# Patient Record
Sex: FEMALE | Race: WHITE | Employment: OTHER | URBAN - NONMETROPOLITAN AREA
[De-identification: names, ages, dates, MRNs, and addresses within clinical notes are randomized per-mention and may not be internally consistent; named-entity substitution may affect disease eponyms.]

---

## 2017-01-26 ENCOUNTER — HOSPITAL ENCOUNTER (OUTPATIENT)
Dept: ULTRASOUND IMAGING | Age: 82
Discharge: HOME | End: 2017-01-26
Payer: MEDICARE

## 2017-01-26 DIAGNOSIS — D47.3 ESSENTIAL HEMORRHAGIC THROMBOCYTHEMIA (HCC): ICD-10-CM

## 2017-01-26 PROCEDURE — 76705 ECHO EXAM OF ABDOMEN: CPT

## 2017-03-23 ENCOUNTER — HOSPITAL ENCOUNTER (OUTPATIENT)
Facility: HOSPITAL | Age: 82
Setting detail: OBSERVATION
Discharge: HOME | End: 2017-03-25
Attending: EMERGENCY MEDICINE | Admitting: FAMILY MEDICINE

## 2017-03-23 ENCOUNTER — APPOINTMENT (OUTPATIENT)
Dept: GENERAL RADIOLOGY | Facility: HOSPITAL | Age: 82
End: 2017-03-23

## 2017-03-23 ENCOUNTER — HOSPITAL ENCOUNTER (OUTPATIENT)
Dept: GENERAL RADIOLOGY | Age: 82
Discharge: HOME | End: 2017-03-23
Payer: MEDICARE

## 2017-03-23 ENCOUNTER — APPOINTMENT (OUTPATIENT)
Dept: CT IMAGING | Facility: HOSPITAL | Age: 82
End: 2017-03-23

## 2017-03-23 DIAGNOSIS — R79.89 POSITIVE D DIMER: ICD-10-CM

## 2017-03-23 DIAGNOSIS — R06.02 SHORTNESS OF BREATH: ICD-10-CM

## 2017-03-23 DIAGNOSIS — D72.829 LEUKOCYTOSIS, UNSPECIFIED TYPE: ICD-10-CM

## 2017-03-23 DIAGNOSIS — I20.9 ISCHEMIC CHEST PAIN (HCC): Primary | ICD-10-CM

## 2017-03-23 DIAGNOSIS — I20.0 UNSTABLE ANGINA PECTORIS (HCC): ICD-10-CM

## 2017-03-23 DIAGNOSIS — R07.9 CHEST PAIN, UNSPECIFIED: ICD-10-CM

## 2017-03-23 LAB
ALBUMIN SERPL-MCNC: 3.8 G/DL (ref 3.5–5)
ALBUMIN/GLOB SERPL: 1.5 G/DL (ref 1.1–2.5)
ALP SERPL-CCNC: 80 U/L (ref 24–120)
ALT SERPL W P-5'-P-CCNC: 16 U/L (ref 0–54)
ANION GAP SERPL CALCULATED.3IONS-SCNC: 9 MMOL/L (ref 4–13)
ANISOCYTOSIS BLD QL: ABNORMAL
AST SERPL-CCNC: 29 U/L (ref 7–45)
BASO STIPL COARSE BLD QL SMEAR: ABNORMAL
BASOPHILS # BLD MANUAL: 0.46 10*3/MM3 (ref 0–0.2)
BASOPHILS NFR BLD AUTO: 3 % (ref 0–2)
BILIRUB SERPL-MCNC: 0.3 MG/DL (ref 0.1–1)
BUN BLD-MCNC: 32 MG/DL (ref 5–21)
BUN/CREAT SERPL: 28.3 (ref 7–25)
CALCIUM SPEC-SCNC: 8.8 MG/DL (ref 8.4–10.4)
CHLORIDE SERPL-SCNC: 98 MMOL/L (ref 98–110)
CO2 SERPL-SCNC: 33 MMOL/L (ref 24–31)
CREAT BLD-MCNC: 1.13 MG/DL (ref 0.5–1.4)
D DIMER PPP FEU-MCNC: 0.82 MG/L (FEU) (ref 0–0.5)
DEPRECATED RDW RBC AUTO: 76.4 FL (ref 40–54)
EOSINOPHIL # BLD MANUAL: 1.69 10*3/MM3 (ref 0–0.7)
EOSINOPHIL NFR BLD MANUAL: 11 % (ref 0–4)
ERYTHROCYTE [DISTWIDTH] IN BLOOD BY AUTOMATED COUNT: 23.5 % (ref 12–15)
GFR SERPL CREATININE-BSD FRML MDRD: 46 ML/MIN/1.73
GLOBULIN UR ELPH-MCNC: 2.6 GM/DL
GLUCOSE BLD-MCNC: 144 MG/DL (ref 70–100)
HCT VFR BLD AUTO: 40.6 % (ref 37–47)
HGB BLD-MCNC: 13.1 G/DL (ref 12–16)
INR PPP: 0.99 (ref 0.91–1.09)
LYMPHOCYTES # BLD MANUAL: 2.91 10*3/MM3 (ref 0.72–4.86)
LYMPHOCYTES NFR BLD MANUAL: 19 % (ref 15–45)
LYMPHOCYTES NFR BLD MANUAL: 3 % (ref 4–12)
MCH RBC QN AUTO: 30.1 PG (ref 28–32)
MCHC RBC AUTO-ENTMCNC: 32.3 G/DL (ref 33–36)
MCV RBC AUTO: 93.3 FL (ref 82–98)
MONOCYTES # BLD AUTO: 0.46 10*3/MM3 (ref 0.19–1.3)
NEUTROPHILS # BLD AUTO: 9.35 10*3/MM3 (ref 1.87–8.4)
NEUTROPHILS NFR BLD MANUAL: 54 % (ref 39–78)
NEUTS BAND NFR BLD MANUAL: 7 % (ref 0–10)
NEUTS VAC BLD QL SMEAR: ABNORMAL
PLATELET # BLD AUTO: 586 10*3/MM3 (ref 130–400)
PMV BLD AUTO: 10.8 FL (ref 6–12)
POLYCHROMASIA BLD QL SMEAR: ABNORMAL
POTASSIUM BLD-SCNC: 4.6 MMOL/L (ref 3.5–5.3)
PROT SERPL-MCNC: 6.4 G/DL (ref 6.3–8.7)
PROTHROMBIN TIME: 13.4 SECONDS (ref 11.9–14.6)
RBC # BLD AUTO: 4.35 10*6/MM3 (ref 4.2–5.4)
SCAN SLIDE: NORMAL
SMALL PLATELETS BLD QL SMEAR: ABNORMAL
SODIUM BLD-SCNC: 140 MMOL/L (ref 135–145)
TOXIC GRANULATION: ABNORMAL
TROPONIN I SERPL-MCNC: 0 NG/ML (ref 0–0.07)
TROPONIN I SERPL-MCNC: 0 NG/ML (ref 0–0.07)
VARIANT LYMPHS NFR BLD MANUAL: 3 % (ref 0–5)
WBC NRBC COR # BLD: 15.32 10*3/MM3 (ref 4.8–10.8)

## 2017-03-23 PROCEDURE — 85610 PROTHROMBIN TIME: CPT | Performed by: EMERGENCY MEDICINE

## 2017-03-23 PROCEDURE — 0 IOPAMIDOL PER 1 ML: Performed by: EMERGENCY MEDICINE

## 2017-03-23 PROCEDURE — 36415 COLL VENOUS BLD VENIPUNCTURE: CPT

## 2017-03-23 PROCEDURE — 96372 THER/PROPH/DIAG INJ SC/IM: CPT

## 2017-03-23 PROCEDURE — 93010 ELECTROCARDIOGRAM REPORT: CPT | Performed by: INTERNAL MEDICINE

## 2017-03-23 PROCEDURE — 84484 ASSAY OF TROPONIN QUANT: CPT

## 2017-03-23 PROCEDURE — 71020 XR CHEST STANDARD TWO VW: CPT

## 2017-03-23 PROCEDURE — 85379 FIBRIN DEGRADATION QUANT: CPT | Performed by: EMERGENCY MEDICINE

## 2017-03-23 PROCEDURE — 99285 EMERGENCY DEPT VISIT HI MDM: CPT

## 2017-03-23 PROCEDURE — 80053 COMPREHEN METABOLIC PANEL: CPT | Performed by: EMERGENCY MEDICINE

## 2017-03-23 PROCEDURE — 71010 HC CHEST PA OR AP: CPT

## 2017-03-23 PROCEDURE — 36415 COLL VENOUS BLD VENIPUNCTURE: CPT | Performed by: EMERGENCY MEDICINE

## 2017-03-23 PROCEDURE — 85025 COMPLETE CBC W/AUTO DIFF WBC: CPT | Performed by: EMERGENCY MEDICINE

## 2017-03-23 PROCEDURE — G0378 HOSPITAL OBSERVATION PER HR: HCPCS

## 2017-03-23 PROCEDURE — 93005 ELECTROCARDIOGRAM TRACING: CPT | Performed by: EMERGENCY MEDICINE

## 2017-03-23 PROCEDURE — 85007 BL SMEAR W/DIFF WBC COUNT: CPT | Performed by: EMERGENCY MEDICINE

## 2017-03-23 PROCEDURE — 25010000002 ENOXAPARIN PER 10 MG: Performed by: EMERGENCY MEDICINE

## 2017-03-23 PROCEDURE — 71275 CT ANGIOGRAPHY CHEST: CPT

## 2017-03-23 PROCEDURE — 93005 ELECTROCARDIOGRAM TRACING: CPT

## 2017-03-23 RX ORDER — INSULIN GLARGINE 100 [IU]/ML
26 INJECTION, SOLUTION SUBCUTANEOUS NIGHTLY
COMMUNITY
End: 2018-04-13 | Stop reason: HOSPADM

## 2017-03-23 RX ORDER — ASPIRIN 81 MG/1
162 TABLET, CHEWABLE ORAL ONCE
Status: DISCONTINUED | OUTPATIENT
Start: 2017-03-23 | End: 2017-03-23

## 2017-03-23 RX ORDER — SIMVASTATIN 40 MG
40 TABLET ORAL NIGHTLY
Status: ON HOLD | COMMUNITY
End: 2017-03-25

## 2017-03-23 RX ORDER — FUROSEMIDE 40 MG/1
40 TABLET ORAL DAILY
COMMUNITY
End: 2018-03-21 | Stop reason: HOSPADM

## 2017-03-23 RX ORDER — SODIUM CHLORIDE 0.9 % (FLUSH) 0.9 %
10 SYRINGE (ML) INJECTION AS NEEDED
Status: DISCONTINUED | OUTPATIENT
Start: 2017-03-23 | End: 2017-03-24

## 2017-03-23 RX ORDER — GABAPENTIN 100 MG/1
100 CAPSULE ORAL 3 TIMES DAILY
COMMUNITY
End: 2018-01-29 | Stop reason: DRUGHIGH

## 2017-03-23 RX ORDER — PANTOPRAZOLE SODIUM 40 MG/1
40 TABLET, DELAYED RELEASE ORAL DAILY
COMMUNITY

## 2017-03-23 RX ORDER — FERROUS SULFATE 325(65) MG
325 TABLET ORAL
COMMUNITY
End: 2018-01-29

## 2017-03-23 RX ORDER — LISINOPRIL 40 MG/1
40 TABLET ORAL DAILY
Status: ON HOLD | COMMUNITY
End: 2018-03-21

## 2017-03-23 RX ORDER — HYDROXYUREA 500 MG/1
500 CAPSULE ORAL TAKE AS DIRECTED
COMMUNITY
End: 2018-03-21 | Stop reason: HOSPADM

## 2017-03-23 RX ORDER — ASPIRIN 81 MG/1
324 TABLET, CHEWABLE ORAL ONCE
Status: DISCONTINUED | OUTPATIENT
Start: 2017-03-23 | End: 2017-03-23

## 2017-03-23 RX ORDER — NIFEDIPINE 10 MG/1
10 CAPSULE ORAL ONCE
Status: COMPLETED | OUTPATIENT
Start: 2017-03-23 | End: 2017-03-23

## 2017-03-23 RX ORDER — ASPIRIN 325 MG
325 TABLET ORAL NIGHTLY
COMMUNITY
End: 2018-03-21 | Stop reason: HOSPADM

## 2017-03-23 RX ORDER — METOPROLOL SUCCINATE 25 MG/1
25 TABLET, EXTENDED RELEASE ORAL DAILY
Status: ON HOLD | COMMUNITY
End: 2017-03-25

## 2017-03-23 RX ADMIN — NIFEDIPINE 10 MG: 10 CAPSULE, LIQUID FILLED ORAL at 22:31

## 2017-03-23 RX ADMIN — IOPAMIDOL 85.5 ML: 755 INJECTION, SOLUTION INTRAVENOUS at 22:12

## 2017-03-23 RX ADMIN — ENOXAPARIN SODIUM 80 MG: 80 INJECTION SUBCUTANEOUS at 22:32

## 2017-03-24 ENCOUNTER — APPOINTMENT (OUTPATIENT)
Dept: CARDIOLOGY | Facility: HOSPITAL | Age: 82
End: 2017-03-24

## 2017-03-24 LAB
ANION GAP SERPL CALCULATED.3IONS-SCNC: 10 MMOL/L (ref 4–13)
ARTICHOKE IGE QN: <30 MG/DL (ref 0–99)
BH CV ECHO MEAS - AO MAX PG (FULL): 7.8 MMHG
BH CV ECHO MEAS - AO MAX PG: 11.3 MMHG
BH CV ECHO MEAS - AO MEAN PG (FULL): 3 MMHG
BH CV ECHO MEAS - AO MEAN PG: 5 MMHG
BH CV ECHO MEAS - AO ROOT AREA (BSA CORRECTED): 1.7
BH CV ECHO MEAS - AO ROOT AREA: 7.1 CM^2
BH CV ECHO MEAS - AO ROOT DIAM: 3 CM
BH CV ECHO MEAS - AO V2 MAX: 168 CM/SEC
BH CV ECHO MEAS - AO V2 MEAN: 105 CM/SEC
BH CV ECHO MEAS - AO V2 VTI: 48.3 CM
BH CV ECHO MEAS - AVA(I,A): 2.4 CM^2
BH CV ECHO MEAS - AVA(I,D): 2.4 CM^2
BH CV ECHO MEAS - AVA(V,A): 2.3 CM^2
BH CV ECHO MEAS - AVA(V,D): 2.3 CM^2
BH CV ECHO MEAS - BSA(HAYCOCK): 1.9 M^2
BH CV ECHO MEAS - BSA: 1.8 M^2
BH CV ECHO MEAS - BZI_BMI: 34.4 KILOGRAMS/M^2
BH CV ECHO MEAS - BZI_METRIC_HEIGHT: 152.4 CM
BH CV ECHO MEAS - BZI_METRIC_WEIGHT: 79.8 KG
BH CV ECHO MEAS - CONTRAST EF 4CH: 60.2 ML/M^2
BH CV ECHO MEAS - EDV(CUBED): 180.1 ML
BH CV ECHO MEAS - EDV(MOD-SP4): 137 ML
BH CV ECHO MEAS - EDV(TEICH): 156.7 ML
BH CV ECHO MEAS - EF(CUBED): 64.1 %
BH CV ECHO MEAS - EF(MOD-SP4): 60.2 %
BH CV ECHO MEAS - EF(TEICH): 55 %
BH CV ECHO MEAS - ESV(CUBED): 64.6 ML
BH CV ECHO MEAS - ESV(MOD-SP4): 54.5 ML
BH CV ECHO MEAS - ESV(TEICH): 70.6 ML
BH CV ECHO MEAS - FS: 28.9 %
BH CV ECHO MEAS - IVS/LVPW: 1.5
BH CV ECHO MEAS - IVSD: 1.9 CM
BH CV ECHO MEAS - LA DIMENSION: 4.6 CM
BH CV ECHO MEAS - LA/AO: 1.5
BH CV ECHO MEAS - LAT PEAK E' VEL: 6.4 CM/SEC
BH CV ECHO MEAS - LV DIASTOLIC VOL/BSA (35-75): 77.5 ML/M^2
BH CV ECHO MEAS - LV MASS(C)D: 438.8 GRAMS
BH CV ECHO MEAS - LV MASS(C)DI: 248.2 GRAMS/M^2
BH CV ECHO MEAS - LV MAX PG: 3.5 MMHG
BH CV ECHO MEAS - LV MEAN PG: 2 MMHG
BH CV ECHO MEAS - LV SYSTOLIC VOL/BSA (12-30): 30.8 ML/M^2
BH CV ECHO MEAS - LV V1 MAX: 93.2 CM/SEC
BH CV ECHO MEAS - LV V1 MEAN: 62.2 CM/SEC
BH CV ECHO MEAS - LV V1 VTI: 28 CM
BH CV ECHO MEAS - LVIDD: 5.6 CM
BH CV ECHO MEAS - LVIDS: 4 CM
BH CV ECHO MEAS - LVLD AP4: 8.6 CM
BH CV ECHO MEAS - LVLS AP4: 7.6 CM
BH CV ECHO MEAS - LVOT AREA (M): 4.2 CM^2
BH CV ECHO MEAS - LVOT AREA: 4.2 CM^2
BH CV ECHO MEAS - LVOT DIAM: 2.3 CM
BH CV ECHO MEAS - LVPWD: 1.3 CM
BH CV ECHO MEAS - MED PEAK E' VEL: 4.24 CM/SEC
BH CV ECHO MEAS - MR ALIAS VEL: 30 CM/SEC
BH CV ECHO MEAS - MR ERO: 0.03 CM^2
BH CV ECHO MEAS - MR FLOW RATE: 17 CM^3/SEC
BH CV ECHO MEAS - MR MAX PG: 154.8 MMHG
BH CV ECHO MEAS - MR MAX VEL: 622 CM/SEC
BH CV ECHO MEAS - MR MEAN PG: 93 MMHG
BH CV ECHO MEAS - MR MEAN VEL: 443 CM/SEC
BH CV ECHO MEAS - MR PISA RADIUS: 0.3 CM
BH CV ECHO MEAS - MR PISA: 0.57 CM^2
BH CV ECHO MEAS - MR VOLUME: 6.6 ML
BH CV ECHO MEAS - MR VTI: 242 CM
BH CV ECHO MEAS - MV A MAX VEL: 74.1 CM/SEC
BH CV ECHO MEAS - MV DEC TIME: 0.17 SEC
BH CV ECHO MEAS - MV E MAX VEL: 128 CM/SEC
BH CV ECHO MEAS - MV E/A: 1.7
BH CV ECHO MEAS - RAP SYSTOLE: 10 MMHG
BH CV ECHO MEAS - RVSP: 26.6 MMHG
BH CV ECHO MEAS - SI(AO): 193.1 ML/M^2
BH CV ECHO MEAS - SI(CUBED): 65.3 ML/M^2
BH CV ECHO MEAS - SI(LVOT): 65.8 ML/M^2
BH CV ECHO MEAS - SI(MOD-SP4): 46.7 ML/M^2
BH CV ECHO MEAS - SI(TEICH): 48.7 ML/M^2
BH CV ECHO MEAS - SV(AO): 341.4 ML
BH CV ECHO MEAS - SV(CUBED): 115.5 ML
BH CV ECHO MEAS - SV(LVOT): 116.3 ML
BH CV ECHO MEAS - SV(MOD-SP4): 82.5 ML
BH CV ECHO MEAS - SV(TEICH): 86.1 ML
BH CV ECHO MEAS - TR MAX VEL: 204 CM/SEC
BH CV STRESS BP STAGE 1: NORMAL
BH CV STRESS BP STAGE 2: NORMAL
BH CV STRESS BP STAGE 3: NORMAL
BH CV STRESS DOSE DOBUTAMINE STAGE 1: 10
BH CV STRESS DOSE DOBUTAMINE STAGE 2: 20
BH CV STRESS DOSE DOBUTAMINE STAGE 3: 30
BH CV STRESS DURATION MIN STAGE 1: 3
BH CV STRESS DURATION MIN STAGE 2: 3
BH CV STRESS DURATION MIN STAGE 3: 1
BH CV STRESS DURATION SEC STAGE 1: 0
BH CV STRESS DURATION SEC STAGE 2: 0
BH CV STRESS DURATION SEC STAGE 3: 20
BH CV STRESS HR STAGE 1: 68
BH CV STRESS HR STAGE 2: 101
BH CV STRESS HR STAGE 3: 114
BH CV STRESS PROTOCOL 1: NORMAL
BH CV STRESS RECOVERY BP: NORMAL MMHG
BH CV STRESS RECOVERY HR: 76 BPM
BH CV STRESS STAGE 1: 1
BH CV STRESS STAGE 2: 2
BH CV STRESS STAGE 3: 3
BUN BLD-MCNC: 28 MG/DL (ref 5–21)
BUN/CREAT SERPL: 27.2 (ref 7–25)
CALCIUM SPEC-SCNC: 8.9 MG/DL (ref 8.4–10.4)
CHLORIDE SERPL-SCNC: 99 MMOL/L (ref 98–110)
CHOLEST SERPL-MCNC: 84 MG/DL (ref 130–200)
CO2 SERPL-SCNC: 34 MMOL/L (ref 24–31)
CREAT BLD-MCNC: 1.03 MG/DL (ref 0.5–1.4)
DEPRECATED RDW RBC AUTO: 79.3 FL (ref 40–54)
E/E' RATIO: 30.2
ERYTHROCYTE [DISTWIDTH] IN BLOOD BY AUTOMATED COUNT: 24 % (ref 12–15)
GFR SERPL CREATININE-BSD FRML MDRD: 51 ML/MIN/1.73
GLUCOSE BLD-MCNC: 57 MG/DL (ref 70–100)
GLUCOSE BLDC GLUCOMTR-MCNC: 144 MG/DL (ref 70–130)
GLUCOSE BLDC GLUCOMTR-MCNC: 224 MG/DL (ref 70–130)
GLUCOSE BLDC GLUCOMTR-MCNC: 304 MG/DL (ref 70–130)
GLUCOSE BLDC GLUCOMTR-MCNC: 90 MG/DL (ref 70–130)
HBA1C MFR BLD: 7.3 %
HCT VFR BLD AUTO: 44.1 % (ref 37–47)
HDLC SERPL-MCNC: 36 MG/DL
HGB BLD-MCNC: 14.3 G/DL (ref 12–16)
HOLD SPECIMEN: NORMAL
HOLD SPECIMEN: NORMAL
LDLC/HDLC SERPL: ABNORMAL {RATIO}
LEFT ATRIUM VOLUME INDEX: 42.7 ML/M2
LEFT ATRIUM VOLUME: 75.6 CM3
LV EF 2D ECHO EST: 65 %
MAGNESIUM SERPL-MCNC: 1.8 MG/DL (ref 1.4–2.2)
MAXIMAL PREDICTED HEART RATE: 134 BPM
MCH RBC QN AUTO: 30.4 PG (ref 28–32)
MCHC RBC AUTO-ENTMCNC: 32.4 G/DL (ref 33–36)
MCV RBC AUTO: 93.6 FL (ref 82–98)
NT-PROBNP SERPL-MCNC: 2710 PG/ML (ref 0–1800)
PERCENT MAX PREDICTED HR: 85.07 %
PLATELET # BLD AUTO: 597 10*3/MM3 (ref 130–400)
PMV BLD AUTO: 11 FL (ref 6–12)
POTASSIUM BLD-SCNC: 3.7 MMOL/L (ref 3.5–5.3)
RBC # BLD AUTO: 4.71 10*6/MM3 (ref 4.2–5.4)
SODIUM BLD-SCNC: 143 MMOL/L (ref 135–145)
STRESS BASELINE BP: NORMAL MMHG
STRESS BASELINE HR: 61 BPM
STRESS PERCENT HR: 100 %
STRESS POST EXERCISE DUR MIN: 7 MIN
STRESS POST EXERCISE DUR SEC: 20 SEC
STRESS POST PEAK BP: NORMAL MMHG
STRESS POST PEAK HR: 114 BPM
STRESS TARGET HR: 114 BPM
TRIGL SERPL-MCNC: 142 MG/DL (ref 0–149)
TROPONIN I SERPL-MCNC: 0.03 NG/ML (ref 0–0.03)
TROPONIN I SERPL-MCNC: 0.03 NG/ML (ref 0–0.03)
TROPONIN I SERPL-MCNC: 0.04 NG/ML (ref 0–0.03)
TROPONIN I SERPL-MCNC: 0.04 NG/ML (ref 0–0.03)
TSH SERPL DL<=0.05 MIU/L-ACNC: 2.92 MIU/ML (ref 0.47–4.68)
WBC NRBC COR # BLD: 15.64 10*3/MM3 (ref 4.8–10.8)
WHOLE BLOOD HOLD SPECIMEN: NORMAL
WHOLE BLOOD HOLD SPECIMEN: NORMAL

## 2017-03-24 PROCEDURE — 80061 LIPID PANEL: CPT | Performed by: NURSE PRACTITIONER

## 2017-03-24 PROCEDURE — 93350 STRESS TTE ONLY: CPT | Performed by: INTERNAL MEDICINE

## 2017-03-24 PROCEDURE — 93306 TTE W/DOPPLER COMPLETE: CPT

## 2017-03-24 PROCEDURE — 93018 CV STRESS TEST I&R ONLY: CPT | Performed by: INTERNAL MEDICINE

## 2017-03-24 PROCEDURE — 80048 BASIC METABOLIC PNL TOTAL CA: CPT | Performed by: INTERNAL MEDICINE

## 2017-03-24 PROCEDURE — G0378 HOSPITAL OBSERVATION PER HR: HCPCS

## 2017-03-24 PROCEDURE — 82962 GLUCOSE BLOOD TEST: CPT

## 2017-03-24 PROCEDURE — 93017 CV STRESS TEST TRACING ONLY: CPT

## 2017-03-24 PROCEDURE — 63710000001 INSULIN LISPRO (HUMAN) PER 5 UNITS: Performed by: NURSE PRACTITIONER

## 2017-03-24 PROCEDURE — 25010000002 PERFLUTREN (DEFINITY) 8.476 MG IN SODIUM CHLORIDE 10 ML INJECTION: Performed by: INTERNAL MEDICINE

## 2017-03-24 PROCEDURE — 83735 ASSAY OF MAGNESIUM: CPT | Performed by: NURSE PRACTITIONER

## 2017-03-24 PROCEDURE — 84484 ASSAY OF TROPONIN QUANT: CPT | Performed by: INTERNAL MEDICINE

## 2017-03-24 PROCEDURE — 96374 THER/PROPH/DIAG INJ IV PUSH: CPT

## 2017-03-24 PROCEDURE — 25010000002 ENOXAPARIN PER 10 MG: Performed by: NURSE PRACTITIONER

## 2017-03-24 PROCEDURE — 25010000003 DOBUTAMINE PER 250 MG: Performed by: INTERNAL MEDICINE

## 2017-03-24 PROCEDURE — 83880 ASSAY OF NATRIURETIC PEPTIDE: CPT | Performed by: NURSE PRACTITIONER

## 2017-03-24 PROCEDURE — 93306 TTE W/DOPPLER COMPLETE: CPT | Performed by: INTERNAL MEDICINE

## 2017-03-24 PROCEDURE — 96372 THER/PROPH/DIAG INJ SC/IM: CPT

## 2017-03-24 PROCEDURE — C8928 TTE W OR W/O FOL W/CON,STRES: HCPCS

## 2017-03-24 PROCEDURE — 83036 HEMOGLOBIN GLYCOSYLATED A1C: CPT | Performed by: NURSE PRACTITIONER

## 2017-03-24 PROCEDURE — 93352 ADMIN ECG CONTRAST AGENT: CPT | Performed by: INTERNAL MEDICINE

## 2017-03-24 PROCEDURE — 84443 ASSAY THYROID STIM HORMONE: CPT | Performed by: NURSE PRACTITIONER

## 2017-03-24 PROCEDURE — 85027 COMPLETE CBC AUTOMATED: CPT | Performed by: INTERNAL MEDICINE

## 2017-03-24 PROCEDURE — 25010000002 HYDRALAZINE PER 20 MG: Performed by: INTERNAL MEDICINE

## 2017-03-24 RX ORDER — NICOTINE POLACRILEX 4 MG
15 LOZENGE BUCCAL
Status: DISCONTINUED | OUTPATIENT
Start: 2017-03-24 | End: 2017-03-25 | Stop reason: HOSPADM

## 2017-03-24 RX ORDER — METOPROLOL TARTRATE 50 MG/1
50 TABLET, FILM COATED ORAL ONCE
Status: COMPLETED | OUTPATIENT
Start: 2017-03-24 | End: 2017-03-24

## 2017-03-24 RX ORDER — PANTOPRAZOLE SODIUM 40 MG/1
40 TABLET, DELAYED RELEASE ORAL 2 TIMES DAILY
Status: DISCONTINUED | OUTPATIENT
Start: 2017-03-24 | End: 2017-03-25 | Stop reason: HOSPADM

## 2017-03-24 RX ORDER — ASPIRIN 325 MG
325 TABLET ORAL DAILY
Status: DISCONTINUED | OUTPATIENT
Start: 2017-03-24 | End: 2017-03-24 | Stop reason: SDUPTHER

## 2017-03-24 RX ORDER — MORPHINE SULFATE 2 MG/ML
1 INJECTION, SOLUTION INTRAMUSCULAR; INTRAVENOUS
Status: DISCONTINUED | OUTPATIENT
Start: 2017-03-24 | End: 2017-03-25 | Stop reason: HOSPADM

## 2017-03-24 RX ORDER — SODIUM CHLORIDE 0.9 % (FLUSH) 0.9 %
1-10 SYRINGE (ML) INJECTION AS NEEDED
Status: DISCONTINUED | OUTPATIENT
Start: 2017-03-24 | End: 2017-03-25 | Stop reason: HOSPADM

## 2017-03-24 RX ORDER — LISINOPRIL 20 MG/1
40 TABLET ORAL DAILY
Status: DISCONTINUED | OUTPATIENT
Start: 2017-03-24 | End: 2017-03-25 | Stop reason: HOSPADM

## 2017-03-24 RX ORDER — DEXTROSE MONOHYDRATE 25 G/50ML
25 INJECTION, SOLUTION INTRAVENOUS
Status: DISCONTINUED | OUTPATIENT
Start: 2017-03-24 | End: 2017-03-25 | Stop reason: HOSPADM

## 2017-03-24 RX ORDER — ACETAMINOPHEN 325 MG/1
650 TABLET ORAL EVERY 4 HOURS PRN
Status: DISCONTINUED | OUTPATIENT
Start: 2017-03-24 | End: 2017-03-25 | Stop reason: HOSPADM

## 2017-03-24 RX ORDER — HYDROXYUREA 500 MG/1
500 CAPSULE ORAL DAILY
Status: DISCONTINUED | OUTPATIENT
Start: 2017-03-24 | End: 2017-03-24

## 2017-03-24 RX ORDER — NITROGLYCERIN 0.4 MG/1
0.4 TABLET SUBLINGUAL
Status: DISCONTINUED | OUTPATIENT
Start: 2017-03-24 | End: 2017-03-25 | Stop reason: HOSPADM

## 2017-03-24 RX ORDER — HYDROXYUREA 500 MG/1
500 CAPSULE ORAL EVERY OTHER DAY
Status: DISCONTINUED | OUTPATIENT
Start: 2017-03-25 | End: 2017-03-25 | Stop reason: HOSPADM

## 2017-03-24 RX ORDER — METOPROLOL SUCCINATE 25 MG/1
25 TABLET, EXTENDED RELEASE ORAL DAILY
Status: DISCONTINUED | OUTPATIENT
Start: 2017-03-25 | End: 2017-03-24

## 2017-03-24 RX ORDER — ONDANSETRON 2 MG/ML
4 INJECTION INTRAMUSCULAR; INTRAVENOUS EVERY 6 HOURS PRN
Status: DISCONTINUED | OUTPATIENT
Start: 2017-03-24 | End: 2017-03-25 | Stop reason: HOSPADM

## 2017-03-24 RX ORDER — MAGNESIUM HYDROXIDE/ALUMINUM HYDROXICE/SIMETHICONE 120; 1200; 1200 MG/30ML; MG/30ML; MG/30ML
30 SUSPENSION ORAL EVERY 6 HOURS PRN
Status: DISCONTINUED | OUTPATIENT
Start: 2017-03-24 | End: 2017-03-25 | Stop reason: HOSPADM

## 2017-03-24 RX ORDER — ASPIRIN 325 MG
325 TABLET, DELAYED RELEASE (ENTERIC COATED) ORAL DAILY
Status: DISCONTINUED | OUTPATIENT
Start: 2017-03-24 | End: 2017-03-25 | Stop reason: HOSPADM

## 2017-03-24 RX ORDER — FERROUS SULFATE 325(65) MG
325 TABLET ORAL
Status: DISCONTINUED | OUTPATIENT
Start: 2017-03-24 | End: 2017-03-25 | Stop reason: HOSPADM

## 2017-03-24 RX ORDER — HYDRALAZINE HYDROCHLORIDE 20 MG/ML
10 INJECTION INTRAMUSCULAR; INTRAVENOUS EVERY 4 HOURS PRN
Status: DISCONTINUED | OUTPATIENT
Start: 2017-03-24 | End: 2017-03-25 | Stop reason: HOSPADM

## 2017-03-24 RX ORDER — GABAPENTIN 100 MG/1
100 CAPSULE ORAL EVERY 8 HOURS SCHEDULED
Status: DISCONTINUED | OUTPATIENT
Start: 2017-03-24 | End: 2017-03-25 | Stop reason: HOSPADM

## 2017-03-24 RX ORDER — DOBUTAMINE HYDROCHLORIDE 100 MG/100ML
10-50 INJECTION INTRAVENOUS
Status: DISCONTINUED | OUTPATIENT
Start: 2017-03-24 | End: 2017-03-25

## 2017-03-24 RX ORDER — DOCUSATE SODIUM 100 MG/1
100 CAPSULE, LIQUID FILLED ORAL 2 TIMES DAILY
Status: DISCONTINUED | OUTPATIENT
Start: 2017-03-24 | End: 2017-03-25 | Stop reason: HOSPADM

## 2017-03-24 RX ORDER — HYDROXYUREA 500 MG/1
1000 CAPSULE ORAL EVERY OTHER DAY
Status: DISCONTINUED | OUTPATIENT
Start: 2017-03-24 | End: 2017-03-25 | Stop reason: HOSPADM

## 2017-03-24 RX ORDER — ATORVASTATIN CALCIUM 10 MG/1
20 TABLET, FILM COATED ORAL DAILY
Status: DISCONTINUED | OUTPATIENT
Start: 2017-03-24 | End: 2017-03-25 | Stop reason: HOSPADM

## 2017-03-24 RX ORDER — METOPROLOL SUCCINATE 50 MG/1
50 TABLET, EXTENDED RELEASE ORAL DAILY
Status: DISCONTINUED | OUTPATIENT
Start: 2017-03-25 | End: 2017-03-25 | Stop reason: HOSPADM

## 2017-03-24 RX ADMIN — INSULIN LISPRO 5 UNITS: 100 INJECTION, SOLUTION INTRAVENOUS; SUBCUTANEOUS at 16:59

## 2017-03-24 RX ADMIN — HYDROXYUREA 1000 MG: 500 CAPSULE ORAL at 20:34

## 2017-03-24 RX ADMIN — GABAPENTIN 100 MG: 100 CAPSULE ORAL at 05:19

## 2017-03-24 RX ADMIN — PANTOPRAZOLE SODIUM 40 MG: 40 TABLET, DELAYED RELEASE ORAL at 17:00

## 2017-03-24 RX ADMIN — HYDRALAZINE HYDROCHLORIDE 10 MG: 20 INJECTION INTRAMUSCULAR; INTRAVENOUS at 05:17

## 2017-03-24 RX ADMIN — METOPROLOL TARTRATE 50 MG: 50 TABLET ORAL at 16:59

## 2017-03-24 RX ADMIN — HYDRALAZINE HYDROCHLORIDE 10 MG: 20 INJECTION INTRAMUSCULAR; INTRAVENOUS at 14:58

## 2017-03-24 RX ADMIN — LISINOPRIL 40 MG: 20 TABLET ORAL at 11:26

## 2017-03-24 RX ADMIN — ENOXAPARIN SODIUM 40 MG: 40 INJECTION SUBCUTANEOUS at 20:42

## 2017-03-24 RX ADMIN — GABAPENTIN 200 MG: 100 CAPSULE ORAL at 20:35

## 2017-03-24 RX ADMIN — PANTOPRAZOLE SODIUM 40 MG: 40 TABLET, DELAYED RELEASE ORAL at 11:25

## 2017-03-24 RX ADMIN — INSULIN LISPRO 3 UNITS: 100 INJECTION, SOLUTION INTRAVENOUS; SUBCUTANEOUS at 20:43

## 2017-03-24 RX ADMIN — ATORVASTATIN CALCIUM 20 MG: 10 TABLET, FILM COATED ORAL at 11:26

## 2017-03-24 RX ADMIN — DOCUSATE SODIUM 100 MG: 100 CAPSULE ORAL at 17:05

## 2017-03-24 RX ADMIN — GABAPENTIN 100 MG: 100 CAPSULE ORAL at 23:08

## 2017-03-24 RX ADMIN — Medication 325 MG: at 11:28

## 2017-03-24 RX ADMIN — SODIUM CHLORIDE 10 ML: 9 INJECTION INTRAMUSCULAR; INTRAVENOUS; SUBCUTANEOUS at 09:42

## 2017-03-24 RX ADMIN — DOBUTAMINE HYDROCHLORIDE 30 MCG/KG/MIN: 100 INJECTION INTRAVENOUS at 09:35

## 2017-03-24 NOTE — PROGRESS NOTES
Discharge Planning Assessment  [facility]     Patient Name: Tamy Sher  MRN: [id number]  Today's Date: 3/24/2017    Admit Date: 3/23/2017          Discharge Needs Assessment       03/24/17 1621    Living Environment    Lives With alone    Living Arrangements apartment    Provides Primary Care For no one    Quality Of Family Relationships supportive    Able to Return to Prior Living Arrangements yes    Discharge Needs Assessment    Concerns To Be Addressed no discharge needs identified;denies needs/concerns at this time    Readmission Within The Last 30 Days no previous admission in last 30 days    Anticipated Changes Related to Illness none    Equipment Currently Used at Home none    Equipment Needed After Discharge none    Transportation Available family or friend will provide    Discharge Disposition home or self-care            Discharge Plan       03/24/17 1624    Case Management/Social Work Plan    Plan PT resides at home alone and plans to dc to the same with no known needs. PT has family to assist as needed. PT is declining HH at this time. Will follow.     Patient/Family In Agreement With Plan yes        Discharge Placement     No information found                Demographic Summary     None            Functional Status     None            Psychosocial     None            Abuse/Neglect     None            Legal     None            Substance Abuse     None            Patient Forms     None          ADRI Nelson

## 2017-03-24 NOTE — ED NOTES
Called lab phlebotomist[facility]arrison at this time and requested a recollect on the CMP     Radha Acevedo, CLIFFORD  03/23/17 2027

## 2017-03-24 NOTE — ED PROVIDER NOTES
Subjective   Patient is a 86 y.o. female presenting with chest pain.   Chest Pain   Pain location:  Substernal area  Pain quality: aching, burning and dull    Pain radiates to:  L jaw  Pain severity:  Mild  Onset quality:  Gradual  Timing:  Intermittent  Progression:  Resolved  Chronicity:  New  Relieved by:  Nothing  Worsened by:  Nothing  Ineffective treatments:  None tried  Associated symptoms: heartburn    Associated symptoms: no abdominal pain, no AICD problem, no altered mental status, no anorexia, no anxiety, no back pain, no claudication, no diaphoresis, no dizziness, no dysphagia, no fatigue, no fever, no headache, no nausea, no near-syncope, no numbness, no palpitations, no PND, no shortness of breath, no syncope, no vomiting and no weakness    Risk factors: diabetes mellitus    Risk factors: no aortic disease, no high cholesterol, not male, no Marfan's syndrome, no prior DVT/PE and no smoking        Review of Systems   Constitutional: Negative.  Negative for activity change, appetite change, chills, diaphoresis, fatigue and fever.   HENT: Negative for congestion, drooling, ear pain, facial swelling, hearing loss, sinus pressure, sore throat and trouble swallowing.    Eyes: Negative.  Negative for discharge.   Respiratory: Negative for shortness of breath.    Cardiovascular: Positive for chest pain. Negative for palpitations, claudication, syncope, PND and near-syncope.   Gastrointestinal: Positive for heartburn. Negative for abdominal distention, abdominal pain, anorexia, blood in stool, diarrhea, nausea and vomiting.   Endocrine: Negative.  Negative for cold intolerance, heat intolerance, polydipsia, polyphagia and polyuria.   Genitourinary: Negative.  Negative for dysuria, flank pain and urgency.   Musculoskeletal: Negative.  Negative for arthralgias, back pain, myalgias and neck stiffness.   Skin: Negative.  Negative for color change, pallor and rash.   Allergic/Immunologic: Negative.    Neurological:  Negative.  Negative for dizziness, seizures, speech difficulty, weakness, numbness and headaches.   Hematological: Negative.  Negative for adenopathy.   All other systems reviewed and are negative.      Past Medical History:   Diagnosis Date   • Constipation    • Diabetes mellitus    • Diarrhea    • Diastolic dysfunction, left ventricle    • Esophagitis    • Exertional shortness of breath    • Hyperlipidemia    • Lumbar spondylitis    • Osteoarthritis    • Peripheral vascular disease    • Sinusitis        No Known Allergies    Past Surgical History:   Procedure Laterality Date   • REPLACEMENT TOTAL KNEE Left        History reviewed. No pertinent family history.    Social History     Social History   • Marital status:      Spouse name: N/A   • Number of children: N/A   • Years of education: N/A     Social History Main Topics   • Smoking status: Never Smoker   • Smokeless tobacco: None   • Alcohol use No   • Drug use: No   • Sexual activity: Not Asked     Other Topics Concern   • None     Social History Narrative   • None           Objective   Physical Exam   Constitutional: She is oriented to person, place, and time. She appears well-developed and well-nourished.   HENT:   Head: Normocephalic.   Right Ear: External ear normal.   Eyes: Conjunctivae are normal. Pupils are equal, round, and reactive to light.   Neck: Normal range of motion. Neck supple.   Cardiovascular: Normal rate, regular rhythm, normal heart sounds and intact distal pulses.  PMI is not displaced.  Exam reveals no decreased pulses.    No murmur heard.  Pulmonary/Chest: Effort normal and breath sounds normal. No accessory muscle usage. No tachypnea. No respiratory distress. She has no decreased breath sounds. She has no wheezes. She has no rales. She exhibits no tenderness.   Abdominal: Soft. Bowel sounds are normal. There is no tenderness.   Musculoskeletal: Normal range of motion. She exhibits no edema or tenderness.   Lower extremity exam  bilaterally is unremarkable.  There is no right or left calf tenderness .  There is no palpable venous cord.  No obvious difference in the size of the legs.  No pitting edema.  The dorsalis pedis and posterior tibial femoral and popliteal pulses are palpable and +2 bilaterally.  Homans sign is negative       Vascular Status -  Her exam exhibits right foot vasculature normal. Her exam exhibits no right foot edema. Her exam exhibits left foot vasculature normal. Her exam exhibits no left foot edema.  Neurological: She is alert and oriented to person, place, and time. She has normal reflexes. No cranial nerve deficit. Coordination normal.   Skin: Skin is warm. No rash noted. No erythema.   Nursing note and vitals reviewed.      Procedures         ED Course  ED Course   Comment By Time   Patient with chest discomfort lab workup was initiated Davidge essentially is negative d-dimer is elevated CT of the chest was performed pending report's custody, with hospitalist patient needs to be admitted has received  lovenox Lovenox Jose Carlos Ayoub MD 03/23 2232                  [facility]      Final diagnoses:   Ischemic chest pain   Unstable angina pectoris   Positive D dimer   Leukocytosis, unspecified type            Jose Carlos Ayoub MD  03/23/17 2235       Jose Carlos Ayoub MD  03/23/17 2239

## 2017-03-24 NOTE — H&P
[facility] Medicine Services  HISTORY AND PHYSICAL    Date of Admission: 3/23/2017  Primary Care Physician: Barry Foley MD    Subjective     Chief Complaint: Chest pain    History of Present Illness  The patient is a pleasant 86-year-old  female who presented to [facility] emergency department yesterday complaining of chest pain, shortness of breath and fatigue.  She states her chest pains been going on for about 3 weeks.  Her primary medical provider is Dr. Barry Foley.  She states she has no cardiac history, she does report a she stroke she believes was in 2008.  She describes her chest pain as a heavy pressure with occasional radiation into her jaw.  She also expresses some exertional dyspnea.  Her chest pain and dyspnea are relieved by rest.  In the emergency department she was found to have an elevated d-dimer, CTA chest was negative for pulmonary embolus.  She was noted to have small pleural effusions with the left greater than right.  CTA also showed thick walled esophagus and possibly suggesting esophagitis.  She denies any fever or chills.  She denies any nausea, vomiting, or diarrhea.    Review of Systems   Constitutional: Positive for activity change and fatigue. Negative for appetite change, chills and fever.   HENT: Negative for hearing loss, nosebleeds, tinnitus and trouble swallowing.    Eyes: Negative for visual disturbance.   Respiratory: Positive for chest tightness and shortness of breath. Negative for cough and wheezing.    Cardiovascular: Positive for chest pain. Negative for palpitations and leg swelling.   Gastrointestinal: Negative for abdominal distention, abdominal pain, blood in stool, constipation, diarrhea, nausea and vomiting.   Endocrine: Negative for cold intolerance, heat intolerance, polydipsia, polyphagia and polyuria.   Genitourinary: Negative for decreased urine volume, difficulty urinating, dysuria, flank pain,  frequency and hematuria.   Musculoskeletal: Negative for arthralgias, joint swelling and myalgias.   Skin: Negative for rash.   Allergic/Immunologic: Negative for immunocompromised state.   Neurological: Positive for weakness. Negative for dizziness, syncope, light-headedness and headaches.   Hematological: Negative for adenopathy. Does not bruise/bleed easily.   Psychiatric/Behavioral: Negative for confusion and sleep disturbance. The patient is not nervous/anxious.       Otherwise complete ROS reviewed and negative except as mentioned in the HPI.    Past Medical History:   Past Medical History:   Diagnosis Date   • Constipation    • Diabetes mellitus    • Diarrhea    • Diastolic dysfunction, left ventricle    • Esophagitis    • Exertional shortness of breath    • Hyperlipidemia    • Lumbar spondylitis    • Osteoarthritis    • Peripheral vascular disease    • Sinusitis      Past Surgical History:  Past Surgical History:   Procedure Laterality Date   • JOINT REPLACEMENT     • REPLACEMENT TOTAL KNEE Left      Social History:  reports that she has never smoked. She does not have any smokeless tobacco history on file. She reports that she does not drink alcohol or use illicit drugs.    Family History: She reports a family history of hypertension, heart disease, cerebrovascular disease and diabetes.    Allergies:  No Known Allergies    Medications:  Prior to Admission medications    Medication Sig Start Date End Date Taking? Authorizing Provider   aspirin 325 MG tablet Take 325 mg by mouth Daily.   Yes Historical Provider, MD   ferrous sulfate 325 (65 FE) MG tablet Take 325 mg by mouth Daily With Breakfast.   Yes Historical Provider, MD   furosemide (LASIX) 40 MG tablet Take 40 mg by mouth Daily As Needed.   Yes Historical Provider, MD   gabapentin (NEURONTIN) 100 MG capsule Take 100 mg by mouth 3 (Three) Times a Day.   Yes Historical Provider, MD   hydroxyurea (HYDREA) 500 MG capsule Take 500 mg by mouth Daily.   Yes  "Historical Provider, MD   insulin glargine (LANTUS) 100 UNIT/ML injection Inject 35 Units under the skin Every Night.   Yes Historical Provider, MD   lisinopril (PRINIVIL,ZESTRIL) 40 MG tablet Take 40 mg by mouth Daily.   Yes Historical Provider, MD   metFORMIN (GLUCOPHAGE) 1000 MG tablet Take 1,000 mg by mouth 2 (Two) Times a Day With Meals.   Yes Historical Provider, MD   metoprolol succinate XL (TOPROL-XL) 25 MG 24 hr tablet Take 25 mg by mouth Daily.   Yes Historical Provider, MD   pantoprazole (PROTONIX) 40 MG EC tablet Take 40 mg by mouth 2 (Two) Times a Day.   Yes Historical Provider, MD   simvastatin (ZOCOR) 40 MG tablet Take 40 mg by mouth Every Night.   Yes Historical Provider, MD     Objective     Vital Signs: /68 (BP Location: Right arm, Patient Position: Lying)  Pulse 79  Temp 98.2 °F (36.8 °C) (Temporal Artery )   Resp 20  Ht 60\" (152.4 cm)  Wt 176 lb 1.6 oz (79.9 kg)  SpO2 99%  BMI 34.39 kg/m2     Physical Exam   Constitutional: She is oriented to person, place, and time. She appears well-developed and well-nourished.   Obese   HENT:   Head: Normocephalic and atraumatic.   Eyes: Conjunctivae and EOM are normal. Pupils are equal, round, and reactive to light.   Neck: Neck supple. No JVD present. No thyromegaly present.   Cardiovascular: Normal rate, regular rhythm, normal heart sounds and intact distal pulses.  Exam reveals no gallop and no friction rub.    No murmur heard.  Pulmonary/Chest: Effort normal and breath sounds normal. No respiratory distress. She has no wheezes. She has no rales. She exhibits no tenderness.   Abdominal: Soft. Bowel sounds are normal. She exhibits no distension. There is no tenderness. There is no rebound and no guarding.   Musculoskeletal: Normal range of motion. She exhibits no edema, tenderness or deformity.   Lymphadenopathy:     She has no cervical adenopathy.   Neurological: She is alert and oriented to person, place, and time. She displays normal " reflexes. No cranial nerve deficit. She exhibits normal muscle tone.   Skin: Skin is warm and dry. No rash noted.   Psychiatric: She has a normal mood and affect. Her behavior is normal. Judgment and thought content normal.   Vitals reviewed.    Results Reviewed:  Lab Results (last 24 hours)     Procedure Component Value Units Date/Time    POC Troponin, Rapid [[id number]]  (Normal) Collected:  03/23/17 1937    Specimen:  Blood Updated:  03/23/17 1951     Troponin I 0.00 ng/mL       Serial Number: [id number]    : [id number]       Protime-INR [[id number]]  (Normal) Collected:  03/23/17 1930    Specimen:  Blood Updated:  03/23/17 2003     Protime 13.4 Seconds      INR 0.99    D-dimer, Quantitative [[id number]]  (Abnormal) Collected:  03/23/17 1930    Specimen:  Blood Updated:  03/23/17 2003     D-Dimer, Quantitative 0.82 (H) mg/L (FEU)     Narrative:       Reference Range is 0-0.50 mg/L FEU. However, results <0.50 mg/L FEU tends to rule out DVT or PE. Results >0.50 mg/L FEU are not useful in predicting absence or presence of DVT or PE.    CBC Auto Differential [[id number]]  (Abnormal) Collected:  03/23/17 1930    Specimen:  Blood Updated:  03/23/17 2042     WBC 15.32 (H) 10*3/mm3      RBC 4.35 10*6/mm3      Hemoglobin 13.1 g/dL      Hematocrit 40.6 %      MCV 93.3 fL      MCH 30.1 pg      MCHC 32.3 (L) g/dL      RDW 23.5 (H) %      RDW-SD 76.4 (H) fl      MPV 10.8 fL      Platelets 586 (H) 10*3/mm3     Scan Slide [[id number]] Collected:  03/23/17 1930    Specimen:  Blood Updated:  03/23/17 2042     Scan Slide --      See Manual Differential Results       CBC & Differential [[id number]] Collected:  03/23/17 1930    Specimen:  Blood Updated:  03/23/17 2042    Narrative:       The following orders were created for panel order CBC & Differential.  Procedure                               Abnormality         Status                     ---------                               -----------         ------                     Manual  Differential[[id number]]           Abnormal            Final result               Scan Slide[[id number]]                                        Final result               CBC Auto Differential[[id number]]         Abnormal            Final result                 Please view results for these tests on the individual orders.    Manual Differential [[id number]]  (Abnormal) Collected:  03/23/17 1930    Specimen:  Blood Updated:  03/23/17 2042     Neutrophil % 54.0 %      Lymphocyte % 19.0 %      Monocyte % 3.0 (L) %      Eosinophil % 11.0 (H) %      Basophil % 3.0 (H) %      Bands %  7.0 %      Atypical Lymphocyte % 3.0 %      Neutrophils Absolute 9.35 (H) 10*3/mm3      Lymphocytes Absolute 2.91 10*3/mm3      Monocytes Absolute 0.46 10*3/mm3      Eosinophils Absolute 1.69 (H) 10*3/mm3      Basophils Absolute 0.46 (H) 10*3/mm3      Anisocytosis Mod/2+     Basophilic Stippling Slight/1+     Polychromasia Slight/1+     Toxic Granulation Mod/2+     Vacuolated Neutrophils Slight/1+     Platelet Estimate Increased    Comprehensive Metabolic Panel [[id number]]  (Abnormal) Collected:  03/23/17 2057    Specimen:  Blood Updated:  03/23/17 2124     Glucose 144 (H) mg/dL      BUN 32 (H) mg/dL      Creatinine 1.13 mg/dL      Sodium 140 mmol/L      Potassium 4.6 mmol/L      Chloride 98 mmol/L      CO2 33.0 (H) mmol/L      Calcium 8.8 mg/dL      Total Protein 6.4 g/dL      Albumin 3.80 g/dL      ALT (SGPT) 16 U/L      AST (SGOT) 29 U/L      Alkaline Phosphatase 80 U/L      Total Bilirubin 0.3 mg/dL      eGFR Non African Amer 46 (L) mL/min/1.73      Globulin 2.6 gm/dL      A/G Ratio 1.5 g/dL      BUN/Creatinine Ratio 28.3 (H)     Anion Gap 9.0 mmol/L     Narrative:       The MDRD GFR formula is only valid for adults with stable renal function between ages 18 and 70.    POC Troponin, Rapid [[id number]]  (Normal) Collected:  03/23/17 2252    Specimen:  Blood Updated:  03/23/17 2305     Troponin I 0.00 ng/mL       Serial Number: [id number]    :  [id number]       [facility] Draw [[id number]] Collected:  03/23/17 1930    Specimen:  Blood Updated:  03/24/17 0001    Narrative:       The following orders were created for panel order [facility] Draw.  Procedure                               Abnormality         Status                     ---------                               -----------         ------                     Light Blue Top[[id number]]                                    Final result               Green Top (Gel)[[id number]]                                   Final result               Lavender Top[[id number]]                                      Final result               Red Top[[id number]]                                           Final result               Green Top (No Gel)[[id number]]                                                             Please view results for these tests on the individual orders.    Light Blue Top [[id number]] Collected:  03/23/17 1930    Specimen:  Blood Updated:  03/24/17 0001     Extra Tube hold for add-on      Auto resulted       Green Top (Gel) [[id number]] Collected:  03/23/17 1930    Specimen:  Blood Updated:  03/24/17 0001     Extra Tube Hold for add-ons.      Auto resulted.       Lavender Top [[id number]] Collected:  03/23/17 1930    Specimen:  Blood Updated:  03/24/17 0001     Extra Tube hold for add-on      Auto resulted       Red Top [[id number]] Collected:  03/23/17 1930    Specimen:  Blood Updated:  03/24/17 0001     Extra Tube Hold for add-ons.      Auto resulted.       POC Glucose Fingerstick [[id number]]  (Normal) Collected:  03/24/17 0045    Specimen:  Blood Updated:  03/24/17 0059     Glucose 90 mg/dL       : [id number] Rene DeannaMeter ID: [id number]       Troponin [[id number]]  (Normal) Collected:  03/24/17 0059    Specimen:  Blood Updated:  03/24/17 0223     Troponin I 0.033 ng/mL     Troponin [[id number]]  (Abnormal) Collected:  03/24/17 0406    Specimen:  Blood Updated:  03/24/17 0542     Troponin I 0.041 (H) ng/mL     Troponin  [[id number]] Collected:  03/24/17 0612    Specimen:  Blood Updated:  03/24/17 0644    CBC (No Diff) [[id number]]  (Abnormal) Collected:  03/24/17 0612    Specimen:  Blood Updated:  03/24/17 0657     WBC 15.64 (H) 10*3/mm3      RBC 4.71 10*6/mm3      Hemoglobin 14.3 g/dL      Hematocrit 44.1 %      MCV 93.6 fL      MCH 30.4 pg      MCHC 32.4 (L) g/dL      RDW 24.0 (H) %      RDW-SD 79.3 (H) fl      MPV 11.0 fL      Platelets 597 (H) 10*3/mm3     Basic Metabolic Panel [[id number]]  (Abnormal) Collected:  03/24/17 0612    Specimen:  Blood Updated:  03/24/17 0709     Glucose 57 (L) mg/dL      BUN 28 (H) mg/dL      Creatinine 1.03 mg/dL      Sodium 143 mmol/L      Potassium 3.7 mmol/L      Chloride 99 mmol/L      CO2 34.0 (H) mmol/L      Calcium 8.9 mg/dL      eGFR Non African Amer 51 (L) mL/min/1.73      BUN/Creatinine Ratio 27.2 (H)     Anion Gap 10.0 mmol/L     Narrative:       The MDRD GFR formula is only valid for adults with stable renal function between ages 18 and 70.        Imaging Results (last 24 hours)     Procedure Component Value Units Date/Time    XR Chest 1 View [[id number]] Collected:  03/23/17 2023     Updated:  03/23/17 2040    Narrative:       EXAMINATION:   XR CHEST 1 VW-  3/23/2017 7:54 PM CDT     HISTORY: Chest pain.     A single view of the chest is obtained. The lungs are clear. The cardiac  silhouette is mildly enlarged. This is compared to the prior study of  08/17/2016.       Impression:       Mild cardiomegaly with no evidence of pulmonary vascular  congestion.  This report was finalized on 03/23/2017 20:37 by Dr. Klever Hooker MD.    CT Angiogram Chest With Contrast [[id number]] Updated:  03/23/17 2211        I have personally reviewed and interpreted the radiology studies and ECG obtained at time of admission.     Assessment / Plan     Assessment & Plan  Hospital Problem List     Ischemic chest pain      1.  Chest pain  2.  Exertional dyspnea  3.  Leukocytosis  4.  Mildly elevated d-dimer, CTA chest  negative for pulmonary embolus  5.  Type II diabetes  6.  Hyperlipidemia  7.  History of stroke    Plan:    The patient will be admitted to the hospitalist service for workup and management.  The patient has had serial troponins all remaining negative.  We will get a dobutamine stress echo today as well as a 2-D echocardiogram to evaluate further.  We will check a lipid profile, hemoglobin A1c and TSH now.  She will be nothing by mouth currently for testing.  We will discontinue her therapeutic Lovenox as she does not have a pulmonary embolus or a positive troponin.  We will start prophylactic Lovenox with teds as well.  We will obtain Accu-Cheks before meals and at bedtime with low-dose sliding scale insulin.  Please note this is initial evaluation workup is ongoing.      Code Status: Full     I discussed the patients findings and my recommendations with the patient and Dr. Adames.    Estimated length of stay 24-48 hours    BULMARO Dhaliwal   03/24/17   7:15 AM    I personally evaluated and examined the patient in conjunction with BULMARO Sim and agree with the assessment, treatment plan, and disposition of the patient as recorded by her. My history, exam, and further recommendations are: I have reviewed and agree with the plan.BASIM

## 2017-03-24 NOTE — PLAN OF CARE
Problem: Patient Care Overview (Adult)  Goal: Plan of Care Review  Outcome: Ongoing (interventions implemented as appropriate)    03/24/17 3899   Coping/Psychosocial Response Interventions   Plan Of Care Reviewed With patient   Patient Care Overview   Progress improving   Outcome Evaluation   Outcome Summary/Follow up Plan NO C/O CHEST PAIN THIS SHIFT. DSE INDICATED LOW RISK FOR ISCHEMIA. PATIENT'S BP CONTINUES TO RUN HIGH. HYDRALAZINE PRN GIVEN. BETABLOCKER DOSAGE INCREASED TO 50 MG. CONT TO MONITOR.       Goal: Adult Individualization and Mutuality  Outcome: Ongoing (interventions implemented as appropriate)  Goal: Discharge Needs Assessment  Outcome: Ongoing (interventions implemented as appropriate)    Problem: Acute Coronary Syndrome (ACS) (Adult)  Goal: Signs and Symptoms of Listed Potential Problems Will be Absent or Manageable (Acute Coronary Syndrome)  Outcome: Ongoing (interventions implemented as appropriate)

## 2017-03-24 NOTE — PLAN OF CARE
Problem: Patient Care Overview (Adult)  Goal: Plan of Care Review  Outcome: Ongoing (interventions implemented as appropriate)    03/24/17 0030   Coping/Psychosocial Response Interventions   Plan Of Care Reviewed With patient   Patient Care Overview   Progress improving   Outcome Evaluation   Outcome Summary/Follow up Plan no chest pain on admit       Goal: Adult Individualization and Mutuality  Outcome: Ongoing (interventions implemented as appropriate)  Goal: Discharge Needs Assessment  Outcome: Ongoing (interventions implemented as appropriate)    Problem: Acute Coronary Syndrome (ACS) (Adult)  Goal: Signs and Symptoms of Listed Potential Problems Will be Absent or Manageable (Acute Coronary Syndrome)  Outcome: Ongoing (interventions implemented as appropriate)

## 2017-03-25 VITALS
BODY MASS INDEX: 36.4 KG/M2 | SYSTOLIC BLOOD PRESSURE: 158 MMHG | HEART RATE: 60 BPM | DIASTOLIC BLOOD PRESSURE: 89 MMHG | TEMPERATURE: 96.9 F | RESPIRATION RATE: 20 BRPM | OXYGEN SATURATION: 93 % | HEIGHT: 60 IN | WEIGHT: 185.4 LBS

## 2017-03-25 LAB
ANION GAP SERPL CALCULATED.3IONS-SCNC: 10 MMOL/L (ref 4–13)
ANION GAP SERPL CALCULATED.3IONS-SCNC: 11 MMOL/L (ref 4–13)
BUN BLD-MCNC: 30 MG/DL (ref 5–21)
BUN BLD-MCNC: 35 MG/DL (ref 5–21)
BUN/CREAT SERPL: 18.2 (ref 7–25)
BUN/CREAT SERPL: 20.5 (ref 7–25)
CALCIUM SPEC-SCNC: 8.3 MG/DL (ref 8.4–10.4)
CALCIUM SPEC-SCNC: 8.4 MG/DL (ref 8.4–10.4)
CHLORIDE SERPL-SCNC: 100 MMOL/L (ref 98–110)
CHLORIDE SERPL-SCNC: 101 MMOL/L (ref 98–110)
CO2 SERPL-SCNC: 28 MMOL/L (ref 24–31)
CO2 SERPL-SCNC: 28 MMOL/L (ref 24–31)
CREAT BLD-MCNC: 1.46 MG/DL (ref 0.5–1.4)
CREAT BLD-MCNC: 1.92 MG/DL (ref 0.5–1.4)
DEPRECATED RDW RBC AUTO: 78.6 FL (ref 40–54)
ERYTHROCYTE [DISTWIDTH] IN BLOOD BY AUTOMATED COUNT: 24 % (ref 12–15)
GFR SERPL CREATININE-BSD FRML MDRD: 25 ML/MIN/1.73
GFR SERPL CREATININE-BSD FRML MDRD: 34 ML/MIN/1.73
GLUCOSE BLD-MCNC: 163 MG/DL (ref 70–100)
GLUCOSE BLD-MCNC: 263 MG/DL (ref 70–100)
GLUCOSE BLDC GLUCOMTR-MCNC: 155 MG/DL (ref 70–130)
GLUCOSE BLDC GLUCOMTR-MCNC: 248 MG/DL (ref 70–130)
HCT VFR BLD AUTO: 41 % (ref 37–47)
HGB BLD-MCNC: 13.3 G/DL (ref 12–16)
MCH RBC QN AUTO: 30 PG (ref 28–32)
MCHC RBC AUTO-ENTMCNC: 32.4 G/DL (ref 33–36)
MCV RBC AUTO: 92.6 FL (ref 82–98)
PLATELET # BLD AUTO: 587 10*3/MM3 (ref 130–400)
PMV BLD AUTO: 10.6 FL (ref 6–12)
POTASSIUM BLD-SCNC: 4.2 MMOL/L (ref 3.5–5.3)
POTASSIUM BLD-SCNC: 4.4 MMOL/L (ref 3.5–5.3)
RBC # BLD AUTO: 4.43 10*6/MM3 (ref 4.2–5.4)
SODIUM BLD-SCNC: 138 MMOL/L (ref 135–145)
SODIUM BLD-SCNC: 140 MMOL/L (ref 135–145)
WBC NRBC COR # BLD: 14.53 10*3/MM3 (ref 4.8–10.8)

## 2017-03-25 PROCEDURE — 82962 GLUCOSE BLOOD TEST: CPT

## 2017-03-25 PROCEDURE — 80048 BASIC METABOLIC PNL TOTAL CA: CPT | Performed by: NURSE PRACTITIONER

## 2017-03-25 PROCEDURE — 85027 COMPLETE CBC AUTOMATED: CPT | Performed by: NURSE PRACTITIONER

## 2017-03-25 PROCEDURE — G0378 HOSPITAL OBSERVATION PER HR: HCPCS

## 2017-03-25 PROCEDURE — 96376 TX/PRO/DX INJ SAME DRUG ADON: CPT

## 2017-03-25 RX ORDER — SODIUM CHLORIDE 9 MG/ML
100 INJECTION, SOLUTION INTRAVENOUS CONTINUOUS
Status: DISCONTINUED | OUTPATIENT
Start: 2017-03-25 | End: 2017-03-25

## 2017-03-25 RX ORDER — AMLODIPINE BESYLATE 5 MG/1
5 TABLET ORAL
Status: DISCONTINUED | OUTPATIENT
Start: 2017-03-25 | End: 2017-03-25 | Stop reason: HOSPADM

## 2017-03-25 RX ORDER — SUCRALFATE ORAL 1 G/10ML
1 SUSPENSION ORAL
Qty: 420 ML | Refills: 0 | Status: SHIPPED | OUTPATIENT
Start: 2017-03-25 | End: 2018-01-29

## 2017-03-25 RX ORDER — SIMVASTATIN 40 MG
20 TABLET ORAL NIGHTLY
Qty: 30 TABLET | Refills: 0 | Status: ON HOLD | OUTPATIENT
Start: 2017-03-25 | End: 2018-01-01

## 2017-03-25 RX ORDER — SUCRALFATE ORAL 1 G/10ML
1 SUSPENSION ORAL EVERY 6 HOURS SCHEDULED
Status: DISCONTINUED | OUTPATIENT
Start: 2017-03-25 | End: 2017-03-25 | Stop reason: HOSPADM

## 2017-03-25 RX ORDER — AMLODIPINE BESYLATE 5 MG/1
5 TABLET ORAL
Qty: 30 TABLET | Refills: 0 | Status: SHIPPED | OUTPATIENT
Start: 2017-03-25 | End: 2018-01-29

## 2017-03-25 RX ORDER — METOPROLOL SUCCINATE 25 MG/1
50 TABLET, EXTENDED RELEASE ORAL DAILY
Qty: 30 TABLET | Refills: 0 | Status: SHIPPED | OUTPATIENT
Start: 2017-03-25 | End: 2018-03-21 | Stop reason: HOSPADM

## 2017-03-25 RX ADMIN — ASPIRIN 325 MG: 325 TABLET, COATED ORAL at 09:04

## 2017-03-25 RX ADMIN — ATORVASTATIN CALCIUM 20 MG: 10 TABLET, FILM COATED ORAL at 09:03

## 2017-03-25 RX ADMIN — DOCUSATE SODIUM 100 MG: 100 CAPSULE ORAL at 09:04

## 2017-03-25 RX ADMIN — ALUMINUM HYDROXIDE, MAGNESIUM HYDROXIDE, AND SIMETHICONE 30 ML: 200; 200; 20 SUSPENSION ORAL at 04:58

## 2017-03-25 RX ADMIN — METOPROLOL SUCCINATE 50 MG: 50 TABLET, FILM COATED, EXTENDED RELEASE ORAL at 09:03

## 2017-03-25 RX ADMIN — LISINOPRIL 40 MG: 20 TABLET ORAL at 09:04

## 2017-03-25 RX ADMIN — PANTOPRAZOLE SODIUM 40 MG: 40 TABLET, DELAYED RELEASE ORAL at 09:03

## 2017-03-25 RX ADMIN — Medication 325 MG: at 09:04

## 2017-03-25 RX ADMIN — AMLODIPINE BESYLATE 5 MG: 5 TABLET ORAL at 09:03

## 2017-03-25 RX ADMIN — SODIUM CHLORIDE 100 ML/HR: 9 INJECTION, SOLUTION INTRAVENOUS at 09:03

## 2017-03-25 NOTE — DISCHARGE SUMMARY
"    [facility] Medicine Services  DISCHARGE SUMMARY       Date of Admission: 3/23/2017  Date of Discharge:  3/25/2017  Primary Care Physician: Barry Foley MD    Presenting Problem/History of Present Illness:  Ischemic chest pain [I20.9]  Ischemic chest pain [I20.9]     Final Discharge Diagnoses:  Hospital Problem List     Ischemic chest pain      1. Chest pain  2. Exertional dyspnea  3. Leukocytosis  4. Mildly elevated d-dimer, CTA chest negative for pulmonary embolus  5. Type II diabetes  6. Hyperlipidemia  7. History of stroke    Consults: None    Procedures Performed:   Tamy BALAJI Nikky   Echocardiogram stress test and limited echocardiogram with contrast   Order# [id number]    Reading physician: Addison Dash MD   Ordering physician: BULMARO Dhaliwal   Study date: 3/24/17         Patient Information      Patient Name MRN Sex  (Age)     Tamy Sher [id number] Female 1930 (86 y.o.)       Admission Information      Admission Date/Time Discharge Date/Time Room/Bed     17  1917  430/1       Patient Height & Weight      Height Weight BSA (Calculated - sq m) BMI (kg/m2) Pulse     60\" (152.4 cm) 185 lb 6.4 oz (84.1 kg) 1.77 sq meters 34.46 60       Patient Vitals      BP Pulse     158/89 60       Reason For Exam      Chest Pain; Dyspnea; Acute Chest Pain; Atypical Chest Pain       Cardiac History      Diagnosis Date Comment     Diabetes mellitus       Hyperlipidemia       Hypertension         Social History      Tobacco Use      Never Smoker.              Family Cardiac History as of 3/25/2017      No family history on file.       Interpretation Summary      Low risk for ischemia     Tamy Sher   Acquired echocardiogram 2D complete   Order# [id number]    Reading physician: Addison Dash MD   Ordering physician: BULMARO Dhaliwal   Study date: 3/24/17         Patient Information      Patient Name MRN Sex  (Age)     Tamy Sher [id number] Female 1930 " (86 y.o.)       Admission Information      Admission Date/Time Discharge Date/Time Room/Bed     03/23/17  1917  430/1       Interpretation Summary      · Left ventricular wall thickness is consistent with moderate concentric hypertrophy.  · Left ventricular function is normal. Estimated EF = 65%.  · Left atrial cavity size is mildly dilated.  · Mild mitral valve regurgitation is present  · Left ventricular diastolic dysfunction (grade II) consistent with pseudonormalization.  · Mild tricuspid valve regurgitation is present.         Pertinent Test Results:   Lab Results (last 24 hours)     Procedure Component Value Units Date/Time    POC Glucose Fingerstick [[id number]]  (Abnormal) Collected:  03/24/17 1624    Specimen:  Blood Updated:  03/24/17 1635     Glucose 304 (H) mg/dL       : [id number] Ketty KaleeMeter ID: [id number]       POC Glucose Fingerstick [[id number]]  (Abnormal) Collected:  03/24/17 1928    Specimen:  Blood Updated:  03/24/17 1939     Glucose 224 (H) mg/dL       : [id number] Jh HungylieMeter ID: [id number]       CBC (No Diff) [[id number]]  (Abnormal) Collected:  03/25/17 0506    Specimen:  Blood Updated:  03/25/17 0525     WBC 14.53 (H) 10*3/mm3      RBC 4.43 10*6/mm3      Hemoglobin 13.3 g/dL      Hematocrit 41.0 %      MCV 92.6 fL      MCH 30.0 pg      MCHC 32.4 (L) g/dL      RDW 24.0 (H) %      RDW-SD 78.6 (H) fl      MPV 10.6 fL      Platelets 587 (H) 10*3/mm3     Basic Metabolic Panel [[id number]]  (Abnormal) Collected:  03/25/17 0505    Specimen:  Blood Updated:  03/25/17 0536     Glucose 163 (H) mg/dL      BUN 35 (H) mg/dL      Creatinine 1.92 (H) mg/dL      Sodium 140 mmol/L      Potassium 4.4 mmol/L      Chloride 101 mmol/L      CO2 28.0 mmol/L      Calcium 8.4 mg/dL      eGFR Non African Amer 25 (L) mL/min/1.73      BUN/Creatinine Ratio 18.2     Anion Gap 11.0 mmol/L     Narrative:       The MDRD GFR formula is only valid for adults with stable renal function between ages 18 and  70.    POC Glucose Fingerstick [[id number]]  (Abnormal) Collected:  03/25/17 0752    Specimen:  Blood Updated:  03/25/17 0804     Glucose 155 (H) mg/dL       : [id number] Matt GreshamMeter ID: [id number]       POC Glucose Fingerstick [[id number]]  (Abnormal) Collected:  03/25/17 1139    Specimen:  Blood Updated:  03/25/17 1200     Glucose 248 (H) mg/dL       : [id number] Matt TaylorMeter ID: [id number]       Basic Metabolic Panel [[id number]]  (Abnormal) Collected:  03/25/17 1257    Specimen:  Blood Updated:  03/25/17 1357     Glucose 263 (H) mg/dL      BUN 30 (H) mg/dL      Creatinine 1.46 (H) mg/dL      Sodium 138 mmol/L      Potassium 4.2 mmol/L      Chloride 100 mmol/L      CO2 28.0 mmol/L      Calcium 8.3 (L) mg/dL      eGFR Non African Amer 34 (L) mL/min/1.73      BUN/Creatinine Ratio 20.5     Anion Gap 10.0 mmol/L     Narrative:       The MDRD GFR formula is only valid for adults with stable renal function between ages 18 and 70.        Imaging Results (last 72 hours)     Procedure Component Value Units Date/Time    XR Chest 1 View [[id number]] Collected:  03/23/17 2023     Updated:  03/23/17 2040    Narrative:       EXAMINATION:   XR CHEST 1 VW-  3/23/2017 7:54 PM CDT     HISTORY: Chest pain.     A single view of the chest is obtained. The lungs are clear. The cardiac  silhouette is mildly enlarged. This is compared to the prior study of  08/17/2016.       Impression:       Mild cardiomegaly with no evidence of pulmonary vascular  congestion.  This report was finalized on 03/23/2017 20:37 by Dr. Klever Hooker MD.    CT Angiogram Chest With Contrast [[id number]] Collected:  03/24/17 0727     Updated:  03/24/17 0733    Narrative:       EXAMINATION: CT ANGIOGRAM CHEST W CONTRAST-      3/23/2017 9:52 PM CDT     HISTORY: cp; I20.9-Angina pectoris, unspecified; I20.0-Unstable angina;  R79.1-Abnormal coagulation profile; D72.829-Elevated white blood cell  count, unspecified     In order to have a CT radiation  dose as low as reasonably achievable  Automated Exposure Control was utilized for adjustment of the mA and/or  KV according to patient size.     DLP in mGycm= 421.     CT angiography protocol.   CT imaging with bolus IV contrast injection.   Under concurrent supervision axial, sagittal, coronal, and  three-dimensional data sets were constructed.     A preliminary NRS/vRAD report was faxed to the appropriate department  immediately after this study was performed.     Cardiomegaly.  Aortic calcification with no aneurysm or dissection.     Symmetric pulmonary arteries. No pulmonary embolism.     Mild basilar atelectasis. Trace left pleural fluid.     No pneumonia or pneumothorax.  No heart failure.     Summary:  1. No pulmonary embolism.  2. No acute lung disease.     This report was finalized on 03/24/2017 07:30 by Dr. Mamadou Garza MD.        Chief Complaint on Day of Discharge: None    Hospital Course:  The patient is a 86 y.o. female who presented to [facility] with chest pain, shortness of breath and fatigue.  She states that her symptoms had been going on for about 3 weeks.  She had negative enzymes in the emergency department and was admitted for further chest pain work up. She has no history of heart disease personally. She did have a stroke in 2008.  Upon evaluation in the emergency department she was found to have an elevated d-dimer and had a CTA of her chest that was negative for pulmonary embolus. She was also noted to have left greater than right small pleural effusions and questionable esophagitis. She underwent a dobutamine stress echo that was low risk for ischemia. Her blood pressure was elevated and her Toprol XL was increased to 50 mg and subsequently added Norvasc as well. She is now 158/89.  She complained of some indigestion and was already started on Protonix therefore i added Carafate to her regimen as well.  Now she is feeling better on reassessment this afternoon and feels back to  "her baseline. She states that she feels ready to go home. She denies any further chest pain, indigestion or shortness of breath and is requesting to go home. She is stable and in good condition for discharge home. She is to follow up with Dr. Barry Foley in one week. She will now be on Toprol Xl 50 mg daily which is an increase from 25 mg, added Norvasc 5 mg daily and decreased her simvastatin to 20 mg QHS.       Condition on Discharge:  stable    Physical Exam on Discharge:  /89 (BP Location: Right arm, Patient Position: Sitting)  Pulse 60  Temp 96.9 °F (36.1 °C) (Temporal Artery )   Resp 20  Ht 60\" (152.4 cm)  Wt 185 lb 6.4 oz (84.1 kg)  SpO2 93%  BMI 36.21 kg/m2     Physical Exam   Constitutional: She is oriented to person, place, and time. She appears well-developed and well-nourished.   HENT:   Head: Normocephalic and atraumatic.   Eyes: Conjunctivae and EOM are normal. Pupils are equal, round, and reactive to light.   Neck: Neck supple. No JVD present. No thyromegaly present.   Cardiovascular: Normal rate, regular rhythm, normal heart sounds and intact distal pulses.  Exam reveals no gallop and no friction rub.    No murmur heard.  Pulmonary/Chest: Effort normal and breath sounds normal. No respiratory distress. She has no wheezes. She has no rales. She exhibits no tenderness.   Abdominal: Soft. Bowel sounds are normal. She exhibits no distension. There is no tenderness. There is no rebound and no guarding.   Musculoskeletal: Normal range of motion. She exhibits no edema, tenderness or deformity.   Lymphadenopathy:     She has no cervical adenopathy.   Neurological: She is alert and oriented to person, place, and time. She displays normal reflexes. No cranial nerve deficit. She exhibits normal muscle tone.   Skin: Skin is warm and dry. No rash noted.   Psychiatric: She has a normal mood and affect. Her behavior is normal. Judgment and thought content normal.   Vitals reviewed.    Discharge " Disposition:  Home or Self Care    Discharge Medications:   Tamy Sher   Home Medication Instructions OSMAN:[id number]    Printed on:03/25/17 1410   Medication Information                      amLODIPine (NORVASC) 5 MG tablet  Take 1 tablet by mouth Daily.             aspirin 325 MG tablet  Take 325 mg by mouth Daily.             ferrous sulfate 325 (65 FE) MG tablet  Take 325 mg by mouth Daily With Breakfast.             furosemide (LASIX) 40 MG tablet  Take 40 mg by mouth Daily As Needed.             gabapentin (NEURONTIN) 100 MG capsule  Take 100 mg by mouth 3 (Three) Times a Day.             hydroxyurea (HYDREA) 500 MG capsule  Take 500 mg by mouth Daily.             insulin glargine (LANTUS) 100 UNIT/ML injection  Inject 35 Units under the skin Every Night.             lisinopril (PRINIVIL,ZESTRIL) 40 MG tablet  Take 40 mg by mouth Daily.             metFORMIN (GLUCOPHAGE) 1000 MG tablet  Take 1,000 mg by mouth 2 (Two) Times a Day With Meals.             metoprolol succinate XL (TOPROL-XL) 25 MG 24 hr tablet  Take 2 tablets by mouth Daily.             pantoprazole (PROTONIX) 40 MG EC tablet  Take 40 mg by mouth 2 (Two) Times a Day.             simvastatin (ZOCOR) 40 MG tablet  Take 0.5 tablets by mouth Every Night.             sucralfate (CARAFATE) 1 GM/10ML suspension  Take 10 mL by mouth 4 (Four) Times a Day With Meals & at Bedtime.               Discharge Diet:   Diet Instructions     Diet: Consistent Carbohydrate, Cardiac; Thin Liquids, No Restrictions       Discharge Diet:   Consistent Carbohydrate  Cardiac      Fluid Consistency:  Thin Liquids, No Restrictions               Activity at Discharge:   Activity Instructions     Activity as Tolerated                   Discharge Care Plan/Instructions:   1. Check blood pressure twice daily    Follow-up Appointments:   1. Follow up with Dr. Barry Foley in one week  No future appointments.    Test Results Pending at Discharge: None    Ulices Huizar,  BULMARO  03/25/17  2:10 PM    Time: 35 minutes       I personally evaluated and examined the patient in conjunction with BULMARO Sim and agree with the assessment, treatment plan, and disposition of the patient as recorded by her. My history, exam, and further recommendations are: I have reviewed and agree with the plan. Zachery.

## 2017-03-25 NOTE — PROGRESS NOTES
[facility] Medicine Services  INPATIENT PROGRESS NOTE    Length of Stay: 0  Date of Admission: 3/23/2017  Primary Care Physician: Barry Foley MD    Subjective   Chief Complaint: f/u indigestion     HPI   The patient is currently sitting up in the chair reading the paper. She states that she is having some indigestion and feels like her stomach is smothering her. She denies any palpitations. She is tolerating a diet well. Her kidney function has increased since yesterday likely secondary to receiving IV contrast.    Review of Systems   Constitutional: Positive for activity change and fatigue. Negative for appetite change, chills and fever.   HENT: Negative for hearing loss, nosebleeds, tinnitus and trouble swallowing.   Eyes: Negative for visual disturbance.   Respiratory: Positive for chest tightness and shortness of breath. Negative for cough and wheezing.   Cardiovascular: Positive for chest pain. Negative for palpitations and leg swelling.   Gastrointestinal: Negative for abdominal distention, abdominal pain, blood in stool, constipation, diarrhea, nausea and vomiting.   Endocrine: Negative for cold intolerance, heat intolerance, polydipsia, polyphagia and polyuria.   Genitourinary: Negative for decreased urine volume, difficulty urinating, dysuria, flank pain, frequency and hematuria.   Musculoskeletal: Negative for arthralgias, joint swelling and myalgias.   Skin: Negative for rash.   Allergic/Immunologic: Negative for immunocompromised state.   Neurological: Positive for weakness. Negative for dizziness, syncope, light-headedness and headaches.   Hematological: Negative for adenopathy. Does not bruise/bleed easily.   Psychiatric/Behavioral: Negative for confusion and sleep disturbance. The patient is not nervous/anxious.     All pertinent negatives and positives are as above. All other systems have been reviewed and are negative unless otherwise stated.      Objective    Temp:  [96.9 °F (36.1 °C)-98.7 °F (37.1 °C)] 96.9 °F (36.1 °C)  Heart Rate:  [56-90] 59  Resp:  [18-20] 20  BP: (153-195)/(53-75) 156/56     Physical Exam  Constitutional: She is oriented to person, place, and time. She appears well-developed and well-nourished.   Obese   HENT:   Head: Normocephalic and atraumatic.   Eyes: Conjunctivae and EOM are normal. Pupils are equal, round, and reactive to light.   Neck: Neck supple. No JVD present. No thyromegaly present.   Cardiovascular: Normal rate, regular rhythm, normal heart sounds and intact distal pulses. Exam reveals no gallop and no friction rub.   No murmur heard.  Pulmonary/Chest: Effort normal and breath sounds normal. No respiratory distress. She has no wheezes. She has no rales. She exhibits no tenderness.   Abdominal: Soft. Bowel sounds are normal. She exhibits no distension. There is no tenderness. There is no rebound and no guarding.   Musculoskeletal: Normal range of motion. She exhibits no edema, tenderness or deformity.   Lymphadenopathy:   She has no cervical adenopathy.   Neurological: She is alert and oriented to person, place, and time. She displays normal reflexes. No cranial nerve deficit. She exhibits normal muscle tone.   Skin: Skin is warm and dry. No rash noted.   Psychiatric: She has a normal mood and affect. Her behavior is normal. Judgment and thought content normal.   Vitals reviewed.    Results Review:  I have reviewed the labs, radiology results, and diagnostic studies.    Laboratory Data:     Results from last 7 days  Lab Units 03/25/17  0506 03/24/17  0612 03/23/17  1930   WBC 10*3/mm3 14.53* 15.64* 15.32*   HEMOGLOBIN g/dL 13.3 14.3 13.1   HEMATOCRIT % 41.0 44.1 40.6   PLATELETS 10*3/mm3 587* 597* 586*       Results from last 7 days  Lab Units 03/25/17  0505 03/24/17  0612 03/23/17 2057   SODIUM mmol/L 140 143 140   POTASSIUM mmol/L 4.4 3.7 4.6   CHLORIDE mmol/L 101 99 98   TOTAL CO2 mmol/L 28.0 34.0* 33.0*   BUN mg/dL  35* 28* 32*   CREATININE mg/dL 1.92* 1.03 1.13   CALCIUM mg/dL 8.4 8.9 8.8   BILIRUBIN mg/dL  --   --  0.3   ALK PHOS U/L  --   --  80   ALT (SGPT) U/L  --   --  16   AST (SGOT) U/L  --   --  29   GLUCOSE mg/dL 163* 57* 144*     I have reviewed the patient current medications.     Assessment/Plan     Hospital Problem List     Ischemic chest pain      Assessment:  1. Chest pain  2. Exertional dyspnea  3. Leukocytosis  4. Mildly elevated d-dimer, CTA chest negative for pulmonary embolus  5. Type II diabetes  6. Hyperlipidemia  7. History of stroke    Plan:  1. IV fluids at 100 ml/hr  2. Recheck a BMP at 1400  3. Continue Protonix and start Carafate  4. Labs in am  5. Add Norvasc 5 mg daily    Discharge Planning: I expect patient to be discharged to home in 1 day.    Ulices Huizar, APRN   03/25/17   8:59 AM

## 2017-03-25 NOTE — PLAN OF CARE
Problem: Patient Care Overview (Adult)  Goal: Plan of Care Review  Outcome: Ongoing (interventions implemented as appropriate)    03/25/17 6347   Outcome Evaluation   Outcome Summary/Follow up Plan note left re: when to restart lantus insulin

## 2017-03-25 NOTE — PLAN OF CARE
Problem: Patient Care Overview (Adult)  Goal: Plan of Care Review  Outcome: Ongoing (interventions implemented as appropriate)    03/25/17 3756   Outcome Evaluation   Outcome Summary/Follow up Plan no chest pain. c/o sharp pain in right ear. sb- nsr with frequent ectopy..pac's pvc's multifocal, coup, trigeminy.

## 2017-06-29 ENCOUNTER — TELEPHONE (OUTPATIENT)
Dept: GASTROENTEROLOGY | Facility: CLINIC | Age: 82
End: 2017-06-29

## 2017-06-29 NOTE — TELEPHONE ENCOUNTER
PT CALLED AFTER RECEIVING A LETTER STATING SHE IS DUE FOR A REPEAT COLONOSCOPY SAYING THAT SHE IS NOT GOING TO HAVE ANY MORE SCOPE AT HER AGE. PLEASE DO NOT SEND ANY MORE LETTERS.

## 2017-07-20 ENCOUNTER — OFFICE VISIT (OUTPATIENT)
Dept: INTERNAL MEDICINE | Age: 82
End: 2017-07-20
Payer: MEDICARE

## 2017-07-20 VITALS
SYSTOLIC BLOOD PRESSURE: 140 MMHG | HEART RATE: 59 BPM | BODY MASS INDEX: 36.52 KG/M2 | WEIGHT: 186 LBS | HEIGHT: 60 IN | DIASTOLIC BLOOD PRESSURE: 70 MMHG | OXYGEN SATURATION: 96 %

## 2017-07-20 DIAGNOSIS — E11.51 DM (DIABETES MELLITUS), TYPE 2 WITH PERIPHERAL VASCULAR COMPLICATIONS (HCC): ICD-10-CM

## 2017-07-20 DIAGNOSIS — I10 ESSENTIAL HYPERTENSION: Primary | ICD-10-CM

## 2017-07-20 DIAGNOSIS — D75.839 THROMBOCYTOSIS: ICD-10-CM

## 2017-07-20 DIAGNOSIS — D72.829 LEUKOCYTOSIS, UNSPECIFIED TYPE: ICD-10-CM

## 2017-07-20 DIAGNOSIS — E11.42 DIABETIC PERIPHERAL NEUROPATHY ASSOCIATED WITH TYPE 2 DIABETES MELLITUS (HCC): ICD-10-CM

## 2017-07-20 LAB
ALBUMIN SERPL-MCNC: 3.8 G/DL (ref 3.5–5.2)
ALP BLD-CCNC: 77 U/L (ref 35–104)
ALT SERPL-CCNC: 8 U/L (ref 5–33)
ANION GAP SERPL CALCULATED.3IONS-SCNC: 13 MMOL/L (ref 7–19)
ANISOCYTOSIS: ABNORMAL
AST SERPL-CCNC: 14 U/L (ref 5–32)
ATYPICAL LYMPHOCYTE RELATIVE PERCENT: 1 % (ref 0–8)
BANDED NEUTROPHILS RELATIVE PERCENT: 1 % (ref 0–5)
BASOPHILS ABSOLUTE: 0 K/UL (ref 0–0.2)
BASOPHILS MANUAL: 0 %
BASOPHILS RELATIVE PERCENT: 0 % (ref 0–1)
BILIRUB SERPL-MCNC: 0.3 MG/DL (ref 0.2–1.2)
BUN BLDV-MCNC: 35 MG/DL (ref 8–23)
CALCIUM SERPL-MCNC: 9 MG/DL (ref 8.8–10.2)
CHLORIDE BLD-SCNC: 101 MMOL/L (ref 98–111)
CO2: 27 MMOL/L (ref 22–29)
CREAT SERPL-MCNC: 1 MG/DL (ref 0.5–0.9)
CREATININE URINE: 31.3 MG/DL (ref 4.2–622)
EOSINOPHILS ABSOLUTE: 0.25 K/UL (ref 0–0.6)
EOSINOPHILS RELATIVE PERCENT: 3 % (ref 0–5)
GFR NON-AFRICAN AMERICAN: 52
GLUCOSE BLD-MCNC: 79 MG/DL (ref 74–109)
HBA1C MFR BLD: 7.5 %
HCT VFR BLD CALC: 33.7 % (ref 37–47)
HEMOGLOBIN: 11 G/DL (ref 12–16)
LDL CHOLESTEROL DIRECT: 53 MG/DL
LYMPHOCYTES ABSOLUTE: 1.8 K/UL (ref 1.1–4.5)
LYMPHOCYTES RELATIVE PERCENT: 21 % (ref 20–40)
MACROCYTES: ABNORMAL
MCH RBC QN AUTO: 36.7 PG (ref 27–31)
MCHC RBC AUTO-ENTMCNC: 32.6 G/DL (ref 33–37)
MCV RBC AUTO: 112.3 FL (ref 81–99)
MICROALBUMIN UR-MCNC: 19.7 MG/DL (ref 0–19)
MICROALBUMIN/CREAT UR-RTO: 629.4 MG/G
MONOCYTES ABSOLUTE: 0.8 K/UL (ref 0–0.9)
MONOCYTES RELATIVE PERCENT: 10 % (ref 0–10)
NEUTROPHILS ABSOLUTE: 5.4 K/UL (ref 1.5–7.5)
NEUTROPHILS MANUAL: 64 %
NEUTROPHILS RELATIVE PERCENT: 64 % (ref 50–65)
PDW BLD-RTO: 15.8 % (ref 11.5–14.5)
PLATELET # BLD: 446 K/UL (ref 130–400)
PLATELET SLIDE REVIEW: ADEQUATE
PMV BLD AUTO: 9.7 FL (ref 9.4–12.3)
POTASSIUM SERPL-SCNC: 4.7 MMOL/L (ref 3.5–5)
RBC # BLD: 3 M/UL (ref 4.2–5.4)
SODIUM BLD-SCNC: 141 MMOL/L (ref 136–145)
TOTAL PROTEIN: 6.9 G/DL (ref 6.6–8.7)
WBC # BLD: 8.3 K/UL (ref 4.8–10.8)

## 2017-07-20 PROCEDURE — G8419 CALC BMI OUT NRM PARAM NOF/U: HCPCS | Performed by: INTERNAL MEDICINE

## 2017-07-20 PROCEDURE — 1036F TOBACCO NON-USER: CPT | Performed by: INTERNAL MEDICINE

## 2017-07-20 PROCEDURE — 4040F PNEUMOC VAC/ADMIN/RCVD: CPT | Performed by: INTERNAL MEDICINE

## 2017-07-20 PROCEDURE — 1123F ACP DISCUSS/DSCN MKR DOCD: CPT | Performed by: INTERNAL MEDICINE

## 2017-07-20 PROCEDURE — 99214 OFFICE O/P EST MOD 30 MIN: CPT | Performed by: INTERNAL MEDICINE

## 2017-07-20 PROCEDURE — 1090F PRES/ABSN URINE INCON ASSESS: CPT | Performed by: INTERNAL MEDICINE

## 2017-07-20 PROCEDURE — G8427 DOCREV CUR MEDS BY ELIG CLIN: HCPCS | Performed by: INTERNAL MEDICINE

## 2017-07-20 RX ORDER — AMLODIPINE BESYLATE 5 MG/1
5 TABLET ORAL DAILY
COMMUNITY
End: 2018-02-12

## 2017-07-20 RX ORDER — SUCRALFATE 1 G/10ML
1 SUSPENSION ORAL
COMMUNITY
Start: 2017-04-12 | End: 2017-10-23

## 2017-07-20 RX ORDER — GABAPENTIN 300 MG/1
300 CAPSULE ORAL 2 TIMES DAILY
Qty: 60 CAPSULE | Refills: 2 | Status: SHIPPED | OUTPATIENT
Start: 2017-07-20 | End: 2017-10-23 | Stop reason: SDUPTHER

## 2017-07-20 RX ORDER — HYDROXYUREA 500 MG/1
CAPSULE ORAL
COMMUNITY
Start: 2017-07-07 | End: 2017-10-11 | Stop reason: SDUPTHER

## 2017-07-20 RX ORDER — PANTOPRAZOLE SODIUM 40 MG/1
40 TABLET, DELAYED RELEASE ORAL DAILY
COMMUNITY
Start: 2017-07-07 | End: 2017-10-23

## 2017-07-20 RX ORDER — METOPROLOL SUCCINATE 25 MG/1
25 TABLET, EXTENDED RELEASE ORAL DAILY
COMMUNITY
Start: 2017-05-04 | End: 2018-02-12

## 2017-07-20 RX ORDER — NITROGLYCERIN 0.4 MG/1
TABLET SUBLINGUAL
COMMUNITY
Start: 2017-04-12

## 2017-07-20 RX ORDER — AMLODIPINE BESYLATE 5 MG/1
5 TABLET ORAL DAILY
COMMUNITY
Start: 2017-04-19 | End: 2017-07-20

## 2017-07-20 RX ORDER — LISINOPRIL 40 MG/1
40 TABLET ORAL DAILY
COMMUNITY
Start: 2017-04-18

## 2017-07-20 RX ORDER — GABAPENTIN 300 MG/1
300 CAPSULE ORAL 2 TIMES DAILY
Qty: 60 CAPSULE | Refills: 2 | Status: SHIPPED | OUTPATIENT
Start: 2017-07-20 | End: 2017-07-20 | Stop reason: SDUPTHER

## 2017-07-20 RX ORDER — INSULIN GLARGINE 100 [IU]/ML
INJECTION, SOLUTION SUBCUTANEOUS
COMMUNITY
Start: 2017-07-07 | End: 2017-07-20

## 2017-07-20 RX ORDER — GABAPENTIN 100 MG/1
100 CAPSULE ORAL
COMMUNITY
Start: 2017-06-19 | End: 2017-07-20 | Stop reason: SDUPTHER

## 2017-07-20 ASSESSMENT — PATIENT HEALTH QUESTIONNAIRE - PHQ9
SUM OF ALL RESPONSES TO PHQ QUESTIONS 1-9: 0
2. FEELING DOWN, DEPRESSED OR HOPELESS: 0
SUM OF ALL RESPONSES TO PHQ9 QUESTIONS 1 & 2: 0
1. LITTLE INTEREST OR PLEASURE IN DOING THINGS: 0

## 2017-07-20 ASSESSMENT — ENCOUNTER SYMPTOMS
TROUBLE SWALLOWING: 0
SORE THROAT: 0
ABDOMINAL PAIN: 0
SHORTNESS OF BREATH: 1
COUGH: 0
NAUSEA: 0
RHINORRHEA: 0

## 2017-07-26 DIAGNOSIS — D72.829 LEUKOCYTOSIS, UNSPECIFIED TYPE: ICD-10-CM

## 2017-07-26 DIAGNOSIS — D75.839 THROMBOCYTOSIS: ICD-10-CM

## 2017-10-11 RX ORDER — HYDROXYUREA 500 MG/1
CAPSULE ORAL
Qty: 45 CAPSULE | Refills: 4 | Status: SHIPPED | OUTPATIENT
Start: 2017-10-11 | End: 2017-10-23

## 2017-10-23 ENCOUNTER — OFFICE VISIT (OUTPATIENT)
Dept: INTERNAL MEDICINE | Age: 82
End: 2017-10-23
Payer: MEDICARE

## 2017-10-23 VITALS
WEIGHT: 188 LBS | HEART RATE: 82 BPM | HEIGHT: 60 IN | BODY MASS INDEX: 36.91 KG/M2 | DIASTOLIC BLOOD PRESSURE: 68 MMHG | OXYGEN SATURATION: 98 % | SYSTOLIC BLOOD PRESSURE: 130 MMHG

## 2017-10-23 DIAGNOSIS — D72.829 LEUKOCYTOSIS, UNSPECIFIED TYPE: ICD-10-CM

## 2017-10-23 DIAGNOSIS — E11.42 DIABETIC PERIPHERAL NEUROPATHY ASSOCIATED WITH TYPE 2 DIABETES MELLITUS (HCC): ICD-10-CM

## 2017-10-23 DIAGNOSIS — I10 ESSENTIAL HYPERTENSION: ICD-10-CM

## 2017-10-23 DIAGNOSIS — D75.839 THROMBOCYTOSIS: ICD-10-CM

## 2017-10-23 DIAGNOSIS — E11.22 TYPE 2 DIABETES MELLITUS WITH STAGE 3 CHRONIC KIDNEY DISEASE, WITH LONG-TERM CURRENT USE OF INSULIN (HCC): ICD-10-CM

## 2017-10-23 DIAGNOSIS — Z79.4 TYPE 2 DIABETES MELLITUS WITH STAGE 3 CHRONIC KIDNEY DISEASE, WITH LONG-TERM CURRENT USE OF INSULIN (HCC): ICD-10-CM

## 2017-10-23 DIAGNOSIS — E78.2 MIXED HYPERLIPIDEMIA: ICD-10-CM

## 2017-10-23 DIAGNOSIS — E11.51 DM (DIABETES MELLITUS), TYPE 2 WITH PERIPHERAL VASCULAR COMPLICATIONS (HCC): ICD-10-CM

## 2017-10-23 DIAGNOSIS — N18.30 TYPE 2 DIABETES MELLITUS WITH STAGE 3 CHRONIC KIDNEY DISEASE, WITH LONG-TERM CURRENT USE OF INSULIN (HCC): ICD-10-CM

## 2017-10-23 DIAGNOSIS — Z23 NEED FOR INFLUENZA VACCINATION: Primary | ICD-10-CM

## 2017-10-23 LAB
ALBUMIN SERPL-MCNC: 3.9 G/DL (ref 3.5–5.2)
ALP BLD-CCNC: 84 U/L (ref 35–104)
ALT SERPL-CCNC: 11 U/L (ref 5–33)
ANION GAP SERPL CALCULATED.3IONS-SCNC: 15 MMOL/L (ref 7–19)
AST SERPL-CCNC: 15 U/L (ref 5–32)
BILIRUB SERPL-MCNC: <0.2 MG/DL (ref 0.2–1.2)
BUN BLDV-MCNC: 46 MG/DL (ref 8–23)
CALCIUM SERPL-MCNC: 8.9 MG/DL (ref 8.8–10.2)
CHLORIDE BLD-SCNC: 99 MMOL/L (ref 98–111)
CO2: 28 MMOL/L (ref 22–29)
CREAT SERPL-MCNC: 1.3 MG/DL (ref 0.5–0.9)
GFR NON-AFRICAN AMERICAN: 39
GLUCOSE BLD-MCNC: 63 MG/DL (ref 74–109)
HBA1C MFR BLD: 7.5 %
HCT VFR BLD CALC: 34.1 % (ref 37–47)
HEMOGLOBIN: 10.9 G/DL (ref 12–16)
LDL CHOLESTEROL DIRECT: 46 MG/DL
MCH RBC QN AUTO: 36.2 PG (ref 27–31)
MCHC RBC AUTO-ENTMCNC: 32 G/DL (ref 33–37)
MCV RBC AUTO: 113.3 FL (ref 81–99)
PDW BLD-RTO: 14.9 % (ref 11.5–14.5)
PLATELET # BLD: 435 K/UL (ref 130–400)
PMV BLD AUTO: 9.9 FL (ref 9.4–12.3)
POTASSIUM SERPL-SCNC: 4.6 MMOL/L (ref 3.5–5)
RBC # BLD: 3.01 M/UL (ref 4.2–5.4)
SODIUM BLD-SCNC: 142 MMOL/L (ref 136–145)
TOTAL PROTEIN: 6.8 G/DL (ref 6.6–8.7)
WBC # BLD: 8.9 K/UL (ref 4.8–10.8)

## 2017-10-23 PROCEDURE — 90662 IIV NO PRSV INCREASED AG IM: CPT | Performed by: INTERNAL MEDICINE

## 2017-10-23 PROCEDURE — G8484 FLU IMMUNIZE NO ADMIN: HCPCS | Performed by: INTERNAL MEDICINE

## 2017-10-23 PROCEDURE — 4040F PNEUMOC VAC/ADMIN/RCVD: CPT | Performed by: INTERNAL MEDICINE

## 2017-10-23 PROCEDURE — G8427 DOCREV CUR MEDS BY ELIG CLIN: HCPCS | Performed by: INTERNAL MEDICINE

## 2017-10-23 PROCEDURE — 1123F ACP DISCUSS/DSCN MKR DOCD: CPT | Performed by: INTERNAL MEDICINE

## 2017-10-23 PROCEDURE — 1090F PRES/ABSN URINE INCON ASSESS: CPT | Performed by: INTERNAL MEDICINE

## 2017-10-23 PROCEDURE — 1036F TOBACCO NON-USER: CPT | Performed by: INTERNAL MEDICINE

## 2017-10-23 PROCEDURE — 99214 OFFICE O/P EST MOD 30 MIN: CPT | Performed by: INTERNAL MEDICINE

## 2017-10-23 PROCEDURE — G8419 CALC BMI OUT NRM PARAM NOF/U: HCPCS | Performed by: INTERNAL MEDICINE

## 2017-10-23 PROCEDURE — G0008 ADMIN INFLUENZA VIRUS VAC: HCPCS | Performed by: INTERNAL MEDICINE

## 2017-10-23 RX ORDER — GABAPENTIN 300 MG/1
CAPSULE ORAL
Qty: 90 CAPSULE | Refills: 1 | Status: SHIPPED | OUTPATIENT
Start: 2017-10-23 | End: 2018-01-02 | Stop reason: SDUPTHER

## 2017-10-23 RX ORDER — GABAPENTIN 300 MG/1
CAPSULE ORAL
Qty: 60 CAPSULE | Refills: 1 | Status: SHIPPED | OUTPATIENT
Start: 2017-10-23 | End: 2017-10-23 | Stop reason: SDUPTHER

## 2017-10-23 ASSESSMENT — ENCOUNTER SYMPTOMS
SHORTNESS OF BREATH: 0
TROUBLE SWALLOWING: 0
NAUSEA: 0
SORE THROAT: 0
COUGH: 0
RHINORRHEA: 0
ABDOMINAL PAIN: 0

## 2017-10-23 NOTE — PATIENT INSTRUCTIONS
Fadia Lynch is having some low blood sugar reactions. We will decrease her metformin, 500 daily, since she does have some kidney impairment. Also reduce her nighttime Lantus, 32 units each evening. She will let us know if she has further reactions. The burning on her abdominal skin is likely due to neuropathy. She could increase her gabapentin 300, 1 tablet in the morning, 2 in the evening , if she tolerates it    We will see her in 4 months with a CMP, A1c, lipids, TSH, microalbumin      Learning About Diabetes Food Guidelines  Your Care Instructions  Meal planning is important to manage diabetes. It helps keep your blood sugar at a target level (which you set with your doctor). You don't have to eat special foods. You can eat what your family eats, including sweets once in a while. But you do have to pay attention to how often you eat and how much you eat of certain foods. You may want to work with a dietitian or a certified diabetes educator (CDE) to help you plan meals and snacks. A dietitian or CDE can also help you lose weight if that is one of your goals. What should you know about eating carbs? Managing the amount of carbohydrate (carbs) you eat is an important part of healthy meals when you have diabetes. Carbohydrate is found in many foods. · Learn which foods have carbs. And learn the amounts of carbs in different foods. ¨ Bread, cereal, pasta, and rice have about 15 grams of carbs in a serving. A serving is 1 slice of bread (1 ounce), ½ cup of cooked cereal, or 1/3 cup of cooked pasta or rice. ¨ Fruits have 15 grams of carbs in a serving. A serving is 1 small fresh fruit, such as an apple or orange; ½ of a banana; ½ cup of cooked or canned fruit; ½ cup of fruit juice; 1 cup of melon or raspberries; or 2 tablespoons of dried fruit. ¨ Milk and no-sugar-added yogurt have 15 grams of carbs in a serving. A serving is 1 cup of milk or 2/3 cup of no-sugar-added yogurt.   ¨ Starchy vegetables have 15 grams of carbs in a serving. A serving is ½ cup of mashed potatoes or sweet potato; 1 cup winter squash; ½ of a small baked potato; ½ cup of cooked beans; or ½ cup cooked corn or green peas. · Learn how much carbs to eat each day and at each meal. A dietitian or CDE can teach you how to keep track of the amount of carbs you eat. This is called carbohydrate counting. · If you are not sure how to count carbohydrate grams, use the Plate Method to plan meals. It is a good, quick way to make sure that you have a balanced meal. It also helps you spread carbs throughout the day. ¨ Divide your plate by types of foods. Put non-starchy vegetables on half the plate, meat or other protein food on one-quarter of the plate, and a grain or starchy vegetable in the final quarter of the plate. To this you can add a small piece of fruit and 1 cup of milk or yogurt, depending on how many carbs you are supposed to eat at a meal.  · Try to eat about the same amount of carbs at each meal. Do not \"save up\" your daily allowance of carbs to eat at one meal.  · Proteins have very little or no carbs per serving. Examples of proteins are beef, chicken, turkey, fish, eggs, tofu, cheese, cottage cheese, and peanut butter. A serving size of meat is 3 ounces, which is about the size of a deck of cards. Examples of meat substitute serving sizes (equal to 1 ounce of meat) are 1/4 cup of cottage cheese, 1 egg, 1 tablespoon of peanut butter, and ½ cup of tofu. How can you eat out and still eat healthy? · Learn to estimate the serving sizes of foods that have carbohydrate. If you measure food at home, it will be easier to estimate the amount in a serving of restaurant food. · If the meal you order has too much carbohydrate (such as potatoes, corn, or baked beans), ask to have a low-carbohydrate food instead. Ask for a salad or green vegetables.   · If you use insulin, check your blood sugar before and after eating out to help you plan how much to eat in the future. · If you eat more carbohydrate at a meal than you had planned, take a walk or do other exercise. This will help lower your blood sugar. What else should you know? · Limit saturated fat, such as the fat from meat and dairy products. This is a healthy choice because people who have diabetes are at higher risk of heart disease. So choose lean cuts of meat and nonfat or low-fat dairy products. Use olive or canola oil instead of butter or shortening when cooking. · Don't skip meals. Your blood sugar may drop too low if you skip meals and take insulin or certain medicines for diabetes. · Check with your doctor before you drink alcohol. Alcohol can cause your blood sugar to drop too low. Alcohol can also cause a bad reaction if you take certain diabetes medicines. Follow-up care is a key part of your treatment and safety. Be sure to make and go to all appointments, and call your doctor if you are having problems. It's also a good idea to know your test results and keep a list of the medicines you take. Where can you learn more? Go to https://[employer]. org and sign in to your [employer] account. Enter B599 in the [employer] box to learn more about \"Learning About Diabetes Food Guidelines. \"     If you do not have an account, please click on the \"Sign Up Now\" link. Current as of: March 13, 2017  Content Version: 11.3  © 4528-2800 [employer]. Care instructions adapted under license by [facility] ([facility]). If you have questions about a medical condition or this instruction, always ask your healthcare professional. [address] any warranty or liability for your use of this information. She is eligible for Prevnar 13 pneumonia vaccine mid-November.     Return visit 4 months with laboratory to include a CMP, A1c, lipids, TSH, microalbumin

## 2017-10-23 NOTE — PROGRESS NOTES
[facility] Medicine    Chief Complaint   Patient presents with    3 Month Follow-Up     Pt is here for 3 month follow up of HTN and DM and review of labs drawn today. Pt c/o what feels like nerve pain to her skin at lateral rib area bilaterally. States this pain is intermittent. HPI:Normal returns for reassessment of several problems, including thrombocytosis, hypertension, diabetes with renal and neuropathic involvement. She continues to see Dr. Matias Schrader about her thrombocytosis. Last platelet count we have is 435,000. She describes burning in her abdominal skin, which is relieved with rubbing alcohol. She also has numbness in her feet. Is likely she describing a neuropathy. She is on gabapentin 300 twice daily. Her morning blood sugars tend to be low, taking 36 units of Lantus at night, in addition to metformin 500 twice daily. Past Medical History:   Diagnosis Date    Benign colon polyp     CVA (cerebral infarction)     9/7 - symptoms of dysmetria, ataxia, MRA showing stenosis of the left posterior cerebral artery    Diabetic peripheral neuropathy (HCC)     Diastolic dysfunction     DM (diabetes mellitus) ([address]     DM (diabetes mellitus), type 2 with peripheral vascular complications (HCC)     Esophageal reflux     Hypertension     Hypertensive heart disease     Leukocytosis     Lumbar spondylolysis     Mixed hyperlipidemia     Obesity     Osteoarthritis     Peripheral vascular disease (HCC)     Type II diabetes mellitus with neurological manifestations ([address]       Family History   Problem Relation Age of Onset    Diabetes Brother       Social History     Social History    Marital status:      Spouse name: N/A    Number of children: N/A    Years of education: N/A     Occupational History    Not on file.      Social History Main Topics    Smoking status: Never Smoker    Smokeless tobacco: Never Used    Alcohol use Not on file    Drug use: No    Sexual hyperlipidemia    4. Essential hypertension    5. Type 2 diabetes mellitus with stage 3 chronic kidney disease, with long-term current use of insulin (HCC)    6. Leukocytosis, unspecified type    7. Thrombocytosis ([address]         ASSESSMENT/PLAN  Normally is having some hypoglycemic reactions. Her GFR has been as low as 25, 50, and now in the 39 range. He was suggested she reduce her Lantus 36 at night, new doses 32 daily. We will also suggest she decrease her metformin, 500, 1 daily because of her nephropathy. Her blood pressure is reasonably well controlled. LDL cholesterol is in a good range 46. Her abdominal cutaneous symptoms are likely neuropathic. She could increase her gabapentin, 1 in the morning, 2 in the evening    She may have a Prevnar 13 vaccine after November. Continue follow-up with Dr. Andres Mehta about her thrombocytosis. Folic acid has been added.     We will see her in 4 months with a CMP, A1c, lipids, TSH, microalbumin    Orders Placed This Encounter   Procedures    INFLUENZA, HIGH DOSE, 65 YRS +, IM, PF, PREFILL SYR, 0.5ML (FLUZONE HD)    Comprehensive Metabolic Panel     Standing Status:   Future     Standing Expiration Date:   10/23/2018    Hemoglobin A1C     Standing Status:   Future     Standing Expiration Date:   10/23/2018    Lipid Panel     Standing Status:   Future     Standing Expiration Date:   2/23/2018     Order Specific Question:   Is Patient Fasting?/# of Hours     Answer:   12    TSH without Reflex     Standing Status:   Future     Standing Expiration Date:   10/23/2018

## 2017-11-21 ENCOUNTER — TELEPHONE (OUTPATIENT)
Dept: INTERNAL MEDICINE | Age: 82
End: 2017-11-21

## 2018-01-01 ENCOUNTER — LAB REQUISITION (OUTPATIENT)
Dept: LAB | Facility: HOSPITAL | Age: 83
End: 2018-01-01

## 2018-01-01 ENCOUNTER — TRANSCRIBE ORDERS (OUTPATIENT)
Dept: ADMINISTRATIVE | Facility: HOSPITAL | Age: 83
End: 2018-01-01

## 2018-01-01 ENCOUNTER — HOSPITAL ENCOUNTER (INPATIENT)
Facility: HOSPITAL | Age: 83
LOS: 3 days | Discharge: NURSING, REHAB OR LONG-TERM CARE | End: 2018-12-01
Attending: EMERGENCY MEDICINE | Admitting: INTERNAL MEDICINE

## 2018-01-01 ENCOUNTER — APPOINTMENT (OUTPATIENT)
Dept: ULTRASOUND IMAGING | Facility: HOSPITAL | Age: 83
End: 2018-01-01

## 2018-01-01 ENCOUNTER — APPOINTMENT (OUTPATIENT)
Dept: CT IMAGING | Facility: HOSPITAL | Age: 83
End: 2018-01-01

## 2018-01-01 ENCOUNTER — APPOINTMENT (OUTPATIENT)
Dept: CARDIOLOGY | Facility: HOSPITAL | Age: 83
End: 2018-01-01
Attending: INTERNAL MEDICINE

## 2018-01-01 ENCOUNTER — APPOINTMENT (OUTPATIENT)
Dept: GENERAL RADIOLOGY | Facility: HOSPITAL | Age: 83
End: 2018-01-01

## 2018-01-01 VITALS
OXYGEN SATURATION: 98 % | RESPIRATION RATE: 16 BRPM | TEMPERATURE: 97 F | HEART RATE: 58 BPM | DIASTOLIC BLOOD PRESSURE: 54 MMHG | WEIGHT: 179.5 LBS | HEIGHT: 60 IN | SYSTOLIC BLOOD PRESSURE: 127 MMHG | BODY MASS INDEX: 35.24 KG/M2

## 2018-01-01 DIAGNOSIS — Z00.00 ENCOUNTER FOR GENERAL ADULT MEDICAL EXAMINATION WITHOUT ABNORMAL FINDINGS: ICD-10-CM

## 2018-01-01 DIAGNOSIS — R09.02 HYPOXIA: ICD-10-CM

## 2018-01-01 DIAGNOSIS — J18.9 PNEUMONIA OF BOTH LUNGS DUE TO INFECTIOUS ORGANISM, UNSPECIFIED PART OF LUNG: ICD-10-CM

## 2018-01-01 DIAGNOSIS — J90 PLEURAL EFFUSION: ICD-10-CM

## 2018-01-01 DIAGNOSIS — R33.9 URINARY RETENTION: ICD-10-CM

## 2018-01-01 DIAGNOSIS — I50.9 PLEURAL EFFUSION DUE TO CHF (CONGESTIVE HEART FAILURE) (HCC): Primary | ICD-10-CM

## 2018-01-01 DIAGNOSIS — Z74.09 IMPAIRED FUNCTIONAL MOBILITY AND ENDURANCE: ICD-10-CM

## 2018-01-01 DIAGNOSIS — R73.9 HYPERGLYCEMIA: ICD-10-CM

## 2018-01-01 DIAGNOSIS — N17.9 AKI (ACUTE KIDNEY INJURY) (HCC): ICD-10-CM

## 2018-01-01 LAB
25(OH)D3 SERPL-MCNC: <12.8 NG/ML (ref 30–100)
25(OH)D3 SERPL-MCNC: <12.8 NG/ML (ref 30–100)
ACANTHOCYTES BLD QL SMEAR: ABNORMAL
ALBUMIN SERPL-MCNC: 2.5 G/DL (ref 3.5–5)
ALBUMIN SERPL-MCNC: 2.8 G/DL (ref 3.5–5)
ALBUMIN SERPL-MCNC: 2.8 G/DL (ref 3.5–5)
ALBUMIN SERPL-MCNC: 3.1 G/DL (ref 3.5–5)
ALBUMIN SERPL-MCNC: 3.4 G/DL (ref 3.5–5)
ALBUMIN SERPL-MCNC: 3.4 G/DL (ref 3.5–5)
ALBUMIN/GLOB SERPL: 1.2 G/DL (ref 1.1–2.5)
ALBUMIN/GLOB SERPL: 1.3 G/DL (ref 1.1–2.5)
ALBUMIN/GLOB SERPL: 1.4 G/DL (ref 1.1–2.5)
ALBUMIN/GLOB SERPL: 1.5 G/DL (ref 1.1–2.5)
ALP SERPL-CCNC: 100 U/L (ref 24–120)
ALP SERPL-CCNC: 118 U/L (ref 24–120)
ALP SERPL-CCNC: 51 U/L (ref 24–120)
ALP SERPL-CCNC: 66 U/L (ref 24–120)
ALP SERPL-CCNC: 66 U/L (ref 24–120)
ALP SERPL-CCNC: 68 U/L (ref 24–120)
ALP SERPL-CCNC: 75 U/L (ref 24–120)
ALP SERPL-CCNC: 81 U/L (ref 24–120)
ALP SERPL-CCNC: 90 U/L (ref 24–120)
ALP SERPL-CCNC: 94 U/L (ref 24–120)
ALT SERPL W P-5'-P-CCNC: 18 U/L (ref 0–54)
ALT SERPL W P-5'-P-CCNC: 19 U/L (ref 0–54)
ALT SERPL W P-5'-P-CCNC: 22 U/L (ref 0–54)
ALT SERPL W P-5'-P-CCNC: 24 U/L (ref 0–54)
ALT SERPL W P-5'-P-CCNC: 25 U/L (ref 0–54)
ALT SERPL W P-5'-P-CCNC: 29 U/L (ref 0–54)
ALT SERPL W P-5'-P-CCNC: 30 U/L (ref 0–54)
ALT SERPL W P-5'-P-CCNC: 34 U/L (ref 0–54)
ANION GAP SERPL CALCULATED.3IONS-SCNC: 10 MMOL/L (ref 4–13)
ANION GAP SERPL CALCULATED.3IONS-SCNC: 10 MMOL/L (ref 4–13)
ANION GAP SERPL CALCULATED.3IONS-SCNC: 11 MMOL/L (ref 4–13)
ANION GAP SERPL CALCULATED.3IONS-SCNC: 12 MMOL/L (ref 4–13)
ANION GAP SERPL CALCULATED.3IONS-SCNC: 12 MMOL/L (ref 4–13)
ANION GAP SERPL CALCULATED.3IONS-SCNC: 13 MMOL/L (ref 4–13)
ANION GAP SERPL CALCULATED.3IONS-SCNC: 16 MMOL/L (ref 4–13)
ANION GAP SERPL CALCULATED.3IONS-SCNC: 7 MMOL/L (ref 4–13)
ANION GAP SERPL CALCULATED.3IONS-SCNC: 7 MMOL/L (ref 4–13)
ANION GAP SERPL CALCULATED.3IONS-SCNC: 8 MMOL/L (ref 4–13)
ANION GAP SERPL CALCULATED.3IONS-SCNC: 9 MMOL/L (ref 4–13)
ANION GAP SERPL CALCULATED.3IONS-SCNC: ABNORMAL MMOL/L (ref 4–13)
ANISOCYTOSIS BLD QL: ABNORMAL
APPEARANCE FLD: ABNORMAL
APTT PPP: 25.8 SECONDS (ref 24.1–34.8)
ARTICHOKE IGE QN: 40 MG/DL (ref 0–99)
ARTICHOKE IGE QN: 43 MG/DL (ref 0–99)
ARTICHOKE IGE QN: <30 MG/DL (ref 0–99)
AST SERPL-CCNC: 11 U/L (ref 7–45)
AST SERPL-CCNC: 12 U/L (ref 7–45)
AST SERPL-CCNC: 13 U/L (ref 7–45)
AST SERPL-CCNC: 13 U/L (ref 7–45)
AST SERPL-CCNC: 14 U/L (ref 7–45)
AST SERPL-CCNC: 15 U/L (ref 7–45)
AST SERPL-CCNC: 19 U/L (ref 7–45)
AST SERPL-CCNC: 25 U/L (ref 7–45)
BACTERIA SPEC AEROBE CULT: ABNORMAL
BACTERIA SPEC AEROBE CULT: NORMAL
BACTERIA UR QL AUTO: ABNORMAL /HPF
BASOPHILS # BLD MANUAL: 0.16 10*3/MM3 (ref 0–0.2)
BASOPHILS # BLD MANUAL: 0.17 10*3/MM3 (ref 0–0.2)
BASOPHILS NFR BLD AUTO: 1 % (ref 0–2)
BASOPHILS NFR BLD AUTO: 1 % (ref 0–2)
BH CV ECHO MEAS - AO MAX PG (FULL): 9.1 MMHG
BH CV ECHO MEAS - AO MAX PG: 11.6 MMHG
BH CV ECHO MEAS - AO MEAN PG (FULL): 5 MMHG
BH CV ECHO MEAS - AO MEAN PG: 6 MMHG
BH CV ECHO MEAS - AO ROOT AREA (BSA CORRECTED): 1.8
BH CV ECHO MEAS - AO ROOT AREA: 8 CM^2
BH CV ECHO MEAS - AO ROOT DIAM: 3.2 CM
BH CV ECHO MEAS - AO V2 MAX: 170 CM/SEC
BH CV ECHO MEAS - AO V2 MEAN: 110 CM/SEC
BH CV ECHO MEAS - AO V2 VTI: 35 CM
BH CV ECHO MEAS - AVA(I,A): 1.6 CM^2
BH CV ECHO MEAS - AVA(I,D): 1.6 CM^2
BH CV ECHO MEAS - AVA(V,A): 1.5 CM^2
BH CV ECHO MEAS - AVA(V,D): 1.5 CM^2
BH CV ECHO MEAS - BSA(HAYCOCK): 1.9 M^2
BH CV ECHO MEAS - BSA: 1.8 M^2
BH CV ECHO MEAS - BZI_BMI: 35.5 KILOGRAMS/M^2
BH CV ECHO MEAS - BZI_METRIC_HEIGHT: 152.4 CM
BH CV ECHO MEAS - BZI_METRIC_WEIGHT: 82.6 KG
BH CV ECHO MEAS - EDV(CUBED): 65.9 ML
BH CV ECHO MEAS - EDV(MOD-SP4): 143 ML
BH CV ECHO MEAS - EDV(TEICH): 71.7 ML
BH CV ECHO MEAS - EF(CUBED): 51.2 %
BH CV ECHO MEAS - EF(MOD-SP4): 60.3 %
BH CV ECHO MEAS - EF(TEICH): 43.7 %
BH CV ECHO MEAS - ESV(CUBED): 32.2 ML
BH CV ECHO MEAS - ESV(MOD-SP4): 56.7 ML
BH CV ECHO MEAS - ESV(TEICH): 40.3 ML
BH CV ECHO MEAS - FS: 21.3 %
BH CV ECHO MEAS - IVS/LVPW: 0.93
BH CV ECHO MEAS - IVSD: 1.4 CM
BH CV ECHO MEAS - LA DIMENSION: 5.1 CM
BH CV ECHO MEAS - LA/AO: 1.6
BH CV ECHO MEAS - LAT PEAK E' VEL: 5.4 CM/SEC
BH CV ECHO MEAS - LV DIASTOLIC VOL/BSA (35-75): 79.7 ML/M^2
BH CV ECHO MEAS - LV MASS(C)D: 227.5 GRAMS
BH CV ECHO MEAS - LV MASS(C)DI: 126.8 GRAMS/M^2
BH CV ECHO MEAS - LV MAX PG: 2.5 MMHG
BH CV ECHO MEAS - LV MEAN PG: 1 MMHG
BH CV ECHO MEAS - LV SYSTOLIC VOL/BSA (12-30): 31.6 ML/M^2
BH CV ECHO MEAS - LV V1 MAX: 79.1 CM/SEC
BH CV ECHO MEAS - LV V1 MEAN: 52.1 CM/SEC
BH CV ECHO MEAS - LV V1 VTI: 18.2 CM
BH CV ECHO MEAS - LVIDD: 4 CM
BH CV ECHO MEAS - LVIDS: 3.2 CM
BH CV ECHO MEAS - LVLD AP4: 7.6 CM
BH CV ECHO MEAS - LVLS AP4: 7.7 CM
BH CV ECHO MEAS - LVOT AREA (M): 3.1 CM^2
BH CV ECHO MEAS - LVOT AREA: 3.1 CM^2
BH CV ECHO MEAS - LVOT DIAM: 2 CM
BH CV ECHO MEAS - LVPWD: 1.5 CM
BH CV ECHO MEAS - MED PEAK E' VEL: 7.5 CM/SEC
BH CV ECHO MEAS - MV DEC TIME: 0.27 SEC
BH CV ECHO MEAS - MV E MAX VEL: 105 CM/SEC
BH CV ECHO MEAS - RAP SYSTOLE: 5 MMHG
BH CV ECHO MEAS - RVSP: 50.7 MMHG
BH CV ECHO MEAS - SI(AO): 157 ML/M^2
BH CV ECHO MEAS - SI(CUBED): 18.8 ML/M^2
BH CV ECHO MEAS - SI(LVOT): 31.9 ML/M^2
BH CV ECHO MEAS - SI(MOD-SP4): 48.1 ML/M^2
BH CV ECHO MEAS - SI(TEICH): 17.5 ML/M^2
BH CV ECHO MEAS - SV(AO): 281.5 ML
BH CV ECHO MEAS - SV(CUBED): 33.8 ML
BH CV ECHO MEAS - SV(LVOT): 57.2 ML
BH CV ECHO MEAS - SV(MOD-SP4): 86.3 ML
BH CV ECHO MEAS - SV(TEICH): 31.3 ML
BH CV ECHO MEAS - TR MAX VEL: 338 CM/SEC
BH CV ECHO MEASUREMENTS AVERAGE E/E' RATIO: 16.28
BILIRUB CONJ SERPL-MCNC: 0 MG/DL (ref 0–0.3)
BILIRUB INDIRECT SERPL-MCNC: 0.4 MG/DL (ref 0–1.1)
BILIRUB SERPL-MCNC: 0.3 MG/DL (ref 0.1–1)
BILIRUB SERPL-MCNC: 0.3 MG/DL (ref 0.1–1)
BILIRUB SERPL-MCNC: 0.4 MG/DL (ref 0.1–1)
BILIRUB SERPL-MCNC: 0.5 MG/DL (ref 0.1–1)
BILIRUB SERPL-MCNC: 0.6 MG/DL (ref 0.1–1)
BILIRUB SERPL-MCNC: 0.6 MG/DL (ref 0.1–1)
BILIRUB SERPL-MCNC: 1.1 MG/DL (ref 0.1–1)
BILIRUB UR QL STRIP: NEGATIVE
BUN BLD-MCNC: 111 MG/DL (ref 5–21)
BUN BLD-MCNC: 33 MG/DL (ref 5–21)
BUN BLD-MCNC: 44 MG/DL (ref 5–21)
BUN BLD-MCNC: 55 MG/DL (ref 5–21)
BUN BLD-MCNC: 55 MG/DL (ref 5–21)
BUN BLD-MCNC: 56 MG/DL (ref 5–21)
BUN BLD-MCNC: 57 MG/DL (ref 5–21)
BUN BLD-MCNC: 57 MG/DL (ref 5–21)
BUN BLD-MCNC: 59 MG/DL (ref 5–21)
BUN BLD-MCNC: 60 MG/DL (ref 5–21)
BUN BLD-MCNC: 60 MG/DL (ref 5–21)
BUN BLD-MCNC: 63 MG/DL (ref 5–21)
BUN BLD-MCNC: 64 MG/DL (ref 5–21)
BUN BLD-MCNC: 67 MG/DL (ref 5–21)
BUN BLD-MCNC: 94 MG/DL (ref 5–21)
BUN BLD-MCNC: 97 MG/DL (ref 5–21)
BUN/CREAT SERPL: 36.3 (ref 7–25)
BUN/CREAT SERPL: 45.5 (ref 7–25)
BUN/CREAT SERPL: 46.8 (ref 7–25)
BUN/CREAT SERPL: 50 (ref 7–25)
BUN/CREAT SERPL: 50.4 (ref 7–25)
BUN/CREAT SERPL: 51.5 (ref 7–25)
BUN/CREAT SERPL: 51.6 (ref 7–25)
BUN/CREAT SERPL: 55.7 (ref 7–25)
BUN/CREAT SERPL: 56.3 (ref 7–25)
BUN/CREAT SERPL: 57.1 (ref 7–25)
BUN/CREAT SERPL: 57.8 (ref 7–25)
BUN/CREAT SERPL: 60.6 (ref 7–25)
BUN/CREAT SERPL: 61.8 (ref 7–25)
BUN/CREAT SERPL: 62.8 (ref 7–25)
BUN/CREAT SERPL: 65.7 (ref 7–25)
BUN/CREAT SERPL: 65.9 (ref 7–25)
BUN/CREAT SERPL: 75.9 (ref 7–25)
BUN/CREAT SERPL: 77 (ref 7–25)
C3 FRG RBC-MCNC: ABNORMAL
CALCIUM SPEC-SCNC: 7.2 MG/DL (ref 8.4–10.4)
CALCIUM SPEC-SCNC: 7.4 MG/DL (ref 8.4–10.4)
CALCIUM SPEC-SCNC: 7.4 MG/DL (ref 8.4–10.4)
CALCIUM SPEC-SCNC: 8 MG/DL (ref 8.4–10.4)
CALCIUM SPEC-SCNC: 8.2 MG/DL (ref 8.4–10.4)
CALCIUM SPEC-SCNC: 8.2 MG/DL (ref 8.4–10.4)
CALCIUM SPEC-SCNC: 8.3 MG/DL (ref 8.4–10.4)
CALCIUM SPEC-SCNC: 8.4 MG/DL (ref 8.4–10.4)
CALCIUM SPEC-SCNC: 8.5 MG/DL (ref 8.4–10.4)
CALCIUM SPEC-SCNC: 8.6 MG/DL (ref 8.4–10.4)
CALCIUM SPEC-SCNC: 8.6 MG/DL (ref 8.4–10.4)
CALCIUM SPEC-SCNC: 8.7 MG/DL (ref 8.4–10.4)
CALCIUM SPEC-SCNC: 8.7 MG/DL (ref 8.4–10.4)
CALCIUM SPEC-SCNC: 8.8 MG/DL (ref 8.4–10.4)
CALCIUM SPEC-SCNC: 8.8 MG/DL (ref 8.4–10.4)
CALCIUM SPEC-SCNC: 9 MG/DL (ref 8.4–10.4)
CALCIUM SPEC-SCNC: 9.2 MG/DL (ref 8.4–10.4)
CALCIUM SPEC-SCNC: 9.4 MG/DL (ref 8.4–10.4)
CHLORIDE SERPL-SCNC: 100 MMOL/L (ref 98–110)
CHLORIDE SERPL-SCNC: 101 MMOL/L (ref 98–110)
CHLORIDE SERPL-SCNC: 102 MMOL/L (ref 98–110)
CHLORIDE SERPL-SCNC: 104 MMOL/L (ref 98–110)
CHLORIDE SERPL-SCNC: 108 MMOL/L (ref 98–110)
CHLORIDE SERPL-SCNC: 109 MMOL/L (ref 98–110)
CHLORIDE SERPL-SCNC: 109 MMOL/L (ref 98–110)
CHLORIDE SERPL-SCNC: 91 MMOL/L (ref 98–110)
CHLORIDE SERPL-SCNC: 96 MMOL/L (ref 98–110)
CHLORIDE SERPL-SCNC: 96 MMOL/L (ref 98–110)
CHLORIDE SERPL-SCNC: 97 MMOL/L (ref 98–110)
CHLORIDE SERPL-SCNC: 97 MMOL/L (ref 98–110)
CHLORIDE SERPL-SCNC: 98 MMOL/L (ref 98–110)
CHLORIDE SERPL-SCNC: 98 MMOL/L (ref 98–110)
CHOLEST SERPL-MCNC: 50 MG/DL (ref 130–200)
CHOLEST SERPL-MCNC: 57 MG/DL (ref 130–200)
CHOLEST SERPL-MCNC: 75 MG/DL (ref 130–200)
CK MB SERPL-CCNC: 2.05 NG/ML (ref 0–5)
CK SERPL-CCNC: 26 U/L (ref 0–203)
CLARITY UR: CLEAR
CLUMPED PLATELETS: PRESENT
CLUMPED PLATELETS: PRESENT
CO2 SERPL-SCNC: 16 MMOL/L (ref 24–31)
CO2 SERPL-SCNC: 20 MMOL/L (ref 24–31)
CO2 SERPL-SCNC: 21 MMOL/L (ref 24–31)
CO2 SERPL-SCNC: 23 MMOL/L (ref 24–31)
CO2 SERPL-SCNC: 23 MMOL/L (ref 24–31)
CO2 SERPL-SCNC: 25 MMOL/L (ref 24–31)
CO2 SERPL-SCNC: 26 MMOL/L (ref 24–31)
CO2 SERPL-SCNC: 27 MMOL/L (ref 24–31)
CO2 SERPL-SCNC: 28 MMOL/L (ref 24–31)
CO2 SERPL-SCNC: 29 MMOL/L (ref 24–31)
CO2 SERPL-SCNC: 31 MMOL/L (ref 24–31)
CO2 SERPL-SCNC: 31 MMOL/L (ref 24–31)
CO2 SERPL-SCNC: 33 MMOL/L (ref 24–31)
CO2 SERPL-SCNC: 34 MMOL/L (ref 24–31)
CO2 SERPL-SCNC: 34 MMOL/L (ref 24–31)
CO2 SERPL-SCNC: >40 MMOL/L (ref 24–31)
COLOR FLD: YELLOW
COLOR UR: YELLOW
CREAT BLD-MCNC: 0.74 MG/DL (ref 0.5–1.4)
CREAT BLD-MCNC: 0.79 MG/DL (ref 0.5–1.4)
CREAT BLD-MCNC: 0.85 MG/DL (ref 0.5–1.4)
CREAT BLD-MCNC: 0.89 MG/DL (ref 0.5–1.4)
CREAT BLD-MCNC: 0.91 MG/DL (ref 0.5–1.4)
CREAT BLD-MCNC: 0.94 MG/DL (ref 0.5–1.4)
CREAT BLD-MCNC: 1.05 MG/DL (ref 0.5–1.4)
CREAT BLD-MCNC: 1.06 MG/DL (ref 0.5–1.4)
CREAT BLD-MCNC: 1.09 MG/DL (ref 0.5–1.4)
CREAT BLD-MCNC: 1.17 MG/DL (ref 0.5–1.4)
CREAT BLD-MCNC: 1.21 MG/DL (ref 0.5–1.4)
CREAT BLD-MCNC: 1.28 MG/DL (ref 0.5–1.4)
CREAT BLD-MCNC: 1.3 MG/DL (ref 0.5–1.4)
CREAT BLD-MCNC: 1.67 MG/DL (ref 0.5–1.4)
CREAT BLD-MCNC: 1.69 MG/DL (ref 0.5–1.4)
CREAT BLD-MCNC: 1.88 MG/DL (ref 0.5–1.4)
CYTO UR: NORMAL
D DIMER PPP FEU-MCNC: 2.24 MG/L (FEU) (ref 0–0.5)
D-LACTATE SERPL-SCNC: 1.6 MMOL/L (ref 0.5–2)
DACRYOCYTES BLD QL SMEAR: ABNORMAL
DEPRECATED RDW RBC AUTO: 58 FL (ref 40–54)
DEPRECATED RDW RBC AUTO: 58.6 FL (ref 40–54)
DEPRECATED RDW RBC AUTO: 59.1 FL (ref 40–54)
DEPRECATED RDW RBC AUTO: 60.7 FL (ref 40–54)
DEPRECATED RDW RBC AUTO: 61.3 FL (ref 40–54)
DEPRECATED RDW RBC AUTO: 61.4 FL (ref 40–54)
DEPRECATED RDW RBC AUTO: 61.6 FL (ref 40–54)
DEPRECATED RDW RBC AUTO: 61.7 FL (ref 40–54)
DEPRECATED RDW RBC AUTO: 63.1 FL (ref 40–54)
DEPRECATED RDW RBC AUTO: 63.5 FL (ref 40–54)
DEPRECATED RDW RBC AUTO: 63.5 FL (ref 40–54)
DEPRECATED RDW RBC AUTO: 63.6 FL (ref 40–54)
DEPRECATED RDW RBC AUTO: 65.5 FL (ref 40–54)
DEPRECATED RDW RBC AUTO: 71.7 FL (ref 40–54)
DEPRECATED RDW RBC AUTO: 74.6 FL (ref 40–54)
ELLIPTOCYTES BLD QL SMEAR: ABNORMAL
ELLIPTOCYTES BLD QL SMEAR: ABNORMAL
EOSINOPHIL # BLD MANUAL: 0.12 10*3/MM3 (ref 0–0.7)
EOSINOPHIL # BLD MANUAL: 0.36 10*3/MM3 (ref 0–0.7)
EOSINOPHIL # BLD MANUAL: 0.5 10*3/MM3 (ref 0–0.7)
EOSINOPHIL # BLD MANUAL: 0.58 10*3/MM3 (ref 0–0.7)
EOSINOPHIL # BLD MANUAL: 0.61 10*3/MM3 (ref 0–0.7)
EOSINOPHIL # BLD MANUAL: 0.64 10*3/MM3 (ref 0–0.7)
EOSINOPHIL # BLD MANUAL: 0.79 10*3/MM3 (ref 0–0.7)
EOSINOPHIL # BLD MANUAL: 0.79 10*3/MM3 (ref 0–0.7)
EOSINOPHIL # BLD MANUAL: 1.08 10*3/MM3 (ref 0–0.7)
EOSINOPHIL # BLD MANUAL: 2.34 10*3/MM3 (ref 0–0.7)
EOSINOPHIL # BLD MANUAL: 2.59 10*3/MM3 (ref 0–0.7)
EOSINOPHIL # BLD MANUAL: 4.11 10*3/MM3 (ref 0–0.7)
EOSINOPHIL NFR BLD MANUAL: 1 % (ref 0–4)
EOSINOPHIL NFR BLD MANUAL: 10.3 % (ref 0–4)
EOSINOPHIL NFR BLD MANUAL: 12.2 % (ref 0–4)
EOSINOPHIL NFR BLD MANUAL: 2 % (ref 0–4)
EOSINOPHIL NFR BLD MANUAL: 2 % (ref 0–4)
EOSINOPHIL NFR BLD MANUAL: 2.9 % (ref 0–4)
EOSINOPHIL NFR BLD MANUAL: 3 % (ref 0–4)
EOSINOPHIL NFR BLD MANUAL: 4.1 % (ref 0–4)
EOSINOPHIL NFR BLD MANUAL: 5 % (ref 0–4)
EOSINOPHIL NFR BLD MANUAL: 6.1 % (ref 0–4)
EOSINOPHIL NFR BLD MANUAL: 7 % (ref 0–4)
EOSINOPHIL NFR BLD MANUAL: 8.2 % (ref 0–4)
ERYTHROCYTE [DISTWIDTH] IN BLOOD BY AUTOMATED COUNT: 15.1 % (ref 12–15)
ERYTHROCYTE [DISTWIDTH] IN BLOOD BY AUTOMATED COUNT: 15.4 % (ref 12–15)
ERYTHROCYTE [DISTWIDTH] IN BLOOD BY AUTOMATED COUNT: 15.6 % (ref 12–15)
ERYTHROCYTE [DISTWIDTH] IN BLOOD BY AUTOMATED COUNT: 15.7 % (ref 12–15)
ERYTHROCYTE [DISTWIDTH] IN BLOOD BY AUTOMATED COUNT: 15.8 % (ref 12–15)
ERYTHROCYTE [DISTWIDTH] IN BLOOD BY AUTOMATED COUNT: 15.8 % (ref 12–15)
ERYTHROCYTE [DISTWIDTH] IN BLOOD BY AUTOMATED COUNT: 15.9 % (ref 12–15)
ERYTHROCYTE [DISTWIDTH] IN BLOOD BY AUTOMATED COUNT: 15.9 % (ref 12–15)
ERYTHROCYTE [DISTWIDTH] IN BLOOD BY AUTOMATED COUNT: 16 % (ref 12–15)
ERYTHROCYTE [DISTWIDTH] IN BLOOD BY AUTOMATED COUNT: 16.2 % (ref 12–15)
ERYTHROCYTE [DISTWIDTH] IN BLOOD BY AUTOMATED COUNT: 17.5 % (ref 12–15)
ERYTHROCYTE [DISTWIDTH] IN BLOOD BY AUTOMATED COUNT: 18.9 % (ref 12–15)
ERYTHROCYTE [DISTWIDTH] IN BLOOD BY AUTOMATED COUNT: 19.3 % (ref 12–15)
ERYTHROCYTE [SEDIMENTATION RATE] IN BLOOD: 9 MM/HR (ref 0–20)
GFR SERPL CREATININE-BSD FRML MDRD: 25 ML/MIN/1.73
GFR SERPL CREATININE-BSD FRML MDRD: 29 ML/MIN/1.73
GFR SERPL CREATININE-BSD FRML MDRD: 29 ML/MIN/1.73
GFR SERPL CREATININE-BSD FRML MDRD: 39 ML/MIN/1.73
GFR SERPL CREATININE-BSD FRML MDRD: 39 ML/MIN/1.73
GFR SERPL CREATININE-BSD FRML MDRD: 42 ML/MIN/1.73
GFR SERPL CREATININE-BSD FRML MDRD: 44 ML/MIN/1.73
GFR SERPL CREATININE-BSD FRML MDRD: 47 ML/MIN/1.73
GFR SERPL CREATININE-BSD FRML MDRD: 49 ML/MIN/1.73
GFR SERPL CREATININE-BSD FRML MDRD: 49 ML/MIN/1.73
GFR SERPL CREATININE-BSD FRML MDRD: 56 ML/MIN/1.73
GFR SERPL CREATININE-BSD FRML MDRD: 58 ML/MIN/1.73
GFR SERPL CREATININE-BSD FRML MDRD: 60 ML/MIN/1.73
GFR SERPL CREATININE-BSD FRML MDRD: 63 ML/MIN/1.73
GFR SERPL CREATININE-BSD FRML MDRD: 69 ML/MIN/1.73
GFR SERPL CREATININE-BSD FRML MDRD: 74 ML/MIN/1.73
GIANT PLATELETS: ABNORMAL
GLOBULIN UR ELPH-MCNC: 2.1 GM/DL
GLOBULIN UR ELPH-MCNC: 2.2 GM/DL
GLOBULIN UR ELPH-MCNC: 2.3 GM/DL
GLOBULIN UR ELPH-MCNC: 2.3 GM/DL
GLOBULIN UR ELPH-MCNC: 2.4 GM/DL
GLOBULIN UR ELPH-MCNC: 2.5 GM/DL
GLOBULIN UR ELPH-MCNC: 2.7 GM/DL
GLUCOSE BLD-MCNC: 100 MG/DL (ref 70–100)
GLUCOSE BLD-MCNC: 100 MG/DL (ref 70–100)
GLUCOSE BLD-MCNC: 108 MG/DL (ref 70–100)
GLUCOSE BLD-MCNC: 117 MG/DL (ref 70–100)
GLUCOSE BLD-MCNC: 128 MG/DL (ref 70–100)
GLUCOSE BLD-MCNC: 141 MG/DL (ref 70–100)
GLUCOSE BLD-MCNC: 146 MG/DL (ref 70–100)
GLUCOSE BLD-MCNC: 168 MG/DL (ref 70–100)
GLUCOSE BLD-MCNC: 183 MG/DL (ref 70–100)
GLUCOSE BLD-MCNC: 184 MG/DL (ref 70–100)
GLUCOSE BLD-MCNC: 188 MG/DL (ref 70–100)
GLUCOSE BLD-MCNC: 193 MG/DL (ref 70–100)
GLUCOSE BLD-MCNC: 210 MG/DL (ref 70–100)
GLUCOSE BLD-MCNC: 215 MG/DL (ref 70–100)
GLUCOSE BLD-MCNC: 218 MG/DL (ref 70–100)
GLUCOSE BLD-MCNC: 226 MG/DL (ref 70–100)
GLUCOSE BLD-MCNC: 88 MG/DL (ref 70–100)
GLUCOSE BLD-MCNC: 93 MG/DL (ref 70–100)
GLUCOSE BLDC GLUCOMTR-MCNC: 150 MG/DL (ref 70–130)
GLUCOSE UR STRIP-MCNC: NEGATIVE MG/DL
GRAM STN SPEC: NORMAL
HBA1C MFR BLD: 6.9 %
HBA1C MFR BLD: 7.2 %
HBA1C MFR BLD: 7.8 %
HCT VFR BLD AUTO: 25.7 % (ref 37–47)
HCT VFR BLD AUTO: 26 % (ref 37–47)
HCT VFR BLD AUTO: 28.4 % (ref 37–47)
HCT VFR BLD AUTO: 28.6 % (ref 37–47)
HCT VFR BLD AUTO: 28.8 % (ref 37–47)
HCT VFR BLD AUTO: 30.1 % (ref 37–47)
HCT VFR BLD AUTO: 30.3 % (ref 37–47)
HCT VFR BLD AUTO: 30.9 % (ref 37–47)
HCT VFR BLD AUTO: 31.3 % (ref 37–47)
HCT VFR BLD AUTO: 31.5 % (ref 37–47)
HCT VFR BLD AUTO: 33.1 % (ref 37–47)
HCT VFR BLD AUTO: 33.7 % (ref 37–47)
HCT VFR BLD AUTO: 38.3 % (ref 37–47)
HCT VFR BLD AUTO: 38.4 % (ref 37–47)
HCT VFR BLD AUTO: 38.5 % (ref 37–47)
HDLC SERPL-MCNC: 24 MG/DL
HDLC SERPL-MCNC: 26 MG/DL
HDLC SERPL-MCNC: 30 MG/DL
HGB BLD-MCNC: 10.4 G/DL (ref 12–16)
HGB BLD-MCNC: 10.5 G/DL (ref 12–16)
HGB BLD-MCNC: 10.9 G/DL (ref 12–16)
HGB BLD-MCNC: 11.1 G/DL (ref 12–16)
HGB BLD-MCNC: 12.5 G/DL (ref 12–16)
HGB BLD-MCNC: 12.7 G/DL (ref 12–16)
HGB BLD-MCNC: 12.8 G/DL (ref 12–16)
HGB BLD-MCNC: 8.4 G/DL (ref 12–16)
HGB BLD-MCNC: 8.5 G/DL (ref 12–16)
HGB BLD-MCNC: 9 G/DL (ref 12–16)
HGB BLD-MCNC: 9.1 G/DL (ref 12–16)
HGB BLD-MCNC: 9.3 G/DL (ref 12–16)
HGB BLD-MCNC: 9.5 G/DL (ref 12–16)
HGB BLD-MCNC: 9.8 G/DL (ref 12–16)
HGB BLD-MCNC: 9.9 G/DL (ref 12–16)
HGB UR QL STRIP.AUTO: NEGATIVE
HOLD SPECIMEN: NORMAL
HYPOCHROMIA BLD QL: ABNORMAL
INR PPP: 1.46 (ref 0.91–1.09)
KETONES UR QL STRIP: NEGATIVE
LAB AP CASE REPORT: NORMAL
LDH FLD-CCNC: 100 IU/L
LDLC/HDLC SERPL: 0.75 {RATIO}
LDLC/HDLC SERPL: 0.9 {RATIO}
LDLC/HDLC SERPL: ABNORMAL {RATIO}
LEFT ATRIUM VOLUME INDEX: 40.7 ML/M2
LEFT ATRIUM VOLUME: 63 CM3
LEUKOCYTE ESTERASE UR QL STRIP.AUTO: ABNORMAL
LIPASE SERPL-CCNC: 78 U/L (ref 23–203)
LV EF 2D ECHO EST: 60 %
LYMPHOCYTES # BLD MANUAL: 0.17 10*3/MM3 (ref 0.72–4.86)
LYMPHOCYTES # BLD MANUAL: 0.24 10*3/MM3 (ref 0.72–4.86)
LYMPHOCYTES # BLD MANUAL: 0.26 10*3/MM3 (ref 0.72–4.86)
LYMPHOCYTES # BLD MANUAL: 0.61 10*3/MM3 (ref 0.72–4.86)
LYMPHOCYTES # BLD MANUAL: 0.91 10*3/MM3 (ref 0.72–4.86)
LYMPHOCYTES # BLD MANUAL: 0.94 10*3/MM3 (ref 0.72–4.86)
LYMPHOCYTES # BLD MANUAL: 1.17 10*3/MM3 (ref 0.72–4.86)
LYMPHOCYTES # BLD MANUAL: 1.31 10*3/MM3 (ref 0.72–4.86)
LYMPHOCYTES # BLD MANUAL: 1.38 10*3/MM3 (ref 0.72–4.86)
LYMPHOCYTES # BLD MANUAL: 1.46 10*3/MM3 (ref 0.72–4.86)
LYMPHOCYTES # BLD MANUAL: 1.77 10*3/MM3 (ref 0.72–4.86)
LYMPHOCYTES # BLD MANUAL: 1.82 10*3/MM3 (ref 0.72–4.86)
LYMPHOCYTES # BLD MANUAL: 2 10*3/MM3 (ref 0.72–4.86)
LYMPHOCYTES # BLD MANUAL: 3.42 10*3/MM3 (ref 0.72–4.86)
LYMPHOCYTES # BLD MANUAL: 3.85 10*3/MM3 (ref 0.72–4.86)
LYMPHOCYTES NFR BLD MANUAL: 1 % (ref 15–45)
LYMPHOCYTES NFR BLD MANUAL: 1 % (ref 4–12)
LYMPHOCYTES NFR BLD MANUAL: 10 % (ref 15–45)
LYMPHOCYTES NFR BLD MANUAL: 11.1 % (ref 15–45)
LYMPHOCYTES NFR BLD MANUAL: 14.1 % (ref 15–45)
LYMPHOCYTES NFR BLD MANUAL: 2 % (ref 15–45)
LYMPHOCYTES NFR BLD MANUAL: 2 % (ref 4–12)
LYMPHOCYTES NFR BLD MANUAL: 25 % (ref 15–45)
LYMPHOCYTES NFR BLD MANUAL: 28 % (ref 15–45)
LYMPHOCYTES NFR BLD MANUAL: 3 % (ref 4–12)
LYMPHOCYTES NFR BLD MANUAL: 3.1 % (ref 4–12)
LYMPHOCYTES NFR BLD MANUAL: 4 % (ref 15–45)
LYMPHOCYTES NFR BLD MANUAL: 4 % (ref 15–45)
LYMPHOCYTES NFR BLD MANUAL: 4 % (ref 4–12)
LYMPHOCYTES NFR BLD MANUAL: 4.1 % (ref 15–45)
LYMPHOCYTES NFR BLD MANUAL: 4.1 % (ref 4–12)
LYMPHOCYTES NFR BLD MANUAL: 5 % (ref 4–12)
LYMPHOCYTES NFR BLD MANUAL: 5.2 % (ref 15–45)
LYMPHOCYTES NFR BLD MANUAL: 5.9 % (ref 4–12)
LYMPHOCYTES NFR BLD MANUAL: 6.1 % (ref 4–12)
LYMPHOCYTES NFR BLD MANUAL: 6.2 % (ref 15–45)
LYMPHOCYTES NFR BLD MANUAL: 9.3 % (ref 15–45)
LYMPHOCYTES NFR FLD MANUAL: 31 %
MACROCYTES BLD QL SMEAR: ABNORMAL
MAGNESIUM SERPL-MCNC: 1.5 MG/DL (ref 1.4–2.2)
MAGNESIUM SERPL-MCNC: 1.6 MG/DL (ref 1.4–2.2)
MAGNESIUM SERPL-MCNC: 1.7 MG/DL (ref 1.4–2.2)
MAGNESIUM SERPL-MCNC: 2 MG/DL (ref 1.4–2.2)
MAXIMAL PREDICTED HEART RATE: 132 BPM
MCH RBC QN AUTO: 32.8 PG (ref 28–32)
MCH RBC QN AUTO: 33.6 PG (ref 28–32)
MCH RBC QN AUTO: 33.7 PG (ref 28–32)
MCH RBC QN AUTO: 33.7 PG (ref 28–32)
MCH RBC QN AUTO: 34.1 PG (ref 28–32)
MCH RBC QN AUTO: 34.6 PG (ref 28–32)
MCH RBC QN AUTO: 34.6 PG (ref 28–32)
MCH RBC QN AUTO: 34.7 PG (ref 28–32)
MCH RBC QN AUTO: 34.7 PG (ref 28–32)
MCH RBC QN AUTO: 34.8 PG (ref 28–32)
MCH RBC QN AUTO: 34.8 PG (ref 28–32)
MCH RBC QN AUTO: 34.9 PG (ref 28–32)
MCH RBC QN AUTO: 35.1 PG (ref 28–32)
MCH RBC QN AUTO: 35.3 PG (ref 28–32)
MCH RBC QN AUTO: 35.8 PG (ref 28–32)
MCHC RBC AUTO-ENTMCNC: 31.6 G/DL (ref 33–36)
MCHC RBC AUTO-ENTMCNC: 31.7 G/DL (ref 33–36)
MCHC RBC AUTO-ENTMCNC: 32.3 G/DL (ref 33–36)
MCHC RBC AUTO-ENTMCNC: 32.5 G/DL (ref 33–36)
MCHC RBC AUTO-ENTMCNC: 32.6 G/DL (ref 33–36)
MCHC RBC AUTO-ENTMCNC: 32.7 G/DL (ref 33–36)
MCHC RBC AUTO-ENTMCNC: 32.7 G/DL (ref 33–36)
MCHC RBC AUTO-ENTMCNC: 32.9 G/DL (ref 33–36)
MCHC RBC AUTO-ENTMCNC: 32.9 G/DL (ref 33–36)
MCHC RBC AUTO-ENTMCNC: 33.2 G/DL (ref 33–36)
MCHC RBC AUTO-ENTMCNC: 33.2 G/DL (ref 33–36)
MCHC RBC AUTO-ENTMCNC: 33.3 G/DL (ref 33–36)
MCHC RBC AUTO-ENTMCNC: 33.7 G/DL (ref 33–36)
MCV RBC AUTO: 102.4 FL (ref 82–98)
MCV RBC AUTO: 102.8 FL (ref 82–98)
MCV RBC AUTO: 103.8 FL (ref 82–98)
MCV RBC AUTO: 104.3 FL (ref 82–98)
MCV RBC AUTO: 104.4 FL (ref 82–98)
MCV RBC AUTO: 104.6 FL (ref 82–98)
MCV RBC AUTO: 106.1 FL (ref 82–98)
MCV RBC AUTO: 106.4 FL (ref 82–98)
MCV RBC AUTO: 106.7 FL (ref 82–98)
MCV RBC AUTO: 109 FL (ref 82–98)
MCV RBC AUTO: 109.7 FL (ref 82–98)
MCV RBC AUTO: 109.8 FL (ref 82–98)
MCV RBC AUTO: 110 FL (ref 82–98)
METAMYELOCYTES NFR BLD MANUAL: 1 % (ref 0–0)
MONOCYTES # BLD AUTO: 0.15 10*3/MM3 (ref 0.19–1.3)
MONOCYTES # BLD AUTO: 0.23 10*3/MM3 (ref 0.19–1.3)
MONOCYTES # BLD AUTO: 0.23 10*3/MM3 (ref 0.19–1.3)
MONOCYTES # BLD AUTO: 0.26 10*3/MM3 (ref 0.19–1.3)
MONOCYTES # BLD AUTO: 0.26 10*3/MM3 (ref 0.19–1.3)
MONOCYTES # BLD AUTO: 0.31 10*3/MM3 (ref 0.19–1.3)
MONOCYTES # BLD AUTO: 0.37 10*3/MM3 (ref 0.19–1.3)
MONOCYTES # BLD AUTO: 0.58 10*3/MM3 (ref 0.19–1.3)
MONOCYTES # BLD AUTO: 0.64 10*3/MM3 (ref 0.19–1.3)
MONOCYTES # BLD AUTO: 0.88 10*3/MM3 (ref 0.19–1.3)
MONOCYTES # BLD AUTO: 0.94 10*3/MM3 (ref 0.19–1.3)
MONOCYTES # BLD AUTO: 1.01 10*3/MM3 (ref 0.19–1.3)
MONOCYTES # BLD AUTO: 1.03 10*3/MM3 (ref 0.19–1.3)
MONOCYTES # BLD AUTO: 1.1 10*3/MM3 (ref 0.19–1.3)
MONOCYTES # BLD AUTO: 1.38 10*3/MM3 (ref 0.19–1.3)
MONOCYTES NFR FLD: 20 %
MYELOCYTES NFR BLD MANUAL: 1 % (ref 0–0)
NEUTROPHILS # BLD AUTO: 10.05 10*3/MM3 (ref 1.87–8.4)
NEUTROPHILS # BLD AUTO: 10.32 10*3/MM3 (ref 1.87–8.4)
NEUTROPHILS # BLD AUTO: 12.28 10*3/MM3 (ref 1.87–8.4)
NEUTROPHILS # BLD AUTO: 14.36 10*3/MM3 (ref 1.87–8.4)
NEUTROPHILS # BLD AUTO: 14.43 10*3/MM3 (ref 1.87–8.4)
NEUTROPHILS # BLD AUTO: 20.18 10*3/MM3 (ref 1.87–8.4)
NEUTROPHILS # BLD AUTO: 21.36 10*3/MM3 (ref 1.87–8.4)
NEUTROPHILS # BLD AUTO: 22.05 10*3/MM3 (ref 1.87–8.4)
NEUTROPHILS # BLD AUTO: 22.19 10*3/MM3 (ref 1.87–8.4)
NEUTROPHILS # BLD AUTO: 22.65 10*3/MM3 (ref 1.87–8.4)
NEUTROPHILS # BLD AUTO: 25.02 10*3/MM3 (ref 1.87–8.4)
NEUTROPHILS # BLD AUTO: 26.47 10*3/MM3 (ref 1.87–8.4)
NEUTROPHILS # BLD AUTO: 28.86 10*3/MM3 (ref 1.87–8.4)
NEUTROPHILS # BLD AUTO: 8.3 10*3/MM3 (ref 1.87–8.4)
NEUTROPHILS # BLD AUTO: 8.64 10*3/MM3 (ref 1.87–8.4)
NEUTROPHILS NFR BLD MANUAL: 66 % (ref 39–78)
NEUTROPHILS NFR BLD MANUAL: 66 % (ref 39–78)
NEUTROPHILS NFR BLD MANUAL: 72 % (ref 39–78)
NEUTROPHILS NFR BLD MANUAL: 75.3 % (ref 39–78)
NEUTROPHILS NFR BLD MANUAL: 75.5 % (ref 39–78)
NEUTROPHILS NFR BLD MANUAL: 77.8 % (ref 39–78)
NEUTROPHILS NFR BLD MANUAL: 78.4 % (ref 39–78)
NEUTROPHILS NFR BLD MANUAL: 78.4 % (ref 39–78)
NEUTROPHILS NFR BLD MANUAL: 78.8 % (ref 39–78)
NEUTROPHILS NFR BLD MANUAL: 79.4 % (ref 39–78)
NEUTROPHILS NFR BLD MANUAL: 79.8 % (ref 39–78)
NEUTROPHILS NFR BLD MANUAL: 84 % (ref 39–78)
NEUTROPHILS NFR BLD MANUAL: 90 % (ref 39–78)
NEUTROPHILS NFR BLD MANUAL: 92 % (ref 39–78)
NEUTROPHILS NFR BLD MANUAL: 93 % (ref 39–78)
NEUTROPHILS NFR FLD MANUAL: 21 %
NEUTS BAND NFR BLD MANUAL: 1 % (ref 0–10)
NEUTS BAND NFR BLD MANUAL: 11 % (ref 0–10)
NEUTS BAND NFR BLD MANUAL: 14.1 % (ref 0–10)
NEUTS BAND NFR BLD MANUAL: 2 % (ref 0–10)
NEUTS BAND NFR BLD MANUAL: 3.1 % (ref 0–10)
NEUTS BAND NFR BLD MANUAL: 4 % (ref 0–10)
NEUTS BAND NFR BLD MANUAL: 5.2 % (ref 0–10)
NEUTS BAND NFR BLD MANUAL: 5.9 % (ref 0–10)
NITRITE UR QL STRIP: NEGATIVE
NRBC BLD MANUAL-RTO: 0 /100 WBC (ref 0–0)
NRBC SPEC MANUAL: 1 /100 WBC (ref 0–0)
NT-PROBNP SERPL-MCNC: 3290 PG/ML (ref 0–1800)
NT-PROBNP SERPL-MCNC: 5710 PG/ML (ref 0–1800)
NT-PROBNP SERPL-MCNC: ABNORMAL PG/ML (ref 0–1800)
OVALOCYTES BLD QL SMEAR: ABNORMAL
OVALOCYTES BLD QL SMEAR: ABNORMAL
PATH REPORT.FINAL DX SPEC: NORMAL
PATH REPORT.GROSS SPEC: NORMAL
PH FLD: 8 [PH]
PH UR STRIP.AUTO: <=5 [PH] (ref 5–8)
PLAT MORPH BLD: NORMAL
PLATELET # BLD AUTO: 168 10*3/MM3 (ref 130–400)
PLATELET # BLD AUTO: 183 10*3/MM3 (ref 130–400)
PLATELET # BLD AUTO: 216 10*3/MM3 (ref 130–400)
PLATELET # BLD AUTO: 267 10*3/MM3 (ref 130–400)
PLATELET # BLD AUTO: 292 10*3/MM3 (ref 130–400)
PLATELET # BLD AUTO: 295 10*3/MM3 (ref 130–400)
PLATELET # BLD AUTO: 311 10*3/MM3 (ref 130–400)
PLATELET # BLD AUTO: 369 10*3/MM3 (ref 130–400)
PLATELET # BLD AUTO: 445 10*3/MM3 (ref 130–400)
PLATELET # BLD AUTO: 485 10*3/MM3 (ref 130–400)
PLATELET # BLD AUTO: 490 10*3/MM3 (ref 130–400)
PLATELET # BLD AUTO: 524 10*3/MM3 (ref 130–400)
PLATELET # BLD AUTO: 537 10*3/MM3 (ref 130–400)
PLATELET # BLD AUTO: 546 10*3/MM3 (ref 130–400)
PLATELET # BLD AUTO: 563 10*3/MM3 (ref 130–400)
PMV BLD AUTO: 10.1 FL (ref 6–12)
PMV BLD AUTO: 10.3 FL (ref 6–12)
PMV BLD AUTO: 10.6 FL (ref 6–12)
PMV BLD AUTO: 10.8 FL (ref 6–12)
PMV BLD AUTO: 10.9 FL (ref 6–12)
PMV BLD AUTO: 10.9 FL (ref 6–12)
PMV BLD AUTO: 11.1 FL (ref 6–12)
PMV BLD AUTO: 11.1 FL (ref 6–12)
PMV BLD AUTO: 11.7 FL (ref 6–12)
PMV BLD AUTO: 11.9 FL (ref 6–12)
PMV BLD AUTO: 12.1 FL (ref 6–12)
PMV BLD AUTO: 12.3 FL (ref 6–12)
PMV BLD AUTO: 12.4 FL (ref 6–12)
POIKILOCYTOSIS BLD QL SMEAR: ABNORMAL
POLYCHROMASIA BLD QL SMEAR: ABNORMAL
POTASSIUM BLD-SCNC: 4.2 MMOL/L (ref 3.5–5.3)
POTASSIUM BLD-SCNC: 4.3 MMOL/L (ref 3.5–5.3)
POTASSIUM BLD-SCNC: 4.4 MMOL/L (ref 3.5–5.3)
POTASSIUM BLD-SCNC: 4.6 MMOL/L (ref 3.5–5.3)
POTASSIUM BLD-SCNC: 4.9 MMOL/L (ref 3.5–5.3)
POTASSIUM BLD-SCNC: 4.9 MMOL/L (ref 3.5–5.3)
POTASSIUM BLD-SCNC: 5 MMOL/L (ref 3.5–5.3)
POTASSIUM BLD-SCNC: 5 MMOL/L (ref 3.5–5.3)
POTASSIUM BLD-SCNC: 5.1 MMOL/L (ref 3.5–5.3)
POTASSIUM BLD-SCNC: 5.2 MMOL/L (ref 3.5–5.3)
POTASSIUM BLD-SCNC: 5.3 MMOL/L (ref 3.5–5.3)
POTASSIUM BLD-SCNC: 5.4 MMOL/L (ref 3.5–5.3)
POTASSIUM BLD-SCNC: 5.5 MMOL/L (ref 3.5–5.3)
POTASSIUM BLD-SCNC: 5.5 MMOL/L (ref 3.5–5.3)
POTASSIUM BLD-SCNC: 5.7 MMOL/L (ref 3.5–5.3)
POTASSIUM BLD-SCNC: 6.2 MMOL/L (ref 3.5–5.3)
PREALB SERPL-MCNC: 13.7 MG/DL (ref 18–36)
PROMYELOCYTES NFR BLD MANUAL: 1 % (ref 0–0)
PROT SERPL-MCNC: 4.6 G/DL (ref 6.3–8.7)
PROT SERPL-MCNC: 5 G/DL (ref 6.3–8.7)
PROT SERPL-MCNC: 5.1 G/DL (ref 6.3–8.7)
PROT SERPL-MCNC: 5.2 G/DL (ref 6.3–8.7)
PROT SERPL-MCNC: 5.4 G/DL (ref 6.3–8.7)
PROT SERPL-MCNC: 5.5 G/DL (ref 6.3–8.7)
PROT SERPL-MCNC: 5.9 G/DL (ref 6.3–8.7)
PROT SERPL-MCNC: 6.1 G/DL (ref 6.3–8.7)
PROT UR QL STRIP: ABNORMAL
PROTHROMBIN TIME: 18.2 SECONDS (ref 11.9–14.6)
RBC # BLD AUTO: 2.49 10*6/MM3 (ref 4.2–5.4)
RBC # BLD AUTO: 2.5 10*6/MM3 (ref 4.2–5.4)
RBC # BLD AUTO: 2.59 10*6/MM3 (ref 4.2–5.4)
RBC # BLD AUTO: 2.6 10*6/MM3 (ref 4.2–5.4)
RBC # BLD AUTO: 2.7 10*6/MM3 (ref 4.2–5.4)
RBC # BLD AUTO: 2.78 10*6/MM3 (ref 4.2–5.4)
RBC # BLD AUTO: 2.85 10*6/MM3 (ref 4.2–5.4)
RBC # BLD AUTO: 2.9 10*6/MM3 (ref 4.2–5.4)
RBC # BLD AUTO: 2.96 10*6/MM3 (ref 4.2–5.4)
RBC # BLD AUTO: 3.02 10*6/MM3 (ref 4.2–5.4)
RBC # BLD AUTO: 3.12 10*6/MM3 (ref 4.2–5.4)
RBC # BLD AUTO: 3.29 10*6/MM3 (ref 4.2–5.4)
RBC # BLD AUTO: 3.61 10*6/MM3 (ref 4.2–5.4)
RBC # BLD AUTO: 3.67 10*6/MM3 (ref 4.2–5.4)
RBC # BLD AUTO: 3.68 10*6/MM3 (ref 4.2–5.4)
RBC # FLD AUTO: 3000 /MM3
RBC # UR: ABNORMAL /HPF
REF LAB TEST METHOD: ABNORMAL
SMALL PLATELETS BLD QL SMEAR: ABNORMAL
SMUDGE CELLS BLD QL SMEAR: ABNORMAL
SODIUM BLD-SCNC: 135 MMOL/L (ref 135–145)
SODIUM BLD-SCNC: 136 MMOL/L (ref 135–145)
SODIUM BLD-SCNC: 137 MMOL/L (ref 135–145)
SODIUM BLD-SCNC: 137 MMOL/L (ref 135–145)
SODIUM BLD-SCNC: 138 MMOL/L (ref 135–145)
SODIUM BLD-SCNC: 139 MMOL/L (ref 135–145)
SODIUM BLD-SCNC: 140 MMOL/L (ref 135–145)
SODIUM BLD-SCNC: 141 MMOL/L (ref 135–145)
SODIUM BLD-SCNC: 141 MMOL/L (ref 135–145)
SP GR UR STRIP: 1.02 (ref 1–1.03)
SQUAMOUS #/AREA URNS HPF: ABNORMAL /HPF
STRESS TARGET HR: 112 BPM
T4 FREE SERPL-MCNC: 1.97 NG/DL (ref 0.78–2.19)
TARGETS BLD QL SMEAR: ABNORMAL
TARGETS BLD QL SMEAR: ABNORMAL
TOXIC GRANULATION: ABNORMAL
TRIGL SERPL-MCNC: 75 MG/DL (ref 0–149)
TRIGL SERPL-MCNC: 79 MG/DL (ref 0–149)
TRIGL SERPL-MCNC: 90 MG/DL (ref 0–149)
TROPONIN I SERPL-MCNC: 0.06 NG/ML (ref 0–0.03)
TROPONIN I SERPL-MCNC: 0.07 NG/ML (ref 0–0.03)
TROPONIN I SERPL-MCNC: 0.07 NG/ML (ref 0–0.03)
TROPONIN I SERPL-MCNC: 0.08 NG/ML (ref 0–0.03)
TSH SERPL DL<=0.05 MIU/L-ACNC: 1.6 MIU/ML (ref 0.47–4.68)
TSH SERPL DL<=0.05 MIU/L-ACNC: 2.13 MIU/ML (ref 0.47–4.68)
TSH SERPL DL<=0.05 MIU/L-ACNC: 5 MIU/ML (ref 0.47–4.68)
UNCLASSIFIED CELLS, FLUID: 28 %
UROBILINOGEN UR QL STRIP: ABNORMAL
VARIANT LYMPHS NFR BLD MANUAL: 1 % (ref 0–5)
VARIANT LYMPHS NFR BLD MANUAL: 1 % (ref 0–5)
VARIANT LYMPHS NFR BLD MANUAL: 3.1 % (ref 0–5)
VARIANT LYMPHS NFR BLD MANUAL: 3.1 % (ref 0–5)
VARIANT LYMPHS NFR BLD MANUAL: 3.9 % (ref 0–5)
VARIANT LYMPHS NFR BLD MANUAL: 6 % (ref 0–5)
VIT B12 BLD-MCNC: 875 PG/ML (ref 239–931)
VIT B12 BLD-MCNC: >1000 PG/ML (ref 239–931)
WBC # FLD: 453 /MM3
WBC MORPH BLD: NORMAL
WBC NRBC COR # BLD: 11.68 10*3/MM3 (ref 4.8–10.8)
WBC NRBC COR # BLD: 12.2 10*3/MM3 (ref 4.8–10.8)
WBC NRBC COR # BLD: 12.92 10*3/MM3 (ref 4.8–10.8)
WBC NRBC COR # BLD: 15.41 10*3/MM3 (ref 4.8–10.8)
WBC NRBC COR # BLD: 15.66 10*3/MM3 (ref 4.8–10.8)
WBC NRBC COR # BLD: 17.11 10*3/MM3 (ref 4.8–10.8)
WBC NRBC COR # BLD: 18 10*3/MM3 (ref 4.8–10.8)
WBC NRBC COR # BLD: 22.74 10*3/MM3 (ref 4.8–10.8)
WBC NRBC COR # BLD: 23.46 10*3/MM3 (ref 4.8–10.8)
WBC NRBC COR # BLD: 23.61 10*3/MM3 (ref 4.8–10.8)
WBC NRBC COR # BLD: 25.1 10*3/MM3 (ref 4.8–10.8)
WBC NRBC COR # BLD: 26.34 10*3/MM3 (ref 4.8–10.8)
WBC NRBC COR # BLD: 28.53 10*3/MM3 (ref 4.8–10.8)
WBC NRBC COR # BLD: 30.7 10*3/MM3 (ref 4.8–10.8)
WBC NRBC COR # BLD: 33.69 10*3/MM3 (ref 4.8–10.8)
WBC UR QL AUTO: ABNORMAL /HPF
YEAST URNS QL MICRO: ABNORMAL /HPF

## 2018-01-01 PROCEDURE — 83735 ASSAY OF MAGNESIUM: CPT | Performed by: NURSE PRACTITIONER

## 2018-01-01 PROCEDURE — 85730 THROMBOPLASTIN TIME PARTIAL: CPT | Performed by: EMERGENCY MEDICINE

## 2018-01-01 PROCEDURE — 99284 EMERGENCY DEPT VISIT MOD MDM: CPT

## 2018-01-01 PROCEDURE — 83986 ASSAY PH BODY FLUID NOS: CPT | Performed by: INTERNAL MEDICINE

## 2018-01-01 PROCEDURE — 25010000002 CEFEPIME PER 500 MG: Performed by: INTERNAL MEDICINE

## 2018-01-01 PROCEDURE — 80053 COMPREHEN METABOLIC PANEL: CPT | Performed by: EMERGENCY MEDICINE

## 2018-01-01 PROCEDURE — 36415 COLL VENOUS BLD VENIPUNCTURE: CPT | Performed by: INTERNAL MEDICINE

## 2018-01-01 PROCEDURE — 80053 COMPREHEN METABOLIC PANEL: CPT | Performed by: NURSE PRACTITIONER

## 2018-01-01 PROCEDURE — 88305 TISSUE EXAM BY PATHOLOGIST: CPT | Performed by: INTERNAL MEDICINE

## 2018-01-01 PROCEDURE — 84484 ASSAY OF TROPONIN QUANT: CPT | Performed by: EMERGENCY MEDICINE

## 2018-01-01 PROCEDURE — 99232 SBSQ HOSP IP/OBS MODERATE 35: CPT | Performed by: INTERNAL MEDICINE

## 2018-01-01 PROCEDURE — 80048 BASIC METABOLIC PNL TOTAL CA: CPT | Performed by: INTERNAL MEDICINE

## 2018-01-01 PROCEDURE — 94760 N-INVAS EAR/PLS OXIMETRY 1: CPT

## 2018-01-01 PROCEDURE — G8978 MOBILITY CURRENT STATUS: HCPCS | Performed by: PHYSICAL THERAPIST

## 2018-01-01 PROCEDURE — 82962 GLUCOSE BLOOD TEST: CPT

## 2018-01-01 PROCEDURE — 83605 ASSAY OF LACTIC ACID: CPT | Performed by: EMERGENCY MEDICINE

## 2018-01-01 PROCEDURE — 93306 TTE W/DOPPLER COMPLETE: CPT

## 2018-01-01 PROCEDURE — 85007 BL SMEAR W/DIFF WBC COUNT: CPT | Performed by: INTERNAL MEDICINE

## 2018-01-01 PROCEDURE — 84439 ASSAY OF FREE THYROXINE: CPT | Performed by: INTERNAL MEDICINE

## 2018-01-01 PROCEDURE — 82550 ASSAY OF CK (CPK): CPT | Performed by: INTERNAL MEDICINE

## 2018-01-01 PROCEDURE — 84134 ASSAY OF PREALBUMIN: CPT | Performed by: INTERNAL MEDICINE

## 2018-01-01 PROCEDURE — 89051 BODY FLUID CELL COUNT: CPT | Performed by: INTERNAL MEDICINE

## 2018-01-01 PROCEDURE — 85025 COMPLETE CBC W/AUTO DIFF WBC: CPT | Performed by: INTERNAL MEDICINE

## 2018-01-01 PROCEDURE — 36415 COLL VENOUS BLD VENIPUNCTURE: CPT | Performed by: NURSE PRACTITIONER

## 2018-01-01 PROCEDURE — 84443 ASSAY THYROID STIM HORMONE: CPT | Performed by: INTERNAL MEDICINE

## 2018-01-01 PROCEDURE — 94640 AIRWAY INHALATION TREATMENT: CPT

## 2018-01-01 PROCEDURE — 80061 LIPID PANEL: CPT | Performed by: INTERNAL MEDICINE

## 2018-01-01 PROCEDURE — 82306 VITAMIN D 25 HYDROXY: CPT | Performed by: INTERNAL MEDICINE

## 2018-01-01 PROCEDURE — 88185 FLOWCYTOMETRY/TC ADD-ON: CPT | Performed by: INTERNAL MEDICINE

## 2018-01-01 PROCEDURE — 25010000002 FUROSEMIDE PER 20 MG: Performed by: EMERGENCY MEDICINE

## 2018-01-01 PROCEDURE — 87040 BLOOD CULTURE FOR BACTERIA: CPT | Performed by: EMERGENCY MEDICINE

## 2018-01-01 PROCEDURE — 87205 SMEAR GRAM STAIN: CPT | Performed by: INTERNAL MEDICINE

## 2018-01-01 PROCEDURE — G0378 HOSPITAL OBSERVATION PER HR: HCPCS

## 2018-01-01 PROCEDURE — 83036 HEMOGLOBIN GLYCOSYLATED A1C: CPT | Performed by: INTERNAL MEDICINE

## 2018-01-01 PROCEDURE — 85610 PROTHROMBIN TIME: CPT | Performed by: EMERGENCY MEDICINE

## 2018-01-01 PROCEDURE — 83615 LACTATE (LD) (LDH) ENZYME: CPT | Performed by: INTERNAL MEDICINE

## 2018-01-01 PROCEDURE — 80076 HEPATIC FUNCTION PANEL: CPT | Performed by: INTERNAL MEDICINE

## 2018-01-01 PROCEDURE — 0W9B3ZX DRAINAGE OF LEFT PLEURAL CAVITY, PERCUTANEOUS APPROACH, DIAGNOSTIC: ICD-10-PCS | Performed by: RADIOLOGY

## 2018-01-01 PROCEDURE — 80053 COMPREHEN METABOLIC PANEL: CPT | Performed by: INTERNAL MEDICINE

## 2018-01-01 PROCEDURE — 63710000001 INSULIN DETEMIR PER 5 UNITS: Performed by: INTERNAL MEDICINE

## 2018-01-01 PROCEDURE — 94799 UNLISTED PULMONARY SVC/PX: CPT

## 2018-01-01 PROCEDURE — 88184 FLOWCYTOMETRY/ TC 1 MARKER: CPT | Performed by: INTERNAL MEDICINE

## 2018-01-01 PROCEDURE — 81001 URINALYSIS AUTO W/SCOPE: CPT | Performed by: EMERGENCY MEDICINE

## 2018-01-01 PROCEDURE — 71275 CT ANGIOGRAPHY CHEST: CPT

## 2018-01-01 PROCEDURE — 93005 ELECTROCARDIOGRAM TRACING: CPT | Performed by: EMERGENCY MEDICINE

## 2018-01-01 PROCEDURE — 82553 CREATINE MB FRACTION: CPT | Performed by: INTERNAL MEDICINE

## 2018-01-01 PROCEDURE — 76942 ECHO GUIDE FOR BIOPSY: CPT

## 2018-01-01 PROCEDURE — 85025 COMPLETE CBC W/AUTO DIFF WBC: CPT | Performed by: EMERGENCY MEDICINE

## 2018-01-01 PROCEDURE — 87086 URINE CULTURE/COLONY COUNT: CPT | Performed by: INTERNAL MEDICINE

## 2018-01-01 PROCEDURE — 82607 VITAMIN B-12: CPT | Performed by: INTERNAL MEDICINE

## 2018-01-01 PROCEDURE — 97162 PT EVAL MOD COMPLEX 30 MIN: CPT

## 2018-01-01 PROCEDURE — 83690 ASSAY OF LIPASE: CPT | Performed by: EMERGENCY MEDICINE

## 2018-01-01 PROCEDURE — 83880 ASSAY OF NATRIURETIC PEPTIDE: CPT | Performed by: EMERGENCY MEDICINE

## 2018-01-01 PROCEDURE — 85379 FIBRIN DEGRADATION QUANT: CPT | Performed by: EMERGENCY MEDICINE

## 2018-01-01 PROCEDURE — 84484 ASSAY OF TROPONIN QUANT: CPT | Performed by: INTERNAL MEDICINE

## 2018-01-01 PROCEDURE — 85007 BL SMEAR W/DIFF WBC COUNT: CPT | Performed by: NURSE PRACTITIONER

## 2018-01-01 PROCEDURE — 93306 TTE W/DOPPLER COMPLETE: CPT | Performed by: INTERNAL MEDICINE

## 2018-01-01 PROCEDURE — 88312 SPECIAL STAINS GROUP 1: CPT | Performed by: INTERNAL MEDICINE

## 2018-01-01 PROCEDURE — 93010 ELECTROCARDIOGRAM REPORT: CPT | Performed by: INTERNAL MEDICINE

## 2018-01-01 PROCEDURE — 85025 COMPLETE CBC W/AUTO DIFF WBC: CPT | Performed by: NURSE PRACTITIONER

## 2018-01-01 PROCEDURE — 83735 ASSAY OF MAGNESIUM: CPT | Performed by: INTERNAL MEDICINE

## 2018-01-01 PROCEDURE — 83880 ASSAY OF NATRIURETIC PEPTIDE: CPT | Performed by: INTERNAL MEDICINE

## 2018-01-01 PROCEDURE — 83880 ASSAY OF NATRIURETIC PEPTIDE: CPT | Performed by: NURSE PRACTITIONER

## 2018-01-01 PROCEDURE — 85007 BL SMEAR W/DIFF WBC COUNT: CPT | Performed by: EMERGENCY MEDICINE

## 2018-01-01 PROCEDURE — 87070 CULTURE OTHR SPECIMN AEROBIC: CPT | Performed by: INTERNAL MEDICINE

## 2018-01-01 PROCEDURE — 85651 RBC SED RATE NONAUTOMATED: CPT | Performed by: INTERNAL MEDICINE

## 2018-01-01 PROCEDURE — 0 IOPAMIDOL PER 1 ML: Performed by: EMERGENCY MEDICINE

## 2018-01-01 PROCEDURE — 71045 X-RAY EXAM CHEST 1 VIEW: CPT

## 2018-01-01 PROCEDURE — 82248 BILIRUBIN DIRECT: CPT | Performed by: INTERNAL MEDICINE

## 2018-01-01 PROCEDURE — 74177 CT ABD & PELVIS W/CONTRAST: CPT

## 2018-01-01 PROCEDURE — 76604 US EXAM CHEST: CPT

## 2018-01-01 PROCEDURE — 88112 CYTOPATH CELL ENHANCE TECH: CPT | Performed by: INTERNAL MEDICINE

## 2018-01-01 PROCEDURE — 25010000002 PERFLUTREN 6.52 MG/ML SUSPENSION: Performed by: INTERNAL MEDICINE

## 2018-01-01 PROCEDURE — 71046 X-RAY EXAM CHEST 2 VIEWS: CPT

## 2018-01-01 PROCEDURE — 80048 BASIC METABOLIC PNL TOTAL CA: CPT | Performed by: NURSE PRACTITIONER

## 2018-01-01 PROCEDURE — 99222 1ST HOSP IP/OBS MODERATE 55: CPT | Performed by: INTERNAL MEDICINE

## 2018-01-01 PROCEDURE — G8979 MOBILITY GOAL STATUS: HCPCS | Performed by: PHYSICAL THERAPIST

## 2018-01-01 RX ORDER — TRAMADOL HYDROCHLORIDE 50 MG/1
50 TABLET ORAL 3 TIMES DAILY PRN
Status: ON HOLD | COMMUNITY
End: 2019-01-01 | Stop reason: SDUPTHER

## 2018-01-01 RX ORDER — LISINOPRIL 5 MG/1
5 TABLET ORAL DAILY
Status: DISCONTINUED | OUTPATIENT
Start: 2018-01-01 | End: 2018-01-01 | Stop reason: HOSPADM

## 2018-01-01 RX ORDER — MORPHINE SULFATE 2 MG/ML
2 INJECTION, SOLUTION INTRAMUSCULAR; INTRAVENOUS ONCE
Status: DISCONTINUED | OUTPATIENT
Start: 2018-01-01 | End: 2018-01-01 | Stop reason: HOSPADM

## 2018-01-01 RX ORDER — TRAMADOL HYDROCHLORIDE 50 MG/1
50 TABLET ORAL 3 TIMES DAILY PRN
Status: DISCONTINUED | OUTPATIENT
Start: 2018-01-01 | End: 2018-01-01 | Stop reason: HOSPADM

## 2018-01-01 RX ORDER — PANTOPRAZOLE SODIUM 40 MG/1
40 TABLET, DELAYED RELEASE ORAL DAILY
Status: DISCONTINUED | OUTPATIENT
Start: 2018-01-01 | End: 2018-01-01 | Stop reason: HOSPADM

## 2018-01-01 RX ORDER — GABAPENTIN 300 MG/1
600 CAPSULE ORAL EVERY EVENING
Status: DISCONTINUED | OUTPATIENT
Start: 2018-01-01 | End: 2018-01-01 | Stop reason: HOSPADM

## 2018-01-01 RX ORDER — FUROSEMIDE 10 MG/ML
40 INJECTION INTRAMUSCULAR; INTRAVENOUS ONCE
Status: COMPLETED | OUTPATIENT
Start: 2018-01-01 | End: 2018-01-01

## 2018-01-01 RX ORDER — ATORVASTATIN CALCIUM 10 MG/1
10 TABLET, FILM COATED ORAL NIGHTLY
Status: DISCONTINUED | OUTPATIENT
Start: 2018-01-01 | End: 2018-01-01 | Stop reason: HOSPADM

## 2018-01-01 RX ORDER — BUDESONIDE AND FORMOTEROL FUMARATE DIHYDRATE 160; 4.5 UG/1; UG/1
2 AEROSOL RESPIRATORY (INHALATION)
Status: DISCONTINUED | OUTPATIENT
Start: 2018-01-01 | End: 2018-01-01 | Stop reason: HOSPADM

## 2018-01-01 RX ORDER — NYSTATIN 100000 [USP'U]/G
POWDER TOPICAL EVERY 12 HOURS SCHEDULED
Qty: 1 EACH | Refills: 0
Start: 2018-01-01 | End: 2019-01-01

## 2018-01-01 RX ORDER — SODIUM CHLORIDE 0.9 % (FLUSH) 0.9 %
10 SYRINGE (ML) INJECTION AS NEEDED
Status: DISCONTINUED | OUTPATIENT
Start: 2018-01-01 | End: 2018-01-01 | Stop reason: HOSPADM

## 2018-01-01 RX ORDER — FOLIC ACID 1 MG/1
1 TABLET ORAL DAILY
Status: DISCONTINUED | OUTPATIENT
Start: 2018-01-01 | End: 2018-01-01 | Stop reason: HOSPADM

## 2018-01-01 RX ORDER — NYSTATIN 100000 [USP'U]/G
POWDER TOPICAL EVERY 12 HOURS SCHEDULED
Status: DISCONTINUED | OUTPATIENT
Start: 2018-01-01 | End: 2018-01-01 | Stop reason: HOSPADM

## 2018-01-01 RX ORDER — GUAIFENESIN 600 MG/1
1200 TABLET, EXTENDED RELEASE ORAL 2 TIMES DAILY
COMMUNITY
End: 2019-01-01

## 2018-01-01 RX ORDER — IPRATROPIUM BROMIDE AND ALBUTEROL SULFATE 2.5; .5 MG/3ML; MG/3ML
3 SOLUTION RESPIRATORY (INHALATION)
Status: DISCONTINUED | OUTPATIENT
Start: 2018-01-01 | End: 2018-01-01

## 2018-01-01 RX ORDER — IPRATROPIUM BROMIDE AND ALBUTEROL SULFATE 2.5; .5 MG/3ML; MG/3ML
3 SOLUTION RESPIRATORY (INHALATION) EVERY 4 HOURS PRN
Status: DISCONTINUED | OUTPATIENT
Start: 2018-01-01 | End: 2018-01-01

## 2018-01-01 RX ORDER — LEVOTHYROXINE SODIUM 0.05 MG/1
50 TABLET ORAL DAILY
Status: DISCONTINUED | OUTPATIENT
Start: 2018-01-01 | End: 2018-01-01 | Stop reason: HOSPADM

## 2018-01-01 RX ORDER — BUMETANIDE 2 MG/1
2 TABLET ORAL 3 TIMES WEEKLY
COMMUNITY
End: 2019-01-01 | Stop reason: SDUPTHER

## 2018-01-01 RX ORDER — LIDOCAINE 50 MG/G
2 PATCH TOPICAL
Status: DISCONTINUED | OUTPATIENT
Start: 2018-01-01 | End: 2018-01-01

## 2018-01-01 RX ORDER — ESCITALOPRAM OXALATE 10 MG/1
10 TABLET ORAL DAILY
Status: DISCONTINUED | OUTPATIENT
Start: 2018-01-01 | End: 2018-01-01 | Stop reason: SDUPTHER

## 2018-01-01 RX ORDER — SIMETHICONE 80 MG
80 TABLET,CHEWABLE ORAL 4 TIMES DAILY PRN
Status: DISCONTINUED | OUTPATIENT
Start: 2018-01-01 | End: 2018-01-01 | Stop reason: HOSPADM

## 2018-01-01 RX ORDER — BUMETANIDE 1 MG/1
1 TABLET ORAL DAILY
Status: DISCONTINUED | OUTPATIENT
Start: 2018-01-01 | End: 2018-01-01

## 2018-01-01 RX ORDER — ACETAMINOPHEN 325 MG/1
650 TABLET ORAL EVERY 6 HOURS PRN
COMMUNITY

## 2018-01-01 RX ORDER — BUMETANIDE 0.5 MG/1
0.5 TABLET ORAL DAILY
Status: DISCONTINUED | OUTPATIENT
Start: 2018-01-01 | End: 2018-01-01 | Stop reason: HOSPADM

## 2018-01-01 RX ORDER — SIMVASTATIN 20 MG
20 TABLET ORAL NIGHTLY
COMMUNITY

## 2018-01-01 RX ORDER — ONDANSETRON 2 MG/ML
4 INJECTION INTRAMUSCULAR; INTRAVENOUS EVERY 6 HOURS PRN
Status: DISCONTINUED | OUTPATIENT
Start: 2018-01-01 | End: 2018-01-01 | Stop reason: HOSPADM

## 2018-01-01 RX ORDER — GABAPENTIN 300 MG/1
300 CAPSULE ORAL
Status: DISCONTINUED | OUTPATIENT
Start: 2018-01-01 | End: 2018-01-01 | Stop reason: HOSPADM

## 2018-01-01 RX ORDER — ACETAMINOPHEN 325 MG/1
650 TABLET ORAL EVERY 4 HOURS PRN
Status: DISCONTINUED | OUTPATIENT
Start: 2018-01-01 | End: 2018-01-01 | Stop reason: HOSPADM

## 2018-01-01 RX ORDER — SODIUM CHLORIDE 0.9 % (FLUSH) 0.9 %
3 SYRINGE (ML) INJECTION EVERY 12 HOURS SCHEDULED
Status: DISCONTINUED | OUTPATIENT
Start: 2018-01-01 | End: 2018-01-01 | Stop reason: HOSPADM

## 2018-01-01 RX ORDER — GUAIFENESIN 600 MG/1
1200 TABLET, EXTENDED RELEASE ORAL EVERY 12 HOURS SCHEDULED
Status: DISCONTINUED | OUTPATIENT
Start: 2018-01-01 | End: 2018-01-01 | Stop reason: HOSPADM

## 2018-01-01 RX ORDER — FUROSEMIDE 20 MG/1
10 TABLET ORAL EVERY OTHER DAY
COMMUNITY
End: 2018-01-01 | Stop reason: HOSPADM

## 2018-01-01 RX ORDER — HYDROXYUREA 500 MG/1
500 CAPSULE ORAL DAILY
Status: DISCONTINUED | OUTPATIENT
Start: 2018-01-01 | End: 2018-01-01

## 2018-01-01 RX ORDER — METOPROLOL SUCCINATE 100 MG/1
100 TABLET, EXTENDED RELEASE ORAL DAILY
Status: DISCONTINUED | OUTPATIENT
Start: 2018-01-01 | End: 2018-01-01 | Stop reason: HOSPADM

## 2018-01-01 RX ORDER — POTASSIUM CHLORIDE 750 MG/1
20 CAPSULE, EXTENDED RELEASE ORAL DAILY
Status: DISCONTINUED | OUTPATIENT
Start: 2018-01-01 | End: 2018-01-01 | Stop reason: HOSPADM

## 2018-01-01 RX ORDER — ESCITALOPRAM OXALATE 20 MG/1
20 TABLET ORAL NIGHTLY
COMMUNITY

## 2018-01-01 RX ORDER — DILTIAZEM HYDROCHLORIDE 240 MG/1
240 CAPSULE, COATED, EXTENDED RELEASE ORAL
Status: DISCONTINUED | OUTPATIENT
Start: 2018-01-01 | End: 2018-01-01 | Stop reason: HOSPADM

## 2018-01-01 RX ORDER — ESCITALOPRAM OXALATE 10 MG/1
20 TABLET ORAL DAILY
Status: DISCONTINUED | OUTPATIENT
Start: 2018-01-01 | End: 2018-01-01 | Stop reason: HOSPADM

## 2018-01-01 RX ORDER — HYDROCODONE BITARTRATE AND ACETAMINOPHEN 7.5; 325 MG/1; MG/1
1 TABLET ORAL EVERY 6 HOURS PRN
Status: DISCONTINUED | OUTPATIENT
Start: 2018-01-01 | End: 2018-01-01

## 2018-01-01 RX ORDER — BUMETANIDE 1 MG/1
1 TABLET ORAL 2 TIMES DAILY
COMMUNITY
End: 2019-01-01 | Stop reason: HOSPADM

## 2018-01-01 RX ORDER — DOCUSATE SODIUM 100 MG/1
100 CAPSULE, LIQUID FILLED ORAL DAILY
Status: DISCONTINUED | OUTPATIENT
Start: 2018-01-01 | End: 2018-01-01 | Stop reason: HOSPADM

## 2018-01-01 RX ORDER — SODIUM CHLORIDE 0.9 % (FLUSH) 0.9 %
3-10 SYRINGE (ML) INJECTION AS NEEDED
Status: DISCONTINUED | OUTPATIENT
Start: 2018-01-01 | End: 2018-01-01 | Stop reason: HOSPADM

## 2018-01-01 RX ORDER — TAMSULOSIN HYDROCHLORIDE 0.4 MG/1
0.4 CAPSULE ORAL NIGHTLY
Status: DISCONTINUED | OUTPATIENT
Start: 2018-01-01 | End: 2018-01-01 | Stop reason: HOSPADM

## 2018-01-01 RX ORDER — MEGESTROL ACETATE 40 MG/ML
400 SUSPENSION ORAL DAILY
Status: DISCONTINUED | OUTPATIENT
Start: 2018-01-01 | End: 2018-01-01

## 2018-01-01 RX ORDER — METHYLPREDNISOLONE SODIUM SUCCINATE 40 MG/ML
20 INJECTION, POWDER, LYOPHILIZED, FOR SOLUTION INTRAMUSCULAR; INTRAVENOUS DAILY
COMMUNITY
End: 2018-01-01 | Stop reason: HOSPADM

## 2018-01-01 RX ORDER — METOPROLOL SUCCINATE 50 MG/1
50 TABLET, EXTENDED RELEASE ORAL DAILY
Status: DISCONTINUED | OUTPATIENT
Start: 2018-01-01 | End: 2018-01-01

## 2018-01-01 RX ADMIN — HYDROXYUREA 500 MG: 500 CAPSULE ORAL at 09:06

## 2018-01-01 RX ADMIN — CEFEPIME HYDROCHLORIDE 1 G: 1 INJECTION, POWDER, FOR SOLUTION INTRAMUSCULAR; INTRAVENOUS at 02:10

## 2018-01-01 RX ADMIN — PANTOPRAZOLE SODIUM 40 MG: 40 TABLET, DELAYED RELEASE ORAL at 09:05

## 2018-01-01 RX ADMIN — GABAPENTIN 300 MG: 300 CAPSULE ORAL at 10:00

## 2018-01-01 RX ADMIN — BUMETANIDE 1 MG: 1 TABLET ORAL at 09:05

## 2018-01-01 RX ADMIN — IPRATROPIUM BROMIDE AND ALBUTEROL SULFATE 3 ML: 2.5; .5 SOLUTION RESPIRATORY (INHALATION) at 19:39

## 2018-01-01 RX ADMIN — DOCUSATE SODIUM 100 MG: 100 CAPSULE ORAL at 09:05

## 2018-01-01 RX ADMIN — ESCITALOPRAM 10 MG: 10 TABLET, FILM COATED ORAL at 14:21

## 2018-01-01 RX ADMIN — COLLAGENASE SANTYL: 250 OINTMENT TOPICAL at 09:13

## 2018-01-01 RX ADMIN — GUAIFENESIN 1200 MG: 600 TABLET, EXTENDED RELEASE ORAL at 20:07

## 2018-01-01 RX ADMIN — INSULIN DETEMIR 10 UNITS: 100 INJECTION, SOLUTION SUBCUTANEOUS at 20:54

## 2018-01-01 RX ADMIN — SODIUM CHLORIDE, PRESERVATIVE FREE 10 ML: 5 INJECTION INTRAVENOUS at 02:05

## 2018-01-01 RX ADMIN — CEFEPIME HYDROCHLORIDE 1 G: 1 INJECTION, POWDER, FOR SOLUTION INTRAMUSCULAR; INTRAVENOUS at 02:05

## 2018-01-01 RX ADMIN — DOCUSATE SODIUM 100 MG: 100 CAPSULE ORAL at 09:58

## 2018-01-01 RX ADMIN — LIDOCAINE 2 PATCH: 50 PATCH CUTANEOUS at 14:22

## 2018-01-01 RX ADMIN — ATORVASTATIN CALCIUM 10 MG: 10 TABLET, FILM COATED ORAL at 21:09

## 2018-01-01 RX ADMIN — APIXABAN 5 MG: 5 TABLET, FILM COATED ORAL at 09:15

## 2018-01-01 RX ADMIN — GABAPENTIN 300 MG: 300 CAPSULE ORAL at 09:06

## 2018-01-01 RX ADMIN — POTASSIUM CHLORIDE 20 MEQ: 750 CAPSULE, EXTENDED RELEASE ORAL at 08:48

## 2018-01-01 RX ADMIN — ESCITALOPRAM 20 MG: 10 TABLET, FILM COATED ORAL at 08:48

## 2018-01-01 RX ADMIN — LEVOTHYROXINE SODIUM 50 MCG: 50 TABLET ORAL at 14:21

## 2018-01-01 RX ADMIN — COLLAGENASE SANTYL: 250 OINTMENT TOPICAL at 14:20

## 2018-01-01 RX ADMIN — GABAPENTIN 600 MG: 300 CAPSULE ORAL at 17:52

## 2018-01-01 RX ADMIN — IPRATROPIUM BROMIDE AND ALBUTEROL SULFATE 3 ML: 2.5; .5 SOLUTION RESPIRATORY (INHALATION) at 20:05

## 2018-01-01 RX ADMIN — SODIUM CHLORIDE, PRESERVATIVE FREE 3 ML: 5 INJECTION INTRAVENOUS at 21:14

## 2018-01-01 RX ADMIN — PANTOPRAZOLE SODIUM 40 MG: 40 TABLET, DELAYED RELEASE ORAL at 08:48

## 2018-01-01 RX ADMIN — SODIUM CHLORIDE, PRESERVATIVE FREE 3 ML: 5 INJECTION INTRAVENOUS at 20:54

## 2018-01-01 RX ADMIN — TAMSULOSIN HYDROCHLORIDE 0.4 MG: 0.4 CAPSULE ORAL at 20:07

## 2018-01-01 RX ADMIN — ESCITALOPRAM 10 MG: 10 TABLET, FILM COATED ORAL at 09:58

## 2018-01-01 RX ADMIN — ATORVASTATIN CALCIUM 10 MG: 10 TABLET, FILM COATED ORAL at 20:54

## 2018-01-01 RX ADMIN — GABAPENTIN 600 MG: 300 CAPSULE ORAL at 18:34

## 2018-01-01 RX ADMIN — METOPROLOL SUCCINATE 100 MG: 100 TABLET, FILM COATED, EXTENDED RELEASE ORAL at 09:05

## 2018-01-01 RX ADMIN — GABAPENTIN 300 MG: 300 CAPSULE ORAL at 14:29

## 2018-01-01 RX ADMIN — POTASSIUM CHLORIDE 20 MEQ: 750 CAPSULE, EXTENDED RELEASE ORAL at 09:05

## 2018-01-01 RX ADMIN — GUAIFENESIN 1200 MG: 600 TABLET, EXTENDED RELEASE ORAL at 09:05

## 2018-01-01 RX ADMIN — BUMETANIDE 0.5 MG/HR: 0.25 INJECTION INTRAMUSCULAR; INTRAVENOUS at 17:51

## 2018-01-01 RX ADMIN — LISINOPRIL 5 MG: 5 TABLET ORAL at 09:05

## 2018-01-01 RX ADMIN — INSULIN DETEMIR 10 UNITS: 100 INJECTION, SOLUTION SUBCUTANEOUS at 21:41

## 2018-01-01 RX ADMIN — METOPROLOL SUCCINATE 100 MG: 100 TABLET, FILM COATED, EXTENDED RELEASE ORAL at 09:58

## 2018-01-01 RX ADMIN — FOLIC ACID 1 MG: 1 TABLET ORAL at 09:05

## 2018-01-01 RX ADMIN — PERFLUTREN 8.48 MG: 6.52 INJECTION, SUSPENSION INTRAVENOUS at 16:56

## 2018-01-01 RX ADMIN — HYDROXYUREA 500 MG: 500 CAPSULE ORAL at 09:57

## 2018-01-01 RX ADMIN — BUMETANIDE 1 MG: 1 TABLET ORAL at 17:40

## 2018-01-01 RX ADMIN — CEFEPIME HYDROCHLORIDE 1 G: 1 INJECTION, POWDER, FOR SOLUTION INTRAMUSCULAR; INTRAVENOUS at 14:37

## 2018-01-01 RX ADMIN — SODIUM CHLORIDE, PRESERVATIVE FREE 3 ML: 5 INJECTION INTRAVENOUS at 09:14

## 2018-01-01 RX ADMIN — COLLAGENASE SANTYL: 250 OINTMENT TOPICAL at 08:49

## 2018-01-01 RX ADMIN — BUDESONIDE AND FORMOTEROL FUMARATE DIHYDRATE 2 PUFF: 160; 4.5 AEROSOL RESPIRATORY (INHALATION) at 07:00

## 2018-01-01 RX ADMIN — HYDROCODONE BITARTRATE AND ACETAMINOPHEN 1 TABLET: 7.5; 325 TABLET ORAL at 21:15

## 2018-01-01 RX ADMIN — MEGESTROL ACETATE 400 MG: 40 SUSPENSION ORAL at 14:20

## 2018-01-01 RX ADMIN — PANTOPRAZOLE SODIUM 40 MG: 40 TABLET, DELAYED RELEASE ORAL at 14:29

## 2018-01-01 RX ADMIN — GABAPENTIN 300 MG: 300 CAPSULE ORAL at 08:48

## 2018-01-01 RX ADMIN — LEVOTHYROXINE SODIUM 50 MCG: 50 TABLET ORAL at 09:04

## 2018-01-01 RX ADMIN — BUDESONIDE AND FORMOTEROL FUMARATE DIHYDRATE 2 PUFF: 160; 4.5 AEROSOL RESPIRATORY (INHALATION) at 19:29

## 2018-01-01 RX ADMIN — TAMSULOSIN HYDROCHLORIDE 0.4 MG: 0.4 CAPSULE ORAL at 21:15

## 2018-01-01 RX ADMIN — DILTIAZEM HYDROCHLORIDE 240 MG: 240 CAPSULE, EXTENDED RELEASE ORAL at 09:58

## 2018-01-01 RX ADMIN — LISINOPRIL 5 MG: 5 TABLET ORAL at 08:49

## 2018-01-01 RX ADMIN — IPRATROPIUM BROMIDE AND ALBUTEROL SULFATE 3 ML: 2.5; .5 SOLUTION RESPIRATORY (INHALATION) at 07:00

## 2018-01-01 RX ADMIN — ATORVASTATIN CALCIUM 10 MG: 10 TABLET, FILM COATED ORAL at 21:15

## 2018-01-01 RX ADMIN — LEVOTHYROXINE SODIUM 50 MCG: 50 TABLET ORAL at 08:48

## 2018-01-01 RX ADMIN — IPRATROPIUM BROMIDE AND ALBUTEROL SULFATE 3 ML: 2.5; .5 SOLUTION RESPIRATORY (INHALATION) at 11:41

## 2018-01-01 RX ADMIN — INSULIN DETEMIR 10 UNITS: 100 INJECTION, SOLUTION SUBCUTANEOUS at 20:07

## 2018-01-01 RX ADMIN — PANTOPRAZOLE SODIUM 40 MG: 40 TABLET, DELAYED RELEASE ORAL at 10:00

## 2018-01-01 RX ADMIN — METOPROLOL SUCCINATE 100 MG: 100 TABLET, FILM COATED, EXTENDED RELEASE ORAL at 08:48

## 2018-01-01 RX ADMIN — LISINOPRIL 5 MG: 5 TABLET ORAL at 14:21

## 2018-01-01 RX ADMIN — BUDESONIDE AND FORMOTEROL FUMARATE DIHYDRATE 2 PUFF: 160; 4.5 AEROSOL RESPIRATORY (INHALATION) at 08:05

## 2018-01-01 RX ADMIN — APIXABAN 5 MG: 5 TABLET, FILM COATED ORAL at 08:48

## 2018-01-01 RX ADMIN — BUMETANIDE 0.5 MG: 1 TABLET ORAL at 08:48

## 2018-01-01 RX ADMIN — IPRATROPIUM BROMIDE AND ALBUTEROL SULFATE 3 ML: 2.5; .5 SOLUTION RESPIRATORY (INHALATION) at 11:24

## 2018-01-01 RX ADMIN — LEVOTHYROXINE SODIUM 50 MCG: 50 TABLET ORAL at 09:58

## 2018-01-01 RX ADMIN — SODIUM CHLORIDE, PRESERVATIVE FREE 3 ML: 5 INJECTION INTRAVENOUS at 08:49

## 2018-01-01 RX ADMIN — GABAPENTIN 600 MG: 300 CAPSULE ORAL at 17:40

## 2018-01-01 RX ADMIN — TAMSULOSIN HYDROCHLORIDE 0.4 MG: 0.4 CAPSULE ORAL at 21:42

## 2018-01-01 RX ADMIN — CEFEPIME HYDROCHLORIDE 1 G: 1 INJECTION, POWDER, FOR SOLUTION INTRAMUSCULAR; INTRAVENOUS at 02:06

## 2018-01-01 RX ADMIN — ESCITALOPRAM 10 MG: 10 TABLET, FILM COATED ORAL at 21:09

## 2018-01-01 RX ADMIN — BUMETANIDE 0.5 MG/HR: 0.25 INJECTION INTRAMUSCULAR; INTRAVENOUS at 09:00

## 2018-01-01 RX ADMIN — BUDESONIDE AND FORMOTEROL FUMARATE DIHYDRATE 2 PUFF: 160; 4.5 AEROSOL RESPIRATORY (INHALATION) at 20:05

## 2018-01-01 RX ADMIN — CEFEPIME HYDROCHLORIDE 1 G: 1 INJECTION, POWDER, FOR SOLUTION INTRAMUSCULAR; INTRAVENOUS at 16:19

## 2018-01-01 RX ADMIN — GUAIFENESIN 1200 MG: 600 TABLET, EXTENDED RELEASE ORAL at 08:48

## 2018-01-01 RX ADMIN — POTASSIUM CHLORIDE 20 MEQ: 750 CAPSULE, EXTENDED RELEASE ORAL at 09:57

## 2018-01-01 RX ADMIN — CEFEPIME HYDROCHLORIDE 1 G: 1 INJECTION, POWDER, FOR SOLUTION INTRAMUSCULAR; INTRAVENOUS at 18:34

## 2018-01-01 RX ADMIN — INSULIN DETEMIR 10 UNITS: 100 INJECTION, SOLUTION SUBCUTANEOUS at 21:08

## 2018-01-01 RX ADMIN — FUROSEMIDE 40 MG: 10 INJECTION, SOLUTION INTRAMUSCULAR; INTRAVENOUS at 16:14

## 2018-01-01 RX ADMIN — METOPROLOL SUCCINATE 100 MG: 100 TABLET, FILM COATED, EXTENDED RELEASE ORAL at 14:21

## 2018-01-01 RX ADMIN — LISINOPRIL 5 MG: 5 TABLET ORAL at 09:58

## 2018-01-01 RX ADMIN — DILTIAZEM HYDROCHLORIDE 240 MG: 240 CAPSULE, EXTENDED RELEASE ORAL at 09:04

## 2018-01-01 RX ADMIN — ATORVASTATIN CALCIUM 10 MG: 10 TABLET, FILM COATED ORAL at 21:42

## 2018-01-01 RX ADMIN — TAMSULOSIN HYDROCHLORIDE 0.4 MG: 0.4 CAPSULE ORAL at 21:09

## 2018-01-01 RX ADMIN — BUDESONIDE AND FORMOTEROL FUMARATE DIHYDRATE 2 PUFF: 160; 4.5 AEROSOL RESPIRATORY (INHALATION) at 19:39

## 2018-01-01 RX ADMIN — DILTIAZEM HYDROCHLORIDE 240 MG: 240 CAPSULE, EXTENDED RELEASE ORAL at 14:21

## 2018-01-01 RX ADMIN — BUDESONIDE AND FORMOTEROL FUMARATE DIHYDRATE 2 PUFF: 160; 4.5 AEROSOL RESPIRATORY (INHALATION) at 19:37

## 2018-01-01 RX ADMIN — IPRATROPIUM BROMIDE AND ALBUTEROL SULFATE 3 ML: 2.5; .5 SOLUTION RESPIRATORY (INHALATION) at 07:37

## 2018-01-01 RX ADMIN — IOPAMIDOL 100 ML: 755 INJECTION, SOLUTION INTRAVENOUS at 14:36

## 2018-01-01 RX ADMIN — HYDROXYUREA 500 MG: 500 CAPSULE ORAL at 14:22

## 2018-01-01 RX ADMIN — GUAIFENESIN 1200 MG: 600 TABLET, EXTENDED RELEASE ORAL at 21:41

## 2018-01-01 RX ADMIN — FOLIC ACID 1 MG: 1 TABLET ORAL at 14:20

## 2018-01-01 RX ADMIN — SODIUM CHLORIDE, PRESERVATIVE FREE 3 ML: 5 INJECTION INTRAVENOUS at 21:42

## 2018-01-01 RX ADMIN — DILTIAZEM HYDROCHLORIDE 240 MG: 240 CAPSULE, EXTENDED RELEASE ORAL at 08:48

## 2018-01-01 RX ADMIN — SODIUM CHLORIDE, PRESERVATIVE FREE 3 ML: 5 INJECTION INTRAVENOUS at 20:07

## 2018-01-01 RX ADMIN — DOCUSATE SODIUM 100 MG: 100 CAPSULE ORAL at 14:21

## 2018-01-01 RX ADMIN — POTASSIUM CHLORIDE 20 MEQ: 750 CAPSULE, EXTENDED RELEASE ORAL at 14:21

## 2018-01-01 RX ADMIN — FOLIC ACID 1 MG: 1 TABLET ORAL at 09:58

## 2018-01-01 RX ADMIN — TAMSULOSIN HYDROCHLORIDE 0.4 MG: 0.4 CAPSULE ORAL at 20:54

## 2018-01-01 RX ADMIN — DOCUSATE SODIUM 100 MG: 100 CAPSULE ORAL at 08:49

## 2018-01-01 RX ADMIN — BUDESONIDE AND FORMOTEROL FUMARATE DIHYDRATE 2 PUFF: 160; 4.5 AEROSOL RESPIRATORY (INHALATION) at 08:06

## 2018-01-01 RX ADMIN — BUMETANIDE 0.5 MG/HR: 0.25 INJECTION INTRAMUSCULAR; INTRAVENOUS at 21:03

## 2018-01-01 RX ADMIN — ESCITALOPRAM 20 MG: 10 TABLET, FILM COATED ORAL at 09:05

## 2018-01-01 RX ADMIN — FOLIC ACID 1 MG: 1 TABLET ORAL at 08:48

## 2018-01-01 RX ADMIN — MEGESTROL ACETATE 400 MG: 40 SUSPENSION ORAL at 09:58

## 2018-01-01 RX ADMIN — APIXABAN 5 MG: 5 TABLET, FILM COATED ORAL at 20:07

## 2018-01-01 RX ADMIN — BUDESONIDE AND FORMOTEROL FUMARATE DIHYDRATE 2 PUFF: 160; 4.5 AEROSOL RESPIRATORY (INHALATION) at 07:37

## 2018-01-01 RX ADMIN — ATORVASTATIN CALCIUM 10 MG: 10 TABLET, FILM COATED ORAL at 20:07

## 2018-01-03 ENCOUNTER — TELEPHONE (OUTPATIENT)
Dept: INTERNAL MEDICINE | Age: 83
End: 2018-01-03

## 2018-01-03 RX ORDER — CIPROFLOXACIN 250 MG/1
250 TABLET, FILM COATED ORAL 2 TIMES DAILY
Qty: 14 TABLET | Refills: 0 | Status: SHIPPED | OUTPATIENT
Start: 2018-01-03 | End: 2018-01-10

## 2018-01-29 ENCOUNTER — APPOINTMENT (OUTPATIENT)
Dept: CT IMAGING | Facility: HOSPITAL | Age: 83
End: 2018-01-29

## 2018-01-29 ENCOUNTER — APPOINTMENT (OUTPATIENT)
Dept: GENERAL RADIOLOGY | Facility: HOSPITAL | Age: 83
End: 2018-01-29

## 2018-01-29 ENCOUNTER — TELEPHONE (OUTPATIENT)
Dept: INTERNAL MEDICINE | Age: 83
End: 2018-01-29

## 2018-01-29 ENCOUNTER — HOSPITAL ENCOUNTER (INPATIENT)
Facility: HOSPITAL | Age: 83
LOS: 7 days | Discharge: NURSING, REHAB OR LONG-TERM CARE | End: 2018-02-05
Attending: FAMILY MEDICINE | Admitting: FAMILY MEDICINE

## 2018-01-29 DIAGNOSIS — R06.00 DYSPNEA: ICD-10-CM

## 2018-01-29 DIAGNOSIS — D64.9 ANEMIA, UNSPECIFIED TYPE: ICD-10-CM

## 2018-01-29 DIAGNOSIS — Z74.09 IMPAIRED FUNCTIONAL MOBILITY AND ACTIVITY TOLERANCE: ICD-10-CM

## 2018-01-29 DIAGNOSIS — J18.9 PNEUMONIA OF BOTH LUNGS DUE TO INFECTIOUS ORGANISM, UNSPECIFIED PART OF LUNG: Primary | ICD-10-CM

## 2018-01-29 DIAGNOSIS — I50.9 CONGESTIVE HEART FAILURE, UNSPECIFIED CONGESTIVE HEART FAILURE CHRONICITY, UNSPECIFIED CONGESTIVE HEART FAILURE TYPE: ICD-10-CM

## 2018-01-29 DIAGNOSIS — R77.8 ELEVATED TROPONIN: ICD-10-CM

## 2018-01-29 DIAGNOSIS — R79.89 ELEVATED D-DIMER: ICD-10-CM

## 2018-01-29 LAB
ALBUMIN SERPL-MCNC: 3.4 G/DL (ref 3.5–5)
ALBUMIN/GLOB SERPL: 1.2 G/DL (ref 1.1–2.5)
ALP SERPL-CCNC: 85 U/L (ref 24–120)
ALT SERPL W P-5'-P-CCNC: 27 U/L (ref 0–54)
ANION GAP SERPL CALCULATED.3IONS-SCNC: 9 MMOL/L (ref 4–13)
APTT PPP: 28.1 SECONDS (ref 24.1–34.8)
ARTERIAL PATENCY WRIST A: POSITIVE
AST SERPL-CCNC: 32 U/L (ref 7–45)
ATMOSPHERIC PRESS: 761 MMHG
BASE EXCESS BLDA CALC-SCNC: 4.8 MMOL/L (ref 0–2)
BASOPHILS # BLD AUTO: 0.06 10*3/MM3 (ref 0–0.2)
BASOPHILS NFR BLD AUTO: 0.6 % (ref 0–2)
BDY SITE: ABNORMAL
BILIRUB CONJ SERPL-MCNC: 0 MG/DL (ref 0–0.3)
BILIRUB INDIRECT SERPL-MCNC: 0.1 MG/DL (ref 0–1.1)
BILIRUB SERPL-MCNC: 0.3 MG/DL (ref 0.1–1)
BILIRUB SERPL-MCNC: 0.3 MG/DL (ref 0.1–1)
BODY TEMPERATURE: 37 C
BUN BLD-MCNC: 34 MG/DL (ref 5–21)
BUN/CREAT SERPL: 27 (ref 7–25)
CALCIUM SPEC-SCNC: 8.7 MG/DL (ref 8.4–10.4)
CHLORIDE SERPL-SCNC: 99 MMOL/L (ref 98–110)
CO2 SERPL-SCNC: 30 MMOL/L (ref 24–31)
CREAT BLD-MCNC: 1.26 MG/DL (ref 0.5–1.4)
D DIMER PPP FEU-MCNC: 1.53 MG/L (FEU) (ref 0–0.5)
D-LACTATE SERPL-SCNC: 0.9 MMOL/L (ref 0.5–2)
DEPRECATED RDW RBC AUTO: 58.6 FL (ref 40–54)
EOSINOPHIL # BLD AUTO: 0.3 10*3/MM3 (ref 0–0.7)
EOSINOPHIL NFR BLD AUTO: 3.2 % (ref 0–4)
ERYTHROCYTE [DISTWIDTH] IN BLOOD BY AUTOMATED COUNT: 14.6 % (ref 12–15)
FLUAV AG NPH QL: NEGATIVE
FLUBV AG NPH QL IA: NEGATIVE
GAS FLOW AIRWAY: 3 LPM
GFR SERPL CREATININE-BSD FRML MDRD: 40 ML/MIN/1.73
GLOBULIN UR ELPH-MCNC: 2.9 GM/DL
GLUCOSE BLD-MCNC: 134 MG/DL (ref 70–100)
GLUCOSE BLDC GLUCOMTR-MCNC: 143 MG/DL (ref 70–130)
HCO3 BLDA-SCNC: 30.1 MMOL/L (ref 20–26)
HCT VFR BLD AUTO: 30.7 % (ref 37–47)
HGB BLD-MCNC: 10 G/DL (ref 12–16)
IMM GRANULOCYTES # BLD: 0.72 10*3/MM3 (ref 0–0.03)
IMM GRANULOCYTES NFR BLD: 7.7 % (ref 0–5)
INR PPP: 0.97 (ref 0.91–1.09)
IRON 24H UR-MRATE: 46 MCG/DL (ref 42–180)
IRON SATN MFR SERPL: 17 % (ref 20–45)
LDH SERPL-CCNC: 600 U/L (ref 265–665)
LYMPHOCYTES # BLD AUTO: 1.61 10*3/MM3 (ref 0.72–4.86)
LYMPHOCYTES NFR BLD AUTO: 17.1 % (ref 15–45)
Lab: ABNORMAL
MCH RBC QN AUTO: 35.3 PG (ref 28–32)
MCHC RBC AUTO-ENTMCNC: 32.6 G/DL (ref 33–36)
MCV RBC AUTO: 108.5 FL (ref 82–98)
MODALITY: ABNORMAL
MONOCYTES # BLD AUTO: 0.57 10*3/MM3 (ref 0.19–1.3)
MONOCYTES NFR BLD AUTO: 6.1 % (ref 4–12)
NEUTROPHILS # BLD AUTO: 6.13 10*3/MM3 (ref 1.87–8.4)
NEUTROPHILS NFR BLD AUTO: 65.3 % (ref 39–78)
NRBC BLD MANUAL-RTO: 0 /100 WBC (ref 0–0)
NT-PROBNP SERPL-MCNC: 6240 PG/ML (ref 0–1800)
PCO2 BLDA: 46.8 MM HG (ref 35–45)
PH BLDA: 7.42 PH UNITS (ref 7.35–7.45)
PLAT MORPH BLD: NORMAL
PLATELET # BLD AUTO: 435 10*3/MM3 (ref 130–400)
PMV BLD AUTO: 9.7 FL (ref 6–12)
PO2 BLDA: 75.8 MM HG (ref 83–108)
POTASSIUM BLD-SCNC: 4.2 MMOL/L (ref 3.5–5.3)
PROT SERPL-MCNC: 6.3 G/DL (ref 6.3–8.7)
PROTHROMBIN TIME: 13.2 SECONDS (ref 11.9–14.6)
RBC # BLD AUTO: 2.83 10*6/MM3 (ref 4.2–5.4)
RBC MORPH BLD: NORMAL
RETICS #: 0.09 10*6/MM3 (ref 0.02–0.13)
RETICS/RBC NFR AUTO: 3.21 % (ref 0.6–1.8)
SAO2 % BLDCOA: 94.8 % (ref 94–99)
SODIUM BLD-SCNC: 138 MMOL/L (ref 135–145)
TIBC SERPL-MCNC: 263 MCG/DL (ref 225–420)
TROPONIN I SERPL-MCNC: 0.04 NG/ML (ref 0–0.03)
TROPONIN I SERPL-MCNC: 0.05 NG/ML (ref 0–0.03)
VENTILATOR MODE: ABNORMAL
WBC MORPH BLD: NORMAL
WBC NRBC COR # BLD: 9.39 10*3/MM3 (ref 4.8–10.8)

## 2018-01-29 PROCEDURE — 86900 BLOOD TYPING SEROLOGIC ABO: CPT | Performed by: FAMILY MEDICINE

## 2018-01-29 PROCEDURE — 85730 THROMBOPLASTIN TIME PARTIAL: CPT | Performed by: NURSE PRACTITIONER

## 2018-01-29 PROCEDURE — 87040 BLOOD CULTURE FOR BACTERIA: CPT | Performed by: NURSE PRACTITIONER

## 2018-01-29 PROCEDURE — 86901 BLOOD TYPING SEROLOGIC RH(D): CPT | Performed by: FAMILY MEDICINE

## 2018-01-29 PROCEDURE — 25010000002 AZITHROMYCIN PER 500 MG: Performed by: FAMILY MEDICINE

## 2018-01-29 PROCEDURE — 83880 ASSAY OF NATRIURETIC PEPTIDE: CPT | Performed by: NURSE PRACTITIONER

## 2018-01-29 PROCEDURE — 85025 COMPLETE CBC W/AUTO DIFF WBC: CPT | Performed by: NURSE PRACTITIONER

## 2018-01-29 PROCEDURE — 36600 WITHDRAWAL OF ARTERIAL BLOOD: CPT

## 2018-01-29 PROCEDURE — 85610 PROTHROMBIN TIME: CPT | Performed by: NURSE PRACTITIONER

## 2018-01-29 PROCEDURE — 84443 ASSAY THYROID STIM HORMONE: CPT | Performed by: NURSE PRACTITIONER

## 2018-01-29 PROCEDURE — 71275 CT ANGIOGRAPHY CHEST: CPT

## 2018-01-29 PROCEDURE — 93005 ELECTROCARDIOGRAM TRACING: CPT | Performed by: NURSE PRACTITIONER

## 2018-01-29 PROCEDURE — 85014 HEMATOCRIT: CPT | Performed by: FAMILY MEDICINE

## 2018-01-29 PROCEDURE — 83921 ORGANIC ACID SINGLE QUANT: CPT | Performed by: FAMILY MEDICINE

## 2018-01-29 PROCEDURE — 71045 X-RAY EXAM CHEST 1 VIEW: CPT

## 2018-01-29 PROCEDURE — 82608 B-12 BINDING CAPACITY: CPT | Performed by: FAMILY MEDICINE

## 2018-01-29 PROCEDURE — 83550 IRON BINDING TEST: CPT | Performed by: FAMILY MEDICINE

## 2018-01-29 PROCEDURE — 83615 LACTATE (LD) (LDH) ENZYME: CPT | Performed by: FAMILY MEDICINE

## 2018-01-29 PROCEDURE — 82607 VITAMIN B-12: CPT | Performed by: FAMILY MEDICINE

## 2018-01-29 PROCEDURE — 82248 BILIRUBIN DIRECT: CPT | Performed by: FAMILY MEDICINE

## 2018-01-29 PROCEDURE — 82962 GLUCOSE BLOOD TEST: CPT

## 2018-01-29 PROCEDURE — 87804 INFLUENZA ASSAY W/OPTIC: CPT | Performed by: NURSE PRACTITIONER

## 2018-01-29 PROCEDURE — 63710000001 INSULIN DETEMIR PER 5 UNITS: Performed by: FAMILY MEDICINE

## 2018-01-29 PROCEDURE — 84484 ASSAY OF TROPONIN QUANT: CPT | Performed by: FAMILY MEDICINE

## 2018-01-29 PROCEDURE — 85045 AUTOMATED RETICULOCYTE COUNT: CPT | Performed by: FAMILY MEDICINE

## 2018-01-29 PROCEDURE — 99284 EMERGENCY DEPT VISIT MOD MDM: CPT

## 2018-01-29 PROCEDURE — 82728 ASSAY OF FERRITIN: CPT | Performed by: FAMILY MEDICINE

## 2018-01-29 PROCEDURE — 82306 VITAMIN D 25 HYDROXY: CPT | Performed by: NURSE PRACTITIONER

## 2018-01-29 PROCEDURE — 82247 BILIRUBIN TOTAL: CPT | Performed by: FAMILY MEDICINE

## 2018-01-29 PROCEDURE — 85379 FIBRIN DEGRADATION QUANT: CPT | Performed by: NURSE PRACTITIONER

## 2018-01-29 PROCEDURE — 80053 COMPREHEN METABOLIC PANEL: CPT | Performed by: NURSE PRACTITIONER

## 2018-01-29 PROCEDURE — 86850 RBC ANTIBODY SCREEN: CPT | Performed by: FAMILY MEDICINE

## 2018-01-29 PROCEDURE — 83010 ASSAY OF HAPTOGLOBIN QUANT: CPT | Performed by: FAMILY MEDICINE

## 2018-01-29 PROCEDURE — 84484 ASSAY OF TROPONIN QUANT: CPT | Performed by: NURSE PRACTITIONER

## 2018-01-29 PROCEDURE — 93010 ELECTROCARDIOGRAM REPORT: CPT | Performed by: INTERNAL MEDICINE

## 2018-01-29 PROCEDURE — 85007 BL SMEAR W/DIFF WBC COUNT: CPT | Performed by: NURSE PRACTITIONER

## 2018-01-29 PROCEDURE — 83540 ASSAY OF IRON: CPT | Performed by: FAMILY MEDICINE

## 2018-01-29 PROCEDURE — 0 IOPAMIDOL PER 1 ML: Performed by: FAMILY MEDICINE

## 2018-01-29 PROCEDURE — 72072 X-RAY EXAM THORAC SPINE 3VWS: CPT

## 2018-01-29 PROCEDURE — 82746 ASSAY OF FOLIC ACID SERUM: CPT | Performed by: FAMILY MEDICINE

## 2018-01-29 PROCEDURE — 82747 ASSAY OF FOLIC ACID RBC: CPT | Performed by: FAMILY MEDICINE

## 2018-01-29 PROCEDURE — 25010000002 CEFTRIAXONE PER 250 MG: Performed by: FAMILY MEDICINE

## 2018-01-29 PROCEDURE — 83605 ASSAY OF LACTIC ACID: CPT | Performed by: NURSE PRACTITIONER

## 2018-01-29 PROCEDURE — 94640 AIRWAY INHALATION TREATMENT: CPT

## 2018-01-29 PROCEDURE — 25010000002 HEPARIN (PORCINE) PER 1000 UNITS: Performed by: FAMILY MEDICINE

## 2018-01-29 PROCEDURE — 82803 BLOOD GASES ANY COMBINATION: CPT

## 2018-01-29 RX ORDER — SODIUM CHLORIDE 0.9 % (FLUSH) 0.9 %
10 SYRINGE (ML) INJECTION AS NEEDED
Status: DISCONTINUED | OUTPATIENT
Start: 2018-01-29 | End: 2018-02-05 | Stop reason: HOSPADM

## 2018-01-29 RX ORDER — FOLIC ACID 1 MG/1
1 TABLET ORAL DAILY
COMMUNITY

## 2018-01-29 RX ORDER — GABAPENTIN 300 MG/1
600 CAPSULE ORAL EVERY EVENING
Status: ON HOLD | COMMUNITY
End: 2018-02-05

## 2018-01-29 RX ORDER — DEXTROSE MONOHYDRATE 25 G/50ML
25 INJECTION, SOLUTION INTRAVENOUS
Status: DISCONTINUED | OUTPATIENT
Start: 2018-01-29 | End: 2018-02-05 | Stop reason: HOSPADM

## 2018-01-29 RX ORDER — GABAPENTIN 300 MG/1
600 CAPSULE ORAL EVERY EVENING
Status: DISCONTINUED | OUTPATIENT
Start: 2018-01-29 | End: 2018-02-05 | Stop reason: HOSPADM

## 2018-01-29 RX ORDER — LISINOPRIL 20 MG/1
40 TABLET ORAL DAILY
Status: DISCONTINUED | OUTPATIENT
Start: 2018-01-30 | End: 2018-02-05 | Stop reason: HOSPADM

## 2018-01-29 RX ORDER — HYDROXYUREA 500 MG/1
1000 CAPSULE ORAL WEEKLY
COMMUNITY
End: 2018-03-21 | Stop reason: HOSPADM

## 2018-01-29 RX ORDER — PANTOPRAZOLE SODIUM 40 MG/1
40 TABLET, DELAYED RELEASE ORAL DAILY
Status: DISCONTINUED | OUTPATIENT
Start: 2018-01-30 | End: 2018-02-05 | Stop reason: HOSPADM

## 2018-01-29 RX ORDER — AMLODIPINE BESYLATE 5 MG/1
5 TABLET ORAL DAILY
Status: DISCONTINUED | OUTPATIENT
Start: 2018-01-30 | End: 2018-02-05 | Stop reason: HOSPADM

## 2018-01-29 RX ORDER — HEPARIN SODIUM 5000 [USP'U]/ML
5000 INJECTION, SOLUTION INTRAVENOUS; SUBCUTANEOUS EVERY 8 HOURS SCHEDULED
Status: DISCONTINUED | OUTPATIENT
Start: 2018-01-29 | End: 2018-02-05 | Stop reason: HOSPADM

## 2018-01-29 RX ORDER — HYDROXYUREA 500 MG/1
1000 CAPSULE ORAL
Status: DISCONTINUED | OUTPATIENT
Start: 2018-02-02 | End: 2018-02-05 | Stop reason: HOSPADM

## 2018-01-29 RX ORDER — METOPROLOL SUCCINATE 50 MG/1
50 TABLET, EXTENDED RELEASE ORAL DAILY
Status: DISCONTINUED | OUTPATIENT
Start: 2018-01-30 | End: 2018-02-05 | Stop reason: HOSPADM

## 2018-01-29 RX ORDER — GABAPENTIN 300 MG/1
300 CAPSULE ORAL
Status: ON HOLD | COMMUNITY
End: 2018-02-05

## 2018-01-29 RX ORDER — FAMOTIDINE 20 MG/1
40 TABLET, FILM COATED ORAL DAILY
Status: DISCONTINUED | OUTPATIENT
Start: 2018-01-30 | End: 2018-01-30

## 2018-01-29 RX ORDER — IPRATROPIUM BROMIDE AND ALBUTEROL SULFATE 2.5; .5 MG/3ML; MG/3ML
3 SOLUTION RESPIRATORY (INHALATION) EVERY 4 HOURS PRN
Status: DISCONTINUED | OUTPATIENT
Start: 2018-01-29 | End: 2018-02-05 | Stop reason: HOSPADM

## 2018-01-29 RX ORDER — SODIUM CHLORIDE 0.9 % (FLUSH) 0.9 %
1-10 SYRINGE (ML) INJECTION AS NEEDED
Status: DISCONTINUED | OUTPATIENT
Start: 2018-01-29 | End: 2018-02-05 | Stop reason: HOSPADM

## 2018-01-29 RX ORDER — GABAPENTIN 300 MG/1
300 CAPSULE ORAL
Status: DISCONTINUED | OUTPATIENT
Start: 2018-01-30 | End: 2018-02-05 | Stop reason: HOSPADM

## 2018-01-29 RX ORDER — AMLODIPINE BESYLATE 5 MG/1
5 TABLET ORAL DAILY
COMMUNITY
End: 2018-03-21 | Stop reason: HOSPADM

## 2018-01-29 RX ORDER — HYDROXYUREA 500 MG/1
500 CAPSULE ORAL
Status: DISCONTINUED | OUTPATIENT
Start: 2018-01-30 | End: 2018-02-05 | Stop reason: HOSPADM

## 2018-01-29 RX ORDER — ATORVASTATIN CALCIUM 10 MG/1
20 TABLET, FILM COATED ORAL DAILY
Status: DISCONTINUED | OUTPATIENT
Start: 2018-01-30 | End: 2018-02-05 | Stop reason: HOSPADM

## 2018-01-29 RX ORDER — ASPIRIN 325 MG
325 TABLET ORAL NIGHTLY
Status: DISCONTINUED | OUTPATIENT
Start: 2018-01-29 | End: 2018-02-05 | Stop reason: HOSPADM

## 2018-01-29 RX ORDER — FUROSEMIDE 40 MG/1
40 TABLET ORAL DAILY
Status: DISCONTINUED | OUTPATIENT
Start: 2018-01-30 | End: 2018-01-30

## 2018-01-29 RX ORDER — GUAIFENESIN 600 MG/1
600 TABLET, EXTENDED RELEASE ORAL EVERY 12 HOURS SCHEDULED
Status: DISCONTINUED | OUTPATIENT
Start: 2018-01-29 | End: 2018-02-05 | Stop reason: HOSPADM

## 2018-01-29 RX ORDER — SODIUM CHLORIDE 9 MG/ML
75 INJECTION, SOLUTION INTRAVENOUS CONTINUOUS
Status: DISCONTINUED | OUTPATIENT
Start: 2018-01-29 | End: 2018-01-30

## 2018-01-29 RX ORDER — NICOTINE POLACRILEX 4 MG
15 LOZENGE BUCCAL
Status: DISCONTINUED | OUTPATIENT
Start: 2018-01-29 | End: 2018-02-05 | Stop reason: HOSPADM

## 2018-01-29 RX ADMIN — GUAIFENESIN 600 MG: 600 TABLET, EXTENDED RELEASE ORAL at 23:19

## 2018-01-29 RX ADMIN — CEFTRIAXONE SODIUM 1 G: 1 INJECTION, POWDER, FOR SOLUTION INTRAMUSCULAR; INTRAVENOUS at 21:39

## 2018-01-29 RX ADMIN — AZITHROMYCIN MONOHYDRATE 500 MG: 500 INJECTION, POWDER, LYOPHILIZED, FOR SOLUTION INTRAVENOUS at 21:37

## 2018-01-29 RX ADMIN — GABAPENTIN 600 MG: 300 CAPSULE ORAL at 23:20

## 2018-01-29 RX ADMIN — SODIUM CHLORIDE 75 ML/HR: 9 INJECTION, SOLUTION INTRAVENOUS at 21:37

## 2018-01-29 RX ADMIN — ASPIRIN 325 MG: 325 TABLET, COATED ORAL at 23:20

## 2018-01-29 RX ADMIN — IPRATROPIUM BROMIDE AND ALBUTEROL SULFATE 3 ML: 2.5; .5 SOLUTION RESPIRATORY (INHALATION) at 21:26

## 2018-01-29 RX ADMIN — HEPARIN SODIUM 5000 UNITS: 5000 INJECTION, SOLUTION INTRAVENOUS; SUBCUTANEOUS at 23:20

## 2018-01-29 RX ADMIN — IOPAMIDOL 71 ML: 755 INJECTION, SOLUTION INTRAVENOUS at 21:30

## 2018-01-29 RX ADMIN — INSULIN DETEMIR 35 UNITS: 100 INJECTION, SOLUTION SUBCUTANEOUS at 23:18

## 2018-01-30 ENCOUNTER — APPOINTMENT (OUTPATIENT)
Dept: CARDIOLOGY | Facility: HOSPITAL | Age: 83
End: 2018-01-30
Attending: FAMILY MEDICINE

## 2018-01-30 ENCOUNTER — APPOINTMENT (OUTPATIENT)
Dept: GENERAL RADIOLOGY | Facility: HOSPITAL | Age: 83
End: 2018-01-30

## 2018-01-30 LAB
ABO GROUP BLD: NORMAL
ALBUMIN SERPL-MCNC: 3 G/DL (ref 3.5–5)
ALBUMIN/GLOB SERPL: 1.1 G/DL (ref 1.1–2.5)
ALP SERPL-CCNC: 76 U/L (ref 24–120)
ALT SERPL W P-5'-P-CCNC: 27 U/L (ref 0–54)
ANION GAP SERPL CALCULATED.3IONS-SCNC: 9 MMOL/L (ref 4–13)
ARTERIAL PATENCY WRIST A: POSITIVE
ARTICHOKE IGE QN: <30 MG/DL (ref 0–99)
AST SERPL-CCNC: 27 U/L (ref 7–45)
ATMOSPHERIC PRESS: 763 MMHG
BASE EXCESS BLDA CALC-SCNC: 6.7 MMOL/L (ref 0–2)
BASO STIPL COARSE BLD QL SMEAR: NORMAL
BDY SITE: ABNORMAL
BH CV ECHO MEAS - AO MAX PG (FULL): 16.3 MMHG
BH CV ECHO MEAS - AO MAX PG: 19.4 MMHG
BH CV ECHO MEAS - AO MEAN PG (FULL): 8 MMHG
BH CV ECHO MEAS - AO MEAN PG: 10 MMHG
BH CV ECHO MEAS - AO ROOT AREA (BSA CORRECTED): 1.7
BH CV ECHO MEAS - AO ROOT AREA: 7.1 CM^2
BH CV ECHO MEAS - AO ROOT DIAM: 3 CM
BH CV ECHO MEAS - AO V2 MAX: 220 CM/SEC
BH CV ECHO MEAS - AO V2 MEAN: 147 CM/SEC
BH CV ECHO MEAS - AO V2 VTI: 56.9 CM
BH CV ECHO MEAS - AVA(I,A): 1.1 CM^2
BH CV ECHO MEAS - AVA(I,D): 1.1 CM^2
BH CV ECHO MEAS - AVA(V,A): 1 CM^2
BH CV ECHO MEAS - AVA(V,D): 1 CM^2
BH CV ECHO MEAS - BSA(HAYCOCK): 1.9 M^2
BH CV ECHO MEAS - BSA: 1.8 M^2
BH CV ECHO MEAS - BZI_BMI: 36.1 KILOGRAMS/M^2
BH CV ECHO MEAS - BZI_METRIC_HEIGHT: 152.4 CM
BH CV ECHO MEAS - BZI_METRIC_WEIGHT: 83.9 KG
BH CV ECHO MEAS - CONTRAST EF 4CH: 65.6 ML/M^2
BH CV ECHO MEAS - EDV(CUBED): 139 ML
BH CV ECHO MEAS - EDV(MOD-SP4): 146 ML
BH CV ECHO MEAS - EDV(TEICH): 128.4 ML
BH CV ECHO MEAS - EF(CUBED): 87.2 %
BH CV ECHO MEAS - EF(MOD-SP4): 65.6 %
BH CV ECHO MEAS - EF(TEICH): 80.6 %
BH CV ECHO MEAS - ESV(CUBED): 17.8 ML
BH CV ECHO MEAS - ESV(MOD-SP4): 50.2 ML
BH CV ECHO MEAS - ESV(TEICH): 24.8 ML
BH CV ECHO MEAS - FS: 49.6 %
BH CV ECHO MEAS - IVS/LVPW: 1.4
BH CV ECHO MEAS - IVSD: 1.2 CM
BH CV ECHO MEAS - LA DIMENSION: 3.8 CM
BH CV ECHO MEAS - LA/AO: 1.3
BH CV ECHO MEAS - LV DIASTOLIC VOL/BSA (35-75): 80.9 ML/M^2
BH CV ECHO MEAS - LV MASS(C)D: 194.3 GRAMS
BH CV ECHO MEAS - LV MASS(C)DI: 107.6 GRAMS/M^2
BH CV ECHO MEAS - LV MAX PG: 3 MMHG
BH CV ECHO MEAS - LV MEAN PG: 2 MMHG
BH CV ECHO MEAS - LV SYSTOLIC VOL/BSA (12-30): 27.8 ML/M^2
BH CV ECHO MEAS - LV V1 MAX: 86.8 CM/SEC
BH CV ECHO MEAS - LV V1 MEAN: 58.3 CM/SEC
BH CV ECHO MEAS - LV V1 VTI: 24.8 CM
BH CV ECHO MEAS - LVIDD: 5.2 CM
BH CV ECHO MEAS - LVIDS: 2.6 CM
BH CV ECHO MEAS - LVLD AP4: 8.8 CM
BH CV ECHO MEAS - LVLS AP4: 7.3 CM
BH CV ECHO MEAS - LVOT AREA (M): 2.5 CM^2
BH CV ECHO MEAS - LVOT AREA: 2.5 CM^2
BH CV ECHO MEAS - LVOT DIAM: 1.8 CM
BH CV ECHO MEAS - LVPWD: 0.84 CM
BH CV ECHO MEAS - MR ALIAS VEL: 36.7 CM/SEC
BH CV ECHO MEAS - MR ERO: 0.19 CM^2
BH CV ECHO MEAS - MR FLOW RATE: 97.4 CM^3/SEC
BH CV ECHO MEAS - MR MAX PG: 105.3 MMHG
BH CV ECHO MEAS - MR MAX VEL: 513 CM/SEC
BH CV ECHO MEAS - MR MEAN PG: 71 MMHG
BH CV ECHO MEAS - MR MEAN VEL: 394 CM/SEC
BH CV ECHO MEAS - MR PISA RADIUS: 0.65 CM
BH CV ECHO MEAS - MR PISA: 2.7 CM^2
BH CV ECHO MEAS - MR VOLUME: 38.2 ML
BH CV ECHO MEAS - MR VTI: 201 CM
BH CV ECHO MEAS - MV A MAX VEL: 87.9 CM/SEC
BH CV ECHO MEAS - MV AREA (1 DIAM): 5.7 CM^2
BH CV ECHO MEAS - MV DEC TIME: 0.22 SEC
BH CV ECHO MEAS - MV DIAM: 2.7 CM
BH CV ECHO MEAS - MV E MAX VEL: 145 CM/SEC
BH CV ECHO MEAS - MV E/A: 1.6
BH CV ECHO MEAS - MV FLOW AREA(1DIAM): 5.7 CM^2
BH CV ECHO MEAS - RAP SYSTOLE: 5 MMHG
BH CV ECHO MEAS - RVSP: 63.1 MMHG
BH CV ECHO MEAS - SI(AO): 222.7 ML/M^2
BH CV ECHO MEAS - SI(CUBED): 67.1 ML/M^2
BH CV ECHO MEAS - SI(LVOT): 34.9 ML/M^2
BH CV ECHO MEAS - SI(MOD-SP4): 53.1 ML/M^2
BH CV ECHO MEAS - SI(TEICH): 57.3 ML/M^2
BH CV ECHO MEAS - SV(AO): 402.2 ML
BH CV ECHO MEAS - SV(CUBED): 121.2 ML
BH CV ECHO MEAS - SV(LVOT): 63.1 ML
BH CV ECHO MEAS - SV(MOD-SP4): 95.8 ML
BH CV ECHO MEAS - SV(TEICH): 103.5 ML
BH CV ECHO MEAS - TR MAX VEL: 381 CM/SEC
BILIRUB SERPL-MCNC: 0.2 MG/DL (ref 0.1–1)
BLD GP AB SCN SERPL QL: NEGATIVE
BODY TEMPERATURE: 37 C
BUN BLD-MCNC: 32 MG/DL (ref 5–21)
BUN/CREAT SERPL: 26 (ref 7–25)
CALCIUM SPEC-SCNC: 8.6 MG/DL (ref 8.4–10.4)
CHLORIDE SERPL-SCNC: 100 MMOL/L (ref 98–110)
CHOLEST SERPL-MCNC: 87 MG/DL (ref 130–200)
CO2 SERPL-SCNC: 32 MMOL/L (ref 24–31)
CREAT BLD-MCNC: 1.23 MG/DL (ref 0.5–1.4)
DEPRECATED RDW RBC AUTO: 60.6 FL (ref 40–54)
E/E' RATIO: 22.6
EOSINOPHIL # BLD MANUAL: 0.3 10*3/MM3 (ref 0–0.7)
EOSINOPHIL NFR BLD MANUAL: 3 % (ref 0–4)
ERYTHROCYTE [DISTWIDTH] IN BLOOD BY AUTOMATED COUNT: 14.9 % (ref 12–15)
FERRITIN SERPL-MCNC: 50.3 NG/ML (ref 11.1–264)
FOLATE SERPL-MCNC: >20 NG/ML
GAS FLOW AIRWAY: 2 LPM
GFR SERPL CREATININE-BSD FRML MDRD: 41 ML/MIN/1.73
GLOBULIN UR ELPH-MCNC: 2.7 GM/DL
GLUCOSE BLD-MCNC: 60 MG/DL (ref 70–100)
GLUCOSE BLDC GLUCOMTR-MCNC: 134 MG/DL (ref 70–130)
GLUCOSE BLDC GLUCOMTR-MCNC: 259 MG/DL (ref 70–130)
GLUCOSE BLDC GLUCOMTR-MCNC: 293 MG/DL (ref 70–130)
GLUCOSE BLDC GLUCOMTR-MCNC: 314 MG/DL (ref 70–130)
GLUCOSE BLDC GLUCOMTR-MCNC: 40 MG/DL (ref 70–130)
GLUCOSE BLDC GLUCOMTR-MCNC: 53 MG/DL (ref 70–130)
HBA1C MFR BLD: 8.3 %
HCO3 BLDA-SCNC: 31.7 MMOL/L (ref 20–26)
HCT VFR BLD AUTO: 29.5 % (ref 37–47)
HDLC SERPL-MCNC: 35 MG/DL
HGB BLD-MCNC: 9.3 G/DL (ref 12–16)
LDLC/HDLC SERPL: ABNORMAL {RATIO}
LEFT ATRIUM VOLUME INDEX: 69.6 ML/M2
LEFT ATRIUM VOLUME: 126 CM3
LYMPHOCYTES # BLD MANUAL: 2.6 10*3/MM3 (ref 0.72–4.86)
LYMPHOCYTES NFR BLD MANUAL: 26 % (ref 15–45)
LYMPHOCYTES NFR BLD MANUAL: 4 % (ref 4–12)
Lab: ABNORMAL
MAGNESIUM SERPL-MCNC: 2 MG/DL (ref 1.4–2.2)
MAXIMAL PREDICTED HEART RATE: 133 BPM
MCH RBC QN AUTO: 34.7 PG (ref 28–32)
MCHC RBC AUTO-ENTMCNC: 31.5 G/DL (ref 33–36)
MCV RBC AUTO: 110.1 FL (ref 82–98)
MODALITY: ABNORMAL
MONOCYTES # BLD AUTO: 0.4 10*3/MM3 (ref 0.19–1.3)
NEUTROPHILS # BLD AUTO: 6.7 10*3/MM3 (ref 1.87–8.4)
NEUTROPHILS NFR BLD MANUAL: 67 % (ref 39–78)
PCO2 BLDA: 46.6 MM HG (ref 35–45)
PH BLDA: 7.44 PH UNITS (ref 7.35–7.45)
PHOSPHATE SERPL-MCNC: 4.5 MG/DL (ref 2.5–4.5)
PLAT MORPH BLD: NORMAL
PLATELET # BLD AUTO: 419 10*3/MM3 (ref 130–400)
PMV BLD AUTO: 10.1 FL (ref 6–12)
PO2 BLDA: 70.8 MM HG (ref 83–108)
POIKILOCYTOSIS BLD QL SMEAR: NORMAL
POTASSIUM BLD-SCNC: 3.8 MMOL/L (ref 3.5–5.3)
PROT SERPL-MCNC: 5.7 G/DL (ref 6.3–8.7)
RBC # BLD AUTO: 2.68 10*6/MM3 (ref 4.2–5.4)
RH BLD: POSITIVE
SAO2 % BLDCOA: 95.5 % (ref 94–99)
SCAN SLIDE: NORMAL
SODIUM BLD-SCNC: 141 MMOL/L (ref 135–145)
STRESS TARGET HR: 113 BPM
TRIGL SERPL-MCNC: 99 MG/DL (ref 0–149)
TROPONIN I SERPL-MCNC: 0.04 NG/ML (ref 0–0.03)
TROPONIN I SERPL-MCNC: 0.05 NG/ML (ref 0–0.03)
VENTILATOR MODE: ABNORMAL
VIT B12 BLD-MCNC: 920 PG/ML (ref 239–931)
WBC MORPH BLD: NORMAL
WBC NRBC COR # BLD: 10 10*3/MM3 (ref 4.8–10.8)

## 2018-01-30 PROCEDURE — 25010000002 FUROSEMIDE PER 20 MG: Performed by: NURSE PRACTITIONER

## 2018-01-30 PROCEDURE — 83036 HEMOGLOBIN GLYCOSYLATED A1C: CPT | Performed by: FAMILY MEDICINE

## 2018-01-30 PROCEDURE — 36600 WITHDRAWAL OF ARTERIAL BLOOD: CPT

## 2018-01-30 PROCEDURE — 93010 ELECTROCARDIOGRAM REPORT: CPT | Performed by: INTERNAL MEDICINE

## 2018-01-30 PROCEDURE — 71045 X-RAY EXAM CHEST 1 VIEW: CPT

## 2018-01-30 PROCEDURE — 94799 UNLISTED PULMONARY SVC/PX: CPT

## 2018-01-30 PROCEDURE — 83735 ASSAY OF MAGNESIUM: CPT | Performed by: FAMILY MEDICINE

## 2018-01-30 PROCEDURE — 82803 BLOOD GASES ANY COMBINATION: CPT

## 2018-01-30 PROCEDURE — 84484 ASSAY OF TROPONIN QUANT: CPT | Performed by: FAMILY MEDICINE

## 2018-01-30 PROCEDURE — 25010000002 HEPARIN (PORCINE) PER 1000 UNITS: Performed by: FAMILY MEDICINE

## 2018-01-30 PROCEDURE — 63710000001 INSULIN LISPRO (HUMAN) PER 5 UNITS: Performed by: FAMILY MEDICINE

## 2018-01-30 PROCEDURE — 25010000002 METHYLPREDNISOLONE PER 125 MG: Performed by: NURSE PRACTITIONER

## 2018-01-30 PROCEDURE — 93306 TTE W/DOPPLER COMPLETE: CPT | Performed by: INTERNAL MEDICINE

## 2018-01-30 PROCEDURE — 25010000002 AZITHROMYCIN PER 500 MG: Performed by: FAMILY MEDICINE

## 2018-01-30 PROCEDURE — 93005 ELECTROCARDIOGRAM TRACING: CPT | Performed by: FAMILY MEDICINE

## 2018-01-30 PROCEDURE — 93306 TTE W/DOPPLER COMPLETE: CPT

## 2018-01-30 PROCEDURE — 85025 COMPLETE CBC W/AUTO DIFF WBC: CPT | Performed by: FAMILY MEDICINE

## 2018-01-30 PROCEDURE — 25010000002 CEFTRIAXONE PER 250 MG: Performed by: FAMILY MEDICINE

## 2018-01-30 PROCEDURE — 80061 LIPID PANEL: CPT | Performed by: FAMILY MEDICINE

## 2018-01-30 PROCEDURE — 63710000001 INSULIN DETEMIR PER 5 UNITS: Performed by: NURSE PRACTITIONER

## 2018-01-30 PROCEDURE — 82962 GLUCOSE BLOOD TEST: CPT

## 2018-01-30 PROCEDURE — 84100 ASSAY OF PHOSPHORUS: CPT | Performed by: FAMILY MEDICINE

## 2018-01-30 PROCEDURE — 25010000002 HYDRALAZINE PER 20 MG: Performed by: FAMILY MEDICINE

## 2018-01-30 PROCEDURE — 80053 COMPREHEN METABOLIC PANEL: CPT | Performed by: FAMILY MEDICINE

## 2018-01-30 RX ORDER — HYDRALAZINE HYDROCHLORIDE 20 MG/ML
10 INJECTION INTRAMUSCULAR; INTRAVENOUS ONCE
Status: COMPLETED | OUTPATIENT
Start: 2018-01-30 | End: 2018-01-30

## 2018-01-30 RX ORDER — FUROSEMIDE 10 MG/ML
20 INJECTION INTRAMUSCULAR; INTRAVENOUS EVERY 12 HOURS
Status: DISCONTINUED | OUTPATIENT
Start: 2018-01-31 | End: 2018-02-01

## 2018-01-30 RX ORDER — IPRATROPIUM BROMIDE AND ALBUTEROL SULFATE 2.5; .5 MG/3ML; MG/3ML
3 SOLUTION RESPIRATORY (INHALATION)
Status: DISCONTINUED | OUTPATIENT
Start: 2018-01-30 | End: 2018-02-05

## 2018-01-30 RX ORDER — FAMOTIDINE 20 MG/1
20 TABLET, FILM COATED ORAL DAILY
Status: DISCONTINUED | OUTPATIENT
Start: 2018-01-31 | End: 2018-02-05 | Stop reason: HOSPADM

## 2018-01-30 RX ORDER — FUROSEMIDE 10 MG/ML
20 INJECTION INTRAMUSCULAR; INTRAVENOUS ONCE
Status: COMPLETED | OUTPATIENT
Start: 2018-01-30 | End: 2018-01-30

## 2018-01-30 RX ORDER — METHYLPREDNISOLONE SODIUM SUCCINATE 125 MG/2ML
60 INJECTION, POWDER, LYOPHILIZED, FOR SOLUTION INTRAMUSCULAR; INTRAVENOUS EVERY 6 HOURS
Status: DISCONTINUED | OUTPATIENT
Start: 2018-01-30 | End: 2018-01-31

## 2018-01-30 RX ORDER — FUROSEMIDE 20 MG/1
20 TABLET ORAL ONCE
Status: DISCONTINUED | OUTPATIENT
Start: 2018-01-30 | End: 2018-01-30 | Stop reason: SDUPTHER

## 2018-01-30 RX ADMIN — HYDROXYUREA 500 MG: 500 CAPSULE ORAL at 08:43

## 2018-01-30 RX ADMIN — ATORVASTATIN CALCIUM 20 MG: 10 TABLET, FILM COATED ORAL at 08:42

## 2018-01-30 RX ADMIN — GUAIFENESIN 600 MG: 600 TABLET, EXTENDED RELEASE ORAL at 22:06

## 2018-01-30 RX ADMIN — PANTOPRAZOLE SODIUM 40 MG: 40 TABLET, DELAYED RELEASE ORAL at 08:40

## 2018-01-30 RX ADMIN — IPRATROPIUM BROMIDE AND ALBUTEROL SULFATE 3 ML: 2.5; .5 SOLUTION RESPIRATORY (INHALATION) at 07:46

## 2018-01-30 RX ADMIN — INSULIN LISPRO 4 UNITS: 100 INJECTION, SOLUTION INTRAVENOUS; SUBCUTANEOUS at 12:35

## 2018-01-30 RX ADMIN — METHYLPREDNISOLONE SODIUM SUCCINATE 60 MG: 125 INJECTION, POWDER, FOR SOLUTION INTRAMUSCULAR; INTRAVENOUS at 22:09

## 2018-01-30 RX ADMIN — AMLODIPINE BESYLATE 5 MG: 5 TABLET ORAL at 08:40

## 2018-01-30 RX ADMIN — IPRATROPIUM BROMIDE AND ALBUTEROL SULFATE 3 ML: 2.5; .5 SOLUTION RESPIRATORY (INHALATION) at 21:00

## 2018-01-30 RX ADMIN — HEPARIN SODIUM 5000 UNITS: 5000 INJECTION, SOLUTION INTRAVENOUS; SUBCUTANEOUS at 06:39

## 2018-01-30 RX ADMIN — METOPROLOL SUCCINATE 50 MG: 50 TABLET, FILM COATED, EXTENDED RELEASE ORAL at 08:43

## 2018-01-30 RX ADMIN — FAMOTIDINE 40 MG: 20 TABLET, FILM COATED ORAL at 08:43

## 2018-01-30 RX ADMIN — LISINOPRIL 40 MG: 20 TABLET ORAL at 08:42

## 2018-01-30 RX ADMIN — METHYLPREDNISOLONE SODIUM SUCCINATE 60 MG: 125 INJECTION, POWDER, FOR SOLUTION INTRAMUSCULAR; INTRAVENOUS at 16:00

## 2018-01-30 RX ADMIN — ASPIRIN 325 MG: 325 TABLET, COATED ORAL at 22:06

## 2018-01-30 RX ADMIN — HEPARIN SODIUM 5000 UNITS: 5000 INJECTION, SOLUTION INTRAVENOUS; SUBCUTANEOUS at 22:05

## 2018-01-30 RX ADMIN — IPRATROPIUM BROMIDE AND ALBUTEROL SULFATE 3 ML: 2.5; .5 SOLUTION RESPIRATORY (INHALATION) at 15:51

## 2018-01-30 RX ADMIN — GUAIFENESIN 600 MG: 600 TABLET, EXTENDED RELEASE ORAL at 08:43

## 2018-01-30 RX ADMIN — FUROSEMIDE 40 MG: 40 TABLET ORAL at 08:43

## 2018-01-30 RX ADMIN — INSULIN LISPRO 4 UNITS: 100 INJECTION, SOLUTION INTRAVENOUS; SUBCUTANEOUS at 17:20

## 2018-01-30 RX ADMIN — FUROSEMIDE 20 MG: 10 INJECTION, SOLUTION INTRAMUSCULAR; INTRAVENOUS at 16:00

## 2018-01-30 RX ADMIN — CEFTRIAXONE SODIUM 1 G: 1 INJECTION, POWDER, FOR SOLUTION INTRAMUSCULAR; INTRAVENOUS at 22:06

## 2018-01-30 RX ADMIN — GABAPENTIN 300 MG: 300 CAPSULE ORAL at 08:40

## 2018-01-30 RX ADMIN — GABAPENTIN 600 MG: 300 CAPSULE ORAL at 17:21

## 2018-01-30 RX ADMIN — AZITHROMYCIN MONOHYDRATE 500 MG: 500 INJECTION, POWDER, LYOPHILIZED, FOR SOLUTION INTRAVENOUS at 22:05

## 2018-01-30 RX ADMIN — HEPARIN SODIUM 5000 UNITS: 5000 INJECTION, SOLUTION INTRAVENOUS; SUBCUTANEOUS at 15:02

## 2018-01-30 RX ADMIN — INSULIN LISPRO 5 UNITS: 100 INJECTION, SOLUTION INTRAVENOUS; SUBCUTANEOUS at 22:06

## 2018-01-30 RX ADMIN — HYDRALAZINE HYDROCHLORIDE 10 MG: 20 INJECTION INTRAMUSCULAR; INTRAVENOUS at 01:39

## 2018-01-30 RX ADMIN — INSULIN DETEMIR 10 UNITS: 100 INJECTION, SOLUTION SUBCUTANEOUS at 22:08

## 2018-01-31 ENCOUNTER — APPOINTMENT (OUTPATIENT)
Dept: GENERAL RADIOLOGY | Facility: HOSPITAL | Age: 83
End: 2018-01-31

## 2018-01-31 LAB
25(OH)D3 SERPL-MCNC: <12.8 NG/ML (ref 30–100)
ANION GAP SERPL CALCULATED.3IONS-SCNC: 11 MMOL/L (ref 4–13)
ARTERIAL PATENCY WRIST A: POSITIVE
ATMOSPHERIC PRESS: 748 MMHG
BASE EXCESS BLDA CALC-SCNC: 0.2 MMOL/L (ref 0–2)
BDY SITE: ABNORMAL
BODY TEMPERATURE: 37 C
BUN BLD-MCNC: 38 MG/DL (ref 5–21)
BUN/CREAT SERPL: 24.2 (ref 7–25)
CALCIUM SPEC-SCNC: 8.6 MG/DL (ref 8.4–10.4)
CHLORIDE SERPL-SCNC: 97 MMOL/L (ref 98–110)
CO2 SERPL-SCNC: 28 MMOL/L (ref 24–31)
CREAT BLD-MCNC: 1.57 MG/DL (ref 0.5–1.4)
DEPRECATED RDW RBC AUTO: 59.3 FL (ref 40–54)
ERYTHROCYTE [DISTWIDTH] IN BLOOD BY AUTOMATED COUNT: 14.8 % (ref 12–15)
FERRITIN SERPL-MCNC: 64.8 NG/ML (ref 11.1–264)
FOLATE BLD-MCNC: 570.7 NG/ML
FOLATE RBC-MCNC: 1841 NG/ML
FOLATE SERPL-MCNC: >20 NG/ML
GAS FLOW AIRWAY: 2 LPM
GFR SERPL CREATININE-BSD FRML MDRD: 31 ML/MIN/1.73
GLUCOSE BLD-MCNC: 336 MG/DL (ref 70–100)
GLUCOSE BLDC GLUCOMTR-MCNC: 303 MG/DL (ref 70–130)
GLUCOSE BLDC GLUCOMTR-MCNC: 363 MG/DL (ref 70–130)
GLUCOSE BLDC GLUCOMTR-MCNC: 382 MG/DL (ref 70–130)
GLUCOSE BLDC GLUCOMTR-MCNC: 401 MG/DL (ref 70–130)
GLUCOSE BLDC GLUCOMTR-MCNC: 452 MG/DL (ref 70–130)
GLUCOSE BLDC GLUCOMTR-MCNC: 468 MG/DL (ref 70–130)
HAPTOGLOB SERPL-MCNC: 120 MG/DL (ref 34–200)
HBA1C MFR BLD: 8.2 %
HCO3 BLDA-SCNC: 25.4 MMOL/L (ref 20–26)
HCT VFR BLD AUTO: 31 % (ref 34–46.6)
HCT VFR BLD AUTO: 31.3 % (ref 37–47)
HGB BLD-MCNC: 10 G/DL (ref 12–16)
INR PPP: 0.93 (ref 0.91–1.09)
IRON 24H UR-MRATE: 59 MCG/DL (ref 42–180)
IRON SATN MFR SERPL: 21 % (ref 20–45)
Lab: ABNORMAL
MCH RBC QN AUTO: 35 PG (ref 28–32)
MCHC RBC AUTO-ENTMCNC: 31.9 G/DL (ref 33–36)
MCV RBC AUTO: 109.4 FL (ref 82–98)
MODALITY: ABNORMAL
PCO2 BLDA: 42.5 MM HG (ref 35–45)
PH BLDA: 7.38 PH UNITS (ref 7.35–7.45)
PLATELET # BLD AUTO: 412 10*3/MM3 (ref 130–400)
PMV BLD AUTO: 10.2 FL (ref 6–12)
PO2 BLDA: 66.1 MM HG (ref 83–108)
POTASSIUM BLD-SCNC: 4.4 MMOL/L (ref 3.5–5.3)
PROTHROMBIN TIME: 12.7 SECONDS (ref 11.9–14.6)
RBC # BLD AUTO: 2.86 10*6/MM3 (ref 4.2–5.4)
SAO2 % BLDCOA: 92.1 % (ref 94–99)
SODIUM BLD-SCNC: 136 MMOL/L (ref 135–145)
TIBC SERPL-MCNC: 285 MCG/DL (ref 225–420)
TSH SERPL DL<=0.05 MIU/L-ACNC: 2.55 MIU/ML (ref 0.47–4.68)
VENTILATOR MODE: ABNORMAL
VIT B12 BLD-MCNC: 971 PG/ML (ref 239–931)
WBC NRBC COR # BLD: 6.55 10*3/MM3 (ref 4.8–10.8)

## 2018-01-31 PROCEDURE — 82962 GLUCOSE BLOOD TEST: CPT

## 2018-01-31 PROCEDURE — 85027 COMPLETE CBC AUTOMATED: CPT | Performed by: NURSE PRACTITIONER

## 2018-01-31 PROCEDURE — 82746 ASSAY OF FOLIC ACID SERUM: CPT | Performed by: NURSE PRACTITIONER

## 2018-01-31 PROCEDURE — 83540 ASSAY OF IRON: CPT | Performed by: NURSE PRACTITIONER

## 2018-01-31 PROCEDURE — 36600 WITHDRAWAL OF ARTERIAL BLOOD: CPT

## 2018-01-31 PROCEDURE — 82607 VITAMIN B-12: CPT | Performed by: NURSE PRACTITIONER

## 2018-01-31 PROCEDURE — 80048 BASIC METABOLIC PNL TOTAL CA: CPT | Performed by: NURSE PRACTITIONER

## 2018-01-31 PROCEDURE — 83550 IRON BINDING TEST: CPT | Performed by: NURSE PRACTITIONER

## 2018-01-31 PROCEDURE — 85610 PROTHROMBIN TIME: CPT | Performed by: NURSE PRACTITIONER

## 2018-01-31 PROCEDURE — 82728 ASSAY OF FERRITIN: CPT | Performed by: NURSE PRACTITIONER

## 2018-01-31 PROCEDURE — 94799 UNLISTED PULMONARY SVC/PX: CPT

## 2018-01-31 PROCEDURE — 25010000002 FUROSEMIDE PER 20 MG: Performed by: NURSE PRACTITIONER

## 2018-01-31 PROCEDURE — 83036 HEMOGLOBIN GLYCOSYLATED A1C: CPT | Performed by: NURSE PRACTITIONER

## 2018-01-31 PROCEDURE — 25010000002 METHYLPREDNISOLONE PER 125 MG: Performed by: NURSE PRACTITIONER

## 2018-01-31 PROCEDURE — 63710000001 INSULIN LISPRO (HUMAN) PER 5 UNITS: Performed by: NURSE PRACTITIONER

## 2018-01-31 PROCEDURE — 63710000001 INSULIN REGULAR HUMAN PER 5 UNITS: Performed by: FAMILY MEDICINE

## 2018-01-31 PROCEDURE — 82803 BLOOD GASES ANY COMBINATION: CPT

## 2018-01-31 PROCEDURE — 25010000002 AZITHROMYCIN PER 500 MG: Performed by: FAMILY MEDICINE

## 2018-01-31 PROCEDURE — 63710000001 INSULIN LISPRO (HUMAN) PER 5 UNITS: Performed by: FAMILY MEDICINE

## 2018-01-31 PROCEDURE — 63710000001 INSULIN DETEMIR PER 5 UNITS: Performed by: NURSE PRACTITIONER

## 2018-01-31 PROCEDURE — 71045 X-RAY EXAM CHEST 1 VIEW: CPT

## 2018-01-31 PROCEDURE — 25010000002 CEFTRIAXONE PER 250 MG: Performed by: FAMILY MEDICINE

## 2018-01-31 PROCEDURE — 25010000002 HEPARIN (PORCINE) PER 1000 UNITS: Performed by: FAMILY MEDICINE

## 2018-01-31 RX ORDER — HYDROXYZINE PAMOATE 50 MG/1
50 CAPSULE ORAL 4 TIMES DAILY PRN
Status: DISCONTINUED | OUTPATIENT
Start: 2018-01-31 | End: 2018-02-05 | Stop reason: HOSPADM

## 2018-01-31 RX ORDER — CLONAZEPAM 0.5 MG/1
0.5 TABLET ORAL 2 TIMES DAILY
Status: DISCONTINUED | OUTPATIENT
Start: 2018-01-31 | End: 2018-02-05 | Stop reason: HOSPADM

## 2018-01-31 RX ORDER — METHYLPREDNISOLONE SODIUM SUCCINATE 125 MG/2ML
60 INJECTION, POWDER, LYOPHILIZED, FOR SOLUTION INTRAMUSCULAR; INTRAVENOUS EVERY 12 HOURS
Status: DISCONTINUED | OUTPATIENT
Start: 2018-01-31 | End: 2018-02-01

## 2018-01-31 RX ADMIN — CEFTRIAXONE SODIUM 1 G: 1 INJECTION, POWDER, FOR SOLUTION INTRAMUSCULAR; INTRAVENOUS at 20:30

## 2018-01-31 RX ADMIN — INSULIN LISPRO 24 UNITS: 100 INJECTION, SOLUTION INTRAVENOUS; SUBCUTANEOUS at 17:21

## 2018-01-31 RX ADMIN — HEPARIN SODIUM 5000 UNITS: 5000 INJECTION, SOLUTION INTRAVENOUS; SUBCUTANEOUS at 05:04

## 2018-01-31 RX ADMIN — PANTOPRAZOLE SODIUM 40 MG: 40 TABLET, DELAYED RELEASE ORAL at 08:03

## 2018-01-31 RX ADMIN — INSULIN DETEMIR 25 UNITS: 100 INJECTION, SOLUTION SUBCUTANEOUS at 20:25

## 2018-01-31 RX ADMIN — INSULIN LISPRO 7 UNITS: 100 INJECTION, SOLUTION INTRAVENOUS; SUBCUTANEOUS at 12:13

## 2018-01-31 RX ADMIN — AZITHROMYCIN MONOHYDRATE 500 MG: 500 INJECTION, POWDER, LYOPHILIZED, FOR SOLUTION INTRAVENOUS at 20:31

## 2018-01-31 RX ADMIN — IPRATROPIUM BROMIDE AND ALBUTEROL SULFATE 3 ML: 2.5; .5 SOLUTION RESPIRATORY (INHALATION) at 15:46

## 2018-01-31 RX ADMIN — INSULIN LISPRO 5 UNITS: 100 INJECTION, SOLUTION INTRAVENOUS; SUBCUTANEOUS at 08:58

## 2018-01-31 RX ADMIN — FUROSEMIDE 20 MG: 10 INJECTION, SOLUTION INTRAVENOUS at 17:13

## 2018-01-31 RX ADMIN — CLONAZEPAM 0.5 MG: 0.5 TABLET ORAL at 20:28

## 2018-01-31 RX ADMIN — LISINOPRIL 40 MG: 20 TABLET ORAL at 08:03

## 2018-01-31 RX ADMIN — INSULIN LISPRO 20 UNITS: 100 INJECTION, SOLUTION INTRAVENOUS; SUBCUTANEOUS at 20:25

## 2018-01-31 RX ADMIN — GABAPENTIN 300 MG: 300 CAPSULE ORAL at 08:03

## 2018-01-31 RX ADMIN — HYDROXYUREA 500 MG: 500 CAPSULE ORAL at 08:03

## 2018-01-31 RX ADMIN — HEPARIN SODIUM 5000 UNITS: 5000 INJECTION, SOLUTION INTRAVENOUS; SUBCUTANEOUS at 21:47

## 2018-01-31 RX ADMIN — GABAPENTIN 600 MG: 300 CAPSULE ORAL at 17:13

## 2018-01-31 RX ADMIN — CLONAZEPAM 0.5 MG: 0.5 TABLET ORAL at 12:13

## 2018-01-31 RX ADMIN — METHYLPREDNISOLONE SODIUM SUCCINATE 60 MG: 125 INJECTION, POWDER, FOR SOLUTION INTRAMUSCULAR; INTRAVENOUS at 09:02

## 2018-01-31 RX ADMIN — GUAIFENESIN 600 MG: 600 TABLET, EXTENDED RELEASE ORAL at 08:03

## 2018-01-31 RX ADMIN — METOPROLOL SUCCINATE 50 MG: 50 TABLET, FILM COATED, EXTENDED RELEASE ORAL at 08:03

## 2018-01-31 RX ADMIN — INSULIN DETEMIR 15 UNITS: 100 INJECTION, SOLUTION SUBCUTANEOUS at 08:58

## 2018-01-31 RX ADMIN — FUROSEMIDE 20 MG: 10 INJECTION, SOLUTION INTRAVENOUS at 05:05

## 2018-01-31 RX ADMIN — ASPIRIN 325 MG: 325 TABLET, COATED ORAL at 20:24

## 2018-01-31 RX ADMIN — METHYLPREDNISOLONE SODIUM SUCCINATE 60 MG: 125 INJECTION, POWDER, FOR SOLUTION INTRAMUSCULAR; INTRAVENOUS at 20:28

## 2018-01-31 RX ADMIN — INSULIN LISPRO 10 UNITS: 100 INJECTION, SOLUTION INTRAVENOUS; SUBCUTANEOUS at 12:36

## 2018-01-31 RX ADMIN — IPRATROPIUM BROMIDE AND ALBUTEROL SULFATE 3 ML: 2.5; .5 SOLUTION RESPIRATORY (INHALATION) at 08:14

## 2018-01-31 RX ADMIN — IPRATROPIUM BROMIDE AND ALBUTEROL SULFATE 3 ML: 2.5; .5 SOLUTION RESPIRATORY (INHALATION) at 12:10

## 2018-01-31 RX ADMIN — METHYLPREDNISOLONE SODIUM SUCCINATE 60 MG: 125 INJECTION, POWDER, FOR SOLUTION INTRAMUSCULAR; INTRAVENOUS at 05:04

## 2018-01-31 RX ADMIN — AMLODIPINE BESYLATE 5 MG: 5 TABLET ORAL at 08:03

## 2018-01-31 RX ADMIN — ATORVASTATIN CALCIUM 20 MG: 10 TABLET, FILM COATED ORAL at 08:03

## 2018-01-31 RX ADMIN — FAMOTIDINE 20 MG: 20 TABLET, FILM COATED ORAL at 08:03

## 2018-01-31 RX ADMIN — IPRATROPIUM BROMIDE AND ALBUTEROL SULFATE 3 ML: 2.5; .5 SOLUTION RESPIRATORY (INHALATION) at 19:12

## 2018-01-31 RX ADMIN — INSULIN HUMAN 10 UNITS: 100 INJECTION, SOLUTION PARENTERAL at 17:45

## 2018-01-31 RX ADMIN — GUAIFENESIN 600 MG: 600 TABLET, EXTENDED RELEASE ORAL at 20:25

## 2018-01-31 RX ADMIN — HEPARIN SODIUM 5000 UNITS: 5000 INJECTION, SOLUTION INTRAVENOUS; SUBCUTANEOUS at 14:29

## 2018-02-01 LAB
ANION GAP SERPL CALCULATED.3IONS-SCNC: 14 MMOL/L (ref 4–13)
BUN BLD-MCNC: 55 MG/DL (ref 5–21)
BUN/CREAT SERPL: 32.7 (ref 7–25)
CALCIUM SPEC-SCNC: 9 MG/DL (ref 8.4–10.4)
CHLORIDE SERPL-SCNC: 95 MMOL/L (ref 98–110)
CO2 SERPL-SCNC: 26 MMOL/L (ref 24–31)
CREAT BLD-MCNC: 1.68 MG/DL (ref 0.5–1.4)
DEPRECATED RDW RBC AUTO: 62.5 FL (ref 40–54)
ERYTHROCYTE [DISTWIDTH] IN BLOOD BY AUTOMATED COUNT: 15 % (ref 12–15)
GFR SERPL CREATININE-BSD FRML MDRD: 29 ML/MIN/1.73
GLUCOSE BLD-MCNC: 201 MG/DL (ref 70–100)
GLUCOSE BLDC GLUCOMTR-MCNC: 179 MG/DL (ref 70–130)
GLUCOSE BLDC GLUCOMTR-MCNC: 293 MG/DL (ref 70–130)
GLUCOSE BLDC GLUCOMTR-MCNC: 331 MG/DL (ref 70–130)
GLUCOSE BLDC GLUCOMTR-MCNC: 359 MG/DL (ref 70–130)
HCT VFR BLD AUTO: 31 % (ref 37–47)
HGB BLD-MCNC: 9.9 G/DL (ref 12–16)
INR PPP: 0.95 (ref 0.91–1.09)
MCH RBC QN AUTO: 35.7 PG (ref 28–32)
MCHC RBC AUTO-ENTMCNC: 31.9 G/DL (ref 33–36)
MCV RBC AUTO: 111.9 FL (ref 82–98)
METHYLMALONATE SERPL-SCNC: 363 NMOL/L (ref 0–378)
PLATELET # BLD AUTO: 430 10*3/MM3 (ref 130–400)
PMV BLD AUTO: 10.5 FL (ref 6–12)
POTASSIUM BLD-SCNC: 4.4 MMOL/L (ref 3.5–5.3)
PROTHROMBIN TIME: 13 SECONDS (ref 11.9–14.6)
RBC # BLD AUTO: 2.77 10*6/MM3 (ref 4.2–5.4)
SODIUM BLD-SCNC: 135 MMOL/L (ref 135–145)
VIT B12 USB SERPL-MCNC: 1799 PG/ML (ref 725–2045)
WBC NRBC COR # BLD: 13.82 10*3/MM3 (ref 4.8–10.8)

## 2018-02-01 PROCEDURE — 97162 PT EVAL MOD COMPLEX 30 MIN: CPT

## 2018-02-01 PROCEDURE — 63710000001 INSULIN LISPRO (HUMAN) PER 5 UNITS: Performed by: NURSE PRACTITIONER

## 2018-02-01 PROCEDURE — 82962 GLUCOSE BLOOD TEST: CPT

## 2018-02-01 PROCEDURE — G8979 MOBILITY GOAL STATUS: HCPCS

## 2018-02-01 PROCEDURE — 94799 UNLISTED PULMONARY SVC/PX: CPT

## 2018-02-01 PROCEDURE — G8978 MOBILITY CURRENT STATUS: HCPCS

## 2018-02-01 PROCEDURE — 63710000001 INSULIN DETEMIR PER 5 UNITS: Performed by: NURSE PRACTITIONER

## 2018-02-01 PROCEDURE — 80048 BASIC METABOLIC PNL TOTAL CA: CPT | Performed by: NURSE PRACTITIONER

## 2018-02-01 PROCEDURE — 25010000002 METHYLPREDNISOLONE PER 40 MG: Performed by: NURSE PRACTITIONER

## 2018-02-01 PROCEDURE — 25010000002 CEFTRIAXONE PER 250 MG: Performed by: FAMILY MEDICINE

## 2018-02-01 PROCEDURE — 25010000002 METHYLPREDNISOLONE PER 125 MG: Performed by: NURSE PRACTITIONER

## 2018-02-01 PROCEDURE — 25010000002 FUROSEMIDE PER 20 MG: Performed by: NURSE PRACTITIONER

## 2018-02-01 PROCEDURE — 85027 COMPLETE CBC AUTOMATED: CPT | Performed by: NURSE PRACTITIONER

## 2018-02-01 PROCEDURE — 25010000002 HEPARIN (PORCINE) PER 1000 UNITS: Performed by: FAMILY MEDICINE

## 2018-02-01 PROCEDURE — 85610 PROTHROMBIN TIME: CPT | Performed by: NURSE PRACTITIONER

## 2018-02-01 PROCEDURE — 25010000002 AZITHROMYCIN PER 500 MG: Performed by: FAMILY MEDICINE

## 2018-02-01 RX ORDER — METHYLPREDNISOLONE SODIUM SUCCINATE 40 MG/ML
40 INJECTION, POWDER, LYOPHILIZED, FOR SOLUTION INTRAMUSCULAR; INTRAVENOUS EVERY 12 HOURS
Status: DISCONTINUED | OUTPATIENT
Start: 2018-02-01 | End: 2018-02-03

## 2018-02-01 RX ADMIN — INSULIN LISPRO 16 UNITS: 100 INJECTION, SOLUTION INTRAVENOUS; SUBCUTANEOUS at 13:05

## 2018-02-01 RX ADMIN — INSULIN DETEMIR 25 UNITS: 100 INJECTION, SOLUTION SUBCUTANEOUS at 21:23

## 2018-02-01 RX ADMIN — INSULIN DETEMIR 15 UNITS: 100 INJECTION, SOLUTION SUBCUTANEOUS at 09:10

## 2018-02-01 RX ADMIN — AZITHROMYCIN MONOHYDRATE 500 MG: 500 INJECTION, POWDER, LYOPHILIZED, FOR SOLUTION INTRAVENOUS at 21:23

## 2018-02-01 RX ADMIN — GABAPENTIN 600 MG: 300 CAPSULE ORAL at 17:24

## 2018-02-01 RX ADMIN — AMLODIPINE BESYLATE 5 MG: 5 TABLET ORAL at 09:09

## 2018-02-01 RX ADMIN — GUAIFENESIN 600 MG: 600 TABLET, EXTENDED RELEASE ORAL at 21:22

## 2018-02-01 RX ADMIN — INSULIN LISPRO 4 UNITS: 100 INJECTION, SOLUTION INTRAVENOUS; SUBCUTANEOUS at 09:09

## 2018-02-01 RX ADMIN — HYDROXYUREA 500 MG: 500 CAPSULE ORAL at 09:08

## 2018-02-01 RX ADMIN — FUROSEMIDE 20 MG: 10 INJECTION, SOLUTION INTRAVENOUS at 06:40

## 2018-02-01 RX ADMIN — IPRATROPIUM BROMIDE AND ALBUTEROL SULFATE 3 ML: 2.5; .5 SOLUTION RESPIRATORY (INHALATION) at 11:08

## 2018-02-01 RX ADMIN — ATORVASTATIN CALCIUM 20 MG: 10 TABLET, FILM COATED ORAL at 09:08

## 2018-02-01 RX ADMIN — INSULIN LISPRO 12 UNITS: 100 INJECTION, SOLUTION INTRAVENOUS; SUBCUTANEOUS at 21:23

## 2018-02-01 RX ADMIN — IPRATROPIUM BROMIDE AND ALBUTEROL SULFATE 3 ML: 2.5; .5 SOLUTION RESPIRATORY (INHALATION) at 07:16

## 2018-02-01 RX ADMIN — METHYLPREDNISOLONE SODIUM SUCCINATE 40 MG: 125 INJECTION, POWDER, FOR SOLUTION INTRAMUSCULAR; INTRAVENOUS at 21:23

## 2018-02-01 RX ADMIN — GABAPENTIN 300 MG: 300 CAPSULE ORAL at 09:08

## 2018-02-01 RX ADMIN — IPRATROPIUM BROMIDE AND ALBUTEROL SULFATE 3 ML: 2.5; .5 SOLUTION RESPIRATORY (INHALATION) at 18:46

## 2018-02-01 RX ADMIN — METHYLPREDNISOLONE SODIUM SUCCINATE 40 MG: 125 INJECTION, POWDER, FOR SOLUTION INTRAMUSCULAR; INTRAVENOUS at 09:09

## 2018-02-01 RX ADMIN — CLONAZEPAM 0.5 MG: 0.5 TABLET ORAL at 21:23

## 2018-02-01 RX ADMIN — HEPARIN SODIUM 5000 UNITS: 5000 INJECTION, SOLUTION INTRAVENOUS; SUBCUTANEOUS at 06:40

## 2018-02-01 RX ADMIN — GUAIFENESIN 600 MG: 600 TABLET, EXTENDED RELEASE ORAL at 09:08

## 2018-02-01 RX ADMIN — CLONAZEPAM 0.5 MG: 0.5 TABLET ORAL at 09:09

## 2018-02-01 RX ADMIN — INSULIN LISPRO 20 UNITS: 100 INJECTION, SOLUTION INTRAVENOUS; SUBCUTANEOUS at 17:24

## 2018-02-01 RX ADMIN — LISINOPRIL 40 MG: 20 TABLET ORAL at 09:08

## 2018-02-01 RX ADMIN — METOPROLOL SUCCINATE 50 MG: 50 TABLET, FILM COATED, EXTENDED RELEASE ORAL at 09:08

## 2018-02-01 RX ADMIN — CEFTRIAXONE SODIUM 1 G: 1 INJECTION, POWDER, FOR SOLUTION INTRAMUSCULAR; INTRAVENOUS at 21:23

## 2018-02-01 RX ADMIN — FAMOTIDINE 20 MG: 20 TABLET, FILM COATED ORAL at 09:08

## 2018-02-01 RX ADMIN — IPRATROPIUM BROMIDE AND ALBUTEROL SULFATE 3 ML: 2.5; .5 SOLUTION RESPIRATORY (INHALATION) at 14:30

## 2018-02-01 RX ADMIN — PANTOPRAZOLE SODIUM 40 MG: 40 TABLET, DELAYED RELEASE ORAL at 09:07

## 2018-02-01 NOTE — PROGRESS NOTES
[facility] Medicine Services  INPATIENT PROGRESS NOTE    Length of Stay: 3  Date of Admission: 1/29/2018  Primary Care Physician: Barry Foley MD    Subjective   Chief Complaint: shortness of breath, cough  HPI   Lying in bed.  No family present.  Nurses deny any confusion.  Patient is alert and oriented.  She reports hallucinations have improved.  Oxygen at 2 L.  Nonproductive cough.  She denies nausea, vomiting or abdominal pain.  She denies chest pain or palpitations.  She reports weakness, chronic due to legs.  She has not ambulated.    Review of Systems   Constitutional: Positive for activity change and fatigue. Negative for chills and unexpected weight change.   HENT: Negative for congestion and trouble swallowing.    Eyes: Negative for photophobia and visual disturbance.   Respiratory: Positive for cough, shortness of breath and wheezing (Improved).    Cardiovascular: Negative for chest pain, palpitations and leg swelling.   Gastrointestinal: Negative for abdominal distention, constipation, diarrhea, nausea and vomiting.   Endocrine: Negative for cold intolerance, heat intolerance and polyuria.   Genitourinary: Negative for dysuria and urgency.   Musculoskeletal: Positive for gait problem.   Skin: Negative for rash and wound.   Neurological: Positive for weakness.   Hematological: Negative for adenopathy. Does not bruise/bleed easily.   Psychiatric/Behavioral: Positive for hallucinations (Improved). The patient is nervous/anxious.       All pertinent negatives and positives are as above. All other systems have been reviewed and are negative unless otherwise stated.     Objective    Temp:  [97 °F (36.1 °C)-98.7 °F (37.1 °C)] 97 °F (36.1 °C)  Heart Rate:  [61-86] 61  Resp:  [20-22] 22  BP: (146-189)/(56-96) 150/66  Physical Exam   Constitutional: She is oriented to person, place, and time. She appears well-developed and well-nourished.   HENT:   Head: Normocephalic  and atraumatic.   Eyes: EOM are normal. Pupils are equal, round, and reactive to light.   Neck: Normal range of motion. Neck supple. No tracheal deviation present. No thyromegaly present.   Cardiovascular: Normal rate, regular rhythm, normal heart sounds and intact distal pulses.  Exam reveals no gallop and no friction rub.    No murmur heard.  Normal sinus rhythm 62-81 on telemetry   Pulmonary/Chest: No respiratory distress. She has no wheezes.   Oxygen at 2 L.  Harsh breath sounds posteriorly.  Decreased breath sounds.  No wheezing.  Nonproductive cough.   Abdominal: Soft. Bowel sounds are normal. She exhibits no distension.   Genitourinary:   Genitourinary Comments: Deferred   Musculoskeletal: She exhibits no edema or deformity.   Neurological: She is alert and oriented to person, place, and time.   Answers all questions appropriate.  No confusion.  Reports hallucinations resolved   Skin: Skin is warm and dry.   Thickened scan lower extremities.  Bruising upper extremities.   Psychiatric: She has a normal mood and affect. Her behavior is normal. Judgment and thought content normal.     Results Review:  I have reviewed the labs, radiology results, and diagnostic studies.    Laboratory Data:     Results from last 7 days  Lab Units 02/01/18  0429 01/31/18 0253 01/30/18  0413   WBC 10*3/mm3 13.82* 6.55 10.00   HEMOGLOBIN g/dL 9.9* 10.0* 9.3*   HEMATOCRIT % 31.0* 31.3* 29.5*   PLATELETS 10*3/mm3 430* 412* 419*          Results from last 7 days  Lab Units 02/01/18  0429 01/31/18  0253 01/30/18  0413 01/29/18  2235 01/29/18  1616   SODIUM mmol/L 135 136 141  --  138   POTASSIUM mmol/L 4.4 4.4 3.8  --  4.2   CHLORIDE mmol/L 95* 97* 100  --  99   CO2 mmol/L 26.0 28.0 32.0*  --  30.0   BUN mg/dL 55* 38* 32*  --  34*   CREATININE mg/dL 1.68* 1.57* 1.23  --  1.26   CALCIUM mg/dL 9.0 8.6 8.6  --  8.7   BILIRUBIN mg/dL  --   --  0.2 0.3 0.3   ALK PHOS U/L  --   --  76  --  85   ALT (SGPT) U/L  --   --  27  --  27   AST (SGOT)  U/L  --   --  27  --  32   GLUCOSE mg/dL 201* 336* 60*  --  134*     Blood Culture   Date Value Ref Range Status   01/29/2018 No growth at 2 days  Preliminary   01/29/2018 No growth at 2 days  Preliminary     Imaging Results (all)     Procedure Component Value Units Date/Time    XR Spine Thoracic 3 View [[id number]] Collected:  01/29/18 1523     Updated:  01/29/18 1527    Narrative:       XR SPINE THORACIC 3 VW- 1/29/2018 2:48 PM CST     HISTORY: back pain, fall     COMPARISON: None     FINDINGS:  Frontal, lateral and swimmers lateral radiographs of the thoracic spine  demonstrate normal alignment without evidence of compression fracture or  subluxation. The pedicles are intact. The disc spaces are maintained  hypertrophic osteophyte formation is noted. There is ossification of the  anterior longitudinal ligament. The visualized thorax is clear.        Impression:       1. Degenerative changes noted in the lumbar spine. There is no acute  compression deformity.     This report was finalized on 01/29/2018 15:24 by Dr. Klever Hooker MD.    XR Chest 1 View [[id number]] Collected:  01/29/18 1524     Updated:  01/29/18 1528    Narrative:       XR CHEST 1 VW- 1/29/2018 2:40 PM CST     HISTORY: shortness of breath       COMPARISON: 03/23/2017.     FINDINGS:   Moderate opacities are seen in the lung bases. There is a small LEFT  pleural effusion. The heart is enlarged.      The osseous structures and surrounding soft tissues demonstrate no acute  abnormality.       Impression:       1. Cardiomegaly and bilateral airspace opacities. Findings could be due  to pulmonary edema or pneumonia.        This report was finalized on 01/29/2018 15:25 by Dr. Yair Ruby MD.    CT Angiogram Chest With & Without Contrast [[id number]] Collected:  01/29/18 2116     Updated:  01/29/18 2124    Narrative:       CT ANGIOGRAM CHEST W WO CONTRAST- 1/29/2018 8:37 PM CST      HISTORY: rule out PE; J18.9-Pneumonia, unspecified  organism;  R74.8-Abnormal levels of other serum enzymes; I50.9-Heart failure,  unspecified; R79.89-Other specified abnormal findings of blood  chemistry; D64.9-Anemia, unspecified      COMPARISON: None.      DLP: 439 mGy cm     TECHNIQUE: Helical tomographic images of the chest were obtained after  the administration of intravenous contrast following angiogram protocol.  Additionally, 3D reformatted images were provided.        FINDINGS:    Pulmonary arteries: There is adequate enhancement of the pulmonary  arteries to evaluate for central and segmental pulmonary emboli. There  are no filling defects within the main, lobar, segmental or visualized  subsegmental pulmonary arteries. Pulmonary arteries are borderline  enlarged.     Aorta and great vessels: There is atherosclerosis in the aorta without  aneurysm or dissection. There is atherosclerosis in the great vessels.     Visualized neck base: The imaged portion of the base of the neck appears  unremarkable.      Lungs: Bilateral lower lobe consolidations, right greater than left..  The trachea and proximal bronchial tree are patent. Small to moderate  bilateral pleural effusions, right greater than left.     Heart: The heart is enlarged. There is no pericardial effusion.      Mediastinum and lymph nodes: No enlarged mediastinal, hilar, or axillary  lymph nodes are present.      Skeletal and soft tissues: The osseous structures of the thorax and  surrounding soft tissues demonstrate no acute process.     Upper abdomen: The imaged portion of the upper abdomen demonstrates no  acute process.        Impression:       1. No evidence of pulmonary embolus.  2. Small-to-moderate bilateral pleural effusion and associated opacities  in the bilateral lower lobes, concerning for pneumonia and parapneumonic  effusions. Additional differential does include aspiration.  This report was finalized on 01/29/2018 21:21 by Dr. Janae Sorensen MD.    XR Chest 1 View [[id number]]  Collected:  01/30/18 0935     Updated:  01/30/18 0939    Narrative:       EXAMINATION:   XR CHEST 1 VW-  1/30/2018 9:35 AM CST     HISTORY: fu     Prior exam 01/29/2018.     Infiltrate right lung is again noted. There is no improvement compared  to January 29. Is may be either pulmonary edema or pneumonia.     Cardiac silhouettes moderately enlarged also unchanged.     IMPRESSION cardiomegaly and air space filling infiltrate in the right  lung. This may be either pulmonary edema or pneumonia. This is unchanged  This report was finalized on 01/30/2018 09:36 by Dr. Klever Hooker MD.    XR Chest 1 View [[id number]] Collected:  01/31/18 0726     Updated:  01/31/18 0730    Narrative:       EXAMINATION:   XR CHEST 1 -  1/31/2018 7:26 AM CST     HISTORY: Bilateral effusions     Single view the chest obtained. Left diaphragms indistinct consistent  with atelectasis left lower lobe. Trace of fluid is noted in the right  minor fissure. Infiltrate in the right lung persists. Again this may be  mild pulmonary vascular congestion or possibly pneumonia.     Cardiac silhouettes mildly enlarged.     IMPRESSION atelectasis left lower lobe     2 persistent air space filling infiltrate right lung. This is pulmonary  vascular congestion versus inflammatory process.  This report was finalized on 01/31/2018 07:27 by Dr. Klever Hooker MD.        Transthoracic echocardiogram 1/30/18  · Left ventricular systolic function is normal. EF >65%.  · Left ventricular diastolic dysfunction (grade II) consistent with pseudonormalization.  · Left ventricular wall thickness is consistent with borderline concentric hypertrophy.  · Mild aortic valve stenosis is present.  · Mild mitral valve regurgitation is present  · Left atrial cavity size is severely dilated.  · Estimated right ventricular systolic pressure from tricuspid regurgitation is markedly elevated (>55 mmHg).      Intake/Output    Intake/Output Summary (Last 24 hours) at 02/01/18  0623  Last data filed at 02/01/18 0409   Gross per 24 hour   Intake              240 ml   Output              500 ml   Net             -260 ml       Scheduled Meds    amLODIPine 5 mg Oral Daily   aspirin 325 mg Oral Nightly   atorvastatin 20 mg Oral Daily   ceftriaxone 1 g Intravenous Q24H   And      azithromycin 500 mg Intravenous Q24H   clonazePAM 0.5 mg Oral BID   famotidine 20 mg Oral Daily   furosemide 20 mg Intravenous Q12H   gabapentin 300 mg Oral Daily With Breakfast   gabapentin 600 mg Oral Q PM   guaiFENesin 600 mg Oral Q12H   heparin (porcine) 5,000 Units Subcutaneous Q8H   [START ON 2/2/2018] hydroxyurea 1,000 mg Oral Once per day on Fri   hydroxyurea 500 mg Oral Once per day on Sun Mon Tue Wed Thu Sat   insulin detemir 15 Units Subcutaneous QAM   insulin detemir 25 Units Subcutaneous Nightly   insulin lispro 0-24 Units Subcutaneous 4x Daily With Meals & Nightly   ipratropium-albuterol 3 mL Nebulization 4x Daily - RT   lisinopril 40 mg Oral Daily   methylPREDNISolone sodium succinate 60 mg Intravenous Q12H   metoprolol succinate XL 50 mg Oral Daily   pantoprazole 40 mg Oral Daily       I have reviewed the patient current medications.     Assessment/Plan     Hospital Problem List     Pneumonia of both lungs due to infectious organism        Assessment:  1.  Pneumonia of both lungs due to infectious organism  2.  Bilateral parapneumonic effusions, mild/moderate  3.  Hypoxia, oxygen at 3 L   4.  Metabolic encephalopathy  5.  Diastolic dysfunction, preserved ejection fraction 65%, echo 1/30/18 elevated proBNP  6.  Elevated troponin secondary to #1  7.  Anemia - stable anemia substrates  8.  Hypertension  9.  Diabetes mellitus type II - A1c 8.2  10.  Hyperlipidemia  11.  Osteoarthritis  12.  Hx stroke/PVD  13.  GERD  14.  Chronic kidney disease stage III - worsening  15.  Poor venous access   16.  Elevated right ventricular systolic pressure > 55 mmHg  17.  Elevated D Dimer with negative CT  angiogram    Plan:  1.  Azithromycin IV, Rocephin IV day 4  2.  Oxygen at 2 L.  We will attempt to wean as tolerated  3.  Decrease Solu Medrol to 40 mg every 12 hours.  No wheezing today.  4.  One view chest x-ray 1/31 persistent air space filling infiltrate right lung, primary vascular congestion versus inflammatory process.  5.  Creatinine increasing 1.68 today.  1.2 on admission.  6.  Lasix 20 mg IV every 12 hours  7.  Glucose 201, 363, 468.  Required 24 units sliding scale insulin coverage past 24 hours.  Glucoses should trend down as steroids weaned.  8.  Physical therapy consultation.  She has not yet ambulated.  9.  Basic metabolic panel tomorrow to monitor renal function, creatinine increasing.  10.  Review chest x-ray.  May consider thoracentesis if effusions not improving.  11.  Social service consult for discharge planning may need home health at discharge.  12.  Hold metformin with increasing creatinine and recent contrast 1/29    The above documentation resulted from a face-to-face encounter by me Meghan CHAMBERLAIN, [facility].        Discharge Planning: I expect patient to be discharged to home  in 2-4 days.    BULMARO Gomez   02/01/18   6:23 AM

## 2018-02-01 NOTE — THERAPY EVALUATION
Acute Care - Physical Therapy Initial Evaluation  [facility]     Patient Name: Tamy Sher  : 1930  MRN: [id number]  Today's Date: 2018   Onset of Illness/Injury or Date of Surgery Date: 18  Date of Referral to PT: 18  Referring Physician: Dr. Martinez      Admit Date: 2018     Visit Dx:    ICD-10-CM ICD-9-CM   1. Pneumonia of both lungs due to infectious organism, unspecified part of lung J18.9 483.8   2. Elevated troponin R74.8 790.6   3. Congestive heart failure, unspecified congestive heart failure chronicity, unspecified congestive heart failure type I50.9 428.0   4. Elevated d-dimer R79.89 790.92   5. Anemia, unspecified type D64.9 285.9   6. Dyspnea R06.00 786.09   7. Impaired functional mobility and activity tolerance Z74.09 V49.89     Patient Active Problem List   Diagnosis   • Ischemic chest pain   • Pneumonia of both lungs due to infectious organism     Past Medical History:   Diagnosis Date   • Constipation    • Diabetes mellitus    • Diarrhea    • Diastolic dysfunction, left ventricle    • Esophagitis    • Exertional shortness of breath    • Hyperlipidemia    • Hypertension    • Lumbar spondylitis    • Osteoarthritis    • Peripheral vascular disease    • Sinusitis    • Stroke      Past Surgical History:   Procedure Laterality Date   • GALLBLADDER SURGERY     • HYSTERECTOMY     • JOINT REPLACEMENT     • REPLACEMENT TOTAL KNEE Left           PT ASSESSMENT (last 72 hours)      PT Evaluation       18 0808 18 1540    Rehab Evaluation    Document Type evaluation   see MAR  -PB (r) JM (t) PB (c)     Subjective Information agree to therapy;complains of;dyspnea;weakness  -PB (r) JM (t) PB (c)     Patient Effort, Rehab Treatment good  -PB (r) JM (t) PB (c)     Symptoms Noted During/After Treatment shortness of breath;fatigue  -PB (r) JM (t) PB (c)     General Information    Patient Profile Review yes  -PB (r) JM (t) PB (c)     Onset of Illness/Injury or Date of Surgery  Date 01/29/18  -PB (r) JM (t) PB (c)     Referring Physician Dr. Martinez  -PB (r) JM (t) PB (c)     General Observations sitting in chair, awake and alert  -PB (r) JM (t) PB (c)     Pertinent History Of Current Problem Pt reported to ER with worsening shortness of breath over the past few days. Also reported a fall. X-rays were negative for any acute injury from fall but did note underlying pneumonia. PMH of DMII, DVT, PVD and CVA  -PB (r) JM (t) PB (c)     Precautions/Limitations fall precautions  -PB (r) JM (t) PB (c)     Prior Level of Function independent:;all household mobility;community mobility;ADL's  -PB (r) JM (t) PB (c)     Equipment Currently Used at Home shower chair;cane, straight  -PB (r) JM (t) PB (c) commode;cane, straight;shower chair;walker, rolling;raised toilet   Pt says she has all of this equipment but does not really use it  -KS (r) CD (t) KS (c)    Plans/Goals Discussed With patient;agreed upon  -PB (r) JM (t) PB (c)     Risks Reviewed patient:;LOB;nausea/vomiting;dizziness;increased discomfort;lines disloged  -PB (r) JM (t) PB (c)     Benefits Reviewed patient:;improve function;increase independence;increase strength;increase balance;decrease risk of DVT  -PB (r) JM (t) PB (c)     Barriers to Rehab medically complex;physical barrier  -PB (r) JM (t) PB (c)     Living Environment    Lives With alone  -PB (r) JM (t) PB (c) alone  -KS (r) CD (t) KS (c)    Living Arrangements condominium  -PB (r) JM (t) PB (c)     Home Accessibility tub/shower is not walk in;grab bars present (bathtub)  -PB (r) JM (t) PB (c) no concerns  -KS (r) CD (t) KS (c)    Stair Railings at Home none  -PB (r) JM (t) PB (c)     Type of Financial/Environmental Concern none  -PB (r) JM (t) PB (c)     Transportation Available car  -PB (r) JM (t) PB (c) car;family or friend will provide  -KS (r) CD (t) KS (c)    Clinical Impression    Date of Referral to PT 01/30/18  -PB (r) JM (t) PB (c)     PT Diagnosis decreased activity  tolerance, limited mobility  -PB (r) JM (t) PB (c)     Functional Level At Time Of Evaluation able to transfer sit-stand with supervision and ambulated 250 feet CGA with 1 standing rest break.   -PB (r) JM (t) PB (c)     Patient/Family Goals Statement return home, get well  -PB (r) JM (t) PB (c)     Criteria for Skilled Therapeutic Interventions Met yes;treatment indicated  -PB (r) JM (t) PB (c)     Impairments Found (describe specific impairments) aerobic capacity/endurance;gait, locomotion, and balance  -PB (r) JM (t) PB (c)     Rehab Potential good, to achieve stated therapy goals  -PB (r) JM (t) PB (c)     Predicted Duration of Therapy Intervention (days/wks) until d/c  -PB (r) JM (t) PB (c)     Vital Signs    Pre SpO2 (%) 90  -PB (r) JM (t) PB (c)     O2 Delivery Pre Treatment room air  -PB (r) JM (t) PB (c)     Intra SpO2 (%) 86  -PB (r) JM (t) PB (c)     O2 Delivery Intra Treatment supplemental O2   2L  -PB (r) JM (t) PB (c)     Post SpO2 (%) 91  -PB (r) JM (t) PB (c)     O2 Delivery Post Treatment supplemental O2   2L  -PB (r) JM (t) PB (c)     Pre Patient Position Sitting  -PB (r) JM (t) PB (c)     Intra Patient Position Standing  -PB (r) JM (t) PB (c)     Post Patient Position Sitting  -PB (r) JM (t) PB (c)     Rest Breaks  1  -PB (r) JM (t) PB (c)     Pain Assessment    Pain Assessment No/denies pain  -PB (r) JM (t) PB (c)     Vision Assessment/Intervention    Visual Impairment WFL with corrective lenses  -PB (r) JM (t) PB (c)     Cognitive Assessment/Intervention    Current Cognitive/Communication Assessment functional  -PB (r) JM (t) PB (c)     Orientation Status oriented x 4  -PB (r) JM (t) PB (c)     Follows Commands/Answers Questions 100% of the time;able to follow multi-step instructions  -PB (r) JM (t) PB (c)     Personal Safety WNL/WFL  -PB (r) JM (t) PB (c)     Personal Safety Interventions fall prevention program maintained;gait belt;nonskid shoes/slippers when out of bed;supervised activity   -PB (r) JM (t) PB (c)     ROM (Range of Motion)    General ROM Detail BLE/UE WFL  -PB (r) JM (t) PB (c)     MMT (Manual Muscle Testing)    General MMT Assessment Detail BLE 4/5, BUE WFL  -PB (r) JM (t) PB (c)     Transfer Assessment/Treatment    Transfers, Sit-Stand [place] supervision required  -PB (r) JM (t) PB (c)     Transfers, Stand-Sit [place] supervision required  -PB (r) JM (t) PB (c)     Transfers, Sit-Stand-Sit, Assist Device rolling walker  -PB (r) JM (t) PB (c)     Transfer, Safety Issues step length decreased;balance decreased during turns  -PB (r) JM (t) PB (c)     Transfer, Impairments impaired balance;strength decreased  -PB (r) JM (t) PB (c)     Gait Assessment/Treatment    Gait, [place] Level contact guard assist  -PB (r) JM (t) PB (c)     Gait, Assistive Device rolling walker  -PB (r) JM (t) PB (c)     Gait, Distance (Feet) 140   standing rest break, 110 stand-sit  -PB (r) JM (t) PB (c)     Gait, Gait Deviations bilateral:;[place] decreased;double stance time increased;step length decreased;stride length decreased  -PB (r) JM (t) PB (c)     Gait, Safety Issues balance decreased during turns;step length decreased;supplemental O2  -PB (r) JM (t) PB (c)     Gait, Impairments strength decreased;impaired balance  -PB (r) JM (t) PB (c)     Motor Skills/Interventions    Additional Documentation Balance Skills Training (Group)  -PB (r) JM (t) PB (c)     Balance Skills Training    Sitting-Level of Assistance Independent  -PB (r) JM (t) PB (c)     Sitting-Balance Support Right upper extremity supported;Left upper extremity supported;Feet supported  -PB (r) JM (t) PB (c)     Standing-Level of Assistance Close supervision  -PB (r) JM (t) PB (c)     Static Standing Balance Support assistive device  -PB (r) JM (t) PB (c)     Gait Balance-Level of Assistance Contact guard  -PB (r) JM (t) PB (c)     Gait Balance Support assistive device  -PB (r) JM (t) PB (c)     Positioning and Restraints     Pre-Treatment Position sitting in chair/recliner  -PB (r) JM (t) PB (c)     Post Treatment Position chair  -PB (r) JM (t) PB (c)     In Chair sitting;call light within reach;encouraged to call for assist  -PB (r) JYOTSNA (t) PB (c)       01/29/18 2306 01/29/18 2304    General Information    Equipment Currently Used at Home  none  -DAYNA    Living Environment    Lives With alone  -     Living Arrangements apartment  -     Home Accessibility no concerns  -     Stair Railings at Home none  -DAYNA     Type of Financial/Environmental Concern none  -DAYNA     Transportation Available car  -DAYNA       User Key  (r) = Recorded By, (t) = Taken By, (c) = Cosigned By    Initials Name Provider Type    DAYNA Kisha Sahu, RN Registered Nurse    SHASHI Espinoza, PT DPT Physical Therapist    GERALDINE Lester, RN Registered Nurse    JYOTSNA Malloy, PT Student PT Student    DEVYN Blake           Physical Therapy Education     Title: PT OT SLP Therapies (Done)     Topic: Physical Therapy (Done)     Point: Mobility training (Done)    Learning Progress Summary    Learner Readiness Method Response Comment Documented by Status   Patient Acceptance E VU Pt was educated on transfer techniques, gait with AD, breathing techniques and PT prognosis  02/01/18 0904 Done                      User Key     Initials Effective Dates Name Provider Type Discipline     01/10/18 -  Abdiaziz Malloy, PT Student PT Student PT                PT Recommendation and Plan  Anticipated Equipment Needs At Discharge: front wheeled walker  Anticipated Discharge Disposition: home, home with assist  Planned Therapy Interventions: balance training, gait training, patient/family education, strengthening  PT Frequency: daily, 2 times/day, per priority policy  Plan of Care Review  Plan Of Care Reviewed With: patient  Outcome Summary/Follow up Plan: PT eval complete. Pt was able to transfer sit-stand with supervision and RW as well as ambulate  140ft before needing a standing rest break, she then continued 110 ft back to her room with CGA and RW. She demonstrated functional strength but moderately decreased activity tolerance compared to reported basline and as evidenced by decreased spO2 with ambulation. Pt will beneift from therapy to address gait training and activity tolerance. anticipate discharge to home with possible assist from family.          IP PT Goals       02/01/18 1313 02/01/18 0908 02/01/18 0906    Gait Training PT LTG    Gait Training Goal PT LTG, Date Established  02/01/18  -PB (r) JM (t) PB (c) 02/01/18  -PB (r) JM (t) PB (c)    Gait Training Goal PT LTG, Time to Achieve  by discharge  -PB (r) JM (t) PB (c) by discharge  -PB (r) JM (t) PB (c)    Gait Training Goal PT LTG, [place] Level  supervision required  -PB (r) JM (t) PB (c) supervision required  -PB (r) JM (t) PB (c)    Gait Training Goal PT LTG, Assist Device  walker, rolling  -PB (r) JM (t) PB (c) walker, rolling  -PB (r) JM (t) PB (c)    Gait Training Goal PT LTG, Distance to Achieve   400 feet, 1 standing rest break  -PB (r) JM (t) PB (c)    Gait Training Goal PT LTG, Additional Goal  Ambulate any distance without spO2 dropping below 90  -PB (r) JM (t) PB (c)     Cardiopulmonary PT LTG    Cardiopulmonary PT LTG, Date Established 02/01/18  -PB (r) JM (t) PB (c)  02/01/18  -PB (r) JM (t) PB (c)    Cardiopulmonary PT LTG, Time to Achieve by discharge  -PB (r) JM (t) PB (c)  by discharge  -PB (r) JM (t) PB (c)    Cardiopulmonary PT LTG, Additional Goal ambulate 400 feet without spO2  dropping below 90%  -PB (r) JM (t) PB (c)        User Key  (r) = Recorded By, (t) = Taken By, (c) = Cosigned By    Initials Name Provider Type    PB Boom Espinoza, PT DPT Physical Therapist    JYOTSNA Malloy, PT Student PT Student                Outcome Measures       02/01/18 0900          How much help from another person do you currently need...    Turning from your back to your side  while in flat bed without using bedrails? 4  -PB (r) JM (t) PB (c)      Moving from lying on back to sitting on the side of a flat bed without bedrails? 4  -PB (r) JM (t) PB (c)      Moving to and from a bed to a chair (including a wheelchair)? 3  -PB (r) JM (t) PB (c)      Standing up from a chair using your arms (e.g., wheelchair, bedside chair)? 4  -PB (r) JM (t) PB (c)      Climbing 3-5 steps with a railing? 3  -PB (r) JM (t) PB (c)      To walk in hospital room? 3  -PB (r) JM (t) PB (c)      AM-PAC 6 Clicks Score 21  -PB (r) JM (t)      Functional Assessment    Outcome Measure Options AM-PAC 6 Clicks Basic Mobility (PT)  -PB (r) JM (t) PB (c)        User Key  (r) = Recorded By, (t) = Taken By, (c) = Cosigned By    Initials Name Provider Type    SHASHI Espinoza, PT DPT Physical Therapist    JYOTSNA Malloy, PT Student PT Student           Time Calculation:         PT Charges       02/01/18 0904          Time Calculation    Start Time 0808  -PB (r) JM (t) PB (c)      Stop Time 0853  -PB (r) JM (t) PB (c)      Time Calculation (min) 45 min  -PB (r) JM (t)      PT Received On 02/01/18  -PB (r) JM (t) PB (c)      PT Goal Re-Cert Due Date 02/11/18  -PB (r) JM (t) PB (c)        User Key  (r) = Recorded By, (t) = Taken By, (c) = Cosigned By    Initials Name Provider Type    SHASHI Espinoza, PT DPT Physical Therapist    JYOTSNA Malloy, PT Student PT Student          Therapy Charges for Today     Code Description Service Date Service Provider Modifiers Qty    [id number]  PT EVAL MOD COMPLEXITY 3 2/1/2018 Abdiaziz Malloy, PT Student GP 1          PT G-Codes  Outcome Measure Options: AM-PAC 6 Clicks Basic Mobility (PT)  Score: 21  Functional Limitation: Mobility: Walking and moving around  Mobility: Walking and Moving Around Current Status (): At least 20 percent but less than 40 percent impaired, limited or restricted  Mobility: Walking and Moving Around Goal Status (): At least 1 percent  but less than 20 percent impaired, limited or restricted      Abdiaziz Malloy, PT Student  2/1/2018

## 2018-02-01 NOTE — PLAN OF CARE
Problem: Patient Care Overview (Adult)  Goal: Plan of Care Review  Outcome: Ongoing (interventions implemented as appropriate)   02/01/18 0457   Coping/Psychosocial Response Interventions   Plan Of Care Reviewed With patient;family   Patient Care Overview   Progress no change   Outcome Evaluation   Outcome Summary/Follow up Plan No c/o of pain voiced. SOA with exertion. VSS. Will continue to monitor.      Goal: Adult Individualization and Mutuality  Outcome: Ongoing (interventions implemented as appropriate)    Goal: Discharge Needs Assessment  Outcome: Ongoing (interventions implemented as appropriate)      Problem: Fall Risk (Adult)  Goal: Absence of Falls  Outcome: Ongoing (interventions implemented as appropriate)      Problem: Pneumonia (Adult)  Goal: Signs and Symptoms of Listed Potential Problems Will be Absent or Manageable (Pneumonia)  Outcome: Ongoing (interventions implemented as appropriate)

## 2018-02-01 NOTE — PLAN OF CARE
Problem: Patient Care Overview (Adult)  Goal: Plan of Care Review  Outcome: Ongoing (interventions implemented as appropriate)   02/01/18 0457 02/01/18 0908   Coping/Psychosocial Response Interventions   Plan Of Care Reviewed With --  patient   Patient Care Overview   Progress no change --    Outcome Evaluation   Outcome Summary/Follow up Plan --  PT eval complete. Pt was able to transfer sit-stand with supervision and RW as well as ambulate 140ft before needing a standing rest break, she then continued 110 ft back to her room with CGA and RW. She demonstrated functional strength but moderately decreased activity tolerance compared to reported basline and as evidenced by decreased spO2 with ambulation. Pt will beneift from therapy to address gait training and activity tolerance. anticipate discharge to home with possible assist from family.       Problem: Inpatient Physical Therapy  Goal: Gait Training Goal LTG- PT  Outcome: Ongoing (interventions implemented as appropriate)   02/01/18 0906 02/01/18 0908   Gait Training PT LTG   Gait Training Goal PT LTG, Date Established --  02/01/18   Gait Training Goal PT LTG, Time to Achieve --  by discharge   Gait Training Goal PT LTG, [place] Level --  supervision required   Gait Training Goal PT LTG, Assist Device --  walker, rolling   Gait Training Goal PT LTG, Distance to Achieve 400 feet, 1 standing rest break --    Gait Training Goal PT LTG, Additional Goal --  Ambulate any distance without spO2 dropping below 90     Goal: Cardiopulmonary Goal LTG- PT  Outcome: Ongoing (interventions implemented as appropriate)   02/01/18 1313   Cardiopulmonary PT LTG   Cardiopulmonary PT LTG, Date Established 02/01/18   Cardiopulmonary PT LTG, Time to Achieve by discharge   Cardiopulmonary PT LTG, Additional Goal ambulate 400 feet without spO2 dropping below 90%

## 2018-02-01 NOTE — PLAN OF CARE
Problem: Patient Care Overview (Adult)  Goal: Plan of Care Review  Outcome: Ongoing (interventions implemented as appropriate)   02/01/18 8689   Coping/Psychosocial Response Interventions   Plan Of Care Reviewed With patient;son   Patient Care Overview   Progress no change   Outcome Evaluation   Outcome Summary/Follow up Plan Initial RDN eval. Pt reports good appetite and states she likes the hospital food however notes she's only able to eat 1/2 to 3/4 of most meals. RDN encouraged intake as tolerated, advised of alt selections, and will continue to follow up.

## 2018-02-02 ENCOUNTER — APPOINTMENT (OUTPATIENT)
Dept: ULTRASOUND IMAGING | Facility: HOSPITAL | Age: 83
End: 2018-02-02

## 2018-02-02 LAB
ANION GAP SERPL CALCULATED.3IONS-SCNC: 13 MMOL/L (ref 4–13)
BUN BLD-MCNC: 69 MG/DL (ref 5–21)
BUN/CREAT SERPL: 42.3 (ref 7–25)
CALCIUM SPEC-SCNC: 9 MG/DL (ref 8.4–10.4)
CHLORIDE SERPL-SCNC: 95 MMOL/L (ref 98–110)
CO2 SERPL-SCNC: 28 MMOL/L (ref 24–31)
CREAT BLD-MCNC: 1.63 MG/DL (ref 0.5–1.4)
GFR SERPL CREATININE-BSD FRML MDRD: 30 ML/MIN/1.73
GLUCOSE BLD-MCNC: 58 MG/DL (ref 70–100)
GLUCOSE BLDC GLUCOMTR-MCNC: 183 MG/DL (ref 70–130)
GLUCOSE BLDC GLUCOMTR-MCNC: 383 MG/DL (ref 70–130)
GLUCOSE BLDC GLUCOMTR-MCNC: 398 MG/DL (ref 70–130)
GLUCOSE BLDC GLUCOMTR-MCNC: 405 MG/DL (ref 70–130)
GLUCOSE BLDC GLUCOMTR-MCNC: 58 MG/DL (ref 70–130)
POTASSIUM BLD-SCNC: 4.1 MMOL/L (ref 3.5–5.3)
SODIUM BLD-SCNC: 136 MMOL/L (ref 135–145)

## 2018-02-02 PROCEDURE — 94799 UNLISTED PULMONARY SVC/PX: CPT

## 2018-02-02 PROCEDURE — 94760 N-INVAS EAR/PLS OXIMETRY 1: CPT

## 2018-02-02 PROCEDURE — 63710000001 INSULIN LISPRO (HUMAN) PER 5 UNITS: Performed by: NURSE PRACTITIONER

## 2018-02-02 PROCEDURE — 25010000002 METHYLPREDNISOLONE PER 40 MG: Performed by: NURSE PRACTITIONER

## 2018-02-02 PROCEDURE — 82962 GLUCOSE BLOOD TEST: CPT

## 2018-02-02 PROCEDURE — 97110 THERAPEUTIC EXERCISES: CPT

## 2018-02-02 PROCEDURE — 76604 US EXAM CHEST: CPT

## 2018-02-02 PROCEDURE — 25010000002 CEFTRIAXONE PER 250 MG: Performed by: FAMILY MEDICINE

## 2018-02-02 PROCEDURE — 97116 GAIT TRAINING THERAPY: CPT

## 2018-02-02 PROCEDURE — 25010000002 HEPARIN (PORCINE) PER 1000 UNITS: Performed by: FAMILY MEDICINE

## 2018-02-02 PROCEDURE — 94640 AIRWAY INHALATION TREATMENT: CPT

## 2018-02-02 PROCEDURE — 25010000002 AZITHROMYCIN PER 500 MG: Performed by: FAMILY MEDICINE

## 2018-02-02 PROCEDURE — 63710000001 INSULIN DETEMIR PER 5 UNITS: Performed by: NURSE PRACTITIONER

## 2018-02-02 PROCEDURE — 80048 BASIC METABOLIC PNL TOTAL CA: CPT | Performed by: NURSE PRACTITIONER

## 2018-02-02 RX ORDER — BUDESONIDE AND FORMOTEROL FUMARATE DIHYDRATE 160; 4.5 UG/1; UG/1
2 AEROSOL RESPIRATORY (INHALATION)
Status: DISCONTINUED | OUTPATIENT
Start: 2018-02-02 | End: 2018-02-05 | Stop reason: HOSPADM

## 2018-02-02 RX ADMIN — GUAIFENESIN 600 MG: 600 TABLET, EXTENDED RELEASE ORAL at 08:58

## 2018-02-02 RX ADMIN — GABAPENTIN 600 MG: 300 CAPSULE ORAL at 17:49

## 2018-02-02 RX ADMIN — HYDROXYUREA 1000 MG: 500 CAPSULE ORAL at 08:58

## 2018-02-02 RX ADMIN — METHYLPREDNISOLONE SODIUM SUCCINATE 40 MG: 125 INJECTION, POWDER, FOR SOLUTION INTRAMUSCULAR; INTRAVENOUS at 08:58

## 2018-02-02 RX ADMIN — PANTOPRAZOLE SODIUM 40 MG: 40 TABLET, DELAYED RELEASE ORAL at 08:58

## 2018-02-02 RX ADMIN — METHYLPREDNISOLONE SODIUM SUCCINATE 40 MG: 125 INJECTION, POWDER, FOR SOLUTION INTRAMUSCULAR; INTRAVENOUS at 21:00

## 2018-02-02 RX ADMIN — INSULIN LISPRO 4 UNITS: 100 INJECTION, SOLUTION INTRAVENOUS; SUBCUTANEOUS at 12:45

## 2018-02-02 RX ADMIN — BUDESONIDE AND FORMOTEROL FUMARATE DIHYDRATE 2 PUFF: 160; 4.5 AEROSOL RESPIRATORY (INHALATION) at 20:00

## 2018-02-02 RX ADMIN — AZITHROMYCIN MONOHYDRATE 500 MG: 500 INJECTION, POWDER, LYOPHILIZED, FOR SOLUTION INTRAVENOUS at 21:00

## 2018-02-02 RX ADMIN — CLONAZEPAM 0.5 MG: 0.5 TABLET ORAL at 08:59

## 2018-02-02 RX ADMIN — GABAPENTIN 300 MG: 300 CAPSULE ORAL at 08:59

## 2018-02-02 RX ADMIN — IPRATROPIUM BROMIDE AND ALBUTEROL SULFATE 3 ML: 2.5; .5 SOLUTION RESPIRATORY (INHALATION) at 20:00

## 2018-02-02 RX ADMIN — INSULIN DETEMIR 25 UNITS: 100 INJECTION, SOLUTION SUBCUTANEOUS at 21:01

## 2018-02-02 RX ADMIN — CEFTRIAXONE SODIUM 1 G: 1 INJECTION, POWDER, FOR SOLUTION INTRAMUSCULAR; INTRAVENOUS at 21:00

## 2018-02-02 RX ADMIN — ASPIRIN 325 MG: 325 TABLET, COATED ORAL at 20:59

## 2018-02-02 RX ADMIN — IPRATROPIUM BROMIDE AND ALBUTEROL SULFATE 3 ML: 2.5; .5 SOLUTION RESPIRATORY (INHALATION) at 07:30

## 2018-02-02 RX ADMIN — LISINOPRIL 40 MG: 20 TABLET ORAL at 08:58

## 2018-02-02 RX ADMIN — IPRATROPIUM BROMIDE AND ALBUTEROL SULFATE 3 ML: 2.5; .5 SOLUTION RESPIRATORY (INHALATION) at 11:13

## 2018-02-02 RX ADMIN — METOPROLOL SUCCINATE 50 MG: 50 TABLET, FILM COATED, EXTENDED RELEASE ORAL at 09:00

## 2018-02-02 RX ADMIN — FAMOTIDINE 20 MG: 20 TABLET, FILM COATED ORAL at 08:58

## 2018-02-02 RX ADMIN — AMLODIPINE BESYLATE 5 MG: 5 TABLET ORAL at 08:58

## 2018-02-02 RX ADMIN — GUAIFENESIN 600 MG: 600 TABLET, EXTENDED RELEASE ORAL at 22:09

## 2018-02-02 RX ADMIN — INSULIN LISPRO 9 UNITS: 100 INJECTION, SOLUTION INTRAVENOUS; SUBCUTANEOUS at 21:00

## 2018-02-02 RX ADMIN — INSULIN LISPRO 8 UNITS: 100 INJECTION, SOLUTION INTRAVENOUS; SUBCUTANEOUS at 17:49

## 2018-02-02 RX ADMIN — ATORVASTATIN CALCIUM 20 MG: 10 TABLET, FILM COATED ORAL at 08:58

## 2018-02-02 RX ADMIN — HEPARIN SODIUM 5000 UNITS: 5000 INJECTION, SOLUTION INTRAVENOUS; SUBCUTANEOUS at 21:00

## 2018-02-02 RX ADMIN — CLONAZEPAM 0.5 MG: 0.5 TABLET ORAL at 21:00

## 2018-02-02 NOTE — PLAN OF CARE
Problem: Patient Care Overview (Adult)  Goal: Plan of Care Review  Outcome: Ongoing (interventions implemented as appropriate)   02/02/18 1008   Coping/Psychosocial Response Interventions   Plan Of Care Reviewed With patient   Patient Care Overview   Progress progress towards functional goals is fair   Outcome Evaluation   Outcome Summary/Follow up Plan Pt c/o fatigue and weakness. Bed mobility supervisionn. Sit-stand supervision. Amb 75'x2 rwx cga mult standing rest due to SOA but o2 rremained in 90s. Pt was able to tolerate BLE ex's x20 AROM

## 2018-02-02 NOTE — PLAN OF CARE
Problem: Patient Care Overview (Adult)  Goal: Plan of Care Review  Outcome: Ongoing (interventions implemented as appropriate)   02/01/18 1758   Coping/Psychosocial Response Interventions   Plan Of Care Reviewed With patient;son   Patient Care Overview   Progress no change   Outcome Evaluation   Outcome Summary/Follow up Plan No c/o pain this shift. VSS, Possible left thoracentesis 2/2/18. Elevated blood sugars today sliding scale insulin given.        Problem: Fall Risk (Adult)  Goal: Absence of Falls  Outcome: Ongoing (interventions implemented as appropriate)   02/01/18 1758   Fall Risk (Adult)   Absence of Falls making progress toward outcome       Problem: Pneumonia (Adult)  Goal: Signs and Symptoms of Listed Potential Problems Will be Absent or Manageable (Pneumonia)  Outcome: Ongoing (interventions implemented as appropriate)   02/01/18 1758   Pneumonia   Problems Assessed (Pneumonia) all   Problems Present (Pneumonia) respiratory compromise

## 2018-02-02 NOTE — PLAN OF CARE
Problem: Patient Care Overview (Adult)  Goal: Plan of Care Review  Outcome: Ongoing (interventions implemented as appropriate)   02/02/18 1738   Coping/Psychosocial Response Interventions   Plan Of Care Reviewed With patient   Patient Care Overview   Progress progress towards functional goals is fair   Outcome Evaluation   Outcome Summary/Follow up Plan Pt c/o fatigue and weakness. VSS. No c/o pain this shift. Not enough fluid to do thoracentesis. Will continue to monitor.       Problem: Fall Risk (Adult)  Goal: Absence of Falls  Outcome: Ongoing (interventions implemented as appropriate)   02/02/18 1738   Fall Risk (Adult)   Absence of Falls making progress toward outcome       Problem: Pneumonia (Adult)  Goal: Signs and Symptoms of Listed Potential Problems Will be Absent or Manageable (Pneumonia)  Outcome: Ongoing (interventions implemented as appropriate)   02/02/18 1738   Pneumonia   Problems Assessed (Pneumonia) all   Problems Present (Pneumonia) respiratory compromise

## 2018-02-02 NOTE — PROGRESS NOTES
[facility] Medicine Services  INPATIENT PROGRESS NOTE    Length of Stay: 4  Date of Admission: 1/29/2018  Primary Care Physician: Barry Foley MD    Subjective   Chief Complaint: shortness of breath, cough  HPI   Sitting up on side of bed.  Reports she did not sleep well last night.  She had some wheezing.  Nonproductive cough.  She denies chest pain or palpitations.  She denies nausea, vomiting or abdominal pain.  She was able to ambulate 250 feet with physical therapy yesterday.  Saturation 86% on room air with activity.  She was placed back on oxygen at 2 L.  Plans for left thoracentesis today.  She reports no hallucination during the daytime.  However, last night she reported she was dreaming as though she was reading the newspaper when she was not.    Review of Systems   Constitutional: Positive for activity change and fatigue. Negative for chills and unexpected weight change.   HENT: Negative for congestion and trouble swallowing.    Eyes: Negative for photophobia and visual disturbance.   Respiratory: Positive for cough, shortness of breath and wheezing.    Cardiovascular: Negative for chest pain, palpitations and leg swelling.   Gastrointestinal: Negative for abdominal distention, constipation, diarrhea, nausea and vomiting.   Endocrine: Negative for cold intolerance, heat intolerance and polyuria.   Genitourinary: Negative for dysuria and urgency.   Musculoskeletal: Positive for gait problem.   Skin: Negative for rash and wound.   Neurological: Positive for weakness.   Hematological: Negative for adenopathy. Does not bruise/bleed easily.   Psychiatric/Behavioral: Positive for hallucinations (Improved). The patient is nervous/anxious.    All pertinent negatives and positives are as above. All other systems have been reviewed and are negative unless otherwise stated.     Objective    Temp:  [97 °F (36.1 °C)-98.7 °F (37.1 °C)] 97.3 °F (36.3 °C)  Heart Rate:  [66-78]  68  Resp:  [18-20] 20  BP: (133-182)/(58-73) 154/68  Physical Exam  Constitutional: She is oriented to person, place, and time. She appears well-developed and well-nourished.   HENT:   Head: Normocephalic and atraumatic.   Eyes: EOM are normal. Pupils are equal, round, and reactive to light.   Neck: Normal range of motion. Neck supple. No tracheal deviation present. No thyromegaly present.   Cardiovascular: Normal rate, regular rhythm, normal heart sounds and intact distal pulses.  Exam reveals no gallop and no friction rub.    No murmur heard.  Normal sinus rhythm 62-78 on telemetry   Pulmonary/Chest: No respiratory distress. She has no wheezes.   Oxygen at 2 L.  Harsh breath sounds posteriorly.  Decreased breath sounds. Scattered expiratory wheezing posteriorly.  Nonproductive cough.   Abdominal: Soft. Bowel sounds are normal. She exhibits no distension.   Genitourinary:   Genitourinary Comments: Deferred   Musculoskeletal: She exhibits no edema or deformity.   Neurological: She is alert and oriented to person, place, and time.   Answers all questions appropriate.  No confusion.  Reports hallucinations only at night.  Skin: Skin is warm and dry.   Thickened scan lower extremities.  Bruising upper extremities.   Psychiatric: She has a normal mood and affect. Her behavior is normal. Judgment and thought content normal    Results Review:  I have reviewed the labs, radiology results, and diagnostic studies.    Laboratory Data:     Results from last 7 days  Lab Units 02/01/18 0429 01/31/18 0253 01/30/18  0413   WBC 10*3/mm3 13.82* 6.55 10.00   HEMOGLOBIN g/dL 9.9* 10.0* 9.3*   HEMATOCRIT % 31.0* 31.3* 29.5*   PLATELETS 10*3/mm3 430* 412* 419*          Results from last 7 days  Lab Units 02/02/18  0642 02/01/18  0429 01/31/18  0253 01/30/18  0413 01/29/18 2235 01/29/18  1616   SODIUM mmol/L 136 135 136 141  --  138   POTASSIUM mmol/L 4.1 4.4 4.4 3.8  --  4.2   CHLORIDE mmol/L 95* 95* 97* 100  --  99   CO2 mmol/L 28.0  26.0 28.0 32.0*  --  30.0   BUN mg/dL 69* 55* 38* 32*  --  34*   CREATININE mg/dL 1.63* 1.68* 1.57* 1.23  --  1.26   CALCIUM mg/dL 9.0 9.0 8.6 8.6  --  8.7   BILIRUBIN mg/dL  --   --   --  0.2 0.3 0.3   ALK PHOS U/L  --   --   --  76  --  85   ALT (SGPT) U/L  --   --   --  27  --  27   AST (SGOT) U/L  --   --   --  27  --  32   GLUCOSE mg/dL 58* 201* 336* 60*  --  134*     Blood Culture   Date Value Ref Range Status   01/29/2018 No growth at 3 days  Preliminary   01/29/2018 No growth at 3 days  Preliminary     Imaging Results (all)     Procedure Component Value Units Date/Time    XR Spine Thoracic 3 View [[id number]] Collected:  01/29/18 1523     Updated:  01/29/18 1527    Narrative:       XR SPINE THORACIC 3 VW- 1/29/2018 2:48 PM CST     HISTORY: back pain, fall     COMPARISON: None     FINDINGS:  Frontal, lateral and swimmers lateral radiographs of the thoracic spine  demonstrate normal alignment without evidence of compression fracture or  subluxation. The pedicles are intact. The disc spaces are maintained  hypertrophic osteophyte formation is noted. There is ossification of the  anterior longitudinal ligament. The visualized thorax is clear.        Impression:       1. Degenerative changes noted in the lumbar spine. There is no acute  compression deformity.     This report was finalized on 01/29/2018 15:24 by Dr. Klever Hooker MD.    XR Chest 1 View [[id number]] Collected:  01/29/18 1524     Updated:  01/29/18 1528    Narrative:       XR CHEST 1 VW- 1/29/2018 2:40 PM CST     HISTORY: shortness of breath       COMPARISON: 03/23/2017.     FINDINGS:   Moderate opacities are seen in the lung bases. There is a small LEFT  pleural effusion. The heart is enlarged.      The osseous structures and surrounding soft tissues demonstrate no acute  abnormality.       Impression:       1. Cardiomegaly and bilateral airspace opacities. Findings could be due  to pulmonary edema or pneumonia.        This report was finalized on  01/29/2018 15:25 by Dr. Yair Ruby MD.    CT Angiogram Chest With & Without Contrast [[id number]] Collected:  01/29/18 2116     Updated:  01/29/18 2124    Narrative:       CT ANGIOGRAM CHEST W WO CONTRAST- 1/29/2018 8:37 PM CST      HISTORY: rule out PE; J18.9-Pneumonia, unspecified organism;  R74.8-Abnormal levels of other serum enzymes; I50.9-Heart failure,  unspecified; R79.89-Other specified abnormal findings of blood  chemistry; D64.9-Anemia, unspecified      COMPARISON: None.      DLP: 439 mGy cm     TECHNIQUE: Helical tomographic images of the chest were obtained after  the administration of intravenous contrast following angiogram protocol.  Additionally, 3D reformatted images were provided.        FINDINGS:    Pulmonary arteries: There is adequate enhancement of the pulmonary  arteries to evaluate for central and segmental pulmonary emboli. There  are no filling defects within the main, lobar, segmental or visualized  subsegmental pulmonary arteries. Pulmonary arteries are borderline  enlarged.     Aorta and great vessels: There is atherosclerosis in the aorta without  aneurysm or dissection. There is atherosclerosis in the great vessels.     Visualized neck base: The imaged portion of the base of the neck appears  unremarkable.      Lungs: Bilateral lower lobe consolidations, right greater than left..  The trachea and proximal bronchial tree are patent. Small to moderate  bilateral pleural effusions, right greater than left.     Heart: The heart is enlarged. There is no pericardial effusion.      Mediastinum and lymph nodes: No enlarged mediastinal, hilar, or axillary  lymph nodes are present.      Skeletal and soft tissues: The osseous structures of the thorax and  surrounding soft tissues demonstrate no acute process.     Upper abdomen: The imaged portion of the upper abdomen demonstrates no  acute process.        Impression:       1. No evidence of pulmonary embolus.  2. Small-to-moderate bilateral  pleural effusion and associated opacities  in the bilateral lower lobes, concerning for pneumonia and parapneumonic  effusions. Additional differential does include aspiration.  This report was finalized on 01/29/2018 21:21 by Dr. Janae Sorensen MD.    XR Chest 1 View [[id number]] Collected:  01/30/18 0935     Updated:  01/30/18 0939    Narrative:       EXAMINATION:   XR CHEST 1 -  1/30/2018 9:35 AM CST     HISTORY: fu     Prior exam 01/29/2018.     Infiltrate right lung is again noted. There is no improvement compared  to January 29. Is may be either pulmonary edema or pneumonia.     Cardiac silhouettes moderately enlarged also unchanged.     IMPRESSION cardiomegaly and air space filling infiltrate in the right  lung. This may be either pulmonary edema or pneumonia. This is unchanged  This report was finalized on 01/30/2018 09:36 by Dr. Klever Hooker MD.    XR Chest 1 View [[id number]] Collected:  01/31/18 0726     Updated:  01/31/18 0730    Narrative:       EXAMINATION:   XR CHEST 1 -  1/31/2018 7:26 AM CST     HISTORY: Bilateral effusions     Single view the chest obtained. Left diaphragms indistinct consistent  with atelectasis left lower lobe. Trace of fluid is noted in the right  minor fissure. Infiltrate in the right lung persists. Again this may be  mild pulmonary vascular congestion or possibly pneumonia.     Cardiac silhouettes mildly enlarged.     IMPRESSION atelectasis left lower lobe     2 persistent air space filling infiltrate right lung. This is pulmonary  vascular congestion versus inflammatory process.  This report was finalized on 01/31/2018 07:27 by Dr. Klever Hooker MD.        Intake/Output    Intake/Output Summary (Last 24 hours) at 02/02/18 0724  Last data filed at 02/02/18 0639   Gross per 24 hour   Intake             1200 ml   Output             2450 ml   Net            -1250 ml       Scheduled Meds    amLODIPine 5 mg Oral Daily   aspirin 325 mg Oral Nightly   atorvastatin 20 mg Oral  Daily   ceftriaxone 1 g Intravenous Q24H   And      azithromycin 500 mg Intravenous Q24H   clonazePAM 0.5 mg Oral BID   famotidine 20 mg Oral Daily   gabapentin 300 mg Oral Daily With Breakfast   gabapentin 600 mg Oral Q PM   guaiFENesin 600 mg Oral Q12H   heparin (porcine) 5,000 Units Subcutaneous Q8H   hydroxyurea 1,000 mg Oral Once per day on Fri   hydroxyurea 500 mg Oral Once per day on Sun Mon Tue Wed Thu Sat   insulin detemir 15 Units Subcutaneous QAM   insulin detemir 25 Units Subcutaneous Nightly   insulin lispro 0-24 Units Subcutaneous 4x Daily With Meals & Nightly   ipratropium-albuterol 3 mL Nebulization 4x Daily - RT   lisinopril 40 mg Oral Daily   methylPREDNISolone sodium succinate 40 mg Intravenous Q12H   metoprolol succinate XL 50 mg Oral Daily   pantoprazole 40 mg Oral Daily       I have reviewed the patient current medications.     Assessment/Plan     Hospital Problem List     Pneumonia of both lungs due to infectious organism        Assessment:  1.  Pneumonia of both lungs due to infectious organism  2.  Bilateral pleural effusions, mild/moderate  3.  Hypoxia, oxygen at 3 L   4.  Metabolic encephalopathy, resolved  5.  Diastolic dysfunction, preserved ejection fraction 65%, echo 1/30/18 elevated proBNP  6.  Elevated troponin secondary to #1  7.  Anemia - stable anemia substrates  8.  Hypertension  9.  Diabetes mellitus type II - A1c 8.2  10.  Hyperlipidemia  11.  Osteoarthritis  12.  Hx stroke/PVD  13.  GERD  14.  Chronic kidney disease stage III - worsening  15.  Poor venous access   16.  Elevated right ventricular systolic pressure > 55 mmHg  17.  Elevated D Dimer with negative CT angiogram    Plan:  1.  Azithromycin IV, Rocephin IV day 5  2.  Oxygen at 2 L. She was weaned to room air yesterday.  However, when she ambulates saturation 86% on room air with activity.  She was placed back on oxygen 2 L.  3.  Solu Medrol to 40 mg every 12 hours.  Scattered wheezing today.  4.  Plans for left  thoracentesis today.  Chest x-ray after procedure.  5.  Heparin and aspirin on hold for thoracentesis.  6.  Lasix held yesterday due to creatinine 1.6., Creatinine 1.6 today.  We will continue to hold diuretic  7.  Duo nebs 4 times daily  8.  Glucose 293, 331, 201.  Required 32 units sliding scale insulin coverage past 24 hours.  She takes 35 units Lantus at night at home.  At present, she is on Levemir 15 units a.m. and 25 units p.m.  Elevated glucoses likely secondary to steroids.  Hopefully will trend downward as steroids weaned.  9.  Metformin not yet resumed due to elevated creatinine.  10.  Physical therapy.  She ambulated 250 feet.  11.  CBC, basic metabolic panel tomorrow  12.   for discharge planning.  At present, she plans to go home with home health.        Discharge Planning: I expect patient to be discharged to home in 2-4 days.    Meghan Hendrix, APRN   02/02/18   7:24 AM

## 2018-02-02 NOTE — PROGRESS NOTES
Continued Stay Note   [place]     Patient Name: Tamy Sher  MRN: [id number]  Today's Date: 2/2/2018    Admit Date: 1/29/2018          Discharge Plan       02/02/18 1527    Case Management/Social Work Plan    Plan (P)  Home with HH    Additional Comments (P)  Pt is agreeable to home with HH if MD orders. Pt was independent prior to admission no DME needs identified right now. Will follow.              Discharge Codes     None            Kathleen Blake

## 2018-02-03 ENCOUNTER — APPOINTMENT (OUTPATIENT)
Dept: GENERAL RADIOLOGY | Facility: HOSPITAL | Age: 83
End: 2018-02-03

## 2018-02-03 LAB
ANION GAP SERPL CALCULATED.3IONS-SCNC: 12 MMOL/L (ref 4–13)
BACTERIA SPEC AEROBE CULT: NORMAL
BACTERIA SPEC AEROBE CULT: NORMAL
BASO STIPL COARSE BLD QL SMEAR: ABNORMAL
BASOPHILS # BLD MANUAL: 0.11 10*3/MM3 (ref 0–0.2)
BASOPHILS NFR BLD AUTO: 1 % (ref 0–2)
BUN BLD-MCNC: 77 MG/DL (ref 5–21)
BUN/CREAT SERPL: 52.4 (ref 7–25)
CALCIUM SPEC-SCNC: 8.5 MG/DL (ref 8.4–10.4)
CHLORIDE SERPL-SCNC: 97 MMOL/L (ref 98–110)
CO2 SERPL-SCNC: 26 MMOL/L (ref 24–31)
CREAT BLD-MCNC: 1.47 MG/DL (ref 0.5–1.4)
DEPRECATED RDW RBC AUTO: 56.7 FL (ref 40–54)
EOSINOPHIL # BLD MANUAL: 0.43 10*3/MM3 (ref 0–0.7)
EOSINOPHIL NFR BLD MANUAL: 4 % (ref 0–4)
ERYTHROCYTE [DISTWIDTH] IN BLOOD BY AUTOMATED COUNT: 14.2 % (ref 12–15)
GFR SERPL CREATININE-BSD FRML MDRD: 34 ML/MIN/1.73
GLUCOSE BLD-MCNC: 263 MG/DL (ref 70–100)
GLUCOSE BLDC GLUCOMTR-MCNC: 234 MG/DL (ref 70–130)
GLUCOSE BLDC GLUCOMTR-MCNC: 357 MG/DL (ref 70–130)
GLUCOSE BLDC GLUCOMTR-MCNC: 407 MG/DL (ref 70–130)
GLUCOSE BLDC GLUCOMTR-MCNC: 457 MG/DL (ref 70–130)
GLUCOSE BLDC GLUCOMTR-MCNC: 516 MG/DL (ref 70–130)
HCT VFR BLD AUTO: 29.1 % (ref 37–47)
HGB BLD-MCNC: 9.4 G/DL (ref 12–16)
LYMPHOCYTES # BLD MANUAL: 0.32 10*3/MM3 (ref 0.72–4.86)
LYMPHOCYTES NFR BLD MANUAL: 1 % (ref 4–12)
LYMPHOCYTES NFR BLD MANUAL: 3 % (ref 15–45)
MCH RBC QN AUTO: 35.1 PG (ref 28–32)
MCHC RBC AUTO-ENTMCNC: 32.3 G/DL (ref 33–36)
MCV RBC AUTO: 108.6 FL (ref 82–98)
MONOCYTES # BLD AUTO: 0.11 10*3/MM3 (ref 0.19–1.3)
NEUTROPHILS # BLD AUTO: 9.63 10*3/MM3 (ref 1.87–8.4)
NEUTROPHILS NFR BLD MANUAL: 87 % (ref 39–78)
NEUTS BAND NFR BLD MANUAL: 3 % (ref 0–10)
NEUTS HYPERSEG # BLD: ABNORMAL 10*3/UL
PLAT MORPH BLD: NORMAL
PLATELET # BLD AUTO: 380 10*3/MM3 (ref 130–400)
PMV BLD AUTO: 10.5 FL (ref 6–12)
POIKILOCYTOSIS BLD QL SMEAR: ABNORMAL
POLYCHROMASIA BLD QL SMEAR: ABNORMAL
POTASSIUM BLD-SCNC: 4.8 MMOL/L (ref 3.5–5.3)
RBC # BLD AUTO: 2.68 10*6/MM3 (ref 4.2–5.4)
SCAN SLIDE: NORMAL
SODIUM BLD-SCNC: 135 MMOL/L (ref 135–145)
VARIANT LYMPHS NFR BLD MANUAL: 1 % (ref 0–5)
WBC NRBC COR # BLD: 10.7 10*3/MM3 (ref 4.8–10.8)

## 2018-02-03 PROCEDURE — 63710000001 INSULIN DETEMIR PER 5 UNITS: Performed by: NURSE PRACTITIONER

## 2018-02-03 PROCEDURE — 97116 GAIT TRAINING THERAPY: CPT

## 2018-02-03 PROCEDURE — 85025 COMPLETE CBC W/AUTO DIFF WBC: CPT | Performed by: NURSE PRACTITIONER

## 2018-02-03 PROCEDURE — 63710000001 INSULIN LISPRO (HUMAN) PER 5 UNITS: Performed by: NURSE PRACTITIONER

## 2018-02-03 PROCEDURE — 82962 GLUCOSE BLOOD TEST: CPT

## 2018-02-03 PROCEDURE — 25010000002 CEFTRIAXONE PER 250 MG: Performed by: FAMILY MEDICINE

## 2018-02-03 PROCEDURE — 71046 X-RAY EXAM CHEST 2 VIEWS: CPT

## 2018-02-03 PROCEDURE — 94799 UNLISTED PULMONARY SVC/PX: CPT

## 2018-02-03 PROCEDURE — 80048 BASIC METABOLIC PNL TOTAL CA: CPT | Performed by: NURSE PRACTITIONER

## 2018-02-03 PROCEDURE — 94760 N-INVAS EAR/PLS OXIMETRY 1: CPT

## 2018-02-03 PROCEDURE — 25010000002 HEPARIN (PORCINE) PER 1000 UNITS: Performed by: FAMILY MEDICINE

## 2018-02-03 PROCEDURE — 85007 BL SMEAR W/DIFF WBC COUNT: CPT | Performed by: NURSE PRACTITIONER

## 2018-02-03 PROCEDURE — 25010000002 METHYLPREDNISOLONE PER 40 MG: Performed by: NURSE PRACTITIONER

## 2018-02-03 RX ORDER — PREDNISONE 20 MG/1
40 TABLET ORAL
Status: DISCONTINUED | OUTPATIENT
Start: 2018-02-04 | End: 2018-02-05 | Stop reason: HOSPADM

## 2018-02-03 RX ADMIN — GUAIFENESIN 600 MG: 600 TABLET, EXTENDED RELEASE ORAL at 21:05

## 2018-02-03 RX ADMIN — AMLODIPINE BESYLATE 5 MG: 5 TABLET ORAL at 08:35

## 2018-02-03 RX ADMIN — ATORVASTATIN CALCIUM 20 MG: 10 TABLET, FILM COATED ORAL at 08:35

## 2018-02-03 RX ADMIN — INSULIN DETEMIR 35 UNITS: 100 INJECTION, SOLUTION SUBCUTANEOUS at 21:35

## 2018-02-03 RX ADMIN — PANTOPRAZOLE SODIUM 40 MG: 40 TABLET, DELAYED RELEASE ORAL at 08:34

## 2018-02-03 RX ADMIN — IPRATROPIUM BROMIDE AND ALBUTEROL SULFATE 3 ML: 2.5; .5 SOLUTION RESPIRATORY (INHALATION) at 14:35

## 2018-02-03 RX ADMIN — CEFTRIAXONE SODIUM 1 G: 1 INJECTION, POWDER, FOR SOLUTION INTRAMUSCULAR; INTRAVENOUS at 21:06

## 2018-02-03 RX ADMIN — INSULIN LISPRO 4 UNITS: 100 INJECTION, SOLUTION INTRAVENOUS; SUBCUTANEOUS at 08:35

## 2018-02-03 RX ADMIN — HEPARIN SODIUM 5000 UNITS: 5000 INJECTION, SOLUTION INTRAVENOUS; SUBCUTANEOUS at 13:12

## 2018-02-03 RX ADMIN — INSULIN LISPRO 8 UNITS: 100 INJECTION, SOLUTION INTRAVENOUS; SUBCUTANEOUS at 21:34

## 2018-02-03 RX ADMIN — ASPIRIN 325 MG: 325 TABLET, COATED ORAL at 21:05

## 2018-02-03 RX ADMIN — GABAPENTIN 600 MG: 300 CAPSULE ORAL at 16:48

## 2018-02-03 RX ADMIN — GABAPENTIN 300 MG: 300 CAPSULE ORAL at 08:34

## 2018-02-03 RX ADMIN — INSULIN DETEMIR 15 UNITS: 100 INJECTION, SOLUTION SUBCUTANEOUS at 08:35

## 2018-02-03 RX ADMIN — HYDROXYUREA 500 MG: 500 CAPSULE ORAL at 08:34

## 2018-02-03 RX ADMIN — HEPARIN SODIUM 5000 UNITS: 5000 INJECTION, SOLUTION INTRAVENOUS; SUBCUTANEOUS at 21:05

## 2018-02-03 RX ADMIN — FAMOTIDINE 20 MG: 20 TABLET, FILM COATED ORAL at 08:34

## 2018-02-03 RX ADMIN — IPRATROPIUM BROMIDE AND ALBUTEROL SULFATE 3 ML: 2.5; .5 SOLUTION RESPIRATORY (INHALATION) at 11:11

## 2018-02-03 RX ADMIN — INSULIN LISPRO 9 UNITS: 100 INJECTION, SOLUTION INTRAVENOUS; SUBCUTANEOUS at 16:49

## 2018-02-03 RX ADMIN — BUDESONIDE AND FORMOTEROL FUMARATE DIHYDRATE 2 PUFF: 160; 4.5 AEROSOL RESPIRATORY (INHALATION) at 07:40

## 2018-02-03 RX ADMIN — CLONAZEPAM 0.5 MG: 0.5 TABLET ORAL at 08:34

## 2018-02-03 RX ADMIN — IPRATROPIUM BROMIDE AND ALBUTEROL SULFATE 3 ML: 2.5; .5 SOLUTION RESPIRATORY (INHALATION) at 20:03

## 2018-02-03 RX ADMIN — GUAIFENESIN 600 MG: 600 TABLET, EXTENDED RELEASE ORAL at 08:34

## 2018-02-03 RX ADMIN — METOPROLOL SUCCINATE 50 MG: 50 TABLET, FILM COATED, EXTENDED RELEASE ORAL at 08:34

## 2018-02-03 RX ADMIN — METHYLPREDNISOLONE SODIUM SUCCINATE 40 MG: 125 INJECTION, POWDER, FOR SOLUTION INTRAMUSCULAR; INTRAVENOUS at 08:35

## 2018-02-03 RX ADMIN — BUDESONIDE AND FORMOTEROL FUMARATE DIHYDRATE 2 PUFF: 160; 4.5 AEROSOL RESPIRATORY (INHALATION) at 20:03

## 2018-02-03 RX ADMIN — CLONAZEPAM 0.5 MG: 0.5 TABLET ORAL at 21:05

## 2018-02-03 RX ADMIN — INSULIN LISPRO 9 UNITS: 100 INJECTION, SOLUTION INTRAVENOUS; SUBCUTANEOUS at 13:12

## 2018-02-03 RX ADMIN — IPRATROPIUM BROMIDE AND ALBUTEROL SULFATE 3 ML: 2.5; .5 SOLUTION RESPIRATORY (INHALATION) at 07:40

## 2018-02-03 RX ADMIN — LISINOPRIL 40 MG: 20 TABLET ORAL at 08:35

## 2018-02-03 NOTE — PROGRESS NOTES
[facility] Medicine Services  INPATIENT PROGRESS NOTE    Length of Stay: 5  Date of Admission: 1/29/2018  Primary Care Physician: Barry Foley MD    Subjective   Chief Complaint: shortness of breath better  HPI   Lying in bed.  No family present.  She slept better last night.  No wheezing heard at present.  Nurses report she had wheezing last night.  Nonproductive cough.  She denies chest pain or palpitations.  She denies nausea, vomiting or abdominal pain.  She ambulated 75 feet ×2 with walker and physical therapy yesterday.  Saturations remained in the 90s with ambulation.  Tolerating diet.    Review of Systems   Constitutional: Positive for activity change and fatigue. Negative for chills and unexpected weight change.   HENT: Negative for congestion and trouble swallowing.    Eyes: Negative for photophobia and visual disturbance.   Respiratory: Positive for cough, shortness of breath and wheezing.    Cardiovascular: Negative for chest pain, palpitations and leg swelling.   Gastrointestinal: Negative for abdominal distention, constipation, diarrhea, nausea and vomiting.   Endocrine: Negative for cold intolerance, heat intolerance and polyuria.   Genitourinary: Negative for dysuria and urgency.   Musculoskeletal: Positive for gait problem.   Skin: Negative for rash and wound.   Neurological: Positive for weakness.   Hematological: Negative for adenopathy. Does not bruise/bleed easily.   Psychiatric/Behavioral: Positive for hallucinations (resolved).   All pertinent negatives and positives are as above. All other systems have been reviewed and are negative unless otherwise stated.     Objective    Temp:  [97.1 °F (36.2 °C)-98.8 °F (37.1 °C)] 97.5 °F (36.4 °C)  Heart Rate:  [62-86] 62  Resp:  [16-20] 18  BP: (130-173)/(59-67) 148/61  Physical Exam  Constitutional: She is oriented to person, place, and time. She appears well-developed and well-nourished.   HENT:   Head:  Normocephalic and atraumatic.   Eyes: EOM are normal. Pupils are equal, round, and reactive to light.   Neck: Normal range of motion. Neck supple. No tracheal deviation present. No thyromegaly present.   Cardiovascular: Normal rate, regular rhythm, normal heart sounds and intact distal pulses.  Exam reveals no gallop and no friction rub.    No murmur heard.  Normal sinus rhythm 55-70 on telemetry   Pulmonary/Chest: No respiratory distress. She has no wheezes.   Oxygen at 2 L.  Harsh breath sounds posteriorly.  Decreased breath sounds. No wheezing heard this morning but nurses report wheezing last night. Nonproductive cough. Oxygen removed this morning.  Abdominal: Soft. Bowel sounds are normal. She exhibits no distension.   Genitourinary:   Genitourinary Comments: Deferred   Musculoskeletal: She exhibits no edema or deformity.   Neurological: She is alert and oriented to person, place, and time.   Answers all questions appropriate.  No confusion. Denies hallucinations.  Skin: Skin is warm and dry.   Thickened scan lower extremities.  Bruising upper extremities.   Psychiatric: She has a normal mood and affect. Her behavior is normal. Judgment and thought content normal    Results Review:  I have reviewed the labs, radiology results, and diagnostic studies.    Laboratory Data:     Results from last 7 days  Lab Units 02/03/18  0436 02/01/18  0429 01/31/18  0253   WBC 10*3/mm3 10.70 13.82* 6.55   HEMOGLOBIN g/dL 9.4* 9.9* 10.0*   HEMATOCRIT % 29.1* 31.0* 31.3*   PLATELETS 10*3/mm3 380 430* 412*          Results from last 7 days  Lab Units 02/03/18  0436 02/02/18  0642 02/01/18  0429  01/30/18  0413 01/29/18  2235 01/29/18  1616   SODIUM mmol/L 135 136 135  < > 141  --  138   POTASSIUM mmol/L 4.8 4.1 4.4  < > 3.8  --  4.2   CHLORIDE mmol/L 97* 95* 95*  < > 100  --  99   CO2 mmol/L 26.0 28.0 26.0  < > 32.0*  --  30.0   BUN mg/dL 77* 69* 55*  < > 32*  --  34*   CREATININE mg/dL 1.47* 1.63* 1.68*  < > 1.23  --  1.26    CALCIUM mg/dL 8.5 9.0 9.0  < > 8.6  --  8.7   BILIRUBIN mg/dL  --   --   --   --  0.2 0.3 0.3   ALK PHOS U/L  --   --   --   --  76  --  85   ALT (SGPT) U/L  --   --   --   --  27  --  27   AST (SGOT) U/L  --   --   --   --  27  --  32   GLUCOSE mg/dL 263* 58* 201*  < > 60*  --  134*   < > = values in this interval not displayed.  Blood Culture   Date Value Ref Range Status   01/29/2018 No growth at 4 days  Preliminary   01/29/2018 No growth at 4 days  Preliminary     Imaging Results (all)     Procedure Component Value Units Date/Time    XR Spine Thoracic 3 View [[id number]] Collected:  01/29/18 1523     Updated:  01/29/18 1527    Narrative:       XR SPINE THORACIC 3 VW- 1/29/2018 2:48 PM CST     HISTORY: back pain, fall     COMPARISON: None     FINDINGS:  Frontal, lateral and swimmers lateral radiographs of the thoracic spine  demonstrate normal alignment without evidence of compression fracture or  subluxation. The pedicles are intact. The disc spaces are maintained  hypertrophic osteophyte formation is noted. There is ossification of the  anterior longitudinal ligament. The visualized thorax is clear.        Impression:       1. Degenerative changes noted in the lumbar spine. There is no acute  compression deformity.     This report was finalized on 01/29/2018 15:24 by Dr. Klever Hooker MD.    XR Chest 1 View [[id number]] Collected:  01/29/18 1524     Updated:  01/29/18 1528    Narrative:       XR CHEST 1 VW- 1/29/2018 2:40 PM CST     HISTORY: shortness of breath       COMPARISON: 03/23/2017.     FINDINGS:   Moderate opacities are seen in the lung bases. There is a small LEFT  pleural effusion. The heart is enlarged.      The osseous structures and surrounding soft tissues demonstrate no acute  abnormality.       Impression:       1. Cardiomegaly and bilateral airspace opacities. Findings could be due  to pulmonary edema or pneumonia.        This report was finalized on 01/29/2018 15:25 by Dr. Yair Ruby MD.     CT Angiogram Chest With & Without Contrast [[id number]] Collected:  01/29/18 2116     Updated:  01/29/18 2124    Narrative:       CT ANGIOGRAM CHEST W WO CONTRAST- 1/29/2018 8:37 PM CST      HISTORY: rule out PE; J18.9-Pneumonia, unspecified organism;  R74.8-Abnormal levels of other serum enzymes; I50.9-Heart failure,  unspecified; R79.89-Other specified abnormal findings of blood  chemistry; D64.9-Anemia, unspecified      COMPARISON: None.      DLP: 439 mGy cm     TECHNIQUE: Helical tomographic images of the chest were obtained after  the administration of intravenous contrast following angiogram protocol.  Additionally, 3D reformatted images were provided.        FINDINGS:    Pulmonary arteries: There is adequate enhancement of the pulmonary  arteries to evaluate for central and segmental pulmonary emboli. There  are no filling defects within the main, lobar, segmental or visualized  subsegmental pulmonary arteries. Pulmonary arteries are borderline  enlarged.     Aorta and great vessels: There is atherosclerosis in the aorta without  aneurysm or dissection. There is atherosclerosis in the great vessels.     Visualized neck base: The imaged portion of the base of the neck appears  unremarkable.      Lungs: Bilateral lower lobe consolidations, right greater than left..  The trachea and proximal bronchial tree are patent. Small to moderate  bilateral pleural effusions, right greater than left.     Heart: The heart is enlarged. There is no pericardial effusion.      Mediastinum and lymph nodes: No enlarged mediastinal, hilar, or axillary  lymph nodes are present.      Skeletal and soft tissues: The osseous structures of the thorax and  surrounding soft tissues demonstrate no acute process.     Upper abdomen: The imaged portion of the upper abdomen demonstrates no  acute process.        Impression:       1. No evidence of pulmonary embolus.  2. Small-to-moderate bilateral pleural effusion and associated  opacities  in the bilateral lower lobes, concerning for pneumonia and parapneumonic  effusions. Additional differential does include aspiration.  This report was finalized on 01/29/2018 21:21 by Dr. Janae Sorensen MD.    XR Chest 1 View [[id number]] Collected:  01/30/18 0935     Updated:  01/30/18 0939    Narrative:       EXAMINATION:   XR CHEST 1 -  1/30/2018 9:35 AM CST     HISTORY: fu     Prior exam 01/29/2018.     Infiltrate right lung is again noted. There is no improvement compared  to January 29. Is may be either pulmonary edema or pneumonia.     Cardiac silhouettes moderately enlarged also unchanged.     IMPRESSION cardiomegaly and air space filling infiltrate in the right  lung. This may be either pulmonary edema or pneumonia. This is unchanged  This report was finalized on 01/30/2018 09:36 by Dr. Klever Hooker MD.    XR Chest 1 View [[id number]] Collected:  01/31/18 0726     Updated:  01/31/18 0730    Narrative:       EXAMINATION:   XR CHEST 1 -  1/31/2018 7:26 AM CST     HISTORY: Bilateral effusions     Single view the chest obtained. Left diaphragms indistinct consistent  with atelectasis left lower lobe. Trace of fluid is noted in the right  minor fissure. Infiltrate in the right lung persists. Again this may be  mild pulmonary vascular congestion or possibly pneumonia.     Cardiac silhouettes mildly enlarged.     IMPRESSION atelectasis left lower lobe     2 persistent air space filling infiltrate right lung. This is pulmonary  vascular congestion versus inflammatory process.  This report was finalized on 01/31/2018 07:27 by Dr. Klever Hooker MD.    US Chest [[id number]] Collected:  02/02/18 1343     Updated:  02/02/18 1349    Narrative:       US CHEST-      REASON FOR EXAM: effusion; J18.9-Pneumonia, unspecified organism;  R74.8-Abnormal levels of other serum enzymes; I50.9-Heart failure,  unspecified; R79.89-Other specified abnormal findings of blood  chemistry; D64.9-Anemia, unspecified;  R06.00-Dyspnea, unspecified;  Z74.09-Other reduced mobility       COMPARISON: Chest x-ray dated 01/31/2018      TECHNIQUE: Realtime sonographic images of the left chest were obtained.      FINDINGS:       Minimal left pleural effusion, insufficient for therapeutic  thoracentesis       Impression:       1. Insufficient pleural effusion for therapeutic thoracentesis        This report was finalized on 02/02/2018 13:46 by Dr Franck Pretty, .        Intake/Output    Intake/Output Summary (Last 24 hours) at 02/03/18 0654  Last data filed at 02/03/18 0113   Gross per 24 hour   Intake           1082.1 ml   Output             1500 ml   Net           -417.9 ml       Scheduled Meds    amLODIPine 5 mg Oral Daily   aspirin 325 mg Oral Nightly   atorvastatin 20 mg Oral Daily   ceftriaxone 1 g Intravenous Q24H   And      azithromycin 500 mg Intravenous Q24H   budesonide-formoterol 2 puff Inhalation BID - RT   clonazePAM 0.5 mg Oral BID   famotidine 20 mg Oral Daily   gabapentin 300 mg Oral Daily With Breakfast   gabapentin 600 mg Oral Q PM   guaiFENesin 600 mg Oral Q12H   heparin (porcine) 5,000 Units Subcutaneous Q8H   hydroxyurea 1,000 mg Oral Once per day on Fri   hydroxyurea 500 mg Oral Once per day on Sun Mon Tue Wed Thu Sat   insulin detemir 15 Units Subcutaneous QAM   insulin detemir 25 Units Subcutaneous Nightly   insulin lispro 0-9 Units Subcutaneous 4x Daily With Meals & Nightly   ipratropium-albuterol 3 mL Nebulization 4x Daily - RT   lisinopril 40 mg Oral Daily   methylPREDNISolone sodium succinate 40 mg Intravenous Q12H   metoprolol succinate XL 50 mg Oral Daily   pantoprazole 40 mg Oral Daily       I have reviewed the patient current medications.     Assessment/Plan     Hospital Problem List     Pneumonia of both lungs due to infectious organism        Assessment:  1.  Pneumonia of both lungs due to infectious organism  2.  Bilateral pleural effusions, insufficient for therapeutic thoracentesis  3.  Hypoxia, oxygen  at 3 L   4.  Metabolic encephalopathy, resolved  5.  Diastolic dysfunction, preserved ejection fraction 65%, echo 1/30/18 elevated proBNP  6.  Elevated troponin secondary to #1  7.  Anemia - stable anemia substrates  8.  Hypertension  9.  Diabetes mellitus type II - A1c 8.2  10.  Hyperlipidemia  11.  Osteoarthritis  12.  Hx stroke/PVD  13.  GERD  14.  Chronic kidney disease stage III   15.  Poor venous access   16.  Elevated right ventricular systolic pressure > 55 mmHg  17.  Elevated D Dimer with negative CT angiogram    Plan:  1.  Azithromycin IV, Rocephin IV day 6. Discontinue azithromycin  2.  Oxygen at 2 L. She was weaned to room air but placed back on oxygen.  She was able to ambulate with physical therapy and saturation greater than 90%.  Continue to wean oxygen.  3.  Solu-Medrol 40 mg IV every 12 hours.  Plan to switch to oral prednisone tomorrow.  4.  Symbicort added 2/2 for continued wheezing  5.  Insufficient pleural fluid for therapeutic thoracentesis on left.  6.  Lasix has been on hold due to creatinine 1.6.  Creatinine 1.47 today.  7.  DuoNeb's 4 times daily  8.  Glucose 263, 383, 183, 58.  Has required total of 21 units sliding scale insulin coverage past 24 hours.  Metformin has remained on hold due to elevated creatinine.  She remains on Levemir 15 units every morning and 25 units at at bedtime.  Normally she takes 35 units at bedtime but have not been able to resume metformin.  9.  Physical therapy.  She ambulated 75 feet ×2 with rolling walker.  Saturations remained in 90s.  10.   for discharge planning.  She declines SNF but has agreed to home health at discharge.  She indicates family members check on her frequently.  11.  Basic metabolic panel tomorrow to monitor renal function.  12.  Will perform overnight pulse oximetry room air to assess for O2.      The above documentation resulted from a face-to-face encounter by da CHAMBERLAIN, [facility].    Discharge Planning: I  expect patient to be discharged to home with home health in an-2 days.    Meghan Hendrix, BULMARO   02/03/18   6:54 AM

## 2018-02-03 NOTE — PLAN OF CARE
Problem: Patient Care Overview (Adult)  Goal: Plan of Care Review  Outcome: Ongoing (interventions implemented as appropriate)   02/03/18 2445   Coping/Psychosocial Response Interventions   Plan Of Care Reviewed With patient   Patient Care Overview   Progress improving   Outcome Evaluation   Outcome Summary/Follow up Plan Pt c/o nervous and shakiness today. She has been sitting in the chair and walking in the hallway with walker and assist. VSS. NSR on tele.       Problem: Fall Risk (Adult)  Goal: Absence of Falls  Outcome: Ongoing (interventions implemented as appropriate)      Problem: Pneumonia (Adult)  Goal: Signs and Symptoms of Listed Potential Problems Will be Absent or Manageable (Pneumonia)  Outcome: Ongoing (interventions implemented as appropriate)

## 2018-02-03 NOTE — PLAN OF CARE
Problem: Patient Care Overview (Adult)  Goal: Plan of Care Review  Outcome: Ongoing (interventions implemented as appropriate)   02/03/18 0600   Coping/Psychosocial Response Interventions   Plan Of Care Reviewed With patient   Patient Care Overview   Progress no change   Outcome Evaluation   Outcome Summary/Follow up Plan O2 sats stable on 2L/NC. No acute distress noted. Audible wheezing noted at start of shift.        Problem: Fall Risk (Adult)  Goal: Absence of Falls  Outcome: Ongoing (interventions implemented as appropriate)      Problem: Pneumonia (Adult)  Goal: Signs and Symptoms of Listed Potential Problems Will be Absent or Manageable (Pneumonia)  Outcome: Ongoing (interventions implemented as appropriate)

## 2018-02-03 NOTE — THERAPY TREATMENT NOTE
"Acute Care - Physical Therapy Treatment Note  [facility]     Patient Name: Tamy Sher  : 1930  MRN: [id number]  Today's Date: 2/3/2018  Onset of Illness/Injury or Date of Surgery Date: 18  Date of Referral to PT: 18  Referring Physician: Dr. Martinez    Admit Date: 2018    Visit Dx:    ICD-10-CM ICD-9-CM   1. Pneumonia of both lungs due to infectious organism, unspecified part of lung J18.9 483.8   2. Elevated troponin R74.8 790.6   3. Congestive heart failure, unspecified congestive heart failure chronicity, unspecified congestive heart failure type I50.9 428.0   4. Elevated d-dimer R79.89 790.92   5. Anemia, unspecified type D64.9 285.9   6. Dyspnea R06.00 786.09   7. Impaired functional mobility and activity tolerance Z74.09 V49.89     Patient Active Problem List   Diagnosis   • Ischemic chest pain   • Pneumonia of both lungs due to infectious organism               Adult Rehabilitation Note       18 1151 18 0946       Rehab Assessment/Intervention    Discipline physical therapy assistant  -ANDRAE physical therapy assistant  -NW     Document Type therapy note (daily note)  -ANDRAE therapy note (daily note)  -NW     Subjective Information agree to therapy;complains of;weakness;fatigue   \"Shakey\"  -ANDRAE agree to therapy;complains of;weakness;fatigue  -NW     Patient Effort, Rehab Treatment  good  -NW     Symptoms Noted During/After Treatment  fatigue  -NW     Precautions/Limitations fall precautions;oxygen therapy device and L/min  -ANDRAE fall precautions;oxygen therapy device and L/min  -NW     Specific Treatment Considerations  pt suppose to go for thorocentesis today  -NW     Recorded by [ANDRAE] Dani Pacheco PTA [NW] Nuzhat Justice PTA     Vital Signs    Pre SpO2 (%)  92  -NW     O2 Delivery Pre Treatment  supplemental O2  -NW     Intra SpO2 (%)  91  -NW     O2 Delivery Intra Treatment  supplemental O2  -NW     Post SpO2 (%)  91  -NW     O2 Delivery Post Treatment  supplemental O2  " -NW     Recorded by  [NW] Nuzhat Justice PTA     Pain Assessment    Pain Assessment No/denies pain  -ANDRAE No/denies pain  -NW     Recorded by [ANDRAE] Dani Pacheco PTA [NW] Nuzhat Justice PTA     Bed Mobility, Assessment/Treatment    Bed Mobility, Assistive Device bed rails  -ANDRAE      Bed Mob, Supine to Sit, Schuyler supervision required  -ANDRAE verbal cues required;supervision required   up in chair  -NW     Bed Mobility, Impairments  strength decreased  -NW     Recorded by [ANDRAE] Dani Pacheco PTA [NW] Nuzhat Justice PTA     Transfer Assessment/Treatment    Transfers, Sit-Stand Schuyler supervision required  -ANDRAE supervision required  -NW     Transfers, Stand-Sit [place]uyler supervision required  -ANDRAE supervision required  -NW     Transfers, Sit-Stand-Sit, Assist Device  rolling walker  -NW     Transfer, Safety Issues  step length decreased  -NW     Transfer, Impairments  strength decreased;impaired balance  -NW     Recorded by [ANDRAE] Dani Pacheco PTA [NW] Nuzhat Justice PTA     Gait Assessment/Treatment    Gait, Schuyler Level verbal cues required;contact guard assist  -ANDRAE verbal cues required;contact guard assist  -NW     Gait, Assistive Device rolling walker  -ANDRAE rolling walker  -NW     Gait, Distance (Feet) 75   X 2 with one sitting rest  -ANDRAE 75   75x2  -NW     Gait, Gait Deviations salomón decreased;step length decreased  -ANDRAE salomón decreased;step length decreased  -NW     Gait, Safety Issues balance decreased during turns  -ANDRAE balance decreased during turns  -NW     Gait, Impairments  strength decreased  -NW     Gait, Comment amb on RA, O2 dropped to 86%, but when sitting would quickly increase into the 90's  -ANDRAE      Recorded by [ANDRAE] Dani Pacheco PTA [NW] Nuzhat Justice PTA     Therapy Exercises    Bilateral Lower Extremities  AROM:;20 reps;sitting  -NW     Recorded by  [NW] Nuzhat Justice PTA     Positioning and Restraints    Pre-Treatment Position in bed  -ANDRAE in bed  -NW      Post Treatment Position chair  -ANDRAE chair  -NW     In Chair sitting;call light within reach;encouraged to call for assist  -ANDRAE sitting;call light within reach;encouraged to call for assist;notified nsg  -NW     Recorded by [ANDRAE] Dani Pacheco, PTA [NW] Nuzhat Justice, VERNELL       User Key  (r) = Recorded By, (t) = Taken By, (c) = Cosigned By    Initials Name Effective Dates    ANDRAE Dani Pacheco, PTA 12/08/16 -     NW Nuzhat Justice, VERNELL 08/02/16 -                 IP PT Goals       02/01/18 1313 02/01/18 0908 02/01/18 0906    Gait Training PT LTG    Gait Training Goal PT LTG, Date Established  02/01/18  -PB (r) JM (t) PB (c) 02/01/18  -PB (r) JM (t) PB (c)    Gait Training Goal PT LTG, Time to Achieve  by discharge  -PB (r) JM (t) PB (c) by discharge  -PB (r) JM (t) PB (c)    Gait Training Goal PT LTG, Garrison Level  supervision required  -PB (r) JM (t) PB (c) supervision required  -PB (r) JM (t) PB (c)    Gait Training Goal PT LTG, Assist Device  walker, rolling  -PB (r) JM (t) PB (c) walker, rolling  -PB (r) JM (t) PB (c)    Gait Training Goal PT LTG, Distance to Achieve   400 feet, 1 standing rest break  -PB (r) JM (t) PB (c)    Gait Training Goal PT LTG, Additional Goal  Ambulate any distance without spO2 dropping below 90  -PB (r) JM (t) PB (c)     Cardiopulmonary PT LTG    Cardiopulmonary PT LTG, Date Established 02/01/18  -PB (r) JM (t) PB (c)  02/01/18  -PB (r) JM (t) PB (c)    Cardiopulmonary PT LTG, Time to Achieve by discharge  -PB (r) JM (t) PB (c)  by discharge  -PB (r) JM (t) PB (c)    Cardiopulmonary PT LTG, Additional Goal ambulate 400 feet without spO2  dropping below 90%  -PB (r) JM (t) PB (c)        User Key  (r) = Recorded By, (t) = Taken By, (c) = Cosigned By    Initials Name Provider Type    PB Boom Espinoza, PT DPT Physical Therapist    JYOTSNA Malloy, PT Student PT Student          Physical Therapy Education     Title: PT OT SLP Therapies (Done)     Topic: Physical Therapy  (Done)     Point: Mobility training (Done)    Learning Progress Summary    Learner Readiness Method Response Comment Documented by Status   Patient Acceptance E VU progression with amb  02/03/18 1151 Done    Acceptance E VU Benefits of activity  02/02/18 1006 Done    Acceptance E VU Pt was educated on transfer techniques, gait with AD, breathing techniques and PT prognosis  02/01/18 0904 Done                      User Key     Initials Effective Dates Name Provider Type Discipline     12/08/16 -  Dani Pacheco, PTA Physical Therapy Assistant PT     08/02/16 -  Nuzhat Justice, PTA Physical Therapy Assistant PT     01/10/18 -  Abdiaziz Malloy, PT Student PT Student PT                    PT Recommendation and Plan  Anticipated Equipment Needs At Discharge: front wheeled walker  Anticipated Discharge Disposition: home, home with assist  Planned Therapy Interventions: balance training, gait training, patient/family education, strengthening  PT Frequency: daily, 2 times/day, per priority policy  Plan of Care Review  Plan Of Care Reviewed With: patient  Progress: progress toward functional goals as expected  Outcome Summary/Follow up Plan: Pt stated that she felt nervous and shakey.  Supine to sit required supervision.  Sit to stand with CGA.  Amb 75' with RWX with CGA.  Will continue to work with pt to increase strength and progess with amb          Outcome Measures       02/03/18 1151 02/02/18 1000 02/01/18 0900    How much help from another person do you currently need...    Turning from your back to your side while in flat bed without using bedrails? 4  -ANDRAE 4  -NW 4  -PB (r) JM (t) PB (c)    Moving from lying on back to sitting on the side of a flat bed without bedrails? 3  -ANDRAE 3  -NW 4  -PB (r) JM (t) PB (c)    Moving to and from a bed to a chair (including a wheelchair)? 3  -ANDRAE 3  -NW 3  -PB (r) JM (t) PB (c)    Standing up from a chair using your arms (e.g., wheelchair, bedside chair)? 3  -ANDRAE 3  -NW  4  -PB (r) JM (t) PB (c)    Climbing 3-5 steps with a railing? 3  -ANDRAE 3  -NW 3  -PB (r) JM (t) PB (c)    To walk in hospital room? 3  -ANDRAE 3  -NW 3  -PB (r) JM (t) PB (c)    AM-PAC 6 Clicks Score 19  -ANDRAE 19  -NW 21  -PB (r) JM (t)    Functional Assessment    Outcome Measure Options AM-PAC 6 Clicks Basic Mobility (PT)  -ANDRAE AM-PAC 6 Clicks Basic Mobility (PT)  -NW AM-PAC 6 Clicks Basic Mobility (PT)  -PB (r) JM (t) PB (c)      User Key  (r) = Recorded By, (t) = Taken By, (c) = Cosigned By    Initials Name Provider Type    ANDRAE Pacheco PTA Physical Therapy Assistant    NW Nuzhat Justice, PTA Physical Therapy Assistant    PB Boom Espinoza, PT DPT Physical Therapist    JM Abdiaziz Malloy, PT Student PT Student           Time Calculation:         PT Charges       02/03/18 1151          Time Calculation    Start Time 1151  -ANDRAE      Stop Time 1214  -ANDRAE      Time Calculation (min) 23 min  -ANDRAE      PT Received On 02/03/18  -ANDRAE      PT Goal Re-Cert Due Date 02/11/18  -ANDRAE      Time Calculation- PT    Total Timed Code Minutes- PT 23 minute(s)  -ANDRAE        User Key  (r) = Recorded By, (t) = Taken By, (c) = Cosigned By    Initials Name Provider Type    ANDRAE Pacheco PTA Physical Therapy Assistant          Therapy Charges for Today     Code Description Service Date Service Provider Modifiers Qty    79361315453 HC GAIT TRAINING EA 15 MIN 2/3/2018 Dani Pacheco PTA GP 2          PT G-Codes  Outcome Measure Options: AM-PAC 6 Clicks Basic Mobility (PT)  Score: 21  Functional Limitation: Mobility: Walking and moving around  Mobility: Walking and Moving Around Current Status (): At least 20 percent but less than 40 percent impaired, limited or restricted  Mobility: Walking and Moving Around Goal Status (): At least 1 percent but less than 20 percent impaired, limited or restricted    Dani Pacheco PTA  2/3/2018

## 2018-02-03 NOTE — PLAN OF CARE
Problem: Patient Care Overview (Adult)  Goal: Plan of Care Review  Outcome: Ongoing (interventions implemented as appropriate)   02/03/18 1151   Coping/Psychosocial Response Interventions   Plan Of Care Reviewed With patient   Patient Care Overview   Progress progress toward functional goals as expected   Outcome Evaluation   Outcome Summary/Follow up Plan Pt stated that she felt nervous and shakey. Supine to sit required supervision. Sit to stand with CGA. Amb 75' with RWX with CGA. Will continue to work with pt to increase strength and progess with amb

## 2018-02-04 LAB
ANION GAP SERPL CALCULATED.3IONS-SCNC: 11 MMOL/L (ref 4–13)
BUN BLD-MCNC: 74 MG/DL (ref 5–21)
BUN/CREAT SERPL: 57.8 (ref 7–25)
CALCIUM SPEC-SCNC: 8.4 MG/DL (ref 8.4–10.4)
CHLORIDE SERPL-SCNC: 99 MMOL/L (ref 98–110)
CO2 SERPL-SCNC: 27 MMOL/L (ref 24–31)
CREAT BLD-MCNC: 1.28 MG/DL (ref 0.5–1.4)
GFR SERPL CREATININE-BSD FRML MDRD: 39 ML/MIN/1.73
GLUCOSE BLD-MCNC: 165 MG/DL (ref 70–100)
GLUCOSE BLDC GLUCOMTR-MCNC: 111 MG/DL (ref 70–130)
GLUCOSE BLDC GLUCOMTR-MCNC: 147 MG/DL (ref 70–130)
GLUCOSE BLDC GLUCOMTR-MCNC: 230 MG/DL (ref 70–130)
GLUCOSE BLDC GLUCOMTR-MCNC: 362 MG/DL (ref 70–130)
POTASSIUM BLD-SCNC: 4.4 MMOL/L (ref 3.5–5.3)
SODIUM BLD-SCNC: 137 MMOL/L (ref 135–145)

## 2018-02-04 PROCEDURE — 94618 PULMONARY STRESS TESTING: CPT

## 2018-02-04 PROCEDURE — 63710000001 INSULIN LISPRO (HUMAN) PER 5 UNITS: Performed by: NURSE PRACTITIONER

## 2018-02-04 PROCEDURE — 63710000001 INSULIN DETEMIR PER 5 UNITS: Performed by: NURSE PRACTITIONER

## 2018-02-04 PROCEDURE — 63710000001 PREDNISONE PER 1 MG: Performed by: NURSE PRACTITIONER

## 2018-02-04 PROCEDURE — 80048 BASIC METABOLIC PNL TOTAL CA: CPT | Performed by: NURSE PRACTITIONER

## 2018-02-04 PROCEDURE — 94760 N-INVAS EAR/PLS OXIMETRY 1: CPT

## 2018-02-04 PROCEDURE — 82962 GLUCOSE BLOOD TEST: CPT

## 2018-02-04 PROCEDURE — 25010000002 HEPARIN (PORCINE) PER 1000 UNITS: Performed by: FAMILY MEDICINE

## 2018-02-04 PROCEDURE — 97116 GAIT TRAINING THERAPY: CPT

## 2018-02-04 PROCEDURE — 94799 UNLISTED PULMONARY SVC/PX: CPT

## 2018-02-04 RX ORDER — CEFDINIR 300 MG/1
300 CAPSULE ORAL EVERY 12 HOURS SCHEDULED
Status: COMPLETED | OUTPATIENT
Start: 2018-02-04 | End: 2018-02-04

## 2018-02-04 RX ORDER — MAGNESIUM CARB/ALUMINUM HYDROX 105-160MG
296 TABLET,CHEWABLE ORAL ONCE
Status: DISCONTINUED | OUTPATIENT
Start: 2018-02-04 | End: 2018-02-05 | Stop reason: HOSPADM

## 2018-02-04 RX ADMIN — INSULIN DETEMIR 35 UNITS: 100 INJECTION, SOLUTION SUBCUTANEOUS at 21:49

## 2018-02-04 RX ADMIN — INSULIN LISPRO 8 UNITS: 100 INJECTION, SOLUTION INTRAVENOUS; SUBCUTANEOUS at 21:48

## 2018-02-04 RX ADMIN — PANTOPRAZOLE SODIUM 40 MG: 40 TABLET, DELAYED RELEASE ORAL at 08:33

## 2018-02-04 RX ADMIN — AMLODIPINE BESYLATE 5 MG: 5 TABLET ORAL at 08:33

## 2018-02-04 RX ADMIN — HEPARIN SODIUM 5000 UNITS: 5000 INJECTION, SOLUTION INTRAVENOUS; SUBCUTANEOUS at 15:42

## 2018-02-04 RX ADMIN — CEFDINIR 300 MG: 300 CAPSULE ORAL at 08:33

## 2018-02-04 RX ADMIN — BUDESONIDE AND FORMOTEROL FUMARATE DIHYDRATE 2 PUFF: 160; 4.5 AEROSOL RESPIRATORY (INHALATION) at 20:35

## 2018-02-04 RX ADMIN — BUDESONIDE AND FORMOTEROL FUMARATE DIHYDRATE 2 PUFF: 160; 4.5 AEROSOL RESPIRATORY (INHALATION) at 11:34

## 2018-02-04 RX ADMIN — LISINOPRIL 40 MG: 20 TABLET ORAL at 08:33

## 2018-02-04 RX ADMIN — GABAPENTIN 300 MG: 300 CAPSULE ORAL at 08:33

## 2018-02-04 RX ADMIN — CEFDINIR 300 MG: 300 CAPSULE ORAL at 21:29

## 2018-02-04 RX ADMIN — METFORMIN HYDROCHLORIDE 500 MG: 500 TABLET ORAL at 21:48

## 2018-02-04 RX ADMIN — INSULIN DETEMIR 10 UNITS: 100 INJECTION, SOLUTION SUBCUTANEOUS at 08:33

## 2018-02-04 RX ADMIN — INSULIN LISPRO 4 UNITS: 100 INJECTION, SOLUTION INTRAVENOUS; SUBCUTANEOUS at 18:00

## 2018-02-04 RX ADMIN — CLONAZEPAM 0.5 MG: 0.5 TABLET ORAL at 21:28

## 2018-02-04 RX ADMIN — CLONAZEPAM 0.5 MG: 0.5 TABLET ORAL at 08:33

## 2018-02-04 RX ADMIN — FAMOTIDINE 20 MG: 20 TABLET, FILM COATED ORAL at 08:33

## 2018-02-04 RX ADMIN — IPRATROPIUM BROMIDE AND ALBUTEROL SULFATE 3 ML: 2.5; .5 SOLUTION RESPIRATORY (INHALATION) at 20:21

## 2018-02-04 RX ADMIN — GUAIFENESIN 600 MG: 600 TABLET, EXTENDED RELEASE ORAL at 08:33

## 2018-02-04 RX ADMIN — ATORVASTATIN CALCIUM 20 MG: 10 TABLET, FILM COATED ORAL at 08:33

## 2018-02-04 RX ADMIN — METOPROLOL SUCCINATE 50 MG: 50 TABLET, FILM COATED, EXTENDED RELEASE ORAL at 08:33

## 2018-02-04 RX ADMIN — HEPARIN SODIUM 5000 UNITS: 5000 INJECTION, SOLUTION INTRAVENOUS; SUBCUTANEOUS at 21:28

## 2018-02-04 RX ADMIN — HYDROXYUREA 500 MG: 500 CAPSULE ORAL at 08:33

## 2018-02-04 RX ADMIN — GUAIFENESIN 600 MG: 600 TABLET, EXTENDED RELEASE ORAL at 21:29

## 2018-02-04 RX ADMIN — IPRATROPIUM BROMIDE AND ALBUTEROL SULFATE 3 ML: 2.5; .5 SOLUTION RESPIRATORY (INHALATION) at 15:48

## 2018-02-04 RX ADMIN — IPRATROPIUM BROMIDE AND ALBUTEROL SULFATE 3 ML: 2.5; .5 SOLUTION RESPIRATORY (INHALATION) at 11:34

## 2018-02-04 RX ADMIN — GABAPENTIN 600 MG: 300 CAPSULE ORAL at 17:59

## 2018-02-04 RX ADMIN — ASPIRIN 325 MG: 325 TABLET, COATED ORAL at 21:28

## 2018-02-04 RX ADMIN — PREDNISONE 40 MG: 20 TABLET ORAL at 08:33

## 2018-02-04 RX ADMIN — HEPARIN SODIUM 5000 UNITS: 5000 INJECTION, SOLUTION INTRAVENOUS; SUBCUTANEOUS at 06:03

## 2018-02-04 NOTE — NURSING NOTE
Spoke with patients son, Ashvin, who says all 4 of the patient's sons are in agreement that patient needs to at least go somewhere for rehab until she is stronger. He states that all the sons work and are not able to be around to help their mother very much. Will notify MD.

## 2018-02-04 NOTE — PLAN OF CARE
Problem: Patient Care Overview (Adult)  Goal: Plan of Care Review  Outcome: Ongoing (interventions implemented as appropriate)   02/04/18 0402   Coping/Psychosocial Response Interventions   Plan Of Care Reviewed With patient   Patient Care Overview   Progress progress towards functional goals is fair   Outcome Evaluation   Outcome Summary/Follow up Plan Pt denies pain.SOB with exertion. IV ATB cont. Overnight O2 sat study completed with pt on room air.      Goal: Adult Individualization and Mutuality  Outcome: Ongoing (interventions implemented as appropriate)    Goal: Discharge Needs Assessment  Outcome: Ongoing (interventions implemented as appropriate)      Problem: Fall Risk (Adult)  Goal: Absence of Falls  Outcome: Ongoing (interventions implemented as appropriate)      Problem: Pneumonia (Adult)  Goal: Signs and Symptoms of Listed Potential Problems Will be Absent or Manageable (Pneumonia)  Outcome: Ongoing (interventions implemented as appropriate)

## 2018-02-04 NOTE — PROGRESS NOTES
[facility]  INPATIENT PROGRESS NOTE    Length of Stay: 6  Date of Admission: 1/29/2018  Primary Care Physician: Barry Foley MD    Subjective   Chief Complaint: less short of breath  HPI   Sitting up on side of bed.  No family present.  She slept better last night.  No wheezing today.  She had overnight pulse oximetry on room air last night.  She is currently not wearing oxygen.  She denies nausea, vomiting or abdominal pain.  She reports no bowel movement in several days.  She denies chest pain or palpitations.  She reports shortness of breath has improved.  She was able to ambulate without oxygen.  Today, she tells me she needs to go to nursing home for therapy.  We have discussed discharge the last several days and she has declined nursing home placement but was agreeable to physical therapy.  She indicates her children feel as though she needs therapy prior to discharge home.    Review of Systems   Constitutional: Positive for activity change and fatigue. Negative for chills and unexpected weight change.   HENT: Negative for congestion and trouble swallowing.    Eyes: Negative for photophobia and visual disturbance.   Respiratory: Positive for cough, shortness of breath and wheezing (resolved).    Cardiovascular: Negative for chest pain, palpitations and leg swelling.   Gastrointestinal: Negative for abdominal distention, constipation, diarrhea, nausea and vomiting.   Endocrine: Negative for cold intolerance, heat intolerance and polyuria.   Genitourinary: Negative for dysuria and urgency.   Musculoskeletal: Positive for gait problem.   Skin: Negative for rash and wound.   Neurological: Positive for weakness.   Hematological: Negative for adenopathy. Does not bruise/bleed easily.   Psychiatric/Behavioral: Positive for hallucinations (resolved).   All pertinent negatives and positives are as above. All other systems have been reviewed and are negative  unless otherwise stated.     Objective    Temp:  [97.6 °F (36.4 °C)-98.9 °F (37.2 °C)] 98.9 °F (37.2 °C)  Heart Rate:  [63-80] 64  Resp:  [16-20] 16  BP: (151-180)/(50-97) 175/73  Physical Exam  Constitutional: She is oriented to person, place, and time. She appears well-developed and well-nourished.   HENT:   Head: Normocephalic and atraumatic.   Eyes: EOM are normal. Pupils are equal, round, and reactive to light.   Neck: Normal range of motion. Neck supple. No tracheal deviation present. No thyromegaly present.   Cardiovascular: Normal rate, regular rhythm, normal heart sounds and intact distal pulses.  Exam reveals no gallop and no friction rub.    No murmur heard.  Normal sinus rhythm 60-69 on telemetry   Pulmonary/Chest: No respiratory distress. She has no wheezes.   No oxygen in use. Harsh breath sounds posteriorly.  Decreased breath sounds. No wheezing heard today. Nonproductive cough. Oxygen remains off.  Abdominal: Soft. Bowel sounds are normal. She exhibits no distension.   Genitourinary:   Genitourinary Comments: Deferred   Musculoskeletal: She exhibits no edema or deformity.   Neurological: She is alert and oriented to person, place, and time.   Answers all questions appropriate.  No confusion. Denies hallucinations.  Skin: Skin is warm and dry.   Thickened scan lower extremities.  Bruising upper extremities.   Psychiatric: She has a normal mood and affect. Her behavior is normal. Judgment and thought content normal    Results Review:  I have reviewed the labs, radiology results, and diagnostic studies.    Laboratory Data:     Results from last 7 days  Lab Units 02/03/18  0436 02/01/18  0429 01/31/18  0253   WBC 10*3/mm3 10.70 13.82* 6.55   HEMOGLOBIN g/dL 9.4* 9.9* 10.0*   HEMATOCRIT % 29.1* 31.0* 31.3*   PLATELETS 10*3/mm3 380 430* 412*          Results from last 7 days  Lab Units 02/04/18  0543 02/03/18  0436 02/02/18  0642  01/30/18  0413 01/29/18  2235 01/29/18  1616   SODIUM mmol/L 137 135 136  <  > 141  --  138   POTASSIUM mmol/L 4.4 4.8 4.1  < > 3.8  --  4.2   CHLORIDE mmol/L 99 97* 95*  < > 100  --  99   CO2 mmol/L 27.0 26.0 28.0  < > 32.0*  --  30.0   BUN mg/dL 74* 77* 69*  < > 32*  --  34*   CREATININE mg/dL 1.28 1.47* 1.63*  < > 1.23  --  1.26   CALCIUM mg/dL 8.4 8.5 9.0  < > 8.6  --  8.7   BILIRUBIN mg/dL  --   --   --   --  0.2 0.3 0.3   ALK PHOS U/L  --   --   --   --  76  --  85   ALT (SGPT) U/L  --   --   --   --  27  --  27   AST (SGOT) U/L  --   --   --   --  27  --  32   GLUCOSE mg/dL 165* 263* 58*  < > 60*  --  134*   < > = values in this interval not displayed.  Blood Culture   Date Value Ref Range Status   01/29/2018 No growth at 5 days  Final   01/29/2018 No growth at 5 days  Final     Imaging Results (all)     Procedure Component Value Units Date/Time    XR Spine Thoracic 3 View [[id number]] Collected:  01/29/18 1523     Updated:  01/29/18 1527    Narrative:       XR SPINE THORACIC 3 VW- 1/29/2018 2:48 PM CST     HISTORY: back pain, fall     COMPARISON: None     FINDINGS:  Frontal, lateral and swimmers lateral radiographs of the thoracic spine  demonstrate normal alignment without evidence of compression fracture or  subluxation. The pedicles are intact. The disc spaces are maintained  hypertrophic osteophyte formation is noted. There is ossification of the  anterior longitudinal ligament. The visualized thorax is clear.        Impression:       1. Degenerative changes noted in the lumbar spine. There is no acute  compression deformity.     This report was finalized on 01/29/2018 15:24 by Dr. Klever Hooker MD.    XR Chest 1 View [[id number]] Collected:  01/29/18 1524     Updated:  01/29/18 1528    Narrative:       XR CHEST 1 VW- 1/29/2018 2:40 PM CST     HISTORY: shortness of breath       COMPARISON: 03/23/2017.     FINDINGS:   Moderate opacities are seen in the lung bases. There is a small LEFT  pleural effusion. The heart is enlarged.      The osseous structures and surrounding soft tissues  demonstrate no acute  abnormality.       Impression:       1. Cardiomegaly and bilateral airspace opacities. Findings could be due  to pulmonary edema or pneumonia.        This report was finalized on 01/29/2018 15:25 by Dr. Yair Ruby MD.    CT Angiogram Chest With & Without Contrast [[id number]] Collected:  01/29/18 2116     Updated:  01/29/18 2124    Narrative:       CT ANGIOGRAM CHEST W WO CONTRAST- 1/29/2018 8:37 PM CST      HISTORY: rule out PE; J18.9-Pneumonia, unspecified organism;  R74.8-Abnormal levels of other serum enzymes; I50.9-Heart failure,  unspecified; R79.89-Other specified abnormal findings of blood  chemistry; D64.9-Anemia, unspecified      COMPARISON: None.      DLP: 439 mGy cm     TECHNIQUE: Helical tomographic images of the chest were obtained after  the administration of intravenous contrast following angiogram protocol.  Additionally, 3D reformatted images were provided.        FINDINGS:    Pulmonary arteries: There is adequate enhancement of the pulmonary  arteries to evaluate for central and segmental pulmonary emboli. There  are no filling defects within the main, lobar, segmental or visualized  subsegmental pulmonary arteries. Pulmonary arteries are borderline  enlarged.     Aorta and great vessels: There is atherosclerosis in the aorta without  aneurysm or dissection. There is atherosclerosis in the great vessels.     Visualized neck base: The imaged portion of the base of the neck appears  unremarkable.      Lungs: Bilateral lower lobe consolidations, right greater than left..  The trachea and proximal bronchial tree are patent. Small to moderate  bilateral pleural effusions, right greater than left.     Heart: The heart is enlarged. There is no pericardial effusion.      Mediastinum and lymph nodes: No enlarged mediastinal, hilar, or axillary  lymph nodes are present.      Skeletal and soft tissues: The osseous structures of the thorax and  surrounding soft tissues demonstrate no  acute process.     Upper abdomen: The imaged portion of the upper abdomen demonstrates no  acute process.        Impression:       1. No evidence of pulmonary embolus.  2. Small-to-moderate bilateral pleural effusion and associated opacities  in the bilateral lower lobes, concerning for pneumonia and parapneumonic  effusions. Additional differential does include aspiration.  This report was finalized on 01/29/2018 21:21 by Dr. Janae Sorensen MD.    XR Chest 1 View [[id number]] Collected:  01/30/18 0935     Updated:  01/30/18 0939    Narrative:       EXAMINATION:   XR CHEST 1 VW-  1/30/2018 9:35 AM CST     HISTORY: fu     Prior exam 01/29/2018.     Infiltrate right lung is again noted. There is no improvement compared  to January 29. Is may be either pulmonary edema or pneumonia.     Cardiac silhouettes moderately enlarged also unchanged.     IMPRESSION cardiomegaly and air space filling infiltrate in the right  lung. This may be either pulmonary edema or pneumonia. This is unchanged  This report was finalized on 01/30/2018 09:36 by Dr. Klever Hooker MD.    XR Chest 1 View [[id number]] Collected:  01/31/18 0726     Updated:  01/31/18 0730    Narrative:       EXAMINATION:   XR CHEST 1 -  1/31/2018 7:26 AM CST     HISTORY: Bilateral effusions     Single view the chest obtained. Left diaphragms indistinct consistent  with atelectasis left lower lobe. Trace of fluid is noted in the right  minor fissure. Infiltrate in the right lung persists. Again this may be  mild pulmonary vascular congestion or possibly pneumonia.     Cardiac silhouettes mildly enlarged.     IMPRESSION atelectasis left lower lobe     2 persistent air space filling infiltrate right lung. This is pulmonary  vascular congestion versus inflammatory process.  This report was finalized on 01/31/2018 07:27 by Dr. Klever Hooker MD.    US Chest [[id number]] Collected:  02/02/18 1343     Updated:  02/02/18 1344    Narrative:       US CHEST-      REASON FOR  EXAM: effusion; J18.9-Pneumonia, unspecified organism;  R74.8-Abnormal levels of other serum enzymes; I50.9-Heart failure,  unspecified; R79.89-Other specified abnormal findings of blood  chemistry; D64.9-Anemia, unspecified; R06.00-Dyspnea, unspecified;  Z74.09-Other reduced mobility       COMPARISON: Chest x-ray dated 01/31/2018      TECHNIQUE: Realtime sonographic images of the left chest were obtained.      FINDINGS:       Minimal left pleural effusion, insufficient for therapeutic  thoracentesis       Impression:       1. Insufficient pleural effusion for therapeutic thoracentesis        This report was finalized on 02/02/2018 13:46 by Dr Franck Pretty, .    XR Chest PA & Lateral [[id number]] Collected:  02/03/18 1543     Updated:  02/03/18 1549    Narrative:       EXAMINATION: XR CHEST PA AND LATERAL- 2/3/2018 3:43 PM CST     HISTORY: Follow-up pneumonia     Comparison: 01/31/2018     Findings:  The heart is mildly enlarged. The aorta is tortuous with atherosclerotic  calcifications. There is obscuration of the hemidiaphragms bilaterally  with more focal consolidation in the medial left lung base, likely  atelectasis. Small pleural effusions are present bilaterally. An opacity  is seen in the medial right upper lobe and in the right lung base. The  pulmonary vasculature is stable.       Impression:       Impression: Areas of airspace disease bilaterally are stable compared to  the previous exam. Small pleural effusions are noted. Findings are  suspicious for pneumonia with parapneumonic effusion. Follow-up 6-8  weeks after treatment is recommended to document resolution of the  radiographic findings.  This report was finalized on 02/03/2018 15:46 by Dr. Mohan Pinzon MD.        Intake/Output    Intake/Output Summary (Last 24 hours) at 02/04/18 1119  Last data filed at 02/04/18 0228   Gross per 24 hour   Intake              200 ml   Output              700 ml   Net             -500 ml       Scheduled  Meds    amLODIPine 5 mg Oral Daily   aspirin 325 mg Oral Nightly   atorvastatin 20 mg Oral Daily   budesonide-formoterol 2 puff Inhalation BID - RT   cefdinir 300 mg Oral Q12H   clonazePAM 0.5 mg Oral BID   famotidine 20 mg Oral Daily   gabapentin 300 mg Oral Daily With Breakfast   gabapentin 600 mg Oral Q PM   guaiFENesin 600 mg Oral Q12H   heparin (porcine) 5,000 Units Subcutaneous Q8H   hydroxyurea 1,000 mg Oral Once per day on Fri   hydroxyurea 500 mg Oral Once per day on Sun Mon Tue Wed Thu Sat   insulin detemir 10 Units Subcutaneous QAM   insulin detemir 35 Units Subcutaneous Nightly   insulin lispro 0-9 Units Subcutaneous 4x Daily With Meals & Nightly   ipratropium-albuterol 3 mL Nebulization 4x Daily - RT   lisinopril 40 mg Oral Daily   metFORMIN 500 mg Oral Nightly   metoprolol succinate XL 50 mg Oral Daily   pantoprazole 40 mg Oral Daily   predniSONE 40 mg Oral Daily With Breakfast       I have reviewed the patient current medications.     Assessment/Plan     Hospital Problem List     Pneumonia of both lungs due to infectious organism        Assessment:  1.  Pneumonia of both lungs due to infectious organism  2.  Bilateral pleural effusions, insufficient for therapeutic thoracentesis  3.  Hypoxia, oxygen at 3 L on admission  4.  Metabolic encephalopathy, resolved  5.  Diastolic dysfunction, preserved ejection fraction 65%, echo 1/30/18 elevated proBNP  6.  Elevated troponin secondary to #1  7.  Anemia - stable anemia substrates  8.  Hypertension  9.  Diabetes mellitus type II - A1c 8.2  10.  Hyperlipidemia  11.  Osteoarthritis  12.  Hx stroke/PVD  13.  GERD  14.  Chronic kidney disease stage III   15.  Poor venous access   16.  Elevated right ventricular systolic pressure > 55 mmHg  17.  Elevated D Dimer with negative CT angiogram  18.  Nocturnal hypoxia, requires oxygen at night at discharge    Plan:  1.  Completed 5 days azithromycin.  Oral Omnicef to complete total of 7 days treatment.  2.  Overnight  pulse oximetry on room air showed saturation less than 88% 29 minutes 20 seconds.  She will require oxygen at 2 L at bedtime upon discharge.  3.  She had walking oximetry on room air today.  Saturation greater than 92% during exercise and post exercise.  She will not require oxygen with ambulation.  4.  Oral prednisone tapering  5.  Continue Symbicort  6.  Repeat PA and lateral chest x-ray 2/3 airspace disease is bilaterally stable compared to previous exam.  Small effusions noted.  Findings suspicious for pneumonia with parapneumonic effusion.  Follow-up 6-8 weeks to document resolution.  7.  Insufficient fluid for thoracentesis  8.  Glucose 111, 165, 357, 407.  She was placed back on 35 units detemir at night which is her home dose.  Morning dose insulin decreased to 10 units.  9.  Resume metformin which has been on hold due to elevated creatinine.  Creatinine now 1.2, at baseline  10.  Physical therapy for ambulation.  She ambulated 75 feet ×2 yesterday.  She has ambulated today with respiratory therapy and did not require oxygen.  11.  Patient and family have now decided and requested skilled nursing facility prior to discharge.  She had previously declined SNF and was agreeable to home with home health.  Asked  to list for SNF.  12.  Basic metabolic panel tomorrow to monitor renal function.  13.  Milk molasses enema today for constipation    The above documentation resulted from a face-to-face encounter by me Meghan CHAMBERLAIN, [facility].      Discharge Planning: I expect patient to be discharged to skilled nursing facility when bed offered and insurance approves.    BULMARO Gomez   02/04/18   11:19 AM

## 2018-02-04 NOTE — DISCHARGE SUMMARY
[facility] Medicine Services  DISCHARGE SUMMARY       Date of Admission: 1/29/2018  Date of Discharge:  2/05/2018  Primary Care Physician: Barry Foley MD    Discharge Diagnoses:  Hospital Problem List     Pneumonia of both lungs due to infectious organism        Discharge diagnoses   1.  Pneumonia of both lungs due to infectious organism  2.  Bilateral pleural effusions, insufficient for therapeutic thoracentesis  3.  Hypoxia, oxygen at 3 L on admission  4.  Metabolic encephalopathy, resolved  5.  Diastolic dysfunction, preserved ejection fraction 65%, echo 1/30/18 elevated proBNP  6.  Elevated troponin secondary to #1  7.  Anemia - stable anemia substrates  8.  Hypertension  9.  Diabetes mellitus type II - A1c 8.2  10.  Hyperlipidemia  11.  Osteoarthritis  12.  Hx stroke/PVD  13.  GERD  14.  Chronic kidney disease stage III   15.  Poor venous access   16.  Elevated right ventricular systolic pressure > 55 mmHg  17.  Elevated D Dimer with negative CT angiogram  18.  Nocturnal hypoxia, requires oxygen at night at discharge  Presenting Problem/History of Present Illness:  Pneumonia of both lungs due to infectious organism, unspecified part of lung [J18.9]     Chief Complaint on Day of Discharge: feels better, not wearing oxygen  History of Present Illness on Day of Discharge:   Sitting up in bed.  She feels better today.  She is not wearing oxygen.  She denies nausea, vomiting or abdominal pain.  She reports bowel movement yesterday.  She denies chest pain or palpitations.  She denies shortness of breath.  She is not wheezing this morning.  She ambulated in the hallway.  She hopes to get to skilled facility for rehabilitation setting    Consults: None    Procedures Performed: None    Pertinent Test Results:     Laboratory Data:      Results from last 7 days  Lab Units 02/05/18  0646 02/03/18  0436 02/01/18  0429   WBC 10*3/mm3 12.76* 10.70 13.82*   HEMOGLOBIN g/dL 10.0*  9.4* 9.9*   HEMATOCRIT % 30.3* 29.1* 31.0*   PLATELETS 10*3/mm3 372 380 430*          Results from last 7 days  Lab Units 02/05/18  0646 02/04/18  0543 02/03/18  0436  01/30/18  0413 01/29/18  2235 01/29/18  1616   SODIUM mmol/L 138 137 135  < > 141  --  138   POTASSIUM mmol/L 4.4 4.4 4.8  < > 3.8  --  4.2   CHLORIDE mmol/L 101 99 97*  < > 100  --  99   CO2 mmol/L 28.0 27.0 26.0  < > 32.0*  --  30.0   BUN mg/dL 68* 74* 77*  < > 32*  --  34*   CREATININE mg/dL 1.15 1.28 1.47*  < > 1.23  --  1.26   CALCIUM mg/dL 8.5 8.4 8.5  < > 8.6  --  8.7   BILIRUBIN mg/dL  --   --   --   --  0.2 0.3 0.3   ALK PHOS U/L  --   --   --   --  76  --  85   ALT (SGPT) U/L  --   --   --   --  27  --  27   AST (SGOT) U/L  --   --   --   --  27  --  32   GLUCOSE mg/dL 255* 165* 263*  < > 60*  --  134*   < > = values in this interval not displayed.   Specimen: Arterial Blood Updated: 01/31/18 1413       Site Right Radial      Brian's Test Positive      pH, Arterial 7.384 pH units       pCO2, Arterial 42.5 mm Hg       pO2, Arterial 66.1 (L) mm Hg       HCO3, Arterial 25.4 mmol/L       Base Excess, Arterial 0.2 mmol/L       O2 Saturation, Arterial 92.1 (L) %       Temperature 37.0 C       Barometric Pressure for Blood Gas 748 mmHg       Modality Nasal Cannula      Flow Rate 2.0 lpm       Ventilator Mode NA      Collected by [id number]     Iron Profile [[id number]] (Normal) Collected: 01/31/18 0930     Lab Status: Final result Specimen: Blood Updated: 01/31/18 1041      Iron 59 mcg/dL       TIBC 285 mcg/dL       Iron Saturation 21 %      Ferritin [[id number]] (Normal) Collected: 01/31/18 0930     Lab Status: Final result Specimen: Blood Updated: 01/31/18 1105      Ferritin 64.80 ng/mL      Folate [[id number]] Collected: 01/31/18 0930     Lab Status: Final result Specimen: Blood Updated: 01/31/18 1135      Folate >20.00 ng/mL      Vitamin B12 [[id number]] (Abnormal) Collected: 01/31/18 0930     Lab Status: Final result Specimen: Blood Updated: 01/31/18  1135      Vitamin B-12 971 (H) pg/mL       Specimen: Blood Updated: 01/31/18 0352       Protime 12.7 Seconds       INR 0.93     Hemoglobin A1c [[id number]] Collected: 01/31/18 0253     Lab Status: Final result Specimen: Blood Updated: 01/31/18 0840      Hemoglobin A1C 8.2 %      Narrative:       Less than 6.0           Non-Diabetic Range  6.0-7.0                 ADA Therapeutic Target  Greater than 7.0        Action Suggested     Troponin [[id number]] (Abnormal) Collected: 01/30/18 1028     Lab Status: Final result Specimen: Blood Updated: 01/30/18 1136      Troponin I 0.039 (H) ng/mL      Blood Gas, Arterial [[id number]] (Abnormal) Collected: 01/30/18 0445     Lab Status: Final result Specimen: Arterial Blood Updated: 01/30/18 0508      Site Right Radial      Brian's Test Positive      pH, Arterial 7.440 pH units       pCO2, Arterial 46.6 (H) mm Hg       pO2, Arterial 70.8 (L) mm Hg       HCO3, Arterial 31.7 (H) mmol/L       Base Excess, Arterial 6.7 (H) mmol/L       O2 Saturation, Arterial 95.5 %       Temperature 37.0 C       Barometric Pressure for Blood Gas 763 mmHg       Modality Nasal Cannula      Flow Rate 2.0 lpm       Ventilator Mode NA      Collected by [id number]     Troponin [[id number]] (Abnormal) Collected: 01/30/18 0413     Lab Status: Final result Specimen: Blood Updated: 01/30/18 0607      Troponin I 0.050 (H) ng/mL       Collected: 01/30/18 0413      Lab Status: Final result Specimen: Blood Updated: 01/30/18 0718      Hemoglobin A1C 8.3 %      Narrative:       Less than 6.0           Non-Diabetic Range  6.0-7.0                 ADA Therapeutic Target  Greater than 7.0        Action Suggested     Lipid Panel [[id number]] (Abnormal) Collected: 01/30/18 0413     Lab Status: Final result Specimen: Blood Updated: 01/30/18 0607      Total Cholesterol 87 (L) mg/dL       Triglycerides 99 mg/dL       HDL Cholesterol 35 (L) mg/dL       LDL Cholesterol  <30 mg/dL       LDL/HDL Ratio --     Phosphorus [[id number]]  (Normal) Collected: 01/30/18 0413     Lab Status: Final result Specimen: Blood Updated: 01/30/18 0557      Phosphorus 4.5 mg/dL      Magnesium [[id number]] (Normal) Collected: 01/30/18 0413     Lab Status: Final result Specimen: Blood Updated: 01/30/18 0557      Magnesium 2.0 mg/dL      Scan Slide [[id number]] Collected: 01/30/18 0413     Lab Status: Final result Specimen: Blood Updated: 01/30/18 0610      Scan Slide --     Methylmalonic Acid, Serum [[id number]] Collected: 01/29/18 2308     Lab Status: Final result Specimen: Blood Updated: 02/01/18 1912      Methylmalonic Acid 363 nmol/L      Narrative:       Performed at:  [address] [place] [address]  [id number]  : Bradly Monk MD, Phone:  [phone]     Troponin [[id number]] (Abnormal) Collected: 01/29/18 2235     Lab Status: Final result Specimen: Blood Updated: 01/29/18 2339      Troponin I 0.042 (H) ng/mL      Vitamin B12 [[id number]] (Normal) Collected: 01/29/18 2235     Lab Status: Final result Specimen: Blood Updated: 01/30/18 0031      Vitamin B-12 920 pg/mL      B-12 Binding Capacity [[id number]] Collected: 01/29/18 2235     Lab Status: Final result Specimen: Blood Updated: 02/01/18 1115      Vitamin B12 Binding Capacity 1799 pg/mL            Bilirubin, Total & Direct [[id number]] (Normal) Collected: 01/29/18 2235     Lab Status: Final result Specimen: Blood Updated: 01/29/18 2327      Total Bilirubin 0.3 mg/dL       Bilirubin, Direct 0.0 mg/dL       Bilirubin, Indirect 0.1 mg/dL      Folate RBC [[id number]] (Abnormal) Collected: 01/29/18 2235     Lab Status: Final result Specimen: Blood Updated: 01/31/18 1415      Folate, Hemolysate 570.7 ng/mL       Hematocrit 31.0 (L) %       RBC Folate 1841 ng/mL      Narrative:       Performed at:  [address] [place] [address]  [id number]  : Zaid Roche PhD, Phone:  [phone]     Folate [[id number]] Collected: 01/29/18 223     Lab Status:  Final result Specimen: Blood Updated: 01/30/18 0031      Folate >20.00 ng/mL      Ferritin [[id number]] (Normal) Collected: 01/29/18 2235     Lab Status: Final result Specimen: Blood Updated: 01/30/18 0001      Ferritin 50.30 ng/mL      Haptoglobin [[id number]] Collected: 01/29/18 2235     Lab Status: Final result Specimen: Blood Updated: 01/31/18 0723      Haptoglobin 120 mg/dL      Narrative:       Performed at:  [address] [place] [address]  [id number]  : Zaid Roche PhD, Phone:  [phone]     Iron Profile [[id number]] (Abnormal) Collected: 01/29/18 2235     Lab Status: Final result Specimen: Blood Updated: 01/29/18 2334      Iron 46 mcg/dL       TIBC 263 mcg/dL       Iron Saturation 17 (L) %      Lactate Dehydrogenase [[id number]] (Normal) Collected: 01/29/18 2235     Lab Status: Final result Specimen: Blood Updated: 01/29/18 2327       U/L      Reticulocytes [[id number]] (Abnormal) Collected: 01/29/18 2235     Lab Status: Final result Specimen: Blood Updated: 01/29/18 2315      Reticulocyte % 3.21 (H) %       Reticulocyte Absolute 0.0908 10*6/mm3      Type & Screen [[id number]] Collected: 01/29/18 2235     Lab Status: Edited Result - FINAL Specimen: Blood Updated: 01/30/18 0004      ABO Type A      RH type Positive      Antibody Screen Negative     TSH [[id number]] (Normal) Collected: 01/29/18 2235     Lab Status: Final result Specimen: Blood Updated: 01/31/18 0924      TSH 2.550 mIU/mL      Vitamin D 25 Hydroxy [[id number]] (Abnormal) Collected: 01/29/18 2235     Lab Status: Final result Specimen: Blood Updated: 01/31/18 0911      25 Hydroxy, Vitamin D <12.8 (L) ng/ml      Occult Blood X 1, Stool - Stool, Per Rectum [[id number]] Updated: 02/02/18 0710     Lab Status: No result Specimen: Stool from Per Rectum       Specimen: Blood Updated: 01/29/18 1658       Protime 13.2 Seconds       INR 0.97     aPTT [[id number]] (Normal) Collected: 01/29/18 4041     Lab Status: Final  result Specimen: Blood Updated: 01/29/18 1658      PTT 28.1 seconds      BNP [[id number]] (Abnormal) Collected: 01/29/18 1616     Lab Status: Final result Specimen: Blood Updated: 01/29/18 1716      proBNP 6240.0 (H) pg/mL      Troponin [[id number]] (Abnormal) Collected: 01/29/18 1616     Lab Status: Final result Specimen: Blood Updated: 01/29/18 1716      Troponin I 0.049 (H) ng/mL      D-dimer, Quantitative [[id number]] (Abnormal) Collected: 01/29/18 1616     Lab Status: Final result Specimen: Blood Updated: 01/29/18 1658      D-Dimer, Quantitative 1.53 (H) mg/L (FEU)      Narrative:       Reference Range is 0-0.50 mg/L FEU. However, results <0.50 mg/L FEU tends to rule out DVT or PE. Results >0.50 mg/L FEU are not useful in predicting absence or presence of DVT or PE.     Lactic Acid, Plasma [[id number]] (Normal) Collected: 01/29/18 1616     Lab Status: Final result Specimen: Blood Updated: 01/29/18 1659      Lactate 0.9 mmol/L       Collected: 01/29/18 1527      Lab Status: Final result Specimen: Swab from Nasopharynx Updated: 01/29/18 1550      Influenza A Ag, EIA Negative      Influenza B Ag, EIA Negative      Specimen: Arterial Blood Updated: 01/29/18 1511       Site Right Radial      Brian's Test Positive      pH, Arterial 7.417 pH units       pCO2, Arterial 46.8 (H) mm Hg       pO2, Arterial 75.8 (L) mm Hg       HCO3, Arterial 30.1 (H) mmol/L       Base Excess, Arterial 4.8 (H) mmol/L       O2 Saturation, Arterial 94.8 %       Temperature 37.0 C       Barometric Pressure for Blood Gas 761 mmHg       Modality Nasal Cannula      Flow Rate 3.0 lpm       Ventilator Mode NA      Collected by [id number]     Culture Data:   Blood Culture   Date Value Ref Range Status   01/29/2018 No growth at 5 days  Final   01/29/2018 No growth at 5 days  Final     Imaging Results (all)     Procedure Component Value Units Date/Time    XR Spine Thoracic 3 View [[id number]] Collected:  01/29/18 1523     Updated:  01/29/18 1527    Narrative:        XR SPINE THORACIC 3 VW- 1/29/2018 2:48 PM CST     HISTORY: back pain, fall     COMPARISON: None     FINDINGS:  Frontal, lateral and swimmers lateral radiographs of the thoracic spine  demonstrate normal alignment without evidence of compression fracture or  subluxation. The pedicles are intact. The disc spaces are maintained  hypertrophic osteophyte formation is noted. There is ossification of the  anterior longitudinal ligament. The visualized thorax is clear.        Impression:       1. Degenerative changes noted in the lumbar spine. There is no acute  compression deformity.     This report was finalized on 01/29/2018 15:24 by Dr. Klever Hooker MD.    XR Chest 1 View [[id number]] Collected:  01/29/18 1524     Updated:  01/29/18 1528    Narrative:       XR CHEST 1 VW- 1/29/2018 2:40 PM CST     HISTORY: shortness of breath       COMPARISON: 03/23/2017.     FINDINGS:   Moderate opacities are seen in the lung bases. There is a small LEFT  pleural effusion. The heart is enlarged.      The osseous structures and surrounding soft tissues demonstrate no acute  abnormality.       Impression:       1. Cardiomegaly and bilateral airspace opacities. Findings could be due  to pulmonary edema or pneumonia.        This report was finalized on 01/29/2018 15:25 by Dr. Yair Ruby MD.    CT Angiogram Chest With & Without Contrast [[id number]] Collected:  01/29/18 2116     Updated:  01/29/18 2124    Narrative:       CT ANGIOGRAM CHEST W WO CONTRAST- 1/29/2018 8:37 PM CST      HISTORY: rule out PE; J18.9-Pneumonia, unspecified organism;  R74.8-Abnormal levels of other serum enzymes; I50.9-Heart failure,  unspecified; R79.89-Other specified abnormal findings of blood  chemistry; D64.9-Anemia, unspecified      COMPARISON: None.      DLP: 439 mGy cm     TECHNIQUE: Helical tomographic images of the chest were obtained after  the administration of intravenous contrast following angiogram protocol.  Additionally, 3D reformatted images  were provided.        FINDINGS:    Pulmonary arteries: There is adequate enhancement of the pulmonary  arteries to evaluate for central and segmental pulmonary emboli. There  are no filling defects within the main, lobar, segmental or visualized  subsegmental pulmonary arteries. Pulmonary arteries are borderline  enlarged.     Aorta and great vessels: There is atherosclerosis in the aorta without  aneurysm or dissection. There is atherosclerosis in the great vessels.     Visualized neck base: The imaged portion of the base of the neck appears  unremarkable.      Lungs: Bilateral lower lobe consolidations, right greater than left..  The trachea and proximal bronchial tree are patent. Small to moderate  bilateral pleural effusions, right greater than left.     Heart: The heart is enlarged. There is no pericardial effusion.      Mediastinum and lymph nodes: No enlarged mediastinal, hilar, or axillary  lymph nodes are present.      Skeletal and soft tissues: The osseous structures of the thorax and  surrounding soft tissues demonstrate no acute process.     Upper abdomen: The imaged portion of the upper abdomen demonstrates no  acute process.        Impression:       1. No evidence of pulmonary embolus.  2. Small-to-moderate bilateral pleural effusion and associated opacities  in the bilateral lower lobes, concerning for pneumonia and parapneumonic  effusions. Additional differential does include aspiration.  This report was finalized on 01/29/2018 21:21 by Dr. Janae Sorensen MD.    XR Chest 1 View [[id number]] Collected:  01/30/18 0935     Updated:  01/30/18 0939    Narrative:       EXAMINATION:   XR CHEST 1 VW-  1/30/2018 9:35 AM CST     HISTORY: fu     Prior exam 01/29/2018.     Infiltrate right lung is again noted. There is no improvement compared  to January 29. Is may be either pulmonary edema or pneumonia.     Cardiac silhouettes moderately enlarged also unchanged.     IMPRESSION cardiomegaly and air space  filling infiltrate in the right  lung. This may be either pulmonary edema or pneumonia. This is unchanged  This report was finalized on 01/30/2018 09:36 by Dr. Klever Hooker MD.    XR Chest 1 View [[id number]] Collected:  01/31/18 0726     Updated:  01/31/18 0730    Narrative:       EXAMINATION:   XR CHEST 1 VW-  1/31/2018 7:26 AM CST     HISTORY: Bilateral effusions     Single view the chest obtained. Left diaphragms indistinct consistent  with atelectasis left lower lobe. Trace of fluid is noted in the right  minor fissure. Infiltrate in the right lung persists. Again this may be  mild pulmonary vascular congestion or possibly pneumonia.     Cardiac silhouettes mildly enlarged.     IMPRESSION atelectasis left lower lobe     2 persistent air space filling infiltrate right lung. This is pulmonary  vascular congestion versus inflammatory process.  This report was finalized on 01/31/2018 07:27 by Dr. Klever Hooker MD.    US Chest [[id number]] Collected:  02/02/18 1343     Updated:  02/02/18 1349    Narrative:       US CHEST-      REASON FOR EXAM: effusion; J18.9-Pneumonia, unspecified organism;  R74.8-Abnormal levels of other serum enzymes; I50.9-Heart failure,  unspecified; R79.89-Other specified abnormal findings of blood  chemistry; D64.9-Anemia, unspecified; R06.00-Dyspnea, unspecified;  Z74.09-Other reduced mobility       COMPARISON: Chest x-ray dated 01/31/2018      TECHNIQUE: Realtime sonographic images of the left chest were obtained.      FINDINGS:       Minimal left pleural effusion, insufficient for therapeutic  thoracentesis       Impression:       1. Insufficient pleural effusion for therapeutic thoracentesis        This report was finalized on 02/02/2018 13:46 by Dr Franck Pretty, .    XR Chest PA & Lateral [[id number]] Collected:  02/03/18 1543     Updated:  02/03/18 1549    Narrative:       EXAMINATION: XR CHEST PA AND LATERAL- 2/3/2018 3:43 PM CST     HISTORY: Follow-up pneumonia     Comparison:  01/31/2018     Findings:  The heart is mildly enlarged. The aorta is tortuous with atherosclerotic  calcifications. There is obscuration of the hemidiaphragms bilaterally  with more focal consolidation in the medial left lung base, likely  atelectasis. Small pleural effusions are present bilaterally. An opacity  is seen in the medial right upper lobe and in the right lung base. The  pulmonary vasculature is stable.       Impression:       Impression: Areas of airspace disease bilaterally are stable compared to  the previous exam. Small pleural effusions are noted. Findings are  suspicious for pneumonia with parapneumonic effusion. Follow-up 6-8  weeks after treatment is recommended to document resolution of the  radiographic findings.  This report was finalized on 02/03/2018 15:46 by Dr. Mohan Pinzon MD.        Overnight pulse oximetry 2/3/18 continuoussaturation less than 88% 29 minutes and 20 seconds  Transthoracic echocardiogram 1/30/18  · Left ventricular systolic function is normal. EF >65%.  · Left ventricular diastolic dysfunction (grade II) consistent with pseudonormalization.  · Left ventricular wall thickness is consistent with borderline concentric hypertrophy.  · Mild aortic valve stenosis is present.  · Mild mitral valve regurgitation is present  · Left atrial cavity size is severely dilated.  · Estimated right ventricular systolic pressure from tricuspid regurgitation is markedly elevated (>55 mmHg).    Hospital Course  Patient is a 87 y.o. female presented to [facility] emergency room 1/29/18 with complaints of worsening shortness of breath over the last 4-5 days.  She denied cough.  She did report recent sinus drainage and feeling weak at home.  She denied chest pain, nausea, vomiting or abdominal pain.  She denied any recent ill contacts.  She reported tripping on the carpet earlier the day but denied any acute pain after the fall.  In the ER she was complaining of left-sided back pain  from fall.  X-ray of the spine showed degenerative changes in the lumbar spine.  No acute compression deformity.  Chest x-ray  Noted cardiomegaly and bilateral airspace opacities.  Could be due to pulmonary edema or pneumonia.  D-dimer 1.53, influenza A negative, influenza B negative.  PO2 75 on 3 L cannula.  CT angiogram the chest performed for elevated d-dimer.  No evidence of pulmonary embolus.  Small to moderate bilateral pleural effusions associated with O pace of these in the bilateral lower bases concerning for pneumonia and parapneumonic effusions.    She was admitted to the medical floor with pneumonia and hypoxia.  She was placed on azithromycin and Rocephin.  She remained on oxygen at 3 L.  She was placed on Lasix 20 mg IV every 12 hours.  She was placed on sliding scale insulin.  Echocardiogram showed ejection fraction greater than 65%.  She continued to have significant wheezing.  She remained on DuoNeb treatments.  Attempts were made at weaning supplemental oxygen unsuccessfully.  CT angiogram chest revealed bilateral effusions.  Orders were for thoracentesis; however, there was insufficient pleural fluid for therapeutic thoracentesis.  She was placed on Symbicort.  Wheezing eventually subsided.  Solu-Medrol was decreased and converted to oral prednisone.  She did report some  hallucinations at night and it was felt that this was likely related to steroids.  Hallucinations cleared after steroids began to be tapered.  She had overnight pulse oximetry 2/3/18 which revealed saturation less than 88% for 29 minutes and 20 seconds.  She will be ordered oxygen at 2 L at bedtime.  She had walking oximetry on room air with respiratory therapy on 2/4.  Saturation greater than 92% during exercise and post exercise.  Repeat PA and lateral chest x-ray 2/3 showed airspace disease bilaterally stable compared to previous exam.  Small effusions.  Follow-up chest x-ray 6-8 weeks to document resolution.  Again, there  "have been insufficient fluid for therapeutic thoracentesis.    Glucoses remained elevated while she was on steroids.  She been placed on insulin twice daily.  She was resumed on home dose of insulin 35 units at night.  Morning insulin had also been started 10 units as her metformin needed to be held due to elevated creatinine 1.6.  Lasix was held when creatinine elevated.  Creatinine decreased to 1.2 and  metformin and Lasix have been resumed. Creatinine 1.1 at discharge.    Physical therapist consulted for deconditioned state.  She was able to ambulate 75 feet with walker.  Patient had declined skilled nursing facility and initially declined home health.  She did agree to home health at discharge.  She does live alone.  On the day of planned discharge patient and family had considered skilled nursing facility for ongoing physical therapy and occupational therapy prior to being discharged as she lives alone.   had previously been consulted and patient was agreeable to home health.   consulted as patient and family requested [facility] for skilled rehab.  She was accepted and offered bed at [facility].  On 2/5/18 she is suitable for discharge to [facility] for ongoing physical therapy and occupational therapy prior to discharge home.    Physical Exam on Discharge:  /50 (BP Location: Right arm, Patient Position: Sitting)  Pulse 61  Temp 98.5 °F (36.9 °C) (Temporal Artery )   Resp 18  Ht 152.4 cm (60\")  Wt 90.5 kg (199 lb 8 oz)  SpO2 94%  BMI 38.96 kg/m2  Physical Exam  Constitutional: She is oriented to person, place, and time. She appears well-developed and well-nourished.   HENT:   Head: Normocephalic and atraumatic.   Eyes: EOM are normal. Pupils are equal, round, and reactive to light.   Neck: Normal range of motion. Neck supple. No tracheal deviation present. No thyromegaly present.   Cardiovascular: Normal rate, regular rhythm, normal heart sounds and intact distal pulses. "  Exam reveals no gallop and no friction rub.    No murmur heard.  Normal sinus rhythm 58-68 on telemetry   Pulmonary/Chest: No respiratory distress. She has no wheezes.   No oxygen in use. Harsh breath sounds posteriorly.  Decreased breath sounds. No wheezing. Nonproductive cough. Oxygen remains off.  Abdominal: Soft. Bowel sounds are normal. She exhibits no distension.   Genitourinary:   Genitourinary Comments: Deferred   Musculoskeletal: She exhibits no edema or deformity.   Neurological: She is alert and oriented to person, place, and time.   Answers all questions appropriate.  No confusion. Denies hallucinations.  Skin: Skin is warm and dry.   Thickened scan lower extremities.  Bruising upper extremities.   Psychiatric: She has a normal mood and affect. Her behavior is normal. Judgment and thought content normal    Condition on Discharge:  Improved    [facility]position[facility]  [facility]    Discharge Diet:   Diet Instructions     Diet: Consistent Carbohydrate       Discharge Diet:  Consistent Carbohydrate              diabetic diet    Activity at Discharge:   Activity Instructions     Activity as Tolerated                  progressive activity as tolerated    Discharge Care Plan / Instructions:   1.  Physical therapy and occupational therapy twice daily  2.  Should she have worsening returning symptoms of shortness of breath she should seek medical attention.  3.  Home O2 at 2 L at night for desaturation with pulse ox less than 88% 29 minutes and 20 seconds continuoue.  4.  Tapering dose of prednisone  5.  Recommend follow-up PA and lateral chest x-ray 6 weeks to document resolution of pneumonia.  6.  BMP 2/9/18. Results to physician in charge of her care of Dr. Foley for orders.    Discharge Medications:   Tamy Sher   Home Medication Instructions OSMAN:[id number]    Printed on:02/05/18 1203   Medication Information                      amLODIPine (NORVASC) 5 MG tablet  Take 5 mg by mouth Daily.              aspirin 325 MG tablet  Take 325 mg by mouth Every Night.             budesonide-formoterol (SYMBICORT) 160-4.5 MCG/ACT inhaler  Inhale 2 puffs 2 (Two) Times a Day.             folic acid (FOLVITE) 1 MG tablet  Take 1 mg by mouth Daily.             furosemide (LASIX) 40 MG tablet  Take 40 mg by mouth Daily.             gabapentin (NEURONTIN) 300 MG capsule  Take 300 mg by mouth Daily With Breakfast.             gabapentin (NEURONTIN) 300 MG capsule  Take 600 mg by mouth Every Evening.             hydroxyurea (HYDREA) 500 MG capsule  Take 500 mg by mouth Daily. Hold on Fridays.             hydroxyurea (HYDREA) 500 MG capsule  Take 1,000 mg by mouth Daily. Friday dose only.             hydrOXYzine (VISTARIL) 50 MG capsule  Take 1 capsule by mouth 3 (Three) Times a Day As Needed for Anxiety.             insulin glargine (LANTUS) 100 UNIT/ML injection  Inject 35 Units under the skin Every Night.             insulin glargine (LANTUS) 100 UNIT/ML injection  Inject 10 Units under the skin Daily. AM dose             insulin lispro (humaLOG) 100 UNIT/ML injection  Inject 0-9 Units under the skin 4 (Four) Times a Day With Meals & at Bedtime.  150-199 2 units, 200-249 4 units, 250-299 6 units,  300-349 7 units, 350-400 8 units, greater 400 9 units and call MD           ipratropium-albuterol (DUO-NEB) 0.5-2.5 mg/mL nebulizer  Take 3 mL by nebulization 4 (Four) Times a Day.             lisinopril (PRINIVIL,ZESTRIL) 40 MG tablet  Take 40 mg by mouth Daily.             metFORMIN (GLUCOPHAGE) 1000 MG tablet  Take 500 mg by mouth Every Night.             metoprolol succinate XL (TOPROL-XL) 25 MG 24 hr tablet  Take 2 tablets by mouth Daily.             pantoprazole (PROTONIX) 40 MG EC tablet  Take 40 mg by mouth Daily.             predniSONE (DELTASONE) 10 MG tablet  Take 1 tablet by mouth Daily. Take 2 tabs x 3 days start 2/6/17, then 1 tab x 3 days then dc             simvastatin (ZOCOR) 40 MG tablet  Take 0.5 tablets by mouth  Every Night.               Follow-up Appointments:   Follow-up Information     Follow up with Barry Foley MD Follow up on 2/12/2018.    Specialty:  Internal Medicine    Why:  follow up with Nurse Practitioner Pooja Cat on February 12 at 10:30am    Contact information:    [address] [place] [address] [address] MART Marline  [place] [address]  [phone]          Test Results Pending at Discharge: None    The above documentation resulted from a face-to-face encounter by me Meghan CHAMBERLAIN, [facility].    BULMARO Gomez  02/05/18  12:03 PM    Time:  This discharge process required 45 minutes for completion.     Plan discussed with Dr. Adames and patient.    Time spent in face-to-face evaluation, chart review, planning and education 45 minutes.  Please note that portions of this note may have been completed with a voice recognition program. Efforts were made to edit the dictations, but occasionally words are mistranscribed.    I personally evaluated and examined the patient in conjunction with BULMARO León and agree with the assessment, treatment plan, and disposition of the patient as recorded by her. My history, exam, and further recommendations are: I have reviewed and agree with the plan. Tory Adames MD  02/05/18  12:13 PM

## 2018-02-04 NOTE — PLAN OF CARE
Problem: Patient Care Overview (Adult)  Goal: Plan of Care Review  Outcome: Ongoing (interventions implemented as appropriate)   02/04/18 5473   Coping/Psychosocial Response Interventions   Plan Of Care Reviewed With patient   Patient Care Overview   Progress improving   Outcome Evaluation   Outcome Summary/Follow up Plan Pt has been up in chair and walking hallway today. She says she still feels weak and tired. Enema given today with results.        Problem: Fall Risk (Adult)  Goal: Absence of Falls  Outcome: Ongoing (interventions implemented as appropriate)      Problem: Pneumonia (Adult)  Goal: Signs and Symptoms of Listed Potential Problems Will be Absent or Manageable (Pneumonia)  Outcome: Ongoing (interventions implemented as appropriate)

## 2018-02-04 NOTE — DISCHARGE PLACEMENT REQUEST
"To:  Delgado  From:  Noreen Kendall, [facility]  [phone]    Price Ly (87 y.o. Female)     Date of Birth Social Security Number Address Home Phone MRN    06/07/1930  [address] [address] [phone] [id number]    Worship Marital Status          Other        Admission Date Admission Type Admitting Provider Attending Provider Department, Room/Bed    1/29/18 Emergency Javed Sahni MD Moore, Jonathan Scott, MD [address], 492/1    Discharge Date Discharge Disposition Discharge Destination                      Attending Provider: Javed Sahni MD     Allergies:  No Known Allergies    Isolation:  None   Infection:  None   Code Status:  FULL    Ht:  152.4 cm (60\")   Wt:  92.5 kg (203 lb 14.4 oz)    Admission Cmt:  None   Principal Problem:  None                Active Insurance as of 1/29/2018     Primary Coverage     Payor Plan Insurance Group Employer/Plan Group    MEDICARE MEDICARE A & B      Payor Plan Address Payor Plan Phone Number Effective From Effective To    [address] [phone] 6/1/1995     [place] [address]       Subscriber Name Subscriber Birth Date Member ID       PRICE LY 6/7/1930 [id number]                 Emergency Contacts      (Rel.) Home Phone Work Phone Mobile Phone    NikkyJeremi greene -- -- [phone]    Ashvin Ly (Son) [phone] -- [phone]               History & Physical      He Reich MD at 1/29/2018  7:33 PM              [facility] Medicine Services  HISTORY AND PHYSICAL    Date of Admission: 1/29/2018  Primary Care Physician: Barry Foley MD    Subjective     Chief Complaint: Shortness of breath    History of Present Illness       87-year-old female with past medical history of diabetes mellitus type II, esophagitis, hyperlipidemia, hypertension, DVT, peripheral vascular disease and stroke comes into the ER with shortness of breath.  Patient " states it started few days ago and progressively getting worse.  Patient lives at home by herself.  She also states this morning she was walking in her room and tripped on her carpet.  She denies any acute pain after the fall.  In the ER patient complaining of some left-sided back pain possibly from the fall.  In the ER imaging study was negative for any acute finding.  Although her chest x-ray was noted for possible underlying pneumonia.  Patient was given IV antibiotic in the ER.  A d-dimer was also noted to be elevated and a stat CTA was ordered in the ER.  Patient will be admitted for further evaluation and treatment.        Review of Systems     Otherwise complete ROS reviewed and negative except as mentioned in the HPI.    Past Medical History:   Past Medical History:   Diagnosis Date   • Constipation    • Diabetes mellitus    • Diarrhea    • Diastolic dysfunction, left ventricle    • Esophagitis    • Exertional shortness of breath    • Hyperlipidemia    • Hypertension    • Lumbar spondylitis    • Osteoarthritis    • Peripheral vascular disease    • Sinusitis    • Stroke      Past Surgical History:  Past Surgical History:   Procedure Laterality Date   • JOINT REPLACEMENT     • REPLACEMENT TOTAL KNEE Left      Social History:  reports that she has never smoked. She does not have any smokeless tobacco history on file. She reports that she does not drink alcohol or use illicit drugs.    Family History: family history includes No Known Problems in her father and mother.      Allergies:  No Known Allergies  Medications:  Prior to Admission medications    Medication Sig Start Date End Date Taking? Authorizing Provider   amLODIPine (NORVASC) 5 MG tablet Take 5 mg by mouth Daily.   Yes Historical Provider, MD   aspirin 325 MG tablet Take 325 mg by mouth Every Night.   Yes Historical Provider, MD   folic acid (FOLVITE) 1 MG tablet Take 1 mg by mouth Daily.   Yes Historical Provider, MD   furosemide (LASIX) 40 MG tablet  "Take 40 mg by mouth Daily.   Yes Historical Provider, MD   gabapentin (NEURONTIN) 300 MG capsule Take 300 mg by mouth Daily With Breakfast.   Yes Historical Provider, MD   gabapentin (NEURONTIN) 300 MG capsule Take 600 mg by mouth Every Evening.   Yes Historical Provider, MD   hydroxyurea (HYDREA) 500 MG capsule Take 500 mg by mouth Daily. Hold on Fridays.   Yes Historical Provider, MD   hydroxyurea (HYDREA) 500 MG capsule Take 1,000 mg by mouth Daily. Friday dose only.   Yes Historical Provider, MD   insulin glargine (LANTUS) 100 UNIT/ML injection Inject 35 Units under the skin Every Night.   Yes Historical Provider, MD   lisinopril (PRINIVIL,ZESTRIL) 40 MG tablet Take 40 mg by mouth Daily.   Yes Historical Provider, MD   metFORMIN (GLUCOPHAGE) 1000 MG tablet Take 500 mg by mouth Every Night.   Yes Historical Provider, MD   metoprolol succinate XL (TOPROL-XL) 25 MG 24 hr tablet Take 2 tablets by mouth Daily. 3/25/17  Yes BULMARO Dhaliwal   pantoprazole (PROTONIX) 40 MG EC tablet Take 40 mg by mouth Daily.   Yes Historical Provider, MD   simvastatin (ZOCOR) 40 MG tablet Take 0.5 tablets by mouth Every Night. 3/25/17  Yes BULMARO Dhaliwal   amLODIPine (NORVASC) 5 MG tablet Take 1 tablet by mouth Daily.  Patient taking differently: Take 5 mg by mouth Daily. 3/25/17 1/29/18  BULMARO Dhaliwal   ferrous sulfate 325 (65 FE) MG tablet Take 325 mg by mouth Daily With Breakfast.  1/29/18  Historical Provider, MD   gabapentin (NEURONTIN) 100 MG capsule Take 100 mg by mouth 3 (Three) Times a Day.  1/29/18  Historical Provider, MD   sucralfate (CARAFATE) 1 GM/10ML suspension Take 10 mL by mouth 4 (Four) Times a Day With Meals & at Bedtime. 3/25/17 1/29/18  BULMARO Dahliwal     Objective     Vital Signs: /71 (BP Location: Right arm, Patient Position: Sitting)  Pulse 55  Temp 97.8 °F (36.6 °C) (Temporal Artery )   Resp 14  Ht 152.4 cm (60\")  Wt 83.9 kg (185 lb)  SpO2 92%  BMI 36.13 kg/m2 "       Physical Exam   Constitutional: She appears well-developed and well-nourished.   HENT:   Head: Normocephalic and atraumatic.   Eyes: EOM are normal. Pupils are equal, round, and reactive to light.   Neck: Normal range of motion. Neck supple.   Cardiovascular: Normal rate, regular rhythm and normal heart sounds.    Pulmonary/Chest: Effort normal and breath sounds normal.   Abdominal: Soft. Bowel sounds are normal.   Musculoskeletal: Normal range of motion.   Neurological: She is alert.   Skin:   1+ pitting edema lower extremity bilaterally   Psychiatric: She has a normal mood and affect. Her behavior is normal. Thought content normal.             Results Reviewed:  Lab Results (last 24 hours)     Procedure Component Value Units Date/Time    Blood Gas, Arterial [[id number]]  (Abnormal) Collected:  01/29/18 1500    Specimen:  Arterial Blood Updated:  01/29/18 1511     Site Right Radial     Brian's Test Positive     pH, Arterial 7.417 pH units      pCO2, Arterial 46.8 (H) mm Hg      pO2, Arterial 75.8 (L) mm Hg      HCO3, Arterial 30.1 (H) mmol/L      Base Excess, Arterial 4.8 (H) mmol/L      O2 Saturation, Arterial 94.8 %      Temperature 37.0 C      Barometric Pressure for Blood Gas 761 mmHg      Modality Nasal Cannula     Flow Rate 3.0 lpm      Ventilator Mode NA     Collected by [id number]    Influenza Antigen, Rapid - Swab, Nasopharynx [[id number]]  (Normal) Collected:  01/29/18 1527    Specimen:  Swab from Nasopharynx Updated:  01/29/18 1550     Influenza A Ag, EIA Negative     Influenza B Ag, EIA Negative    Narrative:         Recommend confirmation of negative results by viral culture or molecular assay.    Protime-INR [[id number]]  (Normal) Collected:  01/29/18 1616    Specimen:  Blood Updated:  01/29/18 1658     Protime 13.2 Seconds      INR 0.97    aPTT [[id number]]  (Normal) Collected:  01/29/18 1616    Specimen:  Blood Updated:  01/29/18 1658     PTT 28.1 seconds     D-dimer, Quantitative [[id number]]  (Abnormal)  Collected:  01/29/18 1616    Specimen:  Blood Updated:  01/29/18 1658     D-Dimer, Quantitative 1.53 (H) mg/L (FEU)     Narrative:       Reference Range is 0-0.50 mg/L FEU. However, results <0.50 mg/L FEU tends to rule out DVT or PE. Results >0.50 mg/L FEU are not useful in predicting absence or presence of DVT or PE.    Lactic Acid, Plasma [[id number]]  (Normal) Collected:  01/29/18 1616    Specimen:  Blood Updated:  01/29/18 1659     Lactate 0.9 mmol/L     Comprehensive Metabolic Panel [[id number]]  (Abnormal) Collected:  01/29/18 1616    Specimen:  Blood Updated:  01/29/18 1704     Glucose 134 (H) mg/dL      BUN 34 (H) mg/dL      Creatinine 1.26 mg/dL      Sodium 138 mmol/L      Potassium 4.2 mmol/L      Chloride 99 mmol/L      CO2 30.0 mmol/L      Calcium 8.7 mg/dL      Total Protein 6.3 g/dL      Albumin 3.40 (L) g/dL      ALT (SGPT) 27 U/L      AST (SGOT) 32 U/L      Alkaline Phosphatase 85 U/L      Total Bilirubin 0.3 mg/dL      eGFR Non African Amer 40 (L) mL/min/1.73      Globulin 2.9 gm/dL      A/G Ratio 1.2 g/dL      BUN/Creatinine Ratio 27.0 (H)     Anion Gap 9.0 mmol/L     Narrative:       The MDRD GFR formula is only valid for adults with stable renal function between ages 18 and 70.    Troponin [[id number]]  (Abnormal) Collected:  01/29/18 1616    Specimen:  Blood Updated:  01/29/18 1716     Troponin I 0.049 (H) ng/mL     BNP [[id number]]  (Abnormal) Collected:  01/29/18 1616    Specimen:  Blood Updated:  01/29/18 1716     proBNP 6240.0 (H) pg/mL     CBC & Differential [[id number]] Collected:  01/29/18 1616    Specimen:  Blood Updated:  01/29/18 8209    Narrative:       The following orders were created for panel order CBC & Differential.  Procedure                               Abnormality         Status                     ---------                               -----------         ------                     Scan Slide[[id number]]                   Normal              Final result               CBC Auto  Differential[[id number]]         Abnormal            Final result                 Please view results for these tests on the individual orders.    CBC Auto Differential [[id number]]  (Abnormal) Collected:  01/29/18 1616    Specimen:  Blood Updated:  01/29/18 1739     WBC 9.39 10*3/mm3      RBC 2.83 (L) 10*6/mm3      Hemoglobin 10.0 (L) g/dL      Hematocrit 30.7 (L) %      .5 (H) fL      MCH 35.3 (H) pg      MCHC 32.6 (L) g/dL      RDW 14.6 %      RDW-SD 58.6 (H) fl      MPV 9.7 fL      Platelets 435 (H) 10*3/mm3      Neutrophil % 65.3 %      Lymphocyte % 17.1 %      Monocyte % 6.1 %      Eosinophil % 3.2 %      Basophil % 0.6 %      Immature Grans % 7.7 (H) %      Neutrophils, Absolute 6.13 10*3/mm3      Lymphocytes, Absolute 1.61 10*3/mm3      Monocytes, Absolute 0.57 10*3/mm3      Eosinophils, Absolute 0.30 10*3/mm3      Basophils, Absolute 0.06 10*3/mm3      Immature Grans, Absolute 0.72 (H) 10*3/mm3      nRBC 0.0 /100 WBC     Scan Slide [[id number]]  (Normal) Collected:  01/29/18 1616    Specimen:  Blood Updated:  01/29/18 1739     RBC Morphology Normal     WBC Morphology Normal     Platelet Morphology Normal    Blood Culture - Blood, [[id number]] Collected:  01/29/18 1810    Specimen:  Blood from Arm, Left Updated:  01/29/18 1834    Blood Culture - Blood, [[id number]] Collected:  01/29/18 1810    Specimen:  Blood from Arm, Right Updated:  01/29/18 1834        Imaging Results (last 24 hours)     Procedure Component Value Units Date/Time    XR Spine Thoracic 3 View [[id number]] Collected:  01/29/18 1523     Updated:  01/29/18 1527    Narrative:       XR SPINE THORACIC 3 VW- 1/29/2018 2:48 PM CST     HISTORY: back pain, fall     COMPARISON: None     FINDINGS:  Frontal, lateral and swimmers lateral radiographs of the thoracic spine  demonstrate normal alignment without evidence of compression fracture or  subluxation. The pedicles are intact. The disc spaces are maintained  hypertrophic osteophyte formation is noted.  There is ossification of the  anterior longitudinal ligament. The visualized thorax is clear.        Impression:       1. Degenerative changes noted in the lumbar spine. There is no acute  compression deformity.     This report was finalized on 01/29/2018 15:24 by Dr. Klever Hooker MD.    XR Chest 1 View [[id number]] Collected:  01/29/18 1524     Updated:  01/29/18 1528    Narrative:       XR CHEST 1 VW- 1/29/2018 2:40 PM CST     HISTORY: shortness of breath       COMPARISON: 03/23/2017.     FINDINGS:   Moderate opacities are seen in the lung bases. There is a small LEFT  pleural effusion. The heart is enlarged.      The osseous structures and surrounding soft tissues demonstrate no acute  abnormality.       Impression:       1. Cardiomegaly and bilateral airspace opacities. Findings could be due  to pulmonary edema or pneumonia.        This report was finalized on 01/29/2018 15:25 by Dr. Yair Ruby MD.        I have personally reviewed and interpreted the radiology studies and ECG obtained at time of admission.     Assessment / Plan     Assessment:   Hospital Problem List     Pneumonia of both lungs due to infectious organism        1.  Pneumonia  2.  Hypoxia  3.  Abnormal ABG  4.  Elevated d-dimer  5.  Elevated proBNP  6.  Elevated troponin  7.  Anemia   8.  Hypertension  9.  Diabetes mellitus type II  10.  Hyperlipidemia      Plan:      -Admit to telemetry  -Continue cardiac monitoring  -Continuous pulse ox  -Continue IV antibiotic  -Follow-up urinalysis  -Follow-up blood culture  -Initial chest x-ray noted  -Follow-up repeat chest x-ray in the a.m.  -Initial ABG noted  -Follow-up repeat ABG in the a.m.  -D-dimer noted to be elevated  -Follow-up CTA  -Follow-up echocardiogram  -Follow-up subsequent troponin level  -Initial EKG noted  -Follow-up repeat EKG in the a.m.  -Continue aspirin and statin  -Consider cardiology consult if indicated  -Follow-up anemia workup  -Monitor H&H  -Maintain optimal blood  pressure  -Continue home insulin therapy  -Follow-up lipid panel  -Continue home cardiac medications  -GI prophylaxis  -DVT prophylaxis        Code Status: Full code     I discussed the patients findings and my recommendations with the patient's RN    Estimated length of stay 2-3 days    He Reich MD   01/29/18   7:34 PM               Electronically signed by He Reich MD at 1/29/2018  7:51 PM        Prior to Admission Medications     Prescriptions Last Dose Informant Patient Reported? Taking?    amLODIPine (NORVASC) 5 MG tablet 1/29/2018 Pharmacy Yes Yes    Take 5 mg by mouth Daily.    aspirin 325 MG tablet 1/28/2018 Self Yes Yes    Take 325 mg by mouth Every Night.    folic acid (FOLVITE) 1 MG tablet 1/29/2018 Pharmacy Yes Yes    Take 1 mg by mouth Daily.    furosemide (LASIX) 40 MG tablet 1/29/2018 Pharmacy Yes Yes    Take 40 mg by mouth Daily.    gabapentin (NEURONTIN) 300 MG capsule 1/29/2018 Pharmacy Yes Yes    Take 300 mg by mouth Daily With Breakfast.    gabapentin (NEURONTIN) 300 MG capsule 1/28/2018 Pharmacy Yes Yes    Take 600 mg by mouth Every Evening.    hydroxyurea (HYDREA) 500 MG capsule 1/26/2018 Self Yes Yes    Take 1,000 mg by mouth Daily. Friday dose only.    hydroxyurea (HYDREA) 500 MG capsule 1/28/2018 Self Yes Yes    Take 500 mg by mouth Daily. Hold on Fridays.    insulin glargine (LANTUS) 100 UNIT/ML injection 1/28/2018 Pharmacy Yes Yes    Inject 35 Units under the skin Every Night.    lisinopril (PRINIVIL,ZESTRIL) 40 MG tablet 1/29/2018 Pharmacy Yes Yes    Take 40 mg by mouth Daily.    metFORMIN (GLUCOPHAGE) 1000 MG tablet 1/28/2018 Pharmacy Yes Yes    Take 500 mg by mouth Every Night.    metoprolol succinate XL (TOPROL-XL) 25 MG 24 hr tablet 1/29/2018 Pharmacy No Yes    Take 2 tablets by mouth Daily.    pantoprazole (PROTONIX) 40 MG EC tablet 1/29/2018 Pharmacy Yes Yes    Take 40 mg by mouth Daily.    simvastatin (ZOCOR) 40 MG tablet 1/28/2018 Pharmacy No Yes    Take 0.5  tablets by mouth Every Night.          Hospital Medications (active)       Dose Frequency Start End    amLODIPine (NORVASC) tablet 5 mg 5 mg Daily 1/30/2018     Sig - Route: Take 1 tablet by mouth Daily. - Oral    aspirin tablet 325 mg 325 mg Nightly 1/29/2018     Sig - Route: Take 1 tablet by mouth Every Night. - Oral    atorvastatin (LIPITOR) tablet 20 mg 20 mg Daily 1/30/2018     Sig - Route: Take 2 tablets by mouth Daily. - Oral    budesonide-formoterol (SYMBICORT) 160-4.5 MCG/ACT inhaler 2 puff 2 puff 2 Times Daily - RT 2/2/2018     Sig - Route: Inhale 2 puffs 2 (Two) Times a Day. - Inhalation    cefdinir (OMNICEF) capsule 300 mg 300 mg Every 12 Hours Scheduled 2/4/2018 2/5/2018    Sig - Route: Take 1 capsule by mouth Every 12 (Twelve) Hours. - Oral    clonazePAM (KlonoPIN) tablet 0.5 mg 0.5 mg 2 Times Daily 1/31/2018 2/10/2018    Sig - Route: Take 1 tablet by mouth 2 (Two) Times a Day. - Oral    dextrose (D50W) solution 25 g 25 g Every 15 Minutes PRN 1/29/2018     Sig - Route: Infuse 50 mL into a venous catheter Every 15 (Fifteen) Minutes As Needed for Low Blood Sugar (Blood Sugar Less Than 70, Patient Has IV Access - Unresponsive, NPO or Unable To Safely Swallow). - Intravenous    dextrose (GLUTOSE) oral gel 15 g 15 g Every 15 Minutes PRN 1/29/2018     Sig - Route: Take 15 g by mouth Every 15 (Fifteen) Minutes As Needed for Low Blood Sugar (Blood Sugar Less Than 70, Patient Alert, Is Not NPO & Can Safely Swallow). - Oral    famotidine (PEPCID) tablet 20 mg 20 mg Daily 1/31/2018     Sig - Route: Take 1 tablet by mouth Daily. - Oral    gabapentin (NEURONTIN) capsule 300 mg 300 mg Daily With Breakfast 1/30/2018     Sig - Route: Take 1 capsule by mouth Daily With Breakfast. - Oral    gabapentin (NEURONTIN) capsule 600 mg 600 mg Every Evening 1/29/2018     Sig - Route: Take 2 capsules by mouth Every Evening. - Oral    glucagon (human recombinant) (GLUCAGEN DIAGNOSTIC) injection 1 mg 1 mg Every 15 Minutes PRN  1/29/2018     Sig - Route: Inject 1 mg under the skin Every 15 (Fifteen) Minutes As Needed (Blood Glucose Less Than 70 - Patient Without IV Access - Unresponsive, NPO or Unable To Safely Swallow). - Subcutaneous    guaiFENesin (MUCINEX) 12 hr tablet 600 mg 600 mg Every 12 Hours Scheduled 1/29/2018     Sig - Route: Take 1 tablet by mouth Every 12 (Twelve) Hours. - Oral    heparin (porcine) 5000 UNIT/ML injection 5,000 Units 5,000 Units Every 8 Hours Scheduled 1/29/2018     Sig - Route: Inject 1 mL under the skin Every 8 (Eight) Hours. - Subcutaneous    Hold medication 1 each Continuous PRN 2/1/2018     Sig - Route: 1 each Continuous As Needed (For 24 Hours Prior to Thoracentesis). - Does not apply    hydroxyurea (HYDREA) capsule 1,000 mg 1,000 mg User Specified 2/2/2018     Sig - Route: Take 2 capsules by mouth Once per day on Fri. - Oral    hydroxyurea (HYDREA) capsule 500 mg 500 mg User Specified 1/30/2018     Sig - Route: Take 1 capsule by mouth Once per day on Sun Mon Tue Wed Thu Sat. - Oral    hydrOXYzine (VISTARIL) capsule 50 mg 50 mg 4 Times Daily PRN 1/31/2018     Sig - Route: Take 1 capsule by mouth 4 (Four) Times a Day As Needed for Anxiety or Sleep (confusion). - Oral    insulin detemir (LEVEMIR) injection 10 Units 10 Units Every Morning 2/4/2018     Sig - Route: Inject 10 Units under the skin Every Morning. - Subcutaneous    insulin detemir (LEVEMIR) injection 35 Units 35 Units Nightly 2/3/2018     Sig - Route: Inject 35 Units under the skin Every Night. - Subcutaneous    insulin lispro (humaLOG) injection 0-9 Units 0-9 Units 4 Times Daily With Meals & Nightly 2/2/2018     Sig - Route: Inject 0-9 Units under the skin 4 (Four) Times a Day With Meals & at Bedtime. - Subcutaneous    ipratropium-albuterol (DUO-NEB) nebulizer solution 3 mL 3 mL Every 4 Hours PRN 1/29/2018     Sig - Route: Take 3 mL by nebulization Every 4 (Four) Hours As Needed for Wheezing or Shortness of Air. - Nebulization     "ipratropium-albuterol (DUO-NEB) nebulizer solution 3 mL 3 mL 4 Times Daily - RT 1/30/2018     Sig - Route: Take 3 mL by nebulization 4 (Four) Times a Day. - Nebulization    lisinopril (PRINIVIL,ZESTRIL) tablet 40 mg 40 mg Daily 1/30/2018     Sig - Route: Take 2 tablets by mouth Daily. - Oral    metFORMIN (GLUCOPHAGE) tablet 500 mg 500 mg Nightly 2/4/2018     Sig - Route: Take 1 tablet by mouth Every Night. - Oral    metoprolol succinate XL (TOPROL-XL) 24 hr tablet 50 mg 50 mg Daily 1/30/2018     Sig - Route: Take 1 tablet by mouth Daily. - Oral    pantoprazole (PROTONIX) EC tablet 40 mg 40 mg Daily 1/30/2018     Sig - Route: Take 1 tablet by mouth Daily. - Oral    predniSONE (DELTASONE) tablet 40 mg 40 mg Daily With Breakfast 2/4/2018     Sig - Route: Take 2 tablets by mouth Daily With Breakfast. - Oral    sodium chloride 0.9 % flush 1-10 mL 1-10 mL As Needed 1/29/2018     Sig - Route: Infuse 1-10 mL into a venous catheter As Needed for Line Care. - Intravenous    sodium chloride 0.9 % flush 10 mL 10 mL As Needed 1/29/2018     Sig - Route: Infuse 10 mL into a venous catheter As Needed for Line Care. - Intravenous    Cosign for Ordering: Accepted by Rylee Gustafson MD on 1/29/2018  4:02 PM    Linked Group 1:  \"And\" Linked Group Details        cefTRIAXone (ROCEPHIN) 1 g/10mL IV PUSH syringe (Discontinued) 1 g Every 24 Hours 1/29/2018 2/4/2018    Sig - Route: Infuse 10 mL into a venous catheter Daily. - Intravenous    Linked Group 2:  \"And\" Linked Group Details        insulin detemir (LEVEMIR) injection 15 Units (Discontinued) 15 Units Every Morning 1/31/2018 2/3/2018    Sig - Route: Inject 15 Units under the skin Every Morning. - Subcutaneous    insulin detemir (LEVEMIR) injection 25 Units (Discontinued) 25 Units Nightly 1/31/2018 2/3/2018    Sig - Route: Inject 25 Units under the skin Every Night. - Subcutaneous    methylPREDNISolone sodium succinate (SOLU-Medrol) injection 40 mg (Discontinued) 40 mg Every 12 Hours " 2/1/2018 2/3/2018    Sig - Route: Infuse 1 mL into a venous catheter Every 12 (Twelve) Hours. - Intravenous          Operative/Procedure Notes (most recent note)     No notes of this type exist for this encounter.           Physician Progress Notes (most recent note)      BULMARO Swanson at 2/4/2018 11:19 AM  Version 1 of 1             [facility] Medicine Services  INPATIENT PROGRESS NOTE    Length of Stay: 6  Date of Admission: 1/29/2018  Primary Care Physician: Barry Foley MD    Subjective   Chief Complaint: less short of breath  HPI   Sitting up on side of bed.  No family present.  She slept better last night.  No wheezing today.  She had overnight pulse oximetry on room air last night.  She is currently not wearing oxygen.  She denies nausea, vomiting or abdominal pain.  She reports no bowel movement in several days.  She denies chest pain or palpitations.  She reports shortness of breath has improved.  She was able to ambulate without oxygen.  Today, she tells me she needs to go to nursing home for therapy.  We have discussed discharge the last several days and she has declined nursing home placement but was agreeable to physical therapy.  She indicates her children feel as though she needs therapy prior to discharge home.    Review of Systems   Constitutional: Positive for activity change and fatigue. Negative for chills and unexpected weight change.   HENT: Negative for congestion and trouble swallowing.    Eyes: Negative for photophobia and visual disturbance.   Respiratory: Positive for cough, shortness of breath and wheezing (resolved).    Cardiovascular: Negative for chest pain, palpitations and leg swelling.   Gastrointestinal: Negative for abdominal distention, constipation, diarrhea, nausea and vomiting.   Endocrine: Negative for cold intolerance, heat intolerance and polyuria.   Genitourinary: Negative for dysuria and urgency.   Musculoskeletal: Positive  for gait problem.   Skin: Negative for rash and wound.   Neurological: Positive for weakness.   Hematological: Negative for adenopathy. Does not bruise/bleed easily.   Psychiatric/Behavioral: Positive for hallucinations (resolved).   All pertinent negatives and positives are as above. All other systems have been reviewed and are negative unless otherwise stated.     Objective    Temp:  [97.6 °F (36.4 °C)-98.9 °F (37.2 °C)] 98.9 °F (37.2 °C)  Heart Rate:  [63-80] 64  Resp:  [16-20] 16  BP: (151-180)/(50-97) 175/73  Physical Exam  Constitutional: She is oriented to person, place, and time. She appears well-developed and well-nourished.   HENT:   Head: Normocephalic and atraumatic.   Eyes: EOM are normal. Pupils are equal, round, and reactive to light.   Neck: Normal range of motion. Neck supple. No tracheal deviation present. No thyromegaly present.   Cardiovascular: Normal rate, regular rhythm, normal heart sounds and intact distal pulses.  Exam reveals no gallop and no friction rub.    No murmur heard.  Normal sinus rhythm 60-69 on telemetry   Pulmonary/Chest: No respiratory distress. She has no wheezes.   No oxygen in use. Harsh breath sounds posteriorly.  Decreased breath sounds. No wheezing heard today. Nonproductive cough. Oxygen remains off.  Abdominal: Soft. Bowel sounds are normal. She exhibits no distension.   Genitourinary:   Genitourinary Comments: Deferred   Musculoskeletal: She exhibits no edema or deformity.   Neurological: She is alert and oriented to person, place, and time.   Answers all questions appropriate.  No confusion. Denies hallucinations.  Skin: Skin is warm and dry.   Thickened scan lower extremities.  Bruising upper extremities.   Psychiatric: She has a normal mood and affect. Her behavior is normal. Judgment and thought content normal    Results Review:  I have reviewed the labs, radiology results, and diagnostic studies.    Laboratory Data:     Results from last 7 days  Lab Units  02/03/18  0436 02/01/18  0429 01/31/18  0253   WBC 10*3/mm3 10.70 13.82* 6.55   HEMOGLOBIN g/dL 9.4* 9.9* 10.0*   HEMATOCRIT % 29.1* 31.0* 31.3*   PLATELETS 10*3/mm3 380 430* 412*          Results from last 7 days  Lab Units 02/04/18  0543 02/03/18  0436 02/02/18  0642  01/30/18  0413 01/29/18  2235 01/29/18  1616   SODIUM mmol/L 137 135 136  < > 141  --  138   POTASSIUM mmol/L 4.4 4.8 4.1  < > 3.8  --  4.2   CHLORIDE mmol/L 99 97* 95*  < > 100  --  99   CO2 mmol/L 27.0 26.0 28.0  < > 32.0*  --  30.0   BUN mg/dL 74* 77* 69*  < > 32*  --  34*   CREATININE mg/dL 1.28 1.47* 1.63*  < > 1.23  --  1.26   CALCIUM mg/dL 8.4 8.5 9.0  < > 8.6  --  8.7   BILIRUBIN mg/dL  --   --   --   --  0.2 0.3 0.3   ALK PHOS U/L  --   --   --   --  76  --  85   ALT (SGPT) U/L  --   --   --   --  27  --  27   AST (SGOT) U/L  --   --   --   --  27  --  32   GLUCOSE mg/dL 165* 263* 58*  < > 60*  --  134*   < > = values in this interval not displayed.  Blood Culture   Date Value Ref Range Status   01/29/2018 No growth at 5 days  Final   01/29/2018 No growth at 5 days  Final     Imaging Results (all)     Procedure Component Value Units Date/Time    XR Spine Thoracic 3 View [[id number]] Collected:  01/29/18 1523     Updated:  01/29/18 1527    Narrative:       XR SPINE THORACIC 3 VW- 1/29/2018 2:48 PM CST     HISTORY: back pain, fall     COMPARISON: None     FINDINGS:  Frontal, lateral and swimmers lateral radiographs of the thoracic spine  demonstrate normal alignment without evidence of compression fracture or  subluxation. The pedicles are intact. The disc spaces are maintained  hypertrophic osteophyte formation is noted. There is ossification of the  anterior longitudinal ligament. The visualized thorax is clear.        Impression:       1. Degenerative changes noted in the lumbar spine. There is no acute  compression deformity.     This report was finalized on 01/29/2018 15:24 by Dr. Klever Hooker MD.    XR Chest 1 View [[id number]] Collected:   01/29/18 1524     Updated:  01/29/18 1528    Narrative:       XR CHEST 1 VW- 1/29/2018 2:40 PM CST     HISTORY: shortness of breath       COMPARISON: 03/23/2017.     FINDINGS:   Moderate opacities are seen in the lung bases. There is a small LEFT  pleural effusion. The heart is enlarged.      The osseous structures and surrounding soft tissues demonstrate no acute  abnormality.       Impression:       1. Cardiomegaly and bilateral airspace opacities. Findings could be due  to pulmonary edema or pneumonia.        This report was finalized on 01/29/2018 15:25 by Dr. Yair Ruby MD.    CT Angiogram Chest With & Without Contrast [[id number]] Collected:  01/29/18 2116     Updated:  01/29/18 2124    Narrative:       CT ANGIOGRAM CHEST W WO CONTRAST- 1/29/2018 8:37 PM CST      HISTORY: rule out PE; J18.9-Pneumonia, unspecified organism;  R74.8-Abnormal levels of other serum enzymes; I50.9-Heart failure,  unspecified; R79.89-Other specified abnormal findings of blood  chemistry; D64.9-Anemia, unspecified      COMPARISON: None.      DLP: 439 mGy cm     TECHNIQUE: Helical tomographic images of the chest were obtained after  the administration of intravenous contrast following angiogram protocol.  Additionally, 3D reformatted images were provided.        FINDINGS:    Pulmonary arteries: There is adequate enhancement of the pulmonary  arteries to evaluate for central and segmental pulmonary emboli. There  are no filling defects within the main, lobar, segmental or visualized  subsegmental pulmonary arteries. Pulmonary arteries are borderline  enlarged.     Aorta and great vessels: There is atherosclerosis in the aorta without  aneurysm or dissection. There is atherosclerosis in the great vessels.     Visualized neck base: The imaged portion of the base of the neck appears  unremarkable.      Lungs: Bilateral lower lobe consolidations, right greater than left..  The trachea and proximal bronchial tree are patent. Small to  moderate  bilateral pleural effusions, right greater than left.     Heart: The heart is enlarged. There is no pericardial effusion.      Mediastinum and lymph nodes: No enlarged mediastinal, hilar, or axillary  lymph nodes are present.      Skeletal and soft tissues: The osseous structures of the thorax and  surrounding soft tissues demonstrate no acute process.     Upper abdomen: The imaged portion of the upper abdomen demonstrates no  acute process.        Impression:       1. No evidence of pulmonary embolus.  2. Small-to-moderate bilateral pleural effusion and associated opacities  in the bilateral lower lobes, concerning for pneumonia and parapneumonic  effusions. Additional differential does include aspiration.  This report was finalized on 01/29/2018 21:21 by Dr. Janae Sorensen MD.    XR Chest 1 View [[id number]] Collected:  01/30/18 0935     Updated:  01/30/18 0939    Narrative:       EXAMINATION:   XR CHEST 1 -  1/30/2018 9:35 AM CST     HISTORY: fu     Prior exam 01/29/2018.     Infiltrate right lung is again noted. There is no improvement compared  to January 29. Is may be either pulmonary edema or pneumonia.     Cardiac silhouettes moderately enlarged also unchanged.     IMPRESSION cardiomegaly and air space filling infiltrate in the right  lung. This may be either pulmonary edema or pneumonia. This is unchanged  This report was finalized on 01/30/2018 09:36 by Dr. Klever Hooker MD.    XR Chest 1 View [[id number]] Collected:  01/31/18 0726     Updated:  01/31/18 0730    Narrative:       EXAMINATION:   XR CHEST 1 -  1/31/2018 7:26 AM CST     HISTORY: Bilateral effusions     Single view the chest obtained. Left diaphragms indistinct consistent  with atelectasis left lower lobe. Trace of fluid is noted in the right  minor fissure. Infiltrate in the right lung persists. Again this may be  mild pulmonary vascular congestion or possibly pneumonia.     Cardiac silhouettes mildly enlarged.     IMPRESSION  atelectasis left lower lobe     2 persistent air space filling infiltrate right lung. This is pulmonary  vascular congestion versus inflammatory process.  This report was finalized on 01/31/2018 07:27 by Dr. Klever Hooker MD.    US Chest [[id number]] Collected:  02/02/18 1343     Updated:  02/02/18 1349    Narrative:       US CHEST-      REASON FOR EXAM: effusion; J18.9-Pneumonia, unspecified organism;  R74.8-Abnormal levels of other serum enzymes; I50.9-Heart failure,  unspecified; R79.89-Other specified abnormal findings of blood  chemistry; D64.9-Anemia, unspecified; R06.00-Dyspnea, unspecified;  Z74.09-Other reduced mobility       COMPARISON: Chest x-ray dated 01/31/2018      TECHNIQUE: Realtime sonographic images of the left chest were obtained.      FINDINGS:       Minimal left pleural effusion, insufficient for therapeutic  thoracentesis       Impression:       1. Insufficient pleural effusion for therapeutic thoracentesis        This report was finalized on 02/02/2018 13:46 by Dr Franck Pretty, .    XR Chest PA & Lateral [[id number]] Collected:  02/03/18 1543     Updated:  02/03/18 1549    Narrative:       EXAMINATION: XR CHEST PA AND LATERAL- 2/3/2018 3:43 PM CST     HISTORY: Follow-up pneumonia     Comparison: 01/31/2018     Findings:  The heart is mildly enlarged. The aorta is tortuous with atherosclerotic  calcifications. There is obscuration of the hemidiaphragms bilaterally  with more focal consolidation in the medial left lung base, likely  atelectasis. Small pleural effusions are present bilaterally. An opacity  is seen in the medial right upper lobe and in the right lung base. The  pulmonary vasculature is stable.       Impression:       Impression: Areas of airspace disease bilaterally are stable compared to  the previous exam. Small pleural effusions are noted. Findings are  suspicious for pneumonia with parapneumonic effusion. Follow-up 6-8  weeks after treatment is recommended to document  resolution of the  radiographic findings.  This report was finalized on 02/03/2018 15:46 by Dr. Mohan Pinzon MD.        Intake/Output    Intake/Output Summary (Last 24 hours) at 02/04/18 1119  Last data filed at 02/04/18 0228   Gross per 24 hour   Intake              200 ml   Output              700 ml   Net             -500 ml       Scheduled Meds    amLODIPine 5 mg Oral Daily   aspirin 325 mg Oral Nightly   atorvastatin 20 mg Oral Daily   budesonide-formoterol 2 puff Inhalation BID - RT   cefdinir 300 mg Oral Q12H   clonazePAM 0.5 mg Oral BID   famotidine 20 mg Oral Daily   gabapentin 300 mg Oral Daily With Breakfast   gabapentin 600 mg Oral Q PM   guaiFENesin 600 mg Oral Q12H   heparin (porcine) 5,000 Units Subcutaneous Q8H   hydroxyurea 1,000 mg Oral Once per day on Fri   hydroxyurea 500 mg Oral Once per day on Sun Mon Tue Wed Thu Sat   insulin detemir 10 Units Subcutaneous QAM   insulin detemir 35 Units Subcutaneous Nightly   insulin lispro 0-9 Units Subcutaneous 4x Daily With Meals & Nightly   ipratropium-albuterol 3 mL Nebulization 4x Daily - RT   lisinopril 40 mg Oral Daily   metFORMIN 500 mg Oral Nightly   metoprolol succinate XL 50 mg Oral Daily   pantoprazole 40 mg Oral Daily   predniSONE 40 mg Oral Daily With Breakfast       I have reviewed the patient current medications.     Assessment/Plan     Hospital Problem List     Pneumonia of both lungs due to infectious organism        Assessment:  1.  Pneumonia of both lungs due to infectious organism  2.  Bilateral pleural effusions, insufficient for therapeutic thoracentesis  3.  Hypoxia, oxygen at 3 L on admission  4.  Metabolic encephalopathy, resolved  5.  Diastolic dysfunction, preserved ejection fraction 65%, echo 1/30/18 elevated proBNP  6.  Elevated troponin secondary to #1  7.  Anemia - stable anemia substrates  8.  Hypertension  9.  Diabetes mellitus type II - A1c 8.2  10.  Hyperlipidemia  11.  Osteoarthritis  12.  Hx stroke/PVD  13.  GERD  14.   Chronic kidney disease stage III   15.  Poor venous access   16.  Elevated right ventricular systolic pressure > 55 mmHg  17.  Elevated D Dimer with negative CT angiogram  18.  Nocturnal hypoxia, requires oxygen at night at discharge    Plan:  1.  Completed 5 days azithromycin.  Oral Omnicef to complete total of 7 days treatment.  2.  Overnight pulse oximetry on room air showed saturation less than 88% 29 minutes 20 seconds.  She will require oxygen at 2 L at bedtime upon discharge.  3.  She had walking oximetry on room air today.  Saturation greater than 92% during exercise and post exercise.  She will not require oxygen with ambulation.  4.  Oral prednisone tapering  5.  Continue Symbicort  6.  Repeat PA and lateral chest x-ray 2/3 airspace disease is bilaterally stable compared to previous exam.  Small effusions noted.  Findings suspicious for pneumonia with parapneumonic effusion.  Follow-up 6-8 weeks to document resolution.  7.  Insufficient fluid for thoracentesis  8.  Glucose 111, 165, 357, 407.  She was placed back on 35 units detemir at night which is her home dose.  Morning dose insulin decreased to 10 units.  9.  Resume metformin which has been on hold due to elevated creatinine.  Creatinine now 1.2, at baseline  10.  Physical therapy for ambulation.  She ambulated 75 feet ×2 yesterday.  She has ambulated today with respiratory therapy and did not require oxygen.  11.  Patient and family have now decided and requested skilled nursing facility prior to discharge.  She had previously declined SNF and was agreeable to home with home health.  Asked  to list for SNF.  12.  Basic metabolic panel tomorrow to monitor renal function.  13.  Milk molasses enema today for constipation    The above documentation resulted from a face-to-face encounter by da CHAMBERLAIN, [facility].      Discharge Planning: I expect patient to be discharged to skilled nursing facility when bed offered and insurance  "approves.    BULMARO Gomez   18   11:19 AM         Electronically signed by BULMARO Swanson at 2018 11:35 AM        Consult Notes (most recent note)     No notes of this type exist for this encounter.           Physical Therapy Notes (most recent note)      Dani Pacheco PTA at 2/3/2018  1:21 PM  Version 1 of 1         Acute Care - Physical Therapy Treatment Note  [facility]     Patient Name: Tamy Sher  : 1930  MRN: [id number]  Today's Date: 2/3/2018  Onset of Illness/Injury or Date of Surgery Date: 18  Date of Referral to PT: 18  Referring Physician: Dr. Martinez    Admit Date: 2018    Visit Dx:    ICD-10-CM ICD-9-CM   1. Pneumonia of both lungs due to infectious organism, unspecified part of lung J18.9 483.8   2. Elevated troponin R74.8 790.6   3. Congestive heart failure, unspecified congestive heart failure chronicity, unspecified congestive heart failure type I50.9 428.0   4. Elevated d-dimer R79.89 790.92   5. Anemia, unspecified type D64.9 285.9   6. Dyspnea R06.00 786.09   7. Impaired functional mobility and activity tolerance Z74.09 V49.89     Patient Active Problem List   Diagnosis   • Ischemic chest pain   • Pneumonia of both lungs due to infectious organism               Adult Rehabilitation Note       18 1151 18 0946       Rehab Assessment/Intervention    Discipline physical therapy assistant  -ANDRAE physical therapy assistant  -NW     Document Type therapy note (daily note)  -ANDRAE therapy note (daily note)  -NW     Subjective Information agree to therapy;complains of;weakness;fatigue   \"Shakey\"  -ANDRAE agree to therapy;complains of;weakness;fatigue  -NW     Patient Effort, Rehab Treatment  good  -NW     Symptoms Noted During/After Treatment  fatigue  -NW     Precautions/Limitations fall precautions;oxygen therapy device and L/min  -ANDRAE fall precautions;oxygen therapy device and L/min  -NW     Specific Treatment Considerations  pt suppose to " go for thorocentesis today  -NW     Recorded by [ANDRAE] Dani Pacheco PTA [NW] Nuzhat Justice PTA     Vital Signs    Pre SpO2 (%)  92  -NW     O2 Delivery Pre Treatment  supplemental O2  -NW     Intra SpO2 (%)  91  -NW     O2 Delivery Intra Treatment  supplemental O2  -NW     Post SpO2 (%)  91  -NW     O2 Delivery Post Treatment  supplemental O2  -NW     Recorded by  [NW] Nuzhat Justice PTA     Pain Assessment    Pain Assessment No/denies pain  -ANDRAE No/denies pain  -NW     Recorded by [ANDRAE] Dani Pacheco PTA [NW] Nuzhat Justice PTA     Bed Mobility, Assessment/Treatment    Bed Mobility, Assistive Device bed rails  -ANDRAE      Bed Mob, Supine to Sit, Pruden supervision required  -ANDRAE verbal cues required;supervision required   up in chair  -NW     Bed Mobility, Impairments  strength decreased  -NW     Recorded by [ANDRAE] Dani Pacheco PTA [NW] Nuzhat Justice PTA     Transfer Assessment/Treatment    Transfers, Sit-Stand Pruden supervision required  -ANDRAE supervision required  -NW     Transfers, Stand-Sit Pruden supervision required  -ANDRAE supervision required  -NW     Transfers, Sit-Stand-Sit, Assist Device  rolling walker  -NW     Transfer, Safety Issues  step length decreased  -NW     Transfer, Impairments  strength decreased;impaired balance  -NW     Recorded by [ANDRAE] Dani Pacheco PTA [NW] Nuzhat Justice PTA     Gait Assessment/Treatment    Gait, Pruden Level verbal cues required;contact guard assist  -ANDRAE verbal cues required;contact guard assist  -NW     Gait, Assistive Device rolling walker  -ANDRAE rolling walker  -NW     Gait, Distance (Feet) 75   X 2 with one sitting rest  -ANDRAE 75   75x2  -NW     Gait, Gait Deviations salomón decreased;step length decreased  -ANDRAE salomón decreased;step length decreased  -NW     Gait, Safety Issues balance decreased during turns  -ANDRAE balance decreased during turns  -NW     Gait, Impairments  strength decreased  -NW     Gait, Comment amb on RA, O2  dropped to 86%, but when sitting would quickly increase into the 90's  -ANDRAE      Recorded by [ANDRAE] Dani Pacheco PTA [NW] Nuzhat Justice PTA     Therapy Exercises    Bilateral Lower Extremities  AROM:;20 reps;sitting  -NW     Recorded by  [NW] Nuzhat Justice PTA     Positioning and Restraints    Pre-Treatment Position in bed  -ANDRAE in bed  -NW     Post Treatment Position chair  -ANDRAE chair  -NW     In Chair sitting;call light within reach;encouraged to call for assist  -ANDRAE sitting;call light within reach;encouraged to call for assist;notified nsg  -NW     Recorded by [ANDRAE] Dani Pacheco PTA [NW] Nuzhat Justice PTA       User Key  (r) = Recorded By, (t) = Taken By, (c) = Cosigned By    Initials Name Effective Dates    ANDRAE Pacheco PTA 12/08/16 -     NW Nuzhat Justice PTA 08/02/16 -                 IP PT Goals       02/01/18 1313 02/01/18 0908 02/01/18 0906    Gait Training PT LTG    Gait Training Goal PT LTG, Date Established  02/01/18  -PB (r) JM (t) PB (c) 02/01/18  -PB (r) JM (t) PB (c)    Gait Training Goal PT LTG, Time to Achieve  by discharge  -PB (r) JM (t) PB (c) by discharge  -PB (r) JM (t) PB (c)    Gait Training Goal PT LTG, Beadle Level  supervision required  -PB (r) JM (t) PB (c) supervision required  -PB (r) JM (t) PB (c)    Gait Training Goal PT LTG, Assist Device  walker, rolling  -PB (r) JM (t) PB (c) walker, rolling  -PB (r) JM (t) PB (c)    Gait Training Goal PT LTG, Distance to Achieve   400 feet, 1 standing rest break  -PB (r) JM (t) PB (c)    Gait Training Goal PT LTG, Additional Goal  Ambulate any distance without spO2 dropping below 90  -PB (r) JM (t) PB (c)     Cardiopulmonary PT LTG    Cardiopulmonary PT LTG, Date Established 02/01/18  -PB (r) JM (t) PB (c)  02/01/18  -PB (r) JM (t) PB (c)    Cardiopulmonary PT LTG, Time to Achieve by discharge  -PB (r) JM (t) PB (c)  by discharge  -PB (r) JM (t) PB (c)    Cardiopulmonary PT LTG, Additional Goal ambulate 400 feet  without spO2  dropping below 90%  -PB (r) JM (t) PB (c)        User Key  (r) = Recorded By, (t) = Taken By, (c) = Cosigned By    Initials Name Provider Type    PB Boom Espinoza, PT DPT Physical Therapist     Abdiaziz Malloy, PT Student PT Student          Physical Therapy Education     Title: PT OT SLP Therapies (Done)     Topic: Physical Therapy (Done)     Point: Mobility training (Done)    Learning Progress Summary    Learner Readiness Method Response Comment Documented by Status   Patient Acceptance E VU progression with amb ANDRAE 02/03/18 1151 Done    Acceptance E VU Benefits of activity  02/02/18 1006 Done    Acceptance E VU Pt was educated on transfer techniques, gait with AD, breathing techniques and PT prognosis  02/01/18 0904 Done                      User Key     Initials Effective Dates Name Provider Type Discipline    ANDRAE 12/08/16 -  Dani Pacheco, PTA Physical Therapy Assistant PT     08/02/16 -  Nuzhat Justice, PTA Physical Therapy Assistant PT     01/10/18 -  Abdiaziz Malloy, PT Student PT Student PT                    PT Recommendation and Plan  Anticipated Equipment Needs At Discharge: front wheeled walker  Anticipated Discharge Disposition: home, home with assist  Planned Therapy Interventions: balance training, gait training, patient/family education, strengthening  PT Frequency: daily, 2 times/day, per priority policy  Plan of Care Review  Plan Of Care Reviewed With: patient  Progress: progress toward functional goals as expected  Outcome Summary/Follow up Plan: Pt stated that she felt nervous and shakey.  Supine to sit required supervision.  Sit to stand with CGA.  Amb 75' with RWX with CGA.  Will continue to work with pt to increase strength and progess with amb          Outcome Measures       02/03/18 1151 02/02/18 1000 02/01/18 0900    How much help from another person do you currently need...    Turning from your back to your side while in flat bed without using bedrails? 4   -ANDRAE 4  -NW 4  -PB (r) JM (t) PB (c)    Moving from lying on back to sitting on the side of a flat bed without bedrails? 3  -ANDRAE 3  -NW 4  -PB (r) JM (t) PB (c)    Moving to and from a bed to a chair (including a wheelchair)? 3  -ANDRAE 3  -NW 3  -PB (r) JM (t) PB (c)    Standing up from a chair using your arms (e.g., wheelchair, bedside chair)? 3  -ANDRAE 3  -NW 4  -PB (r) JM (t) PB (c)    Climbing 3-5 steps with a railing? 3  -ANDRAE 3  -NW 3  -PB (r) JM (t) PB (c)    To walk in hospital room? 3  -ANDRAE 3  -NW 3  -PB (r) JM (t) PB (c)    AM-PAC 6 Clicks Score 19  -ANDRAE 19  -NW 21  -PB (r) JM (t)    Functional Assessment    Outcome Measure Options AM-PAC 6 Clicks Basic Mobility (PT)  -ANDRAE AM-PAC 6 Clicks Basic Mobility (PT)  -NW AM-PAC 6 Clicks Basic Mobility (PT)  -PB (r) JM (t) PB (c)      User Key  (r) = Recorded By, (t) = Taken By, (c) = Cosigned By    Initials Name Provider Type    ANDRAE Pacheco PTA Physical Therapy Assistant    NW Nuzhat Justice, PTA Physical Therapy Assistant    PB Boom Espnioza, PT DPT Physical Therapist    JM Abdiaziz Malloy, PT Student PT Student           Time Calculation:         PT Charges       02/03/18 1151          Time Calculation    Start Time 1151  -ANDRAE      Stop Time 1214  -ANDRAE      Time Calculation (min) 23 min  -ANDRAE      PT Received On 02/03/18  -ANDRAE      PT Goal Re-Cert Due Date 02/11/18  -ANDRAE      Time Calculation- PT    Total Timed Code Minutes- PT 23 minute(s)  -ANDRAE        User Key  (r) = Recorded By, (t) = Taken By, (c) = Cosigned By    Initials Name Provider Type    ANDRAE Pacheco PTA Physical Therapy Assistant          Therapy Charges for Today     Code Description Service Date Service Provider Modifiers Qty    [id number] HC GAIT TRAINING EA 15 MIN 2/3/2018 Dani Pacheco PTA GP 2          PT G-Codes  Outcome Measure Options: AM-PAC 6 Clicks Basic Mobility (PT)  Score: 21  Functional Limitation: Mobility: Walking and moving around  Mobility: Walking and Moving Around  Current Status (): At least 20 percent but less than 40 percent impaired, limited or restricted  Mobility: Walking and Moving Around Goal Status (): At least 1 percent but less than 20 percent impaired, limited or restricted    Dani Pacheco PTA  2/3/2018          Electronically signed by Dani Pacheco PTA at 2/3/2018  1:21 PM        Occupational Therapy Notes (most recent note)     No notes of this type exist for this encounter.

## 2018-02-04 NOTE — PROGRESS NOTES
Continued Stay Note   [place]     Patient Name: Tamy Sher  MRN: [id number]  Today's Date: 2/4/2018    Admit Date: 1/29/2018          Discharge Plan       02/04/18 1148    Case Management/Social Work Plan    Plan SW spoke to pt and she wants a referral made to [facility].  SW has faxed and will follow for bed offer.  CORTES Max.    Patient/Family In Agreement With Plan yes              Discharge Codes     None            CORTES Story

## 2018-02-04 NOTE — PLAN OF CARE
Problem: Patient Care Overview (Adult)  Goal: Plan of Care Review  Outcome: Ongoing (interventions implemented as appropriate)   02/04/18 1157   Coping/Psychosocial Response Interventions   Plan Of Care Reviewed With patient   Patient Care Overview   Progress progress toward functional goals as expected   Outcome Evaluation   Outcome Summary/Follow up Plan Pt required min assist for supine to sit. Transfer sit to stand with CGA. Amb 100' with RWX with CGA. Pt's O2 decreased to 87% on room air while amb, quickly increased to 91% once pt was sitting . Will continue to work with pt to increase strength and progress pt to being independent with all mobiliyt.

## 2018-02-04 NOTE — THERAPY TREATMENT NOTE
"Acute Care - Physical Therapy Treatment Note  [facility]     Patient Name: Tamy Sher  : 1930  MRN: [id number]  Today's Date: 2018  Onset of Illness/Injury or Date of Surgery Date: 18  Date of Referral to PT: 18  Referring Physician: Dr. Martinez    Admit Date: 2018    Visit Dx:    ICD-10-CM ICD-9-CM   1. Pneumonia of both lungs due to infectious organism, unspecified part of lung J18.9 483.8   2. Elevated troponin R74.8 790.6   3. Congestive heart failure, unspecified congestive heart failure chronicity, unspecified congestive heart failure type I50.9 428.0   4. Elevated d-dimer R79.89 790.92   5. Anemia, unspecified type D64.9 285.9   6. Dyspnea R06.00 786.09   7. Impaired functional mobility and activity tolerance Z74.09 V49.89     Patient Active Problem List   Diagnosis   • Ischemic chest pain   • Pneumonia of both lungs due to infectious organism               Adult Rehabilitation Note       18 1157 18 1151 18 0946    Rehab Assessment/Intervention    Discipline physical therapy assistant  -ANDRAE physical therapy assistant  -ANDRAE physical therapy assistant  -NW    Document Type therapy note (daily note)  -ANDRAE therapy note (daily note)  -ANDRAE therapy note (daily note)  -NW    Subjective Information agree to therapy;complains of;weakness  -ANDRAE agree to therapy;complains of;weakness;fatigue   \"Shakey\"  -ANDRAE agree to therapy;complains of;weakness;fatigue  -NW    Patient Effort, Rehab Treatment   good  -NW    Symptoms Noted During/After Treatment   fatigue  -NW    Precautions/Limitations fall precautions;oxygen therapy device and L/min  -ANDRAE fall precautions;oxygen therapy device and L/min  -ANDRAE fall precautions;oxygen therapy device and L/min  -NW    Specific Treatment Considerations   pt suppose to go for thorocentesis today  -NW    Recorded by [ANDRAE] Dani Pacheco PTA [ANDRAE] Dani Pacheco PTA [NW] Nuzhat Justice PTA    Vital Signs    Pre SpO2 (%)   92  -NW    O2 Delivery " Pre Treatment   supplemental O2  -NW    Intra SpO2 (%)   91  -NW    O2 Delivery Intra Treatment   supplemental O2  -NW    Post SpO2 (%)   91  -NW    O2 Delivery Post Treatment   supplemental O2  -NW    Recorded by   [NW] Nuzhat Justice PTA    Pain Assessment    Pain Assessment No/denies pain  -ANDRAE No/denies pain  -ANDRAE No/denies pain  -NW    Recorded by [ANDRAE] Dani Pacheco PTA [ANDRAE] Dani Pacheco PTA [NW] Nuzhat Justice PTA    Bed Mobility, Assessment/Treatment    Bed Mobility, Assistive Device  bed rails  -ANDRAE     Bed Mob, Supine to Sit, [place] supervision required  -ANDRAE supervision required  -ANDRAE verbal cues required;supervision required   up in chair  -NW    Bed Mob, Sit to Supine, [place] --   in chair  -ANDRAE      Bed Mobility, Impairments   strength decreased  -NW    Recorded by [ANDRAE] Dani Pacheco PTA [ANDRAE] Dani Pacheco PTA [NW] Nuzhat Justice PTA    Transfer Assessment/Treatment    Transfers, Sit-Stand [place] supervision required  -ANDRAE supervision required  -ANDRAE supervision required  -NW    Transfers, Stand-Sit [place] supervision required  -ANDRAE supervision required  -ANDRAE supervision required  -NW    Transfers, Sit-Stand-Sit, Assist Device   rolling walker  -NW    Transfer, Safety Issues   step length decreased  -NW    Transfer, Impairments   strength decreased;impaired balance  -NW    Recorded by [ANDRAE] Dani Pacheco PTA [ANDRAE] Dani Pacheco PTA [NW] Nuzhat Justice PTA    Gait Assessment/Treatment    Gait, [place] Level verbal cues required;contact guard assist  -ANDRAE verbal cues required;contact guard assist  -ANDRAE verbal cues required;contact guard assist  -NW    Gait, Assistive Device rolling walker  -ANDRAE rolling walker  -ANDRAE rolling walker  -NW    Gait, Distance (Feet) 100   X 2 with one standing rest  -ANDRAE 75   X 2 with one sitting rest  -ANDRAE 75   75x2  -NW    Gait, Gait Deviations  salomón decreased;step length decreased  -ANDRAE salomón decreased;step length decreased  -NW     Gait, Safety Issues balance decreased during turns  -ANDRAE balance decreased during turns  -ANDRAE balance decreased during turns  -NW    Gait, Impairments   strength decreased  -NW    Gait, Comment  amb on RA, O2 dropped to 86%, but when sitting would quickly increase into the 90's  -ANDRAE     Recorded by [ANDRAE] Dani Pacheco PTA [ANDRAE] Dani Pacheco, VERNELL [NW] Nuzhat Justice PTA    Therapy Exercises    Bilateral Lower Extremities   AROM:;20 reps;sitting  -NW    Recorded by   [NW] Nuzhat Justice PTA    Positioning and Restraints    Pre-Treatment Position in bed  -ANDRAE in bed  -ANDRAE in bed  -NW    Post Treatment Position chair  -ANDRAE chair  -ANDRAE chair  -NW    In Chair sitting;call light within reach;encouraged to call for assist  -ANDRAE sitting;call light within reach;encouraged to call for assist  -ANDRAE sitting;call light within reach;encouraged to call for assist;notified nsg  -NW    Recorded by [ANDRAE] Dani Pacheco PTA [ANDRAE] Dani Pacheco, VERNELL [NW] Nuzhat Justice PTA      User Key  (r) = Recorded By, (t) = Taken By, (c) = Cosigned By    Initials Name Effective Dates    ANDRAE Dani Pacheco, VERNELL 12/08/16 -     NW Nuzhat Justice PTA 08/02/16 -                 IP PT Goals       02/01/18 1313 02/01/18 0908 02/01/18 0906    Gait Training PT LTG    Gait Training Goal PT LTG, Date Established  02/01/18  -PB (r) JM (t) PB (c) 02/01/18  -PB (r) JM (t) PB (c)    Gait Training Goal PT LTG, Time to Achieve  by discharge  -PB (r) JM (t) PB (c) by discharge  -PB (r) JM (t) PB (c)    Gait Training Goal PT LTG, [place] Level  supervision required  -PB (r) JM (t) PB (c) supervision required  -PB (r) JM (t) PB (c)    Gait Training Goal PT LTG, Assist Device  walker, rolling  -PB (r) JM (t) PB (c) walker, rolling  -PB (r) JM (t) PB (c)    Gait Training Goal PT LTG, Distance to Achieve   400 feet, 1 standing rest break  -PB (r) JM (t) PB (c)    Gait Training Goal PT LTG, Additional Goal  Ambulate any distance without spO2 dropping  below 90  -PB (r) JM (t) PB (c)     Cardiopulmonary PT LTG    Cardiopulmonary PT LTG, Date Established 02/01/18  -PB (r) JM (t) PB (c)  02/01/18  -PB (r) JM (t) PB (c)    Cardiopulmonary PT LTG, Time to Achieve by discharge  -PB (r) JM (t) PB (c)  by discharge  -PB (r) JM (t) PB (c)    Cardiopulmonary PT LTG, Additional Goal ambulate 400 feet without spO2  dropping below 90%  -PB (r) JM (t) PB (c)        User Key  (r) = Recorded By, (t) = Taken By, (c) = Cosigned By    Initials Name Provider Type    PB Boom Espinoza, PT DPT Physical Therapist    JYOTSNA Malloy, PT Student PT Student          Physical Therapy Education     Title: PT OT SLP Therapies (Done)     Topic: Physical Therapy (Done)     Point: Mobility training (Done)    Learning Progress Summary    Learner Readiness Method Response Comment Documented by Status   Patient Acceptance E VU benefits of activity  02/04/18 1157 Done    Acceptance E VU progression with amb  02/03/18 1151 Done    Acceptance E VU Benefits of activity  02/02/18 1006 Done    Acceptance E VU Pt was educated on transfer techniques, gait with AD, breathing techniques and PT prognosis  02/01/18 0904 Done                      User Key     Initials Effective Dates Name Provider Type Discipline     12/08/16 -  Dani Pacheco, PTA Physical Therapy Assistant PT     08/02/16 -  Nuzhat Justice PTA Physical Therapy Assistant PT     01/10/18 -  Abdiaziz Malloy, PT Student PT Student PT                    PT Recommendation and Plan  Anticipated Equipment Needs At Discharge: front wheeled walker  Anticipated Discharge Disposition: home, home with assist  Planned Therapy Interventions: balance training, gait training, patient/family education, strengthening  PT Frequency: daily, 2 times/day, per priority policy  Plan of Care Review  Plan Of Care Reviewed With: patient  Progress: progress toward functional goals as expected  Outcome Summary/Follow up Plan: Pt required min  assist for supine to sit.  Transfer sit to stand with CGA.  Amb 100' with RWX with CGA.  Pt's O2 decreased to 87% on room air while amb, quickly increased to 91%  once pt was sitting .  Will continue to work with pt to increase strength and progress pt to being independent with all mobiliyt.          Outcome Measures       02/04/18 1157 02/03/18 1151 02/02/18 1000    How much help from another person do you currently need...    Turning from your back to your side while in flat bed without using bedrails? 4  -ANDRAE 4  -ANDRAE 4  -NW    Moving from lying on back to sitting on the side of a flat bed without bedrails? 3  -ANDRAE 3  -ANDRAE 3  -NW    Moving to and from a bed to a chair (including a wheelchair)? 3  -ANDRAE 3  -ANDRAE 3  -NW    Standing up from a chair using your arms (e.g., wheelchair, bedside chair)? 3  -ANDRAE 3  -ANDRAE 3  -NW    Climbing 3-5 steps with a railing? 3  -ANDRAE 3  -ANDRAE 3  -NW    To walk in hospital room? 3  -ANDRAE 3  -ANDRAE 3  -NW    AM-PAC 6 Clicks Score 19  -ANDRAE 19  -ANDRAE 19  -NW    Functional Assessment    Outcome Measure Options AM-PAC 6 Clicks Basic Mobility (PT)  -ANDRAE AM-PAC 6 Clicks Basic Mobility (PT)  -ANDRAE AM-PAC 6 Clicks Basic Mobility (PT)  -NW      User Key  (r) = Recorded By, (t) = Taken By, (c) = Cosigned By    Initials Name Provider Type    ANDRAE Pacheco PTA Physical Therapy Assistant     Nuzhat Justice PTA Physical Therapy Assistant           Time Calculation:         PT Charges       02/04/18 1157          Time Calculation    Start Time 1157  -ANDRAE      Stop Time 1210  -ANDRAE      Time Calculation (min) 13 min  -ANDRAE      PT Received On 02/04/18  -ANDRAE      PT Goal Re-Cert Due Date 02/11/18  -ANDRAE      Time Calculation- PT    Total Timed Code Minutes- PT 13 minute(s)  -ANDRAE        User Key  (r) = Recorded By, (t) = Taken By, (c) = Cosigned By    Initials Name Provider Type    ANDRAE Pacheco PTA Physical Therapy Assistant          Therapy Charges for Today     Code Description Service Date Service Provider  Modifiers Qty    07497315374 HC GAIT TRAINING EA 15 MIN 2/3/2018 Dani Pacheco, VERNELL GP 2    38273538303 HC GAIT TRAINING EA 15 MIN 2/4/2018 Dani Pacheco, VERNELL GP 1          PT G-Codes  Outcome Measure Options: AM-PAC 6 Clicks Basic Mobility (PT)  Score: 21  Functional Limitation: Mobility: Walking and moving around  Mobility: Walking and Moving Around Current Status (): At least 20 percent but less than 40 percent impaired, limited or restricted  Mobility: Walking and Moving Around Goal Status (): At least 1 percent but less than 20 percent impaired, limited or restricted    Dani Pacheco PTA  2/4/2018

## 2018-02-05 VITALS
HEART RATE: 61 BPM | OXYGEN SATURATION: 94 % | DIASTOLIC BLOOD PRESSURE: 50 MMHG | HEIGHT: 60 IN | WEIGHT: 199.5 LBS | BODY MASS INDEX: 39.17 KG/M2 | RESPIRATION RATE: 18 BRPM | SYSTOLIC BLOOD PRESSURE: 168 MMHG | TEMPERATURE: 98.5 F

## 2018-02-05 LAB
ANION GAP SERPL CALCULATED.3IONS-SCNC: 9 MMOL/L (ref 4–13)
BUN BLD-MCNC: 68 MG/DL (ref 5–21)
BUN/CREAT SERPL: 59.1 (ref 7–25)
CALCIUM SPEC-SCNC: 8.5 MG/DL (ref 8.4–10.4)
CHLORIDE SERPL-SCNC: 101 MMOL/L (ref 98–110)
CO2 SERPL-SCNC: 28 MMOL/L (ref 24–31)
CREAT BLD-MCNC: 1.15 MG/DL (ref 0.5–1.4)
DEPRECATED RDW RBC AUTO: 57.6 FL (ref 40–54)
ERYTHROCYTE [DISTWIDTH] IN BLOOD BY AUTOMATED COUNT: 14.6 % (ref 12–15)
GFR SERPL CREATININE-BSD FRML MDRD: 45 ML/MIN/1.73
GLUCOSE BLD-MCNC: 255 MG/DL (ref 70–100)
GLUCOSE BLDC GLUCOMTR-MCNC: 108 MG/DL (ref 70–130)
GLUCOSE BLDC GLUCOMTR-MCNC: 214 MG/DL (ref 70–130)
HCT VFR BLD AUTO: 30.3 % (ref 37–47)
HGB BLD-MCNC: 10 G/DL (ref 12–16)
MCH RBC QN AUTO: 35.6 PG (ref 28–32)
MCHC RBC AUTO-ENTMCNC: 33 G/DL (ref 33–36)
MCV RBC AUTO: 107.8 FL (ref 82–98)
PLATELET # BLD AUTO: 372 10*3/MM3 (ref 130–400)
PMV BLD AUTO: 10.4 FL (ref 6–12)
POTASSIUM BLD-SCNC: 4.4 MMOL/L (ref 3.5–5.3)
RBC # BLD AUTO: 2.81 10*6/MM3 (ref 4.2–5.4)
SODIUM BLD-SCNC: 138 MMOL/L (ref 135–145)
WBC NRBC COR # BLD: 12.76 10*3/MM3 (ref 4.8–10.8)

## 2018-02-05 PROCEDURE — 94760 N-INVAS EAR/PLS OXIMETRY 1: CPT

## 2018-02-05 PROCEDURE — 94799 UNLISTED PULMONARY SVC/PX: CPT

## 2018-02-05 PROCEDURE — 97116 GAIT TRAINING THERAPY: CPT

## 2018-02-05 PROCEDURE — 80048 BASIC METABOLIC PNL TOTAL CA: CPT | Performed by: NURSE PRACTITIONER

## 2018-02-05 PROCEDURE — 63710000001 PREDNISONE PER 1 MG: Performed by: NURSE PRACTITIONER

## 2018-02-05 PROCEDURE — 82962 GLUCOSE BLOOD TEST: CPT

## 2018-02-05 PROCEDURE — 25010000002 HYDRALAZINE PER 20 MG: Performed by: FAMILY MEDICINE

## 2018-02-05 PROCEDURE — 63710000001 INSULIN LISPRO (HUMAN) PER 5 UNITS: Performed by: NURSE PRACTITIONER

## 2018-02-05 PROCEDURE — 25010000002 HEPARIN (PORCINE) PER 1000 UNITS: Performed by: FAMILY MEDICINE

## 2018-02-05 PROCEDURE — 85027 COMPLETE CBC AUTOMATED: CPT | Performed by: NURSE PRACTITIONER

## 2018-02-05 PROCEDURE — 63710000001 INSULIN DETEMIR PER 5 UNITS: Performed by: NURSE PRACTITIONER

## 2018-02-05 RX ORDER — FUROSEMIDE 40 MG/1
40 TABLET ORAL DAILY
Status: DISCONTINUED | OUTPATIENT
Start: 2018-02-05 | End: 2018-02-05 | Stop reason: HOSPADM

## 2018-02-05 RX ORDER — INSULIN GLARGINE 100 [IU]/ML
10 INJECTION, SOLUTION SUBCUTANEOUS DAILY
Refills: 0
Start: 2018-02-05 | End: 2018-03-21 | Stop reason: HOSPADM

## 2018-02-05 RX ORDER — GABAPENTIN 300 MG/1
300 CAPSULE ORAL
Qty: 4 CAPSULE | Refills: 0 | Status: SHIPPED | OUTPATIENT
Start: 2018-02-05 | End: 2018-02-05

## 2018-02-05 RX ORDER — GABAPENTIN 300 MG/1
600 CAPSULE ORAL EVERY EVENING
Qty: 6 CAPSULE | Refills: 0 | Status: ON HOLD | OUTPATIENT
Start: 2018-02-05 | End: 2018-03-21

## 2018-02-05 RX ORDER — IPRATROPIUM BROMIDE AND ALBUTEROL SULFATE 2.5; .5 MG/3ML; MG/3ML
3 SOLUTION RESPIRATORY (INHALATION)
Status: DISCONTINUED | OUTPATIENT
Start: 2018-02-05 | End: 2018-02-05 | Stop reason: HOSPADM

## 2018-02-05 RX ORDER — HYDRALAZINE HYDROCHLORIDE 20 MG/ML
10 INJECTION INTRAMUSCULAR; INTRAVENOUS ONCE
Status: COMPLETED | OUTPATIENT
Start: 2018-02-05 | End: 2018-02-05

## 2018-02-05 RX ORDER — GABAPENTIN 300 MG/1
300 CAPSULE ORAL
Qty: 3 CAPSULE | Refills: 0 | Status: ON HOLD | OUTPATIENT
Start: 2018-02-05 | End: 2018-03-21

## 2018-02-05 RX ORDER — GABAPENTIN 300 MG/1
600 CAPSULE ORAL EVERY EVENING
Qty: 4 CAPSULE | Refills: 0 | Status: SHIPPED | OUTPATIENT
Start: 2018-02-05 | End: 2018-02-05

## 2018-02-05 RX ORDER — PREDNISONE 10 MG/1
10 TABLET ORAL DAILY
Status: ON HOLD
Start: 2018-02-05 | End: 2018-03-09

## 2018-02-05 RX ORDER — HYDROXYZINE PAMOATE 50 MG/1
50 CAPSULE ORAL 3 TIMES DAILY PRN
Status: ON HOLD
Start: 2018-02-05 | End: 2018-03-09

## 2018-02-05 RX ORDER — IPRATROPIUM BROMIDE AND ALBUTEROL SULFATE 2.5; .5 MG/3ML; MG/3ML
3 SOLUTION RESPIRATORY (INHALATION)
Qty: 360 ML | Status: ON HOLD
Start: 2018-02-05 | End: 2018-01-01

## 2018-02-05 RX ORDER — BUDESONIDE AND FORMOTEROL FUMARATE DIHYDRATE 160; 4.5 UG/1; UG/1
2 AEROSOL RESPIRATORY (INHALATION)
Refills: 12
Start: 2018-02-05

## 2018-02-05 RX ADMIN — GABAPENTIN 300 MG: 300 CAPSULE ORAL at 10:09

## 2018-02-05 RX ADMIN — FAMOTIDINE 20 MG: 20 TABLET, FILM COATED ORAL at 10:13

## 2018-02-05 RX ADMIN — FUROSEMIDE 40 MG: 40 TABLET ORAL at 10:16

## 2018-02-05 RX ADMIN — METOPROLOL SUCCINATE 50 MG: 50 TABLET, FILM COATED, EXTENDED RELEASE ORAL at 10:09

## 2018-02-05 RX ADMIN — BUDESONIDE AND FORMOTEROL FUMARATE DIHYDRATE 2 PUFF: 160; 4.5 AEROSOL RESPIRATORY (INHALATION) at 07:51

## 2018-02-05 RX ADMIN — HYDRALAZINE HYDROCHLORIDE 10 MG: 20 INJECTION INTRAMUSCULAR; INTRAVENOUS at 05:45

## 2018-02-05 RX ADMIN — HEPARIN SODIUM 5000 UNITS: 5000 INJECTION, SOLUTION INTRAVENOUS; SUBCUTANEOUS at 05:45

## 2018-02-05 RX ADMIN — INSULIN DETEMIR 10 UNITS: 100 INJECTION, SOLUTION SUBCUTANEOUS at 10:11

## 2018-02-05 RX ADMIN — CLONAZEPAM 0.5 MG: 0.5 TABLET ORAL at 10:10

## 2018-02-05 RX ADMIN — INSULIN LISPRO 4 UNITS: 100 INJECTION, SOLUTION INTRAVENOUS; SUBCUTANEOUS at 10:11

## 2018-02-05 RX ADMIN — AMLODIPINE BESYLATE 5 MG: 5 TABLET ORAL at 10:09

## 2018-02-05 RX ADMIN — HYDROXYUREA 500 MG: 500 CAPSULE ORAL at 10:09

## 2018-02-05 RX ADMIN — IPRATROPIUM BROMIDE AND ALBUTEROL SULFATE 3 ML: 2.5; .5 SOLUTION RESPIRATORY (INHALATION) at 07:51

## 2018-02-05 RX ADMIN — PREDNISONE 40 MG: 20 TABLET ORAL at 10:10

## 2018-02-05 RX ADMIN — PANTOPRAZOLE SODIUM 40 MG: 40 TABLET, DELAYED RELEASE ORAL at 10:09

## 2018-02-05 RX ADMIN — GUAIFENESIN 600 MG: 600 TABLET, EXTENDED RELEASE ORAL at 10:10

## 2018-02-05 RX ADMIN — LISINOPRIL 40 MG: 20 TABLET ORAL at 10:10

## 2018-02-05 RX ADMIN — ATORVASTATIN CALCIUM 20 MG: 10 TABLET, FILM COATED ORAL at 10:10

## 2018-02-05 NOTE — PROGRESS NOTES
Continued Stay Note  [facility]     Patient Name: Tamy Sher  MRN: [id number]  Today's Date: 2/5/2018    Admit Date: 1/29/2018          Discharge Plan       02/05/18 1325    Case Management/Social Work Plan    Plan (P)  [facility]    Patient/Family In Agreement With Plan (P)  yes    Final Note    Final Note (P)  Pt and family aware of plan to dc to [facility] at SNF level of care. Spoke with Tamar at [place] [address] and she is aware that the pt is dc today. Pt will travel by car, per family member at bedside.      02/05/18 1118    Case Management/Social Work Plan    Plan (P)  [facility]    Patient/Family In Agreement With Plan (P)  yes    Additional Comments (P)  Spoke with Tamar at [place] [address] and they offered a bed for the pt. Spoke with pt and she has accepted their bed offer.      02/05/18 1104    Case Management/Social Work Plan    Additional Comments (P)  AMADO called Adrienne at [place] [address] and left a message to see if they are offering a bed. Waiting for response. Will follow.               Discharge Codes     None        Expected Discharge Date and Time     Expected Discharge Date Expected Discharge Time    Feb 5, 2018             Kathleen Blake

## 2018-02-05 NOTE — PLAN OF CARE
Problem: Patient Care Overview (Adult)  Goal: Plan of Care Review  Outcome: Ongoing (interventions implemented as appropriate)   02/05/18 1200   Coping/Psychosocial Response Interventions   Plan Of Care Reviewed With patient   Patient Care Overview   Progress progress toward functional goals as expected   Outcome Evaluation   Outcome Summary/Follow up Plan Pt sitting up in chair on RA sit-stand sba Pt was able to amb 150'x2 rwx sba/cga required mult standing rrest due to fatigue and SOA but ck o2 and it was 91%. Pt reports ready for SNF to ccont strengthening to get back home

## 2018-02-05 NOTE — THERAPY TREATMENT NOTE
Acute Care - Physical Therapy Treatment Note  [facility]     Patient Name: Tamy Sher  : 1930  MRN: [id number]  Today's Date: 2018  Onset of Illness/Injury or Date of Surgery Date: 18  Date of Referral to PT: 18  Referring Physician: Dr. Martinez    Admit Date: 2018    Visit Dx:    ICD-10-CM ICD-9-CM   1. Pneumonia of both lungs due to infectious organism, unspecified part of lung J18.9 483.8   2. Elevated troponin R74.8 790.6   3. Congestive heart failure, unspecified congestive heart failure chronicity, unspecified congestive heart failure type I50.9 428.0   4. Elevated d-dimer R79.89 790.92   5. Anemia, unspecified type D64.9 285.9   6. Dyspnea R06.00 786.09   7. Impaired functional mobility and activity tolerance Z74.09 V49.89     Patient Active Problem List   Diagnosis   • Ischemic chest pain   • Pneumonia of both lungs due to infectious organism               Adult Rehabilitation Note       18 1400 18 1139 18 1157    Rehab Assessment/Intervention    Discipline physical therapy assistant  -KJ physical therapy assistant  -NW physical therapy assistant  -ANDRAE    Document Type therapy note (daily note)  -KJ therapy note (daily note)  -NW therapy note (daily note)  -ANDRAE    Subjective Information agree to therapy  -KJ agree to therapy;complains of;weakness  -NW agree to therapy;complains of;weakness  -ANDRAE    Precautions/Limitations fall precautions  -KJ fall precautions  -NW fall precautions;oxygen therapy device and L/min  -ANDRAE    Recorded by [KJ] Vika Pederson PTA [NW] Nuzhat Justice PTA [ANDRAE] Dani Pacheco PTA    Vital Signs    Pre SpO2 (%)  93  -NW     O2 Delivery Pre Treatment  room air  -NW     O2 Delivery Intra Treatment  room air  -NW     Post SpO2 (%)  91  -NW     O2 Delivery Post Treatment  room air  -NW     Recorded by  [NW] Nuzhat Justice PTA     Pain Assessment    Pain Assessment No/denies pain  -KJ No/denies pain  -NW No/denies pain  -ANDRAE    Recorded  by [KJ] Vika Pederson PTA [NW] Nuzhat Justice PTA [ANDRAE] Dani Pacheco PTA    Bed Mobility, Assessment/Treatment    Bed Mob, Supine to Sit, [place]   supervision required  -ANDRAE    Bed Mob, Sit to Supine, [place]   --   in chair  -ANDRAE    Bed Mobility, Comment up in chair  -KJ up in chair  -NW     Recorded by [KJ] Vika Pederson PTA [NW] Nuzhat Justice PTA [ANDRAE] Dani Pacheco PTA    Transfer Assessment/Treatment    Transfers, Sit-Stand [place] supervision required  -KJ supervision required  -NW supervision required  -ANDRAE    Transfers, Stand-Sit [place] supervision required  -KJ supervision required  -NW supervision required  -ANDRAE    Toilet Transfer, [place] supervision required  -KJ contact guard assist  -NW     Toilet Transfer, Assistive Device rolling walker  -KJ rolling walker  -NW     Recorded by [KJ] Vika Pederson PTA [NW] Nuzhat Justice PTA [ANDRAE] Dani Pacheco PTA    Gait Assessment/Treatment    Gait, [place] Level stand by assist  -KJ contact guard assist  -NW verbal cues required;contact guard assist  -ANDRAE    Gait, Assistive Device rolling walker  -KJ rolling walker  -NW rolling walker  -ANDRAE    Gait, Distance (Feet) 150  -   150x2 mult standing rest   -   X 2 with one standing rest  -ANDRAE    Gait, Gait Deviations forward flexed posture;step length decreased;salomón decreased  -KJ salomón decreased;step length decreased  -NW     Gait, Safety Issues  balance decreased during turns;step length decreased  -NW balance decreased during turns  -ANDRAE    Recorded by [KJ] Vika Pederson PTA [NW] Nuzhat Justice, VERNELL [ANDRAE] Dani Pacheco PTA    Therapy Exercises    Bilateral Lower Extremities AROM:;15 reps;sitting  -KJ      Recorded by [KJ] Vika Pederson PTA      Positioning and Restraints    Pre-Treatment Position sitting in chair/recliner  -KJ sitting in chair/recliner  -NW in bed  -ANDRAE    Post Treatment Position chair  -KJ chair  -NW chair  -ANDRAE    In Chair   "sitting;call light within reach;encouraged to call for assist;notified nsg  -NW sitting;call light within reach;encouraged to call for assist  -ANDRAE    Recorded by [KJ] Vika Pederson PTA [NW] Nuzhat Justice PTA [ANDRAE] Dani Pacheco PTA      02/03/18 1151          Rehab Assessment/Intervention    Discipline physical therapy assistant  -ANDRAE      Document Type therapy note (daily note)  -ANDRAE      Subjective Information agree to therapy;complains of;weakness;fatigue   \"Shakey\"  -ANDRAE      Precautions/Limitations fall precautions;oxygen therapy device and L/min  -ANDRAE      Recorded by [ANDRAE] Dani Pacheco PTA      Pain Assessment    Pain Assessment No/denies pain  -ANDRAE      Recorded by [ANDRAE] Dani Pacheco PTA      Bed Mobility, Assessment/Treatment    Bed Mobility, Assistive Device bed rails  -ANDRAE      Bed Mob, Supine to Sit, [place] supervision required  -ANDRAE      Recorded by [ANDRAE] Dani Pacheco PTA      Transfer Assessment/Treatment    Transfers, Sit-Stand [place] supervision required  -ANDRAE      Transfers, Stand-Sit [place] supervision required  -ANDRAE      Recorded by [ANDRAE] Dani Pacheco PTA      Gait Assessment/Treatment    Gait, [place] Level verbal cues required;contact guard assist  -ANDRAE      Gait, Assistive Device rolling walker  -ANDRAE      Gait, Distance (Feet) 75   X 2 with one sitting rest  -ANDRAE      Gait, Gait Deviations salomón decreased;step length decreased  -ANDRAE      Gait, Safety Issues balance decreased during turns  -ANDRAE      Gait, Comment amb on RA, O2 dropped to 86%, but when sitting would quickly increase into the 90's  -ANDRAE      Recorded by [ANDRAE] Dani Pacheco PTA      Positioning and Restraints    Pre-Treatment Position in bed  -ANDRAE      Post Treatment Position chair  -ANDRAE      In Chair sitting;call light within reach;encouraged to call for assist  -ANDRAE      Recorded by [ANDRAE] Dani Pacheco PTA        User Key  (r) = Recorded By, (t) = Taken By, (c) = Cosigned By    Initials Name " Effective Dates    KJ Vika Pederson, PTA 08/02/16 -     ANDRAE Dani Pacheco, PTA 12/08/16 -     NW Nuzhat M Vinh, PTA 08/02/16 -                 IP PT Goals       02/01/18 1313 02/01/18 0908 02/01/18 0906    Gait Training PT LTG    Gait Training Goal PT LTG, Date Established  02/01/18  -PB (r) JM (t) PB (c) 02/01/18  -PB (r) JM (t) PB (c)    Gait Training Goal PT LTG, Time to Achieve  by discharge  -PB (r) JM (t) PB (c) by discharge  -PB (r) JM (t) PB (c)    Gait Training Goal PT LTG, [place] Level  supervision required  -PB (r) JM (t) PB (c) supervision required  -PB (r) JM (t) PB (c)    Gait Training Goal PT LTG, Assist Device  walker, rolling  -PB (r) JM (t) PB (c) walker, rolling  -PB (r) JM (t) PB (c)    Gait Training Goal PT LTG, Distance to Achieve   400 feet, 1 standing rest break  -PB (r) JM (t) PB (c)    Gait Training Goal PT LTG, Additional Goal  Ambulate any distance without spO2 dropping below 90  -PB (r) JM (t) PB (c)     Cardiopulmonary PT LTG    Cardiopulmonary PT LTG, Date Established 02/01/18  -PB (r) JM (t) PB (c)  02/01/18  -PB (r) JM (t) PB (c)    Cardiopulmonary PT LTG, Time to Achieve by discharge  -PB (r) JM (t) PB (c)  by discharge  -PB (r) JM (t) PB (c)    Cardiopulmonary PT LTG, Additional Goal ambulate 400 feet without spO2  dropping below 90%  -PB (r) JM (t) PB (c)        User Key  (r) = Recorded By, (t) = Taken By, (c) = Cosigned By    Initials Name Provider Type    SHASHI Espinoza, PT DPT Physical Therapist    JYOTSNA Malloy, PT Student PT Student          Physical Therapy Education     Title: PT OT SLP Therapies (Done)     Topic: Physical Therapy (Done)     Point: Mobility training (Done)    Learning Progress Summary    Learner Readiness Method Response Comment Documented by Status   Patient Acceptance E VU progression of POC NW 02/05/18 1203 Done    Acceptance E VU benefits of activity ANDRAE 02/04/18 1157 Done    Acceptance E VU progression with amb ANDRAE 02/03/18 1151  Done    Acceptance E VU Benefits of activity  02/02/18 1006 Done    Acceptance E VU Pt was educated on transfer techniques, gait with AD, breathing techniques and PT prognosis  02/01/18 0904 Done                      User Key     Initials Effective Dates Name Provider Type Discipline     12/08/16 -  Dani Pacheco PTA Physical Therapy Assistant PT     08/02/16 -  Nuzhat Justice PTA Physical Therapy Assistant PT     01/10/18 -  Abdiaziz Malloy, PT Student PT Student PT                    PT Recommendation and Plan  Anticipated Equipment Needs At Discharge: front wheeled walker  Anticipated Discharge Disposition: home, home with assist  Planned Therapy Interventions: balance training, gait training, patient/family education, strengthening  PT Frequency: daily, 2 times/day, per priority policy             Outcome Measures       02/05/18 1200 02/04/18 1157 02/03/18 1151    How much help from another person do you currently need...    Turning from your back to your side while in flat bed without using bedrails? 4  -NW 4  -ANDRAE 4  -ANDRAE    Moving from lying on back to sitting on the side of a flat bed without bedrails? 3  -NW 3  -ANDRAE 3  -ANDRAE    Moving to and from a bed to a chair (including a wheelchair)? 3  -NW 3  -ANDRAE 3  -ANDRAE    Standing up from a chair using your arms (e.g., wheelchair, bedside chair)? 3  -NW 3  -ANDRAE 3  -ANDRAE    Climbing 3-5 steps with a railing? 3  -NW 3  -ANDRAE 3  -ANDRAE    To walk in hospital room? 3  -NW 3  -ANDRAE 3  -ANDRAE    AM-PAC 6 Clicks Score 19  -NW 19  -ANDRAE 19  -ANDRAE    Functional Assessment    Outcome Measure Options AM-PAC 6 Clicks Basic Mobility (PT)  -NW AM-PAC 6 Clicks Basic Mobility (PT)  -ANDRAE AM-PAC 6 Clicks Basic Mobility (PT)  -ANDRAE      User Key  (r) = Recorded By, (t) = Taken By, (c) = Cosigned By    Initials Name Provider Type    ANDRAE Pacheco PTA Physical Therapy Assistant     Nuzhat Justice PTA Physical Therapy Assistant           Time Calculation:         PT Charges        02/05/18 1203          Time Calculation    Start Time 1139  -NW      Stop Time 1203  -NW      Time Calculation (min) 24 min  -NW      PT Received On 02/05/18  -NW      PT Goal Re-Cert Due Date 02/11/18  -NW      Time Calculation- PT    Total Timed Code Minutes- PT 24 minute(s)  -NW        User Key  (r) = Recorded By, (t) = Taken By, (c) = Cosigned By    Initials Name Provider Type    CORNELIUS Justice, PTA Physical Therapy Assistant              PT G-Codes  Outcome Measure Options: AM-PAC 6 Clicks Basic Mobility (PT)  Score: 21  Functional Limitation: Mobility: Walking and moving around  Mobility: Walking and Moving Around Current Status (): At least 20 percent but less than 40 percent impaired, limited or restricted  Mobility: Walking and Moving Around Goal Status (): At least 1 percent but less than 20 percent impaired, limited or restricted    Vika Pederson, PTA  2/5/2018

## 2018-02-05 NOTE — PROGRESS NOTES
Continued Stay Note  [facility]     Patient Name: Tamy Sher  MRN: [id number]  Today's Date: 2/5/2018    Admit Date: 1/29/2018          Discharge Plan       02/05/18 1118    Case Management/Social Work Plan    Plan (P)  [facility]    Patient/Family In Agreement With Plan (P)  yes    Additional Comments (P)  Spoke with Tamar at [address] and they offered a bed for the pt. Spoke with pt and she has accepted their bed offer.      02/05/18 1104    Case Management/Social Work Plan    Additional Comments (P)  AMADO called Adrienne at [address] and left a message to see if they are offering a bed. Waiting for response. Will follow.               Discharge Codes     None            Kathleen Blake

## 2018-02-05 NOTE — THERAPY DISCHARGE NOTE
Acute Care - Physical Therapy Discharge Summary  [facility]       Patient Name: Tamy Sher  : 1930  MRN: [id number]    Today's Date: 2018  Onset of Illness/Injury or Date of Surgery Date: 18    Date of Referral to PT: 18  Referring Physician: Dr. Martinez      Admit Date: 2018      PT Recommendation and Plan    Visit Dx:    ICD-10-CM ICD-9-CM   1. Pneumonia of both lungs due to infectious organism, unspecified part of lung J18.9 483.8   2. Elevated troponin R74.8 790.6   3. Congestive heart failure, unspecified congestive heart failure chronicity, unspecified congestive heart failure type I50.9 428.0   4. Elevated d-dimer R79.89 790.92   5. Anemia, unspecified type D64.9 285.9   6. Dyspnea R06.00 786.09   7. Impaired functional mobility and activity tolerance Z74.09 V49.89             Outcome Measures       18 1200 18 1157 18 1151    How much help from another person do you currently need...    Turning from your back to your side while in flat bed without using bedrails? 4  -NW 4  -ANDRAE 4  -ANDRAE    Moving from lying on back to sitting on the side of a flat bed without bedrails? 3  -NW 3  -ANDRAE 3  -ANDRAE    Moving to and from a bed to a chair (including a wheelchair)? 3  -NW 3  -ANDRAE 3  -ANDRAE    Standing up from a chair using your arms (e.g., wheelchair, bedside chair)? 3  -NW 3  -ANDRAE 3  -ANDRAE    Climbing 3-5 steps with a railing? 3  -NW 3  -ANDRAE 3  -ANDRAE    To walk in hospital room? 3  -NW 3  -ANDRAE 3  -ANDRAE    AM-PAC 6 Clicks Score 19  -NW 19  -ANDRAE 19  -ANDRAE    Functional Assessment    Outcome Measure Options AM-PAC 6 Clicks Basic Mobility (PT)  -NW AM-PAC 6 Clicks Basic Mobility (PT)  -ANDRAE AM-PAC 6 Clicks Basic Mobility (PT)  -ANDRAE      User Key  (r) = Recorded By, (t) = Taken By, (c) = Cosigned By    Initials Name Provider Type    ANDRAE Pacheco PTA Physical Therapy Assistant    NW Nuzhat M Vinh, PTA Physical Therapy Assistant                PT Charges       18 1400 18 3312        Time Calculation    Start Time 1400  -KJ 1139  -NW     Stop Time 1424  -KJ 1203  -NW     Time Calculation (min) 24 min  -KJ 24 min  -NW     PT Received On 02/05/18  -KJ 02/05/18  -NW     PT Goal Re-Cert Due Date 02/11/18  -KJ 02/11/18  -NW     Time Calculation- PT    Total Timed Code Minutes- PT 24 minute(s)  -KJ 24 minute(s)  -NW       User Key  (r) = Recorded By, (t) = Taken By, (c) = Cosigned By    Initials Name Provider Type    YIMI Pederson, PTA Physical Therapy Assistant    NW Nuzhat Justice, VERNELL Physical Therapy Assistant                  IP PT Goals       02/05/18 1542 02/01/18 1313 02/01/18 0908    Gait Training PT LTG    Gait Training Goal PT LTG, Date Established   02/01/18  -PB (r) JM (t) PB (c)    Gait Training Goal PT LTG, Time to Achieve   by discharge  -PB (r) JM (t) PB (c)    Gait Training Goal PT LTG, [place] Level   supervision required  -PB (r) JM (t) PB (c)    Gait Training Goal PT LTG, Assist Device   walker, rolling  -PB (r) JM (t) PB (c)    Gait Training Goal PT LTG, Additional Goal   Ambulate any distance without spO2 dropping below 90  -PB (r) JM (t) PB (c)    Gait Training Goal PT LTG, Date Goal Reviewed 02/05/18  -KJ      Gait Training Goal PT LTG, Outcome goal not met  -KJ      Gait Training Goal PT LTG, Reason Goal Not Met discharged from facility  -KJ      Cardiopulmonary PT LTG    Cardiopulmonary PT LTG, Date Established  02/01/18  -PB (r) JM (t) PB (c)     Cardiopulmonary PT LTG, Time to Achieve  by discharge  -PB (r) JM (t) PB (c)     Cardiopulmonary PT LTG, Additional Goal  ambulate 400 feet without spO2  dropping below 90%  -PB (r) JM (t) PB (c)     Cardiopulmonary PT LTG, Date Goal Reviewed 02/05/18  -KJ      Cardiopulmonary PT LTG, Outcome goal not met  -KJ      Cardiopulmonary PT LTG, Reason Goal Not Met discharged from facility  -KJ        02/01/18 0906          Gait Training PT LTG    Gait Training Goal PT LTG, Date Established 02/01/18  -PB (r) JM (t) SHASHI (c)       Gait Training Goal PT LTG, Time to Achieve by discharge  -PB (r) JM (t) PB (c)      Gait Training Goal PT LTG, [place] Level supervision required  -PB (r) JM (t) PB (c)      Gait Training Goal PT LTG, Assist Device walker, rolling  -PB (r) JM (t) PB (c)      Gait Training Goal PT LTG, Distance to Achieve 400 feet, 1 standing rest break  -PB (r) JM (t) PB (c)      Cardiopulmonary PT LTG    Cardiopulmonary PT LTG, Date Established 02/01/18  -PB (r) JM (t) PB (c)      Cardiopulmonary PT LTG, Time to Achieve by discharge  -PB (r) JM (t) PB (c)        User Key  (r) = Recorded By, (t) = Taken By, (c) = Cosigned By    Initials Name Provider Type    YIMI Pederson PTA Physical Therapy Assistant    PB Boom Espinoza, PT DPT Physical Therapist    JYOTSNA Malloy, PT Student PT Student          Therapy Charges for Today     Code Description Service Date Service Provider Modifiers Qty    [id number] HC GAIT TRAINING EA 15 MIN 2/5/2018 Vika Pederson PTA GP 2          PT Discharge Summary  Anticipated Discharge Disposition: skilled nursing facility  Reason for Discharge: Discharge from facility  Outcomes Achieved: Refer to plan of care for updates on goals achieved  Discharge Destination: SNF      Vika Pederson PTA   2/5/2018

## 2018-02-05 NOTE — PLAN OF CARE
Problem: Inpatient Physical Therapy  Goal: Gait Training Goal LTG- PT  Outcome: Unable to achieve outcome(s) by discharge Date Met: 02/05/18 02/01/18 0906 02/01/18 0908 02/05/18 1542   Gait Training PT LTG   Gait Training Goal PT LTG, Date Established --  02/01/18 --    Gait Training Goal PT LTG, Time to Achieve --  by discharge --    Gait Training Goal PT LTG, [place] Level --  supervision required --    Gait Training Goal PT LTG, Assist Device --  walker, rolling --    Gait Training Goal PT LTG, Distance to Achieve 400 feet, 1 standing rest break --  --    Gait Training Goal PT LTG, Additional Goal --  Ambulate any distance without spO2 dropping below 90 --    Gait Training Goal PT LTG, Date Goal Reviewed --  --  02/05/18   Gait Training Goal PT LTG, Outcome --  --  goal not met   Gait Training Goal PT LTG, Reason Goal Not Met --  --  discharged from facility     Goal: Cardiopulmonary Goal LTG- PT  Outcome: Unable to achieve outcome(s) by discharge Date Met: 02/05/18 02/01/18 1313 02/05/18 1542   Cardiopulmonary PT LTG   Cardiopulmonary PT LTG, Date Established 02/01/18 --    Cardiopulmonary PT LTG, Time to Achieve by discharge --    Cardiopulmonary PT LTG, Additional Goal ambulate 400 feet without spO2 dropping below 90% --    Cardiopulmonary PT LTG, Date Goal Reviewed --  02/05/18   Cardiopulmonary PT LTG, Outcome --  goal not met   Cardiopulmonary PT LTG, Reason Goal Not Met --  discharged from facility

## 2018-02-05 NOTE — PLAN OF CARE
Problem: Patient Care Overview (Adult)  Goal: Plan of Care Review  Outcome: Ongoing (interventions implemented as appropriate)   02/05/18 0325   Coping/Psychosocial Response Interventions   Plan Of Care Reviewed With patient   Patient Care Overview   Progress progress towards functional goals is fair   Outcome Evaluation   Outcome Summary/Follow up Plan Pt walked several times in the [place] yesterday. Enema given yesterday with good results. SOA with exertion but pt states that it has improved.      Goal: Adult Individualization and Mutuality  Outcome: Ongoing (interventions implemented as appropriate)    Goal: Discharge Needs Assessment  Outcome: Ongoing (interventions implemented as appropriate)      Problem: Fall Risk (Adult)  Goal: Absence of Falls  Outcome: Ongoing (interventions implemented as appropriate)      Problem: Pneumonia (Adult)  Goal: Signs and Symptoms of Listed Potential Problems Will be Absent or Manageable (Pneumonia)  Outcome: Ongoing (interventions implemented as appropriate)

## 2018-02-05 NOTE — PROGRESS NOTES
Continued Stay Note  [facility]     Patient Name: Tamy Sher  MRN: [id number]  Today's Date: 2/5/2018    Admit Date: 1/29/2018          Discharge Plan       02/05/18 1104    Case Management/Social Work Plan    Additional Comments (P)  AMADO called Adrienne at [facility] [phone] and left a message to see if they are offering a bed. Waiting for response. Will follow.               Discharge Codes     None            Kathleen Blake

## 2018-02-06 ENCOUNTER — TELEPHONE (OUTPATIENT)
Dept: INTERNAL MEDICINE | Age: 83
End: 2018-02-06

## 2018-02-07 ENCOUNTER — LAB REQUISITION (OUTPATIENT)
Dept: LAB | Facility: HOSPITAL | Age: 83
End: 2018-02-07

## 2018-02-07 ENCOUNTER — TELEPHONE (OUTPATIENT)
Dept: INTERNAL MEDICINE | Age: 83
End: 2018-02-07

## 2018-02-07 DIAGNOSIS — Z00.00 ENCOUNTER FOR GENERAL ADULT MEDICAL EXAMINATION WITHOUT ABNORMAL FINDINGS: ICD-10-CM

## 2018-02-07 LAB
ALBUMIN SERPL-MCNC: 2.8 G/DL (ref 3.5–5)
ALBUMIN SERPL-MCNC: 2.8 G/DL (ref 3.5–5)
ALBUMIN/GLOB SERPL: 1.3 G/DL (ref 1.1–2.5)
ALP SERPL-CCNC: 53 U/L (ref 24–120)
ALP SERPL-CCNC: 53 U/L (ref 24–120)
ALT SERPL W P-5'-P-CCNC: 39 U/L (ref 0–54)
ALT SERPL W P-5'-P-CCNC: 39 U/L (ref 0–54)
ANION GAP SERPL CALCULATED.3IONS-SCNC: 6 MMOL/L (ref 4–13)
ARTICHOKE IGE QN: <30 MG/DL (ref 0–99)
AST SERPL-CCNC: 21 U/L (ref 7–45)
AST SERPL-CCNC: 21 U/L (ref 7–45)
BILIRUB CONJ SERPL-MCNC: 0 MG/DL (ref 0–0.3)
BILIRUB INDIRECT SERPL-MCNC: 0 MG/DL (ref 0–1.1)
BILIRUB SERPL-MCNC: 0.2 MG/DL (ref 0.1–1)
BILIRUB SERPL-MCNC: 0.2 MG/DL (ref 0.1–1)
BUN BLD-MCNC: 58 MG/DL (ref 5–21)
BUN/CREAT SERPL: 52.7 (ref 7–25)
CALCIUM SPEC-SCNC: 8.7 MG/DL (ref 8.4–10.4)
CHLORIDE SERPL-SCNC: 104 MMOL/L (ref 98–110)
CHOLEST SERPL-MCNC: 100 MG/DL (ref 130–200)
CO2 SERPL-SCNC: 30 MMOL/L (ref 24–31)
CREAT BLD-MCNC: 1.1 MG/DL (ref 0.5–1.4)
DEPRECATED RDW RBC AUTO: 59.4 FL (ref 40–54)
EOSINOPHIL # BLD MANUAL: 0.92 10*3/MM3 (ref 0–0.7)
EOSINOPHIL NFR BLD MANUAL: 7 % (ref 0–4)
ERYTHROCYTE [DISTWIDTH] IN BLOOD BY AUTOMATED COUNT: 14.9 % (ref 12–15)
GFR SERPL CREATININE-BSD FRML MDRD: 47 ML/MIN/1.73
GLOBULIN UR ELPH-MCNC: 2.2 GM/DL
GLUCOSE BLD-MCNC: 45 MG/DL (ref 70–100)
HBA1C MFR BLD: 6.4 %
HCT VFR BLD AUTO: 29.1 % (ref 37–47)
HDLC SERPL-MCNC: 58 MG/DL
HGB BLD-MCNC: 9.6 G/DL (ref 12–16)
LDLC/HDLC SERPL: ABNORMAL {RATIO}
LYMPHOCYTES # BLD MANUAL: 3.82 10*3/MM3 (ref 0.72–4.86)
LYMPHOCYTES NFR BLD MANUAL: 29 % (ref 15–45)
LYMPHOCYTES NFR BLD MANUAL: 7 % (ref 4–12)
MACROCYTES BLD QL SMEAR: ABNORMAL
MCH RBC QN AUTO: 35.8 PG (ref 28–32)
MCHC RBC AUTO-ENTMCNC: 33 G/DL (ref 33–36)
MCV RBC AUTO: 108.6 FL (ref 82–98)
MONOCYTES # BLD AUTO: 0.92 10*3/MM3 (ref 0.19–1.3)
MYELOCYTES NFR BLD MANUAL: 1 % (ref 0–0)
NEUTROPHILS # BLD AUTO: 7.37 10*3/MM3 (ref 1.87–8.4)
NEUTROPHILS NFR BLD MANUAL: 50 % (ref 39–78)
NEUTS BAND NFR BLD MANUAL: 6 % (ref 0–10)
PLATELET # BLD AUTO: 298 10*3/MM3 (ref 130–400)
PMV BLD AUTO: 10.9 FL (ref 6–12)
POTASSIUM BLD-SCNC: 4.4 MMOL/L (ref 3.5–5.3)
PREALB SERPL-MCNC: 22.8 MG/DL (ref 18–36)
PROT SERPL-MCNC: 5 G/DL (ref 6.3–8.7)
PROT SERPL-MCNC: 5 G/DL (ref 6.3–8.7)
RBC # BLD AUTO: 2.68 10*6/MM3 (ref 4.2–5.4)
SCAN SLIDE: NORMAL
SMALL PLATELETS BLD QL SMEAR: ADEQUATE
SODIUM BLD-SCNC: 140 MMOL/L (ref 135–145)
TRIGL SERPL-MCNC: 71 MG/DL (ref 0–149)
VIT B12 BLD-MCNC: 792 PG/ML (ref 239–931)
WBC MORPH BLD: NORMAL
WBC NRBC COR # BLD: 13.16 10*3/MM3 (ref 4.8–10.8)

## 2018-02-07 PROCEDURE — 82607 VITAMIN B-12: CPT | Performed by: INTERNAL MEDICINE

## 2018-02-07 PROCEDURE — 80076 HEPATIC FUNCTION PANEL: CPT | Performed by: INTERNAL MEDICINE

## 2018-02-07 PROCEDURE — 80053 COMPREHEN METABOLIC PANEL: CPT | Performed by: INTERNAL MEDICINE

## 2018-02-07 PROCEDURE — 85007 BL SMEAR W/DIFF WBC COUNT: CPT | Performed by: INTERNAL MEDICINE

## 2018-02-07 PROCEDURE — 80061 LIPID PANEL: CPT | Performed by: INTERNAL MEDICINE

## 2018-02-07 PROCEDURE — 36415 COLL VENOUS BLD VENIPUNCTURE: CPT | Performed by: INTERNAL MEDICINE

## 2018-02-07 PROCEDURE — 83036 HEMOGLOBIN GLYCOSYLATED A1C: CPT | Performed by: INTERNAL MEDICINE

## 2018-02-07 PROCEDURE — 85025 COMPLETE CBC W/AUTO DIFF WBC: CPT | Performed by: INTERNAL MEDICINE

## 2018-02-07 PROCEDURE — 84134 ASSAY OF PREALBUMIN: CPT | Performed by: INTERNAL MEDICINE

## 2018-02-07 NOTE — TELEPHONE ENCOUNTER
Spoke with patient's son Ja Schmidt. He states patient was d/c from the hospital to [facility] for rehab. Patient is planning on keeping appt with Dr. Radha Santos for 2/12. He will call back if she decides to cancel it.

## 2018-02-08 ENCOUNTER — TELEPHONE (OUTPATIENT)
Dept: INTERNAL MEDICINE | Age: 83
End: 2018-02-08

## 2018-02-08 DIAGNOSIS — J18.9 PNEUMONIA DUE TO INFECTIOUS ORGANISM, UNSPECIFIED LATERALITY, UNSPECIFIED PART OF LUNG: Primary | ICD-10-CM

## 2018-02-09 PROBLEM — J18.9 PNEUMONIA OF BOTH LUNGS DUE TO INFECTIOUS ORGANISM: Status: ACTIVE | Noted: 2018-01-29

## 2018-02-09 NOTE — TELEPHONE ENCOUNTER
I don't see any hospital records on her, please try to locate them    I think [phone] told me she would be coming in for FU eventhough she is at [facility]

## 2018-02-09 NOTE — TELEPHONE ENCOUNTER
Pt was admitted to [facility] on 1/29/18. Discharged 2/05. Hospital notes in Care everywhere under 1/29/18 Admission.

## 2018-02-12 ENCOUNTER — LAB REQUISITION (OUTPATIENT)
Dept: LAB | Facility: HOSPITAL | Age: 83
End: 2018-02-12

## 2018-02-12 ENCOUNTER — OFFICE VISIT (OUTPATIENT)
Dept: INTERNAL MEDICINE | Age: 83
End: 2018-02-12
Payer: MEDICARE

## 2018-02-12 VITALS
HEIGHT: 60 IN | OXYGEN SATURATION: 94 % | DIASTOLIC BLOOD PRESSURE: 60 MMHG | SYSTOLIC BLOOD PRESSURE: 114 MMHG | HEART RATE: 61 BPM | WEIGHT: 195 LBS | BODY MASS INDEX: 38.28 KG/M2

## 2018-02-12 DIAGNOSIS — I11.0 HYPERTENSIVE HEART DISEASE WITH HEART FAILURE (HCC): ICD-10-CM

## 2018-02-12 DIAGNOSIS — J18.9 PNEUMONIA OF BOTH LOWER LOBES DUE TO INFECTIOUS ORGANISM: Primary | ICD-10-CM

## 2018-02-12 DIAGNOSIS — E11.22 TYPE 2 DIABETES MELLITUS WITH STAGE 3 CHRONIC KIDNEY DISEASE, WITH LONG-TERM CURRENT USE OF INSULIN (HCC): ICD-10-CM

## 2018-02-12 DIAGNOSIS — E78.2 MIXED HYPERLIPIDEMIA: ICD-10-CM

## 2018-02-12 DIAGNOSIS — D75.839 THROMBOCYTOSIS: ICD-10-CM

## 2018-02-12 DIAGNOSIS — I73.9 PERIPHERAL VASCULAR DISEASE (HCC): ICD-10-CM

## 2018-02-12 DIAGNOSIS — E11.51 DM (DIABETES MELLITUS), TYPE 2 WITH PERIPHERAL VASCULAR COMPLICATIONS (HCC): ICD-10-CM

## 2018-02-12 DIAGNOSIS — Z79.4 TYPE 2 DIABETES MELLITUS WITH STAGE 3 CHRONIC KIDNEY DISEASE, WITH LONG-TERM CURRENT USE OF INSULIN (HCC): ICD-10-CM

## 2018-02-12 DIAGNOSIS — E11.42 DIABETIC PERIPHERAL NEUROPATHY ASSOCIATED WITH TYPE 2 DIABETES MELLITUS (HCC): ICD-10-CM

## 2018-02-12 DIAGNOSIS — N18.30 TYPE 2 DIABETES MELLITUS WITH STAGE 3 CHRONIC KIDNEY DISEASE, WITH LONG-TERM CURRENT USE OF INSULIN (HCC): ICD-10-CM

## 2018-02-12 DIAGNOSIS — Z00.00 ENCOUNTER FOR GENERAL ADULT MEDICAL EXAMINATION WITHOUT ABNORMAL FINDINGS: ICD-10-CM

## 2018-02-12 DIAGNOSIS — E11.49 OTHER DIABETIC NEUROLOGICAL COMPLICATION ASSOCIATED WITH TYPE 2 DIABETES MELLITUS (HCC): ICD-10-CM

## 2018-02-12 DIAGNOSIS — E11.21 DIABETIC NEPHROPATHY ASSOCIATED WITH TYPE 2 DIABETES MELLITUS (HCC): ICD-10-CM

## 2018-02-12 LAB — FOLATE SERPL-MCNC: >20 NG/ML

## 2018-02-12 PROCEDURE — 1036F TOBACCO NON-USER: CPT | Performed by: INTERNAL MEDICINE

## 2018-02-12 PROCEDURE — 99214 OFFICE O/P EST MOD 30 MIN: CPT | Performed by: INTERNAL MEDICINE

## 2018-02-12 PROCEDURE — G8417 CALC BMI ABV UP PARAM F/U: HCPCS | Performed by: INTERNAL MEDICINE

## 2018-02-12 PROCEDURE — 82746 ASSAY OF FOLIC ACID SERUM: CPT | Performed by: NURSE PRACTITIONER

## 2018-02-12 PROCEDURE — 1123F ACP DISCUSS/DSCN MKR DOCD: CPT | Performed by: INTERNAL MEDICINE

## 2018-02-12 PROCEDURE — 4040F PNEUMOC VAC/ADMIN/RCVD: CPT | Performed by: INTERNAL MEDICINE

## 2018-02-12 PROCEDURE — G8484 FLU IMMUNIZE NO ADMIN: HCPCS | Performed by: INTERNAL MEDICINE

## 2018-02-12 PROCEDURE — 36415 COLL VENOUS BLD VENIPUNCTURE: CPT | Performed by: NURSE PRACTITIONER

## 2018-02-12 PROCEDURE — 1090F PRES/ABSN URINE INCON ASSESS: CPT | Performed by: INTERNAL MEDICINE

## 2018-02-12 PROCEDURE — G8427 DOCREV CUR MEDS BY ELIG CLIN: HCPCS | Performed by: INTERNAL MEDICINE

## 2018-02-12 RX ORDER — AMLODIPINE BESYLATE 10 MG/1
10 TABLET ORAL DAILY
COMMUNITY

## 2018-02-12 RX ORDER — PANTOPRAZOLE SODIUM 40 MG/1
40 TABLET, DELAYED RELEASE ORAL DAILY
COMMUNITY

## 2018-02-12 RX ORDER — METOPROLOL SUCCINATE 50 MG/1
50 TABLET, EXTENDED RELEASE ORAL DAILY
COMMUNITY

## 2018-02-12 RX ORDER — BUDESONIDE AND FORMOTEROL FUMARATE DIHYDRATE 160; 4.5 UG/1; UG/1
2 AEROSOL RESPIRATORY (INHALATION) 2 TIMES DAILY
COMMUNITY

## 2018-02-12 RX ORDER — FUROSEMIDE 40 MG/1
TABLET ORAL
Qty: 50 TABLET | Refills: 3
Start: 2018-02-12

## 2018-02-12 RX ORDER — FOLIC ACID 1 MG/1
1 TABLET ORAL DAILY
COMMUNITY

## 2018-02-12 RX ORDER — HYDROXYUREA 500 MG/1
CAPSULE ORAL DAILY
COMMUNITY

## 2018-02-12 RX ORDER — FUROSEMIDE 40 MG/1
40 TABLET ORAL DAILY
COMMUNITY
End: 2018-02-12 | Stop reason: SDUPTHER

## 2018-02-12 RX ORDER — GABAPENTIN 100 MG/1
300 CAPSULE ORAL 3 TIMES DAILY
COMMUNITY

## 2018-02-12 RX ORDER — IPRATROPIUM BROMIDE AND ALBUTEROL SULFATE 2.5; .5 MG/3ML; MG/3ML
1 SOLUTION RESPIRATORY (INHALATION) EVERY 4 HOURS
COMMUNITY

## 2018-02-12 RX ORDER — HYDROXYZINE PAMOATE 50 MG/1
50 CAPSULE ORAL 3 TIMES DAILY PRN
COMMUNITY

## 2018-02-12 ASSESSMENT — ENCOUNTER SYMPTOMS
SHORTNESS OF BREATH: 1
RHINORRHEA: 0
ABDOMINAL PAIN: 0
SORE THROAT: 0
NAUSEA: 0
COUGH: 0
TROUBLE SWALLOWING: 0

## 2018-02-12 NOTE — PATIENT INSTRUCTIONS
when cooking. · Don't skip meals. Your blood sugar may drop too low if you skip meals and take insulin or certain medicines for diabetes. · Check with your doctor before you drink alcohol. Alcohol can cause your blood sugar to drop too low. Alcohol can also cause a bad reaction if you take certain diabetes medicines. Follow-up care is a key part of your treatment and safety. Be sure to make and go to all appointments, and call your doctor if you are having problems. It's also a good idea to know your test results and keep a list of the medicines you take. Where can you learn more? Go to https://The Gl[employer] Gourmetpepiceweb.REach. org and sign in to your [employer] account. Enter K157 in the RealMatch box to learn more about \"Learning About Diabetes Food Guidelines. \"     If you do not have an account, please click on the \"Sign Up Now\" link. Current as of: March 13, 2017  Content Version: 11.5  © 6815-1437 [employer]. Care instructions adapted under license by [facility] ([facility]). If you have questions about a medical condition or this instruction, always ask your healthcare professional. [address] any warranty or liability for your use of this information. We will suggest she we every Monday morning, try to achieve the 5, 10 pound weight loss.   Increased furosemide 40, 1 daily with an afternoon dose on Monday, Wednesday, Friday  CBC, CMP in 2 weeks  For diabetes discontinue metformin, take Lantus 30 units each morning, without sliding scale, without an evening dose Mcloud continue to check blood sugar before breakfast and supper, at least Monday, Wednesday, Friday  Continue nighttime insulin  Keep appointment 0/55 with Zayra Craig

## 2018-02-12 NOTE — PROGRESS NOTES
swallowing. Respiratory: Positive for shortness of breath. Negative for cough. Cardiovascular: Negative for chest pain and palpitations. Legs 1, 2+ swollen   Gastrointestinal: Negative for abdominal pain and nausea. Endocrine: Negative for cold intolerance and heat intolerance. Genitourinary: Negative for dysuria and frequency. Skin: Negative for rash and wound. Neurological: Negative for seizures and syncope. Psychiatric/Behavioral: Negative for behavioral problems and hallucinations. /60   Pulse 61   Ht 5' (1.524 m)   Wt 195 lb (88.5 kg)   SpO2 94%   BMI 38.08 kg/m²   Physical Exam   Constitutional: She appears well-developed and well-nourished. HENT:   Head: Normocephalic and atraumatic. Eyes: Pupils are equal, round, and reactive to light. No scleral icterus. Neck: No JVD present. Cardiovascular: Regular rhythm. No murmur heard. Pulmonary/Chest: No respiratory distress. She exhibits no tenderness. Decreased breath sounds at the base   Abdominal: She exhibits no mass. There is no tenderness. Musculoskeletal: She exhibits edema. She exhibits no deformity. 2+ edema, legs   Lymphadenopathy:     She has no cervical adenopathy. Neurological: She is alert. No cranial nerve deficit. Skin: Skin is warm. No erythema. Footcare: Feet are warm, 1+ pulses, scaly skin without open lesions, decreased sensation    No visits with results within 1 Month(s) from this visit.    Latest known visit with results is:   Orders Only on 10/23/2017   Component Date Value Ref Range Status    WBC 10/23/2017 8.9  4.8 - 10.8 K/uL Final    RBC 10/23/2017 3.01* 4.20 - 5.40 M/uL Final    Hemoglobin 10/23/2017 10.9* 12.0 - 16.0 g/dL Final    Hematocrit 10/23/2017 34.1* 37.0 - 47.0 % Final    MCV 10/23/2017 113.3* 81.0 - 99.0 fL Final    MCH 10/23/2017 36.2* 27.0 - 31.0 pg Final    MCHC 10/23/2017 32.0* 33.0 - 37.0 g/dL Final    RDW 10/23/2017 14.9* 11.5 - 14.5 % Final   

## 2018-02-22 ENCOUNTER — TELEPHONE (OUTPATIENT)
Dept: INTERNAL MEDICINE | Age: 83
End: 2018-02-22

## 2018-02-22 DIAGNOSIS — J18.9 PNEUMONIA OF BOTH LOWER LOBES DUE TO INFECTIOUS ORGANISM: Primary | ICD-10-CM

## 2018-02-22 NOTE — TELEPHONE ENCOUNTER
[facility] Transitions Initial Follow Up Call    Call within 2 business days of discharge: Yes- Patient discharged from [facility] on 2018 Admitted to [facility] on 2018   Discharge diagnosis:   Pneumonia of both lungs due to infectious organism, unspecified part of lung (Primary Dx); Elevated troponin;Congestive heart failure, unspecified congestive heart failure chronicity, unspecified congestive heart failure type;Elevated d-dimer; Anemia, unspecified type;Dyspnea; Impaired functional mobility and activity tolerance     Patient: Reva Umanzor Patient : 1930   MRN: [id number]  Reason for Admission: Patient was admitted  Discharge Date:  2018 RARS: [facility] Risk Score: 1     Spoke with: patient. Facility: [facility] , then discharged to [facility] for rehab and strengthening. Non-face-to-face services provided:  Scheduled appointment with PCP-[facility] appt confirmed with patient. Pt needs to keep Tuesday appt with NP due to transportation issues. Obtained and reviewed discharge summary and/or continuity of care documents. Patient states she is doing pretty well. She states she is still a little weak but breathing is better, jose with Oxygen and breathing treatments. Ankle and lower leg swelling is gone right now. Bowels and bladder working Pervis Plank. Appetite is good and family is bringing in meals for patient. Sleeping well. Home health was at home today for orientation and she will now do rehab at home. Pt states she will bring medication list for review at [facility].       Follow Up  Future Appointments  Date Time Provider Deepthi Mancilla   3/31/2944 8:43 PM OSIEL Lam LPN

## 2018-02-27 ENCOUNTER — OFFICE VISIT (OUTPATIENT)
Dept: INTERNAL MEDICINE | Age: 83
End: 2018-02-27
Payer: MEDICARE

## 2018-02-27 VITALS
TEMPERATURE: 98 F | OXYGEN SATURATION: 90 % | SYSTOLIC BLOOD PRESSURE: 130 MMHG | DIASTOLIC BLOOD PRESSURE: 78 MMHG | BODY MASS INDEX: 38.48 KG/M2 | HEART RATE: 62 BPM | HEIGHT: 60 IN | WEIGHT: 196 LBS

## 2018-02-27 DIAGNOSIS — E11.51 DM (DIABETES MELLITUS), TYPE 2 WITH PERIPHERAL VASCULAR COMPLICATIONS (HCC): ICD-10-CM

## 2018-02-27 DIAGNOSIS — E03.9 ACQUIRED HYPOTHYROIDISM: ICD-10-CM

## 2018-02-27 DIAGNOSIS — E78.2 MIXED HYPERLIPIDEMIA: ICD-10-CM

## 2018-02-27 DIAGNOSIS — Z23 NEED FOR INFLUENZA VACCINATION: ICD-10-CM

## 2018-02-27 DIAGNOSIS — I10 ESSENTIAL HYPERTENSION: ICD-10-CM

## 2018-02-27 DIAGNOSIS — E11.42 DIABETIC PERIPHERAL NEUROPATHY ASSOCIATED WITH TYPE 2 DIABETES MELLITUS (HCC): ICD-10-CM

## 2018-02-27 DIAGNOSIS — J18.9 PNEUMONIA OF BOTH LOWER LOBES DUE TO INFECTIOUS ORGANISM: ICD-10-CM

## 2018-02-27 DIAGNOSIS — Z09 HOSPITAL DISCHARGE FOLLOW-UP: Primary | ICD-10-CM

## 2018-02-27 LAB
ALBUMIN SERPL-MCNC: 3.8 G/DL (ref 3.5–5.2)
ALP BLD-CCNC: 98 U/L (ref 35–104)
ALT SERPL-CCNC: 12 U/L (ref 5–33)
ANION GAP SERPL CALCULATED.3IONS-SCNC: 18 MMOL/L (ref 7–19)
AST SERPL-CCNC: 12 U/L (ref 5–32)
BILIRUB SERPL-MCNC: 0.3 MG/DL (ref 0.2–1.2)
BUN BLDV-MCNC: 49 MG/DL (ref 8–23)
CALCIUM SERPL-MCNC: 8.6 MG/DL (ref 8.8–10.2)
CHLORIDE BLD-SCNC: 98 MMOL/L (ref 98–111)
CO2: 26 MMOL/L (ref 22–29)
CREAT SERPL-MCNC: 1.8 MG/DL (ref 0.5–0.9)
GFR NON-AFRICAN AMERICAN: 27
GLUCOSE BLD-MCNC: 77 MG/DL (ref 74–109)
HBA1C MFR BLD: 7.6 %
POTASSIUM SERPL-SCNC: 5 MMOL/L (ref 3.5–5)
SODIUM BLD-SCNC: 142 MMOL/L (ref 136–145)
TOTAL PROTEIN: 6.3 G/DL (ref 6.6–8.7)
TSH SERPL DL<=0.05 MIU/L-ACNC: 7.4 UIU/ML (ref 0.27–4.2)

## 2018-02-27 PROCEDURE — G8417 CALC BMI ABV UP PARAM F/U: HCPCS | Performed by: NURSE PRACTITIONER

## 2018-02-27 PROCEDURE — 1090F PRES/ABSN URINE INCON ASSESS: CPT | Performed by: NURSE PRACTITIONER

## 2018-02-27 PROCEDURE — G8427 DOCREV CUR MEDS BY ELIG CLIN: HCPCS | Performed by: NURSE PRACTITIONER

## 2018-02-27 PROCEDURE — 1123F ACP DISCUSS/DSCN MKR DOCD: CPT | Performed by: NURSE PRACTITIONER

## 2018-02-27 PROCEDURE — 4040F PNEUMOC VAC/ADMIN/RCVD: CPT | Performed by: NURSE PRACTITIONER

## 2018-02-27 PROCEDURE — 99214 OFFICE O/P EST MOD 30 MIN: CPT | Performed by: NURSE PRACTITIONER

## 2018-02-27 PROCEDURE — 1036F TOBACCO NON-USER: CPT | Performed by: NURSE PRACTITIONER

## 2018-02-27 PROCEDURE — G8484 FLU IMMUNIZE NO ADMIN: HCPCS | Performed by: NURSE PRACTITIONER

## 2018-02-27 RX ORDER — LEVOTHYROXINE SODIUM 0.05 MG/1
50 TABLET ORAL DAILY
Qty: 30 TABLET | Refills: 3 | Status: SHIPPED | OUTPATIENT
Start: 2018-02-27

## 2018-02-27 ASSESSMENT — ENCOUNTER SYMPTOMS
VOMITING: 0
COLOR CHANGE: 0
COUGH: 0
NAUSEA: 0
VOICE CHANGE: 0
SHORTNESS OF BREATH: 1
BACK PAIN: 0
PHOTOPHOBIA: 0
RHINORRHEA: 0

## 2018-02-27 NOTE — PATIENT INSTRUCTIONS
1. Continue all medications as prescribed. 2. Daily weights. 3. Start 50 mcg Synthroid and recheck TSH in 4-6 weeks. 4. Continue breathing treatments at home. 5. Keep appt for CXR on 3/19/2018.

## 2018-02-27 NOTE — PROGRESS NOTES
palpitations. Gastrointestinal: Negative for nausea and vomiting. Endocrine: Negative. Negative for cold intolerance and heat intolerance. Genitourinary: Negative for difficulty urinating and flank pain. Musculoskeletal: Negative for back pain and neck pain. Skin: Negative for color change and rash. Allergic/Immunologic: Negative for environmental allergies and food allergies. Neurological: Negative for dizziness, speech difficulty and headaches. Hematological: Does not bruise/bleed easily. Psychiatric/Behavioral: Negative for sleep disturbance and suicidal ideas. Objective:     Physical Exam   Constitutional: She is oriented to person, place, and time. She appears well-developed and well-nourished. HENT:   Head: Atraumatic. Right Ear: External ear normal.   Left Ear: External ear normal.   Nose: Nose normal.   Mouth/Throat: Oropharynx is clear and moist.   Eyes: Conjunctivae are normal. Pupils are equal, round, and reactive to light. Neck: Normal range of motion. Neck supple. Cardiovascular: Normal rate, regular rhythm, S1 normal, S2 normal and normal heart sounds. Pulmonary/Chest: Effort normal and breath sounds normal.   Abdominal: Soft. Bowel sounds are normal.   Musculoskeletal: Normal range of motion. She exhibits no edema (no BLE edema). Neurological: She is alert and oriented to person, place, and time. Skin: Skin is warm and dry. Psychiatric: She has a normal mood and affect. Her behavior is normal.   Nursing note and vitals reviewed. /78 (Site: Right Arm, Position: Sitting)   Pulse 62   Temp 98 °F (36.7 °C)   Ht 5' (1.524 m)   Wt 196 lb (88.9 kg)   SpO2 90%   BMI 38.28 kg/m²     Assessment:      1. Hospital discharge follow-up     2. DM (diabetes mellitus), type 2 with peripheral vascular complications ([facility] [address]     3. Essential hypertension     4. Mixed hyperlipidemia     5. Pneumonia of both lower lobes due to infectious organism     6.  Acquired

## 2018-03-01 ENCOUNTER — TELEPHONE (OUTPATIENT)
Dept: INTERNAL MEDICINE | Age: 83
End: 2018-03-01

## 2018-03-01 NOTE — TELEPHONE ENCOUNTER
Pt is needing a refill on her Diabetic testing supplies sent to [facility]. She is wanting this updated in her Chart as well that she will be using [facility].

## 2018-03-02 ENCOUNTER — TELEPHONE (OUTPATIENT)
Dept: INTERNAL MEDICINE | Age: 83
End: 2018-03-02

## 2018-03-07 ENCOUNTER — TELEPHONE (OUTPATIENT)
Dept: INTERNAL MEDICINE | Age: 83
End: 2018-03-07

## 2018-03-07 NOTE — TELEPHONE ENCOUNTER
She has been having low blood sugars in the mornings since we switched her lantus dose to the mornings.     BS have been running     Her Pulse has been irregular flutuating

## 2018-03-08 ENCOUNTER — HOSPITAL ENCOUNTER (INPATIENT)
Facility: HOSPITAL | Age: 83
LOS: 13 days | Discharge: NURSING, REHAB OR LONG-TERM CARE | End: 2018-03-21
Attending: EMERGENCY MEDICINE | Admitting: INTERNAL MEDICINE

## 2018-03-08 ENCOUNTER — APPOINTMENT (OUTPATIENT)
Dept: GENERAL RADIOLOGY | Facility: HOSPITAL | Age: 83
End: 2018-03-08

## 2018-03-08 DIAGNOSIS — S72.491A OTHER CLOSED FRACTURE OF DISTAL END OF RIGHT FEMUR, INITIAL ENCOUNTER (HCC): ICD-10-CM

## 2018-03-08 DIAGNOSIS — Z78.9 DECREASED ACTIVITIES OF DAILY LIVING (ADL): ICD-10-CM
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
## 2018-03-08 DIAGNOSIS — Z74.09 IMPAIRED MOBILITY: ICD-10-CM

## 2018-03-08 PROBLEM — J90 PLEURAL EFFUSION: Status: ACTIVE | Noted: 2018-03-08

## 2018-03-08 PROBLEM — E11.49 TYPE 2 DIABETES MELLITUS WITH NEUROLOGIC COMPLICATION, WITH LONG-TERM CURRENT USE OF INSULIN (HCC): Status: ACTIVE | Noted: 2018-03-08

## 2018-03-08 PROBLEM — I50.32 CHRONIC DIASTOLIC HEART FAILURE (HCC): Status: ACTIVE | Noted: 2018-03-08

## 2018-03-08 PROBLEM — S72.351A CLOSED DISPLACED COMMINUTED FRACTURE OF SHAFT OF RIGHT FEMUR (HCC): Status: ACTIVE | Noted: 2018-03-08

## 2018-03-08 PROBLEM — R09.02 HYPOXIA: Status: ACTIVE | Noted: 2018-03-08

## 2018-03-08 PROBLEM — M79.651 PAIN OF RIGHT THIGH: Status: ACTIVE | Noted: 2018-03-08

## 2018-03-08 PROBLEM — IMO0001 CLASS 2 OBESITY WITH SERIOUS COMORBIDITY IN ADULT: Status: ACTIVE | Noted: 2018-03-08

## 2018-03-08 PROBLEM — N17.9 AKI (ACUTE KIDNEY INJURY) (HCC): Status: ACTIVE | Noted: 2018-03-08

## 2018-03-08 PROBLEM — Z79.4 TYPE 2 DIABETES MELLITUS WITH NEUROLOGIC COMPLICATION, WITH LONG-TERM CURRENT USE OF INSULIN (HCC): Status: ACTIVE | Noted: 2018-03-08

## 2018-03-08 LAB
ABO GROUP BLD: NORMAL
ALBUMIN SERPL-MCNC: 3.9 G/DL (ref 3.5–5)
ALBUMIN/GLOB SERPL: 1.5 G/DL (ref 1.1–2.5)
ALP SERPL-CCNC: 83 U/L (ref 24–120)
ALT SERPL W P-5'-P-CCNC: 37 U/L (ref 0–54)
ANION GAP SERPL CALCULATED.3IONS-SCNC: 11 MMOL/L (ref 4–13)
ANISOCYTOSIS BLD QL: ABNORMAL
AST SERPL-CCNC: 24 U/L (ref 7–45)
BACTERIA UR QL AUTO: ABNORMAL /HPF
BILIRUB SERPL-MCNC: 0.3 MG/DL (ref 0.1–1)
BILIRUB UR QL STRIP: NEGATIVE
BLD GP AB SCN SERPL QL: NEGATIVE
BUN BLD-MCNC: 45 MG/DL (ref 5–21)
BUN/CREAT SERPL: 39.8 (ref 7–25)
CALCIUM SPEC-SCNC: 9 MG/DL (ref 8.4–10.4)
CHLORIDE SERPL-SCNC: 97 MMOL/L (ref 98–110)
CLARITY UR: CLEAR
CO2 SERPL-SCNC: 30 MMOL/L (ref 24–31)
COLOR UR: YELLOW
CREAT BLD-MCNC: 1.13 MG/DL (ref 0.5–1.4)
DEPRECATED RDW RBC AUTO: 59.8 FL (ref 40–54)
EOSINOPHIL # BLD MANUAL: 0.11 10*3/MM3 (ref 0–0.7)
EOSINOPHIL NFR BLD MANUAL: 1 % (ref 0–4)
ERYTHROCYTE [DISTWIDTH] IN BLOOD BY AUTOMATED COUNT: 14.8 % (ref 12–15)
GFR SERPL CREATININE-BSD FRML MDRD: 46 ML/MIN/1.73
GLOBULIN UR ELPH-MCNC: 2.6 GM/DL
GLUCOSE BLD-MCNC: 145 MG/DL (ref 70–100)
GLUCOSE BLDC GLUCOMTR-MCNC: 130 MG/DL (ref 70–130)
GLUCOSE BLDC GLUCOMTR-MCNC: 196 MG/DL (ref 70–130)
GLUCOSE UR STRIP-MCNC: NEGATIVE MG/DL
HCT VFR BLD AUTO: 32.6 % (ref 37–47)
HGB BLD-MCNC: 10.4 G/DL (ref 12–16)
HGB UR QL STRIP.AUTO: NEGATIVE
HOLD SPECIMEN: NORMAL
HOLD SPECIMEN: NORMAL
HYALINE CASTS UR QL AUTO: ABNORMAL /LPF
KETONES UR QL STRIP: NEGATIVE
LEUKOCYTE ESTERASE UR QL STRIP.AUTO: NEGATIVE
LYMPHOCYTES # BLD MANUAL: 1.19 10*3/MM3 (ref 0.72–4.86)
LYMPHOCYTES NFR BLD MANUAL: 11 % (ref 15–45)
MACROCYTES BLD QL SMEAR: ABNORMAL
MCH RBC QN AUTO: 34.6 PG (ref 28–32)
MCHC RBC AUTO-ENTMCNC: 31.9 G/DL (ref 33–36)
MCV RBC AUTO: 108.3 FL (ref 82–98)
NEUTROPHILS # BLD AUTO: 9.55 10*3/MM3 (ref 1.87–8.4)
NEUTROPHILS NFR BLD MANUAL: 78 % (ref 39–78)
NEUTS BAND NFR BLD MANUAL: 10 % (ref 0–10)
NITRITE UR QL STRIP: NEGATIVE
NRBC BLD MANUAL-RTO: 0 /100 WBC (ref 0–0)
PH UR STRIP.AUTO: <=5 [PH] (ref 5–8)
PLATELET # BLD AUTO: 511 10*3/MM3 (ref 130–400)
PMV BLD AUTO: 9.8 FL (ref 6–12)
POTASSIUM BLD-SCNC: 4.8 MMOL/L (ref 3.5–5.3)
PROT SERPL-MCNC: 6.5 G/DL (ref 6.3–8.7)
PROT UR QL STRIP: ABNORMAL
RBC # BLD AUTO: 3.01 10*6/MM3 (ref 4.2–5.4)
RBC # UR: ABNORMAL /HPF
REF LAB TEST METHOD: ABNORMAL
RH BLD: POSITIVE
SMALL PLATELETS BLD QL SMEAR: ABNORMAL
SODIUM BLD-SCNC: 138 MMOL/L (ref 135–145)
SP GR UR STRIP: 1.01 (ref 1–1.03)
SQUAMOUS #/AREA URNS HPF: ABNORMAL /HPF
UROBILINOGEN UR QL STRIP: ABNORMAL
WBC MORPH BLD: NORMAL
WBC NRBC COR # BLD: 10.85 10*3/MM3 (ref 4.8–10.8)
WBC UR QL AUTO: ABNORMAL /HPF
WHOLE BLOOD HOLD SPECIMEN: NORMAL
WHOLE BLOOD HOLD SPECIMEN: NORMAL

## 2018-03-08 PROCEDURE — 25010000002 MORPHINE PER 10 MG: Performed by: EMERGENCY MEDICINE

## 2018-03-08 PROCEDURE — 99284 EMERGENCY DEPT VISIT MOD MDM: CPT

## 2018-03-08 PROCEDURE — 73560 X-RAY EXAM OF KNEE 1 OR 2: CPT

## 2018-03-08 PROCEDURE — 63710000001 INSULIN DETEMIR PER 5 UNITS: Performed by: INTERNAL MEDICINE

## 2018-03-08 PROCEDURE — 85025 COMPLETE CBC W/AUTO DIFF WBC: CPT | Performed by: EMERGENCY MEDICINE

## 2018-03-08 PROCEDURE — 73552 X-RAY EXAM OF FEMUR 2/>: CPT

## 2018-03-08 PROCEDURE — 25010000002 MORPHINE SULFATE (PF) 2 MG/ML SOLUTION: Performed by: INTERNAL MEDICINE

## 2018-03-08 PROCEDURE — 82962 GLUCOSE BLOOD TEST: CPT

## 2018-03-08 PROCEDURE — 80053 COMPREHEN METABOLIC PANEL: CPT | Performed by: EMERGENCY MEDICINE

## 2018-03-08 PROCEDURE — 25010000002 ONDANSETRON PER 1 MG: Performed by: EMERGENCY MEDICINE

## 2018-03-08 PROCEDURE — 94640 AIRWAY INHALATION TREATMENT: CPT

## 2018-03-08 PROCEDURE — 94660 CPAP INITIATION&MGMT: CPT

## 2018-03-08 PROCEDURE — 94799 UNLISTED PULMONARY SVC/PX: CPT

## 2018-03-08 PROCEDURE — 73502 X-RAY EXAM HIP UNI 2-3 VIEWS: CPT

## 2018-03-08 PROCEDURE — 86901 BLOOD TYPING SEROLOGIC RH(D): CPT | Performed by: INTERNAL MEDICINE

## 2018-03-08 PROCEDURE — 74018 RADEX ABDOMEN 1 VIEW: CPT

## 2018-03-08 PROCEDURE — 86900 BLOOD TYPING SEROLOGIC ABO: CPT | Performed by: INTERNAL MEDICINE

## 2018-03-08 PROCEDURE — 71045 X-RAY EXAM CHEST 1 VIEW: CPT

## 2018-03-08 PROCEDURE — 85007 BL SMEAR W/DIFF WBC COUNT: CPT | Performed by: EMERGENCY MEDICINE

## 2018-03-08 PROCEDURE — 81001 URINALYSIS AUTO W/SCOPE: CPT | Performed by: INTERNAL MEDICINE

## 2018-03-08 PROCEDURE — 86850 RBC ANTIBODY SCREEN: CPT | Performed by: INTERNAL MEDICINE

## 2018-03-08 RX ORDER — SODIUM CHLORIDE 0.9 % (FLUSH) 0.9 %
10 SYRINGE (ML) INJECTION AS NEEDED
Status: DISCONTINUED | OUTPATIENT
Start: 2018-03-08 | End: 2018-03-21 | Stop reason: HOSPADM

## 2018-03-08 RX ORDER — NALOXONE HCL 0.4 MG/ML
0.4 VIAL (ML) INJECTION
Status: DISCONTINUED | OUTPATIENT
Start: 2018-03-08 | End: 2018-03-21 | Stop reason: HOSPADM

## 2018-03-08 RX ORDER — FOLIC ACID 1 MG/1
1 TABLET ORAL DAILY
Status: DISCONTINUED | OUTPATIENT
Start: 2018-03-08 | End: 2018-03-21 | Stop reason: HOSPADM

## 2018-03-08 RX ORDER — ASPIRIN 325 MG
325 TABLET ORAL NIGHTLY
Status: DISCONTINUED | OUTPATIENT
Start: 2018-03-08 | End: 2018-03-20

## 2018-03-08 RX ORDER — ATORVASTATIN CALCIUM 10 MG/1
10 TABLET, FILM COATED ORAL NIGHTLY
Status: DISCONTINUED | OUTPATIENT
Start: 2018-03-08 | End: 2018-03-21 | Stop reason: HOSPADM

## 2018-03-08 RX ORDER — ONDANSETRON 2 MG/ML
4 INJECTION INTRAMUSCULAR; INTRAVENOUS ONCE
Status: COMPLETED | OUTPATIENT
Start: 2018-03-08 | End: 2018-03-08

## 2018-03-08 RX ORDER — METOPROLOL SUCCINATE 50 MG/1
50 TABLET, EXTENDED RELEASE ORAL DAILY
Status: DISCONTINUED | OUTPATIENT
Start: 2018-03-08 | End: 2018-03-21 | Stop reason: HOSPADM

## 2018-03-08 RX ORDER — PANTOPRAZOLE SODIUM 40 MG/1
40 TABLET, DELAYED RELEASE ORAL
Status: DISCONTINUED | OUTPATIENT
Start: 2018-03-08 | End: 2018-03-21 | Stop reason: HOSPADM

## 2018-03-08 RX ORDER — SODIUM CHLORIDE 9 MG/ML
60 INJECTION, SOLUTION INTRAVENOUS CONTINUOUS
Status: DISCONTINUED | OUTPATIENT
Start: 2018-03-08 | End: 2018-03-13

## 2018-03-08 RX ORDER — GABAPENTIN 300 MG/1
300 CAPSULE ORAL
Status: DISCONTINUED | OUTPATIENT
Start: 2018-03-09 | End: 2018-03-21 | Stop reason: HOSPADM

## 2018-03-08 RX ORDER — BUDESONIDE AND FORMOTEROL FUMARATE DIHYDRATE 160; 4.5 UG/1; UG/1
2 AEROSOL RESPIRATORY (INHALATION)
Status: DISCONTINUED | OUTPATIENT
Start: 2018-03-08 | End: 2018-03-21 | Stop reason: HOSPADM

## 2018-03-08 RX ORDER — MORPHINE SULFATE 2 MG/ML
1 INJECTION, SOLUTION INTRAMUSCULAR; INTRAVENOUS EVERY 4 HOURS PRN
Status: DISCONTINUED | OUTPATIENT
Start: 2018-03-08 | End: 2018-03-11

## 2018-03-08 RX ORDER — MORPHINE SULFATE 4 MG/ML
4 INJECTION, SOLUTION INTRAMUSCULAR; INTRAVENOUS ONCE
Status: COMPLETED | OUTPATIENT
Start: 2018-03-08 | End: 2018-03-08

## 2018-03-08 RX ORDER — NICOTINE POLACRILEX 4 MG
15 LOZENGE BUCCAL
Status: DISCONTINUED | OUTPATIENT
Start: 2018-03-08 | End: 2018-03-21 | Stop reason: HOSPADM

## 2018-03-08 RX ORDER — GABAPENTIN 300 MG/1
600 CAPSULE ORAL EVERY EVENING
Status: DISCONTINUED | OUTPATIENT
Start: 2018-03-08 | End: 2018-03-21 | Stop reason: HOSPADM

## 2018-03-08 RX ORDER — DEXTROSE MONOHYDRATE 25 G/50ML
25 INJECTION, SOLUTION INTRAVENOUS
Status: DISCONTINUED | OUTPATIENT
Start: 2018-03-08 | End: 2018-03-21 | Stop reason: HOSPADM

## 2018-03-08 RX ORDER — IPRATROPIUM BROMIDE AND ALBUTEROL SULFATE 2.5; .5 MG/3ML; MG/3ML
3 SOLUTION RESPIRATORY (INHALATION)
Status: DISCONTINUED | OUTPATIENT
Start: 2018-03-08 | End: 2018-03-21 | Stop reason: HOSPADM

## 2018-03-08 RX ORDER — AMLODIPINE BESYLATE 5 MG/1
5 TABLET ORAL DAILY
Status: DISCONTINUED | OUTPATIENT
Start: 2018-03-08 | End: 2018-03-10

## 2018-03-08 RX ORDER — HYDROXYUREA 500 MG/1
1000 CAPSULE ORAL
Status: DISCONTINUED | OUTPATIENT
Start: 2018-03-09 | End: 2018-03-10

## 2018-03-08 RX ORDER — HYDROXYUREA 500 MG/1
500 CAPSULE ORAL
Status: DISCONTINUED | OUTPATIENT
Start: 2018-03-10 | End: 2018-03-10

## 2018-03-08 RX ORDER — HYDROXYZINE PAMOATE 50 MG/1
50 CAPSULE ORAL 3 TIMES DAILY PRN
Status: DISCONTINUED | OUTPATIENT
Start: 2018-03-08 | End: 2018-03-21 | Stop reason: HOSPADM

## 2018-03-08 RX ORDER — FUROSEMIDE 40 MG/1
40 TABLET ORAL DAILY
Status: DISCONTINUED | OUTPATIENT
Start: 2018-03-08 | End: 2018-03-09

## 2018-03-08 RX ORDER — SODIUM CHLORIDE 0.9 % (FLUSH) 0.9 %
1-10 SYRINGE (ML) INJECTION AS NEEDED
Status: DISCONTINUED | OUTPATIENT
Start: 2018-03-08 | End: 2018-03-21 | Stop reason: HOSPADM

## 2018-03-08 RX ORDER — ONDANSETRON 2 MG/ML
4 INJECTION INTRAMUSCULAR; INTRAVENOUS EVERY 6 HOURS PRN
Status: DISCONTINUED | OUTPATIENT
Start: 2018-03-08 | End: 2018-03-21 | Stop reason: HOSPADM

## 2018-03-08 RX ADMIN — MORPHINE SULFATE 4 MG: 4 INJECTION, SOLUTION INTRAMUSCULAR; INTRAVENOUS at 16:00

## 2018-03-08 RX ADMIN — PANTOPRAZOLE SODIUM 40 MG: 40 TABLET, DELAYED RELEASE ORAL at 21:39

## 2018-03-08 RX ADMIN — IPRATROPIUM BROMIDE AND ALBUTEROL SULFATE 3 ML: 2.5; .5 SOLUTION RESPIRATORY (INHALATION) at 17:25

## 2018-03-08 RX ADMIN — ONDANSETRON 4 MG: 2 INJECTION INTRAMUSCULAR; INTRAVENOUS at 16:00

## 2018-03-08 RX ADMIN — BUDESONIDE AND FORMOTEROL FUMARATE DIHYDRATE 2 PUFF: 160; 4.5 AEROSOL RESPIRATORY (INHALATION) at 21:00

## 2018-03-08 RX ADMIN — SODIUM CHLORIDE 75 ML/HR: 9 INJECTION, SOLUTION INTRAVENOUS at 18:08

## 2018-03-08 RX ADMIN — ASPIRIN 325 MG: 325 TABLET, COATED ORAL at 21:45

## 2018-03-08 RX ADMIN — INSULIN DETEMIR 26 UNITS: 100 INJECTION, SOLUTION SUBCUTANEOUS at 21:38

## 2018-03-08 RX ADMIN — MORPHINE SULFATE 1 MG: 2 INJECTION, SOLUTION INTRAMUSCULAR; INTRAVENOUS at 21:48

## 2018-03-08 RX ADMIN — MORPHINE SULFATE 1 MG: 2 INJECTION, SOLUTION INTRAMUSCULAR; INTRAVENOUS at 18:00

## 2018-03-08 RX ADMIN — ATORVASTATIN CALCIUM 10 MG: 10 TABLET, FILM COATED ORAL at 21:39

## 2018-03-08 RX ADMIN — GABAPENTIN 600 MG: 300 CAPSULE ORAL at 21:30

## 2018-03-08 NOTE — CONSULTS
Consult  [facility]      Tamy Sher (6/7/1930)  3/8/2018    Reason for Consult: Right leg pain  Requesting Physician: Fidencio Rodriguez*      CHIEF COMPLAINT:  Right leg pain    History Obtained From: patient chart family     HISTORY OF PRESENT ILLNESS:                The patient is an 87 y.o. female who presents with above chief complaint. The patient was walking with her walker when she bent down to pick something up.  She fell injuring her right leg.  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  She was brought to [facility] and was found to have a fracture of the right femur.  She has been admitted to the hospital.  She continues to have severe pain and is unable to ambulate.    Orthopedics was consulted on this patient.    Past Medical History:    Past Medical History:   Diagnosis Date   • Constipation    • Diabetes mellitus    • Diarrhea    • Diastolic dysfunction, left ventricle    • Esophagitis    • Exertional shortness of breath    • Hyperlipidemia    • Hypertension    • Lumbar spondylitis    • Osteoarthritis    • Peripheral vascular disease    • Sinusitis    • Stroke        Past Surgical History:    Past Surgical History:   Procedure Laterality Date   • GALLBLADDER SURGERY     • HYSTERECTOMY     • JOINT REPLACEMENT     • REPLACEMENT TOTAL KNEE Left        Current Medications:     Current Facility-Administered Medications:   •  amLODIPine (NORVASC) tablet 5 mg, 5 mg, Oral, Daily, Fidencio Rodriguze MD  •  aspirin tablet 325 mg, 325 mg, Oral, Nightly, Fidencio Rodriguez MD  •  atorvastatin (LIPITOR) tablet 10 mg, 10 mg, Oral, Nightly, Fidencio Rodriguez MD  •  budesonide-formoterol (SYMBICORT) 160-4.5 MCG/ACT inhaler 2 puff, 2 puff, Inhalation, BID - RT, Fidencio Rodriguez MD  •  dextrose (D50W) solution 25 g, 25 g, Intravenous, Q15 Min PRN, Fidencio Rodriguez MD  •   dextrose (GLUTOSE) oral gel 15 g, 15 g, Oral, Q15 Min PRN, Fidencio Rodriguez MD  •  folic acid (FOLVITE) tablet 1 mg, 1 mg, Oral, Daily, Fidencio Rodriguez MD  •  furosemide (LASIX) tablet 40 mg, 40 mg, Oral, Daily, Fidencio Rodriguez MD  •  [START ON 3/9/2018] gabapentin (NEURONTIN) capsule 300 mg, 300 mg, Oral, Daily With Breakfast, Fidencio Rodriguez MD  •  gabapentin (NEURONTIN) capsule 600 mg, 600 mg, Oral, Q PM, Fidencio Rodriguez MD  •  glucagon (human recombinant) (GLUCAGEN DIAGNOSTIC) injection 1 mg, 1 mg, Subcutaneous, PRN, Fidencio Rodriguez MD  •  [START ON 3/9/2018] hydroxyurea (HYDREA) capsule 1,000 mg, 1,000 mg, Oral, Once per day on Fri, Fidencio Rodriguez MD  •  [START ON 3/10/2018] hydroxyurea (HYDREA) capsule 500 mg, 500 mg, Oral, Once per day on Sun Mon Tue Wed Thu Sat, Fidencio Rodriguez MD  •  hydrOXYzine (VISTARIL) capsule 50 mg, 50 mg, Oral, TID PRN, Fidencio Rodriguez MD  •  insulin detemir (LEVEMIR) injection 30 Units, 30 Units, Subcutaneous, Nightly, Fidencio Rodriguez MD  •  insulin lispro (humaLOG) injection 0-9 Units, 0-9 Units, Subcutaneous, 4x Daily With Meals & Nightly, Fidencio Rodriguez MD  •  ipratropium-albuterol (DUO-NEB) nebulizer solution 3 mL, 3 mL, Nebulization, 4x Daily - RT, Fidencio Rodriguez MD, 3 mL at 03/08/18 1725  •  magnesium hydroxide (MILK OF MAGNESIA) suspension 2400 mg/10mL 10 mL, 10 mL, Oral, Daily PRN, Fidencio Rodriguez MD  •  metoprolol succinate XL (TOPROL-XL) 24 hr tablet 50 mg, 50 mg, Oral, Daily, Fidencio Rodriguez MD  •  Morphine sulfate (PF) injection 1 mg, 1 mg, Intravenous, Q4H PRN **AND** naloxone (NARCAN) injection 0.4 mg, 0.4 mg, Intravenous, Q5 Min PRN, Fidencio Rodriguez MD  •  Morphine sulfate (PF) injection 1 mg, 1 mg, Intravenous, Q4H PRN **AND** naloxone (NARCAN) injection 0.4 mg, 0.4 mg, Intravenous, Q5 Min PRN, Fidencio Rodriguez MD  •   ondansetron (ZOFRAN) injection 4 mg, 4 mg, Intravenous, Q6H PRN, Fidencio Rodriguez MD  •  pantoprazole (PROTONIX) EC tablet 40 mg, 40 mg, Oral, Q AM, Fidencio Rodriguez MD  •  sodium chloride 0.9 % flush 1-10 mL, 1-10 mL, Intravenous, PRN, Fidencio Rodriguez MD  •  Insert peripheral IV, , , Once **AND** sodium chloride 0.9 % flush 10 mL, 10 mL, Intravenous, PRN, Rylee Gustafson MD  •  sodium chloride 0.9 % infusion, 75 mL/hr, Intravenous, Continuous, Fidencio Rodriguez MD    Allergies:  Review of patient's allergies indicates no known allergies.    Social History:   Social History     Social History   • Marital status:      Social History Main Topics   • Smoking status: Never Smoker   • Smokeless tobacco: Never Used   • Alcohol use No   • Drug use: No   • Sexual activity: No       Family History:   Family History   Problem Relation Age of Onset   • No Known Problems Mother    • No Known Problems Father        REVIEW OF SYSTEMS:    All systems were reviewed and negative except for:  Musculoskeletal: positive for  bone pain, joint pain and See HPI    PHYSICAL EXAM:        General Appearance:    Alert, cooperative, appears in pain   Head:    Normocephalic, without obvious abnormality, atraumatic CPAP in place   Eyes:            Vision intact, EOM intact     Ears:    Ears appear intact with no abnormalities noted   Neck:   No adenopathy, supple, trachea midline, no thyromegaly   Back:     No kyphosis present, no scoliosis present, no skin lesions,      erythema or scars, no tenderness to palpation,   range of motion normal   Lungs:     CPAP in place, low O2    Heart:    Regular rhythm and normal rate, no edema   Chest Wall:    No abnormalities observed   Abdomen:     Normal bowel sounds, no masses, no organomegaly, soft        non-tender, non-distended, no guarding   Rectal:     Deferred   Extremities:   Severe pain with ROM right knee.  Severe tenderness Right knee.  Swelling and  deformity consistent with fracture.  Otherwise Moves all extremities well, no edema, no cyanosis, no redness   Pulses:   Pulses palpable and equal bilaterally   Skin:   No bleeding, bruising or rash   Lymph nodes:   No palpable adenopathy   Neurologic:   Cranial nerves 2 - 12 grossly intact, alert and oriented times 3.         DATA:    Lab Results (last 24 hours)     Procedure Component Value Units Date/Time    Comprehensive Metabolic Panel [[id number]]  (Abnormal) Collected:  03/08/18 1558    Specimen:  Blood Updated:  03/08/18 1615     Glucose 145 (H) mg/dL      BUN 45 (H) mg/dL      Creatinine 1.13 mg/dL      Sodium 138 mmol/L      Potassium 4.8 mmol/L      Chloride 97 (L) mmol/L      CO2 30.0 mmol/L      Calcium 9.0 mg/dL      Total Protein 6.5 g/dL      Albumin 3.90 g/dL      ALT (SGPT) 37 U/L      AST (SGOT) 24 U/L      Alkaline Phosphatase 83 U/L      Total Bilirubin 0.3 mg/dL      eGFR Non African Amer 46 (L) mL/min/1.73      Globulin 2.6 gm/dL      A/G Ratio 1.5 g/dL      BUN/Creatinine Ratio 39.8 (H)     Anion Gap 11.0 mmol/L     Narrative:       The MDRD GFR formula is only valid for adults with stable renal function between ages 18 and 70.    CBC & Differential [[id number]] Collected:  03/08/18 1558    Specimen:  Blood Updated:  03/08/18 1627    Narrative:       The following orders were created for panel order CBC & Differential.  Procedure                               Abnormality         Status                     ---------                               -----------         ------                     Manual Differential[[id number]]                                                         CBC Auto Differential[[id number]]        Abnormal            Final result                 Please view results for these tests on the individual orders.    CBC Auto Differential [[id number]]  (Abnormal) Collected:  03/08/18 1558    Specimen:  Blood Updated:  03/08/18 1627     WBC 10.85 (H) 10*3/mm3      RBC 3.01 (L) 10*6/mm3       Hemoglobin 10.4 (L) g/dL      Hematocrit 32.6 (L) %      .3 (H) fL      MCH 34.6 (H) pg      MCHC 31.9 (L) g/dL      RDW 14.8 %      RDW-SD 59.8 (H) fl      MPV 9.8 fL      Platelets 511 (H) 10*3/mm3      nRBC 0.0 /100 WBC     Manual Differential [[id number]]  (Abnormal) Collected:  03/08/18 1558    Specimen:  Blood Updated:  03/08/18 1627     Neutrophil % 78.0 %      Lymphocyte % 11.0 (L) %      Eosinophil % 1.0 %      Bands %  10.0 %      Neutrophils Absolute 9.55 (H) 10*3/mm3      Lymphocytes Absolute 1.19 10*3/mm3      Eosinophils Absolute 0.11 10*3/mm3      Anisocytosis Slight/1+     Macrocytes Slight/1+     WBC Morphology Normal     Platelet Estimate Increased    Lillian Draw [[id number]] Collected:  03/08/18 1558    Specimen:  Blood Updated:  03/08/18 1701    Narrative:       The following orders were created for panel order Lillian Draw.  Procedure                               Abnormality         Status                     ---------                               -----------         ------                     Light Blue Top[[id number]]                                   Final result               Green Top (Gel)[[id number]]                                  Final result               Lavender Top[[id number]]                                     Final result               Red Top[[id number]]                                          Final result                 Please view results for these tests on the individual orders.    Light Blue Top [[id number]] Collected:  03/08/18 1558    Specimen:  Blood Updated:  03/08/18 1701     Extra Tube hold for add-on      Auto resulted       Green Top (Gel) [[id number]] Collected:  03/08/18 1558    Specimen:  Blood Updated:  03/08/18 1701     Extra Tube Hold for add-ons.      Auto resulted.       Lavender Top [[id number]] Collected:  03/08/18 1558    Specimen:  Blood Updated:  03/08/18 1701     Extra Tube hold for add-on      Auto resulted       Red Top [[id number]] Collected:   03/08/18 1558    Specimen:  Blood Updated:  03/08/18 1701     Extra Tube Hold for add-ons.      Auto resulted.       POC Glucose Once [[id number]]  (Normal) Collected:  03/08/18 1709    Specimen:  Blood Updated:  03/08/18 1720     Glucose 130 mg/dL       : Shelbie LucasMeter ID: [id number]             Radiology:   Imaging Results (last 7 days)     Procedure Component Value Units Date/Time    XR Chest 1 View [[id number]] Collected:  03/08/18 1619     Updated:  03/08/18 1624    Narrative:       XR CHEST 1 VW- 3/8/2018 3:57 PM CST     HISTORY: history of copd, fall; W19.XXXA-Unspecified fall, initial  encounter; S72.491A-Other fracture of lower end of right femur, initial  encounter for closed fracture       COMPARISON: Exam dated 2/2/2018.     FINDINGS:      Reidentified cardiomegaly with obscuration of the left hemidiaphragm and  left lung base. This is similar to the comparison exam, with the left  base opacification likely due to the cardiomegaly. Overall stable heart  size. There is central pulmonary vascular congestion. No definite  pulmonary edema. No acute lung infiltrate identified. No displaced  fracture identified. No pneumothorax.       Impression:       1. Overall stable chest exam. Reidentified cardiomegaly with pulmonary  venous congestion.         This report was finalized on 03/08/2018 16:21 by Dr Franck Pretty, .    XR Femur 2 View Right [[id number]] Collected:  03/08/18 1635     Updated:  03/08/18 1640    Narrative:       EXAMINATION:   XR FEMUR 2 VW RIGHT-  3/8/2018 4:35 PM CST     HISTORY: Patient fell     AP and lateral views the right femur obtained. In the distal third of  the right femur there is a comminuted spiral fracture. The fracture  fragments extend to the femoral component of a right knee arthroplasty.  There is dorsal angulation of the distal fracture fragments.       Impression:       Comminuted spiral fracture distal third of the right femur.  The comminuted fragments extend  to the femoral component of the right  knee arthroplasty.  2. Dorsal angulation of the distal fragments is noted  This report was finalized on 03/08/2018 16:37 by Dr. Klever Hooker MD.    XR Hip With or Without Pelvis 2 - 3 View Right [[id number]] Collected:  03/08/18 1527     Updated:  03/08/18 1641    XR Knee 1 or 2 View Right [[id number]] Collected:  03/08/18 1553     Updated:  03/08/18 1644    Narrative:       EXAMINATION:   XR KNEE 1 OR 2 VW RIGHT-  3/8/2018 3:53 PM CST     HISTORY: Patient fell     2 views of the right knee are obtained. There is a comminuted fracture  of the distal right femur. The fracture extends to the femoral component  of a right knee prosthesis. Comminuted fracture fragments are present  the distal portion demonstrates dorsal angulation.       Impression:       Comminuted fracture distal right femur  This report was finalized on 03/08/2018 15:54 by Dr. Klever Hooker MD.          Imaging was brought up and reviewed and I agree with the radiology findings.    IMPRESSION/RECOMMENDATIONS:    Principal Problem:    Closed displaced comminuted fracture of shaft of right femur  Active Problems:    Fall    Pain of right thigh    Class 2 obesity with serious comorbidity in adult    Type 2 diabetes mellitus with neurologic complication, with long-term current use of insulin    STUART (acute kidney injury)    Chronic diastolic heart failure    Pleural effusion, left    Hypoxia      Assessment: Closed comminuted severely displaced fracture of distal right femur which is som-prosthetic in nature.    Plan:  1) The patient will need an open reduction internal fixation of the right femur.  The risks and benefits of this procedure were discussed with the patient and her family and they would like to proceed.  She has a splint in place.  We will proceed with surgery when the patient is stable.  Ice pack for pain.      Electronically signed by ANITA Hernandez5:44 PM3/8/2018

## 2018-03-08 NOTE — ED PROVIDER NOTES
Subjective patient is an 87-year-old female who presents to the ER status post a fall.  Patient states she was reaching for a piece of paper under a chair just prior to arrival when she lost her balance and fell landing on her right lower extremity.  Patient heard a pop and has had severe pain to her right thigh since the fall.  She did not hit her head or have any loss of consciousness.  She denies pain elsewhere.  Patient has not been able to ambulate since the fall.  She denies any fever, chest pain, shortness of breath, abdominal pain, nausea vomiting diarrhea, urinary changes, neurological changes, neck or back pain, other pain.    History provided by:  Patient   used: No        Review of Systems   Constitutional: Negative.    HENT: Negative.    Eyes: Negative.    Respiratory: Negative.    Cardiovascular: Negative.    Gastrointestinal: Negative.    Endocrine: Negative.    Genitourinary: Negative.    Musculoskeletal: Positive for arthralgias and myalgias.   Skin: Negative.    Allergic/Immunologic: Negative.    Neurological: Negative.    Hematological: Negative.    Psychiatric/Behavioral: Negative.    All other systems reviewed and are negative.      Past Medical History:   Diagnosis Date   • Constipation    • Diabetes mellitus    • Diarrhea    • Diastolic dysfunction, left ventricle    • Esophagitis    • Exertional shortness of breath    • Hyperlipidemia    • Hypertension    • Lumbar spondylitis    • Osteoarthritis    • Peripheral vascular disease    • Sinusitis    • Stroke        No Known Allergies    Past Surgical History:   Procedure Laterality Date   • GALLBLADDER SURGERY     • HYSTERECTOMY     • JOINT REPLACEMENT     • REPLACEMENT TOTAL KNEE Left        Family History   Problem Relation Age of Onset   • No Known Problems Mother    • No Known Problems Father        Social History     Social History   • Marital status:      Social History Main Topics   • Smoking status: Never Smoker    • Smokeless tobacco: Never Used   • Alcohol use No   • Drug use: No   • Sexual activity: No           Objective   Physical Exam   Constitutional: She is oriented to person, place, and time. She appears well-developed and well-nourished.   HENT:   Head: Normocephalic and atraumatic.   Eyes: Conjunctivae are normal. Pupils are equal, round, and reactive to light.   Neck: Normal range of motion.   Cardiovascular: Normal rate, regular rhythm and normal heart sounds.    Pulmonary/Chest: Effort normal and breath sounds normal.   Abdominal: Soft. There is no tenderness.   Musculoskeletal:   TTP to R mid and distal thigh with decreased ROM due to pain, NTTP elsewhere including CTL spines, NV intact   Neurological: She is alert and oriented to person, place, and time. She has normal strength.   Skin: Skin is warm.   Psychiatric: She has a normal mood and affect. Her behavior is normal.   Nursing note and vitals reviewed.      Procedures         ED Course  ED Course      Patient was given morphine.      Lab Results (last 24 hours)     Procedure Component Value Units Date/Time    Comprehensive Metabolic Panel [[id number]] Collected:  03/08/18 1558    Specimen:  Blood Updated:  03/08/18 1601    CBC & Differential [[id number]] Collected:  03/08/18 1558    Specimen:  Blood Updated:  03/08/18 1601    Narrative:       The following orders were created for panel order CBC & Differential.  Procedure                               Abnormality         Status                     ---------                               -----------         ------                     CBC Auto Differential[[id number]]                            In process                   Please view results for these tests on the individual orders.    CBC Auto Differential [[id number]] Collected:  03/08/18 1558    Specimen:  Blood Updated:  03/08/18 1601        XR Femur 2 View Left   Final Result      XR Hip With or Without Pelvis 2 - 3 View Right   Final Result   Negative  AP pelvis and AP and lateral right hip   This report was finalized on 03/08/2018 15:28 by Dr. Klever Hooker MD.      XR Chest 1 View    (Results Pending)   XR Knee 1 or 2 View Right    (Results Pending)     X-ray showed a comminuted distal right femur fracture.  Discussed the case with Dr. Bundy with orthopedic surgery.  A long leg splint was applied by nursing.  Patient was then admitted to the hospitalist service by Dr. Rodriguez for further treatment.            MDM  Number of Diagnoses or Management Options      Final diagnoses:   Fall, initial encounter   Other closed fracture of distal end of right femur, initial encounter            Rylee Gustafson MD  03/08/18 0294

## 2018-03-08 NOTE — H&P
"    [facility] Medicine Services  HISTORY AND PHYSICAL    Date of Admission: 3/8/2018    Primary Care Physician: Barry Foley MD    Subjective     Chief Complaint:  fell    History of Present Illness  Patient fell as she was picking up a piece of paper and lost her balance.  She uses walker to ambulate  She never lost consciousness.  Denies dizziness, headache, chest pain, dizziness. She has some abdominal distention which cuts her breath she claimed.  She had BM yesterday.  She normally uses oxygen at night (2LPM via NC)  She complians of pain 9/10 on RLE which showed comminuted fracture of femur.    Nurse told me that her O2 sat drops to 88-89% while she's in the Er  She is diabetic.  She said her sugar was \"real low\" this morning (49)      There has been phoned in conversation with her primary care provider regarding blood sugar ranging from  and irregularity in her heart beat.  TSH on February 27 was 7.4 while A1c level is 7.6.  Her creatinine in latter part of  February was 1.8 from 1.3  (Feb 12)  She carries history of diabetes with peripheralneuropathy hyperlipidemia, hypertension  She was hospitalized  approximately 6 weeks ago was discharged to [facility].  She came back to her home about 1-1/2 week ago.      Review of Systems     Otherwise complete ROS reviewed and negative except as mentioned in the HPI.    Past Medical History:   Past Medical History:   Diagnosis Date   • Constipation    • Diabetes mellitus    • Diarrhea    • Diastolic dysfunction, left ventricle    • Esophagitis    • Exertional shortness of breath    • Hyperlipidemia    • Hypertension    • Lumbar spondylitis    • Osteoarthritis    • Peripheral vascular disease    • Sinusitis    • Stroke      Past Surgical History:  Past Surgical History:   Procedure Laterality Date   • GALLBLADDER SURGERY     • HYSTERECTOMY     • JOINT REPLACEMENT     • REPLACEMENT TOTAL KNEE Left      Social " History:  reports that she has never smoked. She has never used smokeless tobacco. She reports that she does not drink alcohol or use illicit drugs.    Family History: family history includes No Known Problems in her father and mother.    Allergies:  No Known Allergies  Medications:  Prior to Admission medications    Medication Sig Start Date End Date Taking? Authorizing Provider   amLODIPine (NORVASC) 5 MG tablet Take 5 mg by mouth Daily.    Historical Provider, MD   aspirin 325 MG tablet Take 325 mg by mouth Every Night.    Historical Provider, MD   budesonide-formoterol (SYMBICORT) 160-4.5 MCG/ACT inhaler Inhale 2 puffs 2 (Two) Times a Day. 2/5/18   BULMARO Swanson   folic acid (FOLVITE) 1 MG tablet Take 1 mg by mouth Daily.    Historical Provider, MD   furosemide (LASIX) 40 MG tablet Take 40 mg by mouth Daily.    Historical Provider, MD   gabapentin (NEURONTIN) 300 MG capsule Take 1 capsule by mouth Daily With Breakfast. 2/5/18   BULMARO Swanson   gabapentin (NEURONTIN) 300 MG capsule Take 2 capsules by mouth Every Evening. 2/5/18   BULMARO Swanson   hydroxyurea (HYDREA) 500 MG capsule Take 500 mg by mouth Daily. Hold on Fridays.    Historical Provider, MD   hydroxyurea (HYDREA) 500 MG capsule Take 1,000 mg by mouth Daily. Friday dose only.    Historical Provider, MD   hydrOXYzine (VISTARIL) 50 MG capsule Take 1 capsule by mouth 3 (Three) Times a Day As Needed for Anxiety. 2/5/18   BULMARO Swanson   insulin glargine (LANTUS) 100 UNIT/ML injection Inject 35 Units under the skin Every Night.    Historical Provider, MD   insulin glargine (LANTUS) 100 UNIT/ML injection Inject 10 Units under the skin Daily. AM dose 2/5/18   BULMARO Swanson   insulin lispro (humaLOG) 100 UNIT/ML injection Inject 0-9 Units under the skin 4 (Four) Times a Day With Meals & at Bedtime. 2/5/18   BULMARO Swanson   ipratropium-albuterol (DUO-NEB) 0.5-2.5 mg/mL nebulizer Take 3 mL by nebulization 4  "(Four) Times a Day. 2/5/18   BULMARO Swanson   lisinopril (PRINIVIL,ZESTRIL) 40 MG tablet Take 40 mg by mouth Daily.    Historical Provider, MD   metFORMIN (GLUCOPHAGE) 1000 MG tablet Take 500 mg by mouth Every Night.    Historical Provider, MD   metoprolol succinate XL (TOPROL-XL) 25 MG 24 hr tablet Take 2 tablets by mouth Daily. 3/25/17   BULMARO Dhaliwal   pantoprazole (PROTONIX) 40 MG EC tablet Take 40 mg by mouth Daily.    Historical Provider, MD   predniSONE (DELTASONE) 10 MG tablet Take 1 tablet by mouth Daily. Take 2 tabs x 3 days start 2/6/17, then 1 tab x 3 days then dc 2/5/18   BULMARO Swanson   simvastatin (ZOCOR) 40 MG tablet Take 0.5 tablets by mouth Every Night. 3/25/17   BULMARO Dhaliwal     Objective     Vital Signs: /65  Pulse 98  Temp 97 °F (36.1 °C) (Temporal Artery )   Resp 19  Ht 152.4 cm (60\")  Wt 88 kg (194 lb)  SpO2 90%  BMI 37.89 kg/m2  Physical Exam   Constitutional: She is oriented to person, place, and time. She appears well-developed and well-nourished.   HENT:   Head: Normocephalic and atraumatic.   Right Ear: External ear normal.   Left Ear: External ear normal.   Nose: Nose normal.   Mouth/Throat: Oropharynx is clear and moist. No oropharyngeal exudate.   Eyes: Conjunctivae and EOM are normal. Pupils are equal, round, and reactive to light. Right eye exhibits no discharge. Left eye exhibits no discharge. No scleral icterus.   Neck: Normal range of motion. Neck supple. No JVD present. No tracheal deviation present. No thyromegaly present.   Cardiovascular: Normal rate, regular rhythm, normal heart sounds and intact distal pulses.    Pulmonary/Chest: No stridor. No respiratory distress. She has no wheezes. She has no rales. She exhibits no tenderness.   Diminished inspiratory effort   Abdominal: Soft. Bowel sounds are normal. She exhibits distension. She exhibits no mass. There is no tenderness. There is no rebound and no guarding. No hernia. "   Musculoskeletal: She exhibits edema.   Swollen right thigh, + tenderness and lots of pain with little movement   Lymphadenopathy:     She has no cervical adenopathy.   Neurological: She is alert and oriented to person, place, and time. No cranial nerve deficit.   Skin: Skin is warm and dry. No erythema. No pallor.   Dry skin LEs   Psychiatric: She has a normal mood and affect. Her behavior is normal. Judgment and thought content normal.   Vitals reviewed.          Results Reviewed:  Lab Results (last 24 hours)     Procedure Component Value Units Date/Time    [place] Draw [[id number]] Collected:  03/08/18 1558    Specimen:  Blood Updated:  03/08/18 1601    Narrative:       The following orders were created for panel order [place] Draw.  Procedure                               Abnormality         Status                     ---------                               -----------         ------                     Light Blue Top[[id number]]                                   In process                 Green Top (Gel)[[id number]]                                  In process                 Lavender Top[[id number]]                                     In process                 Red Top[[id number]]                                          In process                   Please view results for these tests on the individual orders.    Light Blue Top [[id number]] Collected:  03/08/18 1558    Specimen:  Blood Updated:  03/08/18 1601    Green Top (Gel) [[id number]] Collected:  03/08/18 1558    Specimen:  Blood Updated:  03/08/18 1601    Red Top [[id number]] Collected:  03/08/18 1558    Specimen:  Blood Updated:  03/08/18 1601    Lavender Top [[id number]] Collected:  03/08/18 1558    Specimen:  Blood Updated:  03/08/18 1601    Comprehensive Metabolic Panel [[id number]]  (Abnormal) Collected:  03/08/18 1558    Specimen:  Blood Updated:  03/08/18 1615     Glucose 145 (H) mg/dL      BUN 45 (H) mg/dL      Creatinine 1.13 mg/dL      Sodium 138 mmol/L       Potassium 4.8 mmol/L      Chloride 97 (L) mmol/L      CO2 30.0 mmol/L      Calcium 9.0 mg/dL      Total Protein 6.5 g/dL      Albumin 3.90 g/dL      ALT (SGPT) 37 U/L      AST (SGOT) 24 U/L      Alkaline Phosphatase 83 U/L      Total Bilirubin 0.3 mg/dL      eGFR Non African Amer 46 (L) mL/min/1.73      Globulin 2.6 gm/dL      A/G Ratio 1.5 g/dL      BUN/Creatinine Ratio 39.8 (H)     Anion Gap 11.0 mmol/L     Narrative:       The MDRD GFR formula is only valid for adults with stable renal function between ages 18 and 70.    CBC & Differential [[id number]] Collected:  03/08/18 1558    Specimen:  Blood Updated:  03/08/18 1627    Narrative:       The following orders were created for panel order CBC & Differential.  Procedure                               Abnormality         Status                     ---------                               -----------         ------                     Manual Differential[[id number]]                                                         CBC Auto Differential[[id number]]        Abnormal            Final result                 Please view results for these tests on the individual orders.    CBC Auto Differential [[id number]]  (Abnormal) Collected:  03/08/18 1558    Specimen:  Blood Updated:  03/08/18 1627     WBC 10.85 (H) 10*3/mm3      RBC 3.01 (L) 10*6/mm3      Hemoglobin 10.4 (L) g/dL      Hematocrit 32.6 (L) %      .3 (H) fL      MCH 34.6 (H) pg      MCHC 31.9 (L) g/dL      RDW 14.8 %      RDW-SD 59.8 (H) fl      MPV 9.8 fL      Platelets 511 (H) 10*3/mm3      nRBC 0.0 /100 WBC     Manual Differential [[id number]]  (Abnormal) Collected:  03/08/18 1558    Specimen:  Blood Updated:  03/08/18 1627     Neutrophil % 78.0 %      Lymphocyte % 11.0 (L) %      Eosinophil % 1.0 %      Bands %  10.0 %      Neutrophils Absolute 9.55 (H) 10*3/mm3      Lymphocytes Absolute 1.19 10*3/mm3      Eosinophils Absolute 0.11 10*3/mm3      Anisocytosis Slight/1+     Macrocytes Slight/1+     WBC  Morphology Normal     Platelet Estimate Increased        Imaging Results (last 24 hours)     Procedure Component Value Units Date/Time    XR Hip With or Without Pelvis 2 - 3 View Right [[id number]] Collected:  03/08/18 1527     Updated:  03/08/18 1531    Narrative:       EXAMINATION:   XR HIP W OR WO PELVIS 2-3 VIEW RIGHT-  3/8/2018 3:27 PM  CST     HISTORY: Pain after a fall     AP view the pelvis AP and lateral view the right hip is obtained. SI  joints are normal. Right femoral head and neck is intact. Symphysis is  visualized. Left hip appears intact.       Impression:       Negative AP pelvis and AP and lateral right hip  This report was finalized on 03/08/2018 15:28 by Dr. Klever Hooker MD.    XR Femur 2 View Left [[id number]] Collected:  03/08/18 1534     Updated:  03/08/18 1538    Narrative:       EXAMINATION:   XR FEMUR 2 VW LEFT-  3/8/2018 3:34 PM CST     HISTORY: Patient fell     AP and lateral views the right femur obtained. Comminuted spiral  fracture of the distal femur is noted. There is dorsal angulation of the  distal fracture fragments. The fracture extends to a femoral component  of the left knee arthroplasty.     IMPRESSION comminuted spiral fracture distal third left femur  This report was finalized on 03/08/2018 15:35 by Dr. Klever Hooker MD.    XR Chest 1 View [[id number]] Collected:  03/08/18 1619     Updated:  03/08/18 1624    Narrative:       XR CHEST 1 VW- 3/8/2018 3:57 PM CST     HISTORY: history of copd, fall; W19.XXXA-Unspecified fall, initial  encounter; S72.491A-Other fracture of lower end of right femur, initial  encounter for closed fracture       COMPARISON: Exam dated 2/2/2018.     FINDINGS:      Reidentified cardiomegaly with obscuration of the left hemidiaphragm and  left lung base. This is similar to the comparison exam, with the left  base opacification likely due to the cardiomegaly. Overall stable heart  size. There is central pulmonary vascular congestion. No  definite  pulmonary edema. No acute lung infiltrate identified. No displaced  fracture identified. No pneumothorax.       Impression:       1. Overall stable chest exam. Reidentified cardiomegaly with pulmonary  venous congestion.         This report was finalized on 03/08/2018 16:21 by Dr Franck Pretty, .    XR Femur 2 View Right [[id number]] Updated:  03/08/18 1634    XR Knee 1 or 2 View Right [[id number]] Updated:  03/08/18 1634      · Left ventricular systolic function is normal. EF >65%.  · Left ventricular diastolic dysfunction (grade II) consistent with pseudonormalization.  · Left ventricular wall thickness is consistent with borderline concentric hypertrophy.  · Mild aortic valve stenosis is present.  · Mild mitral valve regurgitation is present  · Left atrial cavity size is severely dilated.  · Estimated right ventricular systolic pressure from tricuspid regurgitation is markedly elevated (>55 mmHg).      I have personally reviewed and interpreted the radiology studies and ECG obtained at time of admission.     Assessment / Plan     Assessment:   Hospital Problem List     Fall        right femoral shaft fracture secondary to fall  Limb pain sec to above  Prior b/l knee replacement  Obesity with BMI of 37.9  Hypoxia on nocturnal oxygen 2LPM  ? Left pleural effusion  Hx of recent PNA (6 wks ago)  DMT2 insulin treated  Hx of PVD  Hypothyroidism  Chronic diastolic HF   Macrocytic anemia      Plan:   pain med  splint applied in ER  Dr. Bundy informed by Dr. Gustafson in ER  Oxygen supplement; monitor for sedating effect, may need prn NIPPV  dvt proph per ortho  Ssi, resume basal insulin   abd x-ray - abdominal distention; monitor closely    review home meds  Check b12, folate    Other plans per orders    Code Status:  Full code   I discussed the patients findings and my recommendations with the  son, patient and nurse    Estimated length of stay to be determined    Fidencio Rodriguez MD   03/08/18   4:37  PM

## 2018-03-09 ENCOUNTER — ANESTHESIA EVENT (OUTPATIENT)
Dept: PERIOP | Facility: HOSPITAL | Age: 83
End: 2018-03-09

## 2018-03-09 LAB
ALBUMIN SERPL-MCNC: 2.8 G/DL (ref 3.5–5)
ALBUMIN/GLOB SERPL: 1.3 G/DL (ref 1.1–2.5)
ALP SERPL-CCNC: 64 U/L (ref 24–120)
ALT SERPL W P-5'-P-CCNC: 29 U/L (ref 0–54)
ANION GAP SERPL CALCULATED.3IONS-SCNC: 7 MMOL/L (ref 4–13)
ARTERIAL PATENCY WRIST A: POSITIVE
AST SERPL-CCNC: 32 U/L (ref 7–45)
ATMOSPHERIC PRESS: 748 MMHG
BASE EXCESS BLDA CALC-SCNC: 1.1 MMOL/L (ref 0–2)
BDY SITE: ABNORMAL
BILIRUB SERPL-MCNC: 0.2 MG/DL (ref 0.1–1)
BODY TEMPERATURE: 37 C
BUN BLD-MCNC: 46 MG/DL (ref 5–21)
BUN/CREAT SERPL: 30.3 (ref 7–25)
CA-I BLD-MCNC: 4.35 MG/DL (ref 4.6–5.4)
CALCIUM SPEC-SCNC: 8.1 MG/DL (ref 8.4–10.4)
CHLORIDE SERPL-SCNC: 99 MMOL/L (ref 98–110)
CO2 SERPL-SCNC: 30 MMOL/L (ref 24–31)
COHGB MFR BLD: 2.4 % (ref 0–5)
CREAT BLD-MCNC: 1.52 MG/DL (ref 0.5–1.4)
DEPRECATED RDW RBC AUTO: 60.7 FL (ref 40–54)
EOSINOPHIL # BLD MANUAL: 0.18 10*3/MM3 (ref 0–0.7)
EOSINOPHIL NFR BLD MANUAL: 2 % (ref 0–4)
ERYTHROCYTE [DISTWIDTH] IN BLOOD BY AUTOMATED COUNT: 14.8 % (ref 12–15)
GAS FLOW AIRWAY: 3.5 LPM
GFR SERPL CREATININE-BSD FRML MDRD: 32 ML/MIN/1.73
GLOBULIN UR ELPH-MCNC: 2.2 GM/DL
GLUCOSE BLD-MCNC: 134 MG/DL (ref 70–100)
GLUCOSE BLDC GLUCOMTR-MCNC: 166 MG/DL (ref 70–130)
GLUCOSE BLDC GLUCOMTR-MCNC: 169 MG/DL (ref 70–130)
GLUCOSE BLDC GLUCOMTR-MCNC: 202 MG/DL (ref 70–130)
HCO3 BLDA-SCNC: 27.6 MMOL/L (ref 20–26)
HCT VFR BLD AUTO: 24.7 % (ref 37–47)
HCT VFR BLD AUTO: 29 % (ref 37–47)
HCT VFR BLD CALC: 29.5 % (ref 38–51)
HGB BLD-MCNC: 7.6 G/DL (ref 12–16)
HGB BLD-MCNC: 9.3 G/DL (ref 12–16)
HGB BLDA-MCNC: 9.6 G/DL (ref 13.5–17.5)
LYMPHOCYTES # BLD MANUAL: 1.01 10*3/MM3 (ref 0.72–4.86)
LYMPHOCYTES NFR BLD MANUAL: 11 % (ref 15–45)
LYMPHOCYTES NFR BLD MANUAL: 3 % (ref 4–12)
Lab: ABNORMAL
MACROCYTES BLD QL SMEAR: ABNORMAL
MCH RBC QN AUTO: 34.2 PG (ref 28–32)
MCHC RBC AUTO-ENTMCNC: 30.8 G/DL (ref 33–36)
MCV RBC AUTO: 111.3 FL (ref 82–98)
METAMYELOCYTES NFR BLD MANUAL: 1 % (ref 0–0)
METHGB BLD QL: 1.2 % (ref 0–3)
MODALITY: ABNORMAL
MONOCYTES # BLD AUTO: 0.27 10*3/MM3 (ref 0.19–1.3)
NEUTROPHILS # BLD AUTO: 7.6 10*3/MM3 (ref 1.87–8.4)
NEUTROPHILS NFR BLD MANUAL: 74 % (ref 39–78)
NEUTS BAND NFR BLD MANUAL: 9 % (ref 0–10)
NRBC BLD MANUAL-RTO: 0 /100 WBC (ref 0–0)
OVALOCYTES BLD QL SMEAR: ABNORMAL
OXYHGB MFR BLDV: 91 % (ref 94–99)
PCO2 BLDA: 52.1 MM HG (ref 35–45)
PH BLDA: 7.33 PH UNITS (ref 7.35–7.45)
PLAT MORPH BLD: NORMAL
PLATELET # BLD AUTO: 415 10*3/MM3 (ref 130–400)
PMV BLD AUTO: 10 FL (ref 6–12)
PO2 BLDA: 67.3 MM HG (ref 83–108)
POLYCHROMASIA BLD QL SMEAR: ABNORMAL
POTASSIUM BLD-SCNC: 4.9 MMOL/L (ref 3.5–5.3)
POTASSIUM BLDA-SCNC: 5.3 MMOL/L (ref 3.5–5.2)
PROT SERPL-MCNC: 5 G/DL (ref 6.3–8.7)
RBC # BLD AUTO: 2.22 10*6/MM3 (ref 4.2–5.4)
SAO2 % BLDCOA: 94.5 % (ref 94–99)
SODIUM BLD-SCNC: 136 MMOL/L (ref 135–145)
SODIUM BLDA-SCNC: 133 MMOL/L (ref 136–145)
VENTILATOR MODE: ABNORMAL
WBC MORPH BLD: NORMAL
WBC NRBC COR # BLD: 9.16 10*3/MM3 (ref 4.8–10.8)

## 2018-03-09 PROCEDURE — P9016 RBC LEUKOCYTES REDUCED: HCPCS

## 2018-03-09 PROCEDURE — 36600 WITHDRAWAL OF ARTERIAL BLOOD: CPT

## 2018-03-09 PROCEDURE — 85025 COMPLETE CBC W/AUTO DIFF WBC: CPT | Performed by: INTERNAL MEDICINE

## 2018-03-09 PROCEDURE — 36430 TRANSFUSION BLD/BLD COMPNT: CPT

## 2018-03-09 PROCEDURE — 63710000001 INSULIN DETEMIR PER 5 UNITS: Performed by: INTERNAL MEDICINE

## 2018-03-09 PROCEDURE — 85007 BL SMEAR W/DIFF WBC COUNT: CPT | Performed by: INTERNAL MEDICINE

## 2018-03-09 PROCEDURE — 94660 CPAP INITIATION&MGMT: CPT

## 2018-03-09 PROCEDURE — 25010000002 ONDANSETRON PER 1 MG: Performed by: INTERNAL MEDICINE

## 2018-03-09 PROCEDURE — 86923 COMPATIBILITY TEST ELECTRIC: CPT

## 2018-03-09 PROCEDURE — 82805 BLOOD GASES W/O2 SATURATION: CPT

## 2018-03-09 PROCEDURE — 82962 GLUCOSE BLOOD TEST: CPT

## 2018-03-09 PROCEDURE — 63710000001 INSULIN LISPRO (HUMAN) PER 5 UNITS: Performed by: INTERNAL MEDICINE

## 2018-03-09 PROCEDURE — 93005 ELECTROCARDIOGRAM TRACING: CPT | Performed by: ANESTHESIOLOGY

## 2018-03-09 PROCEDURE — 85018 HEMOGLOBIN: CPT | Performed by: INTERNAL MEDICINE

## 2018-03-09 PROCEDURE — 83050 HGB METHEMOGLOBIN QUAN: CPT

## 2018-03-09 PROCEDURE — 25010000002 MORPHINE SULFATE (PF) 2 MG/ML SOLUTION: Performed by: INTERNAL MEDICINE

## 2018-03-09 PROCEDURE — 94799 UNLISTED PULMONARY SVC/PX: CPT

## 2018-03-09 PROCEDURE — 85014 HEMATOCRIT: CPT | Performed by: INTERNAL MEDICINE

## 2018-03-09 PROCEDURE — 80053 COMPREHEN METABOLIC PANEL: CPT | Performed by: INTERNAL MEDICINE

## 2018-03-09 PROCEDURE — 86900 BLOOD TYPING SEROLOGIC ABO: CPT

## 2018-03-09 PROCEDURE — 82375 ASSAY CARBOXYHB QUANT: CPT

## 2018-03-09 RX ORDER — HYDROCODONE BITARTRATE AND ACETAMINOPHEN 7.5; 325 MG/1; MG/1
1 TABLET ORAL EVERY 6 HOURS PRN
Status: DISCONTINUED | OUTPATIENT
Start: 2018-03-09 | End: 2018-03-21 | Stop reason: HOSPADM

## 2018-03-09 RX ORDER — FUROSEMIDE 20 MG/1
20 TABLET ORAL DAILY
Status: DISCONTINUED | OUTPATIENT
Start: 2018-03-10 | End: 2018-03-10

## 2018-03-09 RX ORDER — LEVOTHYROXINE SODIUM 0.05 MG/1
50 TABLET ORAL DAILY
COMMUNITY

## 2018-03-09 RX ADMIN — FUROSEMIDE 40 MG: 40 TABLET ORAL at 09:47

## 2018-03-09 RX ADMIN — IPRATROPIUM BROMIDE AND ALBUTEROL SULFATE 3 ML: 2.5; .5 SOLUTION RESPIRATORY (INHALATION) at 07:39

## 2018-03-09 RX ADMIN — IPRATROPIUM BROMIDE AND ALBUTEROL SULFATE 3 ML: 2.5; .5 SOLUTION RESPIRATORY (INHALATION) at 11:06

## 2018-03-09 RX ADMIN — ATORVASTATIN CALCIUM 10 MG: 10 TABLET, FILM COATED ORAL at 21:22

## 2018-03-09 RX ADMIN — SODIUM CHLORIDE 75 ML/HR: 9 INJECTION, SOLUTION INTRAVENOUS at 18:14

## 2018-03-09 RX ADMIN — GABAPENTIN 600 MG: 300 CAPSULE ORAL at 18:04

## 2018-03-09 RX ADMIN — FOLIC ACID 1 MG: 1 TABLET ORAL at 09:47

## 2018-03-09 RX ADMIN — ONDANSETRON 4 MG: 2 INJECTION INTRAMUSCULAR; INTRAVENOUS at 04:11

## 2018-03-09 RX ADMIN — MORPHINE SULFATE 1 MG: 2 INJECTION, SOLUTION INTRAMUSCULAR; INTRAVENOUS at 02:17

## 2018-03-09 RX ADMIN — BUDESONIDE AND FORMOTEROL FUMARATE DIHYDRATE 2 PUFF: 160; 4.5 AEROSOL RESPIRATORY (INHALATION) at 19:06

## 2018-03-09 RX ADMIN — IPRATROPIUM BROMIDE AND ALBUTEROL SULFATE 3 ML: 2.5; .5 SOLUTION RESPIRATORY (INHALATION) at 15:04

## 2018-03-09 RX ADMIN — ONDANSETRON 4 MG: 2 INJECTION INTRAMUSCULAR; INTRAVENOUS at 18:14

## 2018-03-09 RX ADMIN — GABAPENTIN 300 MG: 300 CAPSULE ORAL at 09:47

## 2018-03-09 RX ADMIN — IPRATROPIUM BROMIDE AND ALBUTEROL SULFATE 3 ML: 2.5; .5 SOLUTION RESPIRATORY (INHALATION) at 19:05

## 2018-03-09 RX ADMIN — HYDROXYUREA 1000 MG: 500 CAPSULE ORAL at 09:47

## 2018-03-09 RX ADMIN — SODIUM CHLORIDE 75 ML/HR: 9 INJECTION, SOLUTION INTRAVENOUS at 07:48

## 2018-03-09 RX ADMIN — INSULIN LISPRO 4 UNITS: 100 INJECTION, SOLUTION INTRAVENOUS; SUBCUTANEOUS at 21:22

## 2018-03-09 RX ADMIN — HYDROCODONE BITARTRATE AND ACETAMINOPHEN 1 TABLET: 7.5; 325 TABLET ORAL at 04:11

## 2018-03-09 RX ADMIN — MORPHINE SULFATE 1 MG: 2 INJECTION, SOLUTION INTRAMUSCULAR; INTRAVENOUS at 06:08

## 2018-03-09 RX ADMIN — HYDROCODONE BITARTRATE AND ACETAMINOPHEN 1 TABLET: 7.5; 325 TABLET ORAL at 18:14

## 2018-03-09 RX ADMIN — INSULIN DETEMIR 30 UNITS: 100 INJECTION, SOLUTION SUBCUTANEOUS at 21:22

## 2018-03-09 RX ADMIN — BUDESONIDE AND FORMOTEROL FUMARATE DIHYDRATE 2 PUFF: 160; 4.5 AEROSOL RESPIRATORY (INHALATION) at 07:45

## 2018-03-09 NOTE — PROGRESS NOTES
Discharge Planning Assessment  [facility]     Patient Name: Tamy Sher  MRN: [id number]  Today's Date: 3/9/2018    Admit Date: 3/8/2018          Discharge Needs Assessment       03/09/18 2269    Living Environment    Lives With alone    Living Arrangements apartment    Home Accessibility no concerns    Stair Railings at Home none    Type of Financial/Environmental Concern none    Transportation Available car;family or friend will provide    Living Environment    Provides Primary Care For no one, unable/limited ability to care for self    Primary Care Provided By child(anival) (specify)   son at bedside    Quality Of Family Relationships supportive;involved    Able to Return to Prior Living Arrangements no    Discharge Needs Assessment    Concerns To Be Addressed basic needs concerns    Readmission Within The Last 30 Days no previous admission in last 30 days    Equipment Currently Used at Home walker, rolling    Equipment Needed After Discharge --   pending PT/OT eval    Discharge Facility/Level Of Care Needs nursing facility, skilled    Discharge Planning Comments Fractured hip s/p fall / pending surgery and PT/OT evals. Son states will need SNF for inpt rehab. not familiar with facilities around [facility]. SW to provide with available facility and will follow rec of PT/OT as patient tolerated activity post op.  Desires in [facility] SNF.  has been to [facility] recently. Current with [facility].              Discharge Plan     None        Discharge Placement     No information found                Demographic Summary     None            Functional Status     None            Psychosocial     None            Abuse/Neglect     None            Legal     None            Substance Abuse     None            Patient Forms     None          Soniya Alatorre RN

## 2018-03-09 NOTE — PROGRESS NOTES
Sponge and is a periprosthetic right distal femur fracture.  The plan is for surgery tomorrow morning to place a supracondylar locking nail I have discussed the findings and treatment plan with patient and her family

## 2018-03-09 NOTE — ANESTHESIA PREPROCEDURE EVALUATION
Anesthesia Evaluation     Patient summary reviewed and Nursing notes reviewed   NPO Solid Status: > 8 hours  NPO Liquid Status: > 8 hours           Airway   Mallampati: II  TM distance: >3 FB  Neck ROM: full  Dental      Pulmonary    (+) pneumonia (1/31/18, had attempted thoracentesis but were unable to get fluid. Review of xrays shows chronic left lower opacity.  Pt now spo2 on 3-4 L) resolved , wheezes,     ROS comment: cxr cardiomegaly with pulmonary venous congestion  Cardiovascular   Exercise tolerance: poor (<4 METS)    ECG reviewed  Rhythm: regular  Rate: normal    (+) hypertension, PVD, hyperlipidemia,     ROS comment: Echo 1/31/18  · Left ventricular systolic function is normal. EF >65%.  · Left ventricular diastolic dysfunction (grade II) consistent with pseudonormalization.  · Left ventricular wall thickness is consistent with borderline concentric hypertrophy.  · Mild aortic valve stenosis is present.  · Mild mitral valve regurgitation is present  · Left atrial cavity size is severely dilated.  · Estimated right ventricular systolic pressure from tricuspid regurgitation is markedly elevated (>55 mmHg).    Normal stress test 3/24/17      Neuro/Psych  (+) CVA,     GI/Hepatic/Renal/Endo    (+) obesity,   renal disease CRI, diabetes mellitus type 2 using insulin,     Musculoskeletal     Abdominal    Substance History      OB/GYN          Other   (+) arthritis                   Anesthesia Plan    ASA 3     spinal   (Pt mildly labored while I am talking with her. Spo2 96% on 4L. Xray reviewed. ABG ordered and reviewed, shows respiratory acidosis and hypoxemia. Will plan for spinal anesthesia.     HR feels irregular, per H&P pt has been getting w/u for irregular heart rate by pcp. WIll order EKG. Pt currently receiving blood transfusion. )  Anesthetic plan and risks discussed with patient and child.

## 2018-03-09 NOTE — PROGRESS NOTES
[facility] Medicine Services  INPATIENT PROGRESS NOTE    Length of Stay: 1  Date of Admission: 3/8/2018  Primary Care Physician: Barry Foley MD    Subjective   Chief Complaint: follow up  HPI   States that pain for the most part is controlled  She is receiving blood transfusion   Dr. Bundy anticipates surgery tomorrow    Last night , she had episode of hypoxia that I expected.  She needed NIPPV.  She is now on nasal cannula  Review of Systems     All pertinent negatives and positives are as above. All other systems have been reviewed and are negative unless otherwise stated.     Objective    Temp:  [97 °F (36.1 °C)-99.1 °F (37.3 °C)] 98.7 °F (37.1 °C)  Heart Rate:  [] 99  Resp:  [12-22] 18  BP: ()/(42-84) 91/42  Physical Exam  Constitutional: She is oriented to person, place, and time. She appears well-developed and well-nourished.   HENT:   Head: Normocephalic and atraumatic.     Nose: Nose normal.   Mouth/Throat: Oropharynx is clear and moist. No oropharyngeal exudate.   Eyes: Conjunctivae and EOM are normal. Pupils are equal, round  Right eye exhibits no discharge. Left eye exhibits no discharge. No scleral icterus.   Neck: Normal range of motion. Neck supple. No JVD present. No tracheal deviation present. No thyromegaly present.   Cardiovascular: Normal rate, regular rhythm, normal heart sounds and intact distal pulses.    Pulmonary/Chest: No stridor. No respiratory distress. She has no wheezes. She has no rales. She exhibits no tenderness.   Diminished inspiratory effort   Abdominal: Soft. Bowel sounds are normal. Abdominal distention is not as pronounced. She exhibits no mass. There is no tenderness. There is no rebound and no guarding. No hernia.   Musculoskeletal: She exhibits edema.   Swollen right thigh, + tenderness and lots of pain with little movement; + splint  Lymphadenopathy:     She has no cervical adenopathy.   Neurological: She is alert  and oriented to person, place, and time. No cranial nerve deficit.   Skin: Skin is warm and dry. No erythema. No pallor.   Dry skin LEs   Psychiatric: She has a normal mood and affect. Her behavior is normal. Judgment and thought content normal.   Vitals reviewed.            Results Review:  I have reviewed the labs, radiology results, and diagnostic studies.    Laboratory Data:     Results from last 7 days  Lab Units 03/09/18  0644 03/08/18  1558   WBC 10*3/mm3 9.16 10.85*   HEMOGLOBIN g/dL 7.6* 10.4*   HEMATOCRIT % 24.7* 32.6*   PLATELETS 10*3/mm3 415* 511*          Results from last 7 days  Lab Units 03/09/18  0644 03/08/18  1558   SODIUM mmol/L 136 138   POTASSIUM mmol/L 4.9 4.8   CHLORIDE mmol/L 99 97*   CO2 mmol/L 30.0 30.0   BUN mg/dL 46* 45*   CREATININE mg/dL 1.52* 1.13   CALCIUM mg/dL 8.1* 9.0   BILIRUBIN mg/dL 0.2 0.3   ALK PHOS U/L 64 83   ALT (SGPT) U/L 29 37   AST (SGOT) U/L 32 24   GLUCOSE mg/dL 134* 145*       Culture Data:        Radiology Data:   Imaging Results (last 24 hours)     Procedure Component Value Units Date/Time    XR Chest 1 View [[id number]] Collected:  03/08/18 1619     Updated:  03/08/18 1624    Narrative:       XR CHEST 1 VW- 3/8/2018 3:57 PM CST     HISTORY: history of copd, fall; W19.XXXA-Unspecified fall, initial  encounter; S72.491A-Other fracture of lower end of right femur, initial  encounter for closed fracture       COMPARISON: Exam dated 2/2/2018.     FINDINGS:      Reidentified cardiomegaly with obscuration of the left hemidiaphragm and  left lung base. This is similar to the comparison exam, with the left  base opacification likely due to the cardiomegaly. Overall stable heart  size. There is central pulmonary vascular congestion. No definite  pulmonary edema. No acute lung infiltrate identified. No displaced  fracture identified. No pneumothorax.       Impression:       1. Overall stable chest exam. Reidentified cardiomegaly with pulmonary  venous congestion.         This  report was finalized on 03/08/2018 16:21 by Dr Franck Pretty, .    XR Femur 2 View Right [[id number]] Collected:  03/08/18 1635     Updated:  03/08/18 1640    Narrative:       EXAMINATION:   XR FEMUR 2 VW RIGHT-  3/8/2018 4:35 PM CST     HISTORY: Patient fell     AP and lateral views the right femur obtained. In the distal third of  the right femur there is a comminuted spiral fracture. The fracture  fragments extend to the femoral component of a right knee arthroplasty.  There is dorsal angulation of the distal fracture fragments.       Impression:       Comminuted spiral fracture distal third of the right femur.  The comminuted fragments extend to the femoral component of the right  knee arthroplasty.  2. Dorsal angulation of the distal fragments is noted  This report was finalized on 03/08/2018 16:37 by Dr. Klever Hooker MD.    XR Hip With or Without Pelvis 2 - 3 View Right [[id number]] Collected:  03/08/18 1527     Updated:  03/08/18 1641    XR Knee 1 or 2 View Right [[id number]] Collected:  03/08/18 1553     Updated:  03/08/18 1644    Narrative:       EXAMINATION:   XR KNEE 1 OR 2 VW RIGHT-  3/8/2018 3:53 PM CST     HISTORY: Patient fell     2 views of the right knee are obtained. There is a comminuted fracture  of the distal right femur. The fracture extends to the femoral component  of a right knee prosthesis. Comminuted fracture fragments are present  the distal portion demonstrates dorsal angulation.       Impression:       Comminuted fracture distal right femur  This report was finalized on 03/08/2018 15:54 by Dr. Klever Hooker MD.    XR Abdomen KUB [[id number]] Collected:  03/08/18 1916     Updated:  03/08/18 1920    Narrative:       EXAMINATION: XR ABDOMEN KUB-     3/8/2018 6:34 PM CST     HISTORY: abdominal distention; W19.XXXA-Unspecified fall, initial  encounter; S72.491A-Other fracture of lower end of right femur, initial  encounter for closed fracture; W19.XXXA-Unspecified fall, initial  encounter.      2 view abdominal series.     Nonspecific bowel gas pattern.     No obstruction and no evidence of free air.     Left basilar consolidation compatible with left lower lobe pneumonia.     Degenerative spine changes.     Surgical clips are seen within the upper abdomen.     Summary:  1. Incompletely visualized chest though the appearance is that of left  lower lobe consolidation/pneumonia.  2. No acute abnormality is seen within the abdomen.        This report was finalized on 03/08/2018 19:17 by Dr. Mamadou Garza MD.          I have reviewed the patient current medications.     Assessment/Plan     Hospital Problem List     * (Principal)Closed displaced comminuted fracture of shaft of right femur    Pain of right thigh    Fall    Class 2 obesity with serious comorbidity in adult    Type 2 diabetes mellitus with neurologic complication, with long-term current use of insulin    STUART (acute kidney injury) vs stuart on ckd vs ckd    Chronic diastolic heart failure    Pleural effusion, left    Hypoxia   Acute blood loss anemia likely from the fracture        Her blood pressure has been all over the place. Her creatinine is elevated but improve compared to 1.8  on Feb 12.  Agree with transfusion ; f/u posttransfusion lab  Accu-Chek 134, 169 - continue sliding scale insulin and basal insulin  Discussed with Dr. Bundy - planned OR tomorrow  pain med  splint applied  Dr. Bundy informed by Dr. Gustafson in ER  Oxygen supplement; monitor for sedating effect,  prn NIPPV; keep spo2 > 92%  dvt proph per ortho  Monitor bp and adjust meds accordingly       amLODIPine 5 mg Oral Daily   aspirin 325 mg Oral Nightly   atorvastatin 10 mg Oral Nightly   budesonide-formoterol 2 puff Inhalation BID - RT   folic acid 1 mg Oral Daily   [START ON 3/10/2018] furosemide 20 mg Oral Daily   gabapentin 300 mg Oral Daily With Breakfast   gabapentin 600 mg Oral Q PM   hydroxyurea 1,000 mg Oral Once per day on Fri   [START ON 3/10/2018] hydroxyurea 500 mg  Oral Once per day on Sun Mon Tue Wed Thu Sat   insulin detemir 30 Units Subcutaneous Nightly   insulin lispro 0-9 Units Subcutaneous 4x Daily With Meals & Nightly   ipratropium-albuterol 3 mL Nebulization 4x Daily - RT   metoprolol succinate XL 50 mg Oral Daily   pantoprazole 40 mg Oral Q AM                 Discharge Planning:  To be determined    Fidencio Rodriguez MD   03/09/18   1:57 PM

## 2018-03-09 NOTE — PLAN OF CARE
Problem: Patient Care Overview (Adult)  Goal: Plan of Care Review  Outcome: Ongoing (interventions implemented as appropriate)    Goal: Adult Individualization and Mutuality  Outcome: Ongoing (interventions implemented as appropriate)   03/09/18 1528   Individualization   Patient Specific Goals less pain with fracture     Goal: Discharge Needs Assessment  Outcome: Ongoing (interventions implemented as appropriate)      Problem: Fall Risk (Adult)  Goal: Absence of Falls  Outcome: Ongoing (interventions implemented as appropriate)      Problem: Pressure Ulcer Risk (Jeremiah Scale) (Adult,Obstetrics,Pediatric)  Goal: Skin Integrity  Outcome: Ongoing (interventions implemented as appropriate)      Problem: Orthopaedic Fracture (Adult)  Goal: Signs and Symptoms of Listed Potential Problems Will be Absent or Manageable (Orthopaedic Fracture)  Outcome: Ongoing (interventions implemented as appropriate)   03/09/18 1528   Orthopaedic Fracture   Problems Assessed (Orthopaedic Fracture) all   Problems Present (Orthopaedic Fracture) pain;respiratory compromise  (patient has expiratory wheezes. )

## 2018-03-09 NOTE — PLAN OF CARE
Problem: Patient Care Overview (Adult)  Goal: Plan of Care Review  Outcome: Ongoing (interventions implemented as appropriate)   03/08/18 1900   Coping/Psychosocial Response Interventions   Plan Of Care Reviewed With patient;family   Patient Care Overview   Progress no change   Outcome Evaluation   Outcome Summary/Follow up Plan Patient came in with low oxygen saturation and high pain. Doctor was notified and respirtory was called. A BiPap was started and vitals became stable. A catheter was inserted and the patient was made comfortable.        Problem: Fall Risk (Adult)  Goal: Identify Related Risk Factors and Signs and Symptoms  Outcome: Ongoing (interventions implemented as appropriate)    Goal: Absence of Falls  Outcome: Ongoing (interventions implemented as appropriate)      Problem: Pressure Ulcer Risk (Jeremiah Scale) (Adult,Obstetrics,Pediatric)  Goal: Identify Related Risk Factors and Signs and Symptoms  Outcome: Ongoing (interventions implemented as appropriate)    Goal: Skin Integrity  Outcome: Ongoing (interventions implemented as appropriate)      Problem: Orthopaedic Fracture (Adult)  Goal: Signs and Symptoms of Listed Potential Problems Will be Absent or Manageable (Orthopaedic Fracture)  Outcome: Ongoing (interventions implemented as appropriate)      Problem: Infection, Risk/Actual (Adult)  Goal: Identify Related Risk Factors and Signs and Symptoms  Outcome: Outcome(s) achieved Date Met: 03/08/18    Goal: Infection Prevention/Resolution  Outcome: Ongoing (interventions implemented as appropriate)

## 2018-03-09 NOTE — PLAN OF CARE
Problem: Patient Care Overview (Adult)  Goal: Plan of Care Review  Outcome: Ongoing (interventions implemented as appropriate)   03/09/18 0747   Coping/Psychosocial Response Interventions   Plan Of Care Reviewed With patient   Patient Care Overview   Progress no change   Outcome Evaluation   Outcome Summary/Follow up Plan VSS, O2 @ 4L, NPO since midnight, Morphine and Norco given PRN for pain, abdominal distention noted and KUB performed yesterday, safety maintained, will continue to monitor.        Problem: Fall Risk (Adult)  Goal: Identify Related Risk Factors and Signs and Symptoms  Outcome: Outcome(s) achieved Date Met: 03/09/18    Goal: Absence of Falls  Outcome: Ongoing (interventions implemented as appropriate)      Problem: Pressure Ulcer Risk (Jeremiah Scale) (Adult,Obstetrics,Pediatric)  Goal: Identify Related Risk Factors and Signs and Symptoms  Outcome: Outcome(s) achieved Date Met: 03/09/18    Goal: Skin Integrity  Outcome: Ongoing (interventions implemented as appropriate)      Problem: Orthopaedic Fracture (Adult)  Goal: Signs and Symptoms of Listed Potential Problems Will be Absent or Manageable (Orthopaedic Fracture)  Outcome: Ongoing (interventions implemented as appropriate)

## 2018-03-10 ENCOUNTER — APPOINTMENT (OUTPATIENT)
Dept: GENERAL RADIOLOGY | Facility: HOSPITAL | Age: 83
End: 2018-03-10

## 2018-03-10 ENCOUNTER — ANESTHESIA (OUTPATIENT)
Dept: PERIOP | Facility: HOSPITAL | Age: 83
End: 2018-03-10

## 2018-03-10 LAB
ABO + RH BLD: NORMAL
ABO + RH BLD: NORMAL
ANION GAP SERPL CALCULATED.3IONS-SCNC: 9 MMOL/L (ref 4–13)
BH BB BLOOD EXPIRATION DATE: NORMAL
BH BB BLOOD EXPIRATION DATE: NORMAL
BH BB BLOOD TYPE BARCODE: 6200
BH BB BLOOD TYPE BARCODE: 6200
BH BB DISPENSE STATUS: NORMAL
BH BB DISPENSE STATUS: NORMAL
BH BB PRODUCT CODE: NORMAL
BH BB PRODUCT CODE: NORMAL
BH BB UNIT NUMBER: NORMAL
BH BB UNIT NUMBER: NORMAL
BILIRUB UR QL STRIP: NEGATIVE
BUN BLD-MCNC: 60 MG/DL (ref 5–21)
BUN/CREAT SERPL: 24.1 (ref 7–25)
CALCIUM SPEC-SCNC: 7.8 MG/DL (ref 8.4–10.4)
CHLORIDE SERPL-SCNC: 98 MMOL/L (ref 98–110)
CLARITY UR: CLEAR
CO2 SERPL-SCNC: 29 MMOL/L (ref 24–31)
COLOR UR: YELLOW
CREAT BLD-MCNC: 2.49 MG/DL (ref 0.5–1.4)
CREAT UR-MCNC: 88.6 MG/DL
DEPRECATED RDW RBC AUTO: 76.8 FL (ref 40–54)
EOSINOPHIL # BLD MANUAL: 0.13 10*3/MM3 (ref 0–0.7)
EOSINOPHIL NFR BLD MANUAL: 1 % (ref 0–4)
ERYTHROCYTE [DISTWIDTH] IN BLOOD BY AUTOMATED COUNT: 20.6 % (ref 12–15)
GFR SERPL CREATININE-BSD FRML MDRD: 18 ML/MIN/1.73
GLUCOSE BLD-MCNC: 95 MG/DL (ref 70–100)
GLUCOSE BLDC GLUCOMTR-MCNC: 140 MG/DL (ref 70–130)
GLUCOSE BLDC GLUCOMTR-MCNC: 156 MG/DL (ref 70–130)
GLUCOSE BLDC GLUCOMTR-MCNC: 94 MG/DL (ref 70–130)
GLUCOSE UR STRIP-MCNC: NEGATIVE MG/DL
HCT VFR BLD AUTO: 30.2 % (ref 37–47)
HGB BLD-MCNC: 9.6 G/DL (ref 12–16)
HGB UR QL STRIP.AUTO: ABNORMAL
IRON 24H UR-MRATE: 72 MCG/DL (ref 42–180)
IRON SATN MFR SERPL: 23 % (ref 20–45)
KETONES UR QL STRIP: NEGATIVE
LEUKOCYTE ESTERASE UR QL STRIP.AUTO: ABNORMAL
LYMPHOCYTES # BLD MANUAL: 1.63 10*3/MM3 (ref 0.72–4.86)
LYMPHOCYTES NFR BLD MANUAL: 13 % (ref 15–45)
LYMPHOCYTES NFR BLD MANUAL: 6 % (ref 4–12)
MCH RBC QN AUTO: 33.2 PG (ref 28–32)
MCHC RBC AUTO-ENTMCNC: 31.8 G/DL (ref 33–36)
MCV RBC AUTO: 104.5 FL (ref 82–98)
MONOCYTES # BLD AUTO: 0.75 10*3/MM3 (ref 0.19–1.3)
NEUTROPHILS # BLD AUTO: 10.03 10*3/MM3 (ref 1.87–8.4)
NEUTROPHILS NFR BLD MANUAL: 78 % (ref 39–78)
NEUTS BAND NFR BLD MANUAL: 2 % (ref 0–10)
NITRITE UR QL STRIP: NEGATIVE
NRBC BLD MANUAL-RTO: 0 /100 WBC (ref 0–0)
PH UR STRIP.AUTO: <=5 [PH] (ref 5–8)
PLAT MORPH BLD: NORMAL
PLATELET # BLD AUTO: 344 10*3/MM3 (ref 130–400)
PMV BLD AUTO: 10.2 FL (ref 6–12)
POLYCHROMASIA BLD QL SMEAR: ABNORMAL
POTASSIUM BLD-SCNC: 5.2 MMOL/L (ref 3.5–5.3)
PROT UR QL STRIP: ABNORMAL
RBC # BLD AUTO: 2.89 10*6/MM3 (ref 4.2–5.4)
SODIUM BLD-SCNC: 136 MMOL/L (ref 135–145)
SODIUM UR-SCNC: 23 MMOL/L (ref 30–90)
SP GR UR STRIP: 1.02 (ref 1–1.03)
TIBC SERPL-MCNC: 315 MCG/DL (ref 225–420)
UNIT  ABO: NORMAL
UNIT  ABO: NORMAL
UNIT  RH: NORMAL
UNIT  RH: NORMAL
URATE SERPL-MCNC: 9.4 MG/DL (ref 2.7–7.5)
UROBILINOGEN UR QL STRIP: ABNORMAL
WBC MORPH BLD: NORMAL
WBC NRBC COR # BLD: 12.54 10*3/MM3 (ref 4.8–10.8)

## 2018-03-10 PROCEDURE — 94799 UNLISTED PULMONARY SVC/PX: CPT

## 2018-03-10 PROCEDURE — 85025 COMPLETE CBC W/AUTO DIFF WBC: CPT | Performed by: INTERNAL MEDICINE

## 2018-03-10 PROCEDURE — C1713 ANCHOR/SCREW BN/BN,TIS/BN: HCPCS | Performed by: ORTHOPAEDIC SURGERY

## 2018-03-10 PROCEDURE — 76000 FLUOROSCOPY <1 HR PHYS/QHP: CPT

## 2018-03-10 PROCEDURE — 81003 URINALYSIS AUTO W/O SCOPE: CPT | Performed by: INTERNAL MEDICINE

## 2018-03-10 PROCEDURE — 63710000001 INSULIN DETEMIR PER 5 UNITS: Performed by: INTERNAL MEDICINE

## 2018-03-10 PROCEDURE — 86900 BLOOD TYPING SEROLOGIC ABO: CPT

## 2018-03-10 PROCEDURE — 25010000002 MORPHINE SULFATE (PF) 2 MG/ML SOLUTION: Performed by: INTERNAL MEDICINE

## 2018-03-10 PROCEDURE — 87205 SMEAR GRAM STAIN: CPT | Performed by: INTERNAL MEDICINE

## 2018-03-10 PROCEDURE — 93010 ELECTROCARDIOGRAM REPORT: CPT | Performed by: INTERNAL MEDICINE

## 2018-03-10 PROCEDURE — 94760 N-INVAS EAR/PLS OXIMETRY 1: CPT

## 2018-03-10 PROCEDURE — 0QH806Z INSERTION OF INTRAMEDULLARY INTERNAL FIXATION DEVICE INTO RIGHT FEMORAL SHAFT, OPEN APPROACH: ICD-10-PCS | Performed by: ORTHOPAEDIC SURGERY

## 2018-03-10 PROCEDURE — 82962 GLUCOSE BLOOD TEST: CPT

## 2018-03-10 PROCEDURE — 80048 BASIC METABOLIC PNL TOTAL CA: CPT | Performed by: INTERNAL MEDICINE

## 2018-03-10 PROCEDURE — P9016 RBC LEUKOCYTES REDUCED: HCPCS

## 2018-03-10 PROCEDURE — 25010000003 CEFAZOLIN PER 500 MG: Performed by: ORTHOPAEDIC SURGERY

## 2018-03-10 PROCEDURE — 82570 ASSAY OF URINE CREATININE: CPT | Performed by: INTERNAL MEDICINE

## 2018-03-10 PROCEDURE — 86923 COMPATIBILITY TEST ELECTRIC: CPT

## 2018-03-10 PROCEDURE — 84550 ASSAY OF BLOOD/URIC ACID: CPT | Performed by: INTERNAL MEDICINE

## 2018-03-10 PROCEDURE — 25010000003 MORPHINE PER 100 MG: Performed by: INTERNAL MEDICINE

## 2018-03-10 PROCEDURE — 83540 ASSAY OF IRON: CPT | Performed by: INTERNAL MEDICINE

## 2018-03-10 PROCEDURE — 84300 ASSAY OF URINE SODIUM: CPT | Performed by: INTERNAL MEDICINE

## 2018-03-10 PROCEDURE — 36430 TRANSFUSION BLD/BLD COMPNT: CPT

## 2018-03-10 PROCEDURE — 73552 X-RAY EXAM OF FEMUR 2/>: CPT

## 2018-03-10 PROCEDURE — 94660 CPAP INITIATION&MGMT: CPT

## 2018-03-10 PROCEDURE — 25010000003 CEFAZOLIN PER 500 MG: Performed by: NURSE ANESTHETIST, CERTIFIED REGISTERED

## 2018-03-10 PROCEDURE — 85007 BL SMEAR W/DIFF WBC COUNT: CPT | Performed by: INTERNAL MEDICINE

## 2018-03-10 PROCEDURE — 83550 IRON BINDING TEST: CPT | Performed by: INTERNAL MEDICINE

## 2018-03-10 DEVICE — IMPLANTABLE DEVICE: Type: IMPLANTABLE DEVICE | Site: FEMUR | Status: FUNCTIONAL

## 2018-03-10 DEVICE — SCRW CORT FA FUL/THRD HEX3.5 TI 5X37.5MM: Type: IMPLANTABLE DEVICE | Site: FEMUR | Status: FUNCTIONAL

## 2018-03-10 DEVICE — SCRW CANC FA HEX3.5 TI 6X70MM: Type: IMPLANTABLE DEVICE | Site: FEMUR | Status: FUNCTIONAL

## 2018-03-10 DEVICE — PIN STNMAN BILAT CTR THRD 5X200MM: Type: IMPLANTABLE DEVICE | Status: FUNCTIONAL

## 2018-03-10 RX ORDER — SODIUM CHLORIDE, SODIUM LACTATE, POTASSIUM CHLORIDE, CALCIUM CHLORIDE 600; 310; 30; 20 MG/100ML; MG/100ML; MG/100ML; MG/100ML
50 INJECTION, SOLUTION INTRAVENOUS CONTINUOUS
Status: DISCONTINUED | OUTPATIENT
Start: 2018-03-10 | End: 2018-03-10

## 2018-03-10 RX ORDER — PHENYLEPHRINE HCL IN 0.9% NACL 0.8MG/10ML
SYRINGE (ML) INTRAVENOUS AS NEEDED
Status: DISCONTINUED | OUTPATIENT
Start: 2018-03-10 | End: 2018-03-10 | Stop reason: SURG

## 2018-03-10 RX ORDER — FLUMAZENIL 0.1 MG/ML
0.2 INJECTION INTRAVENOUS AS NEEDED
Status: DISCONTINUED | OUTPATIENT
Start: 2018-03-10 | End: 2018-03-10

## 2018-03-10 RX ORDER — MORPHINE SULFATE IN 0.9 % NACL 50 MG/50ML
PATIENT CONTROLLED ANALGESIA SYRINGE INTRAVENOUS CONTINUOUS
Status: DISCONTINUED | OUTPATIENT
Start: 2018-03-10 | End: 2018-03-11

## 2018-03-10 RX ORDER — KETAMINE HYDROCHLORIDE 50 MG/ML
INJECTION, SOLUTION, CONCENTRATE INTRAMUSCULAR; INTRAVENOUS AS NEEDED
Status: DISCONTINUED | OUTPATIENT
Start: 2018-03-10 | End: 2018-03-10 | Stop reason: SURG

## 2018-03-10 RX ORDER — SODIUM CHLORIDE, SODIUM LACTATE, POTASSIUM CHLORIDE, CALCIUM CHLORIDE 600; 310; 30; 20 MG/100ML; MG/100ML; MG/100ML; MG/100ML
INJECTION, SOLUTION INTRAVENOUS CONTINUOUS PRN
Status: DISCONTINUED | OUTPATIENT
Start: 2018-03-10 | End: 2018-03-10 | Stop reason: SURG

## 2018-03-10 RX ORDER — LEVOTHYROXINE SODIUM 0.05 MG/1
50 TABLET ORAL
Status: DISCONTINUED | OUTPATIENT
Start: 2018-03-11 | End: 2018-03-21 | Stop reason: HOSPADM

## 2018-03-10 RX ORDER — HYDRALAZINE HYDROCHLORIDE 20 MG/ML
5 INJECTION INTRAMUSCULAR; INTRAVENOUS
Status: DISCONTINUED | OUTPATIENT
Start: 2018-03-10 | End: 2018-03-10

## 2018-03-10 RX ORDER — NALOXONE HCL 0.4 MG/ML
0.1 VIAL (ML) INJECTION
Status: DISCONTINUED | OUTPATIENT
Start: 2018-03-10 | End: 2018-03-21 | Stop reason: HOSPADM

## 2018-03-10 RX ORDER — MAGNESIUM HYDROXIDE 1200 MG/15ML
LIQUID ORAL AS NEEDED
Status: DISCONTINUED | OUTPATIENT
Start: 2018-03-10 | End: 2018-03-10 | Stop reason: HOSPADM

## 2018-03-10 RX ORDER — BUPIVACAINE HYDROCHLORIDE 7.5 MG/ML
INJECTION, SOLUTION EPIDURAL; RETROBULBAR AS NEEDED
Status: DISCONTINUED | OUTPATIENT
Start: 2018-03-10 | End: 2018-03-10 | Stop reason: SURG

## 2018-03-10 RX ORDER — LABETALOL HYDROCHLORIDE 5 MG/ML
5 INJECTION, SOLUTION INTRAVENOUS
Status: DISCONTINUED | OUTPATIENT
Start: 2018-03-10 | End: 2018-03-10

## 2018-03-10 RX ORDER — SODIUM CHLORIDE 9 MG/ML
INJECTION, SOLUTION INTRAVENOUS AS NEEDED
Status: DISCONTINUED | OUTPATIENT
Start: 2018-03-10 | End: 2018-03-10 | Stop reason: HOSPADM

## 2018-03-10 RX ORDER — MORPHINE SULFATE 2 MG/ML
2 INJECTION, SOLUTION INTRAMUSCULAR; INTRAVENOUS AS NEEDED
Status: DISCONTINUED | OUTPATIENT
Start: 2018-03-10 | End: 2018-03-10

## 2018-03-10 RX ORDER — CEFAZOLIN SODIUM 1 G/3ML
INJECTION, POWDER, FOR SOLUTION INTRAMUSCULAR; INTRAVENOUS AS NEEDED
Status: DISCONTINUED | OUTPATIENT
Start: 2018-03-10 | End: 2018-03-10 | Stop reason: SURG

## 2018-03-10 RX ORDER — HYDROXYUREA 500 MG/1
500 CAPSULE ORAL DAILY
Status: DISCONTINUED | OUTPATIENT
Start: 2018-03-10 | End: 2018-03-21 | Stop reason: HOSPADM

## 2018-03-10 RX ORDER — IPRATROPIUM BROMIDE AND ALBUTEROL SULFATE 2.5; .5 MG/3ML; MG/3ML
3 SOLUTION RESPIRATORY (INHALATION) ONCE AS NEEDED
Status: DISCONTINUED | OUTPATIENT
Start: 2018-03-10 | End: 2018-03-10

## 2018-03-10 RX ORDER — MEPERIDINE HYDROCHLORIDE 25 MG/ML
12.5 INJECTION INTRAMUSCULAR; INTRAVENOUS; SUBCUTANEOUS
Status: DISCONTINUED | OUTPATIENT
Start: 2018-03-10 | End: 2018-03-10

## 2018-03-10 RX ORDER — ONDANSETRON 2 MG/ML
4 INJECTION INTRAMUSCULAR; INTRAVENOUS AS NEEDED
Status: DISCONTINUED | OUTPATIENT
Start: 2018-03-10 | End: 2018-03-10

## 2018-03-10 RX ORDER — METOCLOPRAMIDE HYDROCHLORIDE 5 MG/ML
5 INJECTION INTRAMUSCULAR; INTRAVENOUS
Status: DISCONTINUED | OUTPATIENT
Start: 2018-03-10 | End: 2018-03-10

## 2018-03-10 RX ORDER — ONDANSETRON 2 MG/ML
4 INJECTION INTRAMUSCULAR; INTRAVENOUS EVERY 6 HOURS PRN
Status: DISCONTINUED | OUTPATIENT
Start: 2018-03-10 | End: 2018-03-21 | Stop reason: HOSPADM

## 2018-03-10 RX ADMIN — INSULIN DETEMIR 30 UNITS: 100 INJECTION, SOLUTION SUBCUTANEOUS at 20:33

## 2018-03-10 RX ADMIN — IPRATROPIUM BROMIDE AND ALBUTEROL SULFATE 3 ML: 2.5; .5 SOLUTION RESPIRATORY (INHALATION) at 15:18

## 2018-03-10 RX ADMIN — CEFAZOLIN 2 MG: 1 INJECTION, POWDER, FOR SOLUTION INTRAVENOUS at 09:00

## 2018-03-10 RX ADMIN — MORPHINE SULFATE: 25 INJECTION, SOLUTION, CONCENTRATE INTRAVENOUS at 15:06

## 2018-03-10 RX ADMIN — IPRATROPIUM BROMIDE AND ALBUTEROL SULFATE 3 ML: 2.5; .5 SOLUTION RESPIRATORY (INHALATION) at 06:59

## 2018-03-10 RX ADMIN — ASPIRIN 325 MG: 325 TABLET, COATED ORAL at 20:19

## 2018-03-10 RX ADMIN — BUDESONIDE AND FORMOTEROL FUMARATE DIHYDRATE 2 PUFF: 160; 4.5 AEROSOL RESPIRATORY (INHALATION) at 07:00

## 2018-03-10 RX ADMIN — IPRATROPIUM BROMIDE AND ALBUTEROL SULFATE 3 ML: 2.5; .5 SOLUTION RESPIRATORY (INHALATION) at 20:27

## 2018-03-10 RX ADMIN — Medication 160 MCG: at 08:53

## 2018-03-10 RX ADMIN — CEFAZOLIN SODIUM 1 G: 1 INJECTION, POWDER, FOR SOLUTION INTRAMUSCULAR; INTRAVENOUS at 17:41

## 2018-03-10 RX ADMIN — Medication 160 MCG: at 08:45

## 2018-03-10 RX ADMIN — SODIUM CHLORIDE, POTASSIUM CHLORIDE, SODIUM LACTATE AND CALCIUM CHLORIDE 50 ML/HR: 600; 310; 30; 20 INJECTION, SOLUTION INTRAVENOUS at 12:34

## 2018-03-10 RX ADMIN — MORPHINE SULFATE 1 MG: 2 INJECTION, SOLUTION INTRAMUSCULAR; INTRAVENOUS at 13:22

## 2018-03-10 RX ADMIN — ATORVASTATIN CALCIUM 10 MG: 10 TABLET, FILM COATED ORAL at 20:19

## 2018-03-10 RX ADMIN — IPRATROPIUM BROMIDE AND ALBUTEROL SULFATE 3 ML: 2.5; .5 SOLUTION RESPIRATORY (INHALATION) at 11:52

## 2018-03-10 RX ADMIN — SODIUM CHLORIDE 150 ML/HR: 9 INJECTION, SOLUTION INTRAVENOUS at 13:23

## 2018-03-10 RX ADMIN — KETAMINE HYDROCHLORIDE 50 MG: 50 INJECTION, SOLUTION INTRAMUSCULAR; INTRAVENOUS at 09:25

## 2018-03-10 RX ADMIN — KETAMINE HYDROCHLORIDE 50 MG: 50 INJECTION, SOLUTION INTRAMUSCULAR; INTRAVENOUS at 08:55

## 2018-03-10 RX ADMIN — SODIUM CHLORIDE 75 ML/HR: 9 INJECTION, SOLUTION INTRAVENOUS at 04:28

## 2018-03-10 RX ADMIN — METOPROLOL SUCCINATE 50 MG: 50 TABLET, FILM COATED, EXTENDED RELEASE ORAL at 06:55

## 2018-03-10 RX ADMIN — Medication 160 MCG: at 08:49

## 2018-03-10 RX ADMIN — KETAMINE HYDROCHLORIDE 100 MG: 50 INJECTION, SOLUTION INTRAMUSCULAR; INTRAVENOUS at 08:37

## 2018-03-10 RX ADMIN — BUPIVACAINE HYDROCHLORIDE 1.4 ML: 7.5 INJECTION, SOLUTION EPIDURAL; RETROBULBAR at 08:58

## 2018-03-10 RX ADMIN — SODIUM CHLORIDE 150 ML/HR: 9 INJECTION, SOLUTION INTRAVENOUS at 20:20

## 2018-03-10 RX ADMIN — BUDESONIDE AND FORMOTEROL FUMARATE DIHYDRATE 2 PUFF: 160; 4.5 AEROSOL RESPIRATORY (INHALATION) at 20:27

## 2018-03-10 RX ADMIN — SODIUM CHLORIDE, POTASSIUM CHLORIDE, SODIUM LACTATE AND CALCIUM CHLORIDE: 600; 310; 30; 20 INJECTION, SOLUTION INTRAVENOUS at 08:50

## 2018-03-10 NOTE — ANESTHESIA POSTPROCEDURE EVALUATION
Patient: Tamy Sher    Procedure Summary     Date:  03/10/18 Room / Location:  [facility] OR [address] [place] [address] PAD OR    Anesthesia Start:  0835 Anesthesia Stop:  1029    Procedure:  FEMUR SUPRACONDYLAR NAIL (Right Thigh) Diagnosis:  (GUCCI-PROSTHETIC FEMUR FRACTURE )    Surgeon:  Nima Bundy MD Provider:  Abdiaziz Engel CRNA    Anesthesia Type:  spinal ASA Status:  3          Anesthesia Type: spinal  Last vitals  BP   138/74 (03/10/18 1105)   Temp   98.5 °F (36.9 °C) (03/10/18 1110)   Pulse   95 (03/10/18 1110)   Resp   14 (03/10/18 1110)     SpO2   94 % (03/10/18 1110)     Post Anesthesia Care and Evaluation    Patient location during evaluation: bedside  Patient participation: complete - patient cannot participate  Pain score: 1  Pain management: adequate  Airway patency: patent    Cardiovascular status: acceptable  Respiratory status: acceptable  Hydration status: acceptable  Post Neuraxial Block status: No signs or symptoms of PDPH

## 2018-03-10 NOTE — PROGRESS NOTES
1           [facility] Medicine Services  INPATIENT PROGRESS NOTE    Length of Stay: 2  Date of Admission: 3/8/2018  Primary Care Physician: Barry Foley MD    Subjective   Chief Complaint:  Follow-up  HPI   Patient was taken to operating room today for the right femur fracture.  Her to this I noted that her serum creatinine had worsened.  I have no particular medications besides Lasix which I already cut back from yesterday that can cause elevation in her creatinine.  I noted that hydroxyurea will need to be adjusted if not discontinued based on her renal function.  I am not quite sure though the rationale of using hydroxyurea.  She claims that she has a blood disorder could not put the name onto it.  I consulted nephrology to assist in evaluation and management.  I already requested for urine studies and baseline ultrasound to facilitate evaluation  She has postoperative pain currently    Review of Systems     All pertinent negatives and positives are as above. All other systems have been reviewed and are negative unless otherwise stated.     Objective    Temp:  [97.4 °F (36.3 °C)-99.1 °F (37.3 °C)] 97.4 °F (36.3 °C)  Heart Rate:  [] 110  Resp:  [14-20] 16  BP: (109-142)/(52-97) 122/75  FiO2 (%):  [40 %] 40 %  Physical Exam  Constitutional: She is oriented to person, place, and time. She appears well-developed and well-nourished.   HENT:   Head: Normocephalic and atraumatic.    Nose: Nose normal.   Mouth/Throat: Oropharynx is clear and moist. No oropharyngeal exudate.   Eyes: Conjunctivae and EOM are normal. Pupils are equal, round  Right eye exhibits no discharge. Left eye exhibits no discharge. No scleral icterus.   Neck: Normal range of motion. Neck supple. No JVD present. No tracheal deviation present. No thyromegaly present.   Cardiovascular: Normal rate, regular rhythm, normal heart sounds and intact distal pulses.    Pulmonary/Chest: No stridor. No respiratory  distress. She has no wheezes. She has no rales. She exhibits no tenderness.   Diminished inspiratory effort   Abdominal: Soft. Bowel sounds are normal. Abdominal distention is not as pronounced. She exhibits no mass. There is no tenderness. There is no rebound and no guarding. No hernia.   Musculoskeletal: She exhibits edema.   Swollen right thigh, + tenderness and lots of pain with little movement; + dressing Lymphadenopathy:     She has no cervical adenopathy.   Neurological: She is alert and oriented to person, place, and time. No cranial nerve deficit.   Skin: Skin is warm and dry. No erythema. No pallor.     Psychiatric: She has a normal mood and affect. Her behavior is normal. Judgment and thought content normal.   Vitals reviewed.       Results Review:  I have reviewed the labs, radiology results, and diagnostic studies.    Laboratory Data:     Results from last 7 days  Lab Units 03/10/18  0618 03/09/18  1927 03/09/18  0644 03/08/18  1558   WBC 10*3/mm3 12.54*  --  9.16 10.85*   HEMOGLOBIN g/dL 9.6* 9.3* 7.6* 10.4*   HEMATOCRIT % 30.2* 29.0* 24.7* 32.6*   PLATELETS 10*3/mm3 344  --  415* 511*          Results from last 7 days  Lab Units 03/10/18  0618 03/09/18 2035 03/09/18 0644 03/08/18  1558   SODIUM mmol/L 136  --  136 138   SODIUM, ARTERIAL mmol/L  --  133*  --   --    POTASSIUM mmol/L 5.2  --  4.9 4.8   CHLORIDE mmol/L 98  --  99 97*   CO2 mmol/L 29.0  --  30.0 30.0   BUN mg/dL 60*  --  46* 45*   CREATININE mg/dL 2.49*  --  1.52* 1.13   CALCIUM mg/dL 7.8*  --  8.1* 9.0   BILIRUBIN mg/dL  --   --  0.2 0.3   ALK PHOS U/L  --   --  64 83   ALT (SGPT) U/L  --   --  29 37   AST (SGOT) U/L  --   --  32 24   GLUCOSE mg/dL 95  --  134* 145*       Culture Data:        Radiology Data:   Imaging Results (last 24 hours)     Procedure Component Value Units Date/Time    XR Femur 2 View Right [[id number]] Collected:  03/10/18 1113     Updated:  03/10/18 1116    Narrative:       EXAMINATION:   XR FEMUR 2 VW RIGHT-   3/10/2018 11:13 AM CST     HISTORY: 7 images are obtained operating room under the direction of Dr. Mohamud Bundy. Femoral nail is present. Fixation screws are present.  Right knee arthroplasty is present.     Skeletal structures appear relatively aligned.     This procedure utilized 1 minute 40 seconds of fluoroscopic time  This report was finalized on 03/10/2018 11:13 by Dr. Klever Hooker MD.    FL C Arm During Surgery [[id number]] Updated:  03/10/18 1033          I have reviewed the patient current medications.     Assessment/Plan     Hospital Problem List     * (Principal)Closed displaced comminuted fracture of shaft of right femur - status post open reduction internal fixation March 10     Pain of right thigh    Fall    Class 2 obesity with serious comorbidity in adult    Type 2 diabetes mellitus with neurologic complication, with long-term current use of insulin    STUART (acute kidney injury)    Chronic diastolic heart failure    Pleural effusion, left    Hypoxia   Acute blood loss anemia            Renal ultrasound, urine studies, nephrology consult appreciated  Follow-up electrolytes/chemistries  Hydroxyurea adjusted based on renal function care off pharmacists  Place on morphine PCA for better operative pain control  Physical and occupational therapy when okay with ortho  Referral to skilled nursing facility probably be needed  DVT prophylaxis deferred to Dr. Bundy  Oxygen supplemental, when necessary and N IPPV for hypoxia  Other plans per orders      Fidencio Rodriguez MD   03/10/18   2:20 PM

## 2018-03-10 NOTE — ANESTHESIA PROCEDURE NOTES
Spinal Block    Patient location during procedure: OR  Indication:at surgeon's request  Performed By  CRNA: ISABEL OCHOA  Preanesthetic Checklist  Completed: patient identified, site marked, surgical consent, pre-op evaluation, timeout performed, IV checked, risks and benefits discussed and monitors and equipment checked  Spinal Block Prep:  Patient Position:sitting  Sterile Tech:cap, gloves and sterile barriers  Patient Monitoring:EKG, continuous pulse oximetry and blood pressure monitoring  Spinal Block Procedure  Location:L2-L3  Needle Gauge:22 G  Placement of Spinal needle event:cerebrospinal fluid aspirated    Fluid Appearance:clear  Post Assessment  Patient Tolerance:patient tolerated the procedure well with no apparent complications  Complications no

## 2018-03-10 NOTE — ADDENDUM NOTE
Addendum  created 03/10/18 1252 by Abdiaziz Engel, CRNA    Anesthesia Review and Sign - Ready for Procedure

## 2018-03-10 NOTE — PLAN OF CARE
Problem: Patient Care Overview (Adult)  Goal: Plan of Care Review  Outcome: Ongoing (interventions implemented as appropriate)   03/09/18 4188   Coping/Psychosocial Response Interventions   Plan Of Care Reviewed With patient   Patient Care Overview   Progress no change   Outcome Evaluation   Outcome Summary/Follow up Plan Patient to have surgery in AM for right femur fx. PPP, VSS Safety maintained. c/o pain with movement.       Problem: Fall Risk (Adult)  Goal: Absence of Falls  Outcome: Ongoing (interventions implemented as appropriate)      Problem: Pressure Ulcer Risk (Jeremiah Scale) (Adult,Obstetrics,Pediatric)  Goal: Skin Integrity  Outcome: Ongoing (interventions implemented as appropriate)      Problem: Orthopaedic Fracture (Adult)  Goal: Signs and Symptoms of Listed Potential Problems Will be Absent or Manageable (Orthopaedic Fracture)  Outcome: Ongoing (interventions implemented as appropriate)

## 2018-03-10 NOTE — CONSULTS
Nephrology ([facility]) Consult Note      Patient:  Tamy Sher  YOB: 1930  Date of Service: 3/10/2018  MRN: [id number]   Acct: [id number]   Primary Care Physician: Barry Foley MD  Advance Directive: Full Code  Admit Date: 3/8/2018       Hospital Day: 2  Referring Provider: Fidencio Rodriguez*      Patient Seen, Chart, Consults, Notes, Labs, Radiology studies reviewed.    Reason for consultation: Acute kidney injury    Subjective:  Tamy Sher is a 87 y.o. female  whom we were consulted for acute kidney injury.  Patient does not have any history of chronic kidney disease.  She was admitted couple days ago after fall and has a fracture of right femur.  Patient underwent internal fixation.  Hospital course remarkable for worsening of renal function creatinine jumped from 1.1 mg 2.5 mg and nephrology is consulted.  Otherwise she has known history of insulin-dependent type II diabetes, hypertension, hypercholesterolemia and morbid abdominal obesity.  She she is sleeping as she is under the influence of intravenous narcotics.    Allergies:  Review of patient's allergies indicates no known allergies.    Home Meds:  Prescriptions Prior to Admission   Medication Sig Dispense Refill Last Dose   • levothyroxine (SYNTHROID, LEVOTHROID) 50 MCG tablet Take 50 mcg by mouth Daily.      • amLODIPine (NORVASC) 5 MG tablet Take 5 mg by mouth Daily.   Unknown at Unknown time   • aspirin 325 MG tablet Take 325 mg by mouth Every Night.   Unknown at Unknown time   • budesonide-formoterol (SYMBICORT) 160-4.5 MCG/ACT inhaler Inhale 2 puffs 2 (Two) Times a Day.  12 Unknown at Unknown time   • folic acid (FOLVITE) 1 MG tablet Take 1 mg by mouth Daily.   Unknown at Unknown time   • furosemide (LASIX) 40 MG tablet Take 40 mg by mouth Daily.   Unknown at Unknown time   • gabapentin (NEURONTIN) 300 MG capsule Take 1 capsule by mouth Daily With Breakfast. 3 capsule 0 Unknown at Unknown time    • gabapentin (NEURONTIN) 300 MG capsule Take 2 capsules by mouth Every Evening. 6 capsule 0 Unknown at Unknown time   • hydroxyurea (HYDREA) 500 MG capsule Take 500 mg by mouth Take As Directed. Daily except Fridays.   Unknown at Unknown time   • hydroxyurea (HYDREA) 500 MG capsule Take 1,000 mg by mouth 1 (One) Time Per Week. Friday dose   Unknown at Unknown time   • insulin glargine (LANTUS) 100 UNIT/ML injection Inject 26 Units under the skin Every Night.   Unknown at Unknown time   • insulin glargine (LANTUS) 100 UNIT/ML injection Inject 10 Units under the skin Daily. AM dose  0 Unknown at Unknown time   • insulin lispro (humaLOG) 100 UNIT/ML injection Inject 0-9 Units under the skin 4 (Four) Times a Day With Meals & at Bedtime.  12 Unknown at Unknown time   • ipratropium-albuterol (DUO-NEB) 0.5-2.5 mg/mL nebulizer Take 3 mL by nebulization 4 (Four) Times a Day. 360 mL  Unknown at Unknown time   • lisinopril (PRINIVIL,ZESTRIL) 40 MG tablet Take 40 mg by mouth Daily.   Unknown at Unknown time   • metFORMIN (GLUCOPHAGE) 1000 MG tablet Take 500 mg by mouth Every Night.   Unknown at Unknown time   • metoprolol succinate XL (TOPROL-XL) 25 MG 24 hr tablet Take 2 tablets by mouth Daily. 30 tablet 0 Unknown at Unknown time   • pantoprazole (PROTONIX) 40 MG EC tablet Take 40 mg by mouth Daily.   Unknown at Unknown time   • simvastatin (ZOCOR) 40 MG tablet Take 0.5 tablets by mouth Every Night. 30 tablet 0 Unknown at Unknown time       Medicines:  Current Facility-Administered Medications   Medication Dose Route Frequency Provider Last Rate Last Dose   • amLODIPine (NORVASC) tablet 5 mg  5 mg Oral Daily Fidencio Rodriguez MD       • aspirin tablet 325 mg  325 mg Oral Nightly Fidencio Rodriguez MD   325 mg at 03/08/18 2145   • atorvastatin (LIPITOR) tablet 10 mg  10 mg Oral Nightly Fidencio Rodriguez MD   10 mg at 03/09/18 2122   • budesonide-formoterol (SYMBICORT) 160-4.5 MCG/ACT inhaler 2 puff  2 puff  Inhalation BID - RT Fidencio Rodriguez MD   2 puff at 03/10/18 0700   • ceFAZolin (ANCEF) 1 g/10ml IV PUSH syringe  1 g Intravenous Q8H Nima Bundy MD       • dextrose (D50W) solution 25 g  25 g Intravenous Q15 Min PRN Fidencio Rodriguez MD       • [MAR Hold] dextrose (GLUTOSE) oral gel 15 g  15 g Oral Q15 Min PRN Fidencio Rodriguez MD       • folic acid (FOLVITE) tablet 1 mg  1 mg Oral Daily Fidencio Rodriguez MD   1 mg at 03/09/18 0947   • gabapentin (NEURONTIN) capsule 300 mg  300 mg Oral Daily With Breakfast Fidencio Rodriguez MD   300 mg at 03/09/18 0947   • gabapentin (NEURONTIN) capsule 600 mg  600 mg Oral Q PM Fidencio Rodriguez MD   600 mg at 03/09/18 1804   • glucagon (human recombinant) (GLUCAGEN DIAGNOSTIC) injection 1 mg  1 mg Subcutaneous PRN Fidencio Rodriguez MD       • HYDROcodone-acetaminophen (NORCO) 7.5-325 MG per tablet 1 tablet  1 tablet Oral Q6H PRN Rufino Herndon MD   1 tablet at 03/09/18 1814   • hydroxyurea (HYDREA) capsule 500 mg  500 mg Oral Daily Fidencio Rodriguez MD       • hydrOXYzine (VISTARIL) capsule 50 mg  50 mg Oral TID PRN Fidencio Rodriguez MD       • insulin detemir (LEVEMIR) injection 30 Units  30 Units Subcutaneous Nightly Fidencio Rodriguez MD   30 Units at 03/09/18 2122   • insulin lispro (humaLOG) injection 0-9 Units  0-9 Units Subcutaneous 4x Daily With Meals & Nightly Fidencio Rodriguez MD   4 Units at 03/09/18 2122   • ipratropium-albuterol (DUO-NEB) nebulizer solution 3 mL  3 mL Nebulization 4x Daily - RT Fidencio Rodriguez MD   3 mL at 03/10/18 1152   • lactated ringers infusion  50 mL/hr Intravenous Continuous Nima Bundy MD 50 mL/hr at 03/10/18 1234 50 mL/hr at 03/10/18 1234   • [START ON 3/11/2018] levothyroxine (SYNTHROID, LEVOTHROID) tablet 50 mcg  50 mcg Oral Q AM Nima Bundy MD       • magnesium hydroxide (MILK OF MAGNESIA) suspension 2400 mg/10mL 10 mL  10 mL Oral  Daily PRN Fidencio Rodriguez MD       • metoprolol succinate XL (TOPROL-XL) 24 hr tablet 50 mg  50 mg Oral Daily Fidencio Rodriguez MD   50 mg at 03/10/18 0655   • Morphine sulfate (PF) injection 1 mg  1 mg Intravenous Q4H PRN Fidencio Rodriguez MD        And   • naloxone (NARCAN) injection 0.4 mg  0.4 mg Intravenous Q5 Min PRN Fidencio Rodriguez MD       • Morphine sulfate (PF) injection 1 mg  1 mg Intravenous Q4H PRN Fidencio Rodriguez MD   1 mg at 03/09/18 0608    And   • naloxone (NARCAN) injection 0.4 mg  0.4 mg Intravenous Q5 Min PRN Fidencio Rodriguez MD       • ondansetron (ZOFRAN) injection 4 mg  4 mg Intravenous Q6H PRN Fidencio Rodriguez MD   4 mg at 03/09/18 1814   • ondansetron (ZOFRAN) injection 4 mg  4 mg Intravenous Q6H PRN Nima Bundy MD       • pantoprazole (PROTONIX) EC tablet 40 mg  40 mg Oral Q AM Fidencio Rodriguez MD   40 mg at 03/08/18 2139   • sodium chloride 0.9 % flush 1-10 mL  1-10 mL Intravenous PRN Fidencio Rodriguez MD       • sodium chloride 0.9 % flush 10 mL  10 mL Intravenous PRN Rylee Gustafson MD       • sodium chloride 0.9 % infusion  150 mL/hr Intravenous Continuous Fidencio Rodriguez MD 75 mL/hr at 03/10/18 0428 75 mL/hr at 03/10/18 0428       Past Medical History:  Past Medical History:   Diagnosis Date   • Constipation    • Diabetes mellitus    • Diarrhea    • Diastolic dysfunction, left ventricle    • Esophagitis    • Exertional shortness of breath    • Hyperlipidemia    • Hypertension    • Lumbar spondylitis    • Osteoarthritis    • Peripheral vascular disease    • Sinusitis    • Stroke        Past Surgical History:  Past Surgical History:   Procedure Laterality Date   • GALLBLADDER SURGERY     • HYSTERECTOMY     • JOINT REPLACEMENT     • REPLACEMENT TOTAL KNEE Left        Family History  Family History   Problem Relation Age of Onset   • No Known Problems Mother    • No Known Problems Father        Social  "History  Social History     Social History   • Marital status:      Spouse name: N/A   • Number of children: N/A   • Years of education: N/A     Occupational History   • Not on file.     Social History Main Topics   • Smoking status: Never Smoker   • Smokeless tobacco: Never Used   • Alcohol use No   • Drug use: No   • Sexual activity: No     Other Topics Concern   • Not on file     Social History Narrative   • No narrative on file         Review of Systems:  History obtained from chart review and the patient  General ROS: No fever or chills  Respiratory ROS: No cough, shortness of breath, wheezing  Cardiovascular ROS: no chest pain or dyspnea on exertion  Gastrointestinal ROS: No abdominal pain or melena  Genito-Urinary ROS: No dysuria or hematuria  14 point ROS reviewed with the patient and negative except as noted above and in the HPI unless unable to obtain.    Objective:  /75 (BP Location: Left arm, Patient Position: Lying)   Pulse 110   Temp 97.4 °F (36.3 °C) (Oral)   Resp 16   Ht 152.4 cm (60\")   Wt 90.3 kg (199 lb)   SpO2 97%   BMI 38.86 kg/m²     Intake/Output Summary (Last 24 hours) at 03/10/18 1248  Last data filed at 03/10/18 1115   Gross per 24 hour   Intake           3443.9 ml   Output              525 ml   Net           2918.9 ml     General: awake/alert x2  HEENT: Normocephalic atraumatic head  Neck: Supple with no JVD  Chest:  clear to auscultation bilaterally without respiratory distress  CVS: regular rate and rhythm  Abdominal: soft, nontender, normal bowel sounds  Extremities: no cyanosis or edema  Skin: warm and dry without rash      Labs:      Results from last 7 days  Lab Units 03/10/18  0618 03/09/18  1927 03/09/18  0644 03/08/18  1558   WBC 10*3/mm3 12.54*  --  9.16 10.85*   HEMOGLOBIN g/dL 9.6* 9.3* 7.6* 10.4*   HEMATOCRIT % 30.2* 29.0* 24.7* 32.6*   PLATELETS 10*3/mm3 344  --  415* 511*         Results from last 7 days  Lab Units 03/10/18  0618 03/09/18 2035 " 03/09/18  0644 03/08/18  1558   SODIUM mmol/L 136  --  136 138   SODIUM, ARTERIAL mmol/L  --  133*  --   --    POTASSIUM mmol/L 5.2  --  4.9 4.8   CHLORIDE mmol/L 98  --  99 97*   CO2 mmol/L 29.0  --  30.0 30.0   BUN mg/dL 60*  --  46* 45*   CREATININE mg/dL 2.49*  --  1.52* 1.13   CALCIUM mg/dL 7.8*  --  8.1* 9.0   BILIRUBIN mg/dL  --   --  0.2 0.3   ALK PHOS U/L  --   --  64 83   ALT (SGPT) U/L  --   --  29 37   AST (SGOT) U/L  --   --  32 24   GLUCOSE mg/dL 95  --  134* 145*         Radiology:   Imaging Results (last 24 hours)     Procedure Component Value Units Date/Time    XR Femur 2 View Right [[id number]] Collected:  03/10/18 1113     Updated:  03/10/18 1116    Narrative:       EXAMINATION:   XR FEMUR 2 VW RIGHT-  3/10/2018 11:13 AM CST     HISTORY: 7 images are obtained operating room under the direction of Dr. Mohamud Bundy. Femoral nail is present. Fixation screws are present.  Right knee arthroplasty is present.     Skeletal structures appear relatively aligned.     This procedure utilized 1 minute 40 seconds of fluoroscopic time  This report was finalized on 03/10/2018 11:13 by Dr. Klever Hooker MD.    FL C Arm During Surgery [[id number]] Updated:  03/10/18 1033          Culture:  No components found for: WOUNDCUL, 3  No components found for: CSFCUL, 3  No components found for: BC, 3  No components found for: URINECUL, 3      Assessment   1.  Acute kidney injury secondary to volume depletion versus acute tubular necrosis.  2.  Status post recent fall at home and fracture of right femur  3.  Status post internal fixation  4.  Type II insulin-dependent diabetes.  5.  Relatively hypotensive.  6.  Morbid abdominal obesity.    Plan:  1.  IV fluid resuscitation.  2.  Urinary electrolytes.  3.  Baseline renal ultrasound.  4.  Hold blood pressure medications.      Thank you for the consult, we appreciate the opportunity to provide care to your patients.  Feel free to contact me if I can be of any further  assistance.      Jeffery Hong MD  3/10/2018  12:48 PM

## 2018-03-11 LAB
ABO + RH BLD: NORMAL
ABO + RH BLD: NORMAL
ALBUMIN SERPL-MCNC: 2.9 G/DL (ref 3.5–5)
ALBUMIN/GLOB SERPL: 1.2 G/DL (ref 1.1–2.5)
ALP SERPL-CCNC: 74 U/L (ref 24–120)
ALT SERPL W P-5'-P-CCNC: 27 U/L (ref 0–54)
ANION GAP SERPL CALCULATED.3IONS-SCNC: 11 MMOL/L (ref 4–13)
ANISOCYTOSIS BLD QL: ABNORMAL
AST SERPL-CCNC: 23 U/L (ref 7–45)
BACTERIA UR QL AUTO: ABNORMAL /HPF
BH BB BLOOD EXPIRATION DATE: NORMAL
BH BB BLOOD EXPIRATION DATE: NORMAL
BH BB BLOOD TYPE BARCODE: 6200
BH BB BLOOD TYPE BARCODE: 6200
BH BB DISPENSE STATUS: NORMAL
BH BB DISPENSE STATUS: NORMAL
BH BB PRODUCT CODE: NORMAL
BH BB PRODUCT CODE: NORMAL
BH BB UNIT NUMBER: NORMAL
BH BB UNIT NUMBER: NORMAL
BILIRUB SERPL-MCNC: 0.2 MG/DL (ref 0.1–1)
BILIRUB UR QL STRIP: NEGATIVE
BUN BLD-MCNC: 62 MG/DL (ref 5–21)
BUN/CREAT SERPL: 26.6 (ref 7–25)
CALCIUM SPEC-SCNC: 8.1 MG/DL (ref 8.4–10.4)
CHLORIDE SERPL-SCNC: 102 MMOL/L (ref 98–110)
CLARITY UR: ABNORMAL
CO2 SERPL-SCNC: 24 MMOL/L (ref 24–31)
COLOR UR: YELLOW
CREAT BLD-MCNC: 2.33 MG/DL (ref 0.5–1.4)
DEPRECATED RDW RBC AUTO: 77.7 FL (ref 40–54)
EOSINOPHIL # BLD MANUAL: 0.73 10*3/MM3 (ref 0–0.7)
EOSINOPHIL NFR BLD MANUAL: 5 % (ref 0–4)
ERYTHROCYTE [DISTWIDTH] IN BLOOD BY AUTOMATED COUNT: 21.5 % (ref 12–15)
GFR SERPL CREATININE-BSD FRML MDRD: 20 ML/MIN/1.73
GLOBULIN UR ELPH-MCNC: 2.5 GM/DL
GLUCOSE BLD-MCNC: 145 MG/DL (ref 70–100)
GLUCOSE BLDC GLUCOMTR-MCNC: 164 MG/DL (ref 70–130)
GLUCOSE BLDC GLUCOMTR-MCNC: 184 MG/DL (ref 70–130)
GLUCOSE BLDC GLUCOMTR-MCNC: 199 MG/DL (ref 70–130)
GLUCOSE BLDC GLUCOMTR-MCNC: 252 MG/DL (ref 70–130)
GLUCOSE UR STRIP-MCNC: NEGATIVE MG/DL
HCT VFR BLD AUTO: 34.5 % (ref 37–47)
HGB BLD-MCNC: 11.1 G/DL (ref 12–16)
HGB UR QL STRIP.AUTO: NEGATIVE
HYALINE CASTS UR QL AUTO: ABNORMAL /LPF
KETONES UR QL STRIP: NEGATIVE
LEUKOCYTE ESTERASE UR QL STRIP.AUTO: ABNORMAL
LYMPHOCYTES # BLD MANUAL: 0.87 10*3/MM3 (ref 0.72–4.86)
LYMPHOCYTES NFR BLD MANUAL: 6 % (ref 15–45)
LYMPHOCYTES NFR BLD MANUAL: 8 % (ref 4–12)
MCH RBC QN AUTO: 32.1 PG (ref 28–32)
MCHC RBC AUTO-ENTMCNC: 32.2 G/DL (ref 33–36)
MCV RBC AUTO: 99.7 FL (ref 82–98)
MONOCYTES # BLD AUTO: 1.16 10*3/MM3 (ref 0.19–1.3)
NEUTROPHILS # BLD AUTO: 11.6 10*3/MM3 (ref 1.87–8.4)
NEUTROPHILS NFR BLD MANUAL: 80 % (ref 39–78)
NITRITE UR QL STRIP: NEGATIVE
NRBC BLD MANUAL-RTO: 0.1 /100 WBC (ref 0–0)
PH UR STRIP.AUTO: <=5 [PH] (ref 5–8)
PLAT MORPH BLD: NORMAL
PLATELET # BLD AUTO: 337 10*3/MM3 (ref 130–400)
PMV BLD AUTO: 9.9 FL (ref 6–12)
POIKILOCYTOSIS BLD QL SMEAR: ABNORMAL
POLYCHROMASIA BLD QL SMEAR: ABNORMAL
POTASSIUM BLD-SCNC: 5.6 MMOL/L (ref 3.5–5.3)
PROT SERPL-MCNC: 5.4 G/DL (ref 6.3–8.7)
PROT UR QL STRIP: NEGATIVE
RBC # BLD AUTO: 3.46 10*6/MM3 (ref 4.2–5.4)
RBC # UR: ABNORMAL /HPF
REF LAB TEST METHOD: ABNORMAL
SODIUM BLD-SCNC: 137 MMOL/L (ref 135–145)
SP GR UR STRIP: 1.02 (ref 1–1.03)
SQUAMOUS #/AREA URNS HPF: ABNORMAL /HPF
UNIT  ABO: NORMAL
UNIT  ABO: NORMAL
UNIT  RH: NORMAL
UNIT  RH: NORMAL
UROBILINOGEN UR QL STRIP: ABNORMAL
VARIANT LYMPHS NFR BLD MANUAL: 1 % (ref 0–5)
WBC MORPH BLD: NORMAL
WBC NRBC COR # BLD: 14.5 10*3/MM3 (ref 4.8–10.8)
WBC UR QL AUTO: ABNORMAL /HPF
YEAST URNS QL MICRO: ABNORMAL /HPF

## 2018-03-11 PROCEDURE — 94660 CPAP INITIATION&MGMT: CPT

## 2018-03-11 PROCEDURE — 85007 BL SMEAR W/DIFF WBC COUNT: CPT | Performed by: INTERNAL MEDICINE

## 2018-03-11 PROCEDURE — G8981 BODY POS CURRENT STATUS: HCPCS

## 2018-03-11 PROCEDURE — 80053 COMPREHEN METABOLIC PANEL: CPT | Performed by: INTERNAL MEDICINE

## 2018-03-11 PROCEDURE — 81001 URINALYSIS AUTO W/SCOPE: CPT | Performed by: NURSE PRACTITIONER

## 2018-03-11 PROCEDURE — 25010000003 CEFAZOLIN PER 500 MG: Performed by: ORTHOPAEDIC SURGERY

## 2018-03-11 PROCEDURE — 94760 N-INVAS EAR/PLS OXIMETRY 1: CPT

## 2018-03-11 PROCEDURE — 63710000001 INSULIN DETEMIR PER 5 UNITS: Performed by: INTERNAL MEDICINE

## 2018-03-11 PROCEDURE — 94799 UNLISTED PULMONARY SVC/PX: CPT

## 2018-03-11 PROCEDURE — 85025 COMPLETE CBC W/AUTO DIFF WBC: CPT | Performed by: INTERNAL MEDICINE

## 2018-03-11 PROCEDURE — 63710000001 INSULIN LISPRO (HUMAN) PER 5 UNITS: Performed by: INTERNAL MEDICINE

## 2018-03-11 PROCEDURE — G8987 SELF CARE CURRENT STATUS: HCPCS | Performed by: OCCUPATIONAL THERAPIST

## 2018-03-11 PROCEDURE — 97167 OT EVAL HIGH COMPLEX 60 MIN: CPT | Performed by: OCCUPATIONAL THERAPIST

## 2018-03-11 PROCEDURE — 82962 GLUCOSE BLOOD TEST: CPT

## 2018-03-11 PROCEDURE — G8982 BODY POS GOAL STATUS: HCPCS

## 2018-03-11 PROCEDURE — 97162 PT EVAL MOD COMPLEX 30 MIN: CPT

## 2018-03-11 PROCEDURE — G8988 SELF CARE GOAL STATUS: HCPCS | Performed by: OCCUPATIONAL THERAPIST

## 2018-03-11 RX ORDER — SODIUM POLYSTYRENE SULFONATE 15 G/60ML
15 SUSPENSION ORAL; RECTAL ONCE
Status: COMPLETED | OUTPATIENT
Start: 2018-03-11 | End: 2018-03-11

## 2018-03-11 RX ADMIN — IPRATROPIUM BROMIDE AND ALBUTEROL SULFATE 3 ML: 2.5; .5 SOLUTION RESPIRATORY (INHALATION) at 10:35

## 2018-03-11 RX ADMIN — LEVOTHYROXINE SODIUM 50 MCG: 50 TABLET ORAL at 06:23

## 2018-03-11 RX ADMIN — INSULIN LISPRO 2 UNITS: 100 INJECTION, SOLUTION INTRAVENOUS; SUBCUTANEOUS at 12:07

## 2018-03-11 RX ADMIN — SODIUM CHLORIDE 100 ML/HR: 9 INJECTION, SOLUTION INTRAVENOUS at 17:14

## 2018-03-11 RX ADMIN — PANTOPRAZOLE SODIUM 40 MG: 40 TABLET, DELAYED RELEASE ORAL at 06:23

## 2018-03-11 RX ADMIN — INSULIN LISPRO 6 UNITS: 100 INJECTION, SOLUTION INTRAVENOUS; SUBCUTANEOUS at 17:31

## 2018-03-11 RX ADMIN — SODIUM CHLORIDE 150 ML/HR: 9 INJECTION, SOLUTION INTRAVENOUS at 03:55

## 2018-03-11 RX ADMIN — SODIUM POLYSTYRENE SULFONATE 15 G: 15 SUSPENSION ORAL; RECTAL at 12:07

## 2018-03-11 RX ADMIN — INSULIN LISPRO 2 UNITS: 100 INJECTION, SOLUTION INTRAVENOUS; SUBCUTANEOUS at 08:56

## 2018-03-11 RX ADMIN — IPRATROPIUM BROMIDE AND ALBUTEROL SULFATE 3 ML: 2.5; .5 SOLUTION RESPIRATORY (INHALATION) at 19:35

## 2018-03-11 RX ADMIN — IPRATROPIUM BROMIDE AND ALBUTEROL SULFATE 3 ML: 2.5; .5 SOLUTION RESPIRATORY (INHALATION) at 14:52

## 2018-03-11 RX ADMIN — FOLIC ACID 1 MG: 1 TABLET ORAL at 08:56

## 2018-03-11 RX ADMIN — ASPIRIN 325 MG: 325 TABLET, COATED ORAL at 20:37

## 2018-03-11 RX ADMIN — GABAPENTIN 600 MG: 300 CAPSULE ORAL at 17:14

## 2018-03-11 RX ADMIN — IPRATROPIUM BROMIDE AND ALBUTEROL SULFATE 3 ML: 2.5; .5 SOLUTION RESPIRATORY (INHALATION) at 06:49

## 2018-03-11 RX ADMIN — BUDESONIDE AND FORMOTEROL FUMARATE DIHYDRATE 2 PUFF: 160; 4.5 AEROSOL RESPIRATORY (INHALATION) at 19:35

## 2018-03-11 RX ADMIN — INSULIN DETEMIR 30 UNITS: 100 INJECTION, SOLUTION SUBCUTANEOUS at 20:25

## 2018-03-11 RX ADMIN — GABAPENTIN 300 MG: 300 CAPSULE ORAL at 08:56

## 2018-03-11 RX ADMIN — CEFAZOLIN SODIUM 1 G: 1 INJECTION, POWDER, FOR SOLUTION INTRAMUSCULAR; INTRAVENOUS at 00:51

## 2018-03-11 RX ADMIN — CEFAZOLIN SODIUM 1 G: 1 INJECTION, POWDER, FOR SOLUTION INTRAMUSCULAR; INTRAVENOUS at 08:57

## 2018-03-11 RX ADMIN — HYDROXYUREA 500 MG: 500 CAPSULE ORAL at 08:56

## 2018-03-11 RX ADMIN — METOPROLOL SUCCINATE 50 MG: 50 TABLET, FILM COATED, EXTENDED RELEASE ORAL at 09:03

## 2018-03-11 RX ADMIN — BUDESONIDE AND FORMOTEROL FUMARATE DIHYDRATE 2 PUFF: 160; 4.5 AEROSOL RESPIRATORY (INHALATION) at 06:50

## 2018-03-11 RX ADMIN — ATORVASTATIN CALCIUM 10 MG: 10 TABLET, FILM COATED ORAL at 20:37

## 2018-03-11 NOTE — THERAPY EVALUATION
Acute Care - Physical Therapy Initial Evaluation  [facility]     Patient Name: Tamy Sher  : 1930  MRN: [id number]  Today's Date: 3/11/2018   Onset of Illness/Injury or Date of Surgery: 18  Date of Referral to PT: 18  Referring Physician: Dr. Bundy      Admit Date: 3/8/2018    Visit Dx:     ICD-10-CM ICD-9-CM   1. Fall, initial encounter W19.XXXA E888.9   2. Other closed fracture of distal end of right femur, initial encounter S72.491A 821.29   3. Fall W19.XXXA E888.9   4. Impaired mobility Z74.09 799.89     Patient Active Problem List   Diagnosis   • Ischemic chest pain   • Pneumonia of both lungs due to infectious organism   • Fall   • Closed displaced comminuted fracture of shaft of right femur   • Pain of right thigh   • Class 2 obesity with serious comorbidity in adult   • Type 2 diabetes mellitus with neurologic complication, with long-term current use of insulin   • STUART (acute kidney injury)   • Chronic diastolic heart failure   • Pleural effusion, left   • Hypoxia     Past Medical History:   Diagnosis Date   • Constipation    • Diabetes mellitus    • Diarrhea    • Diastolic dysfunction, left ventricle    • Esophagitis    • Exertional shortness of breath    • Hyperlipidemia    • Hypertension    • Lumbar spondylitis    • Osteoarthritis    • Peripheral vascular disease    • Sinusitis    • Stroke      Past Surgical History:   Procedure Laterality Date   • GALLBLADDER SURGERY     • HYSTERECTOMY     • JOINT REPLACEMENT     • REPLACEMENT TOTAL KNEE Left         PT ASSESSMENT (last 72 hours)      Physical Therapy Evaluation     Row Name 18 1305          PT Evaluation Time/Intention    Subjective Information complains of;weakness;fatigue;nausea/vomiting;dyspnea;pain  -FERNANDO     Document Type evaluation  -FERNANDO     Mode of Treatment physical therapy  -FERNANDO     Total Evaluation Minutes, Physical Therapy 60  -FERNANDO     Row Name 18 1302          General Information    Patient Profile Reviewed?  yes  -FERNANDO     Onset of Illness/Injury or Date of Surgery 03/08/18  -FERNANDO     Referring Physician Dr. Bundy   NWB R LE, t/f's  -FERNANDO     Patient Observations cooperative;agree to therapy;lethargic  -FERNANDO     Patient/Family Observations Family x 3 in room  -FERNANDO     General Observations of Patient Fowlers in bed, sleeping, R knee/thigh wrapped in ace wrap.   -FERNANDO     Prior Level of Function independent:;all household mobility;dressing;bathing  -FERNANDO     Equipment Currently Used at Home walker, rolling;commode;bath bench  -FERNANDO     Pertinent History of Current Functional Problem CC: Fall as she was picking up a piece of paper. Dx: R femur fx. s/p 3/10 internal fixation of R periprosthetic femur fx. 3/8 R femur xray: comminuted spiral fx distal third of the R femur. Comminuted fragments extend to the femoral component of R knee arthroplasty.   -FERNANDO     Existing Precautions/Restrictions fall   [place] [employer]  -FERNANDO     Risks Reviewed patient:;LOB;nausea/vomiting;dizziness;increased discomfort;change in vital signs;lines disloged  -FERNANDO     Benefits Reviewed patient:;improve function;increase independence;increase strength;increase balance;decrease pain;increase knowledge;improve skin integrity  -FERNANDO     Barriers to Rehab medically complex;physical barrier  -FERNANDO     Row Name 03/11/18 1305          Relationship/Environment    Lives With alone  -FERNANDO     Family Caregiver if Needed child(anival), adult  -FERNANDO     Row Name 03/11/18 1305          Resource/Environmental Concerns    Current Living Arrangements home/apartment/condo  -FERNANDO     Resource/Environmental Concerns home accessibility  -FERNANDO     Row Name 03/11/18 1305          Cognitive Assessment/Interventions    Additional Documentation Cognitive Assessment/Intervention (Group)  -FERNANDO     Row Name 03/11/18 1305          Cognitive Assessment/Intervention- PT/OT    Orientation Status (Cognition) oriented x 3  -FERNANDO     Follows Commands (Cognition) follows one step commands;75-90% accuracy;delayed  response/completion;verbal cues/prompting required  -FERNANDO     Safety Deficit (Cognitive) safety precautions awareness  -FERNANDO     Personal Safety Interventions fall prevention program maintained;gait belt;muscle strengthening facilitated;nonskid shoes/slippers when out of bed  -FERNANDO     Row Name 03/11/18 1305          Safety Issues, Functional Mobility    Safety Issues Affecting Function (Mobility) safety precaution awareness   NWB R LE  -FERNANDO     Impairments Affecting Function (Mobility) balance;pain;strength;shortness of breath  -FERNANDO     Row Name 03/11/18 1305          Mobility Assessment/Treatment    Extremity Weight-bearing Status right lower extremity  -FERNANDO     Right Lower Extremity (Weight-bearing Status) non weight-bearing (NWB)  -FERNANDO     Row Name 03/11/18 1305          Bed Mobility Assessment/Treatment    Bed Mobility Assessment/Treatment rolling left;rolling right;supine-sit;sit-supine  -FERNANDO     Rolling Left [place] (Bed Mobility) maximum assist (25% patient effort)  -FERNANDO     Rolling Right [place] (Bed Mobility) maximum assist (25% patient effort)  -FERNANDO     Supine-Sit [place] (Bed Mobility) maximum assist (25% patient effort);2 person assist  -FERNANDO     Sit-Supine [place] (Bed Mobility) maximum assist (25% patient effort);2 person assist  -FERNANDO     Bed Mobility, Safety Issues decreased use of arms for pushing/pulling;decreased use of legs for bridging/pushing;impaired trunk control for bed mobility  -FERNANDO     Assistive Device (Bed Mobility) bed rails;draw sheet;head of bed elevated  -FERNANDO     Row Name 03/11/18 1305          Transfer Assessment/Treatment    Comment (Transfers) Unable  -FERNANDO     Row Name 03/11/18 1305          General ROM    GENERAL ROM COMMENTS L LE AROM impaired ~ 25%, deferred R LE  -FERNANDO     Row Name 03/11/18 1305          General Assessment (Manual Muscle Testing)    Comment, General Manual Muscle Testing (MMT) Assessment L LE ~ 3+/5, deferred R LE  -FERNANDO     Row Name 03/11/18 1305          Motor  Assessment/Intervention    Additional Documentation Balance (Group)  -FERNANDO     Row Name 03/11/18 1305          Balance    Balance static sitting balance  -FERNANDO     [facility] Name 03/11/18 1305          Static Sitting Balance    Level of [place] (Unsupported Sitting, Static Balance) minimal assist, 75% patient effort;moderate assist, 50 to 74% patient effort;maximal assist, 25 to 49% patient effort  -FERNANDO     Sitting Position (Unsupported Sitting, Static Balance) sitting on edge of bed  -FERNANDO     Time Able to Maintain Position (Unsupported Sitting, Static Balance) 4 to 5 minutes  -[facility] Name 03/11/18 1305          Sensory Assessment/Intervention    Sensory General Assessment no sensation deficits identified   B LE, per pt  -FERNANDO     Row Name 03/11/18 1305          Pain Assessment    Additional Documentation Pain Scale: FACES Pre/Post-Treatment (Group)  -FERNANDO     Row Name 03/11/18 1305          Pain Scale: FACES Pre/Post-Treatment    Pain: FACES Scale, Pretreatment 0-->no hurt  -FERNANDO     Pain: FACES Scale, Post-Treatment 4-->hurts little more  -FERNANDO     Pre/Post Treatment Pain Comment R femur, surgical site  -FERNANDO     Row Name 03/11/18 1305          Health Promotion    Additional Documentation Plan of Care Review (Group)  -FERNANDO     Row Name             Wound 03/10/18 0934 Right medial knee incision    Wound - Properties Group Date first assessed: 03/10/18  -SD Time first assessed: 0934  -SD Present On Admission : no  -SD, surgical wound  Side: Right  -SD Orientation: medial  -SD Location: knee  -SD Type: incision  -SD Additional Comments: adaptic, 4x4, soft roll, ace  -SD    Row Name             Wound 03/10/18 1025 Right upper thigh surgical    Wound - Properties Group Date first assessed: 03/10/18  -SD Time first assessed: 1025  -SD Present On Admission : no  -SD, surgical  Side: Right  -SD Orientation: upper  -SD Location: thigh  -SD Type: surgical  -SD Additional Comments: adaptic, 4x4, soft roll, tape  -SD    Row Name 03/11/18  1305          Coping    Observed Emotional State accepting;cooperative  -FERNANDO     Row Name 03/11/18 1305          Physical Therapy Clinical Impression    Date of Referral to PT 03/11/18  -FERNANDO     PT Diagnosis (PT Clinical Impression) impaired mobility, weakness  -FERNANDO     Functional Level at Time of Evaluation (PT Clinical Impression) max x 2 bed mobility  -FERNANDO     Patient/Family Goals Statement (PT Clinical Impression) Go to rehab  -FERNANDO     Criteria for Skilled Interventions Met (PT Clinical Impression) yes;treatment indicated  -FERNANDO     Impairments Found (describe specific impairments) arousal, attention, and cognition;gait, locomotion, and balance  -FERNANDO     Functional Limitations in Following Categories (Describe Specific Limitations) self-care;home management  -FERNANDO     Disability: Inability to Perform Actions/Activities of Required Roles (describe specific disability) community/leisure  -FERNANDO     Rehab Potential (PT Clinical Summary) good, to achieve stated therapy goals  -FERNANDO     Predicted Duration of Therapy (PT) until d/c  -FERNANDO     Care Plan Review (PT) evaluation/treatment results reviewed;risks/benefits reviewed;current/potential barriers reviewed;patient/other agree to care plan  -FERNANDO     Care Plan Review, Other Participant (PT Clinical Impression) son  -FERNANDO     Row Name 03/11/18 1305          Physical Therapy Goals    Bed Mobility Goal Selection (PT) bed mobility, PT goal 1  -FERNANDO     Transfer Goal Selection (PT) transfer, PT goal 1  -FERNANDO     Gait Training Goal Selection (PT) --  -FERNANDO     Row Name 03/11/18 1305          Bed Mobility Goal 1 (PT)    Activity/Assistive Device (Bed Mobility Goal 1, PT) sit to supine/supine to sit  -FERNANDO     [place] Level/Cues Needed (Bed Mobility Goal 1, PT) minimum assist (75% or more patient effort)  -FERNANDO     Time Frame (Bed Mobility Goal 1, PT) long term goal (LTG);10 days  -FERNANDO     Barriers (Bed Mobility Goal 1, PT) Weakness, pain  -FERNANDO     Progress/Outcomes (Bed Mobility Goal 1, PT) goal  ongoing  -FERNANDO     Row Name 03/11/18 1305          Transfer Goal 1 (PT)    Activity/Assistive Device (Transfer Goal 1, PT) sit-to-stand/stand-to-sit;bed-to-chair/chair-to-bed;walker, rolling  -FERNANDO     [place] Level/Cues Needed (Transfer Goal 1, PT) minimum assist (75% or more patient effort)  -FERNANDO     Time Frame (Transfer Goal 1, PT) long term goal (LTG);10 days  -FERNANDO     Barriers (Transfers Goal 1, PT) weakness, pain  -FERNANDO     Progress/Outcome (Transfer Goal 1, PT) goal ongoing  -FERNANDO     Row Name 03/11/18 1305          Gait Training Goal 1 (PT)    Activity/Assistive Device (Gait Training Goal 1, PT) --  -FERNANDO     [place] Level (Gait Training Goal 1, PT) --  -FERNANDO     Distance (Gait Goal 1, PT) --  -FERNANDO     Time Frame (Gait Training Goal 1, PT) --  -FERNANDO     Barriers (Gait Training Goal 1, PT) --  -FERNANDO     Progress/Outcome (Gait Training Goal 1, PT) --  -FERNANDO     Row Name 03/11/18 1305          Positioning and Restraints    Pre-Treatment Position in bed  -FERNANDO     Post Treatment Position bed  -FERNANDO     In Bed fowlers;call light within reach;encouraged to call for assist;with family/caregiver;RUE elevated;RLE elevated  -FERNANDO     Row Name 03/11/18 1305          Living Environment    Home Accessibility tub/shower is not walk in  -FERNANDO       User Key  (r) = Recorded By, (t) = Taken By, (c) = Cosigned By    Initials Name Provider Type    FERNANDO Callaway, PT DPT Physical Therapist    SANDRA Joshi, CL[place] Registered Nurse          Physical Therapy Education     Title: PT OT SLP Therapies (Active)     Topic: Physical Therapy (Active)     Point: Mobility training (Done)    Learning Progress Summary     Learner Status Readiness Method Response Comment Documented by    Patient Done Acceptance E VU,NR Educated pt/sons on progression of PT POC, benefits of activity, NWB R EUGENIO FERNANDO 03/11/18 1305    Family Done Acceptance E VU,NR Educated pt/sons on progression of PT POC, benefits of activity, NWB R LE FERNANDO 03/11/18 1305          Point:  Precautions (Done)    Learning Progress Summary     Learner Status Readiness Method Response Comment Documented by    Patient Done Acceptance E ROBBY,NR Educated pt/sons on progression of PT POC, benefits of activity, NWB R LE FERNANDO 03/11/18 1305    Family Done Acceptance E ROBBY,NR Educated pt/sons on progression of PT POC, benefits of activity, NWB R LE FERNANDO 03/11/18 1305                      User Key     Initials Effective Dates Name Provider Type Discipline     08/02/16 -  Kd Callaway, PT DPT Physical Therapist PT                PT Recommendation and Plan  Planned Therapy Interventions (PT Eval): bed mobility training, transfer training, gait training, balance training, home exercise program, patient/family education, postural re-education, strengthening  Therapy Frequency (PT Clinical Impression): 2 times/day  Plan of Care Reviewed With: son, patient  Outcome Summary: PT eval completed. She required max assist x 2 to get to EOB. Once sitting EOB her assist level ranged from Max-min assist. She demos pain in R LE surgical site and weakness with mobility. PT will work to progress her functional mobiilty, balance, and strength while maintaining NWB R LE with t/f's. Anticiapte d/c to SNF/rehab,  Plan of Care Reviewed With: son, patient          Outcome Measures     Row Name 03/11/18 1320 03/11/18 1305          How much help from another person do you currently need...    Turning from your back to your side while in flat bed without using bedrails?  -- 2  -FERNANDO     Moving from lying on back to sitting on the side of a flat bed without bedrails?  -- 2  -FERNANDO     Moving to and from a bed to a chair (including a wheelchair)?  -- 1  -FERNANDO     Standing up from a chair using your arms (e.g., wheelchair, bedside chair)?  -- 1  -FERNANDO     Climbing 3-5 steps with a railing?  -- 1  -FERNANDO     To walk in hospital room?  -- 1  -FERNANDO     AM-PAC 6 Clicks Score  -- 8  -FERNANDO        How much help from another is currently needed...    Putting on and  taking off regular lower body clothing? 1  -CH  --     Bathing (including washing, rinsing, and drying) 1  -CH  --     Toileting (which includes using toilet bed pan or urinal) 1  -CH  --     Putting on and taking off regular upper body clothing 3  -CH  --     Taking care of personal grooming (such as brushing teeth) 4  -CH  --     Eating meals 4  -CH  --     Score 14  -CH  --        Functional Assessment    Outcome Measure Options AM-PAC 6 Clicks Daily Activity (OT)  -CH AM-PAC 6 Clicks Basic Mobility (PT)  -FERNANDO       User Key  (r) = Recorded By, (t) = Taken By, (c) = Cosigned By    Initials Name Provider Type     Lilian Park, OTR/L Occupational Therapist    FERNANDO Callaway, PT DPT Physical Therapist           Time Calculation:         PT Charges     Row Name 03/11/18 1428             Time Calculation    Start Time 1305  -FERNANDO      Stop Time 1405  -FERNANDO      Time Calculation (min) 60 min  -FERNANDO      PT Received On 03/11/18  -FERNANDO      PT Goal Re-Cert Due Date 03/21/18  -FERNANDO        User Key  (r) = Recorded By, (t) = Taken By, (c) = Cosigned By    Initials Name Provider Type    FERNANDO Callaway, PT DPT Physical Therapist          Therapy Charges for Today     Code Description Service Date Service Provider Modifiers Qty    [id number]  PT NG MAIN POS CURRENT 3/11/2018 Kd Callaway, PT DPT GP, CM 1    [id number]  PT CHNG MAIN POS PROJECTED 3/11/2018 Kd Callaway, PT DPT GP, CK 1    [id number]  PT EVAL MOD COMPLEXITY 4 3/11/2018 Kd Callaway, PT DPT GP, KX 1          PT G-Codes  Outcome Measure Options: AM-PAC 6 Clicks Daily Activity (OT)  Score: 8  Functional Limitation: Changing and maintaining body position  Changing and Maintaining Body Position Current Status (): At least 80 percent but less than 100 percent impaired, limited or restricted  Changing and Maintaining Body Position Goal Status (): At least 40 percent but less than 60 percent impaired, limited or restricted      Kd  ISHAN Callaway, PT DPT  3/11/2018

## 2018-03-11 NOTE — PLAN OF CARE
Problem: Patient Care Overview (Adult)  Goal: Plan of Care Review  Outcome: Ongoing (interventions implemented as appropriate)   03/10/18 1923   Coping/Psychosocial Response Interventions   Plan Of Care Reviewed With patient   Patient Care Overview   Progress no change   Outcome Evaluation   Outcome Summary/Follow up Plan R femur fx nailing today, morphine PCA in place controlling pain, pulses palpable, nephrology consult, BUN and creatinine elevated, F/C in place. Renal US ordered. IVF continues.      Goal: Adult Individualization and Mutuality  Outcome: Ongoing (interventions implemented as appropriate)    Goal: Discharge Needs Assessment  Outcome: Ongoing (interventions implemented as appropriate)      Problem: Fall Risk (Adult)  Goal: Identify Related Risk Factors and Signs and Symptoms  Outcome: Ongoing (interventions implemented as appropriate)    Goal: Absence of Falls  Outcome: Ongoing (interventions implemented as appropriate)      Problem: Pressure Ulcer Risk (Jeremiah Scale) (Adult,Obstetrics,Pediatric)  Goal: Identify Related Risk Factors and Signs and Symptoms  Outcome: Ongoing (interventions implemented as appropriate)    Goal: Skin Integrity  Outcome: Ongoing (interventions implemented as appropriate)      Problem: Infection, Risk/Actual (Adult)  Goal: Identify Related Risk Factors and Signs and Symptoms  Outcome: Ongoing (interventions implemented as appropriate)    Goal: Infection Prevention/Resolution  Outcome: Ongoing (interventions implemented as appropriate)

## 2018-03-11 NOTE — PLAN OF CARE
Problem: Patient Care Overview  Goal: Plan of Care Review  Outcome: Ongoing (interventions implemented as appropriate)   03/11/18 9446   Coping/Psychosocial   Plan of Care Reviewed With son;patient   OTHER   Outcome Summary PT eval completed. She required max assist x 2 to get to EOB. Once sitting EOB her assist level ranged from Max-min assist. She demos pain in R LE surgical site and weakness with mobility. PT will work to progress her functional mobiilty, balance, and strength while maintaining NWB R LE with t/f's. Anticiapte d/c to SNF/rehab,

## 2018-03-11 NOTE — PROGRESS NOTES
[facility] Medicine Services  INPATIENT PROGRESS NOTE    Length of Stay: 3  Date of Admission: 3/8/2018  Primary Care Physician: Barry Foley MD    Subjective   Chief Complaint: follow up  HPI   Relative perceives that she has episode of confusion  Used NIPPV last night during sleep  Afebrile  Diminished urine output  Alves cath somehow removed earlier.  Patient able to determined when she is to urinate      Review of Systems     All pertinent negatives and positives are as above. All other systems have been reviewed and are negative unless otherwise stated.     Objective    Temp:  [97.4 °F (36.3 °C)-98.7 °F (37.1 °C)] 98.6 °F (37 °C)  Heart Rate:  [] 108  Resp:  [11-18] 18  BP: ()/(64-87) 137/65  FiO2 (%):  [40 %] 40 %  Physical Exam  Constitutional: She is oriented to person, place, and time. She appears well-developed and well-nourished.   HENT:   Head: Normocephalic and atraumatic.    Nose: Nose normal.    Eyes: Conjunctivae and EOM are normal. Pupils are equal, round  Right eye exhibits no discharge. Left eye exhibits no discharge. No scleral icterus.   Neck: Normal range of motion. Neck supple. No JVD present. No tracheal deviation present. No thyromegaly present.   Cardiovascular: Normal rate, regular rhythm, normal heart sounds and intact distal pulses.    Pulmonary/Chest: No stridor. No respiratory distress. She has no wheezes. She has no rales. She exhibits no tenderness.   Diminished inspiratory effort   Abdominal: Soft. Bowel sounds are normal. Abdominal distention is not as pronounced. She exhibits no mass. There is no tenderness. There is no rebound and no guarding. No hernia.   Musculoskeletal: She exhibits edema.   Swollen right thigh, + tenderness and lots of pain with little movement; + dressing Lymphadenopathy:     She has no cervical adenopathy.   Neurological: She is alert and oriented to person, place, and time. No cranial nerve deficit.    Skin: Skin is warm and dry. No erythema. No pallor.     Psychiatric: She has a normal mood and affect. Her behavior is normal. Judgment and thought content normal.   Vitals reviewed.      Results Review:  I have reviewed the labs, radiology results, and diagnostic studies.    Laboratory Data:     Results from last 7 days  Lab Units 03/11/18  0604 03/10/18  0618 03/09/18  1927 03/09/18  0644   WBC 10*3/mm3 14.50* 12.54*  --  9.16   HEMOGLOBIN g/dL 11.1* 9.6* 9.3* 7.6*   HEMATOCRIT % 34.5* 30.2* 29.0* 24.7*   PLATELETS 10*3/mm3 337 344  --  415*          Results from last 7 days  Lab Units 03/11/18  0604 03/10/18  0618 03/09/18  2035 03/09/18  0644 03/08/18  1558   SODIUM mmol/L 137 136  --  136 138   SODIUM, ARTERIAL mmol/L  --   --  133*  --   --    POTASSIUM mmol/L 5.6* 5.2  --  4.9 4.8   CHLORIDE mmol/L 102 98  --  99 97*   CO2 mmol/L 24.0 29.0  --  30.0 30.0   BUN mg/dL 62* 60*  --  46* 45*   CREATININE mg/dL 2.33* 2.49*  --  1.52* 1.13   CALCIUM mg/dL 8.1* 7.8*  --  8.1* 9.0   BILIRUBIN mg/dL 0.2  --   --  0.2 0.3   ALK PHOS U/L 74  --   --  64 83   ALT (SGPT) U/L 27  --   --  29 37   AST (SGOT) U/L 23  --   --  32 24   GLUCOSE mg/dL 145* 95  --  134* 145*       Culture Data:        Radiology Data:   Imaging Results (last 24 hours)     Procedure Component Value Units Date/Time    XR Femur 2 View Right [[id number]] Collected:  03/10/18 1113     Updated:  03/10/18 1116    Narrative:       EXAMINATION:   XR FEMUR 2 VW RIGHT-  3/10/2018 11:13 AM CST     HISTORY: 7 images are obtained operating room under the direction of Dr. Mohamud Bundy. Femoral nail is present. Fixation screws are present.  Right knee arthroplasty is present.     Skeletal structures appear relatively aligned.     This procedure utilized 1 minute 40 seconds of fluoroscopic time  This report was finalized on 03/10/2018 11:13 by Dr. Klever Hooker MD.          I have reviewed the patient current medications.     Assessment/Plan     Hospital  Problem List     * (Principal)Closed displaced comminuted fracture of shaft of right femur POD 1    Pain of right thigh    Fall    Class 2 obesity with serious comorbidity in adult    Type 2 diabetes mellitus with neurologic complication, with long-term current use of insulin    STUART (acute kidney injury)    Chronic diastolic heart failure    Pleural effusion, left    Hypoxia   Acute blood loss anemia  Hyperkalemia  Mild leukocytosis          Kayexalate x 1  Cont ivf. Monitor electroyte and renal function recovery; other paln per Renal service  Begin physical  rehabilitation per ortho  Prn bipap; supplemental oxygen   Defer to Dr. Bundy anticoagulation for dvt prophylaxis  sw assist in disposition       aspirin 325 mg Oral Nightly   atorvastatin 10 mg Oral Nightly   budesonide-formoterol 2 puff Inhalation BID - RT   folic acid 1 mg Oral Daily   gabapentin 300 mg Oral Daily With Breakfast   gabapentin 600 mg Oral Q PM   hydroxyurea 500 mg Oral Daily   insulin detemir 30 Units Subcutaneous Nightly   insulin lispro 0-9 Units Subcutaneous 4x Daily With Meals & Nightly   ipratropium-albuterol 3 mL Nebulization 4x Daily - RT   levothyroxine 50 mcg Oral Q AM   metoprolol succinate XL 50 mg Oral Daily   pantoprazole 40 mg Oral Q AM             Discharge Planning: depending on renal function.  Anticipate may need placement upon discharge    Fidencio Rodriguez MD   03/11/18   12:13 PM

## 2018-03-11 NOTE — PLAN OF CARE
Problem: Patient Care Overview (Adult)  Goal: Plan of Care Review  Outcome: Ongoing (interventions implemented as appropriate)   03/11/18 0404   Coping/Psychosocial Response Interventions   Plan Of Care Reviewed With patient   Patient Care Overview   Progress no change   Outcome Evaluation   Outcome Summary/Follow up Plan Patient rested well through the night. PCA in use for pain control. dressing to right knee CDI , Dressing to right hip stained. PPP, VSS, Safety maintained.       Problem: Fall Risk (Adult)  Goal: Identify Related Risk Factors and Signs and Symptoms  Outcome: Outcome(s) achieved Date Met: 03/11/18    Goal: Absence of Falls  Outcome: Ongoing (interventions implemented as appropriate)      Problem: Pressure Ulcer Risk (Jeremiah Scale) (Adult,Obstetrics,Pediatric)  Goal: Identify Related Risk Factors and Signs and Symptoms  Outcome: Outcome(s) achieved Date Met: 03/11/18    Goal: Skin Integrity  Outcome: Ongoing (interventions implemented as appropriate)      Problem: Skin Injury Risk (Adult)  Goal: Identify Related Risk Factors and Signs and Symptoms  Outcome: Outcome(s) achieved Date Met: 03/11/18    Goal: Skin Health and Integrity  Outcome: Ongoing (interventions implemented as appropriate)    Goal: Identify Related Risk Factors and Signs and Symptoms  Outcome: Ongoing (interventions implemented as appropriate)

## 2018-03-11 NOTE — PROGRESS NOTES
Nephrology ([facility]) Progress Note      Patient:  Tamy Sher  YOB: 1930  Date of Service: 3/11/2018  MRN: [id number]   Acct: [id number]   Primary Care Physician: Barry Foley MD  Advance Directive: Full Code  Admit Date: 3/8/2018       Hospital Day: 3  Referring Provider: Fidencio Rodriguez*      Patient Seen, Chart, Consults, Notes, Labs, Radiology studies reviewed.        Subjective:  Tamy Sher is a 87 y.o. female  whom we were consulted for acute kidney injury/hyperkalemia.  Patient does not recall any history of chronic kidney disease.  She was admitted after a fall and fracture of right femur.  Patient underwent internal fixation.  Hospital course remarkable for worsening of renal function/hyperkalemia.  This morning she is still very weak and complaining of pain at the surgical wound.    Allergies:  Review of patient's allergies indicates no known allergies.    Home Meds:  Prescriptions Prior to Admission   Medication Sig Dispense Refill Last Dose   • levothyroxine (SYNTHROID, LEVOTHROID) 50 MCG tablet Take 50 mcg by mouth Daily.      • amLODIPine (NORVASC) 5 MG tablet Take 5 mg by mouth Daily.   Unknown at Unknown time   • aspirin 325 MG tablet Take 325 mg by mouth Every Night.   Unknown at Unknown time   • budesonide-formoterol (SYMBICORT) 160-4.5 MCG/ACT inhaler Inhale 2 puffs 2 (Two) Times a Day.  12 Unknown at Unknown time   • folic acid (FOLVITE) 1 MG tablet Take 1 mg by mouth Daily.   Unknown at Unknown time   • furosemide (LASIX) 40 MG tablet Take 40 mg by mouth Daily.   Unknown at Unknown time   • gabapentin (NEURONTIN) 300 MG capsule Take 1 capsule by mouth Daily With Breakfast. 3 capsule 0 Unknown at Unknown time   • gabapentin (NEURONTIN) 300 MG capsule Take 2 capsules by mouth Every Evening. 6 capsule 0 Unknown at Unknown time   • hydroxyurea (HYDREA) 500 MG capsule Take 500 mg by mouth Take As Directed. Daily except Fridays.   Unknown  at Unknown time   • hydroxyurea (HYDREA) 500 MG capsule Take 1,000 mg by mouth 1 (One) Time Per Week. Friday dose   Unknown at Unknown time   • insulin glargine (LANTUS) 100 UNIT/ML injection Inject 26 Units under the skin Every Night.   Unknown at Unknown time   • insulin glargine (LANTUS) 100 UNIT/ML injection Inject 10 Units under the skin Daily. AM dose  0 Unknown at Unknown time   • insulin lispro (humaLOG) 100 UNIT/ML injection Inject 0-9 Units under the skin 4 (Four) Times a Day With Meals & at Bedtime.  12 Unknown at Unknown time   • ipratropium-albuterol (DUO-NEB) 0.5-2.5 mg/mL nebulizer Take 3 mL by nebulization 4 (Four) Times a Day. 360 mL  Unknown at Unknown time   • lisinopril (PRINIVIL,ZESTRIL) 40 MG tablet Take 40 mg by mouth Daily.   Unknown at Unknown time   • metFORMIN (GLUCOPHAGE) 1000 MG tablet Take 500 mg by mouth Every Night.   Unknown at Unknown time   • metoprolol succinate XL (TOPROL-XL) 25 MG 24 hr tablet Take 2 tablets by mouth Daily. 30 tablet 0 Unknown at Unknown time   • pantoprazole (PROTONIX) 40 MG EC tablet Take 40 mg by mouth Daily.   Unknown at Unknown time   • simvastatin (ZOCOR) 40 MG tablet Take 0.5 tablets by mouth Every Night. 30 tablet 0 Unknown at Unknown time       Medicines:  Current Facility-Administered Medications   Medication Dose Route Frequency Provider Last Rate Last Dose   • aspirin tablet 325 mg  325 mg Oral Nightly Fidencio Rodriguez MD   325 mg at 03/10/18 2019   • atorvastatin (LIPITOR) tablet 10 mg  10 mg Oral Nightly Fidencio Rodriguez MD   10 mg at 03/10/18 2019   • budesonide-formoterol (SYMBICORT) 160-4.5 MCG/ACT inhaler 2 puff  2 puff Inhalation BID - RT Fidencio Rodriguez MD   2 puff at 03/11/18 0650   • dextrose (D50W) solution 25 g  25 g Intravenous Q15 Min PRN Fidencio Rodriguez MD       • [MAR Hold] dextrose (GLUTOSE) oral gel 15 g  15 g Oral Q15 Min PRN Fidencio Rodriguez MD       • folic acid (FOLVITE) tablet 1 mg  1  mg Oral Daily Fidencio Rodriguez MD   1 mg at 03/11/18 0856   • gabapentin (NEURONTIN) capsule 300 mg  300 mg Oral Daily With Breakfast Fidencio Rodriguez MD   300 mg at 03/11/18 0856   • gabapentin (NEURONTIN) capsule 600 mg  600 mg Oral Q PM Fidencio Rodriguez MD   600 mg at 03/09/18 1804   • glucagon (human recombinant) (GLUCAGEN DIAGNOSTIC) injection 1 mg  1 mg Subcutaneous PRN Fidencio Rodriguez MD       • HYDROcodone-acetaminophen (NORCO) 7.5-325 MG per tablet 1 tablet  1 tablet Oral Q6H PRN Rufino Herndon MD   1 tablet at 03/09/18 1814   • hydroxyurea (HYDREA) capsule 500 mg  500 mg Oral Daily Fidencio Rodriguez MD   500 mg at 03/11/18 0856   • hydrOXYzine (VISTARIL) capsule 50 mg  50 mg Oral TID PRN Fidencio Rodriguez MD       • insulin detemir (LEVEMIR) injection 30 Units  30 Units Subcutaneous Nightly Fidencio Rodriguez MD   30 Units at 03/10/18 2033   • insulin lispro (humaLOG) injection 0-9 Units  0-9 Units Subcutaneous 4x Daily With Meals & Nightly Fidencio Rodriguez MD   2 Units at 03/11/18 1207   • ipratropium-albuterol (DUO-NEB) nebulizer solution 3 mL  3 mL Nebulization 4x Daily - RT Fidencio Rodriguez MD   3 mL at 03/11/18 1035   • levothyroxine (SYNTHROID, LEVOTHROID) tablet 50 mcg  50 mcg Oral Q AM Nima Bundy MD   50 mcg at 03/11/18 0623   • magnesium hydroxide (MILK OF MAGNESIA) suspension 2400 mg/10mL 10 mL  10 mL Oral Daily PRN Fidencio Rodriguez MD       • metoprolol succinate XL (TOPROL-XL) 24 hr tablet 50 mg  50 mg Oral Daily Fidencio Rodriguez MD   50 mg at 03/11/18 0903   • naloxone (NARCAN) injection 0.1 mg  0.1 mg Intravenous Q5 Min PRN Fidencio Rodriguez MD       • naloxone (NARCAN) injection 0.4 mg  0.4 mg Intravenous Q5 Min PRN Fidencio Rodriguez MD       • naloxone (NARCAN) injection 0.4 mg  0.4 mg Intravenous Q5 Min PRN Fidencio Rodriguez MD       • ondansetron (ZOFRAN) injection 4 mg  4  mg Intravenous Q6H PRN Fidencio Rodriguez MD   4 mg at 03/09/18 1814   • ondansetron (ZOFRAN) injection 4 mg  4 mg Intravenous Q6H PRN Nima Bundy MD       • pantoprazole (PROTONIX) EC tablet 40 mg  40 mg Oral Q AM Fidencio Rodriguez MD   40 mg at 03/11/18 0623   • sodium chloride 0.9 % flush 1-10 mL  1-10 mL Intravenous PRN Fidencio Rodriguez MD       • sodium chloride 0.9 % flush 10 mL  10 mL Intravenous PRN Rylee Gustafson MD       • sodium chloride 0.9 % infusion  150 mL/hr Intravenous Continuous Jeffery Hong  mL/hr at 03/11/18 0355 150 mL/hr at 03/11/18 0355       Past Medical History:  Past Medical History:   Diagnosis Date   • Constipation    • Diabetes mellitus    • Diarrhea    • Diastolic dysfunction, left ventricle    • Esophagitis    • Exertional shortness of breath    • Hyperlipidemia    • Hypertension    • Lumbar spondylitis    • Osteoarthritis    • Peripheral vascular disease    • Sinusitis    • Stroke        Past Surgical History:  Past Surgical History:   Procedure Laterality Date   • GALLBLADDER SURGERY     • HYSTERECTOMY     • JOINT REPLACEMENT     • REPLACEMENT TOTAL KNEE Left        Family History  Family History   Problem Relation Age of Onset   • No Known Problems Mother    • No Known Problems Father        Social History  Social History     Social History   • Marital status:      Spouse name: N/A   • Number of children: N/A   • Years of education: N/A     Occupational History   • Not on file.     Social History Main Topics   • Smoking status: Never Smoker   • Smokeless tobacco: Never Used   • Alcohol use No   • Drug use: No   • Sexual activity: No     Other Topics Concern   • Not on file     Social History Narrative   • No narrative on file         Review of Systems:  History obtained from chart review and the patient  General ROS: No fever or chills  Respiratory ROS: positive for - shortness of breath  Cardiovascular ROS: no chest pain or dyspnea on  "exertion  Gastrointestinal ROS: No abdominal pain or melena  Genito-Urinary ROS: No dysuria or hematuria  14 point ROS reviewed with the patient and negative except as noted above and in the HPI unless unable to obtain.    Objective:  /65 (BP Location: Left arm, Patient Position: Lying)   Pulse 108   Temp 98.6 °F (37 °C) (Axillary)   Resp 18   Ht 152.4 cm (60\")   Wt 90.3 kg (199 lb)   SpO2 95%   BMI 38.86 kg/m²     Intake/Output Summary (Last 24 hours) at 03/11/18 1319  Last data filed at 03/11/18 0835   Gross per 24 hour   Intake          2741.64 ml   Output              400 ml   Net          2341.64 ml     General: awake/alert   HEENT: Normocephalic atraumatic head  Neck: Supple with mild JVD  Chest:  clear to auscultation bilaterally without respiratory distress  CVS: regular rate and rhythm  Abdominal: soft, nontender, normal bowel sounds  Extremities: no cyanosis or edema  Skin: Bilateral trace pedal edema      Labs:      Results from last 7 days  Lab Units 03/11/18  0604 03/10/18  0618 03/09/18 1927 03/09/18  0644   WBC 10*3/mm3 14.50* 12.54*  --  9.16   HEMOGLOBIN g/dL 11.1* 9.6* 9.3* 7.6*   HEMATOCRIT % 34.5* 30.2* 29.0* 24.7*   PLATELETS 10*3/mm3 337 344  --  415*         Results from last 7 days  Lab Units 03/11/18  0604 03/10/18  0618 03/09/18 2035 03/09/18  0644 03/08/18  1558   SODIUM mmol/L 137 136  --  136 138   SODIUM, ARTERIAL mmol/L  --   --  133*  --   --    POTASSIUM mmol/L 5.6* 5.2  --  4.9 4.8   CHLORIDE mmol/L 102 98  --  99 97*   CO2 mmol/L 24.0 29.0  --  30.0 30.0   BUN mg/dL 62* 60*  --  46* 45*   CREATININE mg/dL 2.33* 2.49*  --  1.52* 1.13   CALCIUM mg/dL 8.1* 7.8*  --  8.1* 9.0   BILIRUBIN mg/dL 0.2  --   --  0.2 0.3   ALK PHOS U/L 74  --   --  64 83   ALT (SGPT) U/L 27  --   --  29 37   AST (SGOT) U/L 23  --   --  32 24   GLUCOSE mg/dL 145* 95  --  134* 145*         Radiology:   Imaging Results (last 24 hours)     ** No results found for the last 24 hours. **    "       Culture:  No components found for: WOUNDCUL, 3  No components found for: CSFCUL, 3  No components found for: BC, 3  No components found for: URINECUL, 3      Assessment   1.  Acute kidney injury secondary to acute tubular necrosis  2.  Mild rhabdomyolysis.  3.  Hyperkalemia.  4.  Status post fall and fracture of right femur.  5.  Status post internal fixation.  7.  Type II insulin-dependent diabetes.  8.  Morbid abdominal obesity    Plan:  1.  Continue IV fluid  2.  Kayexalate 15 g by mouth  3.  Continue to monitor renal recovery.        Jeffery Hong MD  3/11/2018  1:19 PM

## 2018-03-11 NOTE — PLAN OF CARE
Problem: Patient Care Overview  Goal: Plan of Care Review  Outcome: Ongoing (interventions implemented as appropriate)   03/11/18 1320   Coping/Psychosocial   Plan of Care Reviewed With patient;son   OTHER   Outcome Summary OT sheri completed. Pt. required Max A x 2 - Dep x2 for bed mobility. Pt. required varying levels of assist while seated EOB. As she increased her time in EOB the pt. improved her indep likely to a decline in fear and her ability to follow cues. Ms. Sher has a siginificant decline in ADLs and I anticipate she will require Max A x 2 for all higher levels of ADLs (toileting, dressing, bathing). I rec she continue OT tx & be placed at a [place]F [place].   Plan of Care Review   Progress no change

## 2018-03-11 NOTE — THERAPY EVALUATION
Acute Care - Occupational Therapy Initial Evaluation  [facility]     Patient Name: Tamy Sher  : 1930  MRN: [id number]  Today's Date: 3/11/2018  Onset of Illness/Injury or Date of Surgery: 18  Date of Referral to OT: 18  Referring Physician: Dr. Bundy    Admit Date: 3/8/2018       ICD-10-CM ICD-9-CM   1. Fall, initial encounter W19.XXXA E888.9   2. Other closed fracture of distal end of right femur, initial encounter S72.491A 821.29   3. Fall W19.XXXA E888.9   4. Impaired mobility Z74.09 799.89   5. Decreased activities of daily living (ADL) Z78.9 V49.89     Patient Active Problem List   Diagnosis   • Ischemic chest pain   • Pneumonia of both lungs due to infectious organism   • Fall   • Closed displaced comminuted fracture of shaft of right femur   • Pain of right thigh   • Class 2 obesity with serious comorbidity in adult   • Type 2 diabetes mellitus with neurologic complication, with long-term current use of insulin   • STUART (acute kidney injury)   • Chronic diastolic heart failure   • Pleural effusion, left   • Hypoxia     Past Medical History:   Diagnosis Date   • Constipation    • Diabetes mellitus    • Diarrhea    • Diastolic dysfunction, left ventricle    • Esophagitis    • Exertional shortness of breath    • Hyperlipidemia    • Hypertension    • Lumbar spondylitis    • Osteoarthritis    • Peripheral vascular disease    • Sinusitis    • Stroke      Past Surgical History:   Procedure Laterality Date   • GALLBLADDER SURGERY     • HYSTERECTOMY     • JOINT REPLACEMENT     • REPLACEMENT TOTAL KNEE Left           OT ASSESSMENT FLOWSHEET (last 72 hours)      Occupational Therapy Evaluation     Row Name 18 1320                   OT Evaluation Time/Intention    Subjective Information complains of;weakness;fatigue;nausea/vomiting;dyspnea  -CH        Document Type evaluation  -CH        Mode of Treatment occupational therapy  -           General Information    Patient Profile Reviewed?  yes  -        Onset of Illness/Injury or Date of Surgery 03/08/18  -        Referring Physician Dr. Bundy  -        Patient Observations cooperative;agree to therapy;lethargic  -        Patient/Family Observations 3/4 sons present, all seem to be very involved in patient's life  -        General Observations of Patient fowlers, O2 per NC, dressings at RLE  -        Prior Level of Function independent:;all household mobility;community mobility;ADL's  -        Equipment Currently Used at Home walker, rolling;commode;bath bench  -        Pertinent History of Current Functional Problem CC: Fall as she was picking up a piece of paper. Dx: R femur fx. s/p 3/10 internal fixation of R periprosthetic femur fx. 3/8 R femur xray: comminuted spiral fx distal third of the R femur. Comminuted fragments extend to the femoral component of R knee arthroplasty.  -        Existing Precautions/Restrictions fall;right;non-weight bearing  -        Equipment Issued to Patient walker, front wheeled  -        Risks Reviewed patient and family:;LOB;increased discomfort  -        Benefits Reviewed patient and family:;improve function;increase independence;increase strength;increase balance  -        Barriers to Rehab medically complex;physical barrier  -           Relationship/Environment    Lives With alone  -           Resource/Environmental Concerns    Current Living Arrangements home/apartment/condo  -           Cognitive Assessment/Interventions    Additional Documentation Cognitive Assessment/Intervention (Group)  -           Cognitive Assessment/Intervention- PT/OT    Orientation Status (Cognition) oriented x 3  -        Follows Commands (Cognition) follows one step commands;75-90% accuracy;delayed response/completion;verbal cues/prompting required  -        Safety Deficit (Cognitive) at risk behavior observed  -           Safety Issues, Functional Mobility    Impairments Affecting Function  (Mobility) balance;endurance/activity tolerance;pain;strength  -           Mobility Assessment/Treatment    Extremity Weight-bearing Status right lower extremity  -        Right Lower Extremity (Weight-bearing Status) non weight-bearing (NWB)  -           Bed Mobility Assessment/Treatment    Bed Mobility Assessment/Treatment supine-sit;sit-supine;rolling left;rolling right;scooting/bridging  -        Rolling Left [place] (Bed Mobility) maximum assist (25% patient effort);verbal cues;nonverbal cues (demo/gesture)  -        Rolling Right [place] (Bed Mobility) maximum assist (25% patient effort);verbal cues;nonverbal cues (demo/gesture)  -        Scooting/Bridging [place] (Bed Mobility) dependent (less than 25% patient effort);2 person assist;verbal cues;nonverbal cues (demo/gesture)  -        Supine-Sit [place] (Bed Mobility) maximum assist (25% patient effort);2 person assist;verbal cues;nonverbal cues (demo/gesture)  -        Sit-Supine [place] (Bed Mobility) maximum assist (25% patient effort);2 person assist;verbal cues;nonverbal cues (demo/gesture)  -        Bed Mobility, Safety Issues decreased use of legs for bridging/pushing;decreased use of arms for pushing/pulling  -        Assistive Device (Bed Mobility) bed rails;draw sheet;head of bed elevated  -           ADL Assessment/Intervention    BADL Assessment/Intervention lower body dressing;bathing;toileting  -           Bathing Assessment/Intervention    Comment (Bathing) Anticipate pt. will require Max A x 2 while supine & if seated  -           Lower Body Dressing Assessment/Training    Comment (Lower Body Dressing) Anticipate pt. will require Max A x 2 while supine  -           Toileting Assessment/Training    Comment (Toileting) Anticipate pt. will require Max A x 2 while supine  -           BADL Safety/Performance    Impairments, BADL Safety/Performance balance;endurance/activity tolerance  -         Skilled BADL Treatment/Intervention adaptive equipment training;BADL process/adaptation training;compensatory training;energy conservation;environmental modifications  -           General ROM    GENERAL ROM COMMENTS BUE AROM WFL  -           General Assessment (Manual Muscle Testing)    Comment, General Manual Muscle Testing (MMT) Assessment BUE 4/5  -           Motor Assessment/Interventions    Additional Documentation Balance (Group)  -           Balance    Balance static sitting balance  -           Static Sitting Balance    Level of [place] (Unsupported Sitting, Static Balance) minimal assist, 75% patient effort;moderate assist, 50 to 74% patient effort;maximal assist, 25 to 49% patient effort   x2   -CH        Sitting Position (Unsupported Sitting, Static Balance) sitting on edge of bed  -        Time Able to Maintain Position (Unsupported Sitting, Static Balance) 4 to 5 minutes  -        Comment (Unsupported Sitting, Static Balance) Pt. required less assist as she increased her time at EOB.  INitiatlly she required Max A x 2 but decreased to Min A x 1  -           Sensory Assessment/Intervention    Sensory General Assessment no sensation deficits identified  -           Positioning and Restraints    Pre-Treatment Position sitting in chair/recliner  -        Post Treatment Position bed  -           Pain Assessment    Additional Documentation Pain Scale: FACES Pre/Post-Treatment (Group)  -           Pain Scale: FACES Pre/Post-Treatment    Pain: FACES Scale, Pretreatment 0-->no hurt  -        Pain: FACES Scale, Post-Treatment 6-->hurts even more  -           Edema Assessment & Management    Location (Edema) upper extremity, right;lower extremity, right  -        Additional Documentation Location (Edema) (Row);Edema Symptoms/Interventions (Group)  -           Edema Symptoms/Interventions    Treatment Interventions (Edema) elevation  -           Wound 03/10/18 0934 Right  medial knee incision    Wound - Properties Group Date first assessed: 03/10/18  -SD Time first assessed: 0934  -SD Present On Admission : no  -SD, surgical wound  Side: Right  -SD Orientation: medial  -SD Location: knee  -SD Type: incision  -SD Additional Comments: adaptic, 4x4, soft roll, ace  -SD       Wound 03/10/18 1025 Right upper thigh surgical    Wound - Properties Group Date first assessed: 03/10/18  -SD Time first assessed: 1025  -SD Present On Admission : no  -SD, surgical  Side: Right  -SD Orientation: upper  -SD Location: thigh  -SD Type: surgical  -SD Additional Comments: adaptic, 4x4, soft roll, tape  -SD       Coping    Observed Emotional State afraid/fearful  -           Plan of Care Review    Plan of Care Reviewed With patient;son  -           Clinical Impression (OT)    Date of Referral to OT 03/11/18  -        Patient/Family Goals Statement (OT Eval) improve mobility & indep  -        Criteria for Skilled Therapeutic Interventions Met (OT Eval) yes;treatment indicated  -        Rehab Potential (OT Eval) good, to achieve stated therapy goals  -        Therapy Frequency (OT Eval) 5 times/wk  -        Predicted Duration of Therapy Intervention (OT Eval) until DC from this facility  -        Care Plan Review (OT) evaluation/treatment results reviewed;care plan/treatment goals reviewed;risks/benefits reviewed;current/potential barriers reviewed;patient/other agree to care plan  -        Care Plan Review, Other Participant (OT Eval) family  -        Anticipated Equipment Needs at Discharge (OT) bedside commode;gait belt;hospital bed;reacher;tub bench;front wheeled walker;wheelchair  -        Anticipated Discharge Disposition (OT) nursing facility  -           Planned OT Interventions    Planned Therapy Interventions (OT Eval) activity tolerance training;adaptive equipment training;BADL retraining;edema control/reduction;functional balance retraining;occupation/activity based  interventions;patient/caregiver education/training;strengthening exercise;transfer/mobility retraining  -           OT Goals    Toileting Goal Selection (OT) toileting, OT goal 1  -CH        Safety Awareness Goal Selection (OT) safety awareness, OT goal 1  -CH        Additional Documentation Safety Awareness Goal Selection (OT) (Row);Balance Goal Selection (OT) (Row)  -           Toileting Goal 1 (OT)    Activity/Device (Toileting Goal 1, OT) commode, bedside with drop arms  -        [place] Level/Cues Needed (Toileting Goal 1, OT) maximum assist (25-49% patient effort)   x 2  -CH        Time Frame (Toileting Goal 1, OT) by discharge  -        Barriers (Toileting Goal 1, OT) NWB status, pain  -CH        Progress/Outcome (Toileting Goal 1, OT) goal ongoing  -           Safety Awareness Goal 1 (OT)    Activity (Safety Awareness Goal 1, OT) follow through of safety precautions;safe use of assistive device/equipment  -        [place]/Cues/Accuracy (Safety Awareness Goal 1, OT) verbal cues/redirection;with minimum;with 75% accuracy  -        Time Frame (Safety Awareness Goal 1, OT) by discharge;long term goal (LTG)  -        Barriers (Safety Awareness Goal 1, OT) pain, demo'd fearfulness  -CH        Progress/Outcome (Safety Awareness Goal 1, OT) goal ongoing  -           Patient Education Goal (OT)    Activity (Patient Education Goal, OT) Pt. will adhere to NWB status at RLE to improve safety during ADLs  -        [place]/Cues/Accuracy (Memory Goal 2, OT) demonstrates adequately  -CH        Time Frame (Patient Education Goal, OT) long term goal (LTG);by discharge  -        Barriers (Patient Education Goal, OT) pain, demo'd fearfulness  -CH        Progress/Outcome (Patient Education Goal, OT) goal ongoing  -           Living Environment    Home Accessibility tub/shower is not walk in  -          User Key  (r) = Recorded By, (t) = Taken By, (c) = Cosigned By    Initials Name  Effective Dates     Lilian Park OTR/L 08/02/16 -     SANDRA Joshi RN 08/02/16 -            Occupational Therapy Education     Title: PT OT SLP Therapies (Active)     Topic: Occupational Therapy (Active)     Point: ADL training (Done)     Description: Instruct learner(s) on proper safety adaptation and remediation techniques during self care or transfers.   Instruct in proper use of assistive devices.   Learning Progress Summary     Learner Status Readiness Method Response Comment Documented by    Patient Done Acceptance E,D VU,NR   03/11/18 1426    Family Done Acceptance E,D VU,NR   03/11/18 1426          Point: Precautions (Done)     Description: Instruct learner(s) on prescribed precautions during self-care and functional transfers.   Learning Progress Summary     Learner Status Readiness Method Response Comment Documented by    Patient Done Acceptance E,D VU,NR   03/11/18 1426    Family Done Acceptance E,D VU,NR   03/11/18 1426          Point: Body mechanics (Done)     Description: Instruct learner(s) on proper positioning and spine alignment during self-care, functional mobility activities and/or exercises.   Learning Progress Summary     Learner Status Readiness Method Response Comment Documented by    Patient Done Acceptance E,D VU,NR   03/11/18 1426    Family Done Acceptance E,D VU,NR   03/11/18 1426                      User Key     Initials Effective Dates Name Provider Type Discipline     08/02/16 -  Lilian Park OTR/L Occupational Therapist OT                  OT Recommendation and Plan  Anticipated Equipment Needs at Discharge (OT): bedside commode, gait belt, hospital bed, reacher, tub bench, front wheeled walker, wheelchair  Anticipated Discharge Disposition (OT): nursing facility  Planned Therapy Interventions (OT Eval): activity tolerance training, adaptive equipment training, BADL retraining, edema control/reduction, functional balance retraining, occupation/activity  based interventions, patient/caregiver education/training, strengthening exercise, transfer/mobility retraining  Therapy Frequency (OT Eval): 5 times/wk  Plan of Care Review  Plan of Care Reviewed With: patient, son  Plan of Care Reviewed With: patient, son  Outcome Summary: OT eval completed.  Pt. required Max A x 2 - Dep x2 for bed mobility.  Pt. required varying levels of assist while seated EOB.  As she increased her time in EOB the pt. improved her indep likely to a decline in fear and her ability to follow cues.  Ms. Sher has a siginificant decline in ADLs and I anticipate she will require Max A x 2 for all higher levels of ADLs (toileting, dressing, bathing).  I rec she continue OT tx & be placed at a SNF at NH.          Outcome Measures     Row Name 03/11/18 1320 03/11/18 1305          How much help from another person do you currently need...    Turning from your back to your side while in flat bed without using bedrails?  -- 2  -FERNANDO     Moving from lying on back to sitting on the side of a flat bed without bedrails?  -- 2  -FERNANDO     Moving to and from a bed to a chair (including a wheelchair)?  -- 1  -FERNANDO     Standing up from a chair using your arms (e.g., wheelchair, bedside chair)?  -- 1  -FERNANDO     Climbing 3-5 steps with a railing?  -- 1  -FERNANDO     To walk in hospital room?  -- 1  -FERNANDO     AM-PAC 6 Clicks Score  -- 8  -FERNANDO        How much help from another is currently needed...    Putting on and taking off regular lower body clothing? 1  -CH  --     Bathing (including washing, rinsing, and drying) 1  -CH  --     Toileting (which includes using toilet bed pan or urinal) 1  -CH  --     Putting on and taking off regular upper body clothing 3  -CH  --     Taking care of personal grooming (such as brushing teeth) 4  -CH  --     Eating meals 4  -CH  --     Score 14  -CH  --        Functional Assessment    Outcome Measure Options AM-PAC 6 Clicks Daily Activity (OT)  -CH AM-PAC 6 Clicks Basic Mobility (PT)  -FERNANDO        User Key  (r) = Recorded By, (t) = Taken By, (c) = Cosigned By    Initials Name Provider Type    CH Lilian Park, OTR/L Occupational Therapist    FERNANDO Callaway, PT DPT Physical Therapist          Time Calculation:   OT Start Time: 1320  OT Stop Time: 1400  OT Time Calculation (min): 40 min    Therapy Charges for Today     Code Description Service Date Service Provider Modifiers Qty    36074267825 HC OT SELFCARE CURRENT 3/11/2018 Lilian Park OTR/L GO, CK 1    [id number] HC OT SELFCARE PROJECTED 3/11/2018 Lilian Park OTR/L GO, CK 1    [id number]  OT EVAL HIGH COMPLEXITY 3 3/11/2018 Lilian Park OTR/L GO, KX 1          OT G-codes  OT Professional Judgement Used?: Yes  OT Functional Scales Options: AM-PAC 6 Clicks Daily Activity (OT)  Score: 14  Functional Limitation: Self care  Self Care Current Status (): At least 40 percent but less than 60 percent impaired, limited or restricted  Self Care Goal Status (): At least 40 percent but less than 60 percent impaired, limited or restricted (at higher score than 14)    JUDY Munoz/L  3/11/2018

## 2018-03-11 NOTE — PROGRESS NOTES
This patient is by mouth daily 1 for internal fixation of a right periprosthetic femur fracture she is progressing satisfactorily and will begin physical therapy

## 2018-03-12 ENCOUNTER — APPOINTMENT (OUTPATIENT)
Dept: ULTRASOUND IMAGING | Facility: HOSPITAL | Age: 83
End: 2018-03-12

## 2018-03-12 LAB
ABO + RH BLD: NORMAL
ANION GAP SERPL CALCULATED.3IONS-SCNC: 8 MMOL/L (ref 4–13)
ANISOCYTOSIS BLD QL: ABNORMAL
BH BB BLOOD EXPIRATION DATE: NORMAL
BH BB BLOOD TYPE BARCODE: 6200
BH BB DISPENSE STATUS: NORMAL
BH BB PRODUCT CODE: NORMAL
BH BB UNIT NUMBER: NORMAL
BUN BLD-MCNC: 69 MG/DL (ref 5–21)
BUN/CREAT SERPL: 38.1 (ref 7–25)
BURR CELLS BLD QL SMEAR: ABNORMAL
CALCIUM SPEC-SCNC: 7.8 MG/DL (ref 8.4–10.4)
CHLORIDE SERPL-SCNC: 101 MMOL/L (ref 98–110)
CK SERPL-CCNC: 65 U/L (ref 0–203)
CO2 SERPL-SCNC: 28 MMOL/L (ref 24–31)
CREAT BLD-MCNC: 1.81 MG/DL (ref 0.5–1.4)
DEPRECATED RDW RBC AUTO: 73.4 FL (ref 40–54)
EOSINOPHIL # BLD MANUAL: 0.11 10*3/MM3 (ref 0–0.7)
EOSINOPHIL NFR BLD MANUAL: 1 % (ref 0–4)
ERYTHROCYTE [DISTWIDTH] IN BLOOD BY AUTOMATED COUNT: 20 % (ref 12–15)
GFR SERPL CREATININE-BSD FRML MDRD: 26 ML/MIN/1.73
GLUCOSE BLD-MCNC: 91 MG/DL (ref 70–100)
GLUCOSE BLDC GLUCOMTR-MCNC: 165 MG/DL (ref 70–130)
GLUCOSE BLDC GLUCOMTR-MCNC: 186 MG/DL (ref 70–130)
GLUCOSE BLDC GLUCOMTR-MCNC: 232 MG/DL (ref 70–130)
GLUCOSE BLDC GLUCOMTR-MCNC: 84 MG/DL (ref 70–130)
HCT VFR BLD AUTO: 31.5 % (ref 37–47)
HGB BLD-MCNC: 10.1 G/DL (ref 12–16)
LYMPHOCYTES # BLD MANUAL: 1.02 10*3/MM3 (ref 0.72–4.86)
LYMPHOCYTES NFR BLD MANUAL: 7 % (ref 4–12)
LYMPHOCYTES NFR BLD MANUAL: 9 % (ref 15–45)
MCH RBC QN AUTO: 32.5 PG (ref 28–32)
MCHC RBC AUTO-ENTMCNC: 32.1 G/DL (ref 33–36)
MCV RBC AUTO: 101.3 FL (ref 82–98)
MONOCYTES # BLD AUTO: 0.79 10*3/MM3 (ref 0.19–1.3)
NEUTROPHILS # BLD AUTO: 9.37 10*3/MM3 (ref 1.87–8.4)
NEUTROPHILS NFR BLD MANUAL: 83 % (ref 39–78)
NRBC BLD MANUAL-RTO: 0 /100 WBC (ref 0–0)
PLAT MORPH BLD: NORMAL
PLATELET # BLD AUTO: 278 10*3/MM3 (ref 130–400)
PMV BLD AUTO: 10.5 FL (ref 6–12)
POTASSIUM BLD-SCNC: 4.9 MMOL/L (ref 3.5–5.3)
RBC # BLD AUTO: 3.11 10*6/MM3 (ref 4.2–5.4)
SODIUM BLD-SCNC: 137 MMOL/L (ref 135–145)
UNIT  ABO: NORMAL
UNIT  RH: NORMAL
WBC MORPH BLD: NORMAL
WBC NRBC COR # BLD: 11.29 10*3/MM3 (ref 4.8–10.8)

## 2018-03-12 PROCEDURE — 97530 THERAPEUTIC ACTIVITIES: CPT

## 2018-03-12 PROCEDURE — 82550 ASSAY OF CK (CPK): CPT | Performed by: INTERNAL MEDICINE

## 2018-03-12 PROCEDURE — 85025 COMPLETE CBC W/AUTO DIFF WBC: CPT | Performed by: INTERNAL MEDICINE

## 2018-03-12 PROCEDURE — 63710000001 INSULIN DETEMIR PER 5 UNITS: Performed by: INTERNAL MEDICINE

## 2018-03-12 PROCEDURE — 94799 UNLISTED PULMONARY SVC/PX: CPT

## 2018-03-12 PROCEDURE — 85007 BL SMEAR W/DIFF WBC COUNT: CPT | Performed by: INTERNAL MEDICINE

## 2018-03-12 PROCEDURE — 76775 US EXAM ABDO BACK WALL LIM: CPT

## 2018-03-12 PROCEDURE — 63710000001 INSULIN LISPRO (HUMAN) PER 5 UNITS: Performed by: INTERNAL MEDICINE

## 2018-03-12 PROCEDURE — 97110 THERAPEUTIC EXERCISES: CPT

## 2018-03-12 PROCEDURE — 80048 BASIC METABOLIC PNL TOTAL CA: CPT | Performed by: INTERNAL MEDICINE

## 2018-03-12 PROCEDURE — 94760 N-INVAS EAR/PLS OXIMETRY 1: CPT

## 2018-03-12 PROCEDURE — 94660 CPAP INITIATION&MGMT: CPT

## 2018-03-12 PROCEDURE — 82962 GLUCOSE BLOOD TEST: CPT

## 2018-03-12 RX ADMIN — ATORVASTATIN CALCIUM 10 MG: 10 TABLET, FILM COATED ORAL at 21:13

## 2018-03-12 RX ADMIN — GABAPENTIN 600 MG: 300 CAPSULE ORAL at 17:20

## 2018-03-12 RX ADMIN — BUDESONIDE AND FORMOTEROL FUMARATE DIHYDRATE 2 PUFF: 160; 4.5 AEROSOL RESPIRATORY (INHALATION) at 21:16

## 2018-03-12 RX ADMIN — IPRATROPIUM BROMIDE AND ALBUTEROL SULFATE 3 ML: 2.5; .5 SOLUTION RESPIRATORY (INHALATION) at 15:47

## 2018-03-12 RX ADMIN — IPRATROPIUM BROMIDE AND ALBUTEROL SULFATE 3 ML: 2.5; .5 SOLUTION RESPIRATORY (INHALATION) at 21:15

## 2018-03-12 RX ADMIN — SODIUM CHLORIDE 100 ML/HR: 9 INJECTION, SOLUTION INTRAVENOUS at 02:46

## 2018-03-12 RX ADMIN — INSULIN LISPRO 2 UNITS: 100 INJECTION, SOLUTION INTRAVENOUS; SUBCUTANEOUS at 17:24

## 2018-03-12 RX ADMIN — MAGNESIUM HYDROXIDE 10 ML: 2400 SUSPENSION ORAL at 08:50

## 2018-03-12 RX ADMIN — IPRATROPIUM BROMIDE AND ALBUTEROL SULFATE 3 ML: 2.5; .5 SOLUTION RESPIRATORY (INHALATION) at 07:19

## 2018-03-12 RX ADMIN — IPRATROPIUM BROMIDE AND ALBUTEROL SULFATE 3 ML: 2.5; .5 SOLUTION RESPIRATORY (INHALATION) at 11:25

## 2018-03-12 RX ADMIN — ASPIRIN 325 MG: 325 TABLET, COATED ORAL at 21:13

## 2018-03-12 RX ADMIN — HYDROXYUREA 500 MG: 500 CAPSULE ORAL at 08:50

## 2018-03-12 RX ADMIN — INSULIN DETEMIR 30 UNITS: 100 INJECTION, SOLUTION SUBCUTANEOUS at 21:25

## 2018-03-12 RX ADMIN — INSULIN LISPRO 2 UNITS: 100 INJECTION, SOLUTION INTRAVENOUS; SUBCUTANEOUS at 12:46

## 2018-03-12 RX ADMIN — FOLIC ACID 1 MG: 1 TABLET ORAL at 08:50

## 2018-03-12 RX ADMIN — INSULIN LISPRO 4 UNITS: 100 INJECTION, SOLUTION INTRAVENOUS; SUBCUTANEOUS at 21:24

## 2018-03-12 RX ADMIN — BUDESONIDE AND FORMOTEROL FUMARATE DIHYDRATE 2 PUFF: 160; 4.5 AEROSOL RESPIRATORY (INHALATION) at 07:25

## 2018-03-12 RX ADMIN — METOPROLOL SUCCINATE 50 MG: 50 TABLET, FILM COATED, EXTENDED RELEASE ORAL at 08:50

## 2018-03-12 RX ADMIN — GABAPENTIN 300 MG: 300 CAPSULE ORAL at 08:50

## 2018-03-12 NOTE — ACP (ADVANCE CARE PLANNING)
Consult visit for discussion of Advance Care Planning/ [facility] [place].  Goals and purpose of ACP discussed as well as choosing a healthcare surrogate. Pateint's son at bedside and expressed interest in completing one for himself.  Information packet left for them to discuss and will f/u.

## 2018-03-12 NOTE — PROGRESS NOTES
[facility] Medicine Services  INPATIENT PROGRESS NOTE    Length of Stay: 4  Date of Admission: 3/8/2018  Primary Care Physician: Barry Foley MD    Subjective   Chief Complaint: Hurting from moving.    HPI   No nausea  No chest pain.  Has not been able to get out of bed yet.         Review of Systems     All pertinent negatives and positives are as above. All other systems have been reviewed and are negative unless otherwise stated.     Objective    Temp:  [97.6 °F (36.4 °C)-98.7 °F (37.1 °C)] 97.6 °F (36.4 °C)  Heart Rate:  [] 113  Resp:  [16-22] 16  BP: (117-147)/(44-76) 119/66  Physical Exam   Constitutional: She is oriented to person, place, and time. She appears well-developed and well-nourished.   Eyes: Conjunctivae and EOM are normal. Pupils are equal, round, and reactive to light.   Neck: Neck supple.   Cardiovascular: Normal rate and regular rhythm.  Exam reveals no gallop and no friction rub.    No murmur heard.  Pulmonary/Chest: Effort normal and breath sounds normal.   Abdominal: Soft. Bowel sounds are normal. There is no hepatosplenomegaly. There is no tenderness.   Musculoskeletal: She exhibits edema (right arm.  IV infiltrate).   Neurological: She is alert and oriented to person, place, and time. No cranial nerve deficit.   Skin: Skin is warm and dry.   Psychiatric: She has a normal mood and affect. Her behavior is normal.   Nursing note and vitals reviewed.          Results Review:  I have reviewed the labs, radiology results, and diagnostic studies.    Laboratory Data:     Results from last 7 days  Lab Units 03/12/18  0516 03/11/18  0604 03/10/18  0618   WBC 10*3/mm3 11.29* 14.50* 12.54*   HEMOGLOBIN g/dL 10.1* 11.1* 9.6*   HEMATOCRIT % 31.5* 34.5* 30.2*   PLATELETS 10*3/mm3 278 337 344          Results from last 7 days  Lab Units 03/12/18  0723 03/11/18  0604 03/10/18  0618  03/09/18  0644 03/08/18  1558   SODIUM mmol/L 137 137 136  --  136 138    SODIUM, ARTERIAL   --   --   --   < >  --   --    POTASSIUM mmol/L 4.9 5.6* 5.2  --  4.9 4.8   CHLORIDE mmol/L 101 102 98  --  99 97*   CO2 mmol/L 28.0 24.0 29.0  --  30.0 30.0   BUN mg/dL 69* 62* 60*  --  46* 45*   CREATININE mg/dL 1.81* 2.33* 2.49*  --  1.52* 1.13   CALCIUM mg/dL 7.8* 8.1* 7.8*  --  8.1* 9.0   BILIRUBIN mg/dL  --  0.2  --   --  0.2 0.3   ALK PHOS U/L  --  74  --   --  64 83   ALT (SGPT) U/L  --  27  --   --  29 37   AST (SGOT) U/L  --  23  --   --  32 24   GLUCOSE mg/dL 91 145* 95  --  134* 145*   < > = values in this interval not displayed.    Culture Data:        Radiology Data:   Imaging Results (last 24 hours)     Procedure Component Value Units Date/Time    XR Femur 2 View Right [[id number]] Collected:  03/10/18 1113     Updated:  03/12/18 1552    Narrative:       EXAMINATION:   XR FEMUR 2 VW RIGHT-  3/10/2018 11:13 AM CST     HISTORY: 7 images are obtained operating room under the direction of Dr. Mohamud Bundy. Femoral nail is present. Fixation screws are present.  Right knee arthroplasty is present.     Skeletal structures appear relatively aligned.     This procedure utilized 1 minute 40 seconds of fluoroscopic time  This report was finalized on 03/10/2018 11:13 by Dr. Klever Hooker MD.    FL C Arm During Surgery [[id number]] Collected:  03/10/18 1113     Updated:  03/12/18 1552    Narrative:       EXAMINATION:   XR FEMUR 2 VW RIGHT-  3/10/2018 11:13 AM CST     HISTORY: 7 images are obtained operating room under the direction of Dr. Mohamud Bundy. Femoral nail is present. Fixation screws are present.  Right knee arthroplasty is present.     Skeletal structures appear relatively aligned.     This procedure utilized 1 minute 40 seconds of fluoroscopic time  This report was finalized on 03/10/2018 11:13 by Dr. Klever Hooker MD.    US Renal Bilateral [[id number]] Collected:  03/12/18 0722     Updated:  03/12/18 0728    Narrative:       EXAMINATION: US RENAL BILATERAL- 3/12/2018 7:22 AM  CDT     HISTORY: STUART; W19.XXXA-Unspecified fall, initial encounter;  S72.491A-Other fracture of lower end of right femur, initial encounter  for closed fracture; W19.XXXA-Unspecified fall, initial encounter;  Z74.09-Other reduced mobility; Z78.9-Other specified health status.     REPORT: Sonographic images of the kidneys were obtained bilaterally.  There are no comparison studies.     The aorta is visualized and is normal in caliber, 1.8 cm proximally. The  IVC measures 2.1 cm, normal. The right kidney measures 10.7 x 4.9 cm,  with normal echogenicity and no evidence of hydronephrosis. There is a  small cyst at the inferior pole the right kidney that appears benign and  measures 1.5 x 1.3 cm. There is a 1.1 cm cyst in the mid right kidney  which appears benign.     Left kidney measures 11.4 x 5.8 x 5.9 cm, there is a benign-appearing  cyst at the hilum of the left kidney that measures 3.3 x 2.3 x 2.0 cm.  This may represent parapelvic cyst or possibly dilation of the renal  pelvis.     Color flow imaging demonstrates vascular flow within both kidneys. The  bladder is decompressed by Alves catheter.       Impression:       1. 2 small benign-appearing cysts are identified in the right kidney,  there is no hydronephrosis on the right.  2. 3.3 cm cyst at the hilum of the left kidney possibly a parapelvic  cyst or dilation of the renal pelvis.  This report was finalized on 03/12/2018 07:25 by Dr. Addison Rivero MD.          I have reviewed the patient current medications.     Assessment/Plan     Hospital Problem List     * (Principal)Closed displaced comminuted fracture of shaft of right femur    Fall    Pain of right thigh    Class 2 obesity with serious comorbidity in adult    Type 2 diabetes mellitus with neurologic complication, with long-term current use of insulin    STUART (acute kidney injury)    Chronic diastolic heart failure    Pleural effusion, left    Hypoxia        Assessment    Fracture shaft of right  femur  Fall  Obesity  DM II  STUART  Chronic diastolic heart failure no current exacerbation  Hypoxia  Pleural effusion left     Plan    Social service consult dc planning to SNF  IVF  Monitor BMP  Mobilize when okay with orthopedics.             Discharge Planning: I expect the patient to be discharged to SNF in 1-2 days.    Manda Andrew DO   03/12/18   3:58 PM

## 2018-03-12 NOTE — PROGRESS NOTES
Continued Stay Note  [facility]     Patient Name: Tamy Sher  MRN: [id number]  Today's Date: 3/12/2018    Admit Date: 3/8/2018          Discharge Plan     Row Name 03/12/18 1431       Plan    Plan Referral to [facility]    Patient/Family in Agreement with Plan yes    Plan Comments Patient's son has contacted  and requested referral to [facility].  called Inessa in admissions and notified her of referral. Inessa confirmed she will evaluate today. Will follow for bed offer.               Discharge Codes    No documentation.           CORTES Mcnamara

## 2018-03-12 NOTE — PROGRESS NOTES
This patient's post internal fixation of a right femur fracture.  She is progressing satisfactorily O.  Take a plan social service consult for placement continue physical therapy

## 2018-03-12 NOTE — PLAN OF CARE
Problem: Patient Care Overview (Adult)  Goal: Plan of Care Review  Outcome: Ongoing (interventions implemented as appropriate)   03/11/18 0517   Coping/Psychosocial Response Interventions   Plan Of Care Reviewed With patient;family   Patient Care Overview   Progress no change   Outcome Evaluation   Outcome Summary/Follow up Plan Drsg d/i to R thigh. Ace wrap. IV inserted per vascular access. IVF. Alves placed. Potassium 5.6; kayexalate given. Creat 2.33, BUN 62, WBC 14.5. Tele on; Afib. VSS. Continue to monitor.      Goal: Adult Individualization and Mutuality  Outcome: Ongoing (interventions implemented as appropriate)    Goal: Discharge Needs Assessment  Outcome: Ongoing (interventions implemented as appropriate)      Problem: Fall Risk (Adult)  Goal: Absence of Falls  Outcome: Ongoing (interventions implemented as appropriate)      Problem: Pressure Ulcer Risk (Jeremiah Scale) (Adult,Obstetrics,Pediatric)  Goal: Skin Integrity  Outcome: Ongoing (interventions implemented as appropriate)      Problem: Skin Injury Risk (Adult)  Goal: Skin Health and Integrity  Outcome: Ongoing (interventions implemented as appropriate)    Goal: Identify Related Risk Factors and Signs and Symptoms  Outcome: Ongoing (interventions implemented as appropriate)

## 2018-03-12 NOTE — PROGRESS NOTES
Nephrology ([facility]) Progress Note      Patient:  Tamy Sher  YOB: 1930  Date of Service: 3/12/2018  MRN: [id number]   Acct: [id number]   Primary Care Physician: Barry Foley MD  Advance Directive: Full Code  Admit Date: 3/8/2018       Hospital Day: 4  Referring Provider: Fidencio Rodriguez*      Patient personally seen and examined.  Complete chart including Consults, Notes, Operative Reports, Labs, Cardiology, and Radiology studies reviewed as able.        Subjective:  Tamy Sher is a 87 y.o. female  whom we were consulted for acute kidney injury and hyperkalemia.  No prior known renal disease.  Admitted with fractured right femur after a fall.  Underwent internal fixation on 3/10.  Hospital course remarkable for worsening renal function.    Today is complaining of pain in her feet/ankles.  No new overnight issues. Urine output is nonoliguric    Allergies:  Review of patient's allergies indicates no known allergies.    Home Meds:  Prescriptions Prior to Admission   Medication Sig Dispense Refill Last Dose   • levothyroxine (SYNTHROID, LEVOTHROID) 50 MCG tablet Take 50 mcg by mouth Daily.      • amLODIPine (NORVASC) 5 MG tablet Take 5 mg by mouth Daily.   Unknown at Unknown time   • aspirin 325 MG tablet Take 325 mg by mouth Every Night.   Unknown at Unknown time   • budesonide-formoterol (SYMBICORT) 160-4.5 MCG/ACT inhaler Inhale 2 puffs 2 (Two) Times a Day.  12 Unknown at Unknown time   • folic acid (FOLVITE) 1 MG tablet Take 1 mg by mouth Daily.   Unknown at Unknown time   • furosemide (LASIX) 40 MG tablet Take 40 mg by mouth Daily.   Unknown at Unknown time   • gabapentin (NEURONTIN) 300 MG capsule Take 1 capsule by mouth Daily With Breakfast. 3 capsule 0 Unknown at Unknown time   • gabapentin (NEURONTIN) 300 MG capsule Take 2 capsules by mouth Every Evening. 6 capsule 0 Unknown at Unknown time   • hydroxyurea (HYDREA) 500 MG capsule Take 500 mg by  mouth Take As Directed. Daily except Fridays.   Unknown at Unknown time   • hydroxyurea (HYDREA) 500 MG capsule Take 1,000 mg by mouth 1 (One) Time Per Week. Friday dose   Unknown at Unknown time   • insulin glargine (LANTUS) 100 UNIT/ML injection Inject 26 Units under the skin Every Night.   Unknown at Unknown time   • insulin glargine (LANTUS) 100 UNIT/ML injection Inject 10 Units under the skin Daily. AM dose  0 Unknown at Unknown time   • insulin lispro (humaLOG) 100 UNIT/ML injection Inject 0-9 Units under the skin 4 (Four) Times a Day With Meals & at Bedtime.  12 Unknown at Unknown time   • ipratropium-albuterol (DUO-NEB) 0.5-2.5 mg/mL nebulizer Take 3 mL by nebulization 4 (Four) Times a Day. 360 mL  Unknown at Unknown time   • lisinopril (PRINIVIL,ZESTRIL) 40 MG tablet Take 40 mg by mouth Daily.   Unknown at Unknown time   • metFORMIN (GLUCOPHAGE) 1000 MG tablet Take 500 mg by mouth Every Night.   Unknown at Unknown time   • metoprolol succinate XL (TOPROL-XL) 25 MG 24 hr tablet Take 2 tablets by mouth Daily. 30 tablet 0 Unknown at Unknown time   • pantoprazole (PROTONIX) 40 MG EC tablet Take 40 mg by mouth Daily.   Unknown at Unknown time   • simvastatin (ZOCOR) 40 MG tablet Take 0.5 tablets by mouth Every Night. 30 tablet 0 Unknown at Unknown time       Medicines:  Current Facility-Administered Medications   Medication Dose Route Frequency Provider Last Rate Last Dose   • aspirin tablet 325 mg  325 mg Oral Nightly Fidencio Rodriguez MD   325 mg at 03/11/18 2037   • atorvastatin (LIPITOR) tablet 10 mg  10 mg Oral Nightly Fidencio Rodriguez MD   10 mg at 03/11/18 2037   • budesonide-formoterol (SYMBICORT) 160-4.5 MCG/ACT inhaler 2 puff  2 puff Inhalation BID - RT Fidencio Rodriguez MD   2 puff at 03/12/18 0725   • dextrose (D50W) solution 25 g  25 g Intravenous Q15 Min PRN Fidencio Rodriguez MD       • [MAR Hold] dextrose (GLUTOSE) oral gel 15 g  15 g Oral Q15 Min PRN Fidencio Mcgill  MD Jennifer       • folic acid (FOLVITE) tablet 1 mg  1 mg Oral Daily Fidencio Rodriguez MD   1 mg at 03/12/18 0850   • gabapentin (NEURONTIN) capsule 300 mg  300 mg Oral Daily With Breakfast Fidencio Rodriguez MD   300 mg at 03/12/18 0850   • gabapentin (NEURONTIN) capsule 600 mg  600 mg Oral Q PM Fidencio Rodriguez MD   600 mg at 03/11/18 1714   • glucagon (human recombinant) (GLUCAGEN DIAGNOSTIC) injection 1 mg  1 mg Subcutaneous PRN Fidencio Rodriguez MD       • HYDROcodone-acetaminophen (NORCO) 7.5-325 MG per tablet 1 tablet  1 tablet Oral Q6H PRN Rufino Herndon MD   1 tablet at 03/09/18 1814   • hydroxyurea (HYDREA) capsule 500 mg  500 mg Oral Daily Fidencio Rodriguez MD   500 mg at 03/12/18 0850   • hydrOXYzine (VISTARIL) capsule 50 mg  50 mg Oral TID PRN Fidencio Rodriguez MD       • insulin detemir (LEVEMIR) injection 30 Units  30 Units Subcutaneous Nightly Fidencio Rodriguez MD   30 Units at 03/11/18 2025   • insulin lispro (humaLOG) injection 0-9 Units  0-9 Units Subcutaneous 4x Daily With Meals & Nightly Fidencio Rodriguez MD   6 Units at 03/11/18 1731   • ipratropium-albuterol (DUO-NEB) nebulizer solution 3 mL  3 mL Nebulization 4x Daily - RT Fidencio Rodriguez MD   3 mL at 03/12/18 0719   • levothyroxine (SYNTHROID, LEVOTHROID) tablet 50 mcg  50 mcg Oral Q AM Nima Bundy MD   50 mcg at 03/11/18 0623   • magnesium hydroxide (MILK OF MAGNESIA) suspension 2400 mg/10mL 10 mL  10 mL Oral Daily PRN Fidencio Rodriguez MD   10 mL at 03/12/18 0850   • metoprolol succinate XL (TOPROL-XL) 24 hr tablet 50 mg  50 mg Oral Daily Fidencio Rodriguez MD   50 mg at 03/12/18 0850   • naloxone (NARCAN) injection 0.1 mg  0.1 mg Intravenous Q5 Min PRN Fidencio Rodriguez MD       • naloxone (NARCAN) injection 0.4 mg  0.4 mg Intravenous Q5 Min PRN Fidencio Rodriguez MD       • naloxone (NARCAN) injection 0.4 mg  0.4 mg Intravenous Q5  Min PRN Fidencio Rodriguez MD       • ondansetron (ZOFRAN) injection 4 mg  4 mg Intravenous Q6H PRN Fidencio Rodriguez MD   4 mg at 03/09/18 1814   • ondansetron (ZOFRAN) injection 4 mg  4 mg Intravenous Q6H PRN Nima Bundy MD       • pantoprazole (PROTONIX) EC tablet 40 mg  40 mg Oral Q AM Fidencio Rodriguez MD   40 mg at 03/11/18 0623   • sodium chloride 0.9 % flush 1-10 mL  1-10 mL Intravenous PRN Fidencio Rodriguez MD       • sodium chloride 0.9 % flush 10 mL  10 mL Intravenous PRN Rylee Gustafson MD       • sodium chloride 0.9 % infusion  100 mL/hr Intravenous Continuous Jeffery Hong  mL/hr at 03/12/18 0246 100 mL/hr at 03/12/18 0246       Past Medical History:  Past Medical History:   Diagnosis Date   • Constipation    • Diabetes mellitus    • Diarrhea    • Diastolic dysfunction, left ventricle    • Esophagitis    • Exertional shortness of breath    • Hyperlipidemia    • Hypertension    • Lumbar spondylitis    • Osteoarthritis    • Peripheral vascular disease    • Sinusitis    • Stroke        Past Surgical History:  Past Surgical History:   Procedure Laterality Date   • GALLBLADDER SURGERY     • HYSTERECTOMY     • JOINT REPLACEMENT     • REPLACEMENT TOTAL KNEE Left        Family History  Family History   Problem Relation Age of Onset   • No Known Problems Mother    • No Known Problems Father        Social History  Social History     Social History   • Marital status:      Spouse name: N/A   • Number of children: N/A   • Years of education: N/A     Occupational History   • Not on file.     Social History Main Topics   • Smoking status: Never Smoker   • Smokeless tobacco: Never Used   • Alcohol use No   • Drug use: No   • Sexual activity: No     Other Topics Concern   • Not on file     Social History Narrative   • No narrative on file         Review of Systems:  History obtained from chart review and the patient  General ROS: No fever or chills  Respiratory ROS: No  "cough, shortness of breath, wheezing  Cardiovascular ROS: No chest pain or palpitations  Gastrointestinal ROS: No abdominal pain or melena  Genito-Urinary ROS: No dysuria or hematuria  Neurological ROS: no headache or dizziness    Objective:  /66 (BP Location: Right arm, Patient Position: Lying)   Pulse 104   Temp 97.6 °F (36.4 °C) (Oral)   Resp 18   Ht 152.4 cm (60\")   Wt 90.3 kg (199 lb)   SpO2 93%   BMI 38.86 kg/m²     Intake/Output Summary (Last 24 hours) at 03/12/18 1040  Last data filed at 03/12/18 0849   Gross per 24 hour   Intake                0 ml   Output             1175 ml   Net            -1175 ml     General: awake/alert    Neck: supple, no JVD  Chest:  clear to auscultation bilaterally without respiratory distress  CVS: regular rate and rhythm  Abdominal: soft, nontender, normal bowel sounds  Extremities: no cyanosis or edema  Skin: warm and dry without rash  Neuro: no focal motor deficits    Labs:    Results from last 7 days  Lab Units 03/12/18  0516 03/11/18  0604 03/10/18  0618   WBC 10*3/mm3 11.29* 14.50* 12.54*   HEMOGLOBIN g/dL 10.1* 11.1* 9.6*   HEMATOCRIT % 31.5* 34.5* 30.2*   PLATELETS 10*3/mm3 278 337 344           Results from last 7 days  Lab Units 03/12/18  0723 03/11/18  0604 03/10/18  0618  03/09/18  0644 03/08/18  1558   SODIUM mmol/L 137 137 136  --  136 138   SODIUM, ARTERIAL   --   --   --   < >  --   --    POTASSIUM mmol/L 4.9 5.6* 5.2  --  4.9 4.8   CHLORIDE mmol/L 101 102 98  --  99 97*   CO2 mmol/L 28.0 24.0 29.0  --  30.0 30.0   BUN mg/dL 69* 62* 60*  --  46* 45*   CREATININE mg/dL 1.81* 2.33* 2.49*  --  1.52* 1.13   CALCIUM mg/dL 7.8* 8.1* 7.8*  --  8.1* 9.0   BILIRUBIN mg/dL  --  0.2  --   --  0.2 0.3   ALK PHOS U/L  --  74  --   --  64 83   ALT (SGPT) U/L  --  27  --   --  29 37   AST (SGOT) U/L  --  23  --   --  32 24   GLUCOSE mg/dL 91 145* 95  --  134* 145*   < > = values in this interval not displayed.    Radiology:   Imaging Results (last 72 hours)     " Procedure Component Value Units Date/Time    US Renal Bilateral [[id number]] Collected:  03/12/18 0722     Updated:  03/12/18 0728    Narrative:       EXAMINATION: US RENAL BILATERAL- 3/12/2018 7:22 AM CDT     HISTORY: STUART; W19.XXXA-Unspecified fall, initial encounter;  S72.491A-Other fracture of lower end of right femur, initial encounter  for closed fracture; W19.XXXA-Unspecified fall, initial encounter;  Z74.09-Other reduced mobility; Z78.9-Other specified health status.     REPORT: Sonographic images of the kidneys were obtained bilaterally.  There are no comparison studies.     The aorta is visualized and is normal in caliber, 1.8 cm proximally. The  IVC measures 2.1 cm, normal. The right kidney measures 10.7 x 4.9 cm,  with normal echogenicity and no evidence of hydronephrosis. There is a  small cyst at the inferior pole the right kidney that appears benign and  measures 1.5 x 1.3 cm. There is a 1.1 cm cyst in the mid right kidney  which appears benign.     Left kidney measures 11.4 x 5.8 x 5.9 cm, there is a benign-appearing  cyst at the hilum of the left kidney that measures 3.3 x 2.3 x 2.0 cm.  This may represent parapelvic cyst or possibly dilation of the renal  pelvis.     Color flow imaging demonstrates vascular flow within both kidneys. The  bladder is decompressed by Alves catheter.       Impression:       1. 2 small benign-appearing cysts are identified in the right kidney,  there is no hydronephrosis on the right.  2. 3.3 cm cyst at the hilum of the left kidney possibly a parapelvic  cyst or dilation of the renal pelvis.  This report was finalized on 03/12/2018 07:25 by Dr. Addison Rivero MD.    XR Femur 2 View Right [[id number]] Collected:  03/10/18 1113     Updated:  03/10/18 1116    Narrative:       EXAMINATION:   XR FEMUR 2 VW RIGHT-  3/10/2018 11:13 AM CST     HISTORY: 7 images are obtained operating room under the direction of Dr. Mohamud Bundy. Femoral nail is present. Fixation screws are  present.  Right knee arthroplasty is present.     Skeletal structures appear relatively aligned.     This procedure utilized 1 minute 40 seconds of fluoroscopic time  This report was finalized on 03/10/2018 11:13 by Dr. Klever Hooker MD.    FL C Arm During Surgery [[id number]] Updated:  03/10/18 1033          Culture:         Assessment   1.  Acute kidney injury secondary to acute tubular necrosis--improving  2.  Mild rhabdomyolysis  3.  Hyperkalemia--improved  4.  Status post surgical repair of right femur fracture  5.  Type 2 diabetes on insulin  6.  Anemia     Plan:  1.  Continue current IV fluids  2.  Monitor labs for ongoing renal recovery      Isael Redding, APRN  3/12/2018  10:40 AM

## 2018-03-12 NOTE — THERAPY TREATMENT NOTE
Acute Care - Physical Therapy Treatment Note  [facility]     Patient Name: Tamy Sher  : 1930  MRN: [id number]  Today's Date: 3/12/2018  Onset of Illness/Injury or Date of Surgery: 18  Date of Referral to PT: 18  Referring Physician: Dr. Bundy    Admit Date: 3/8/2018    Visit Dx:    ICD-10-CM ICD-9-CM   1. Fall, initial encounter W19.XXXA E888.9   2. Other closed fracture of distal end of right femur, initial encounter S72.491A 821.29   3. Fall W19.XXXA E888.9   4. Impaired mobility Z74.09 799.89   5. Decreased activities of daily living (ADL) Z78.9 V49.89     Patient Active Problem List   Diagnosis   • Ischemic chest pain   • Pneumonia of both lungs due to infectious organism   • Fall   • Closed displaced comminuted fracture of shaft of right femur   • Pain of right thigh   • Class 2 obesity with serious comorbidity in adult   • Type 2 diabetes mellitus with neurologic complication, with long-term current use of insulin   • STUART (acute kidney injury)   • Chronic diastolic heart failure   • Pleural effusion, left   • Hypoxia       Therapy Treatment    Therapy Treatment / Health Promotion    Treatment Time/Intention  Discipline: physical therapy assistant  Document Type: therapy note (daily note)  Subjective Information: complains of, weakness, pain  Treatment Considerations/Comments: NWANAIS RLE  Plan of Care Review  Plan of Care Reviewed With: patient    Vitals/Pain/Safety  Vital Signs  Post SpO2 (%): 93  O2 Delivery Post Treatment: supplemental O2 (3l)  Pain Assessment  Additional Documentation: Pain Scale: FACES Pre/Post-Treatment (Group)  Pain Scale: Numbers Pre/Post-Treatment  Pain Location - Side: Right  Pain Location - Orientation: lower  Pain Location: knee  Pain Scale: Word Pre/Post-Treatment  Pain Location - Side: Right  Pain Location - Orientation: lower  Pain Location: knee  Pain Scale: FACES Pre/Post-Treatment  Pain: FACES Scale, Pretreatment: 6-->hurts even more  Pain Location -  Side: Right  Pain Location - Orientation: lower  Pain Location: knee  Positioning and Restraints  Pre-Treatment Position: in bed  Post Treatment Position: bed  In Bed: fowlers, call light within reach, encouraged to call for assist, with family/caregiver    Mobility,ADL,Motor, Modality  Mobility Assessment/Intervention  Extremity Weight-bearing Status: right lower extremity  Right Lower Extremity (Weight-bearing Status): non weight-bearing (NWB)  Bed Mobility Assessment/Treatment  Scooting/Bridging [place] (Bed Mobility): dependent (less than 25% patient effort), 2 person assist  Supine-Sit [place] (Bed Mobility): maximum assist (25% patient effort), 2 person assist, verbal cues  Sit-Supine [place] (Bed Mobility): maximum assist (25% patient effort), 2 person assist, verbal cues  Bed Mobility, Safety Issues: decreased use of arms for pushing/pulling, decreased use of legs for bridging/pushing  Assistive Device (Bed Mobility): draw sheet  Transfer Assessment/Treatment  Comment (Transfers): not appropriate to attempt     Motor Skills Assessment/Interventions  Additional Documentation: Balance (Group), Therapeutic Exercise (Group), Therapeutic Exercise Interventions (Group)  Therapeutic Exercise  Lower Extremity Range of Motion (Therapeutic Exercise): ankle dorsiflexion/plantar flexion, bilateral, knee flexion/extension, left  Exercise Type (Therapeutic Exercise): AROM (active range of motion)  Position (Therapeutic Exercise): seated  Sets/Reps (Therapeutic Exercise): 10 reps x sets  Comment (Therapeutic Exercise): P-AAROM RLE x 10, AAROM LLE 20 reps  Balance  Balance: static sitting balance  Static Sitting Balance  Level of [place] (Unsupported Sitting, Static Balance): minimal assist, 75% patient effort, moderate assist, 50 to 74% patient effort  Sitting Position (Unsupported Sitting, Static Balance): sitting on edge of bed  Time Able to Maintain Position (Unsupported Sitting, Static Balance):  more than 5 minutes        ROM/MMT             Sensory, Edema, Orthotics          Cognition, Communication, Swallow  Speaking Valve  Post SpO2 (%): 93  General Eating/Swallowing Observations  Post SpO2 (%): 93    Outcome Summary               PT Rehab Goals     Row Name 03/11/18 7264             Bed Mobility Goal 1 (PT)    Activity/Assistive Device (Bed Mobility Goal 1, PT) sit to supine/supine to sit  -FERNANDO      [place] Level/Cues Needed (Bed Mobility Goal 1, PT) minimum assist (75% or more patient effort)  -FERNANDO      Time Frame (Bed Mobility Goal 1, PT) long term goal (LTG);10 days  -FERNANDO      Barriers (Bed Mobility Goal 1, PT) Weakness, pain  -FERNANDO      Progress/Outcomes (Bed Mobility Goal 1, PT) goal ongoing  -FENRANDO         Transfer Goal 1 (PT)    Activity/Assistive Device (Transfer Goal 1, PT) sit-to-stand/stand-to-sit;bed-to-chair/chair-to-bed;walker, rolling  -FERNANDO      [place] Level/Cues Needed (Transfer Goal 1, PT) minimum assist (75% or more patient effort)  -FERNANDO      Time Frame (Transfer Goal 1, PT) long term goal (LTG);10 days  -FERNANDO      Barriers (Transfers Goal 1, PT) weakness, pain  -FERNANDO      Progress/Outcome (Transfer Goal 1, PT) goal ongoing  -FERNANDO         Gait Training Goal 1 (PT)    Activity/Assistive Device (Gait Training Goal 1, PT) --  -FERNANDO      [place] Level (Gait Training Goal 1, PT) --  -FERNANDO      Distance (Gait Goal 1, PT) --  -FERNANDO      Time Frame (Gait Training Goal 1, PT) --  -FERNANDO      Barriers (Gait Training Goal 1, PT) --  -FERNANDO      Progress/Outcome (Gait Training Goal 1, PT) --  -FERNANDO        User Key  (r) = Recorded By, (t) = Taken By, (c) = Cosigned By    Initials Name Provider Type    FERNANDO Callaway, PT DPT Physical Therapist          Physical Therapy Education     Title: PT OT SLP Therapies (Active)     Topic: Physical Therapy (Active)     Point: Mobility training (Active)    Learning Progress Summary     Learner Status Readiness Method Response Comment Documented by    Patient Active  Acceptance E NR bed mobility  03/12/18 1152     Done Acceptance E VU,NR Educated pt/sons on progression of PT POC, benefits of activity, NWB R LE FERNANDO 03/11/18 1305    Family Done Acceptance E VU,NR Educated pt/sons on progression of PT POC, benefits of activity, NWB R LE FERNANDO 03/11/18 1305          Point: Precautions (Done)    Learning Progress Summary     Learner Status Readiness Method Response Comment Documented by    Patient Done Acceptance E VU,NR Educated pt/sons on progression of PT POC, benefits of activity, NWB R LE FERNANDO 03/11/18 1305    Family Done Acceptance E VU,NR Educated pt/sons on progression of PT POC, benefits of activity, NWB R LE FERNANDO 03/11/18 1305                      User Key     Initials Effective Dates Name Provider Type Discipline     08/02/16 -  Yolie Francis, PTA Physical Therapy Assistant PT     08/02/16 -  Kd Callaway, PT DPT Physical Therapist PT                    PT Recommendation and Plan  Anticipated Discharge Disposition (PT): skilled nursing facility (SNF), inpatient rehab facility  Planned Therapy Interventions (PT Eval): bed mobility training, transfer training, gait training, balance training, home exercise program, patient/family education, postural re-education, strengthening  Therapy Frequency (PT Clinical Impression): 2 times/day  Plan of Care Reviewed With: patient  Progress: no change  Outcome Summary: pt trans to EOB max of 2, pt sat EOB 15 minutes, min-mod assist to maintain sitting balance, performed LLE exercises while sitting EOB, pt would benefit from SNF          Outcome Measures     Row Name 03/12/18 1100 03/11/18 1320 03/11/18 1305       How much help from another person do you currently need...    Turning from your back to your side while in flat bed without using bedrails? 2  -AH  -- 2  -FERNANDO    Moving from lying on back to sitting on the side of a flat bed without bedrails? 2  -AH  -- 2  -FERNANDO    Moving to and from a bed to a chair (including a wheelchair)? 1   -  -- 1  -FERNANDO    Standing up from a chair using your arms (e.g., wheelchair, bedside chair)? 1  -  -- 1  -FERNANDO    Climbing 3-5 steps with a railing? 1  -AH  -- 1  -FERNANDO    To walk in hospital room? 1  -AH  -- 1  -FERNANDO    AM-PAC 6 Clicks Score 8  -  -- 8  -FERNANDO       How much help from another is currently needed...    Putting on and taking off regular lower body clothing?  -- 1  -CH  --    Bathing (including washing, rinsing, and drying)  -- 1  -CH  --    Toileting (which includes using toilet bed pan or urinal)  -- 1  -CH  --    Putting on and taking off regular upper body clothing  -- 3  -CH  --    Taking care of personal grooming (such as brushing teeth)  -- 4  -CH  --    Eating meals  -- 4  -CH  --    Score  -- 14  -CH  --       Functional Assessment    Outcome Measure [facility] AM-PAC 6 Clicks Basic Mobility (PT)  - AM-PAC 6 Clicks Daily Activity (OT)  - AM-[facility] 6 Clicks Basic Mobility (PT)  -      User Key  (r) = Recorded By, (t) = Taken By, (c) = Cosigned By    Initials Name Provider Type     Yolie Francis PTA Physical Therapy Assistant     Lilian [place], OTR/L Occupational Therapist    FERNANDO Callaway, PT DPT Physical Therapist           Time Calculation:         PT Charges     Row Name 03/12/18 1435 03/12/18 1155          Time Calculation    Start Time 1357  - 1058  -     Stop Time 1423  - 1128  -     Time Calculation (min) 26 min  - 30 min  -     PT Received On  -- 03/12/18  -     PT Goal Re-Cert Due Date  -- 03/21/18  -        Time Calculation- PT    Total Timed Code Minutes- PT 26 minute(s)  -AH 30 minute(s)  -       User Key  (r) = Recorded By, (t) = Taken By, (c) = Cosigned By    Initials Name Provider Type     Yolie Francis PTA Physical Therapy Assistant          Therapy Charges for Today     Code Description Service Date Service Provider Modifiers Qty    78964649931  PT THERAPEUTIC ACT EA 15 MIN 3/12/2018 Yolie Francis PTA GP, KX 2    [id number]  PT THER PROC  EA 15 MIN 3/12/2018 Yolie Francis PTA GP, KX 2          PT G-Codes  Outcome Measure Options: AM-PAC 6 Clicks Basic Mobility (PT)  Score: 8  Functional Limitation: Changing and maintaining body position  Changing and Maintaining Body Position Current Status (): At least 80 percent but less than 100 percent impaired, limited or restricted  Changing and Maintaining Body Position Goal Status (): At least 40 percent but less than 60 percent impaired, limited or restricted    Yolie Francis PTA  3/12/2018

## 2018-03-12 NOTE — THERAPY TREATMENT NOTE
Acute Care - Physical Therapy Treatment Note  [facility]     Patient Name: Tamy Sher  : 1930  MRN: [id number]  Today's Date: 3/12/2018  Onset of Illness/Injury or Date of Surgery: 18  Date of Referral to PT: 18  Referring Physician: Dr. Bundy    Admit Date: 3/8/2018    Visit Dx:    ICD-10-CM ICD-9-CM   1. Fall, initial encounter W19.XXXA E888.9   2. Other closed fracture of distal end of right femur, initial encounter S72.491A 821.29   3. Fall W19.XXXA E888.9   4. Impaired mobility Z74.09 799.89   5. Decreased activities of daily living (ADL) Z78.9 V49.89     Patient Active Problem List   Diagnosis   • Ischemic chest pain   • Pneumonia of both lungs due to infectious organism   • Fall   • Closed displaced comminuted fracture of shaft of right femur   • Pain of right thigh   • Class 2 obesity with serious comorbidity in adult   • Type 2 diabetes mellitus with neurologic complication, with long-term current use of insulin   • STUART (acute kidney injury)   • Chronic diastolic heart failure   • Pleural effusion, left   • Hypoxia       Therapy Treatment    Therapy Treatment / Health Promotion    Treatment Time/Intention  Discipline: physical therapy assistant  Document Type: therapy note (daily note)  Subjective Information: complains of, pain, swelling, weakness  Treatment Considerations/Comments: NWB RLE  Plan of Care Review  Plan of Care Reviewed With: patient    Vitals/Pain/Safety  Vital Signs  Post SpO2 (%): 93  O2 Delivery Post Treatment: supplemental O2 (3l)  Pain Assessment  Additional Documentation: Pain Scale: FACES Pre/Post-Treatment (Group)  Pain Scale: Numbers Pre/Post-Treatment  Pain Location - Side: Right  Pain Location - Orientation: lower  Pain Location: knee  Pain Scale: Word Pre/Post-Treatment  Pain Location - Side: Right  Pain Location - Orientation: lower  Pain Location: knee  Pain Scale: FACES Pre/Post-Treatment  Pain: FACES Scale, Pretreatment: 6-->hurts even more  Pain  Location - Side: Right  Pain Location - Orientation: lower  Pain Location: knee  Positioning and Restraints  Pre-Treatment Position: in bed  Post Treatment Position: bed  In Bed: fowlers, call light within reach, encouraged to call for assist    Mobility,ADL,Motor, Modality  Mobility Assessment/Intervention  Extremity Weight-bearing Status: right lower extremity  Right Lower Extremity (Weight-bearing Status): non weight-bearing (NWB)  Bed Mobility Assessment/Treatment  Scooting/Bridging [place] (Bed Mobility): dependent (less than 25% patient effort), 2 person assist  Supine-Sit [place] (Bed Mobility): maximum assist (25% patient effort), 2 person assist, verbal cues  Sit-Supine [place] (Bed Mobility): maximum assist (25% patient effort), 2 person assist, verbal cues  Bed Mobility, Safety Issues: decreased use of arms for pushing/pulling, decreased use of legs for bridging/pushing  Assistive Device (Bed Mobility): draw sheet  Transfer Assessment/Treatment  Comment (Transfers): not appropriate to attempt     Motor Skills Assessment/Interventions  Additional Documentation: Balance (Group), Therapeutic Exercise (Group), Therapeutic Exercise Interventions (Group)  Therapeutic Exercise  Lower Extremity Range of Motion (Therapeutic Exercise): ankle dorsiflexion/plantar flexion, bilateral, knee flexion/extension, left  Exercise Type (Therapeutic Exercise): AROM (active range of motion)  Position (Therapeutic Exercise): seated  Sets/Reps (Therapeutic Exercise): 10 reps x sets  Balance  Balance: static sitting balance  Static Sitting Balance  Level of [place] (Unsupported Sitting, Static Balance): minimal assist, 75% patient effort, moderate assist, 50 to 74% patient effort  Sitting Position (Unsupported Sitting, Static Balance): sitting on edge of bed  Time Able to Maintain Position (Unsupported Sitting, Static Balance): more than 5 minutes        ROM/MMT             Sensory, Edema, Orthotics           Cognition, Communication, Swallow  Speaking Valve  Post SpO2 (%): 93  General Eating/Swallowing Observations  Post SpO2 (%): 93    Outcome Summary               PT Rehab Goals     Row Name 03/11/18 1305             Bed Mobility Goal 1 (PT)    Activity/Assistive Device (Bed Mobility Goal 1, PT) sit to supine/supine to sit  -FERNANDO      [place] Level/Cues Needed (Bed Mobility Goal 1, PT) minimum assist (75% or more patient effort)  -FERNANDO      Time Frame (Bed Mobility Goal 1, PT) long term goal (LTG);10 days  -FERNANDO      Barriers (Bed Mobility Goal 1, PT) Weakness, pain  -FERNANDO      Progress/Outcomes (Bed Mobility Goal 1, PT) goal ongoing  -FERNANDO         Transfer Goal 1 (PT)    Activity/Assistive Device (Transfer Goal 1, PT) sit-to-stand/stand-to-sit;bed-to-chair/chair-to-bed;walker, rolling  -FERNANDO      [place] Level/Cues Needed (Transfer Goal 1, PT) minimum assist (75% or more patient effort)  -FERNANDO      Time Frame (Transfer Goal 1, PT) long term goal (LTG);10 days  -FERNANDO      Barriers (Transfers Goal 1, PT) weakness, pain  -FERNANDO      Progress/Outcome (Transfer Goal 1, PT) goal ongoing  -FERNANDO         Gait Training Goal 1 (PT)    Activity/Assistive Device (Gait Training Goal 1, PT) --  -FERNANDO      [place] Level (Gait Training Goal 1, PT) --  -FERNANDO      Distance (Gait Goal 1, PT) --  -FERNANDO      Time Frame (Gait Training Goal 1, PT) --  -FERNANDO      Barriers (Gait Training Goal 1, PT) --  -FERNANDO      Progress/Outcome (Gait Training Goal 1, PT) --  -FERNANDO        User Key  (r) = Recorded By, (t) = Taken By, (c) = Cosigned By    Initials Name Provider Type    FERNANDO Callaway, PT DPT Physical Therapist          Physical Therapy Education     Title: PT OT SLP Therapies (Active)     Topic: Physical Therapy (Active)     Point: Mobility training (Active)    Learning Progress Summary     Learner Status Readiness Method Response Comment Documented by    Patient Active Acceptance E NR bed mobility AH 03/12/18 1152     Done Acceptance E VU,NR Educated  pt/sons on progression of PT POC, benefits of activity, NWB R LE FERNANDO 03/11/18 1305    Family Done Acceptance E VU,NR Educated pt/sons on progression of PT POC, benefits of activity, NWB R LE FERNANDO 03/11/18 1305          Point: Precautions (Done)    Learning Progress Summary     Learner Status Readiness Method Response Comment Documented by    Patient Done Acceptance E VU,NR Educated pt/sons on progression of PT POC, benefits of activity, NWB R LE FERNANDO 03/11/18 1305    Family Done Acceptance E VU,NR Educated pt/sons on progression of PT POC, benefits of activity, NWB R LE FERNANDO 03/11/18 1305                      User Key     Initials Effective Dates Name Provider Type Discipline     08/02/16 -  Yolie Francis, PTA Physical Therapy Assistant PT    FERNANDO 08/02/16 -  Kd Callaway, PT DPT Physical Therapist PT                    PT Recommendation and Plan  Anticipated Discharge Disposition (PT): skilled nursing facility (SNF), inpatient rehab facility  Planned Therapy Interventions (PT Eval): bed mobility training, transfer training, gait training, balance training, home exercise program, patient/family education, postural re-education, strengthening  Therapy Frequency (PT Clinical Impression): 2 times/day  Plan of Care Reviewed With: patient  Progress: no change  Outcome Summary: pt trans to EOB max of 2, pt sat EOB 15 minutes, min-mod assist to maintain sitting balance, performed LLE exercises while sitting EOB, pt would benefit from SNF          Outcome Measures     Row Name 03/12/18 1100 03/11/18 1320 03/11/18 1305       How much help from another person do you currently need...    Turning from your back to your side while in flat bed without using bedrails? 2  -AH  -- 2  -FERNANDO    Moving from lying on back to sitting on the side of a flat bed without bedrails? 2  -AH  -- 2  -FERNANDO    Moving to and from a bed to a chair (including a wheelchair)? 1  -AH  -- 1  -FERNANDO    Standing up from a chair using your arms (e.g., wheelchair,  bedside chair)? 1  -  -- 1  -FERNANDO    Climbing 3-5 steps with a railing? 1  -  -- 1  -FERNANDO    To walk in hospital room? 1  -  -- 1  -FERNANDO    AM-PAC 6 Clicks Score 8  -  -- 8  -FERNANDO       How much help from another is currently needed...    Putting on and taking off regular lower body clothing?  -- 1  -CH  --    Bathing (including washing, rinsing, and drying)  -- 1  -CH  --    Toileting (which includes using toilet bed pan or urinal)  -- 1  -CH  --    Putting on and taking off regular upper body clothing  -- 3  -CH  --    Taking care of personal grooming (such as brushing teeth)  -- 4  -CH  --    Eating meals  -- 4  -CH  --    Score  -- 14  -CH  --       Functional Assessment    Outcome Measure Options AM-PAC 6 Clicks Basic Mobility (PT)  - AM-PAC 6 Clicks Daily Activity (OT)  - AM-[facility] 6 Clicks Basic Mobility (PT)  -      User Key  (r) = Recorded By, (t) = Taken By, (c) = Cosigned By    Initials Name Provider Type     Yolie Francis PTA Physical Therapy Assistant    CH [place], OTR/L Occupational Therapist    FERNANDO Callaway, PT DPT Physical Therapist           Time Calculation:         PT Charges     Row Name 03/12/18 1155             Time Calculation    Start Time 1058  -      Stop Time 1128  -      Time Calculation (min) 30 min  -      PT Received On 03/12/18  -      PT Goal Re-Cert Due Date 03/21/18  -         Time Calculation- PT    Total Timed Code Minutes- PT 30 minute(s)  -        User Key  (r) = Recorded By, (t) = Taken By, (c) = Cosigned By    Initials Name Provider Type     Yolie Francis PTA Physical Therapy Assistant          Therapy Charges for Today     Code Description Service Date Service Provider Modifiers Qty    [id number]  PT THERAPEUTIC ACT EA 15 MIN 3/12/2018 Yolie Francis PTA GP, KX 2          PT G-Codes  Outcome Measure Options: AM-PAC 6 Clicks Basic Mobility (PT)  Score: 8  Functional Limitation: Changing and maintaining body position  Changing and  Maintaining Body Position Current Status (): At least 80 percent but less than 100 percent impaired, limited or restricted  Changing and Maintaining Body Position Goal Status (): At least 40 percent but less than 60 percent impaired, limited or restricted    Yolie Francis, PTA  3/12/2018

## 2018-03-12 NOTE — PROGRESS NOTES
Discharge Planning Assessment  [facility]     Patient Name: Tamy Sher  MRN: [id number]  Today's Date: 3/12/2018    Admit Date: 3/8/2018          Discharge Needs Assessment     Row Name 03/12/18 1241       Living Environment    Lives With alone    Current Living Arrangements home/apartment/condo    Primary Care Provided by child(anival);homecare agency;other (see comments)   [facility]    Provides Primary Care For no one    Family Caregiver if Needed child(anival), adult;grandchild(anival), adult    Quality of Family Relationships involved;helpful;supportive    Able to Return to Prior Arrangements yes       Transition Planning    Patient/Family Anticipated Services at Transition rehabilitation services       Discharge Needs Assessment    Readmission Within the Last 30 Days no previous admission in last 30 days    Concerns to be Addressed adjustment to diagnosis/illness    Equipment Currently Used at Home walker, rolling;commode;bath bench    Anticipated Changes Related to Illness inability to care for self    Outpatient/Agency/Support Group Needs skilled nursing facility    Offered/Gave Vendor List yes    Patient's Choice of Community Agency(s) [facility]    Current Discharge Risk lives alone    Discharge Coordination/Progress AMADO spoke to patient and her sons in room re discharge planning. Patient lives alone and is current with [facility] ([facility] aware of current admission). Patient/sons understand patient will need skilled placement for rehab. SW provided options to patient and sons, and they would like referral made to [facility]. AMADO called Sue in admissions at [facility] and provided referral. Will follow for bed offer.             Discharge Plan    No documentation.       Destination     No service coordination in this encounter.      Durable Medical Equipment     No service coordination in this encounter.      Dialysis/Infusion     No service coordination in this encounter.      Home Medical  Care     No service coordination in this encounter.      Social Care     No service coordination in this encounter.                Demographic Summary    No documentation.           Functional Status    No documentation.           Psychosocial    No documentation.           Abuse/Neglect    No documentation.           Legal    No documentation.           Substance Abuse    No documentation.           Patient Forms    No documentation.         IZAIAH McnamaraW

## 2018-03-12 NOTE — PLAN OF CARE
Problem: Patient Care Overview (Adult)  Goal: Plan of Care Review  Outcome: Ongoing (interventions implemented as appropriate)   03/12/18 4138   Coping/Psychosocial Response Interventions   Plan Of Care Reviewed With patient   Patient Care Overview   Progress no change   Outcome Evaluation   Outcome Summary/Follow up Plan Patient has maintained safety with no c/o pain. Acewrap inplace to right knee and mepilex is c/d/i to right hip. VSS. Family at bedside assisting patient with her meals. Will continue to monitor. .       Problem: Fall Risk (Adult)  Goal: Absence of Falls  Outcome: Ongoing (interventions implemented as appropriate)      Problem: Pressure Ulcer Risk (Jeremiah Scale) (Adult,Obstetrics,Pediatric)  Goal: Skin Integrity  Outcome: Ongoing (interventions implemented as appropriate)      Problem: Skin Injury Risk (Adult)  Goal: Skin Health and Integrity  Outcome: Ongoing (interventions implemented as appropriate)    Goal: Identify Related Risk Factors and Signs and Symptoms  Outcome: Ongoing (interventions implemented as appropriate)

## 2018-03-12 NOTE — PLAN OF CARE
Problem: Patient Care Overview (Adult)  Goal: Plan of Care Review  Outcome: Ongoing (interventions implemented as appropriate)   03/12/18 0130   Coping/Psychosocial Response Interventions   Plan Of Care Reviewed With patient   Patient Care Overview   Progress no change   Outcome Evaluation   Outcome Summary/Follow up Plan Patient rested well through the night. Dressing to right knee CDI, dressing to right hip stained with dried drainage. No c/o pain. PPP, VSS, Safety maintained.       Problem: Fall Risk (Adult)  Goal: Absence of Falls  Outcome: Ongoing (interventions implemented as appropriate)      Problem: Pressure Ulcer Risk (Jeremiah Scale) (Adult,Obstetrics,Pediatric)  Goal: Skin Integrity  Outcome: Ongoing (interventions implemented as appropriate)      Problem: Skin Injury Risk (Adult)  Goal: Skin Health and Integrity  Outcome: Ongoing (interventions implemented as appropriate)    Goal: Identify Related Risk Factors and Signs and Symptoms  Outcome: Ongoing (interventions implemented as appropriate)

## 2018-03-13 ENCOUNTER — APPOINTMENT (OUTPATIENT)
Dept: GENERAL RADIOLOGY | Facility: HOSPITAL | Age: 83
End: 2018-03-13

## 2018-03-13 LAB
ALBUMIN SERPL-MCNC: 2.4 G/DL (ref 3.5–5)
ALBUMIN/GLOB SERPL: 1.1 G/DL (ref 1.1–2.5)
ALP SERPL-CCNC: 62 U/L (ref 24–120)
ALT SERPL W P-5'-P-CCNC: 20 U/L (ref 0–54)
ANION GAP SERPL CALCULATED.3IONS-SCNC: 7 MMOL/L (ref 4–13)
ANISOCYTOSIS BLD QL: ABNORMAL
AST SERPL-CCNC: 23 U/L (ref 7–45)
BILIRUB SERPL-MCNC: 0.3 MG/DL (ref 0.1–1)
BUN BLD-MCNC: 65 MG/DL (ref 5–21)
BUN/CREAT SERPL: 53.7 (ref 7–25)
CALCIUM SPEC-SCNC: 8 MG/DL (ref 8.4–10.4)
CHLORIDE SERPL-SCNC: 104 MMOL/L (ref 98–110)
CO2 SERPL-SCNC: 27 MMOL/L (ref 24–31)
CREAT BLD-MCNC: 1.21 MG/DL (ref 0.5–1.4)
DEPRECATED RDW RBC AUTO: 69.1 FL (ref 40–54)
EOSINOPHIL # BLD MANUAL: 0.33 10*3/MM3 (ref 0–0.7)
EOSINOPHIL NFR BLD MANUAL: 3 % (ref 0–4)
EOSINOPHIL SPEC QL WRIGHT STN: NORMAL %
ERYTHROCYTE [DISTWIDTH] IN BLOOD BY AUTOMATED COUNT: 18.9 % (ref 12–15)
GFR SERPL CREATININE-BSD FRML MDRD: 42 ML/MIN/1.73
GLOBULIN UR ELPH-MCNC: 2.2 GM/DL
GLUCOSE BLD-MCNC: 76 MG/DL (ref 70–100)
GLUCOSE BLDC GLUCOMTR-MCNC: 116 MG/DL (ref 70–130)
GLUCOSE BLDC GLUCOMTR-MCNC: 119 MG/DL (ref 70–130)
GLUCOSE BLDC GLUCOMTR-MCNC: 136 MG/DL (ref 70–130)
GLUCOSE BLDC GLUCOMTR-MCNC: 53 MG/DL (ref 70–130)
HCT VFR BLD AUTO: 31.3 % (ref 37–47)
HGB BLD-MCNC: 9.9 G/DL (ref 12–16)
LYMPHOCYTES # BLD MANUAL: 0.66 10*3/MM3 (ref 0.72–4.86)
LYMPHOCYTES NFR BLD MANUAL: 6 % (ref 15–45)
LYMPHOCYTES NFR BLD MANUAL: 8 % (ref 4–12)
MACROCYTES BLD QL SMEAR: ABNORMAL
MCH RBC QN AUTO: 32.1 PG (ref 28–32)
MCHC RBC AUTO-ENTMCNC: 31.6 G/DL (ref 33–36)
MCV RBC AUTO: 101.6 FL (ref 82–98)
MONOCYTES # BLD AUTO: 0.88 10*3/MM3 (ref 0.19–1.3)
NEUTROPHILS # BLD AUTO: 9.14 10*3/MM3 (ref 1.87–8.4)
NEUTROPHILS NFR BLD MANUAL: 76 % (ref 39–78)
NEUTS BAND NFR BLD MANUAL: 7 % (ref 0–10)
PLATELET # BLD AUTO: 305 10*3/MM3 (ref 130–400)
PMV BLD AUTO: 10 FL (ref 6–12)
POTASSIUM BLD-SCNC: 4.9 MMOL/L (ref 3.5–5.3)
PROT SERPL-MCNC: 4.6 G/DL (ref 6.3–8.7)
RBC # BLD AUTO: 3.08 10*6/MM3 (ref 4.2–5.4)
SMALL PLATELETS BLD QL SMEAR: ADEQUATE
SODIUM BLD-SCNC: 138 MMOL/L (ref 135–145)
WBC MORPH BLD: NORMAL
WBC NRBC COR # BLD: 11.01 10*3/MM3 (ref 4.8–10.8)

## 2018-03-13 PROCEDURE — 94799 UNLISTED PULMONARY SVC/PX: CPT

## 2018-03-13 PROCEDURE — 82962 GLUCOSE BLOOD TEST: CPT

## 2018-03-13 PROCEDURE — 97110 THERAPEUTIC EXERCISES: CPT

## 2018-03-13 PROCEDURE — 71045 X-RAY EXAM CHEST 1 VIEW: CPT

## 2018-03-13 PROCEDURE — 85025 COMPLETE CBC W/AUTO DIFF WBC: CPT | Performed by: FAMILY MEDICINE

## 2018-03-13 PROCEDURE — 97530 THERAPEUTIC ACTIVITIES: CPT

## 2018-03-13 PROCEDURE — 80053 COMPREHEN METABOLIC PANEL: CPT | Performed by: FAMILY MEDICINE

## 2018-03-13 PROCEDURE — 94660 CPAP INITIATION&MGMT: CPT

## 2018-03-13 PROCEDURE — 85007 BL SMEAR W/DIFF WBC COUNT: CPT | Performed by: FAMILY MEDICINE

## 2018-03-13 RX ORDER — FUROSEMIDE 20 MG/1
20 TABLET ORAL ONCE
Status: COMPLETED | OUTPATIENT
Start: 2018-03-13 | End: 2018-03-13

## 2018-03-13 RX ORDER — FUROSEMIDE 10 MG/ML
20 INJECTION INTRAMUSCULAR; INTRAVENOUS ONCE
Status: DISCONTINUED | OUTPATIENT
Start: 2018-03-13 | End: 2018-03-13

## 2018-03-13 RX ADMIN — LEVOTHYROXINE SODIUM 50 MCG: 50 TABLET ORAL at 06:00

## 2018-03-13 RX ADMIN — METOPROLOL SUCCINATE 50 MG: 50 TABLET, FILM COATED, EXTENDED RELEASE ORAL at 09:24

## 2018-03-13 RX ADMIN — GABAPENTIN 300 MG: 300 CAPSULE ORAL at 09:28

## 2018-03-13 RX ADMIN — FOLIC ACID 1 MG: 1 TABLET ORAL at 09:25

## 2018-03-13 RX ADMIN — BUDESONIDE AND FORMOTEROL FUMARATE DIHYDRATE 2 PUFF: 160; 4.5 AEROSOL RESPIRATORY (INHALATION) at 07:19

## 2018-03-13 RX ADMIN — HYDROXYUREA 500 MG: 500 CAPSULE ORAL at 09:25

## 2018-03-13 RX ADMIN — FUROSEMIDE 20 MG: 20 TABLET ORAL at 19:59

## 2018-03-13 RX ADMIN — IPRATROPIUM BROMIDE AND ALBUTEROL SULFATE 3 ML: 2.5; .5 SOLUTION RESPIRATORY (INHALATION) at 07:14

## 2018-03-13 RX ADMIN — GABAPENTIN 600 MG: 300 CAPSULE ORAL at 17:24

## 2018-03-13 RX ADMIN — HYDROCODONE BITARTRATE AND ACETAMINOPHEN 1 TABLET: 7.5; 325 TABLET ORAL at 23:24

## 2018-03-13 RX ADMIN — MAGNESIUM HYDROXIDE 10 ML: 2400 SUSPENSION ORAL at 20:46

## 2018-03-13 RX ADMIN — IPRATROPIUM BROMIDE AND ALBUTEROL SULFATE 3 ML: 2.5; .5 SOLUTION RESPIRATORY (INHALATION) at 19:19

## 2018-03-13 RX ADMIN — ASPIRIN 325 MG: 325 TABLET, COATED ORAL at 20:46

## 2018-03-13 RX ADMIN — ATORVASTATIN CALCIUM 10 MG: 10 TABLET, FILM COATED ORAL at 20:46

## 2018-03-13 RX ADMIN — IPRATROPIUM BROMIDE AND ALBUTEROL SULFATE 3 ML: 2.5; .5 SOLUTION RESPIRATORY (INHALATION) at 11:10

## 2018-03-13 RX ADMIN — BUDESONIDE AND FORMOTEROL FUMARATE DIHYDRATE 2 PUFF: 160; 4.5 AEROSOL RESPIRATORY (INHALATION) at 19:19

## 2018-03-13 RX ADMIN — PANTOPRAZOLE SODIUM 40 MG: 40 TABLET, DELAYED RELEASE ORAL at 06:00

## 2018-03-13 NOTE — PLAN OF CARE
Problem: Patient Care Overview (Adult)  Goal: Plan of Care Review  Outcome: Ongoing (interventions implemented as appropriate)   03/13/18 0418   Coping/Psychosocial Response Interventions   Plan Of Care Reviewed With patient   Patient Care Overview   Progress no change   Outcome Evaluation   Outcome Summary/Follow up Plan vss; alert and oriented x 4; no c/o pain this shift; rested well; continuous pulse ox; estrada; dressings c/d/i; continue to monitor     Goal: Adult Individualization and Mutuality  Outcome: Ongoing (interventions implemented as appropriate)    Goal: Discharge Needs Assessment  Outcome: Ongoing (interventions implemented as appropriate)      Problem: Fall Risk (Adult)  Goal: Absence of Falls  Outcome: Ongoing (interventions implemented as appropriate)      Problem: Pressure Ulcer Risk (Jeremiah Scale) (Adult,Obstetrics,Pediatric)  Goal: Skin Integrity  Outcome: Ongoing (interventions implemented as appropriate)      Problem: Skin Injury Risk (Adult)  Goal: Skin Health and Integrity  Outcome: Ongoing (interventions implemented as appropriate)    Goal: Identify Related Risk Factors and Signs and Symptoms  Outcome: Ongoing (interventions implemented as appropriate)

## 2018-03-13 NOTE — PROGRESS NOTES
Spaces post internal fixation of her right distal femur periprosthetic fracture.  She is progressing satisfactorily.  Her course is going to be slow due to her age and lack of mobility.    Plan continue with physical therapy

## 2018-03-13 NOTE — THERAPY TREATMENT NOTE
Acute Care - Physical Therapy Treatment Note  [facility]     Patient Name: Tamy Sher  : 1930  MRN: [id number]  Today's Date: 3/13/2018  Onset of Illness/Injury or Date of Surgery: 18  Date of Referral to PT: 18  Referring Physician: Dr. Bundy    Admit Date: 3/8/2018    Visit Dx:    ICD-10-CM ICD-9-CM   1. Fall, initial encounter W19.XXXA E888.9   2. Other closed fracture of distal end of right femur, initial encounter S72.491A 821.29   3. Fall W19.XXXA E888.9   4. Impaired mobility Z74.09 799.89   5. Decreased activities of daily living (ADL) Z78.9 V49.89     Patient Active Problem List   Diagnosis   • Ischemic chest pain   • Pneumonia of both lungs due to infectious organism   • Fall   • Closed displaced comminuted fracture of shaft of right femur   • Pain of right thigh   • Class 2 obesity with serious comorbidity in adult   • Type 2 diabetes mellitus with neurologic complication, with long-term current use of insulin   • STUART (acute kidney injury)   • Chronic diastolic heart failure   • Pleural effusion, left   • Hypoxia       Therapy Treatment    Therapy Treatment / Health Promotion    Treatment Time/Intention  Discipline: physical therapy assistant  Document Type: therapy note (daily note)  Subjective Information: complains of, dyspnea, weakness  Existing Precautions/Restrictions: fall, weight bearing (NWB RLE)  Treatment Considerations/Comments: (S) NWB RLE  Plan of Care Review  Plan of Care Reviewed With: patient    Vitals/Pain/Safety  Pain Assessment  Additional Documentation: Pain Scale: FACES Pre/Post-Treatment (Group)  Pain Scale: Numbers Pre/Post-Treatment  Pain Location - Side: Right  Pain Location - Orientation: lower  Pain Location: knee  Pain Scale: Word Pre/Post-Treatment  Pain Location - Side: Right  Pain Location - Orientation: lower  Pain Location: knee  Pain Scale: FACES Pre/Post-Treatment  Pain: FACES Scale, Pretreatment: 6-->hurts even more  Pain: FACES Scale,  Post-Treatment: 6-->hurts even more  Pain Location - Side: Right  Pain Location - Orientation: lower  Pain Location: knee  Positioning and Restraints  Pre-Treatment Position: in bed  Post Treatment Position: bed  In Bed: fowlers, call light within reach, encouraged to call for assist, exit alarm on, with family/caregiver    Mobility,ADL,Motor, Modality  Mobility Assessment/Intervention  Extremity Weight-bearing Status: right lower extremity  Right Lower Extremity (Weight-bearing Status): (S) non weight-bearing (NWB)  Bed Mobility Assessment/Treatment  Supine-Sit [place] (Bed Mobility): maximum assist (25% patient effort), 2 person assist, verbal cues  Sit-Supine [place] (Bed Mobility): maximum assist (25% patient effort), 2 person assist, verbal cues  Bed Mobility, Safety Issues: decreased use of arms for pushing/pulling, decreased use of legs for bridging/pushing  Assistive Device (Bed Mobility): draw sheet  Transfer Assessment/Treatment  Comment (Transfers): attempted to stand,pt not able to stand  Gait/Stairs Assessment/Training  Comment (Gait/Stairs): not appropriated to attempt     Therapeutic Exercise  Lower Extremity (Therapeutic Exercise): LAQ (long arc quad), bilateral  Lower Extremity Range of Motion (Therapeutic Exercise): ankle dorsiflexion/plantar flexion, bilateral  Exercise Type (Therapeutic Exercise): AAROM (active assistive range of motion)  Position (Therapeutic Exercise): seated  Comment (Therapeutic Exercise): P-AAROM RLE 10 REPS, AAROM LLE 20 REPS  Balance  Balance: static sitting balance  Static Sitting Balance  Level of [place] (Unsupported Sitting, Static Balance): contact guard assist  Sitting Position (Unsupported Sitting, Static Balance): sitting on edge of bed  Time Able to Maintain Position (Unsupported Sitting, Static Balance): more than 5 minutes        ROM/MMT             Sensory, Edema, Orthotics          Cognition, Communication, Swallow       Outcome Summary                PT Rehab Goals     Row Name 03/11/18 1305             Bed Mobility Goal 1 (PT)    Activity/Assistive Device (Bed Mobility Goal 1, PT) sit to supine/supine to sit  -FERNANDO      [place] Level/Cues Needed (Bed Mobility Goal 1, PT) minimum assist (75% or more patient effort)  -FERNANDO      Time Frame (Bed Mobility Goal 1, PT) long term goal (LTG);10 days  -FERNANDO      Barriers (Bed Mobility Goal 1, PT) Weakness, pain  -FERNANDO      Progress/Outcomes (Bed Mobility Goal 1, PT) goal ongoing  -FERNANDO         Transfer Goal 1 (PT)    Activity/Assistive Device (Transfer Goal 1, PT) sit-to-stand/stand-to-sit;bed-to-chair/chair-to-bed;walker, rolling  -FERNANDO      [place] Level/Cues Needed (Transfer Goal 1, PT) minimum assist (75% or more patient effort)  -FERNANDO      Time Frame (Transfer Goal 1, PT) long term goal (LTG);10 days  -FERNANDO      Barriers (Transfers Goal 1, PT) weakness, pain  -FERNANDO      Progress/Outcome (Transfer Goal 1, PT) goal ongoing  -FERNANDO         Gait Training Goal 1 (PT)    Activity/Assistive Device (Gait Training Goal 1, PT) --  -FERNANDO      [place] Level (Gait Training Goal 1, PT) --  -FERNANDO      Distance (Gait Goal 1, PT) --  -FERNANDO      Time Frame (Gait Training Goal 1, PT) --  -FERNANDO      Barriers (Gait Training Goal 1, PT) --  -FERNANDO      Progress/Outcome (Gait Training Goal 1, PT) --  -FERNANDO        User Key  (r) = Recorded By, (t) = Taken By, (c) = Cosigned By    Initials Name Provider Type    FERNANDO Callaway, PT DPT Physical Therapist          Physical Therapy Education     Title: PT OT SLP Therapies (Active)     Topic: Physical Therapy (Active)     Point: Mobility training (Done)    Learning Progress Summary     Learner Status Readiness Method Response Comment Documented by    Patient Done Acceptance E VU bed mobility  03/13/18 1112     Active Acceptance E NR bed mobility  03/12/18 1152     Done Acceptance E VU,NR Educated pt/sons on progression of PT POC, benefits of activity, NWB R LE FERNANDO 03/11/18 1305    Family Done  Acceptance E VU,NR Educated pt/sons on progression of PT POC, benefits of activity, NWB R LE FERNANDO 03/11/18 1305          Point: Precautions (Done)    Learning Progress Summary     Learner Status Readiness Method Response Comment Documented by    Patient Done Acceptance E VU,NR Educated pt/sons on progression of PT POC, benefits of activity, NWB R LE FERNANDO 03/11/18 1305    Family Done Acceptance E VU,NR Educated pt/sons on progression of PT POC, benefits of activity, NWB R LE FERNANDO 03/11/18 1305                      User Key     Initials Effective Dates Name Provider Type Discipline     08/02/16 -  Yolie Francis, PTA Physical Therapy Assistant PT    FERNANDO 08/02/16 -  Kd Callaway, PT DPT Physical Therapist PT                    PT Recommendation and Plan  Anticipated Discharge Disposition (PT): skilled nursing facility (SNF), inpatient rehab facility  Planned Therapy Interventions (PT Eval): bed mobility training, transfer training, gait training, balance training, home exercise program, patient/family education, postural re-education, strengthening  Therapy Frequency (PT Clinical Impression): 2 times/day  Plan of Care Reviewed With: patient  Progress: no change  Outcome Summary: pt trans to EOB max of 2,sitting balance improved today, pt able to sit EOB CGA, pt performed B LAQ AAROM, attempted to stand pt not able to stand. Pt would benefit form SNF          Outcome Measures     Row Name 03/13/18 1100 03/12/18 1100 03/11/18 1320       How much help from another person do you currently need...    Turning from your back to your side while in flat bed without using bedrails? 2  -AH 2  -AH  --    Moving from lying on back to sitting on the side of a flat bed without bedrails? 2  -AH 2  -AH  --    Moving to and from a bed to a chair (including a wheelchair)? 1  -AH 1  -AH  --    Standing up from a chair using your arms (e.g., wheelchair, bedside chair)? 1  - 1  -AH  --    Climbing 3-5 steps with a railing? 1  - 1  -AH   --    To walk in hospital room? 1  -AH 1  -AH  --    AM-PAC 6 Clicks Score 8  -AH 8  -AH  --       How much help from another is currently needed...    Putting on and taking off regular lower body clothing?  --  -- 1  -CH    Bathing (including washing, rinsing, and drying)  --  -- 1  -CH    Toileting (which includes using toilet bed pan or urinal)  --  -- 1  -CH    Putting on and taking off regular upper body clothing  --  -- 3  -CH    Taking care of personal grooming (such as brushing teeth)  --  -- 4  -CH    Eating meals  --  -- 4  -CH    Score  --  -- 14  -CH       Functional Assessment    Outcome Measure Options AM-PAC 6 Clicks Basic Mobility (PT)  - AM-[facility] 6 Clicks Basic Mobility (PT)  - AM-[facility] 6 Clicks Daily Activity (OT)  -    Row Name 03/11/18 1305             How much help from another person do you currently need...    Turning from your back to your side while in flat bed without using bedrails? 2  -FERNANDO      Moving from lying on back to sitting on the side of a flat bed without bedrails? 2  -FERNANDO      Moving to and from a bed to a chair (including a wheelchair)? 1  -FERNANDO      Standing up from a chair using your arms (e.g., wheelchair, bedside chair)? 1  -FERNANDO      Climbing 3-5 steps with a railing? 1  -FERNANDO      To walk in hospital room? 1  -FERNANDO      AM-PAC 6 Clicks Score 8  -FERNANDO         Functional Assessment    Outcome Measure Options AM-[facility] 6 Clicks Basic Mobility (PT)  -FERNANDO        User Key  (r) = Recorded By, (t) = Taken By, (c) = Cosigned By    Initials Name Provider Type     Yolie Francis, PTA Physical Therapy Assistant    REMY Park, OTR/L Occupational Therapist    FERNANDO Callaway, PT DPT Physical Therapist           Time Calculation:         PT Charges     Row Name 03/13/18 1623 03/13/18 1115          Time Calculation    Start Time 1536  - 1032  -     Stop Time 1606  - 1102  -     Time Calculation (min) 30 min  - 30 min  -     PT Received On  -- 03/13/18  -     PT Goal Re-Cert  Due Date  -- 03/21/18  -        Time Calculation- PT    Total Timed Code Minutes- PT 30 minute(s)  -AH 30 minute(s)  -       User Key  (r) = Recorded By, (t) = Taken By, (c) = Cosigned By    Initials Name Provider Type     Yolie Francis PTA Physical Therapy Assistant          Therapy Charges for Today     Code Description Service Date Service Provider Modifiers Qty    35377654390 HC PT THERAPEUTIC ACT EA 15 MIN 3/12/2018 Yolie Francis PTA GP, KX 2    57589600961 HC PT THER PROC EA 15 MIN 3/12/2018 Yolie Francis PTA GP, KX 2    34001656033 HC PT THERAPEUTIC ACT EA 15 MIN 3/13/2018 Yolie Francis, VERNELL GP, KX 2    93274316285 HC PT THER PROC EA 15 MIN 3/13/2018 Yolie Francis, VERNELL GP, KX 2          PT G-Codes  Outcome Measure Options: AM-PAC 6 Clicks Basic Mobility (PT)  Score: 8  Functional Limitation: Changing and maintaining body position  Changing and Maintaining Body Position Current Status (): At least 80 percent but less than 100 percent impaired, limited or restricted  Changing and Maintaining Body Position Goal Status (): At least 40 percent but less than 60 percent impaired, limited or restricted    Yolie Francis PTA  3/13/2018

## 2018-03-13 NOTE — ACP (ADVANCE CARE PLANNING)
Follow-up visit to review conversation for Advance care Planning.  Two family members present.  Each section of the document discussed and their questions answered. Patient states she wants to complete but her choice for healthcare surrogate is not present and she wishes to speak with him first. Contact number given and encouraged to call facilitators when he is present.

## 2018-03-13 NOTE — OP NOTE
[facility]  OP NOTE    Patient Name: Tamy Sher  Date of Procedure: 3/10/18     PREOPERATIVE DIAGNOSIS: Closed comminuted periprosthetic supracondylar fracture right distal femur     POSTOPERATIVE DIAGNOSIS: Same.     PROCEDURE PERFORMED: Interlocking IM nailing right femur fracture     SURGEON: Nima Bundy MD      ANESTHESIA: General.    PREPARATION: Routine.    STAFF: Circulator: Cely Joshi RN  Scrub Person: [place]; Lisa Mariano  Assistant: Jes Walls    ESTIMATED BLOOD LOSS: 200 mL    SPECIMENS: None    COMPLICATIONS: None    INDICATIONS: Tamy Sher is a 87 y.o. female who sustained a periprosthetic fracture of her right distal femur was felt that stabilization was indicated. The indications, risks, and possible complications of the procedure were explained to the patient, who voiced understanding and wished to proceed with surgery.  At surgery the Biomet system was used utilizing a 340 mm x 13 mm nail with 4 distal locking screws and one static proximal locking screw     PROCEDURE IN DETAIL: The patient was taken to the operating room and placed on the operating table in a supine position. After a spinal anesthetic was administered  the right lower extremity was prepped and draped in a sterile manner.  The previous total knee wound was then reopened with sharp dissection and a medial parapatellar arthrotomy incision was made.  Adequate retraction a guidewire was inserted at the appropriate entry point between the condyles of the previously implanted total knee arthroplasty.  A guidewire was inserted and reaming over the guidewire was carried out.  The nail was then impacted into position and the distal locking screws were placed using an outrigger.  A Steinmann pin had been inserted through the tibia and a traction bow was used to aid in the reduction.  Attention was then made proximally and 2 anterior to posterior approach under fluoroscopy Inderal was made  and the proximal locking screw was placed.  The fixation appeared anatomic and stable.  The wounds were then irrigated and the deep layers were closed with Vicryl suture followed by chromic on the subcutaneous tissue followed by staples on the skin Sterile dressings were applied. The patient tolerated the procedure well. Sponge and needle counts were correct. The patient was then awakened and extubated in the operating room and taken to the recovery room in good condition.  Nima Bundy MD  Date: 3/13/2018 Time: 10:28 AM

## 2018-03-13 NOTE — PLAN OF CARE
Problem: Patient Care Overview (Adult)  Goal: Plan of Care Review  Outcome: Ongoing (interventions implemented as appropriate)   03/13/18 1224   Coping/Psychosocial Response Interventions   Plan Of Care Reviewed With patient   Patient Care Overview   Progress no change   Outcome Evaluation   Outcome Summary/Follow up Plan Patient has maintained safety this shift with no c/o pain. Patient is alert and oriented x4, continues on iv fluids and continuous pulse ox. Alves catheter in place; cath care complete with theraworx. Dressing is clean dry and intact. Family at bedside, VSS, will continue to monitor.        Problem: Fall Risk (Adult)  Goal: Absence of Falls  Outcome: Ongoing (interventions implemented as appropriate)      Problem: Pressure Ulcer Risk (Jeremiah Scale) (Adult,Obstetrics,Pediatric)  Goal: Skin Integrity  Outcome: Ongoing (interventions implemented as appropriate)      Problem: Skin Injury Risk (Adult)  Goal: Skin Health and Integrity  Outcome: Ongoing (interventions implemented as appropriate)    Goal: Identify Related Risk Factors and Signs and Symptoms  Outcome: Ongoing (interventions implemented as appropriate)

## 2018-03-13 NOTE — PROGRESS NOTES
[facility] Medicine Services  INPATIENT PROGRESS NOTE    Length of Stay: 5  Date of Admission: 3/8/2018  Primary Care Physician: Barry Foley MD    Subjective   Chief Complaint: cough    HPI   Feels congested.  Coughing.  No sputum  Has not been using IS.  No nausea or vomiting  Arms swollen.  IV red.   Pain control adequate.     Review of Systems     All pertinent negatives and positives are as above. All other systems have been reviewed and are negative unless otherwise stated.     Objective    Temp:  [97.3 °F (36.3 °C)-98.5 °F (36.9 °C)] 98.5 °F (36.9 °C)  Heart Rate:  [106-120] 120  Resp:  [18-20] 18  BP: (142-150)/(78-88) 150/78  FiO2 (%):  [40 %] 40 %  Physical Exam   Constitutional: She is oriented to person, place, and time. She appears well-developed and well-nourished.   HENT:   Head: Normocephalic and atraumatic.   Eyes: Conjunctivae and EOM are normal. Pupils are equal, round, and reactive to light.   Neck: Neck supple.   Cardiovascular: Normal rate, regular rhythm and intact distal pulses.  Exam reveals no gallop and no friction rub.    No murmur heard.  Pulmonary/Chest: Effort normal and breath sounds normal.   Cough  No sputum     Abdominal: Soft. Bowel sounds are normal. There is no hepatosplenomegaly. There is no tenderness.   Musculoskeletal: Normal range of motion. She exhibits no edema.   Neurological: She is alert and oriented to person, place, and time. No cranial nerve deficit.   Skin: Skin is warm and dry.   Left forearm IV site with minimal redness and moderate edema.  Will dc.    Psychiatric: She has a normal mood and affect. Her behavior is normal.   Nursing note and vitals reviewed.          Results Review:  I have reviewed the labs, radiology results, and diagnostic studies.    Laboratory Data:     Results from last 7 days  Lab Units 03/13/18  0509 03/12/18  0516 03/11/18  0604   WBC 10*3/mm3 11.01* 11.29* 14.50*   HEMOGLOBIN g/dL 9.9* 10.1*  11.1*   HEMATOCRIT % 31.3* 31.5* 34.5*   PLATELETS 10*3/mm3 305 278 337          Results from last 7 days  Lab Units 03/13/18  0509 03/12/18  0723 03/11/18  0604  03/09/18  0644   SODIUM mmol/L 138 137 137  < > 136   SODIUM, ARTERIAL   --   --   --   < >  --    POTASSIUM mmol/L 4.9 4.9 5.6*  < > 4.9   CHLORIDE mmol/L 104 101 102  < > 99   CO2 mmol/L 27.0 28.0 24.0  < > 30.0   BUN mg/dL 65* 69* 62*  < > 46*   CREATININE mg/dL 1.21 1.81* 2.33*  < > 1.52*   CALCIUM mg/dL 8.0* 7.8* 8.1*  < > 8.1*   BILIRUBIN mg/dL 0.3  --  0.2  --  0.2   ALK PHOS U/L 62  --  74  --  64   ALT (SGPT) U/L 20  --  27  --  29   AST (SGOT) U/L 23  --  23  --  32   GLUCOSE mg/dL 76 91 145*  < > 134*   < > = values in this interval not displayed.    Culture Data:        Radiology Data:   Imaging Results (last 24 hours)     ** No results found for the last 24 hours. **          I have reviewed the patient current medications.     Assessment/Plan     Hospital Problem List     * (Principal)Closed displaced comminuted fracture of shaft of right femur    Fall    Pain of right thigh    Class 2 obesity with serious comorbidity in adult    Type 2 diabetes mellitus with neurologic complication, with long-term current use of insulin    STUART (acute kidney injury)    Chronic diastolic heart failure    Pleural effusion, left    Hypoxia        Assessment     Fracture shaft of right femur  Fall  Obesity  DM II  STUART  Chronic diastolic heart failure no current exacerbation  Hypoxia  Pleural effusion left      Plan    DC IVF  DC IV site red  Encourage IS  Chest xray portable  Likely to [facility] in AM  Bmp in AM              Discharge Planning: I expect the patient to be discharged to SNF in 1 days.    Manda Andrew DO   03/13/18   6:11 PM

## 2018-03-13 NOTE — PLAN OF CARE
Problem: Patient Care Overview  Goal: Plan of Care Review  Outcome: Ongoing (interventions implemented as appropriate)   03/13/18 1113   Coping/Psychosocial   Plan of Care Reviewed With patient   OTHER   Outcome Summary pt trans to EOB max of 2,sitting balance improved today, pt able to sit EOB CGA, pt performed B LAQ AAROM, attempted to stand pt not able to stand. Pt would benefit form SNF   Plan of Care Review   Progress no change

## 2018-03-13 NOTE — THERAPY TREATMENT NOTE
Acute Care - Physical Therapy Treatment Note  [facility]     Patient Name: Tamy Sher  : 1930  MRN: [id number]  Today's Date: 3/13/2018  Onset of Illness/Injury or Date of Surgery: 18  Date of Referral to PT: 18  Referring Physician: Dr. Bundy    Admit Date: 3/8/2018    Visit Dx:    ICD-10-CM ICD-9-CM   1. Fall, initial encounter W19.XXXA E888.9   2. Other closed fracture of distal end of right femur, initial encounter S72.491A 821.29   3. Fall W19.XXXA E888.9   4. Impaired mobility Z74.09 799.89   5. Decreased activities of daily living (ADL) Z78.9 V49.89     Patient Active Problem List   Diagnosis   • Ischemic chest pain   • Pneumonia of both lungs due to infectious organism   • Fall   • Closed displaced comminuted fracture of shaft of right femur   • Pain of right thigh   • Class 2 obesity with serious comorbidity in adult   • Type 2 diabetes mellitus with neurologic complication, with long-term current use of insulin   • STUART (acute kidney injury)   • Chronic diastolic heart failure   • Pleural effusion, left   • Hypoxia       Therapy Treatment    Therapy Treatment / Health Promotion    Treatment Time/Intention  Discipline: physical therapy assistant  Document Type: therapy note (daily note)  Subjective Information: complains of, weakness, pain, swelling  Treatment Considerations/Comments: (S) NWB RLE  Plan of Care Review  Plan of Care Reviewed With: patient    Vitals/Pain/Safety  Pain Assessment  Additional Documentation: Pain Scale: FACES Pre/Post-Treatment (Group)  Pain Scale: Numbers Pre/Post-Treatment  Pain Location - Side: Right  Pain Location - Orientation: lower  Pain Scale: Word Pre/Post-Treatment  Pain Location - Side: Right  Pain Location - Orientation: lower  Pain Scale: FACES Pre/Post-Treatment  Pain: FACES Scale, Pretreatment: 6-->hurts even more  Pain: FACES Scale, Post-Treatment: 6-->hurts even more  Pain Location - Side: Right  Pain Location - Orientation:  lower  Positioning and Restraints  Pre-Treatment Position: in bed  Post Treatment Position: bed  In Bed: fowlers, call light within reach, encouraged to call for assist, with family/caregiver, notified nsg    Mobility,ADL,Motor, Modality  Mobility Assessment/Intervention  Extremity Weight-bearing Status: right lower extremity  Right Lower Extremity (Weight-bearing Status): (S) non weight-bearing (NWB)  Bed Mobility Assessment/Treatment  Supine-Sit [place] (Bed Mobility): maximum assist (25% patient effort), 2 person assist, verbal cues  Sit-Supine [place] (Bed Mobility): maximum assist (25% patient effort), 2 person assist, verbal cues  Bed Mobility, Safety Issues: decreased use of arms for pushing/pulling, decreased use of legs for bridging/pushing  Assistive Device (Bed Mobility): draw sheet  Transfer Assessment/Treatment  Comment (Transfers): attempted to stand,pt not able to stand  Gait/Stairs Assessment/Training  Comment (Gait/Stairs): not appropriated to attempt     Therapeutic Exercise  Lower Extremity (Therapeutic Exercise): LAQ (long arc quad), bilateral  Lower Extremity Range of Motion (Therapeutic Exercise): ankle dorsiflexion/plantar flexion, bilateral  Exercise Type (Therapeutic Exercise): AAROM (active assistive range of motion)  Position (Therapeutic Exercise): seated  Balance  Balance: static sitting balance  Static Sitting Balance  Level of [place] (Unsupported Sitting, Static Balance): contact guard assist  Sitting Position (Unsupported Sitting, Static Balance): sitting on edge of bed  Time Able to Maintain Position (Unsupported Sitting, Static Balance): more than 5 minutes        ROM/MMT             Sensory, Edema, Orthotics          Cognition, Communication, Swallow       Outcome Summary               PT Rehab Goals     Row Name 03/11/18 5540             Bed Mobility Goal 1 (PT)    Activity/Assistive Device (Bed Mobility Goal 1, PT) sit to supine/supine to sit  -FERNANDO       [place] Level/Cues Needed (Bed Mobility Goal 1, PT) minimum assist (75% or more patient effort)  -FERNANDO      Time Frame (Bed Mobility Goal 1, PT) long term goal (LTG);10 days  -FERNANDO      Barriers (Bed Mobility Goal 1, PT) Weakness, pain  -FERNANDO      Progress/Outcomes (Bed Mobility Goal 1, PT) goal ongoing  -FERNANDO         Transfer Goal 1 (PT)    Activity/Assistive Device (Transfer Goal 1, PT) sit-to-stand/stand-to-sit;bed-to-chair/chair-to-bed;walker, rolling  -FERNANDO      [place] Level/Cues Needed (Transfer Goal 1, PT) minimum assist (75% or more patient effort)  -FERNANDO      Time Frame (Transfer Goal 1, PT) long term goal (LTG);10 days  -FERNANDO      Barriers (Transfers Goal 1, PT) weakness, pain  -FERNANDO      Progress/Outcome (Transfer Goal 1, PT) goal ongoing  -FERNANDO         Gait Training Goal 1 (PT)    Activity/Assistive Device (Gait Training Goal 1, PT) --  -FERNANDO      [place] Level (Gait Training Goal 1, PT) --  -FERNANDO      Distance (Gait Goal 1, PT) --  -FERNANDO      Time Frame (Gait Training Goal 1, PT) --  -FERNANDO      Barriers (Gait Training Goal 1, PT) --  -FERNANDO      Progress/Outcome (Gait Training Goal 1, PT) --  -FERNANDO        User Key  (r) = Recorded By, (t) = Taken By, (c) = Cosigned By    Initials Name Provider Type    FERNANDO Callaway, PT DPT Physical Therapist          Physical Therapy Education     Title: PT OT SLP Therapies (Active)     Topic: Physical Therapy (Active)     Point: Mobility training (Done)    Learning Progress Summary     Learner Status Readiness Method Response Comment Documented by    Patient Done Acceptance E VU bed mobility  03/13/18 1112     Active Acceptance E NR bed mobility  03/12/18 1152     Done Acceptance E VU,NR Educated pt/sons on progression of PT POC, benefits of activity, NWB R LE FERNANDO 03/11/18 1305    Family Done Acceptance E VU,NR Educated pt/sons on progression of PT POC, benefits of activity, NWB R LE FERNANDO 03/11/18 1305          Point: Precautions (Done)    Learning Progress Summary     Learner  Status Readiness Method Response Comment Documented by    Patient Done Acceptance E ROBBY,NR Educated pt/sons on progression of PT POC, benefits of activity, NWB R LE FERNANDO 03/11/18 1305    Family Done Acceptance E VU,NR Educated pt/sons on progression of PT POC, benefits of activity, NWB R LE FERNANDO 03/11/18 1305                      User Key     Initials Effective Dates Name Provider Type Discipline     08/02/16 -  Yolie Francis, PTA Physical Therapy Assistant PT    FERNANDO 08/02/16 -  Kd Callaway, PT DPT Physical Therapist PT                    PT Recommendation and Plan  Anticipated Discharge Disposition (PT): skilled nursing facility (SNF), inpatient rehab facility  Planned Therapy Interventions (PT Eval): bed mobility training, transfer training, gait training, balance training, home exercise program, patient/family education, postural re-education, strengthening  Therapy Frequency (PT Clinical Impression): 2 times/day  Plan of Care Reviewed With: patient  Progress: no change  Outcome Summary: pt trans to EOB max of 2,sitting balance improved today, pt able to sit EOB CGA, pt performed B LAQ AAROM, attempted to stand pt not able to stand. Pt would benefit form SNF          Outcome Measures     Row Name 03/13/18 1100 03/12/18 1100 03/11/18 1320       How much help from another person do you currently need...    Turning from your back to your side while in flat bed without using bedrails? 2  -AH 2  -AH  --    Moving from lying on back to sitting on the side of a flat bed without bedrails? 2  -AH 2  -AH  --    Moving to and from a bed to a chair (including a wheelchair)? 1  - 1  -AH  --    Standing up from a chair using your arms (e.g., wheelchair, bedside chair)? 1  - 1  -AH  --    Climbing 3-5 steps with a railing? 1  - 1  -AH  --    To walk in hospital room? 1  - 1  -AH  --    AM-PAC 6 Clicks Score 8  - 8  -AH  --       How much help from another is currently needed...    Putting on and taking off regular  lower body clothing?  --  -- 1  -CH    Bathing (including washing, rinsing, and drying)  --  -- 1  -CH    Toileting (which includes using toilet bed pan or urinal)  --  -- 1  -CH    Putting on and taking off regular upper body clothing  --  -- 3  -CH    Taking care of personal grooming (such as brushing teeth)  --  -- 4  -CH    Eating meals  --  -- 4  -CH    Score  --  -- 14  -CH       Functional Assessment    Outcome Measure Options AM-PAC 6 Clicks Basic Mobility (PT)  - AM-PAC 6 Clicks Basic Mobility (PT)  - AM-[facility] 6 Clicks Daily Activity (OT)  -    Row Name 03/11/18 1305             How much help from another person do you currently need...    Turning from your back to your side while in flat bed without using bedrails? 2  -FERNANDO      Moving from lying on back to sitting on the side of a flat bed without bedrails? 2  -FERNANDO      Moving to and from a bed to a chair (including a wheelchair)? 1  -FERNANDO      Standing up from a chair using your arms (e.g., wheelchair, bedside chair)? 1  -FERNANDO      Climbing 3-5 steps with a railing? 1  -FERNANDO      To walk in hospital room? 1  -FERNANDO      AM-[facility] 6 Clicks Score 8  -FERNANDO         Functional Assessment    Outcome Measure Options AM-[facility] 6 Clicks Basic Mobility (PT)  -FERNANDO        User Key  (r) = Recorded By, (t) = Taken By, (c) = Cosigned By    Initials Name Provider Type     Yolie Francis PTA Physical Therapy Assistant    REMY Park, OTR/L Occupational Therapist    FERNANDO Callaway, PT DPT Physical Therapist           Time Calculation:         PT Charges     Row Name 03/13/18 1115             Time Calculation    Start Time 1032  -      Stop Time 1102  -      Time Calculation (min) 30 min  -      PT Received On 03/13/18  -      PT Goal Re-Cert Due Date 03/21/18  -         Time Calculation- PT    Total Timed Code Minutes- PT 30 minute(s)  -        User Key  (r) = Recorded By, (t) = Taken By, (c) = Cosigned By    Initials Name Provider Type     Yolie Francis PTA  Physical Therapy Assistant          Therapy Charges for Today     Code Description Service Date Service Provider Modifiers Qty    14853323355 HC PT THERAPEUTIC ACT EA 15 MIN 3/12/2018 Yolie Francis, PTA GP, KX 2    02658534842 HC PT THER PROC EA 15 MIN 3/12/2018 Yolie Francis, VERNELL GP, KX 2    05626685777 HC PT THERAPEUTIC ACT EA 15 MIN 3/13/2018 Yolie Francis, VERNELL GP, KX 2          PT G-Codes  Outcome Measure Options: AM-PAC 6 Clicks Basic Mobility (PT)  Score: 8  Functional Limitation: Changing and maintaining body position  Changing and Maintaining Body Position Current Status (): At least 80 percent but less than 100 percent impaired, limited or restricted  Changing and Maintaining Body Position Goal Status (): At least 40 percent but less than 60 percent impaired, limited or restricted    Yolie Francis PTA  3/13/2018

## 2018-03-13 NOTE — PROGRESS NOTES
Continued Stay Note  [facility]     Patient Name: Tamy Sher  MRN: [id number]  Today's Date: 3/13/2018    Admit Date: 3/8/2018          Discharge Plan     Row Name 03/13/18 0806       Plan    Plan [facility]    Patient/Family in Agreement with Plan yes    Plan Comments Inessa at [facility] notified SW that patient has been accepted and can admit to [facility] upon discharge. Will follow for discharge orders.               Discharge Codes    No documentation.           CORTES Mcnamara

## 2018-03-13 NOTE — PROGRESS NOTES
"PROGRESS NOTE.      Patient:  Tamy Sher  YOB: 1930  Date of Service: 3/13/2018  MRN: [id number]   Acct: [id number]   Primary Care Physician: Barry Foley MD  Advance Directive: Full Code  Admit Date: 3/8/2018       Hospital Day: 5  Referring Provider: Fidencio Rodriguez*      Patient Seen, Chart, Consults, Notes, Labs, Radiology studies reviewed.        Subjective:  Tamy Sher is a 87 y.o. female  whom we were consulted for acute kidney injury and hyperkalemia.  No prior known renal disease.  Admitted with fractured right femur after a fall.  Underwent internal fixation on 3/10.  Hospital course remarkable for STUART. Patient is still complaining of pain in her lower extremities and constipation. She had good urine output.         Review of Systems:  History obtained from chart review and the patient  General ROS: No fever or chills  Respiratory ROS: No cough, shortness of breath, wheezing  Cardiovascular ROS: no chest pain or dyspnea on exertion  Gastrointestinal ROS: No abdominal pain or melena  Genito-Urinary ROS: No dysuria or hematuria  Musculoskeletal: negative  Skin: negative    Objective:  /78 (BP Location: Left arm, Patient Position: Lying)   Pulse 120   Temp 98.5 °F (36.9 °C)   Resp 18   Ht 152.4 cm (60\")   Wt 90.3 kg (199 lb)   SpO2 93%   BMI 38.86 kg/m²     Intake/Output Summary (Last 24 hours) at 03/13/18 1723  Last data filed at 03/13/18 1447   Gross per 24 hour   Intake                0 ml   Output             1850 ml   Net            -1850 ml       Physical examination:  General: awake/alert   Chest:  clear to auscultation bilaterally without respiratory distress  CVS: regular rate and rhythm  Abdominal: soft, nontender, normal bowel sounds  Extremities: Trace peripheral edema  Skin: warm and dry without rash  Neuro: No focal motor deficits    Labs:  Lab Results (last 24 hours)     Procedure Component Value Units Date/Time    Eosinophil Smear - Urine, " Urine, Clean Catch [[id number]] Collected:  03/10/18 1752    Specimen:  Urine from Urine, Catheter In/Out Updated:  03/13/18 1611     Eosinophil, Urine No Eosinophils Seen %      Comment:                                   <5% few or none seen       Narrative:       Performed at:  [address] [place] [address]  [id number]  : Zaid Roche PhD, Phone:  [phone]    POC Glucose Once [[id number]]  (Normal) Collected:  03/13/18 1134    Specimen:  Blood Updated:  03/13/18 1147     Glucose 116 mg/dL      Comment: : [id number] [employer] ID: [id number]       POC Glucose Once [[id number]]  (Abnormal) Collected:  03/13/18 0737    Specimen:  Blood Updated:  03/13/18 0754     Glucose 53 (L) mg/dL      Comment: : [id number] [employer] ID: [id number]       CBC & Differential [[id number]] Collected:  03/13/18 0509    Specimen:  Blood Updated:  03/13/18 0649    Narrative:       The following orders were created for panel order CBC & Differential.  Procedure                               Abnormality         Status                     ---------                               -----------         ------                     Manual Differential[[id number]]                                                         CBC Auto Differential[[id number]]        Abnormal            Final result                 Please view results for these tests on the individual orders.    CBC Auto Differential [[id number]]  (Abnormal) Collected:  03/13/18 0509    Specimen:  Blood Updated:  03/13/18 0649     WBC 11.01 (H) 10*3/mm3      RBC 3.08 (L) 10*6/mm3      Hemoglobin 9.9 (L) g/dL      Hematocrit 31.3 (L) %      .6 (H) fL      MCH 32.1 (H) pg      MCHC 31.6 (L) g/dL      RDW 18.9 (H) %      RDW-SD 69.1 (H) fl      MPV 10.0 fL      Platelets 305 10*3/mm3     Manual Differential [[id number]]  (Abnormal) Collected:  03/13/18 0509    Specimen:  Blood Updated:  03/13/18 0649     Neutrophil % 76.0 %       Lymphocyte % 6.0 (L) %      Monocyte % 8.0 %      Eosinophil % 3.0 %      Bands %  7.0 %      Neutrophils Absolute 9.14 (H) 10*3/mm3      Lymphocytes Absolute 0.66 (L) 10*3/mm3      Monocytes Absolute 0.88 10*3/mm3      Eosinophils Absolute 0.33 10*3/mm3      Anisocytosis Mod/2+     Macrocytes Slight/1+     WBC Morphology Normal     Platelet Estimate Adequate    Comprehensive Metabolic Panel [[id number]]  (Abnormal) Collected:  03/13/18 0509    Specimen:  Blood Updated:  03/13/18 0644     Glucose 76 mg/dL      BUN 65 (H) mg/dL      Creatinine 1.21 mg/dL      Sodium 138 mmol/L      Potassium 4.9 mmol/L      Chloride 104 mmol/L      CO2 27.0 mmol/L      Calcium 8.0 (L) mg/dL      Total Protein 4.6 (L) g/dL      Albumin 2.40 (L) g/dL      ALT (SGPT) 20 U/L      AST (SGOT) 23 U/L      Alkaline Phosphatase 62 U/L      Total Bilirubin 0.3 mg/dL      eGFR Non African Amer 42 (L) mL/min/1.73      Globulin 2.2 gm/dL      A/G Ratio 1.1 g/dL      BUN/Creatinine Ratio 53.7 (H)     Anion Gap 7.0 mmol/L     Narrative:       The MDRD GFR formula is only valid for adults with stable renal function between ages 18 and 70.    POC Glucose Once [[id number]]  (Abnormal) Collected:  03/12/18 2116    Specimen:  Blood Updated:  03/12/18 2127     Glucose 232 (H) mg/dL      Comment: : [id number] [place] ID: [id number]       POC Glucose Once [[id number]]  (Abnormal) Collected:  03/12/18 1719    Specimen:  Blood Updated:  03/12/18 1738     Glucose 186 (H) mg/dL      Comment: : [id number] [employer] MandyMeter ID: [id number]             Radiology:   Imaging Results (last 24 hours)     ** No results found for the last 24 hours. **              Assessment   1.  Acute kidney injury secondary to acute tubular necrosis--improving  2.  Mild rhabdomyolysis  3.  Hyperkalemia--improved  4.  Status post surgical repair of right femur fracture  5.  Type 2 diabetes on insulin  6.  Anemia    Plan:  Labs have improved. May stop IV fluids for now  and encourage oral hydration. Will give 20 mg of intravenous lasix.      Sj Fournier MD  3/13/2018  5:23 PM

## 2018-03-14 ENCOUNTER — APPOINTMENT (OUTPATIENT)
Dept: GENERAL RADIOLOGY | Facility: HOSPITAL | Age: 83
End: 2018-03-14

## 2018-03-14 LAB
ANION GAP SERPL CALCULATED.3IONS-SCNC: 8 MMOL/L (ref 4–13)
BUN BLD-MCNC: 61 MG/DL (ref 5–21)
BUN/CREAT SERPL: 64.9 (ref 7–25)
CALCIUM SPEC-SCNC: 8.6 MG/DL (ref 8.4–10.4)
CHLORIDE SERPL-SCNC: 103 MMOL/L (ref 98–110)
CO2 SERPL-SCNC: 25 MMOL/L (ref 24–31)
CREAT BLD-MCNC: 0.94 MG/DL (ref 0.5–1.4)
GFR SERPL CREATININE-BSD FRML MDRD: 56 ML/MIN/1.73
GLUCOSE BLD-MCNC: 154 MG/DL (ref 70–100)
GLUCOSE BLDC GLUCOMTR-MCNC: 215 MG/DL (ref 70–130)
GLUCOSE BLDC GLUCOMTR-MCNC: 258 MG/DL (ref 70–130)
GLUCOSE BLDC GLUCOMTR-MCNC: 264 MG/DL (ref 70–130)
POTASSIUM BLD-SCNC: 5.6 MMOL/L (ref 3.5–5.3)
SODIUM BLD-SCNC: 136 MMOL/L (ref 135–145)

## 2018-03-14 PROCEDURE — 97530 THERAPEUTIC ACTIVITIES: CPT

## 2018-03-14 PROCEDURE — C1751 CATH, INF, PER/CENT/MIDLINE: HCPCS

## 2018-03-14 PROCEDURE — 63710000001 INSULIN DETEMIR PER 5 UNITS: Performed by: INTERNAL MEDICINE

## 2018-03-14 PROCEDURE — 80048 BASIC METABOLIC PNL TOTAL CA: CPT | Performed by: INTERNAL MEDICINE

## 2018-03-14 PROCEDURE — 97110 THERAPEUTIC EXERCISES: CPT

## 2018-03-14 PROCEDURE — 71046 X-RAY EXAM CHEST 2 VIEWS: CPT

## 2018-03-14 PROCEDURE — 94760 N-INVAS EAR/PLS OXIMETRY 1: CPT

## 2018-03-14 PROCEDURE — 94799 UNLISTED PULMONARY SVC/PX: CPT

## 2018-03-14 PROCEDURE — 25010000002 FUROSEMIDE PER 20 MG: Performed by: NURSE PRACTITIONER

## 2018-03-14 PROCEDURE — 63710000001 INSULIN LISPRO (HUMAN) PER 5 UNITS: Performed by: INTERNAL MEDICINE

## 2018-03-14 PROCEDURE — 94660 CPAP INITIATION&MGMT: CPT

## 2018-03-14 PROCEDURE — 82962 GLUCOSE BLOOD TEST: CPT

## 2018-03-14 RX ORDER — FUROSEMIDE 10 MG/ML
40 INJECTION INTRAMUSCULAR; INTRAVENOUS ONCE
Status: COMPLETED | OUTPATIENT
Start: 2018-03-14 | End: 2018-03-14

## 2018-03-14 RX ORDER — SODIUM POLYSTYRENE SULFONATE 15 G/60ML
15 SUSPENSION ORAL; RECTAL ONCE
Status: COMPLETED | OUTPATIENT
Start: 2018-03-14 | End: 2018-03-14

## 2018-03-14 RX ADMIN — GABAPENTIN 600 MG: 300 CAPSULE ORAL at 16:33

## 2018-03-14 RX ADMIN — IPRATROPIUM BROMIDE AND ALBUTEROL SULFATE 3 ML: 2.5; .5 SOLUTION RESPIRATORY (INHALATION) at 11:25

## 2018-03-14 RX ADMIN — GABAPENTIN 300 MG: 300 CAPSULE ORAL at 09:25

## 2018-03-14 RX ADMIN — INSULIN LISPRO 6 UNITS: 100 INJECTION, SOLUTION INTRAVENOUS; SUBCUTANEOUS at 17:44

## 2018-03-14 RX ADMIN — ASPIRIN 325 MG: 325 TABLET, COATED ORAL at 20:44

## 2018-03-14 RX ADMIN — IPRATROPIUM BROMIDE AND ALBUTEROL SULFATE 3 ML: 2.5; .5 SOLUTION RESPIRATORY (INHALATION) at 07:09

## 2018-03-14 RX ADMIN — BUDESONIDE AND FORMOTEROL FUMARATE DIHYDRATE 2 PUFF: 160; 4.5 AEROSOL RESPIRATORY (INHALATION) at 19:40

## 2018-03-14 RX ADMIN — IPRATROPIUM BROMIDE AND ALBUTEROL SULFATE 3 ML: 2.5; .5 SOLUTION RESPIRATORY (INHALATION) at 15:32

## 2018-03-14 RX ADMIN — FUROSEMIDE 40 MG: 10 INJECTION, SOLUTION INTRAMUSCULAR; INTRAVENOUS at 14:19

## 2018-03-14 RX ADMIN — INSULIN LISPRO 6 UNITS: 100 INJECTION, SOLUTION INTRAVENOUS; SUBCUTANEOUS at 20:44

## 2018-03-14 RX ADMIN — PANTOPRAZOLE SODIUM 40 MG: 40 TABLET, DELAYED RELEASE ORAL at 05:08

## 2018-03-14 RX ADMIN — INSULIN LISPRO 4 UNITS: 100 INJECTION, SOLUTION INTRAVENOUS; SUBCUTANEOUS at 12:46

## 2018-03-14 RX ADMIN — ATORVASTATIN CALCIUM 10 MG: 10 TABLET, FILM COATED ORAL at 20:45

## 2018-03-14 RX ADMIN — IPRATROPIUM BROMIDE AND ALBUTEROL SULFATE 3 ML: 2.5; .5 SOLUTION RESPIRATORY (INHALATION) at 19:39

## 2018-03-14 RX ADMIN — BUDESONIDE AND FORMOTEROL FUMARATE DIHYDRATE 2 PUFF: 160; 4.5 AEROSOL RESPIRATORY (INHALATION) at 07:09

## 2018-03-14 RX ADMIN — INSULIN DETEMIR 15 UNITS: 100 INJECTION, SOLUTION SUBCUTANEOUS at 20:45

## 2018-03-14 RX ADMIN — SODIUM POLYSTYRENE SULFONATE 15 G: 15 SUSPENSION ORAL; RECTAL at 13:26

## 2018-03-14 RX ADMIN — HYDROXYUREA 500 MG: 500 CAPSULE ORAL at 09:25

## 2018-03-14 RX ADMIN — FOLIC ACID 1 MG: 1 TABLET ORAL at 09:25

## 2018-03-14 RX ADMIN — LEVOTHYROXINE SODIUM 50 MCG: 50 TABLET ORAL at 05:08

## 2018-03-14 RX ADMIN — METOPROLOL SUCCINATE 50 MG: 50 TABLET, FILM COATED, EXTENDED RELEASE ORAL at 09:25

## 2018-03-14 NOTE — PLAN OF CARE
Problem: Patient Care Overview  Goal: Plan of Care Review  Outcome: Ongoing (interventions implemented as appropriate)   03/14/18 1117   Coping/Psychosocial   Plan of Care Reviewed With patient   OTHER   Outcome Summary Pt. required constant rests and time to get her strength back she says to complete tasks, no shd. flex exs, pt. performed ue exs with time given for rests, no issues with tx!   Plan of Care Review   Progress (progressing gradual!)

## 2018-03-14 NOTE — THERAPY TREATMENT NOTE
Acute Care - Physical Therapy Treatment Note  [facility]     Patient Name: Tamy Sher  : 1930  MRN: [id number]  Today's Date: 3/14/2018  Onset of Illness/Injury or Date of Surgery: 18  Date of Referral to PT: 18  Referring Physician: Dr. Bundy    Admit Date: 3/8/2018    Visit Dx:    ICD-10-CM ICD-9-CM   1. Fall, initial encounter W19.XXXA E888.9   2. Other closed fracture of distal end of right femur, initial encounter S72.491A 821.29   3. Fall W19.XXXA E888.9   4. Impaired mobility Z74.09 799.89   5. Decreased activities of daily living (ADL) Z78.9 V49.89     Patient Active Problem List   Diagnosis   • Ischemic chest pain   • Pneumonia of both lungs due to infectious organism   • Fall   • Closed displaced comminuted fracture of shaft of right femur   • Pain of right thigh   • Class 2 obesity with serious comorbidity in adult   • Type 2 diabetes mellitus with neurologic complication, with long-term current use of insulin   • STUART (acute kidney injury)   • Chronic diastolic heart failure   • Pleural effusion, left   • Hypoxia       Therapy Treatment    Therapy Treatment / Health Promotion    Treatment Time/Intention  Discipline: physical therapy assistant  Document Type: therapy note (daily note)  Subjective Information: complains of, weakness, fatigue, pain, dyspnea, swelling  Existing Precautions/Restrictions: fall, non-weight bearing, oxygen therapy device and L/min (monitor HR (afib))  Treatment Considerations/Comments: (S) NWB RLE  Plan of Care Review  Plan of Care Reviewed With: patient    Vitals/Pain/Safety  Vital Signs  Pretreatment Heart Rate (beats/min): 115  Intratreatment Heart Rate (beats/min): 148  Posttreatment Heart Rate (beats/min): 112  Pre SpO2 (%): 92  O2 Delivery Pre Treatment: nasal cannula (3l)  Pain Assessment  Additional Documentation: Pain Scale: Numbers Pre/Post-Treatment (Group)  Pain Scale: Numbers Pre/Post-Treatment  Pain Scale: Numbers, Pretreatment: 6/10  Pain  Location - Side: Right  Pain Location: knee  Pain Scale: Word Pre/Post-Treatment  Pain Location - Side: Right  Pain Location: knee  Pain Scale: FACES Pre/Post-Treatment  Pain Location - Side: Right  Pain Location: knee  Positioning and Restraints  Pre-Treatment Position: in bed  Post Treatment Position: bed  In Bed: fowlers, call light within reach, encouraged to call for assist, RUE elevated, LUE elevated, legs elevated    Mobility,ADL,Motor, Modality  Mobility Assessment/Intervention  Extremity Weight-bearing Status: right lower extremity  Right Lower Extremity (Weight-bearing Status): (S) non weight-bearing (NWB)  Bed Mobility Assessment/Treatment  Supine-Sit [place] (Bed Mobility): maximum assist (25% patient effort), 2 person assist, verbal cues  Sit-Supine [place] (Bed Mobility): maximum assist (25% patient effort), 2 person assist, verbal cues  Bed Mobility, Safety Issues: decreased use of arms for pushing/pulling, decreased use of legs for bridging/pushing  Transfer Assessment/Treatment  Comment (Transfers): not attempted     Therapeutic Exercise  Position (Therapeutic Exercise): seated  Comment (Therapeutic Exercise): RLAMANDA P-AAROM, DAMASO A-AAROM  Balance  Balance: static sitting balance  Static Sitting Balance  Level of [place] (Unsupported Sitting, Static Balance): contact guard assist  Sitting Position (Unsupported Sitting, Static Balance): sitting on edge of bed  Time Able to Maintain Position (Unsupported Sitting, Static Balance): more than 5 minutes        ROM/MMT             Sensory, Edema, Orthotics          Cognition, Communication, Swallow  Speaking Valve  Pretreatment Heart Rate (beats/min): 115  Pre SpO2 (%): 92  Posttreatment Heart Rate (beats/min): 112  General Eating/Swallowing Observations  Pre SpO2 (%): 92    Outcome Summary               PT Rehab Goals     Row Name 03/11/18 5868             Bed Mobility Goal 1 (PT)    Activity/Assistive Device (Bed Mobility Goal 1, PT) sit to  supine/supine to sit  -FERNANDO      [place] Level/Cues Needed (Bed Mobility Goal 1, PT) minimum assist (75% or more patient effort)  -FERNANDO      Time Frame (Bed Mobility Goal 1, PT) long term goal (LTG);10 days  -FERNANDO      Barriers (Bed Mobility Goal 1, PT) Weakness, pain  -FERNANDO      Progress/Outcomes (Bed Mobility Goal 1, PT) goal ongoing  -FERNANDO         Transfer Goal 1 (PT)    Activity/Assistive Device (Transfer Goal 1, PT) sit-to-stand/stand-to-sit;bed-to-chair/chair-to-bed;walker, rolling  -FERNANDO      [place] Level/Cues Needed (Transfer Goal 1, PT) minimum assist (75% or more patient effort)  -FERNANDO      Time Frame (Transfer Goal 1, PT) long term goal (LTG);10 days  -FERNANDO      Barriers (Transfers Goal 1, PT) weakness, pain  -FERNANDO      Progress/Outcome (Transfer Goal 1, PT) goal ongoing  -FERNANDO         Gait Training Goal 1 (PT)    Activity/Assistive Device (Gait Training Goal 1, PT) --  -FERNANDO      [place] Level (Gait Training Goal 1, PT) --  -FERNANDO      Distance (Gait Goal 1, PT) --  -FERNANDO      Time Frame (Gait Training Goal 1, PT) --  -FERNANDO      Barriers (Gait Training Goal 1, PT) --  -FERNANDO      Progress/Outcome (Gait Training Goal 1, PT) --  -FERNANDO        User Key  (r) = Recorded By, (t) = Taken By, (c) = Cosigned By    Initials Name Provider Type    FERNANDO Callaway, PT DPT Physical Therapist          Physical Therapy Education     Title: PT OT SLP Therapies (Active)     Topic: Physical Therapy (Active)     Point: Mobility training (Done)    Learning Progress Summary     Learner Status Readiness Method Response Comment Documented by    Patient Done Acceptance E VU bed mobility  03/13/18 1112     Active Acceptance E NR bed mobility  03/12/18 1152     Done Acceptance E VU,NR Educated pt/sons on progression of PT POC, benefits of activity, NWB R LE FERNANDO 03/11/18 1305    Family Done Acceptance E VU,NR Educated pt/sons on progression of PT POC, benefits of activity, NWB R LE FERNANDO 03/11/18 1305          Point: Precautions (Done)     Learning Progress Summary     Learner Status Readiness Method Response Comment Documented by    Patient Done Acceptance E VU,NR POSITIONING  03/14/18 1013     Done Acceptance E VU,NR Educated pt/sons on progression of PT POC, benefits of activity, NWB R LE FERNANDO 03/11/18 1305    Family Done Acceptance E VU,NR Educated pt/sons on progression of PT POC, benefits of activity, NWB R LE FERNANDO 03/11/18 1305                      User Key     Initials Effective Dates Name Provider Type Discipline     08/02/16 -  Yolie Francis, PTA Physical Therapy Assistant PT     08/02/16 -  Kd Callaway, PT DPT Physical Therapist PT                    PT Recommendation and Plan  Anticipated Discharge Disposition (PT): skilled nursing facility (SNF), inpatient rehab facility  Planned Therapy Interventions (PT Eval): bed mobility training, transfer training, gait training, balance training, home exercise program, patient/family education, postural re-education, strengthening  Therapy Frequency (PT Clinical Impression): 2 times/day  Plan of Care Reviewed With: patient  Progress: no change  Outcome Summary: pt c/o SOA and swelling of her entire body, the swelling also limits her mobility, trans to EOB max of 2, pt sat EOB about 20 minutes performed BLE AAROM. pt HR up to 150 at times. Pt would benefit from SNF          Outcome Measures     Row Name 03/14/18 1000 03/13/18 1100 03/12/18 1100       How much help from another person do you currently need...    Turning from your back to your side while in flat bed without using bedrails? 1  -AH 2  -AH 2  -AH    Moving from lying on back to sitting on the side of a flat bed without bedrails? 1  -AH 2  -AH 2  -AH    Moving to and from a bed to a chair (including a wheelchair)? 1  -AH 1  -AH 1  -AH    Standing up from a chair using your arms (e.g., wheelchair, bedside chair)? 1  -AH 1  -AH 1  -AH    Climbing 3-5 steps with a railing? 1  -AH 1  -AH 1  -AH    To walk in hospital room? 1  - 1   - 1  -    AM-PAC 6 Clicks Score 6  -AH 8  -AH 8  -       Functional Assessment    Outcome Measure Options AM-PAC 6 Clicks Basic Mobility (PT)  - AM-[facility] 6 Clicks Basic Mobility (PT)  - AM-[facility] 6 Clicks Basic Mobility (PT)  -    Row Name 03/11/18 1320 03/11/18 1305          How much help from another person do you currently need...    Turning from your back to your side while in flat bed without using bedrails?  -- 2  -FERNANDO     Moving from lying on back to sitting on the side of a flat bed without bedrails?  -- 2  -FERNANDO     Moving to and from a bed to a chair (including a wheelchair)?  -- 1  -FERNANDO     Standing up from a chair using your arms (e.g., wheelchair, bedside chair)?  -- 1  -FERNANDO     Climbing 3-5 steps with a railing?  -- 1  -FERNANDO     To walk in hospital room?  -- 1  -FERNANDO     AM-PAC 6 Clicks Score  -- 8  -FERNANDO        How much help from another is currently needed...    Putting on and taking off regular lower body clothing? 1  -CH  --     Bathing (including washing, rinsing, and drying) 1  -CH  --     Toileting (which includes using toilet bed pan or urinal) 1  -CH  --     Putting on and taking off regular upper body clothing 3  -CH  --     Taking care of personal grooming (such as brushing teeth) 4  -CH  --     Eating meals 4  -CH  --     Score 14  -CH  --        Functional Assessment    Outcome Measure Options AM-PAC 6 Clicks Daily Activity (OT)  - AM-[facility] 6 Clicks Basic Mobility (PT)  -       User Key  (r) = Recorded By, (t) = Taken By, (c) = Cosigned By    Initials Name Provider Type     Yolie Francis, PTA Physical Therapy Assistant     Lilian Park, OTR/L Occupational Therapist    FERNANDO Callaway, PT DPT Physical Therapist           Time Calculation:         PT Charges     Row Name 03/14/18 1018             Time Calculation    Start Time 0935  -      Stop Time 1005  -      Time Calculation (min) 30 min  -      PT Received On 03/14/18  -      PT Goal Re-Cert Due Date 03/21/18  -          Time Calculation- PT    Total Timed Code Minutes- PT 30 minute(s)  -        User Key  (r) = Recorded By, (t) = Taken By, (c) = Cosigned By    Initials Name Provider Type     Yolie Francis PTA Physical Therapy Assistant          Therapy Charges for Today     Code Description Service Date Service Provider Modifiers Qty    87345629464 HC PT THERAPEUTIC ACT EA 15 MIN 3/13/2018 Yolie Francis PTA GP, KX 2    11480849780 HC PT THER PROC EA 15 MIN 3/13/2018 Yolie Francis PTA GP, KX 2    27770642579 HC PT THERAPEUTIC ACT EA 15 MIN 3/14/2018 Yolie Francis PTA GP, KX 2          PT G-Codes  Outcome Measure Options: AM-PAC 6 Clicks Basic Mobility (PT)  Score: 8  Functional Limitation: Changing and maintaining body position  Changing and Maintaining Body Position Current Status (): At least 80 percent but less than 100 percent impaired, limited or restricted  Changing and Maintaining Body Position Goal Status (): At least 40 percent but less than 60 percent impaired, limited or restricted    Yolie Francis PTA  3/14/2018

## 2018-03-14 NOTE — CONSULTS
8 cm Midline catheter placed in pt left cephalic vein. Pt tolerated procedure well. Line flushes and draws well. Sterile dressing applied.

## 2018-03-14 NOTE — PLAN OF CARE
Problem: Patient Care Overview (Adult)  Goal: Plan of Care Review  Outcome: Ongoing (interventions implemented as appropriate)   03/14/18 1342   Coping/Psychosocial Response Interventions   Plan Of Care Reviewed With patient   Patient Care Overview   Progress no change   Outcome Evaluation   Outcome Summary/Follow up Plan Patient alert and oriented x4, maintained safety, no c/o pain, sats drop with activity and heart rate elevates md aware, midline placed this shift started, patient placed on iv lasix, Alves catheter placement addressed with hospitalist verbal orders to keep, glucerna ordered for each meal, kayexalate given for bowels as ordered, dressing is c/d/i to left leg, vss, will continue to monitor       Problem: Fall Risk (Adult)  Goal: Absence of Falls  Outcome: Ongoing (interventions implemented as appropriate)      Problem: Pressure Ulcer Risk (Jeremiah Scale) (Adult,Obstetrics,Pediatric)  Goal: Skin Integrity  Outcome: Ongoing (interventions implemented as appropriate)      Problem: Skin Injury Risk (Adult)  Goal: Skin Health and Integrity  Outcome: Ongoing (interventions implemented as appropriate)    Goal: Identify Related Risk Factors and Signs and Symptoms  Outcome: Ongoing (interventions implemented as appropriate)

## 2018-03-14 NOTE — PLAN OF CARE
Problem: Patient Care Overview  Goal: Plan of Care Review  Outcome: Ongoing (interventions implemented as appropriate)   03/14/18 1014   Coping/Psychosocial   Plan of Care Reviewed With patient   OTHER   Outcome Summary pt c/o SOA and swelling of her entire body, the swelling also limits her mobility, trans to EOB max of 2, pt sat EOB about 20 minutes performed BLE AAROM. pt HR up to 150 at times. Pt would benefit from SNF   Plan of Care Review   Progress no change

## 2018-03-14 NOTE — PROGRESS NOTES
Nephrology ([facility]) Progress Note      Patient:  Tamy Sher  YOB: 1930  Date of Service: 3/14/2018  MRN: [id number]   Acct: [id number]   Primary Care Physician: Barry Foley MD  Advance Directive: Full Code  Admit Date: 3/8/2018       Hospital Day: 6  Referring Provider: Fidencio Rodriguez*      Patient personally seen and examined.  Complete chart including Consults, Notes, Operative Reports, Labs, Cardiology, and Radiology studies reviewed as able.        Subjective:  Tamy Sher is a 87 y.o. female  whom we were consulted for acute kidney injury and hyperkalemia.  No prior known renal disease.  Admitted with fractured right femur after a fall.  Underwent internal fixation on 3/10.  Hospital course remarkable for acute kidney injury which has been steadily resolving.    Today is complaining of dyspnea and pain in lower extremities.  No new overnight issues. Urine output is nonoliguric    Allergies:  Review of patient's allergies indicates no known allergies.    Home Meds:  Prescriptions Prior to Admission   Medication Sig Dispense Refill Last Dose   • levothyroxine (SYNTHROID, LEVOTHROID) 50 MCG tablet Take 50 mcg by mouth Daily.      • amLODIPine (NORVASC) 5 MG tablet Take 5 mg by mouth Daily.   Unknown at Unknown time   • aspirin 325 MG tablet Take 325 mg by mouth Every Night.   Unknown at Unknown time   • budesonide-formoterol (SYMBICORT) 160-4.5 MCG/ACT inhaler Inhale 2 puffs 2 (Two) Times a Day.  12 Unknown at Unknown time   • folic acid (FOLVITE) 1 MG tablet Take 1 mg by mouth Daily.   Unknown at Unknown time   • furosemide (LASIX) 40 MG tablet Take 40 mg by mouth Daily.   Unknown at Unknown time   • gabapentin (NEURONTIN) 300 MG capsule Take 1 capsule by mouth Daily With Breakfast. 3 capsule 0 Unknown at Unknown time   • gabapentin (NEURONTIN) 300 MG capsule Take 2 capsules by mouth Every Evening. 6 capsule 0 Unknown at Unknown time   •  hydroxyurea (HYDREA) 500 MG capsule Take 500 mg by mouth Take As Directed. Daily except Fridays.   Unknown at Unknown time   • hydroxyurea (HYDREA) 500 MG capsule Take 1,000 mg by mouth 1 (One) Time Per Week. Friday dose   Unknown at Unknown time   • insulin glargine (LANTUS) 100 UNIT/ML injection Inject 26 Units under the skin Every Night.   Unknown at Unknown time   • insulin glargine (LANTUS) 100 UNIT/ML injection Inject 10 Units under the skin Daily. AM dose  0 Unknown at Unknown time   • insulin lispro (humaLOG) 100 UNIT/ML injection Inject 0-9 Units under the skin 4 (Four) Times a Day With Meals & at Bedtime.  12 Unknown at Unknown time   • ipratropium-albuterol (DUO-NEB) 0.5-2.5 mg/mL nebulizer Take 3 mL by nebulization 4 (Four) Times a Day. 360 mL  Unknown at Unknown time   • lisinopril (PRINIVIL,ZESTRIL) 40 MG tablet Take 40 mg by mouth Daily.   Unknown at Unknown time   • metFORMIN (GLUCOPHAGE) 1000 MG tablet Take 500 mg by mouth Every Night.   Unknown at Unknown time   • metoprolol succinate XL (TOPROL-XL) 25 MG 24 hr tablet Take 2 tablets by mouth Daily. 30 tablet 0 Unknown at Unknown time   • pantoprazole (PROTONIX) 40 MG EC tablet Take 40 mg by mouth Daily.   Unknown at Unknown time   • simvastatin (ZOCOR) 40 MG tablet Take 0.5 tablets by mouth Every Night. 30 tablet 0 Unknown at Unknown time       Medicines:  Current Facility-Administered Medications   Medication Dose Route Frequency Provider Last Rate Last Dose   • aspirin tablet 325 mg  325 mg Oral Nightly Fidencio Rodriguez MD   325 mg at 03/13/18 2046   • atorvastatin (LIPITOR) tablet 10 mg  10 mg Oral Nightly Fidencio Rodriguez MD   10 mg at 03/13/18 2046   • budesonide-formoterol (SYMBICORT) 160-4.5 MCG/ACT inhaler 2 puff  2 puff Inhalation BID - RT Fidencio Rodriguez MD   2 puff at 03/14/18 0709   • dextrose (D50W) solution 25 g  25 g Intravenous Q15 Min PRN Fidencio Rodriguez MD       • [MAR Hold] dextrose (GLUTOSE)  oral gel 15 g  15 g Oral Q15 Min PRN Fidencio Rodriguez MD       • folic acid (FOLVITE) tablet 1 mg  1 mg Oral Daily Fidencio Rodriguez MD   1 mg at 03/14/18 0925   • gabapentin (NEURONTIN) capsule 300 mg  300 mg Oral Daily With Breakfast Fidencio Rodriguez MD   300 mg at 03/14/18 0925   • gabapentin (NEURONTIN) capsule 600 mg  600 mg Oral Q PM Fidencio Rodriguez MD   600 mg at 03/13/18 1724   • glucagon (human recombinant) (GLUCAGEN DIAGNOSTIC) injection 1 mg  1 mg Subcutaneous PRN Fidencio Rodriguez MD       • HYDROcodone-acetaminophen (NORCO) 7.5-325 MG per tablet 1 tablet  1 tablet Oral Q6H PRN Rufino Herndon MD   1 tablet at 03/13/18 2324   • hydroxyurea (HYDREA) capsule 500 mg  500 mg Oral Daily Fidencio Rodriguez MD   500 mg at 03/14/18 0925   • hydrOXYzine (VISTARIL) capsule 50 mg  50 mg Oral TID PRN Fidencio Rodriguez MD       • insulin detemir (LEVEMIR) injection 30 Units  30 Units Subcutaneous Nightly Fidencio Rodriguez MD   30 Units at 03/12/18 2125   • insulin lispro (humaLOG) injection 0-9 Units  0-9 Units Subcutaneous 4x Daily With Meals & Nightly Fidencio Rodriguez MD   4 Units at 03/12/18 2124   • ipratropium-albuterol (DUO-NEB) nebulizer solution 3 mL  3 mL Nebulization 4x Daily - RT Fidencio Rodriguez MD   3 mL at 03/14/18 0709   • levothyroxine (SYNTHROID, LEVOTHROID) tablet 50 mcg  50 mcg Oral Q AM Nima Bundy MD   50 mcg at 03/14/18 0508   • magnesium hydroxide (MILK OF MAGNESIA) suspension 2400 mg/10mL 10 mL  10 mL Oral Daily PRN Fidencio Rodriguez MD   10 mL at 03/13/18 2046   • metoprolol succinate XL (TOPROL-XL) 24 hr tablet 50 mg  50 mg Oral Daily Fidencio Rodriguez MD   50 mg at 03/14/18 0925   • naloxone (NARCAN) injection 0.1 mg  0.1 mg Intravenous Q5 Min PRN Fidencio Rodriguez MD       • naloxone (NARCAN) injection 0.4 mg  0.4 mg Intravenous Q5 Min PRN Fidencio Rodriguez MD       • naloxone  (NARCAN) injection 0.4 mg  0.4 mg Intravenous Q5 Min PRN Fidencio Rodriguez MD       • ondansetron (ZOFRAN) injection 4 mg  4 mg Intravenous Q6H PRN Fidencio Rodriguez MD   4 mg at 03/09/18 1814   • ondansetron (ZOFRAN) injection 4 mg  4 mg Intravenous Q6H PRN Nima Bundy MD       • pantoprazole (PROTONIX) EC tablet 40 mg  40 mg Oral Q AM Fidencio Rodriguez MD   40 mg at 03/14/18 0508   • sodium chloride 0.9 % flush 1-10 mL  1-10 mL Intravenous PRN Fidencio Rodriguez MD       • sodium chloride 0.9 % flush 10 mL  10 mL Intravenous PRN Rylee Gustafson MD           Past Medical History:  Past Medical History:   Diagnosis Date   • Constipation    • Diabetes mellitus    • Diarrhea    • Diastolic dysfunction, left ventricle    • Esophagitis    • Exertional shortness of breath    • Hyperlipidemia    • Hypertension    • Lumbar spondylitis    • Osteoarthritis    • Peripheral vascular disease    • Sinusitis    • Stroke        Past Surgical History:  Past Surgical History:   Procedure Laterality Date   • FEMUR SUPRACONDYLAR FRACTURE REPAIR Right 3/10/2018    Procedure: FEMUR SUPRACONDYLAR NAIL;  Surgeon: Nima Bundy MD;  Location: [facility];  Service: Orthopedics   • GALLBLADDER SURGERY     • HYSTERECTOMY     • JOINT REPLACEMENT     • REPLACEMENT TOTAL KNEE Left        Family History  Family History   Problem Relation Age of Onset   • No Known Problems Mother    • No Known Problems Father        Social History  Social History     Social History   • Marital status:      Spouse name: N/A   • Number of children: N/A   • Years of education: N/A     Occupational History   • Not on file.     Social History Main Topics   • Smoking status: Never Smoker   • Smokeless tobacco: Never Used   • Alcohol use No   • Drug use: No   • Sexual activity: No     Other Topics Concern   • Not on file     Social History Narrative   • No narrative on file         Review of Systems:  History obtained from  "chart review and the patient  General ROS: No fever or chills  Respiratory ROS: positive for shortness of breath.  No cough, wheezing  Cardiovascular ROS: No chest pain or palpitations  Gastrointestinal ROS: No abdominal pain or melena  Genito-Urinary ROS: No dysuria or hematuria  Neurological ROS: no headache or dizziness    Objective:  /96 (BP Location: Right arm, Patient Position: Lying)   Pulse 101   Temp 97.8 °F (36.6 °C) (Axillary)   Resp 18   Ht 152.4 cm (60\")   Wt 90.3 kg (199 lb)   SpO2 90%   BMI 38.86 kg/m²     Intake/Output Summary (Last 24 hours) at 03/14/18 1116  Last data filed at 03/14/18 0600   Gross per 24 hour   Intake             1316 ml   Output             1750 ml   Net             -434 ml     General: awake/alert    Neck: supple, no JVD  Chest:  clear to auscultation bilaterally without respiratory distress  CVS: regular rate and rhythm  Abdominal: soft, nontender, normal bowel sounds  Extremities: bilateral 1+ edema  Skin: warm and dry without rash  Neuro: no focal motor deficits    Labs:    Results from last 7 days  Lab Units 03/13/18  0509 03/12/18  0516 03/11/18  0604   WBC 10*3/mm3 11.01* 11.29* 14.50*   HEMOGLOBIN g/dL 9.9* 10.1* 11.1*   HEMATOCRIT % 31.3* 31.5* 34.5*   PLATELETS 10*3/mm3 305 278 337           Results from last 7 days  Lab Units 03/14/18  0453 03/13/18  0509 03/12/18  0723 03/11/18  0604  03/09/18  0644   SODIUM mmol/L 136 138 137 137  < > 136   SODIUM, ARTERIAL   --   --   --   --   < >  --    POTASSIUM mmol/L 5.6* 4.9 4.9 5.6*  < > 4.9   CHLORIDE mmol/L 103 104 101 102  < > 99   CO2 mmol/L 25.0 27.0 28.0 24.0  < > 30.0   BUN mg/dL 61* 65* 69* 62*  < > 46*   CREATININE mg/dL 0.94 1.21 1.81* 2.33*  < > 1.52*   CALCIUM mg/dL 8.6 8.0* 7.8* 8.1*  < > 8.1*   BILIRUBIN mg/dL  --  0.3  --  0.2  --  0.2   ALK PHOS U/L  --  62  --  74  --  64   ALT (SGPT) U/L  --  20  --  27  --  29   AST (SGOT) U/L  --  23  --  23  --  32   GLUCOSE mg/dL 154* 76 91 145*  < > 134* "   < > = values in this interval not displayed.    Radiology:   Imaging Results (last 72 hours)     Procedure Component Value Units Date/Time    US Renal Bilateral [[id number]] Collected:  03/12/18 0722     Updated:  03/12/18 0728    Narrative:       EXAMINATION: US RENAL BILATERAL- 3/12/2018 7:22 AM CDT     HISTORY: STUART; W19.XXXA-Unspecified fall, initial encounter;  S72.491A-Other fracture of lower end of right femur, initial encounter  for closed fracture; W19.XXXA-Unspecified fall, initial encounter;  Z74.09-Other reduced mobility; Z78.9-Other specified health status.     REPORT: Sonographic images of the kidneys were obtained bilaterally.  There are no comparison studies.     The aorta is visualized and is normal in caliber, 1.8 cm proximally. The  IVC measures 2.1 cm, normal. The right kidney measures 10.7 x 4.9 cm,  with normal echogenicity and no evidence of hydronephrosis. There is a  small cyst at the inferior pole the right kidney that appears benign and  measures 1.5 x 1.3 cm. There is a 1.1 cm cyst in the mid right kidney  which appears benign.     Left kidney measures 11.4 x 5.8 x 5.9 cm, there is a benign-appearing  cyst at the hilum of the left kidney that measures 3.3 x 2.3 x 2.0 cm.  This may represent parapelvic cyst or possibly dilation of the renal  pelvis.     Color flow imaging demonstrates vascular flow within both kidneys. The  bladder is decompressed by Alves catheter.       Impression:       1. 2 small benign-appearing cysts are identified in the right kidney,  there is no hydronephrosis on the right.  2. 3.3 cm cyst at the hilum of the left kidney possibly a parapelvic  cyst or dilation of the renal pelvis.  This report was finalized on 03/12/2018 07:25 by Dr. Addison Rivero MD.    XR Femur 2 View Right [[id number]] Collected:  03/10/18 1113     Updated:  03/10/18 1116    Narrative:       EXAMINATION:   XR FEMUR 2 VW RIGHT-  3/10/2018 11:13 AM CST     HISTORY: 7 images are obtained  operating room under the direction of Dr. Mohamud Bundy. Femoral nail is present. Fixation screws are present.  Right knee arthroplasty is present.     Skeletal structures appear relatively aligned.     This procedure utilized 1 minute 40 seconds of fluoroscopic time  This report was finalized on 03/10/2018 11:13 by Dr. Klever Hooker MD.    FL C Arm During Surgery [[id number]] Updated:  03/10/18 1033          Culture:         Assessment   1.  Acute kidney injury secondary to acute tubular necrosis--improving  2.  Mild rhabdomyolysis--resolved  3.  Hyperkalemia  4.  Status post surgical repair of right femur fracture  5.  Type 2 diabetes on insulin  6.  Anemia     Plan:  1.  Lasix 40 mg IV once now  2.  Monitor labs for ongoing renal recovery      Isael Redding, APRN  3/14/2018  11:16 AM

## 2018-03-14 NOTE — PLAN OF CARE
Problem: Patient Care Overview (Adult)  Goal: Plan of Care Review  Outcome: Ongoing (interventions implemented as appropriate)   03/14/18 8539   Coping/Psychosocial Response Interventions   Plan Of Care Reviewed With patient   Patient Care Overview   Progress no change   Outcome Evaluation   Outcome Summary/Follow up Plan pt has been maintaining adequate O2 on 3L, breath sounds diminished, pt has refused to be turned several times saying that she is comfortable, pt was given MOM and warm prune juice to assist with BM, PPP, drsg in c/d/i, pt is notably swollen, alert however, and has not c/o much pain. Safety maintained        Problem: Fall Risk (Adult)  Goal: Absence of Falls  Outcome: Ongoing (interventions implemented as appropriate)      Problem: Pressure Ulcer Risk (Jeremiah Scale) (Adult,Obstetrics,Pediatric)  Goal: Skin Integrity  Outcome: Ongoing (interventions implemented as appropriate)      Problem: Skin Injury Risk (Adult)  Goal: Skin Health and Integrity  Outcome: Ongoing (interventions implemented as appropriate)    Goal: Identify Related Risk Factors and Signs and Symptoms  Outcome: Ongoing (interventions implemented as appropriate)

## 2018-03-14 NOTE — PROGRESS NOTES
[facility] Medicine Services  INPATIENT PROGRESS NOTE    Length of Stay: 6  Date of Admission: 3/8/2018  Primary Care Physician: Barry Foley MD    Subjective   Chief Complaint: Not feeling as well today.  Feels short of breath.  HPI   No nausea.  No chest pain.  Tired.   Not doing well with therapy at this time        Review of Systems     All pertinent negatives and positives are as above. All other systems have been reviewed and are negative unless otherwise stated.     Objective    Temp:  [97.8 °F (36.6 °C)-98.9 °F (37.2 °C)] 97.8 °F (36.6 °C)  Heart Rate:  [] 106  Resp:  [18-19] 18  BP: (136-174)/(66-99) 157/96  Physical Exam   Constitutional: She is oriented to person, place, and time. She appears well-developed and well-nourished.   Eyes: Conjunctivae and EOM are normal. Pupils are equal, round, and reactive to light.   Neck: Normal range of motion. Neck supple. No JVD present.   Cardiovascular: Normal rate, regular rhythm, normal heart sounds and intact distal pulses.    Pulmonary/Chest: Effort normal.   Basilar crackles.   Abdominal: Soft. Bowel sounds are normal.   Musculoskeletal: She exhibits edema (trace bilateral ,).   Neurological: She is alert and oriented to person, place, and time.   Skin: Skin is warm and dry.   Psychiatric: She has a normal mood and affect. Her behavior is normal.           Results Review:  I have reviewed the labs, radiology results, and diagnostic studies.    Laboratory Data:     Results from last 7 days  Lab Units 03/13/18  0509 03/12/18  0516 03/11/18  0604   WBC 10*3/mm3 11.01* 11.29* 14.50*   HEMOGLOBIN g/dL 9.9* 10.1* 11.1*   HEMATOCRIT % 31.3* 31.5* 34.5*   PLATELETS 10*3/mm3 305 278 337          Results from last 7 days  Lab Units 03/14/18  0453 03/13/18  0509 03/12/18  0723 03/11/18  0604  03/09/18  0644   SODIUM mmol/L 136 138 137 137  < > 136   SODIUM, ARTERIAL   --   --   --   --   < >  --    POTASSIUM mmol/L 5.6* 4.9  4.9 5.6*  < > 4.9   CHLORIDE mmol/L 103 104 101 102  < > 99   CO2 mmol/L 25.0 27.0 28.0 24.0  < > 30.0   BUN mg/dL 61* 65* 69* 62*  < > 46*   CREATININE mg/dL 0.94 1.21 1.81* 2.33*  < > 1.52*   CALCIUM mg/dL 8.6 8.0* 7.8* 8.1*  < > 8.1*   BILIRUBIN mg/dL  --  0.3  --  0.2  --  0.2   ALK PHOS U/L  --  62  --  74  --  64   ALT (SGPT) U/L  --  20  --  27  --  29   AST (SGOT) U/L  --  23  --  23  --  32   GLUCOSE mg/dL 154* 76 91 145*  < > 134*   < > = values in this interval not displayed.    Culture Data:        Radiology Data:   Imaging Results (last 24 hours)     Procedure Component Value Units Date/Time    XR Chest 1 View [[id number]] Collected:  03/13/18 2007     Updated:  03/13/18 2011    Narrative:       EXAMINATION: XR CHEST 1 VW-     3/13/2018 7:27 PM CDT     HISTORY: congestion; W19.XXXA-Unspecified fall, initial encounter;  S72.491A-Other fracture of lower end of right femur, initial encounter  for closed fracture; W19.XXXA-Unspecified fall, initial encounter;  Z74.09-Other reduced mobility; Z78.9-Other specified health status.     One view chest x-ray compared with 03/08/2018.     The heart and mediastinum are within normal limits.     Patchy right lower lobe infiltrate or atelectasis.     Partial clearing of the left lower lobe as compared with 5 days ago.     The upper lobes are clear.     No pneumothorax or heart failure.     Summary:  1. Patchy right lower lobe infiltrate or atelectasis.  This report was finalized on 03/13/2018 20:08 by Dr. Mamadou Garza MD.          I have reviewed the patient current medications.     Assessment/Plan     Hospital Problem List     * (Principal)Closed displaced comminuted fracture of shaft of right femur    Fall    Pain of right thigh    Class 2 obesity with serious comorbidity in adult    Type 2 diabetes mellitus with neurologic complication, with long-term current use of insulin    STUART (acute kidney injury)    Chronic diastolic heart failure    Pleural effusion, left     Hypoxia        Assessment     Fracture shaft of right femur  Fall  Obesity  DM II  STUART  Chronic diastolic heart failure no current exacerbation  Hypoxia  Pleural effusion left     Plan    Midline   2 view chest xray  Will hold on going to NH today  Lasix 40 mg x 1 IV  IS hourly  Bmp in AM    Discharge Planning: I expect the patient to be discharged to [facility] in 1-2 days.    Manda Andrew,    03/14/18   2:18 PM

## 2018-03-14 NOTE — THERAPY TREATMENT NOTE
Acute Care - Physical Therapy Treatment Note  [facility]     Patient Name: Tamy Sher  : 1930  MRN: [id number]  Today's Date: 3/14/2018  Onset of Illness/Injury or Date of Surgery: 18  Date of Referral to PT: 18  Referring Physician: Dr. Bundy    Admit Date: 3/8/2018    Visit Dx:    ICD-10-CM ICD-9-CM   1. Fall, initial encounter W19.XXXA E888.9   2. Other closed fracture of distal end of right femur, initial encounter S72.491A 821.29   3. Fall W19.XXXA E888.9   4. Impaired mobility Z74.09 799.89   5. Decreased activities of daily living (ADL) Z78.9 V49.89     Patient Active Problem List   Diagnosis   • Ischemic chest pain   • Pneumonia of both lungs due to infectious organism   • Fall   • Closed displaced comminuted fracture of shaft of right femur   • Pain of right thigh   • Class 2 obesity with serious comorbidity in adult   • Type 2 diabetes mellitus with neurologic complication, with long-term current use of insulin   • STUART (acute kidney injury)   • Chronic diastolic heart failure   • Pleural effusion, left   • Hypoxia       Therapy Treatment    Therapy Treatment / Health Promotion    Treatment Time/Intention  Discipline: physical therapy assistant (Yolie Francis PTA)  Document Type: therapy note (daily note) (Yolie Francis PTA)  Subjective Information: complains of, pain, weakness, swelling (Yolie Francis PTA)  Existing Precautions/Restrictions: fall, non-weight bearing (Yolie Francis PTA)  Treatment Considerations/Comments: (S) NWB RLE (Yolie Francis PTA)  Plan of Care Review  Plan of Care Reviewed With: patient (Yolie Francis PTA)    Vitals/Pain/Safety  Vital Signs  Pretreatment Heart Rate (beats/min): 115 (Yolie Francis PTA)  Intratreatment Heart Rate (beats/min): 148 (Yolie Francis PTA)  Posttreatment Heart Rate (beats/min): 112 (Yolie Francis PTA)  Pre SpO2 (%): 92 (Yolie Francis PTA)  O2 Delivery Pre Treatment: nasal cannula (3l) (Yolie Francis PTA)  Pain  Assessment  Additional Documentation: Pain Scale: FACES Pre/Post-Treatment (Group) (Yolie Francis PTA)  Pain Scale: Numbers Pre/Post-Treatment  Pain Scale: Numbers, Pretreatment: 6/10 (Yolie KUMAR Francis PTA)  Pain Location - Side: Right (Yolie HEATH Francis, VERNELL)  Pain Location: knee (Yolie HEATH Francis, VERNELL)  Pain Scale: Word Pre/Post-Treatment  Pain Location - Side: Right (Yolie HEATH Francis, VERNELL)  Pain Location: knee (Yolie KUMAR Francis, PTA)  Pain Scale: FACES Pre/Post-Treatment  Pain: FACES Scale, Pretreatment: 6-->hurts even more (Yolie KUMAR Francis, VERNELL)  Pain Location - Side: Right (Yolie Francis, VERNELL)  Pain Location: knee (Yolie KUMAR DuffyFrancis, PTA)  Positioning and Restraints  Pre-Treatment Position: in bed (Yolie Francis PTA)  Post Treatment Position: bed (Yolie Francis PTA)  In Bed: fowlers, call light within reach, encouraged to call for assist (Yolie Francis PTA)    Mobility,ADL,Motor, Modality  Mobility Assessment/Intervention  Extremity Weight-bearing Status: right lower extremity (Yolie Francis PTA)  Right Lower Extremity (Weight-bearing Status): (S) non weight-bearing (NWB) (Yolie Francis PTA)  Bed Mobility Assessment/Treatment  Supine-Sit [place] (Bed Mobility): maximum assist (25% patient effort), 2 person assist, verbal cues (Yolie Francis PTA)  Sit-Supine [place] (Bed Mobility): maximum assist (25% patient effort), 2 person assist, verbal cues (Yolie Francis PTA)  Bed Mobility, Safety Issues: decreased use of arms for pushing/pulling, decreased use of legs for bridging/pushing (Yolie Francis PTA)  Transfer Assessment/Treatment  Comment (Transfers): not attempted (Yolie Francis PTA)     Therapeutic Exercise  Position (Therapeutic Exercise): seated (Yolie Francis PTA)  Comment (Therapeutic Exercise): RLE P-AAROM, LLE AAROM x 20 reps (Yolie Francis PTA)  Balance  Balance: static sitting balance (Yolie Francis PTA)  Static Sitting Balance  Level of [place] (Unsupported Sitting, Static  Balance): contact guard assist (Yolie Francis PTA)  Sitting Position (Unsupported Sitting, Static Balance): sitting on edge of bed (Yolie Francis PTA)  Time Able to Maintain Position (Unsupported Sitting, Static Balance): more than 5 minutes (Yolie Francis PTA)        ROM/MMT             Sensory, Edema, Orthotics          Cognition, Communication, Swallow  Speaking Valve  Pretreatment Heart Rate (beats/min): 115 (Yolie Francis PTA)  Pre SpO2 (%): 92 (Yolie Francis PTA)  Posttreatment Heart Rate (beats/min): 112 (Yolie Francis PTA)  General Eating/Swallowing Observations  Pre SpO2 (%): 92 (Yolie Francis PTA)    Outcome Summary               PT Rehab Goals     Row Name 03/11/18 1594             Bed Mobility Goal 1 (PT)    Activity/Assistive Device (Bed Mobility Goal 1, PT) sit to supine/supine to sit  -FERNANDO      [place] Level/Cues Needed (Bed Mobility Goal 1, PT) minimum assist (75% or more patient effort)  -FERNANDO      Time Frame (Bed Mobility Goal 1, PT) long term goal (LTG);10 days  -FERNANDO      Barriers (Bed Mobility Goal 1, PT) Weakness, pain  -FERNANDO      Progress/Outcomes (Bed Mobility Goal 1, PT) goal ongoing  -FERNANDO         Transfer Goal 1 (PT)    Activity/Assistive Device (Transfer Goal 1, PT) sit-to-stand/stand-to-sit;bed-to-chair/chair-to-bed;walker, rolling  -FERNANDO      [place] Level/Cues Needed (Transfer Goal 1, PT) minimum assist (75% or more patient effort)  -FERNANDO      Time Frame (Transfer Goal 1, PT) long term goal (LTG);10 days  -FERNANDO      Barriers (Transfers Goal 1, PT) weakness, pain  -FERNANDO      Progress/Outcome (Transfer Goal 1, PT) goal ongoing  -FERNANDO         Gait Training Goal 1 (PT)    Activity/Assistive Device (Gait Training Goal 1, PT) --  -FERNANDO      [place] Level (Gait Training Goal 1, PT) --  -FERNANDO      Distance (Gait Goal 1, PT) --  -FERNANDO      Time Frame (Gait Training Goal 1, PT) --  -FERNANDO      Barriers (Gait Training Goal 1, PT) --  -FERNANDO      Progress/Outcome (Gait Training Goal 1, PT) --  -FERNANDO         User Key  (r) = Recorded By, (t) = Taken By, (c) = Cosigned By    Initials Name Provider Type    FERNANDO Callaway, PT DPT Physical Therapist          Physical Therapy Education     Title: PT OT SLP Therapies (Active)     Topic: Physical Therapy (Active)     Point: Mobility training (Done)    Learning Progress Summary     Learner Status Readiness Method Response Comment Documented by    Patient Done Acceptance E VU bed mobility  03/13/18 1112     Active Acceptance E NR bed mobility  03/12/18 1152     Done Acceptance E VU,NR Educated pt/sons on progression of PT POC, benefits of activity, NWB R LE FERNANDO 03/11/18 1305    Family Done Acceptance E VU,NR Educated pt/sons on progression of PT POC, benefits of activity, NWB R LE FERNANDO 03/11/18 1305          Point: Precautions (Done)    Learning Progress Summary     Learner Status Readiness Method Response Comment Documented by    Patient Done Acceptance E VU,NR POSITIONING  03/14/18 1013     Done Acceptance E VU,NR Educated pt/sons on progression of PT POC, benefits of activity, NWB R LE FERNANDO 03/11/18 1305    Family Done Acceptance E VU,NR Educated pt/sons on progression of PT POC, benefits of activity, NWB R LE FERNANDO 03/11/18 1305                      User Key     Initials Effective Dates Name Provider Type [facility] 08/02/16 -  Yolie Francis, PTA Physical Therapy Assistant PT     08/02/16 -  Kd Callaway, PT DPT Physical Therapist PT                    PT Recommendation and Plan     Plan of Care Reviewed With: patient  Progress: no change  Outcome Summary: pt c/o SOA and swelling of her entire body, the swelling also limits her mobility, trans to EOB max of 2, pt sat EOB about 20 minutes performed BLE AAROM. pt HR up to 150 at times. Pt would benefit from SNF          Outcome Measures     Row Name 03/14/18 1000 03/14/18 0757 03/13/18 1100       How much help from another person do you currently need...    Turning from your back to your side while in flat  bed without using bedrails? 1  -AH  -- 2  -AH    Moving from lying on back to sitting on the side of a flat bed without bedrails? 1  -AH  -- 2  -AH    Moving to and from a bed to a chair (including a wheelchair)? 1  -AH  -- 1  -AH    Standing up from a chair using your arms (e.g., wheelchair, bedside chair)? 1  -AH  -- 1  -AH    Climbing 3-5 steps with a railing? 1  -AH  -- 1  -AH    To walk in hospital room? 1  -AH  -- 1  -AH    AM-PAC 6 Clicks Score 6  -  -- 8  -       How much help from another is currently needed...    Putting on and taking off regular lower body clothing?  -- 1  -CJ  --    Bathing (including washing, rinsing, and drying)  -- 1  -CJ  --    Toileting (which includes using toilet bed pan or urinal)  -- 1  -CJ  --    Putting on and taking off regular upper body clothing  -- 3  -CJ  --    Taking care of personal grooming (such as brushing teeth)  -- 4  -CJ  --    Eating meals  -- 4  -CJ  --    Score  -- 14  -CJ  --       Functional Assessment    Outcome Measure Options AM-[facility] 6 Clicks Basic Mobility (PT)  - AM-[facility] 6 Clicks Daily Activity (OT)  -[facility] 6 Clicks Basic Mobility (PT)  -    Row Name 03/12/18 1100             How much help from another person do you currently need...    Turning from your back to your side while in flat bed without using bedrails? 2  -AH      Moving from lying on back to sitting on the side of a flat bed without bedrails? 2  -AH      Moving to and from a bed to a chair (including a wheelchair)? 1  -AH      Standing up from a chair using your arms (e.g., wheelchair, bedside chair)? 1  -AH      Climbing 3-5 steps with a railing? 1  -      To walk in hospital room? 1  -      AM-PAC 6 Clicks Score 8  -         Functional Assessment    Outcome Measure Options AM-[facility] 6 Clicks Basic Mobility (PT)  -        User Key  (r) = Recorded By, (t) = Taken By, (c) = Cosigned By    Initials Name Provider Type     Yolie Francis PTA Physical Therapy Assistant      YASIR Singh/BALAJI Occupational Therapy Assistant           Time Calculation:         PT Charges     Row Name 03/14/18 1549 03/14/18 1018          Time Calculation    Start Time 1525  - 0935  -     Stop Time 1548  - 1005  -     Time Calculation (min) 23 min  - 30 min  -     PT Received On  -- 03/14/18  -     PT Goal Re-Cert Due Date  -- 03/21/18  -        Time Calculation- PT    Total Timed Code Minutes- PT 23 minute(s)  -AH 30 minute(s)  -       User Key  (r) = Recorded By, (t) = Taken By, (c) = Cosigned By    Initials Name Provider Type     Yolie Francis PTA Physical Therapy Assistant          Therapy Charges for Today     Code Description Service Date Service Provider Modifiers Qty    19656346322 HC PT THERAPEUTIC ACT EA 15 MIN 3/13/2018 Yolie Francis PTA GP, KX 2    07936658259 HC PT THER PROC EA 15 MIN 3/13/2018 Yolie Francis PTA GP, KX 2    64168420612 HC PT THERAPEUTIC ACT EA 15 MIN 3/14/2018 Yolie Francis PTA GP, KX 2    58591578051 HC PT THER SUPP EA 15 MIN 3/14/2018 Yolie Francis PTA GP 1    26368359264 HC PT THER PROC EA 15 MIN 3/14/2018 Yolie Francis PTA GP, KX 2          PT G-Codes  Outcome Measure Options: AM-PAC 6 Clicks Basic Mobility (PT)  Score: 8  Functional Limitation: Changing and maintaining body position  Changing and Maintaining Body Position Current Status (): At least 80 percent but less than 100 percent impaired, limited or restricted  Changing and Maintaining Body Position Goal Status (): At least 40 percent but less than 60 percent impaired, limited or restricted    Yolie Francis PTA  3/14/2018

## 2018-03-15 LAB
ANION GAP SERPL CALCULATED.3IONS-SCNC: 7 MMOL/L (ref 4–13)
BUN BLD-MCNC: 64 MG/DL (ref 5–21)
BUN/CREAT SERPL: 68.1 (ref 7–25)
CALCIUM SPEC-SCNC: 9 MG/DL (ref 8.4–10.4)
CHLORIDE SERPL-SCNC: 102 MMOL/L (ref 98–110)
CO2 SERPL-SCNC: 28 MMOL/L (ref 24–31)
CREAT BLD-MCNC: 0.94 MG/DL (ref 0.5–1.4)
GFR SERPL CREATININE-BSD FRML MDRD: 56 ML/MIN/1.73
GLUCOSE BLD-MCNC: 146 MG/DL (ref 70–100)
GLUCOSE BLDC GLUCOMTR-MCNC: 148 MG/DL (ref 70–130)
GLUCOSE BLDC GLUCOMTR-MCNC: 245 MG/DL (ref 70–130)
GLUCOSE BLDC GLUCOMTR-MCNC: 260 MG/DL (ref 70–130)
GLUCOSE BLDC GLUCOMTR-MCNC: 276 MG/DL (ref 70–130)
POTASSIUM BLD-SCNC: 5.2 MMOL/L (ref 3.5–5.3)
SODIUM BLD-SCNC: 137 MMOL/L (ref 135–145)

## 2018-03-15 PROCEDURE — 63710000001 INSULIN DETEMIR PER 5 UNITS: Performed by: INTERNAL MEDICINE

## 2018-03-15 PROCEDURE — 63710000001 INSULIN LISPRO (HUMAN) PER 5 UNITS: Performed by: INTERNAL MEDICINE

## 2018-03-15 PROCEDURE — 82962 GLUCOSE BLOOD TEST: CPT

## 2018-03-15 PROCEDURE — 94660 CPAP INITIATION&MGMT: CPT

## 2018-03-15 PROCEDURE — 94799 UNLISTED PULMONARY SVC/PX: CPT

## 2018-03-15 PROCEDURE — 80048 BASIC METABOLIC PNL TOTAL CA: CPT | Performed by: INTERNAL MEDICINE

## 2018-03-15 PROCEDURE — 97530 THERAPEUTIC ACTIVITIES: CPT

## 2018-03-15 RX ORDER — FUROSEMIDE 40 MG/1
40 TABLET ORAL DAILY
Status: DISCONTINUED | OUTPATIENT
Start: 2018-03-15 | End: 2018-03-15

## 2018-03-15 RX ORDER — FUROSEMIDE 40 MG/1
40 TABLET ORAL 2 TIMES DAILY
Status: DISCONTINUED | OUTPATIENT
Start: 2018-03-15 | End: 2018-03-16

## 2018-03-15 RX ADMIN — IPRATROPIUM BROMIDE AND ALBUTEROL SULFATE 3 ML: 2.5; .5 SOLUTION RESPIRATORY (INHALATION) at 15:12

## 2018-03-15 RX ADMIN — FUROSEMIDE 40 MG: 40 TABLET ORAL at 13:04

## 2018-03-15 RX ADMIN — INSULIN LISPRO 6 UNITS: 100 INJECTION, SOLUTION INTRAVENOUS; SUBCUTANEOUS at 21:07

## 2018-03-15 RX ADMIN — GABAPENTIN 600 MG: 300 CAPSULE ORAL at 16:23

## 2018-03-15 RX ADMIN — FOLIC ACID 1 MG: 1 TABLET ORAL at 09:00

## 2018-03-15 RX ADMIN — BUDESONIDE AND FORMOTEROL FUMARATE DIHYDRATE 2 PUFF: 160; 4.5 AEROSOL RESPIRATORY (INHALATION) at 19:11

## 2018-03-15 RX ADMIN — INSULIN LISPRO 4 UNITS: 100 INJECTION, SOLUTION INTRAVENOUS; SUBCUTANEOUS at 18:29

## 2018-03-15 RX ADMIN — BUDESONIDE AND FORMOTEROL FUMARATE DIHYDRATE 2 PUFF: 160; 4.5 AEROSOL RESPIRATORY (INHALATION) at 07:00

## 2018-03-15 RX ADMIN — HYDROCODONE BITARTRATE AND ACETAMINOPHEN 1 TABLET: 7.5; 325 TABLET ORAL at 23:44

## 2018-03-15 RX ADMIN — INSULIN LISPRO 6 UNITS: 100 INJECTION, SOLUTION INTRAVENOUS; SUBCUTANEOUS at 12:41

## 2018-03-15 RX ADMIN — ASPIRIN 325 MG: 325 TABLET, COATED ORAL at 21:00

## 2018-03-15 RX ADMIN — FUROSEMIDE 40 MG: 40 TABLET ORAL at 21:00

## 2018-03-15 RX ADMIN — INSULIN DETEMIR 30 UNITS: 100 INJECTION, SOLUTION SUBCUTANEOUS at 21:10

## 2018-03-15 RX ADMIN — ATORVASTATIN CALCIUM 10 MG: 10 TABLET, FILM COATED ORAL at 21:00

## 2018-03-15 RX ADMIN — IPRATROPIUM BROMIDE AND ALBUTEROL SULFATE 3 ML: 2.5; .5 SOLUTION RESPIRATORY (INHALATION) at 10:39

## 2018-03-15 RX ADMIN — IPRATROPIUM BROMIDE AND ALBUTEROL SULFATE 3 ML: 2.5; .5 SOLUTION RESPIRATORY (INHALATION) at 19:10

## 2018-03-15 RX ADMIN — HYDROXYUREA 500 MG: 500 CAPSULE ORAL at 09:00

## 2018-03-15 RX ADMIN — PANTOPRAZOLE SODIUM 40 MG: 40 TABLET, DELAYED RELEASE ORAL at 05:32

## 2018-03-15 RX ADMIN — GABAPENTIN 300 MG: 300 CAPSULE ORAL at 09:00

## 2018-03-15 RX ADMIN — IPRATROPIUM BROMIDE AND ALBUTEROL SULFATE 3 ML: 2.5; .5 SOLUTION RESPIRATORY (INHALATION) at 07:00

## 2018-03-15 RX ADMIN — LEVOTHYROXINE SODIUM 50 MCG: 50 TABLET ORAL at 05:32

## 2018-03-15 RX ADMIN — METOPROLOL SUCCINATE 50 MG: 50 TABLET, FILM COATED, EXTENDED RELEASE ORAL at 09:00

## 2018-03-15 NOTE — THERAPY TREATMENT NOTE
Acute Care - Physical Therapy Treatment Note  [facility]     Patient Name: Tamy Sher  : 1930  MRN: [id number]  Today's Date: 3/15/2018  Onset of Illness/Injury or Date of Surgery: 18  Date of Referral to PT: 18  Referring Physician: Dr. Bundy    Admit Date: 3/8/2018    Visit Dx:    ICD-10-CM ICD-9-CM   1. Fall, initial encounter W19.XXXA E888.9   2. Other closed fracture of distal end of right femur, initial encounter S72.491A 821.29   3. Fall W19.XXXA E888.9   4. Impaired mobility Z74.09 799.89   5. Decreased activities of daily living (ADL) Z78.9 V49.89     Patient Active Problem List   Diagnosis   • Ischemic chest pain   • Pneumonia of both lungs due to infectious organism   • Fall   • Closed displaced comminuted fracture of shaft of right femur   • Pain of right thigh   • Class 2 obesity with serious comorbidity in adult   • Type 2 diabetes mellitus with neurologic complication, with long-term current use of insulin   • STUART (acute kidney injury)   • Chronic diastolic heart failure   • Pleural effusion, left   • Hypoxia       Therapy Treatment    Therapy Treatment / Health Promotion    Treatment Time/Intention  Discipline: physical therapy assistant (Yolie Francis PTA)  Document Type: therapy note (daily note) (Yolie Francis PTA)  Subjective Information: complains of, pain, weakness, swelling (Yolie Francis PTA)  Existing Precautions/Restrictions: fall, oxygen therapy device and L/min, non-weight bearing (Yolie Francis PTA)  Treatment Considerations/Comments: NWB RLE (Yolie Francis PTA)  Plan of Care Review  Plan of Care Reviewed With: patient, son (Yolie Francis PTA)    Vitals/Pain/Safety  Vital Signs  Intratreatment Heart Rate (beats/min): 142 (Yolie Francis PTA)  Pain Assessment  Additional Documentation: Pain Scale: FACES Pre/Post-Treatment (Group) (Yolie Francis PTA)  Pain Scale: Numbers Pre/Post-Treatment  Pain Location - Side: Right (Yolie Francis PTA)  Pain  Location: knee (Yolie Francis PTA)  Pain Scale: Word Pre/Post-Treatment  Pain Location - Side: Right (Yolie Francis, VERNELL)  Pain Location: knee (Yolie KUMAR Francis, VERNELL)  Pain Scale: FACES Pre/Post-Treatment  Pain: FACES Scale, Pretreatment: 6-->hurts even more (Yolie Francis PTA)  Pain Location - Side: Right (Yolie KUMAR Francis, VERNELL)  Pain Location: knee (Yolie Francis, VERNELL)  Pre/Post Treatment Pain Comment: pain with mobility (Yolie Francis PTA)  Safety Issues, Functional Mobility  Safety Issues Affecting Function (Mobility): (S) unable to maintain weight-bearing restrictions (Yolie Francis PTA)  Impairments Affecting Function (Mobility): (S) pain, shortness of breath, strength (edema) (Yolie Francis PTA)  Positioning and Restraints  Pre-Treatment Position: in bed (Yolie Francis PTA)  Post Treatment Position: bed (Yolie Francis PTA)  In Bed: fowlers, call light within reach, encouraged to call for assist, with family/caregiver (Yolie Francis PTA)    Mobility,ADL,Motor, Modality  Mobility Assessment/Intervention  Extremity Weight-bearing Status: right lower extremity (Yolie Francis PTA)  Right Lower Extremity (Weight-bearing Status): (S) non weight-bearing (NWB) (Yolie Francis PTA)  Bed Mobility Assessment/Treatment  Supine-Sit [place] (Bed Mobility): maximum assist (25% patient effort), 2 person assist, verbal cues (Yolie Francis PTA)  Sit-Supine [place] (Bed Mobility): maximum assist (25% patient effort), 2 person assist, verbal cues (Yolie Francis PTA)  Assistive Device (Bed Mobility): draw sheet (Yolie Francis PTA)  Transfer Assessment/Treatment  Comment (Transfers): (S) attempted to stand, pt not able to stand or maintain NWB RLE (Yolie Francis PTA)     Therapeutic Exercise  Comment (Therapeutic Exercise): BLE AAROM sitting EOB (Yolie Francis PTA)  Balance  Balance: static sitting balance (Yolie Francis PTA)  Static Sitting Balance  Level of [place] (Unsupported Sitting,  Static Balance): contact guard assist, minimal assist, 75% patient effort (Yolie Francis PTA)  Sitting Position (Unsupported Sitting, Static Balance): sitting on edge of bed (Yolie Francis PTA)  Time Able to Maintain Position (Unsupported Sitting, Static Balance): more than 5 minutes (Yolie Francis PTA)        ROM/MMT             Sensory, Edema, Orthotics          Cognition, Communication, Swallow       Outcome Summary               PT Rehab Goals     Row Name 03/11/18 0078             Bed Mobility Goal 1 (PT)    Activity/Assistive Device (Bed Mobility Goal 1, PT) sit to supine/supine to sit  -FERNANDO      [place] Level/Cues Needed (Bed Mobility Goal 1, PT) minimum assist (75% or more patient effort)  -FERNANDO      Time Frame (Bed Mobility Goal 1, PT) long term goal (LTG);10 days  -FERNANDO      Barriers (Bed Mobility Goal 1, PT) Weakness, pain  -FERNANDO      Progress/Outcomes (Bed Mobility Goal 1, PT) goal ongoing  -FERNANDO         Transfer Goal 1 (PT)    Activity/Assistive Device (Transfer Goal 1, PT) sit-to-stand/stand-to-sit;bed-to-chair/chair-to-bed;walker, rolling  -FERNANDO      [place] Level/Cues Needed (Transfer Goal 1, PT) minimum assist (75% or more patient effort)  -FERNANDO      Time Frame (Transfer Goal 1, PT) long term goal (LTG);10 days  -FRENANDO      Barriers (Transfers Goal 1, PT) weakness, pain  -FERNANDO      Progress/Outcome (Transfer Goal 1, PT) goal ongoing  -FERNANDO         Gait Training Goal 1 (PT)    Activity/Assistive Device (Gait Training Goal 1, PT) --  -FERNANDO      [place] Level (Gait Training Goal 1, PT) --  -FERNANDO      Distance (Gait Goal 1, PT) --  -FERNANDO      Time Frame (Gait Training Goal 1, PT) --  -FERNANDO      Barriers (Gait Training Goal 1, PT) --  -FERNANDO      Progress/Outcome (Gait Training Goal 1, PT) --  -FERNANDO        User Key  (r) = Recorded By, (t) = Taken By, (c) = Cosigned By    Initials Name Provider Type    FERNANDO Callaway, PT DPT Physical Therapist          Physical Therapy Education     Title: PT OT SLP Therapies  (Active)     Topic: Physical Therapy (Active)     Point: Mobility training (Done)    Learning Progress Summary     Learner Status Readiness Method Response Comment Documented by    Patient Done Acceptance E VU bed mobility  03/15/18 1053     Done Acceptance E VU bed mobility  03/13/18 1112     Active Acceptance E NR bed mobility  03/12/18 1152     Done Acceptance E VU,NR Educated pt/sons on progression of PT POC, benefits of activity, NWB R LE FERNANDO 03/11/18 1305    Family Done Acceptance E VU,NR Educated pt/sons on progression of PT POC, benefits of activity, NWB R LE FERNANDO 03/11/18 1305          Point: Precautions (Done)    Learning Progress Summary     Learner Status Readiness Method Response Comment Documented by    Patient Done Acceptance E VU,NR POSITIONING  03/14/18 1013     Done Acceptance E VU,NR Educated pt/sons on progression of PT POC, benefits of activity, NWB R LE FERNANDO 03/11/18 1305    Family Done Acceptance E VU,NR Educated pt/sons on progression of PT POC, benefits of activity, NWB R LE FERNANDO 03/11/18 1305                      User Key     Initials Effective Dates Name Provider Type Discipline     08/02/16 -  Yolie Francis, PTA Physical Therapy Assistant PT     08/02/16 -  Kd Callaway, PT DPT Physical Therapist PT                    PT Recommendation and Plan     Plan of Care Reviewed With: patient, son  Progress: no change  Outcome Summary: pt cont to c/o swelling of her entire body and SOA, edema limits her mobility. Pt trans to EOB max of 2, pt sat EOB 20 minutes, attempted to stand, pt not able to stand, HR up to 140's with activity.           Outcome Measures     Row Name 03/15/18 1100 03/14/18 1000 03/14/18 0757       How much help from another person do you currently need...    Turning from your back to your side while in flat bed without using bedrails? 1  - 1  -AH  --    Moving from lying on back to sitting on the side of a flat bed without bedrails? 1  - 1  -AH  --    Moving to  and from a bed to a chair (including a wheelchair)? 1  - 1  -AH  --    Standing up from a chair using your arms (e.g., wheelchair, bedside chair)? 1  -AH 1  -AH  --    Climbing 3-5 steps with a railing? 1  -AH 1  -AH  --    To walk in hospital room? 1  -AH 1  -AH  --    AM-PAC 6 Clicks Score 6  -AH 6  -AH  --       How much help from another is currently needed...    Putting on and taking off regular lower body clothing?  --  -- 1  -CJ    Bathing (including washing, rinsing, and drying)  --  -- 1  -CJ    Toileting (which includes using toilet bed pan or urinal)  --  -- 1  -CJ    Putting on and taking off regular upper body clothing  --  -- 3  -CJ    Taking care of personal grooming (such as brushing teeth)  --  -- 4  -CJ    Eating meals  --  -- 4  -CJ    Score  --  -- 14  -CJ       Functional Assessment    Outcome Measure Options AM-PAC 6 Clicks Basic Mobility (PT)  - AM-[facility] 6 Clicks Basic Mobility (PT)  - AM-[facility] 6 Clicks Daily Activity (OT)  -    Row Name 03/13/18 1100             How much help from another person do you currently need...    Turning from your back to your side while in flat bed without using bedrails? 2  -AH      Moving from lying on back to sitting on the side of a flat bed without bedrails? 2  -AH      Moving to and from a bed to a chair (including a wheelchair)? 1  -AH      Standing up from a chair using your arms (e.g., wheelchair, bedside chair)? 1  -AH      Climbing 3-5 steps with a railing? 1  -AH      To walk in hospital room? 1  -      AM-PAC 6 Clicks Score 8  -         Functional Assessment    Outcome Measure Options AM-PAC 6 Clicks Basic Mobility (PT)  -        User Key  (r) = Recorded By, (t) = Taken By, (c) = Cosigned By    Initials Name Provider Type     Yolie Francis PTA Physical Therapy Assistant    CYNDEE Green Occupational Therapy Assistant           Time Calculation:         PT Charges     Row Name 03/15/18 1100             Time Calculation     Start Time 1009  -      Stop Time 1040  -      Time Calculation (min) 31 min  -      PT Received On 03/15/18  -      PT Goal Re-Cert Due Date 03/21/18  -         Time Calculation- PT    Total Timed Code Minutes- PT 31 minute(s)  -        User Key  (r) = Recorded By, (t) = Taken By, (c) = Cosigned By    Initials Name Provider Type     Yolie Francis PTA Physical Therapy Assistant          Therapy Charges for Today     Code Description Service Date Service Provider Modifiers Qty    91967277491 HC PT THERAPEUTIC ACT EA 15 MIN 3/14/2018 Yolie Francis PTA GP, KX 2    11714023549 HC PT THER SUPP EA 15 MIN 3/14/2018 Yolie Francis PTA GP 1    07405267442 HC PT THER PROC EA 15 MIN 3/14/2018 Yolie Francis PTA GP, KX 2    18024748832 HC PT THERAPEUTIC ACT EA 15 MIN 3/15/2018 Yolie Francis, VERNELL GP, KX 2          PT G-Codes  Outcome Measure Options: AM-PAC 6 Clicks Basic Mobility (PT)  Score: 8  Functional Limitation: Changing and maintaining body position  Changing and Maintaining Body Position Current Status (): At least 80 percent but less than 100 percent impaired, limited or restricted  Changing and Maintaining Body Position Goal Status (): At least 40 percent but less than 60 percent impaired, limited or restricted    Yolie Francis PTA  3/15/2018

## 2018-03-15 NOTE — PROGRESS NOTES
Nephrology ([facility]) Progress Note      Patient:  Tamy Sher  YOB: 1930  Date of Service: 3/15/2018  MRN: [id number]   Acct: [id number]   Primary Care Physician: Barry Foley MD  Advance Directive: Full Code  Admit Date: 3/8/2018       Hospital Day: 7  Referring Provider: Fidencio Rodriguez*      Patient personally seen and examined.  Complete chart including Consults, Notes, Operative Reports, Labs, Cardiology, and Radiology studies reviewed as able.        Subjective:  Tamy Sher is a 87 y.o. female  whom we were consulted for acute kidney injury and hyperkalemia.  No prior known renal disease.  Admitted with fractured right femur after a fall.  Underwent internal fixation on 3/10.  Hospital course remarkable for acute kidney injury which has been improving.    Today is complaining of dyspnea with any activity and edema.  No new overnight issues. Urine output is nonoliguric    Allergies:  Review of patient's allergies indicates no known allergies.    Home Meds:  Prescriptions Prior to Admission   Medication Sig Dispense Refill Last Dose   • levothyroxine (SYNTHROID, LEVOTHROID) 50 MCG tablet Take 50 mcg by mouth Daily.      • amLODIPine (NORVASC) 5 MG tablet Take 5 mg by mouth Daily.   Unknown at Unknown time   • aspirin 325 MG tablet Take 325 mg by mouth Every Night.   Unknown at Unknown time   • budesonide-formoterol (SYMBICORT) 160-4.5 MCG/ACT inhaler Inhale 2 puffs 2 (Two) Times a Day.  12 Unknown at Unknown time   • folic acid (FOLVITE) 1 MG tablet Take 1 mg by mouth Daily.   Unknown at Unknown time   • furosemide (LASIX) 40 MG tablet Take 40 mg by mouth Daily.   Unknown at Unknown time   • gabapentin (NEURONTIN) 300 MG capsule Take 1 capsule by mouth Daily With Breakfast. 3 capsule 0 Unknown at Unknown time   • gabapentin (NEURONTIN) 300 MG capsule Take 2 capsules by mouth Every Evening. 6 capsule 0 Unknown at Unknown time   • hydroxyurea (HYDREA)  500 MG capsule Take 500 mg by mouth Take As Directed. Daily except Fridays.   Unknown at Unknown time   • hydroxyurea (HYDREA) 500 MG capsule Take 1,000 mg by mouth 1 (One) Time Per Week. Friday dose   Unknown at Unknown time   • insulin glargine (LANTUS) 100 UNIT/ML injection Inject 26 Units under the skin Every Night.   Unknown at Unknown time   • insulin glargine (LANTUS) 100 UNIT/ML injection Inject 10 Units under the skin Daily. AM dose  0 Unknown at Unknown time   • insulin lispro (humaLOG) 100 UNIT/ML injection Inject 0-9 Units under the skin 4 (Four) Times a Day With Meals & at Bedtime.  12 Unknown at Unknown time   • ipratropium-albuterol (DUO-NEB) 0.5-2.5 mg/mL nebulizer Take 3 mL by nebulization 4 (Four) Times a Day. 360 mL  Unknown at Unknown time   • lisinopril (PRINIVIL,ZESTRIL) 40 MG tablet Take 40 mg by mouth Daily.   Unknown at Unknown time   • metFORMIN (GLUCOPHAGE) 1000 MG tablet Take 500 mg by mouth Every Night.   Unknown at Unknown time   • metoprolol succinate XL (TOPROL-XL) 25 MG 24 hr tablet Take 2 tablets by mouth Daily. 30 tablet 0 Unknown at Unknown time   • pantoprazole (PROTONIX) 40 MG EC tablet Take 40 mg by mouth Daily.   Unknown at Unknown time   • simvastatin (ZOCOR) 40 MG tablet Take 0.5 tablets by mouth Every Night. 30 tablet 0 Unknown at Unknown time       Medicines:  Current Facility-Administered Medications   Medication Dose Route Frequency Provider Last Rate Last Dose   • aspirin tablet 325 mg  325 mg Oral Nightly Fidencio Rodriguez MD   325 mg at 03/14/18 2044   • atorvastatin (LIPITOR) tablet 10 mg  10 mg Oral Nightly Fidencio Rodriguez MD   10 mg at 03/14/18 2045   • budesonide-formoterol (SYMBICORT) 160-4.5 MCG/ACT inhaler 2 puff  2 puff Inhalation BID - RT Fidencio Rodriguez MD   2 puff at 03/15/18 0700   • dextrose (D50W) solution 25 g  25 g Intravenous Q15 Min PRN Fidencio Rodriguez MD       • [MAR Hold] dextrose (GLUTOSE) oral gel 15 g  15 g Oral  Q15 Min PRN Fidencio Rodriguez MD       • folic acid (FOLVITE) tablet 1 mg  1 mg Oral Daily Fidencio Rodriguez MD   1 mg at 03/15/18 0900   • furosemide (LASIX) tablet 40 mg  40 mg Oral Daily Isael Redding, APRROSARIO       • gabapentin (NEURONTIN) capsule 300 mg  300 mg Oral Daily With Breakfast Fidencio Rodriguez MD   300 mg at 03/15/18 0900   • gabapentin (NEURONTIN) capsule 600 mg  600 mg Oral Q PM Fidencio Rodriguez MD   600 mg at 03/14/18 1633   • glucagon (human recombinant) (GLUCAGEN DIAGNOSTIC) injection 1 mg  1 mg Subcutaneous PRN Fidencio Rodriguez MD       • HYDROcodone-acetaminophen (NORCO) 7.5-325 MG per tablet 1 tablet  1 tablet Oral Q6H PRN Rufino Herndon MD   1 tablet at 03/13/18 2324   • hydroxyurea (HYDREA) capsule 500 mg  500 mg Oral Daily Fidencio Rodriguez MD   500 mg at 03/15/18 0900   • hydrOXYzine (VISTARIL) capsule 50 mg  50 mg Oral TID PRN Fidencio Rodriguez MD       • insulin detemir (LEVEMIR) injection 30 Units  30 Units Subcutaneous Nightly Fidencio Rodriguez MD   15 Units at 03/14/18 2045   • insulin lispro (humaLOG) injection 0-9 Units  0-9 Units Subcutaneous 4x Daily With Meals & Nightly Fidencio Rodriguez MD   6 Units at 03/14/18 2044   • ipratropium-albuterol (DUO-NEB) nebulizer solution 3 mL  3 mL Nebulization 4x Daily - RT Fidencio Rodriguez MD   3 mL at 03/15/18 1039   • levothyroxine (SYNTHROID, LEVOTHROID) tablet 50 mcg  50 mcg Oral Q AM Nima Bundy MD   50 mcg at 03/15/18 0532   • magnesium hydroxide (MILK OF MAGNESIA) suspension 2400 mg/10mL 10 mL  10 mL Oral Daily PRN Fidencio Rodriguez MD   10 mL at 03/13/18 2046   • metoprolol succinate XL (TOPROL-XL) 24 hr tablet 50 mg  50 mg Oral Daily Fidencio Rodriguez MD   50 mg at 03/15/18 0900   • naloxone (NARCAN) injection 0.1 mg  0.1 mg Intravenous Q5 Min PRN Fidencio Rodriguez MD       • naloxone (NARCAN) injection 0.4 mg  0.4 mg Intravenous  Q5 Min PRN Fidencio Rodriguez MD       • naloxone (NARCAN) injection 0.4 mg  0.4 mg Intravenous Q5 Min PRN Fidencio Rodriguez MD       • ondansetron (ZOFRAN) injection 4 mg  4 mg Intravenous Q6H PRN Fidencio Rodriguez MD   4 mg at 03/09/18 1814   • ondansetron (ZOFRAN) injection 4 mg  4 mg Intravenous Q6H PRN Nima Bundy MD       • pantoprazole (PROTONIX) EC tablet 40 mg  40 mg Oral Q AM Fidencio Rodriguez MD   40 mg at 03/15/18 0532   • sodium chloride 0.9 % flush 1-10 mL  1-10 mL Intravenous PRN Fidencio Rodriguez MD       • sodium chloride 0.9 % flush 10 mL  10 mL Intravenous PRN Rylee Gustafson MD           Past Medical History:  Past Medical History:   Diagnosis Date   • Constipation    • Diabetes mellitus    • Diarrhea    • Diastolic dysfunction, left ventricle    • Esophagitis    • Exertional shortness of breath    • Hyperlipidemia    • Hypertension    • Lumbar spondylitis    • Osteoarthritis    • Peripheral vascular disease    • Sinusitis    • Stroke        Past Surgical History:  Past Surgical History:   Procedure Laterality Date   • FEMUR SUPRACONDYLAR FRACTURE REPAIR Right 3/10/2018    Procedure: FEMUR SUPRACONDYLAR NAIL;  Surgeon: Nima Bundy MD;  Location: [facility];  Service: Orthopedics   • GALLBLADDER SURGERY     • HYSTERECTOMY     • JOINT REPLACEMENT     • REPLACEMENT TOTAL KNEE Left        Family History  Family History   Problem Relation Age of Onset   • No Known Problems Mother    • No Known Problems Father        Social History  Social History     Social History   • Marital status:      Spouse name: N/A   • Number of children: N/A   • Years of education: N/A     Occupational History   • Not on file.     Social History Main Topics   • Smoking status: Never Smoker   • Smokeless tobacco: Never Used   • Alcohol use No   • Drug use: No   • Sexual activity: No     Other Topics Concern   • Not on file     Social History Narrative   • No narrative on  "file         Review of Systems:  History obtained from chart review and the patient  General ROS: No fever or chills  Respiratory ROS: positive for shortness of breath.  No cough, wheezing  Cardiovascular ROS: No chest pain or palpitations  Gastrointestinal ROS: No abdominal pain or melena  Genito-Urinary ROS: No dysuria or hematuria  Neurological ROS: no headache or dizziness    Objective:  /90 (BP Location: Right arm, Patient Position: Lying)   Pulse 114   Temp 98.6 °F (37 °C) (Oral)   Resp 20   Ht 152.4 cm (60\")   Wt 99.1 kg (218 lb 6.4 oz)   SpO2 90%   BMI 42.65 kg/m²     Intake/Output Summary (Last 24 hours) at 03/15/18 1137  Last data filed at 03/15/18 0500   Gross per 24 hour   Intake                0 ml   Output              800 ml   Net             -800 ml     General: awake/alert    Neck: supple, no JVD  Chest:  clear to auscultation bilaterally without respiratory distress  CVS: regular rate and rhythm  Abdominal: soft, nontender, normal bowel sounds  Extremities: bilateral 1+ edema  Skin: warm and dry without rash  Neuro: no focal motor deficits    Labs:    Results from last 7 days  Lab Units 03/13/18  0509 03/12/18  0516 03/11/18  0604   WBC 10*3/mm3 11.01* 11.29* 14.50*   HEMOGLOBIN g/dL 9.9* 10.1* 11.1*   HEMATOCRIT % 31.3* 31.5* 34.5*   PLATELETS 10*3/mm3 305 278 337           Results from last 7 days  Lab Units 03/15/18  0539 03/14/18  0453 03/13/18  0509  03/11/18  0604  03/09/18  0644   SODIUM mmol/L 137 136 138  < > 137  < > 136   SODIUM, ARTERIAL   --   --   --   --   --   < >  --    POTASSIUM mmol/L 5.2 5.6* 4.9  < > 5.6*  < > 4.9   CHLORIDE mmol/L 102 103 104  < > 102  < > 99   CO2 mmol/L 28.0 25.0 27.0  < > 24.0  < > 30.0   BUN mg/dL 64* 61* 65*  < > 62*  < > 46*   CREATININE mg/dL 0.94 0.94 1.21  < > 2.33*  < > 1.52*   CALCIUM mg/dL 9.0 8.6 8.0*  < > 8.1*  < > 8.1*   BILIRUBIN mg/dL  --   --  0.3  --  0.2  --  0.2   ALK PHOS U/L  --   --  62  --  74  --  64   ALT (SGPT) U/L  --  "  --  20  --  27  --  29   AST (SGOT) U/L  --   --  23  --  23  --  32   GLUCOSE mg/dL 146* 154* 76  < > 145*  < > 134*   < > = values in this interval not displayed.    Radiology:   Imaging Results (last 72 hours)     Procedure Component Value Units Date/Time    US Renal Bilateral [[id number]] Collected:  03/12/18 0722     Updated:  03/12/18 0728    Narrative:       EXAMINATION: US RENAL BILATERAL- 3/12/2018 7:22 AM CDT     HISTORY: STUART; W19.XXXA-Unspecified fall, initial encounter;  S72.491A-Other fracture of lower end of right femur, initial encounter  for closed fracture; W19.XXXA-Unspecified fall, initial encounter;  Z74.09-Other reduced mobility; Z78.9-Other specified health status.     REPORT: Sonographic images of the kidneys were obtained bilaterally.  There are no comparison studies.     The aorta is visualized and is normal in caliber, 1.8 cm proximally. The  IVC measures 2.1 cm, normal. The right kidney measures 10.7 x 4.9 cm,  with normal echogenicity and no evidence of hydronephrosis. There is a  small cyst at the inferior pole the right kidney that appears benign and  measures 1.5 x 1.3 cm. There is a 1.1 cm cyst in the mid right kidney  which appears benign.     Left kidney measures 11.4 x 5.8 x 5.9 cm, there is a benign-appearing  cyst at the hilum of the left kidney that measures 3.3 x 2.3 x 2.0 cm.  This may represent parapelvic cyst or possibly dilation of the renal  pelvis.     Color flow imaging demonstrates vascular flow within both kidneys. The  bladder is decompressed by Alves catheter.       Impression:       1. 2 small benign-appearing cysts are identified in the right kidney,  there is no hydronephrosis on the right.  2. 3.3 cm cyst at the hilum of the left kidney possibly a parapelvic  cyst or dilation of the renal pelvis.  This report was finalized on 03/12/2018 07:25 by Dr. Addison Rivero MD.    XR Femur 2 View Right [[id number]] Collected:  03/10/18 1113     Updated:  03/10/18 1116     Narrative:       EXAMINATION:   XR FEMUR 2 VW RIGHT-  3/10/2018 11:13 AM CST     HISTORY: 7 images are obtained operating room under the direction of Dr. Mohamud Bundy. Femoral nail is present. Fixation screws are present.  Right knee arthroplasty is present.     Skeletal structures appear relatively aligned.     This procedure utilized 1 minute 40 seconds of fluoroscopic time  This report was finalized on 03/10/2018 11:13 by Dr. Klever Hooker MD.    FL C Arm During Surgery [[id number]] Updated:  03/10/18 1033          Culture:         Assessment   1.  Acute kidney injury secondary to acute tubular necrosis--improving  2.  Mild rhabdomyolysis--resolved  3.  Hyperkalemia--improved  4.  Status post surgical repair of right femur fracture  5.  Type 2 diabetes on insulin  6.  Anemia     Plan:  1.  Resume once daily oral lasix  2.  Monitor labs for continued renal recovery      Isael Redding, BULMARO  3/15/2018  11:37 AM

## 2018-03-15 NOTE — PLAN OF CARE
Problem: Patient Care Overview  Goal: Plan of Care Review  Outcome: Ongoing (interventions implemented as appropriate)   03/15/18 1054   Coping/Psychosocial   Plan of Care Reviewed With patient;son   OTHER   Outcome Summary pt cont to c/o swelling of her entire body and SOA, edema limits her mobility. Pt trans to EOB max of 2, pt sat EOB 20 minutes, attempted to stand, pt not able to stand, HR up to 140's with activity.    Plan of Care Review   Progress no change

## 2018-03-15 NOTE — PLAN OF CARE
Problem: Patient Care Overview (Adult)  Goal: Plan of Care Review  Outcome: Ongoing (interventions implemented as appropriate)   03/15/18 1329   Coping/Psychosocial Response Interventions   Plan Of Care Reviewed With patient   Patient Care Overview   Progress no change   Outcome Evaluation   Outcome Summary/Follow up Plan Pts PO intake remains poor 25% avg over 3 meals. Pt reported she likes the glucerna and prefers to have chocolate flavor. Encouraged pt to increase her PO intake, will continue to follow.

## 2018-03-15 NOTE — PLAN OF CARE
Problem: Patient Care Overview (Adult)  Goal: Plan of Care Review  Outcome: Ongoing (interventions implemented as appropriate)   03/15/18 0247   Coping/Psychosocial Response Interventions   Plan Of Care Reviewed With patient   Patient Care Overview   Progress no change   Outcome Evaluation   Outcome Summary/Follow up Plan pt O2 saturation has improved and she sates she can breath easier, pt heart rate is still tachy, pt is reluctant and refuses to be turned at times. edema generalized still significant. pt drowsy and confused to times tonight, safety maintained.        Problem: Fall Risk (Adult)  Goal: Absence of Falls  Outcome: Ongoing (interventions implemented as appropriate)      Problem: Skin Injury Risk (Adult)  Goal: Skin Health and Integrity  Outcome: Ongoing (interventions implemented as appropriate)    Goal: Identify Related Risk Factors and Signs and Symptoms  Outcome: Ongoing (interventions implemented as appropriate)

## 2018-03-15 NOTE — PLAN OF CARE
Problem: Patient Care Overview (Adult)  Goal: Plan of Care Review  Outcome: Ongoing (interventions implemented as appropriate)   03/15/18 1445   Coping/Psychosocial Response Interventions   Plan Of Care Reviewed With patient   Patient Care Overview   Progress no change   Outcome Evaluation   Outcome Summary/Follow up Plan Patient has been drinking Glucerna with both meals so far this shift. Continue to encourage PO intake     Goal: Adult Individualization and Mutuality  Outcome: Ongoing (interventions implemented as appropriate)   03/15/18 1445   Individualization   Patient Specific Preferences prefers to have door closed   Patient Specific Goals less pain   Patient Specific Interventions Alves in place due to strict I and O    Mutuality/Individual Preferences   What Anxieties, Fears or Concerns Do You Have About Your Health or Care? n/a   What Questions Do You Have About Your Health or Care? n/a   What Information Would Help Us Give You More Personalized Care? n/a     Goal: Discharge Needs Assessment  Outcome: Ongoing (interventions implemented as appropriate)      Problem: Fall Risk (Adult)  Goal: Absence of Falls  Outcome: Ongoing (interventions implemented as appropriate)      Problem: Pressure Ulcer Risk (Jeremiah Scale) (Adult,Obstetrics,Pediatric)  Goal: Skin Integrity  Outcome: Ongoing (interventions implemented as appropriate)      Problem: Skin Injury Risk (Adult)  Goal: Skin Health and Integrity  Outcome: Ongoing (interventions implemented as appropriate)    Goal: Identify Related Risk Factors and Signs and Symptoms  Outcome: Ongoing (interventions implemented as appropriate)

## 2018-03-15 NOTE — DISCHARGE INSTRUCTIONS
1. Strict Non WTB Rt Lower Extremity  2. Ok for PT to Transfer Only  3. Stitches out in 1 week  4. F/U in my office in 1mo.

## 2018-03-15 NOTE — PROGRESS NOTES
[facility] Medicine Services  INPATIENT PROGRESS NOTE    Length of Stay: 7  Date of Admission: 3/8/2018  Primary Care Physician: Barry Foley MD    Subjective   Chief Complaint: Patient complains of shortness of breath.    HPI   No nausea.  Tired.   No chest pain  Wt gain 20 pounds since admit.  Pain control fair.      Review of Systems     All pertinent negatives and positives are as above. All other systems have been reviewed and are negative unless otherwise stated.     Objective    Temp:  [98.4 °F (36.9 °C)-98.6 °F (37 °C)] 98.4 °F (36.9 °C)  Heart Rate:  [] 126  Resp:  [16-20] 20  BP: (144-152)/(57-95) 144/82  Physical Exam   Constitutional: She is oriented to person, place, and time. She appears well-developed and well-nourished.   HENT:   Head: Normocephalic and atraumatic.   Eyes: Conjunctivae and EOM are normal. Pupils are equal, round, and reactive to light.   Neck: Neck supple.   Cardiovascular: Normal rate and regular rhythm.  Exam reveals no gallop and no friction rub.    No murmur heard.  Pulmonary/Chest: Effort normal and breath sounds normal.   Abdominal: Soft. Bowel sounds are normal. There is no hepatosplenomegaly. There is no tenderness.   Musculoskeletal: She exhibits edema.   Neurological: She is alert and oriented to person, place, and time. No cranial nerve deficit.   Skin: Skin is warm and dry.   Psychiatric: She has a normal mood and affect. Her behavior is normal.   Nursing note and vitals reviewed.          Results Review:  I have reviewed the labs, radiology results, and diagnostic studies.    Laboratory Data:     Results from last 7 days  Lab Units 03/13/18  0509 03/12/18  0516 03/11/18  0604   WBC 10*3/mm3 11.01* 11.29* 14.50*   HEMOGLOBIN g/dL 9.9* 10.1* 11.1*   HEMATOCRIT % 31.3* 31.5* 34.5*   PLATELETS 10*3/mm3 305 278 337          Results from last 7 days  Lab Units 03/15/18  0539 03/14/18  0453 03/13/18  0509  03/11/18  0604   03/09/18  0644   SODIUM mmol/L 137 136 138  < > 137  < > 136   SODIUM, ARTERIAL   --   --   --   --   --   < >  --    POTASSIUM mmol/L 5.2 5.6* 4.9  < > 5.6*  < > 4.9   CHLORIDE mmol/L 102 103 104  < > 102  < > 99   CO2 mmol/L 28.0 25.0 27.0  < > 24.0  < > 30.0   BUN mg/dL 64* 61* 65*  < > 62*  < > 46*   CREATININE mg/dL 0.94 0.94 1.21  < > 2.33*  < > 1.52*   CALCIUM mg/dL 9.0 8.6 8.0*  < > 8.1*  < > 8.1*   BILIRUBIN mg/dL  --   --  0.3  --  0.2  --  0.2   ALK PHOS U/L  --   --  62  --  74  --  64   ALT (SGPT) U/L  --   --  20  --  27  --  29   AST (SGOT) U/L  --   --  23  --  23  --  32   GLUCOSE mg/dL 146* 154* 76  < > 145*  < > 134*   < > = values in this interval not displayed.    Culture Data:        Radiology Data:   Imaging Results (last 24 hours)     Procedure Component Value Units Date/Time    XR Chest PA & Lateral [[id number]] Collected:  03/14/18 1709     Updated:  03/14/18 1712    Narrative:       EXAMINATION: XR CHEST PA AND LATERAL-     3/14/2018 4:54 PM CDT     HISTORY: shortness of breath; W19.XXXA-Unspecified fall, initial  encounter; S72.491A-Other fracture of lower end of right femur, initial  encounter for closed fracture; W19.XXXA-Unspecified fall, initial  encounter; Z74.09-Other reduced mobility; Z78.9-Other specified health  status.     2 view chest x-ray compared with 03/13/2018.     Increasing basilar infiltrate or atelectasis and small pleural  effusions.     No pneumothorax.     Underlying chronic change.  Pulmonary artery enlargement.     Borderline heart size.     Summary:  1. Increasing bibasilar infiltrate and small pleural effusions.  This report was finalized on 03/14/2018 17:09 by Dr. Mamadou Garza MD.          I have reviewed the patient current medications.     Assessment/Plan     Hospital Problem List     * (Principal)Closed displaced comminuted fracture of shaft of right femur    Fall    Pain of right thigh    Class 2 obesity with serious comorbidity in adult    Type 2 diabetes  mellitus with neurologic complication, with long-term current use of insulin    STUART (acute kidney injury)    Chronic diastolic heart failure    Pleural effusion, left    Hypoxia          Assessment     Fracture shaft of right femur  Fall  Obesity  DM II  STUART  Chronic diastolic heart failure no current exacerbation  Hypoxia  Pleural effusion left   Weight Gain    Plan    Diurese.  BMP in AM  Encourage movement        Discharge Planning: I expect the patient to be discharged to SNF in 1-2 days.    Manda Andrew,    03/15/18   4:40 PM

## 2018-03-16 LAB
ANION GAP SERPL CALCULATED.3IONS-SCNC: 7 MMOL/L (ref 4–13)
BUN BLD-MCNC: 66 MG/DL (ref 5–21)
BUN/CREAT SERPL: 76.7 (ref 7–25)
CALCIUM SPEC-SCNC: 8.9 MG/DL (ref 8.4–10.4)
CHLORIDE SERPL-SCNC: 99 MMOL/L (ref 98–110)
CO2 SERPL-SCNC: 30 MMOL/L (ref 24–31)
CREAT BLD-MCNC: 0.86 MG/DL (ref 0.5–1.4)
GFR SERPL CREATININE-BSD FRML MDRD: 62 ML/MIN/1.73
GLUCOSE BLD-MCNC: 161 MG/DL (ref 70–100)
GLUCOSE BLDC GLUCOMTR-MCNC: 211 MG/DL (ref 70–130)
GLUCOSE BLDC GLUCOMTR-MCNC: 225 MG/DL (ref 70–130)
GLUCOSE BLDC GLUCOMTR-MCNC: 231 MG/DL (ref 70–130)
POTASSIUM BLD-SCNC: 5.3 MMOL/L (ref 3.5–5.3)
SODIUM BLD-SCNC: 136 MMOL/L (ref 135–145)

## 2018-03-16 PROCEDURE — 25010000002 FUROSEMIDE PER 20 MG: Performed by: FAMILY MEDICINE

## 2018-03-16 PROCEDURE — 63710000001 INSULIN LISPRO (HUMAN) PER 5 UNITS: Performed by: INTERNAL MEDICINE

## 2018-03-16 PROCEDURE — 94799 UNLISTED PULMONARY SVC/PX: CPT

## 2018-03-16 PROCEDURE — 82962 GLUCOSE BLOOD TEST: CPT

## 2018-03-16 PROCEDURE — 97530 THERAPEUTIC ACTIVITIES: CPT

## 2018-03-16 PROCEDURE — 94660 CPAP INITIATION&MGMT: CPT

## 2018-03-16 PROCEDURE — 80048 BASIC METABOLIC PNL TOTAL CA: CPT | Performed by: FAMILY MEDICINE

## 2018-03-16 PROCEDURE — 97110 THERAPEUTIC EXERCISES: CPT

## 2018-03-16 PROCEDURE — 63710000001 INSULIN DETEMIR PER 5 UNITS: Performed by: INTERNAL MEDICINE

## 2018-03-16 PROCEDURE — 94760 N-INVAS EAR/PLS OXIMETRY 1: CPT

## 2018-03-16 RX ORDER — FUROSEMIDE 10 MG/ML
40 INJECTION INTRAMUSCULAR; INTRAVENOUS EVERY 12 HOURS
Status: DISCONTINUED | OUTPATIENT
Start: 2018-03-16 | End: 2018-03-17

## 2018-03-16 RX ADMIN — BUDESONIDE AND FORMOTEROL FUMARATE DIHYDRATE 2 PUFF: 160; 4.5 AEROSOL RESPIRATORY (INHALATION) at 07:15

## 2018-03-16 RX ADMIN — ASPIRIN 325 MG: 325 TABLET, COATED ORAL at 21:06

## 2018-03-16 RX ADMIN — IPRATROPIUM BROMIDE AND ALBUTEROL SULFATE 3 ML: 2.5; .5 SOLUTION RESPIRATORY (INHALATION) at 07:15

## 2018-03-16 RX ADMIN — INSULIN LISPRO 4 UNITS: 100 INJECTION, SOLUTION INTRAVENOUS; SUBCUTANEOUS at 21:14

## 2018-03-16 RX ADMIN — LEVOTHYROXINE SODIUM 50 MCG: 50 TABLET ORAL at 05:24

## 2018-03-16 RX ADMIN — INSULIN DETEMIR 30 UNITS: 100 INJECTION, SOLUTION SUBCUTANEOUS at 21:20

## 2018-03-16 RX ADMIN — INSULIN LISPRO 4 UNITS: 100 INJECTION, SOLUTION INTRAVENOUS; SUBCUTANEOUS at 13:46

## 2018-03-16 RX ADMIN — FUROSEMIDE 40 MG: 10 INJECTION, SOLUTION INTRAMUSCULAR; INTRAVENOUS at 13:47

## 2018-03-16 RX ADMIN — GABAPENTIN 600 MG: 300 CAPSULE ORAL at 17:32

## 2018-03-16 RX ADMIN — GABAPENTIN 300 MG: 300 CAPSULE ORAL at 09:01

## 2018-03-16 RX ADMIN — HYDROXYUREA 500 MG: 500 CAPSULE ORAL at 09:01

## 2018-03-16 RX ADMIN — BUDESONIDE AND FORMOTEROL FUMARATE DIHYDRATE 2 PUFF: 160; 4.5 AEROSOL RESPIRATORY (INHALATION) at 19:05

## 2018-03-16 RX ADMIN — IPRATROPIUM BROMIDE AND ALBUTEROL SULFATE 3 ML: 2.5; .5 SOLUTION RESPIRATORY (INHALATION) at 15:00

## 2018-03-16 RX ADMIN — INSULIN LISPRO 4 UNITS: 100 INJECTION, SOLUTION INTRAVENOUS; SUBCUTANEOUS at 17:36

## 2018-03-16 RX ADMIN — FOLIC ACID 1 MG: 1 TABLET ORAL at 09:00

## 2018-03-16 RX ADMIN — PANTOPRAZOLE SODIUM 40 MG: 40 TABLET, DELAYED RELEASE ORAL at 05:24

## 2018-03-16 RX ADMIN — ATORVASTATIN CALCIUM 10 MG: 10 TABLET, FILM COATED ORAL at 21:06

## 2018-03-16 RX ADMIN — FUROSEMIDE 40 MG: 40 TABLET ORAL at 09:00

## 2018-03-16 RX ADMIN — IPRATROPIUM BROMIDE AND ALBUTEROL SULFATE 3 ML: 2.5; .5 SOLUTION RESPIRATORY (INHALATION) at 19:04

## 2018-03-16 RX ADMIN — METOPROLOL SUCCINATE 50 MG: 50 TABLET, FILM COATED, EXTENDED RELEASE ORAL at 09:00

## 2018-03-16 NOTE — PROGRESS NOTES
Continued Stay Note  [facility]     Patient Name: Tamy Sher  MRN: [id number]  Today's Date: 3/16/2018    Admit Date: 3/8/2018          Discharge Plan     Row Name 03/16/18 1410       Plan    Plan [facility]    Patient/Family in Agreement with Plan yes    Plan Comments SS following for discharge to [facility]. Inessa in admissions at [facility] has advised that patient can admit to [facility] on Saturday but cannot admit on Sunday, as their pharmacy is closed on Sunday.           Expected Discharge Date and Time     Expected Discharge Date Expected Discharge Time    Mar 15, 2018             IZAIAH McnamaraW

## 2018-03-16 NOTE — PROGRESS NOTES
[facility] Medicine Services  INPATIENT PROGRESS NOTE    Length of Stay: 8  Date of Admission: 3/8/2018  Primary Care Physician: Barry Foley MD    Subjective   Chief Complaint: Feels about the same.  Tired.   HPI   No nausea or vomiting  No chest pain.  No real complaint of increased shortness of air.  Not mobile.    Review of Systems     All pertinent negatives and positives are as above. All other systems have been reviewed and are negative unless otherwise stated.     Objective    Temp:  [98.3 °F (36.8 °C)-98.8 °F (37.1 °C)] 98.3 °F (36.8 °C)  Heart Rate:  [102-126] 124  Resp:  [16-20] 18  BP: (129-171)/(73-90) 151/86  Physical Exam   Constitutional: She is oriented to person, place, and time. She appears well-developed and well-nourished.   HENT:   Head: Normocephalic and atraumatic.   Eyes: Conjunctivae and EOM are normal. Pupils are equal, round, and reactive to light.   Neck: Neck supple.   Cardiovascular: Regular rhythm.  Exam reveals no gallop and no friction rub.    No murmur heard.  Tachycardic.   Pulmonary/Chest: Effort normal and breath sounds normal.   Abdominal: Soft. Bowel sounds are normal. There is no hepatosplenomegaly. There is no tenderness.   Musculoskeletal: She exhibits edema (generalized).   Neurological: She is alert and oriented to person, place, and time. No cranial nerve deficit.   Skin: Skin is warm and dry.   Psychiatric: She has a normal mood and affect. Her behavior is normal.   Nursing note and vitals reviewed.          Results Review:  I have reviewed the labs, radiology results, and diagnostic studies.    Laboratory Data:     Results from last 7 days  Lab Units 03/13/18  0509 03/12/18  0516 03/11/18  0604   WBC 10*3/mm3 11.01* 11.29* 14.50*   HEMOGLOBIN g/dL 9.9* 10.1* 11.1*   HEMATOCRIT % 31.3* 31.5* 34.5*   PLATELETS 10*3/mm3 305 278 337          Results from last 7 days  Lab Units 03/16/18  0441 03/15/18  0539 03/14/18  0455  03/13/18  0509  03/11/18  0604   SODIUM mmol/L 136 137 136 138  < > 137   POTASSIUM mmol/L 5.3 5.2 5.6* 4.9  < > 5.6*   CHLORIDE mmol/L 99 102 103 104  < > 102   CO2 mmol/L 30.0 28.0 25.0 27.0  < > 24.0   BUN mg/dL 66* 64* 61* 65*  < > 62*   CREATININE mg/dL 0.86 0.94 0.94 1.21  < > 2.33*   CALCIUM mg/dL 8.9 9.0 8.6 8.0*  < > 8.1*   BILIRUBIN mg/dL  --   --   --  0.3  --  0.2   ALK PHOS U/L  --   --   --  62  --  74   ALT (SGPT) U/L  --   --   --  20  --  27   AST (SGOT) U/L  --   --   --  23  --  23   GLUCOSE mg/dL 161* 146* 154* 76  < > 145*   < > = values in this interval not displayed.    Culture Data:        Radiology Data:   Imaging Results (last 24 hours)     ** No results found for the last 24 hours. **          I have reviewed the patient current medications.     Assessment/Plan     Hospital Problem List     * (Principal)Closed displaced comminuted fracture of shaft of right femur    Fall    Pain of right thigh    Class 2 obesity with serious comorbidity in adult    Type 2 diabetes mellitus with neurologic complication, with long-term current use of insulin    STUART (acute kidney injury)    Chronic diastolic heart failure    Pleural effusion, left    Hypoxia          Assessment     Fracture shaft of right femur  Fall  Obesity  DM II  STUART  Chronic diastolic heart failure no current exacerbation  Hypoxia  Pleural effusion left   Weight Gain     Plan    Change oral lasix to IV  Contiue to monitor I/O  Bmp in AM  Nursing home when fluid weight improved              Discharge Planning: I expect the patient to be discharged to snf in 1-2 days.    Manda Andrew DO   03/16/18   12:10 PM

## 2018-03-16 NOTE — PLAN OF CARE
Problem: Fall Risk (Adult)  Goal: Absence of Falls  Outcome: Ongoing (interventions implemented as appropriate)      Problem: Orthopaedic Fracture (Adult)  Goal: Signs and Symptoms of Listed Potential Problems Will be Absent or Manageable (Orthopaedic Fracture)  Outcome: Ongoing (interventions implemented as appropriate)      Problem: Patient Care Overview  Goal: Plan of Care Review   03/16/18 0513   Coping/Psychosocial   Plan of Care Reviewed With patient;family   OTHER   Outcome Summary Pt. sleeping between care. Med x1 for pain so far. Pressure injury noted to right heel. picture taken. Heels elevated off bed at all times. Turned Q2. HOB elevated to assist with patient breathing. Generalized edema. Right leg incision dressings changed. Fluid filled blister noted under prior dressing to upper thigh. FC in place. urine clear with mod amount of sediment. A fib 99-135bpm on telemetry.    Plan of Care Review   Progress no change       Problem: Wound (Includes Pressure Injury) (Adult)  Goal: Signs and Symptoms of Listed Potential Problems Will be Absent, Minimized or Managed (Wound)  Outcome: Ongoing (interventions implemented as appropriate)

## 2018-03-16 NOTE — PLAN OF CARE
Problem: Patient Care Overview (Adult)  Goal: Plan of Care Review  Outcome: Ongoing (interventions implemented as appropriate)   03/16/18 0797   Coping/Psychosocial Response Interventions   Plan Of Care Reviewed With patient;family   Patient Care Overview   Progress improving   Outcome Evaluation   Outcome Summary/Follow up Plan no c/o pain this shift. pressure wound to right heel, floating heels. on tele= afib. drsg to right leg changed today per icu nursr. f/c to bsd. left upper arm midline . safety maintained. family at bedside

## 2018-03-16 NOTE — PLAN OF CARE
Problem: Patient Care Overview  Goal: Plan of Care Review  Outcome: Ongoing (interventions implemented as appropriate)   03/16/18 9360   Coping/Psychosocial   Plan of Care Reviewed With patient   OTHER   Outcome Summary Pt completed BUE strengthening while in bed in fowlers with O2 and pulse ox donned. Pt educated on benefits of ROM to assist in reducing edema. Pt discharging to SNF. Continue OT POC    Plan of Care Review   Progress improving

## 2018-03-16 NOTE — THERAPY TREATMENT NOTE
Acute Care - Physical Therapy Treatment Note  [facility]     Patient Name: Tamy Sher  : 1930  MRN: [id number]  Today's Date: 3/16/2018  Onset of Illness/Injury or Date of Surgery: 18  Date of Referral to PT: 18  Referring Physician: Dr. Bundy    Admit Date: 3/8/2018    Visit Dx:    ICD-10-CM ICD-9-CM   1. Fall, initial encounter W19.XXXA E888.9   2. Other closed fracture of distal end of right femur, initial encounter S72.491A 821.29   3. Fall W19.XXXA E888.9   4. Impaired mobility Z74.09 799.89   5. Decreased activities of daily living (ADL) Z78.9 V49.89     Patient Active Problem List   Diagnosis   • Ischemic chest pain   • Pneumonia of both lungs due to infectious organism   • Fall   • Closed displaced comminuted fracture of shaft of right femur   • Pain of right thigh   • Class 2 obesity with serious comorbidity in adult   • Type 2 diabetes mellitus with neurologic complication, with long-term current use of insulin   • STUART (acute kidney injury)   • Chronic diastolic heart failure   • Pleural effusion, left   • Hypoxia       Therapy Treatment    Therapy Treatment / Health Promotion    Treatment Time/Intention  Discipline: physical therapy assistant (Yolie Francis PTA)  Document Type: therapy note (daily note) (Yolie Francis PTA)  Subjective Information: complains of, weakness, pain, swelling (Yolie Francis PTA)  Existing Precautions/Restrictions: fall, non-weight bearing (NWB RLE) (Yolie Francis PTA)  Treatment Considerations/Comments: NWB RLE (Yolie Francis PTA)  Plan of Care Review  Plan of Care Reviewed With: patient (Yolie Francis PTA)    Vitals/Pain/Safety  Pain Assessment  Additional Documentation: Pain Scale: FACES Pre/Post-Treatment (Group) (Yolie Francis PTA)  Pain Scale: Numbers Pre/Post-Treatment  Pain Location - Side: Right (Yolie Francis PTA)  Pain Location: knee (Yolie Francis PTA)  Pain Scale: Word Pre/Post-Treatment  Pain Location - Side: Right (Yolie HEATH  VERNELL Francis)  Pain Location: knee (Yolie Francis PTA)  Pain Scale: FACES Pre/Post-Treatment  Pain: FACES Scale, Pretreatment: 6-->hurts even more (Yolie Francis PTA)  Pain Location - Side: Right (Yolie Francis PTA)  Pain Location: knee (Yolie Francis PTA)  Positioning and Restraints  Pre-Treatment Position: in bed (Yolie Francis PTA)  Post Treatment Position: bed (Yolie Francis PTA)  In Bed: fowlers, call light within reach, encouraged to call for assist, with family/caregiver, RUE elevated, LUE elevated, heels elevated (Yolie Francis PTA)    Mobility,ADL,Motor, Modality  Bed Mobility Assessment/Treatment  Rolling Left [place] (Bed Mobility): maximum assist (25% patient effort), verbal cues (Yolie Francis PTA)  Rolling Right [place] (Bed Mobility): maximum assist (25% patient effort), verbal cues (Yolie Francis PTA)  Scooting/Bridging [place] (Bed Mobility): dependent (less than 25% patient effort), 2 person assist (Yolie Francis PTA)  Supine-Sit [place] (Bed Mobility): maximum assist (25% patient effort), 2 person assist, verbal cues (Yolie Francis PTA)  Sit-Supine [place] (Bed Mobility): maximum assist (25% patient effort), 2 person assist, verbal cues (Yolie Francis PTA)  Bed Mobility, Safety Issues: decreased use of arms for pushing/pulling, decreased use of legs for bridging/pushing (Yolie Francis PTA)  Assistive Device (Bed Mobility): draw sheet (Yolie Francis PTA)     Therapeutic Exercise  Comment (Therapeutic Exercise): BLE AAROM in sitting (Yolie Francis PTA)  Static Sitting Balance  Level of [place] (Unsupported Sitting, Static Balance): contact guard assist (Yolie Francis PTA)  Sitting Position (Unsupported Sitting, Static Balance): sitting on edge of bed (Yolie Francis PTA)  Time Able to Maintain Position (Unsupported Sitting, Static Balance): more than 5 minutes (Yolie Francis PTA)        ROM/MMT             Sensory, Edema, Orthotics           Cognition, Communication, Swallow       Outcome Summary               PT Rehab Goals     Row Name 03/11/18 7259             Bed Mobility Goal 1 (PT)    Activity/Assistive Device (Bed Mobility Goal 1, PT) sit to supine/supine to sit  -FERNNADO      [place] Level/Cues Needed (Bed Mobility Goal 1, PT) minimum assist (75% or more patient effort)  -FERNANDO      Time Frame (Bed Mobility Goal 1, PT) long term goal (LTG);10 days  -FERNANDO      Barriers (Bed Mobility Goal 1, PT) Weakness, pain  -FERNANDO      Progress/Outcomes (Bed Mobility Goal 1, PT) goal ongoing  -FERNANDO         Transfer Goal 1 (PT)    Activity/Assistive Device (Transfer Goal 1, PT) sit-to-stand/stand-to-sit;bed-to-chair/chair-to-bed;walker, rolling  -FERNANDO      [place] Level/Cues Needed (Transfer Goal 1, PT) minimum assist (75% or more patient effort)  -FERNANDO      Time Frame (Transfer Goal 1, PT) long term goal (LTG);10 days  -FERNANDO      Barriers (Transfers Goal 1, PT) weakness, pain  -FERNANDO      Progress/Outcome (Transfer Goal 1, PT) goal ongoing  -FERNANDO         Gait Training Goal 1 (PT)    Activity/Assistive Device (Gait Training Goal 1, PT) --  -FERNANDO      [place] Level (Gait Training Goal 1, PT) --  -FERNANDO      Distance (Gait Goal 1, PT) --  -FERNANDO      Time Frame (Gait Training Goal 1, PT) --  -FERNANDO      Barriers (Gait Training Goal 1, PT) --  -FERNANDO      Progress/Outcome (Gait Training Goal 1, PT) --  -FERNANDO        User Key  (r) = Recorded By, (t) = Taken By, (c) = Cosigned By    Initials Name Provider Type    FERNANDO Callaway, PT DPT Physical Therapist          Physical Therapy Education     Title: PT OT SLP Therapies (Active)     Topic: Physical Therapy (Active)     Point: Mobility training (Done)    Learning Progress Summary     Learner Status Readiness Method Response Comment Documented by    Patient Done Acceptance E VU bed mobility  03/15/18 1053     Done Acceptance E VU bed mobility  03/13/18 1112     Active Acceptance E NR bed mobility  03/12/18 1152     Done Acceptance  E VU,NR Educated pt/sons on progression of PT POC, benefits of activity, NWB R LE FERNANDO 03/11/18 1305    Family Done Acceptance E VU,NR Educated pt/sons on progression of PT POC, benefits of activity, NWB R LE FERNANDO 03/11/18 1305          Point: Precautions (Done)    Learning Progress Summary     Learner Status Readiness Method Response Comment Documented by    Patient Done Acceptance E VU positioning  03/16/18 1127     Done Acceptance E VU,NR POSITIONING  03/14/18 1013     Done Acceptance E VU,NR Educated pt/sons on progression of PT POC, benefits of activity, NWB R LE FERNANDO 03/11/18 1305    Family Done Acceptance E VU,NR Educated pt/sons on progression of PT POC, benefits of activity, NWB R LE FERNANDO 03/11/18 1305                      User Key     Initials Effective Dates Name Provider Type Discipline     08/02/16 -  Yolie Francis, PTA Physical Therapy Assistant PT     08/02/16 -  Kd Callaway, PT DPT Physical Therapist PT                    PT Recommendation and Plan     Plan of Care Reviewed With: patient  Progress: no change  Outcome Summary: pt cont to c/o swelling and SOA, pt trans trans to EOB max assist of 2, pt sat EOB performed AAROM BLE, Pt mobility limited by edema. Pt would benefit from SNF          Outcome Measures     Row Name 03/15/18 1100 03/14/18 1000 03/14/18 0757       How much help from another person do you currently need...    Turning from your back to your side while in flat bed without using bedrails? 1  - 1  -AH  --    Moving from lying on back to sitting on the side of a flat bed without bedrails? 1  - 1  -AH  --    Moving to and from a bed to a chair (including a wheelchair)? 1  - 1  -AH  --    Standing up from a chair using your arms (e.g., wheelchair, bedside chair)? 1  - 1  -AH  --    Climbing 3-5 steps with a railing? 1  - 1  -AH  --    To walk in hospital room? 1  - 1  -AH  --    AM-PAC 6 Clicks Score 6  - 6  -AH  --       How much help from another is currently  needed...    Putting on and taking off regular lower body clothing?  --  -- 1  -CJ    Bathing (including washing, rinsing, and drying)  --  -- 1  -CJ    Toileting (which includes using toilet bed pan or urinal)  --  -- 1  -CJ    Putting on and taking off regular upper body clothing  --  -- 3  -CJ    Taking care of personal grooming (such as brushing teeth)  --  -- 4  -CJ    Eating meals  --  -- 4  -CJ    Score  --  -- 14  -CJ       Functional Assessment    Outcome Measure Options AM-PAC 6 Clicks Basic Mobility (PT)  - AM-PAC 6 Clicks Basic Mobility (PT)  - AM-PAC 6 Clicks Daily Activity (OT)  -      User Key  (r) = Recorded By, (t) = Taken By, (c) = Cosigned By    Initials Name Provider Type     Yolie Francis PTA Physical Therapy Assistant     CYNDEE Singh Occupational Therapy Assistant           Time Calculation:         PT Charges     Row Name 03/16/18 1131             Time Calculation    Start Time 1013  -      Stop Time 1036  -      Time Calculation (min) 23 min  -         Time Calculation- PT    Total Timed Code Minutes- PT 23 minute(s)  -        User Key  (r) = Recorded By, (t) = Taken By, (c) = Cosigned By    Initials Name Provider Type     Yolie Francis PTA Physical Therapy Assistant          Therapy Charges for Today     Code Description Service Date Service Provider Modifiers Qty    55149579628  PT THERAPEUTIC ACT EA 15 MIN 3/15/2018 Yolie Francis PTA GP, KX 2    [id number]  PT THERAPEUTIC ACT EA 15 MIN 3/16/2018 Yolie Francis PTA GP, KX 2          PT G-Codes  Outcome Measure Options: AM-PAC 6 Clicks Basic Mobility (PT)  Score: 8  Functional Limitation: Changing and maintaining body position  Changing and Maintaining Body Position Current Status (): At least 80 percent but less than 100 percent impaired, limited or restricted  Changing and Maintaining Body Position Goal Status (): At least 40 percent but less than 60 percent impaired, limited or  restricted    Yolie Francis, PTA  3/16/2018

## 2018-03-16 NOTE — THERAPY TREATMENT NOTE
Acute Care - Occupational Therapy Treatment Note  [facility]     Patient Name: Tamy Sher  : 1930  MRN: [id number]  Today's Date: 3/16/2018  Onset of Illness/Injury or Date of Surgery: 18  Date of Referral to OT: 18  Referring Physician: Dr. Bundy    Admit Date: 3/8/2018       ICD-10-CM ICD-9-CM   1. Fall, initial encounter W19.XXXA E888.9   2. Other closed fracture of distal end of right femur, initial encounter S72.491A 821.29   3. Fall W19.XXXA E888.9   4. Impaired mobility Z74.09 799.89   5. Decreased activities of daily living (ADL) Z78.9 V49.89     Patient Active Problem List   Diagnosis   • Ischemic chest pain   • Pneumonia of both lungs due to infectious organism   • Fall   • Closed displaced comminuted fracture of shaft of right femur   • Pain of right thigh   • Class 2 obesity with serious comorbidity in adult   • Type 2 diabetes mellitus with neurologic complication, with long-term current use of insulin   • STUART (acute kidney injury)   • Chronic diastolic heart failure   • Pleural effusion, left   • Hypoxia     Past Medical History:   Diagnosis Date   • Constipation    • Diabetes mellitus    • Diarrhea    • Diastolic dysfunction, left ventricle    • Esophagitis    • Exertional shortness of breath    • Hyperlipidemia    • Hypertension    • Lumbar spondylitis    • Osteoarthritis    • Peripheral vascular disease    • Sinusitis    • Stroke      Past Surgical History:   Procedure Laterality Date   • FEMUR SUPRACONDYLAR FRACTURE REPAIR Right 3/10/2018    Procedure: FEMUR SUPRACONDYLAR NAIL;  Surgeon: Nima Bundy MD;  Location: [facility];  Service: Orthopedics   • GALLBLADDER SURGERY     • HYSTERECTOMY     • JOINT REPLACEMENT     • REPLACEMENT TOTAL KNEE Left        Therapy Treatment    Therapy Treatment / Health Promotion    Treatment Time/Intention  Discipline: occupational therapy assistant (YASIR Melendez/L)  Document Type: therapy note (daily note) (Jasmyn PONCE  Russel, COOLEY/L)  Subjective Information: complains of, weakness, fatigue (MELVINA MelendezA/L)  Existing Precautions/Restrictions: fall, non-weight bearing (NWB RLE) (MELVINA MelendezA/L)  Plan of Care Review  Plan of Care Reviewed With: patient (YASIR Melendez/L)    Vitals/Pain/Safety  Positioning and Restraints  Pre-Treatment Position: in bed (MELVINA MelendezA/L)  Post Treatment Position: bed (MELVINA MelendezA/L)  In Bed: fowlers, call light within reach, encouraged to call for assist, side rails up x2 (MELVINA MelendezA/L)    Mobility,ADL,Motor, Modality        Therapeutic Exercise  Upper Extremity Range of Motion (Therapeutic Exercise): shoulder flexion/extension, bilateral, shoulder abduction/adduction, bilateral, shoulder horizontal abduction/adduction, bilateral, elbow flexion/extension, bilateral, wrist flexion/extension, bilateral (10 reps x2 sets with rest breaks) (MELVINA MelendezA/L)           ROM/MMT             Sensory, Edema, Orthotics          Cognition, Communication, Swallow  Cognitive Assessment/Intervention- PT/OT  Personal Safety Interventions: fall prevention program maintained (YASIR Melendez/L)    Outcome Summary           OT Rehab Goals     Row Name 03/11/18 1320             Toileting Goal 1 (OT)    Activity/Device (Toileting Goal 1, OT) commode, bedside with drop arms  -CH      [place] Level/Cues Needed (Toileting Goal 1, OT) maximum assist (25-49% patient effort)   x 2  -CH      Time Frame (Toileting Goal 1, OT) by discharge  -CH      Barriers (Toileting Goal 1, OT) NWB status, pain  -CH      Progress/Outcome (Toileting Goal 1, OT) goal ongoing  -CH         Safety Awareness Goal 1 (OT)    Activity (Safety Awareness Goal 1, OT) follow through of safety precautions;safe use of assistive device/equipment  -      [place]/Cues/Accuracy (Safety Awareness Goal 1, OT) verbal cues/redirection;with minimum;with  75% accuracy  -CH      Time Frame (Safety Awareness Goal 1, OT) by discharge;long term goal (LTG)  -CH      Barriers (Safety Awareness Goal 1, OT) pain, demo'd fearfulness  -CH      Progress/Outcome (Safety Awareness Goal 1, OT) goal ongoing  -CH         Patient Education Goal (OT)    Activity (Patient Education Goal, OT) Pt. will adhere to NWB status at E to improve safety during ADLs  -CH      [place]/Cues/Accuracy (Memory Goal 2, OT) demonstrates adequately  -CH      Time Frame (Patient Education Goal, OT) long term goal (LTG);by discharge  -CH      Barriers (Patient Education Goal, OT) pain, demo'd fearfulness  -CH      Progress/Outcome (Patient Education Goal, OT) goal ongoing  -CH        User Key  (r) = Recorded By, (t) = Taken By, (c) = Cosigned By    Initials Name Provider Type    REMY Park OTR/L Occupational Therapist        Occupational Therapy Education     Title: PT OT SLP Therapies (Active)     Topic: Occupational Therapy (Done)     Point: ADL training (Done)     Description: Instruct learner(s) on proper safety adaptation and remediation techniques during self care or transfers.   Instruct in proper use of assistive devices.   Learning Progress Summary     Learner Status Readiness Method Response Comment Documented by    Patient Done Acceptance E VU BUE ROM/strengthening, benefits of activity  03/16/18 1408     Done Acceptance E,D VU,NR   03/11/18 1426    Family Done Acceptance E,D VU,NR   03/11/18 1426          Point: Home exercise program (Done)     Description: Instruct learner(s) on appropriate technique for monitoring, assisting and/or progressing therapeutic exercises/activities.   Learning Progress Summary     Learner Status Readiness Method Response Comment Documented by    Patient Done Acceptance E VU BUE ROM/strengthening, benefits of activity  03/16/18 1408     Done Acceptance E,D VU,DU,NR Pt. performed ue exs with constant rests and recuperation duuring her ue exs, no  c/o's or issues with tx!  03/14/18 1119          Point: Precautions (Done)     Description: Instruct learner(s) on prescribed precautions during self-care and functional transfers.   Learning Progress Summary     Learner Status Readiness Method Response Comment Documented by    Patient Done Acceptance E,D ROBBY,GEORGES,NR Pt. performed ue exs with constant rests and recuperation duuring her ue exs, no c/o's or issues with tx!  03/14/18 1119     Done Acceptance E,D VU,NR   03/11/18 1426    Family Done Acceptance E,D VU,NR   03/11/18 1426          Point: Body mechanics (Done)     Description: Instruct learner(s) on proper positioning and spine alignment during self-care, functional mobility activities and/or exercises.   Learning Progress Summary     Learner Status Readiness Method Response Comment Documented by    Patient Done Acceptance E,D ROBBY,GEORGES,NR Pt. performed ue exs with constant rests and recuperation duuring her ue exs, no c/o's or issues with tx!  03/14/18 1119     Done Acceptance E,D VU,NR   03/11/18 1426    Family Done Acceptance E,D VU,NR   03/11/18 1426                      User Key     Initials Effective Dates Name Provider Type Discipline     08/02/16 -  JUDY Salazar/L Occupational Therapist OT     08/02/16 -  YASIR Singh/BALAJI Occupational Therapy Assistant OT     08/02/16 -  YASIR Melendez/L Occupational Therapy Assistant OT                  OT Recommendation and Plan     Plan of Care Review  Plan of Care Reviewed With: patient  Plan of Care Reviewed With: patient  Outcome Summary: Pt completed BUE strengthening while in bed in fowlers with O2 and pulse ox donned. Pt educated on benefits of ROM to assist in reducing edema. Pt discharging to SNF. Continue OT POC         Outcome Measures     Row Name 03/16/18 1400 03/15/18 1100 03/14/18 1000       How much help from another person do you currently need...    Turning from your back to your side while in flat bed  without using bedrails?  -- 1  - 1  -AH    Moving from lying on back to sitting on the side of a flat bed without bedrails?  -- 1  - 1  -AH    Moving to and from a bed to a chair (including a wheelchair)?  -- 1  -AH 1  -AH    Standing up from a chair using your arms (e.g., wheelchair, bedside chair)?  -- 1  - 1  -AH    Climbing 3-5 steps with a railing?  -- 1  - 1  -AH    To walk in hospital room?  -- 1  - 1  -AH    AM-PAC 6 Clicks Score  -- 6  - 6  -       How much help from another is currently needed...    Putting on and taking off regular lower body clothing? 2  -TS  --  --    Bathing (including washing, rinsing, and drying) 2  -TS  --  --    Toileting (which includes using toilet bed pan or urinal) 2  -TS  --  --    Putting on and taking off regular upper body clothing 2  -TS  --  --    Taking care of personal grooming (such as brushing teeth) 3  -TS  --  --    Eating meals 3  -TS  --  --    Score 14  -TS  --  --       Functional Assessment    Outcome Measure Options AM-PAC 6 Clicks Daily Activity (OT)  - AM-PAC 6 Clicks Basic Mobility (PT)  - AM-PAC 6 Clicks Basic Mobility (PT)  -    Row Name 03/14/18 0757             How much help from another is currently needed...    Putting on and taking off regular lower body clothing? 1  -CJ      Bathing (including washing, rinsing, and drying) 1  -CJ      Toileting (which includes using toilet bed pan or urinal) 1  -CJ      Putting on and taking off regular upper body clothing 3  -CJ      Taking care of personal grooming (such as brushing teeth) 4  -CJ      Eating meals 4  -CJ      Score 14  -         Functional Assessment    Outcome Measure Options AM-PAC 6 Clicks Daily Activity (OT)  -        User Key  (r) = Recorded By, (t) = Taken By, (c) = Cosigned By    Initials Name Provider Type     Yolie Francis, VERNELL Physical Therapy Assistant    CJ YASIR Singh/BALAJI Occupational Therapy Assistant    TS YASIR Melendez/BALAJI Occupational  Therapy Assistant           Time Calculation:         Time Calculation- OT     Row Name 03/16/18 1411             Time Calculation- OT    OT Start Time 1300  -TS      OT Stop Time 1330  -TS      OT Time Calculation (min) 30 min  -TS      Total Timed Code Minutes- OT 30 minute(s)  -TS      OT Received On 03/16/18  -TS        User Key  (r) = Recorded By, (t) = Taken By, (c) = Cosigned By    Initials Name Provider Type     CYNDEE Melendez Occupational Therapy Assistant           Therapy Charges for Today     Code Description Service Date Service Provider Modifiers Qty    40659099493 HC OT THER PROC EA 15 MIN 3/16/2018 CYNDEE Melendez GO, KX 2          OT G-codes  OT Professional Judgement Used?: Yes  OT Functional Scales Options: AM-PAC 6 Clicks Daily Activity (OT)  Score: 14  Functional Limitation: Self care  Self Care Current Status (): At least 40 percent but less than 60 percent impaired, limited or restricted  Self Care Goal Status (): At least 40 percent but less than 60 percent impaired, limited or restricted (at higher score than 14)    CYNDEE Mcclelland  3/16/2018

## 2018-03-16 NOTE — PLAN OF CARE
Problem: Patient Care Overview  Goal: Plan of Care Review  Outcome: Ongoing (interventions implemented as appropriate)   03/16/18 1127   Coping/Psychosocial   Plan of Care Reviewed With patient   OTHER   Outcome Summary pt cont to c/o swelling and SOA, pt trans trans to EOB max assist of 2, pt sat EOB performed AAROM BLE, Pt mobility limited by edema. Pt would benefit from SNF   Plan of Care Review   Progress no change

## 2018-03-17 LAB
ANION GAP SERPL CALCULATED.3IONS-SCNC: 8 MMOL/L (ref 4–13)
BUN BLD-MCNC: 60 MG/DL (ref 5–21)
BUN/CREAT SERPL: 72.3 (ref 7–25)
CALCIUM SPEC-SCNC: 8.7 MG/DL (ref 8.4–10.4)
CHLORIDE SERPL-SCNC: 98 MMOL/L (ref 98–110)
CO2 SERPL-SCNC: 33 MMOL/L (ref 24–31)
CREAT BLD-MCNC: 0.83 MG/DL (ref 0.5–1.4)
GFR SERPL CREATININE-BSD FRML MDRD: 65 ML/MIN/1.73
GLUCOSE BLD-MCNC: 107 MG/DL (ref 70–100)
GLUCOSE BLDC GLUCOMTR-MCNC: 151 MG/DL (ref 70–130)
GLUCOSE BLDC GLUCOMTR-MCNC: 232 MG/DL (ref 70–130)
GLUCOSE BLDC GLUCOMTR-MCNC: 243 MG/DL (ref 70–130)
POTASSIUM BLD-SCNC: 4.5 MMOL/L (ref 3.5–5.3)
SODIUM BLD-SCNC: 139 MMOL/L (ref 135–145)

## 2018-03-17 PROCEDURE — 25010000002 FUROSEMIDE PER 20 MG: Performed by: FAMILY MEDICINE

## 2018-03-17 PROCEDURE — 82962 GLUCOSE BLOOD TEST: CPT

## 2018-03-17 PROCEDURE — 97110 THERAPEUTIC EXERCISES: CPT

## 2018-03-17 PROCEDURE — 97530 THERAPEUTIC ACTIVITIES: CPT

## 2018-03-17 PROCEDURE — 94799 UNLISTED PULMONARY SVC/PX: CPT

## 2018-03-17 PROCEDURE — 94660 CPAP INITIATION&MGMT: CPT

## 2018-03-17 PROCEDURE — 63710000001 INSULIN DETEMIR PER 5 UNITS: Performed by: INTERNAL MEDICINE

## 2018-03-17 PROCEDURE — 80048 BASIC METABOLIC PNL TOTAL CA: CPT | Performed by: FAMILY MEDICINE

## 2018-03-17 PROCEDURE — 63710000001 INSULIN LISPRO (HUMAN) PER 5 UNITS: Performed by: INTERNAL MEDICINE

## 2018-03-17 PROCEDURE — 94760 N-INVAS EAR/PLS OXIMETRY 1: CPT

## 2018-03-17 RX ORDER — FUROSEMIDE 40 MG/1
40 TABLET ORAL
Status: DISCONTINUED | OUTPATIENT
Start: 2018-03-17 | End: 2018-03-21 | Stop reason: HOSPADM

## 2018-03-17 RX ADMIN — IPRATROPIUM BROMIDE AND ALBUTEROL SULFATE 3 ML: 2.5; .5 SOLUTION RESPIRATORY (INHALATION) at 06:41

## 2018-03-17 RX ADMIN — FUROSEMIDE 40 MG: 10 INJECTION, SOLUTION INTRAMUSCULAR; INTRAVENOUS at 00:34

## 2018-03-17 RX ADMIN — ASPIRIN 325 MG: 325 TABLET, COATED ORAL at 22:35

## 2018-03-17 RX ADMIN — HYDROXYUREA 500 MG: 500 CAPSULE ORAL at 08:56

## 2018-03-17 RX ADMIN — INSULIN LISPRO 4 UNITS: 100 INJECTION, SOLUTION INTRAVENOUS; SUBCUTANEOUS at 22:44

## 2018-03-17 RX ADMIN — ATORVASTATIN CALCIUM 10 MG: 10 TABLET, FILM COATED ORAL at 23:12

## 2018-03-17 RX ADMIN — GABAPENTIN 600 MG: 300 CAPSULE ORAL at 17:42

## 2018-03-17 RX ADMIN — INSULIN DETEMIR 30 UNITS: 100 INJECTION, SOLUTION SUBCUTANEOUS at 22:45

## 2018-03-17 RX ADMIN — GABAPENTIN 300 MG: 300 CAPSULE ORAL at 08:59

## 2018-03-17 RX ADMIN — FOLIC ACID 1 MG: 1 TABLET ORAL at 08:56

## 2018-03-17 RX ADMIN — BUDESONIDE AND FORMOTEROL FUMARATE DIHYDRATE 2 PUFF: 160; 4.5 AEROSOL RESPIRATORY (INHALATION) at 18:59

## 2018-03-17 RX ADMIN — METOPROLOL SUCCINATE 50 MG: 50 TABLET, FILM COATED, EXTENDED RELEASE ORAL at 08:56

## 2018-03-17 RX ADMIN — IPRATROPIUM BROMIDE AND ALBUTEROL SULFATE 3 ML: 2.5; .5 SOLUTION RESPIRATORY (INHALATION) at 10:35

## 2018-03-17 RX ADMIN — FUROSEMIDE 40 MG: 10 INJECTION, SOLUTION INTRAMUSCULAR; INTRAVENOUS at 13:49

## 2018-03-17 RX ADMIN — LEVOTHYROXINE SODIUM 50 MCG: 50 TABLET ORAL at 05:24

## 2018-03-17 RX ADMIN — IPRATROPIUM BROMIDE AND ALBUTEROL SULFATE 3 ML: 2.5; .5 SOLUTION RESPIRATORY (INHALATION) at 15:03

## 2018-03-17 RX ADMIN — IPRATROPIUM BROMIDE AND ALBUTEROL SULFATE 3 ML: 2.5; .5 SOLUTION RESPIRATORY (INHALATION) at 18:59

## 2018-03-17 RX ADMIN — PANTOPRAZOLE SODIUM 40 MG: 40 TABLET, DELAYED RELEASE ORAL at 05:24

## 2018-03-17 RX ADMIN — FUROSEMIDE 40 MG: 40 TABLET ORAL at 17:44

## 2018-03-17 RX ADMIN — INSULIN LISPRO 4 UNITS: 100 INJECTION, SOLUTION INTRAVENOUS; SUBCUTANEOUS at 17:42

## 2018-03-17 RX ADMIN — BUDESONIDE AND FORMOTEROL FUMARATE DIHYDRATE 2 PUFF: 160; 4.5 AEROSOL RESPIRATORY (INHALATION) at 06:41

## 2018-03-17 RX ADMIN — NYSTATIN 500000 UNITS: 100000 SUSPENSION ORAL at 22:44

## 2018-03-17 NOTE — PLAN OF CARE
Problem: Patient Care Overview (Adult)  Goal: Plan of Care Review  Outcome: Ongoing (interventions implemented as appropriate)      Problem: Patient Care Overview  Goal: Plan of Care Review  Outcome: Ongoing (interventions implemented as appropriate)   03/17/18 1050   Coping/Psychosocial   Plan of Care Reviewed With patient   OTHER   Outcome Summary Pt. weak, and unable to wt. bear in Rle. ue exs performed to increase bed mobility and transfers!   Plan of Care Review   Progress (Pt. progressing as expected!)

## 2018-03-17 NOTE — PLAN OF CARE
Problem: Patient Care Overview (Adult)  Goal: Plan of Care Review  Outcome: Ongoing (interventions implemented as appropriate)   03/17/18 1522   Coping/Psychosocial Response Interventions   Plan Of Care Reviewed With patient;family   Patient Care Overview   Progress improving   Outcome Evaluation   Outcome Summary/Follow up Plan Denies pain today. Continue to evelate heels and turn every 2 hrs. Fall protocol in place. Family at bedside.       Problem: Fall Risk (Adult)  Goal: Absence of Falls  Outcome: Ongoing (interventions implemented as appropriate)      Problem: Orthopaedic Fracture (Adult)  Goal: Signs and Symptoms of Listed Potential Problems Will be Absent or Manageable (Orthopaedic Fracture)  Outcome: Ongoing (interventions implemented as appropriate)      Problem: Wound (Includes Pressure Injury) (Adult)  Goal: Signs and Symptoms of Listed Potential Problems Will be Absent, Minimized or Managed (Wound)  Outcome: Ongoing (interventions implemented as appropriate)

## 2018-03-17 NOTE — PROGRESS NOTES
[facility] Medicine Services  INPATIENT PROGRESS NOTE    Length of Stay: 9  Date of Admission: 3/8/2018  Primary Care Physician: Barry Foley MD    Subjective   Chief Complaint: Wants a crutch to help with stabilizing her when she gets up.    HPI   No nausea.  No chest pain.  No shortness of breath  Pain is controlled.           Review of Systems     All pertinent negatives and positives are as above. All other systems have been reviewed and are negative unless otherwise stated.     Objective    Temp:  [98.1 °F (36.7 °C)-99 °F (37.2 °C)] 98.3 °F (36.8 °C)  Heart Rate:  [] 120  Resp:  [16-20] 20  BP: (112-167)/() 137/71  Physical Exam   Constitutional: She is oriented to person, place, and time. She appears well-developed and well-nourished.   HENT:   Head: Normocephalic and atraumatic.   Eyes: Conjunctivae and EOM are normal. Pupils are equal, round, and reactive to light.   Neck: Neck supple. JVD present.   Cardiovascular: Normal rate and regular rhythm.  Exam reveals no gallop and no friction rub.    No murmur heard.  Pulmonary/Chest: Effort normal and breath sounds normal.   Abdominal: Soft. Bowel sounds are normal. There is no hepatosplenomegaly. There is no tenderness.   Musculoskeletal: She exhibits edema (less edmeatous with skin wrinkling.).   Neurological: She is alert and oriented to person, place, and time. No cranial nerve deficit.   Skin: Skin is warm and dry.   Psychiatric: She has a normal mood and affect. Her behavior is normal.   Nursing note and vitals reviewed.          Results Review:  I have reviewed the labs, radiology results, and diagnostic studies.    Laboratory Data:     Results from last 7 days  Lab Units 03/13/18  0509 03/12/18  0516 03/11/18  0604   WBC 10*3/mm3 11.01* 11.29* 14.50*   HEMOGLOBIN g/dL 9.9* 10.1* 11.1*   HEMATOCRIT % 31.3* 31.5* 34.5*   PLATELETS 10*3/mm3 305 278 337          Results from last 7 days  Lab Units  03/17/18  0418 03/16/18  0441 03/15/18  0539  03/13/18  0509  03/11/18  0604   SODIUM mmol/L 139 136 137  < > 138  < > 137   POTASSIUM mmol/L 4.5 5.3 5.2  < > 4.9  < > 5.6*   CHLORIDE mmol/L 98 99 102  < > 104  < > 102   CO2 mmol/L 33.0* 30.0 28.0  < > 27.0  < > 24.0   BUN mg/dL 60* 66* 64*  < > 65*  < > 62*   CREATININE mg/dL 0.83 0.86 0.94  < > 1.21  < > 2.33*   CALCIUM mg/dL 8.7 8.9 9.0  < > 8.0*  < > 8.1*   BILIRUBIN mg/dL  --   --   --   --  0.3  --  0.2   ALK PHOS U/L  --   --   --   --  62  --  74   ALT (SGPT) U/L  --   --   --   --  20  --  27   AST (SGOT) U/L  --   --   --   --  23  --  23   GLUCOSE mg/dL 107* 161* 146*  < > 76  < > 145*   < > = values in this interval not displayed.    Culture Data:        Radiology Data:   Imaging Results (last 24 hours)     ** No results found for the last 24 hours. **          I have reviewed the patient current medications.     Assessment/Plan     Hospital Problem List     * (Principal)Closed displaced comminuted fracture of shaft of right femur    Fall    Pain of right thigh    Class 2 obesity with serious comorbidity in adult    Type 2 diabetes mellitus with neurologic complication, with long-term current use of insulin    STUART (acute kidney injury)    Chronic diastolic heart failure    Pleural effusion, left    Hypoxia          Assessment    Fracture shaft of right femur  Fall  Obesity  DM II  STUART  Chronic diastolic heart failure no current exacerbation  Hypoxia  Pleural effusion left   Weight Gain    Plan    Improve weight  Change back to oral lasix  NH on Monday since they do not admit on sundays   PT/OT  Patient does not like to work or exercise.       Discharge Planning: I expect the patient to be discharged to SNF in 2 days.    Manad Andrew,    03/17/18   3:53 PM

## 2018-03-17 NOTE — PROGRESS NOTES
This patient's post intramedullary nailing of a supracondylar fracture periprosthetic of her right distal femur.  She is progressing well with the fracture but she is doing very poorly with physical therapy due to generalized debility she is nonweightbearing therefore she is not going to be able to ambulate or waiting on discharge to nursing facility

## 2018-03-18 LAB
GLUCOSE BLDC GLUCOMTR-MCNC: 145 MG/DL (ref 70–130)
GLUCOSE BLDC GLUCOMTR-MCNC: 235 MG/DL (ref 70–130)
GLUCOSE BLDC GLUCOMTR-MCNC: 256 MG/DL (ref 70–130)
GLUCOSE BLDC GLUCOMTR-MCNC: 84 MG/DL (ref 70–130)

## 2018-03-18 PROCEDURE — 94799 UNLISTED PULMONARY SVC/PX: CPT

## 2018-03-18 PROCEDURE — 97530 THERAPEUTIC ACTIVITIES: CPT

## 2018-03-18 PROCEDURE — 97110 THERAPEUTIC EXERCISES: CPT

## 2018-03-18 PROCEDURE — 82962 GLUCOSE BLOOD TEST: CPT

## 2018-03-18 PROCEDURE — 63710000001 INSULIN LISPRO (HUMAN) PER 5 UNITS: Performed by: INTERNAL MEDICINE

## 2018-03-18 PROCEDURE — 63710000001 INSULIN DETEMIR PER 5 UNITS: Performed by: INTERNAL MEDICINE

## 2018-03-18 PROCEDURE — 94660 CPAP INITIATION&MGMT: CPT

## 2018-03-18 RX ADMIN — NYSTATIN 500000 UNITS: 100000 SUSPENSION ORAL at 21:52

## 2018-03-18 RX ADMIN — ATORVASTATIN CALCIUM 10 MG: 10 TABLET, FILM COATED ORAL at 21:52

## 2018-03-18 RX ADMIN — GABAPENTIN 600 MG: 300 CAPSULE ORAL at 17:25

## 2018-03-18 RX ADMIN — INSULIN DETEMIR 30 UNITS: 100 INJECTION, SOLUTION SUBCUTANEOUS at 21:52

## 2018-03-18 RX ADMIN — INSULIN LISPRO 4 UNITS: 100 INJECTION, SOLUTION INTRAVENOUS; SUBCUTANEOUS at 17:33

## 2018-03-18 RX ADMIN — FUROSEMIDE 40 MG: 40 TABLET ORAL at 08:35

## 2018-03-18 RX ADMIN — BUDESONIDE AND FORMOTEROL FUMARATE DIHYDRATE 2 PUFF: 160; 4.5 AEROSOL RESPIRATORY (INHALATION) at 21:23

## 2018-03-18 RX ADMIN — METOPROLOL SUCCINATE 50 MG: 50 TABLET, FILM COATED, EXTENDED RELEASE ORAL at 08:35

## 2018-03-18 RX ADMIN — BUDESONIDE AND FORMOTEROL FUMARATE DIHYDRATE 2 PUFF: 160; 4.5 AEROSOL RESPIRATORY (INHALATION) at 06:36

## 2018-03-18 RX ADMIN — NYSTATIN 500000 UNITS: 100000 SUSPENSION ORAL at 11:46

## 2018-03-18 RX ADMIN — HYDROXYUREA 500 MG: 500 CAPSULE ORAL at 08:29

## 2018-03-18 RX ADMIN — ASPIRIN 325 MG: 325 TABLET, COATED ORAL at 21:52

## 2018-03-18 RX ADMIN — NYSTATIN 500000 UNITS: 100000 SUSPENSION ORAL at 17:33

## 2018-03-18 RX ADMIN — IPRATROPIUM BROMIDE AND ALBUTEROL SULFATE 3 ML: 2.5; .5 SOLUTION RESPIRATORY (INHALATION) at 14:18

## 2018-03-18 RX ADMIN — IPRATROPIUM BROMIDE AND ALBUTEROL SULFATE 3 ML: 2.5; .5 SOLUTION RESPIRATORY (INHALATION) at 06:35

## 2018-03-18 RX ADMIN — GABAPENTIN 300 MG: 300 CAPSULE ORAL at 08:39

## 2018-03-18 RX ADMIN — LEVOTHYROXINE SODIUM 50 MCG: 50 TABLET ORAL at 06:17

## 2018-03-18 RX ADMIN — IPRATROPIUM BROMIDE AND ALBUTEROL SULFATE 3 ML: 2.5; .5 SOLUTION RESPIRATORY (INHALATION) at 21:23

## 2018-03-18 RX ADMIN — FOLIC ACID 1 MG: 1 TABLET ORAL at 08:35

## 2018-03-18 RX ADMIN — PANTOPRAZOLE SODIUM 40 MG: 40 TABLET, DELAYED RELEASE ORAL at 06:17

## 2018-03-18 RX ADMIN — NYSTATIN 500000 UNITS: 100000 SUSPENSION ORAL at 08:29

## 2018-03-18 RX ADMIN — FUROSEMIDE 40 MG: 40 TABLET ORAL at 17:33

## 2018-03-18 RX ADMIN — INSULIN LISPRO 6 UNITS: 100 INJECTION, SOLUTION INTRAVENOUS; SUBCUTANEOUS at 21:51

## 2018-03-18 NOTE — PLAN OF CARE
Problem: Patient Care Overview  Goal: Plan of Care Review  Outcome: Ongoing (interventions implemented as appropriate)   03/18/18 1041   Coping/Psychosocial   Plan of Care Reviewed With patient   OTHER   Outcome Summary Pt still very weak. Bed mobility mod x2 using draw sheet. Attempt to stand but pt unable due to decrease strength and NWB RLE. Pt tolerated sittiig EOB doing AROM LLE and AAROM RLE.    Plan of Care Review   Progress no change

## 2018-03-18 NOTE — PROGRESS NOTES
[facility] Medicine Services  INPATIENT PROGRESS NOTE    Length of Stay: 10  Date of Admission: 3/8/2018  Primary Care Physician: Barry Foley MD    Subjective   Chief Complaint: Feels ok  Thinks moving to nursing home will be a good thing.  HPI   No new problems.  No nausea.  No shortness of breath.  Family at bedside        Review of Systems     All pertinent negatives and positives are as above. All other systems have been reviewed and are negative unless otherwise stated.     Objective    Temp:  [98.2 °F (36.8 °C)-98.5 °F (36.9 °C)] 98.2 °F (36.8 °C)  Heart Rate:  [104-122] 109  Resp:  [17-20] 18  BP: (135-153)/() 135/79     Weight Weight (lb) Weight Method   3/17/2018 211 lb 4.8 oz Bed scale   3/16/2018 214 lb 1 oz Bed scale   3/16/2018 220 lb 3 oz Bed scale   3/15/2018 218 lb 6.4 oz Bed scale   3/8/2018 199 lb Bed scale   3/8/2018 194 lb Stated   2/4/2018 199 lb 8 oz Bed scale         Physical Exam   Constitutional: She is oriented to person, place, and time. She appears well-developed and well-nourished.   HENT:   Head: Normocephalic and atraumatic.   Eyes: Conjunctivae and EOM are normal. Pupils are equal, round, and reactive to light.   Neck: Neck supple. No JVD present.   Cardiovascular: Normal rate and regular rhythm.  Exam reveals no gallop and no friction rub.    No murmur heard.  Pulmonary/Chest: Effort normal and breath sounds normal.   Abdominal: Soft. Bowel sounds are normal. There is no hepatosplenomegaly. There is no tenderness.   obese   Musculoskeletal: Normal range of motion. She exhibits edema (improved, generalized).   Neurological: She is alert and oriented to person, place, and time. No cranial nerve deficit.   Skin: Skin is warm and dry.   Psychiatric: She has a normal mood and affect. Her behavior is normal.   Nursing note and vitals reviewed.          Results Review:  I have reviewed the labs, radiology results, and diagnostic  studies.    Laboratory Data:     Results from last 7 days  Lab Units 03/13/18  0509 03/12/18  0516   WBC 10*3/mm3 11.01* 11.29*   HEMOGLOBIN g/dL 9.9* 10.1*   HEMATOCRIT % 31.3* 31.5*   PLATELETS 10*3/mm3 305 278          Results from last 7 days  Lab Units 03/17/18  0418 03/16/18  0441 03/15/18  0539  03/13/18  0509   SODIUM mmol/L 139 136 137  < > 138   POTASSIUM mmol/L 4.5 5.3 5.2  < > 4.9   CHLORIDE mmol/L 98 99 102  < > 104   CO2 mmol/L 33.0* 30.0 28.0  < > 27.0   BUN mg/dL 60* 66* 64*  < > 65*   CREATININE mg/dL 0.83 0.86 0.94  < > 1.21   CALCIUM mg/dL 8.7 8.9 9.0  < > 8.0*   BILIRUBIN mg/dL  --   --   --   --  0.3   ALK PHOS U/L  --   --   --   --  62   ALT (SGPT) U/L  --   --   --   --  20   AST (SGOT) U/L  --   --   --   --  23   GLUCOSE mg/dL 107* 161* 146*  < > 76   < > = values in this interval not displayed.    Culture Data:        Radiology Data:   Imaging Results (last 24 hours)     ** No results found for the last 24 hours. **          I have reviewed the patient current medications.     Assessment/Plan     Hospital Problem List     * (Principal)Closed displaced comminuted fracture of shaft of right femur    Fall    Pain of right thigh    Class 2 obesity with serious comorbidity in adult    Type 2 diabetes mellitus with neurologic complication, with long-term current use of insulin    STUART (acute kidney injury)    Chronic diastolic heart failure    Pleural effusion, left    Hypoxia          Assessment     Fracture shaft of right femur  Fall  Obesity  DM II  STUART  Chronic diastolic heart failure no current exacerbation  Hypoxia  Pleural effusion left   Weight Gain now close to baseline    Plan    Will likely be able to transition to NH tomorrow.  [facility] accepting  Continue current medication  CBC CMP in AM       Discharge Planning: I expect the patient to be discharged to SNF in 1 day.    Manda Andrew DO   03/18/18   1:20 PM

## 2018-03-18 NOTE — PLAN OF CARE
Problem: Patient Care Overview (Adult)  Goal: Plan of Care Review   03/18/18 0801   Coping/Psychosocial Response Interventions   Plan Of Care Reviewed With patient   Patient Care Overview   Progress no change   Outcome Evaluation   Outcome Summary/Follow up Plan VSS. NV check wnl, ppp. Drsg c/d/i/. Pt c/o pain at times, denies need for prn . Continue to elevate heels, turn every 2 hours, estrada care every 4 hours. Estrada was dc'd this am, pt is due to void. Bed check on safety maintained.       Problem: Fall Risk (Adult)  Goal: Absence of Falls  Outcome: Ongoing (interventions implemented as appropriate)      Problem: Wound (Includes Pressure Injury) (Adult)  Goal: Signs and Symptoms of Listed Potential Problems Will be Absent, Minimized or Managed (Wound)  Outcome: Ongoing (interventions implemented as appropriate)

## 2018-03-18 NOTE — PROGRESS NOTES
Continued Stay Note   [place]     Patient Name: Tamy Sher  MRN: [id number]  Today's Date: 3/18/2018    Admit Date: 3/8/2018          Discharge Plan     Row Name 03/18/18 1013       Plan    Plan Plan continues to be dc to [facility].  SW will follow.  CORTES Max.    Patient/Family in Agreement with Plan yes              Discharge Codes    No documentation.       Expected Discharge Date and Time     Expected Discharge Date Expected Discharge Time    Mar 18, 2018             CORTES Story

## 2018-03-18 NOTE — PLAN OF CARE
Problem: Patient Care Overview (Adult)  Goal: Plan of Care Review  Outcome: Ongoing (interventions implemented as appropriate)   03/18/18 1492   Coping/Psychosocial Response Interventions   Plan Of Care Reviewed With patient   Patient Care Overview   Progress no change   Outcome Evaluation   Outcome Summary/Follow up Plan Pt is A&Ox4. Pt has not gotten out of bed today, but worked with PT. Pt has been turned Q2 and encouraged to move around in bed frequently. Pt's accu check ACHS, no insulin was given until this afternon. Pt denies pain, but also states she does not like to take pain medication. Neuro checks done. Pt dressing is dry and intact. Pt has prevalon boot on RLE. Pt takes her medications whole with water. Safety maintained.      Goal: Adult Individualization and Mutuality  Outcome: Ongoing (interventions implemented as appropriate)    Goal: Discharge Needs Assessment  Outcome: Ongoing (interventions implemented as appropriate)      Problem: Fall Risk (Adult)  Goal: Absence of Falls  Outcome: Ongoing (interventions implemented as appropriate)      Problem: Orthopaedic Fracture (Adult)  Goal: Signs and Symptoms of Listed Potential Problems Will be Absent or Manageable (Orthopaedic Fracture)  Outcome: Ongoing (interventions implemented as appropriate)      Problem: Wound (Includes Pressure Injury) (Adult)  Goal: Signs and Symptoms of Listed Potential Problems Will be Absent, Minimized or Managed (Wound)  Outcome: Ongoing (interventions implemented as appropriate)

## 2018-03-19 LAB
GLUCOSE BLDC GLUCOMTR-MCNC: 127 MG/DL (ref 70–130)
GLUCOSE BLDC GLUCOMTR-MCNC: 206 MG/DL (ref 70–130)
GLUCOSE BLDC GLUCOMTR-MCNC: 59 MG/DL (ref 70–130)
GLUCOSE BLDC GLUCOMTR-MCNC: 88 MG/DL (ref 70–130)
GLUCOSE BLDC GLUCOMTR-MCNC: 99 MG/DL (ref 70–130)

## 2018-03-19 PROCEDURE — 94799 UNLISTED PULMONARY SVC/PX: CPT

## 2018-03-19 PROCEDURE — 63710000001 INSULIN DETEMIR PER 5 UNITS: Performed by: NURSE PRACTITIONER

## 2018-03-19 PROCEDURE — 63710000001 INSULIN LISPRO (HUMAN) PER 5 UNITS: Performed by: INTERNAL MEDICINE

## 2018-03-19 PROCEDURE — 94660 CPAP INITIATION&MGMT: CPT

## 2018-03-19 PROCEDURE — 97535 SELF CARE MNGMENT TRAINING: CPT

## 2018-03-19 PROCEDURE — 82962 GLUCOSE BLOOD TEST: CPT

## 2018-03-19 PROCEDURE — 97110 THERAPEUTIC EXERCISES: CPT

## 2018-03-19 RX ORDER — TAMSULOSIN HYDROCHLORIDE 0.4 MG/1
0.4 CAPSULE ORAL DAILY
Status: DISCONTINUED | OUTPATIENT
Start: 2018-03-19 | End: 2018-03-21 | Stop reason: HOSPADM

## 2018-03-19 RX ADMIN — ASPIRIN 325 MG: 325 TABLET, COATED ORAL at 21:03

## 2018-03-19 RX ADMIN — NYSTATIN 500000 UNITS: 100000 SUSPENSION ORAL at 21:03

## 2018-03-19 RX ADMIN — HYDROCODONE BITARTRATE AND ACETAMINOPHEN 1 TABLET: 7.5; 325 TABLET ORAL at 16:28

## 2018-03-19 RX ADMIN — FUROSEMIDE 40 MG: 40 TABLET ORAL at 09:35

## 2018-03-19 RX ADMIN — LEVOTHYROXINE SODIUM 50 MCG: 50 TABLET ORAL at 05:46

## 2018-03-19 RX ADMIN — METOPROLOL SUCCINATE 50 MG: 50 TABLET, FILM COATED, EXTENDED RELEASE ORAL at 09:34

## 2018-03-19 RX ADMIN — PANTOPRAZOLE SODIUM 40 MG: 40 TABLET, DELAYED RELEASE ORAL at 05:46

## 2018-03-19 RX ADMIN — INSULIN DETEMIR 25 UNITS: 100 INJECTION, SOLUTION SUBCUTANEOUS at 21:14

## 2018-03-19 RX ADMIN — GABAPENTIN 600 MG: 300 CAPSULE ORAL at 19:02

## 2018-03-19 RX ADMIN — IPRATROPIUM BROMIDE AND ALBUTEROL SULFATE 3 ML: 2.5; .5 SOLUTION RESPIRATORY (INHALATION) at 14:35

## 2018-03-19 RX ADMIN — HYDROCODONE BITARTRATE AND ACETAMINOPHEN 1 TABLET: 7.5; 325 TABLET ORAL at 09:45

## 2018-03-19 RX ADMIN — IPRATROPIUM BROMIDE AND ALBUTEROL SULFATE 3 ML: 2.5; .5 SOLUTION RESPIRATORY (INHALATION) at 10:18

## 2018-03-19 RX ADMIN — INSULIN LISPRO 4 UNITS: 100 INJECTION, SOLUTION INTRAVENOUS; SUBCUTANEOUS at 21:13

## 2018-03-19 RX ADMIN — ATORVASTATIN CALCIUM 10 MG: 10 TABLET, FILM COATED ORAL at 21:03

## 2018-03-19 RX ADMIN — NYSTATIN 500000 UNITS: 100000 SUSPENSION ORAL at 12:11

## 2018-03-19 RX ADMIN — HYDROXYUREA 500 MG: 500 CAPSULE ORAL at 09:35

## 2018-03-19 RX ADMIN — FUROSEMIDE 40 MG: 40 TABLET ORAL at 19:02

## 2018-03-19 RX ADMIN — NYSTATIN 500000 UNITS: 100000 SUSPENSION ORAL at 09:34

## 2018-03-19 RX ADMIN — IPRATROPIUM BROMIDE AND ALBUTEROL SULFATE 3 ML: 2.5; .5 SOLUTION RESPIRATORY (INHALATION) at 18:58

## 2018-03-19 RX ADMIN — TAMSULOSIN HYDROCHLORIDE 0.4 MG: 0.4 CAPSULE ORAL at 19:01

## 2018-03-19 RX ADMIN — GABAPENTIN 300 MG: 300 CAPSULE ORAL at 09:34

## 2018-03-19 RX ADMIN — BUDESONIDE AND FORMOTEROL FUMARATE DIHYDRATE 2 PUFF: 160; 4.5 AEROSOL RESPIRATORY (INHALATION) at 18:58

## 2018-03-19 RX ADMIN — FOLIC ACID 1 MG: 1 TABLET ORAL at 09:34

## 2018-03-19 RX ADMIN — NYSTATIN 500000 UNITS: 100000 SUSPENSION ORAL at 19:01

## 2018-03-19 NOTE — PROGRESS NOTES
Continued Stay Note  [facility]     Patient Name: Tamy Sher  MRN: [id number]  Today's Date: 3/19/2018    Admit Date: 3/8/2018          Discharge Plan     Row Name 03/19/18 1522       Plan    Plan [facility]    Plan Comments SW following for discharge to [facility]. Plan was discharge to [facility] today but still no discharge orders.              Expected Discharge Date and Time     Expected Discharge Date Expected Discharge Time    Mar 18, 2018             CORTES Mcnamara

## 2018-03-19 NOTE — PLAN OF CARE
Problem: Patient Care Overview  Goal: Plan of Care Review  Outcome: Ongoing (interventions implemented as appropriate)   03/19/18 1978   Coping/Psychosocial   Plan of Care Reviewed With patient   OTHER   Outcome Summary Pt mod A for rolling L in bed with bed rail and draw sheet. Pt completed BUE strengthening post bed bath to build endurance to assist with maintaining NWB status of RLE. Continue OT POC   Plan of Care Review   Progress improving

## 2018-03-19 NOTE — PLAN OF CARE
Problem: Patient Care Overview (Adult)  Goal: Plan of Care Review  Outcome: Ongoing (interventions implemented as appropriate)   03/19/18 1639 03/19/18 1715   Coping/Psychosocial Response Interventions   Plan Of Care Reviewed With patient --    Patient Care Overview   Progress --  no change   Outcome Evaluation   Outcome Summary/Follow up Plan --  Pt c/o pain in R knee where incision is. She is alert and oriented x4. VSS. Right prevalon boot in place. Turn Q2. Tele running a-fib. Accu check ACHS. Pt was retaining urine, bladder scan done and pt was retaining 999ml. F/c was placed per APRN orders. Pt does not get out of bed. Neuro checks completed. Pt wears glasses. Safety maintained. Pt plans to be d/c to [facility] tomorrow.      Goal: Adult Individualization and Mutuality  Outcome: Ongoing (interventions implemented as appropriate)    Goal: Discharge Needs Assessment  Outcome: Ongoing (interventions implemented as appropriate)      Problem: Fall Risk (Adult)  Goal: Absence of Falls  Outcome: Ongoing (interventions implemented as appropriate)      Problem: Orthopaedic Fracture (Adult)  Goal: Signs and Symptoms of Listed Potential Problems Will be Absent or Manageable (Orthopaedic Fracture)  Outcome: Ongoing (interventions implemented as appropriate)      Problem: Wound (Includes Pressure Injury) (Adult)  Goal: Signs and Symptoms of Listed Potential Problems Will be Absent, Minimized or Managed (Wound)  Outcome: Ongoing (interventions implemented as appropriate)

## 2018-03-19 NOTE — PROGRESS NOTES
[facility] Medicine Services  INPATIENT PROGRESS NOTE    Length of Stay: 11  Date of Admission: 3/8/2018  Primary Care Physician: Barry Foley MD    Subjective   Chief Complaint: Follow-up   HPI   The patient is resting in bed. She states she is miserable from in an out catheters. No complaints of chest pain or shortness of breath. No complaints of nausea or vomiting. Her pain is well controlled. She is working with therapy with encouragement. She thinks the swelling in her arms looks better.    Review of Systems   All pertinent negatives and positives are as above. All other systems have been reviewed and are negative unless otherwise stated.     Objective    Temp:  [98 °F (36.7 °C)-98.4 °F (36.9 °C)] 98 °F (36.7 °C)  Heart Rate:  [] 108  Resp:  [16-20] 20  BP: (141-148)/(75-90) 148/75  Physical Exam   Constitutional: She is oriented to person, place, and time. She appears well-developed and well-nourished. No distress.   Obese body habitus   HENT:   Head: Normocephalic and atraumatic.   Neck: Normal range of motion. Neck supple.   Cardiovascular: Normal rate, regular rhythm, normal heart sounds and intact distal pulses.  Exam reveals no gallop and no friction rub.    No murmur heard.  Atrial fibrillation with frequent PVC's rate currently 113   Pulmonary/Chest: Effort normal and breath sounds normal. She has no wheezes. She has no rales.   Abdominal: Soft. Bowel sounds are normal. She exhibits no distension. There is no tenderness.   Musculoskeletal: She exhibits edema (Moderate Upper and lower extremity edema, skin wrinkling) and tenderness (right leg).   Neurological: She is alert and oriented to person, place, and time.   Skin: Skin is warm and dry. No erythema.   Right leg with healing incisions, som wound bruising noted   Psychiatric: She has a normal mood and affect. Her behavior is normal. Judgment and thought content normal.   Vitals reviewed.    Results  Review:  I have reviewed the labs, radiology results, and diagnostic studies.    Laboratory Data:     Results from last 7 days  Lab Units 03/13/18  0509   WBC 10*3/mm3 11.01*   HEMOGLOBIN g/dL 9.9*   HEMATOCRIT % 31.3*   PLATELETS 10*3/mm3 305        Results from last 7 days  Lab Units 03/17/18  0418 03/16/18  0441 03/15/18  0539  03/13/18  0509   SODIUM mmol/L 139 136 137  < > 138   POTASSIUM mmol/L 4.5 5.3 5.2  < > 4.9   CHLORIDE mmol/L 98 99 102  < > 104   CO2 mmol/L 33.0* 30.0 28.0  < > 27.0   BUN mg/dL 60* 66* 64*  < > 65*   CREATININE mg/dL 0.83 0.86 0.94  < > 1.21   CALCIUM mg/dL 8.7 8.9 9.0  < > 8.0*   BILIRUBIN mg/dL  --   --   --   --  0.3   ALK PHOS U/L  --   --   --   --  62   ALT (SGPT) U/L  --   --   --   --  20   AST (SGOT) U/L  --   --   --   --  23   GLUCOSE mg/dL 107* 161* 146*  < > 76   < > = values in this interval not displayed.    I have reviewed the patient current medications.     Assessment/Plan     Hospital Problem List     * (Principal)Closed displaced comminuted fracture of shaft of right femur    Fall    Pain of right thigh    Class 2 obesity with serious comorbidity in adult    Type 2 diabetes mellitus with neurologic complication, with long-term current use of insulin    STUART (acute kidney injury)    Chronic diastolic heart failure    Pleural effusion, left    Hypoxia      in addition to above   Urinary retention    Plan:  1. Place estrada catheter for urinary retention and place patient on Flomax. Anticipate dc to [facility] with estrada catheter with clamp/voiding trials.  2. Patient has not been weighed today but her weight does seem to improving overall, continue twice daily Lasix  3. Continue physical/occupational therapy with non-weight bearing to right lower extremity  4. Last blood glucoses- 256, 59, 99, 88. Will decrease her nighttime Levemir to 25 units to see if this reduces her AM hypoglycemia.   5. Will check BMP in AM    Discharge Planning: I expect the patient to be  discharged to SNF tomorrow.    BULMARO Mcghee   03/19/18   5:12 PM     I personally evaluated and examined the patient in conjunction with  Natalia CHAMBERLAIN and agree with the assessment, treatment plan, and disposition of the patient as recorded by her. My history, exam, and further recommendations are:   No nausea, vomiting, abdominal pain.  She states she had BM.  She claims no other issues that will limit her discharge tomorrow.  Pain is adequately controlled  Blood pressure 148/75, heart rate 108, respiratory rate 20, temperature 98.  Pleasant woman, NAD, unlabored breathing  CTA  s1 s2 tachycardic.  Abdominal distention/globular.  NABS  Surgical wound lokks clean and dry  Check BMP in the morning  Lab Results (last 24 hours)     Procedure Component Value Units Date/Time    POC Glucose Once [[id number]]  (Normal) Collected:  03/19/18 1719    Specimen:  Blood Updated:  03/19/18 1729     Glucose 127 mg/dL      Comment: : [id number] Beth NuñeznMeter ID: [id number]       POC Glucose Once [[id number]]  (Normal) Collected:  03/19/18 1206    Specimen:  Blood Updated:  03/19/18 1218     Glucose 88 mg/dL      Comment: : [id number] WillCall AllieMeter ID: [id number]       POC Glucose Once [[id number]]  (Normal) Collected:  03/19/18 0932    Specimen:  Blood Updated:  03/19/18 0943     Glucose 99 mg/dL      Comment: : [id number] TrademarkFlyber AllieMeter ID: [id number]       POC Glucose Once [[id number]]  (Abnormal) Collected:  03/19/18 0750    Specimen:  Blood Updated:  03/19/18 0801     Glucose 59 (L) mg/dL      Comment: : [id number] Wildharber AllieMeter ID: [id number]       POC Glucose Once [[id number]]  (Abnormal) Collected:  03/18/18 2144    Specimen:  Blood Updated:  03/18/18 2214     Glucose 256 (H) mg/dL      Comment: : [id number] Robert DawsonMeter ID: [id number]               Fidencio Rodriguez MD  03/19/18  6:12 PM

## 2018-03-19 NOTE — PLAN OF CARE
Problem: Patient Care Overview (Adult)  Goal: Plan of Care Review   03/19/18 0754   Coping/Psychosocial Response Interventions   Plan Of Care Reviewed With patient   Patient Care Overview   Progress no change   Outcome Evaluation   Outcome Summary/Follow up Plan Pt. c/o pain but refuses to take pain medication, in and out cath performed x2, VSS, right prevalon boot in place, turned q2hrs, atrial fibrillation noted on telemetry, generalized edema noted, safety maintained, will continue to monitor.        Problem: Fall Risk (Adult)  Goal: Absence of Falls  Outcome: Ongoing (interventions implemented as appropriate)      Problem: Orthopaedic Fracture (Adult)  Goal: Signs and Symptoms of Listed Potential Problems Will be Absent or Manageable (Orthopaedic Fracture)  Outcome: Ongoing (interventions implemented as appropriate)      Problem: Wound (Includes Pressure Injury) (Adult)  Goal: Signs and Symptoms of Listed Potential Problems Will be Absent, Minimized or Managed (Wound)  Outcome: Ongoing (interventions implemented as appropriate)

## 2018-03-19 NOTE — THERAPY TREATMENT NOTE
Acute Care - Occupational Therapy Treatment Note  [facility]     Patient Name: Tamy Sher  : 1930  MRN: [id number]  Today's Date: 3/19/2018  Onset of Illness/Injury or Date of Surgery: 18  Date of Referral to OT: 18  Referring Physician: Dr. Bundy    Admit Date: 3/8/2018       ICD-10-CM ICD-9-CM   1. Fall, initial encounter W19.XXXA E888.9   2. Other closed fracture of distal end of right femur, initial encounter S72.491A 821.29   3. Fall W19.XXXA E888.9   4. Impaired mobility Z74.09 799.89   5. Decreased activities of daily living (ADL) Z78.9 V49.89     Patient Active Problem List   Diagnosis   • Ischemic chest pain   • Pneumonia of both lungs due to infectious organism   • Fall   • Closed displaced comminuted fracture of shaft of right femur   • Pain of right thigh   • Class 2 obesity with serious comorbidity in adult   • Type 2 diabetes mellitus with neurologic complication, with long-term current use of insulin   • STUART (acute kidney injury)   • Chronic diastolic heart failure   • Pleural effusion, left   • Hypoxia     Past Medical History:   Diagnosis Date   • Constipation    • Diabetes mellitus    • Diarrhea    • Diastolic dysfunction, left ventricle    • Esophagitis    • Exertional shortness of breath    • Hyperlipidemia    • Hypertension    • Lumbar spondylitis    • Osteoarthritis    • Peripheral vascular disease    • Sinusitis    • Stroke      Past Surgical History:   Procedure Laterality Date   • FEMUR SUPRACONDYLAR FRACTURE REPAIR Right 3/10/2018    Procedure: FEMUR SUPRACONDYLAR NAIL;  Surgeon: Nima Bundy MD;  Location: [facility];  Service: Orthopedics   • GALLBLADDER SURGERY     • HYSTERECTOMY     • JOINT REPLACEMENT     • REPLACEMENT TOTAL KNEE Left        Therapy Treatment    Therapy Treatment / Health Promotion    Treatment Time/Intention  Discipline: occupational therapy assistant (YASIR Melendez/L)  Document Type: therapy note (daily note) (Jasmyn PONCE  Russel, COOLEY/L)  Subjective Information: complains of, weakness, fatigue, pain (Jasmyn ROSARIO Mathewin, COOLEY/L)  Patient Effort: good (Jasmyn ROSARIO Russel, COOLEY/L)  Comment: pt just finished with bed bath but agreed to UE exercise (Jasmyn Goode, COOLEY/L)  Existing Precautions/Restrictions: fall, oxygen therapy device and L/min, non-weight bearing, right (NWB RLE) (Jasmynshanel Mathewin, COOLEY/L)  Plan of Care Review  Plan of Care Reviewed With: patient (Jasmyn Caldwellklin, COOLEY/L)    Vitals/Pain/Safety  Pain Scale: Numbers Pre/Post-Treatment  Pain Scale: Numbers, Pretreatment: 5/10 (Jasmynleonel Mathewin, COOLEY/L)  Pain Location - Side: Right (Jasmyn ROSARIO Mathewin, COOLEY/L)  Pain Location - Orientation: lower (Jasmyn Mathewin, COOLEY/L)  Pain Scale: Word Pre/Post-Treatment  Pain Location - Side: Right (Jasmyn ROSARIO Russel, COOLEY/L)  Pain Location - Orientation: lower (Jasmyn ROSARIO Russel, COOLEY/L)  Pain Scale: FACES Pre/Post-Treatment  Pain Location - Side: Right (Jasmyn ROSARIO Russel, COOLEY/L)  Pain Location - Orientation: lower (Jasmyn ROSARIO Russel, COOLEY/L)  Positioning and Restraints  Pre-Treatment Position: in bed (Jasmyn Goode, COOLEY/L)  Post Treatment Position: bed (Jasmyn Mathewin, COOLEY/L)  In Bed: side lying left, call light within reach, encouraged to call for assist, exit alarm on, side rails up x2, pillow between legs (Jasmyn Mathewin, COOLEY/L)    Mobility,ADL,Motor, Modality  Bed Mobility Assessment/Treatment  Bed Mobility Assessment/Treatment: rolling left, scooting/bridging (Jasmyn Mathewin, COOLEY/L)  Rolling Left [place] (Bed Mobility): moderate assist (50% patient effort) (Jasmyn Goode, COOLEY/L)  Scooting/Bridging [place] (Bed Mobility): maximum assist (25% patient effort), verbal cues (pt able to assist with LLE) (YASIR Melendez/L)  Assistive Device (Bed Mobility): bed rails, draw sheet (YASIR Melendez/L)     Therapeutic Exercise  Upper Extremity  Range of Motion (Therapeutic Exercise): shoulder flexion/extension, bilateral, shoulder abduction/adduction, bilateral, shoulder horizontal abduction/adduction, bilateral, shoulder internal/external rotation, bilateral, elbow flexion/extension, bilateral, wrist flexion/extension, bilateral (10 reps x2 sets) (YASIR Melendez/L)           ROM/MMT             Sensory, Edema, Orthotics          Cognition, Communication, Swallow  Cognitive Assessment/Intervention- PT/OT  Personal Safety Interventions: fall prevention program maintained, elopement precautions initiated (YASIR Melendez/L)    Outcome Summary           OT Rehab Goals     Row Name 03/11/18 1320             Toileting Goal 1 (OT)    Activity/Device (Toileting Goal 1, OT) commode, bedside with drop arms  -CH      [place] Level/Cues Needed (Toileting Goal 1, OT) maximum assist (25-49% patient effort)   x 2  -CH      Time Frame (Toileting Goal 1, OT) by discharge  -CH      Barriers (Toileting Goal 1, OT) NWB status, pain  -CH      Progress/Outcome (Toileting Goal 1, OT) goal ongoing  -CH         Safety Awareness Goal 1 (OT)    Activity (Safety Awareness Goal 1, OT) follow through of safety precautions;safe use of assistive device/equipment  -CH      [place]/Cues/Accuracy (Safety Awareness Goal 1, OT) verbal cues/redirection;with minimum;with 75% accuracy  -CH      Time Frame (Safety Awareness Goal 1, OT) by discharge;long term goal (LTG)  -CH      Barriers (Safety Awareness Goal 1, OT) pain, demo'd fearfulness  -CH      Progress/Outcome (Safety Awareness Goal 1, OT) goal ongoing  -CH         Patient Education Goal (OT)    Activity (Patient Education Goal, OT) Pt. will adhere to NWB status at [facility] to improve safety during ADLs  -CH      [place]/Cues/Accuracy (Memory Goal 2, OT) demonstrates adequately  -CH      Time Frame (Patient Education Goal, OT) long term goal (LTG);by discharge  -CH      Barriers (Patient Education Goal, OT)  pain, demo'd fearfulness  -      Progress/Outcome (Patient Education Goal, OT) goal ongoing  -        User Key  (r) = Recorded By, (t) = Taken By, (c) = Cosigned By    Initials Name Provider Type    REMY Park, OTR/L Occupational Therapist        Occupational Therapy Education     Title: PT OT SLP Therapies (Active)     Topic: Occupational Therapy (Done)     Point: ADL training (Done)     Description: Instruct learner(s) on proper safety adaptation and remediation techniques during self care or transfers.   Instruct in proper use of assistive devices.   Learning Progress Summary     Learner Status Readiness Method Response Comment Documented by    Patient Done Acceptance E VU benefits of UE strengthening and endurance for maintaining WB status  03/19/18 1407     Done Acceptance E VU BUE ROM/strengthening, benefits of activity  03/16/18 1408     Done Acceptance E,D VU,NR   03/11/18 1426    Family Done Acceptance E,D VU,NR   03/11/18 1426          Point: Home exercise program (Done)     Description: Instruct learner(s) on appropriate technique for monitoring, assisting and/or progressing therapeutic exercises/activities.   Learning Progress Summary     Learner Status Readiness Method Response Comment Documented by    Patient Done Acceptance E,D VU,DU,NR Pt. Nwb Rle, ue exs performed with no shd. flex exs sec. previous rotator injuries in past, rests required, but no issues with tx!  03/17/18 1055     Done Acceptance E VU BUE ROM/strengthening, benefits of activity  03/16/18 1408     Done Acceptance E,D VU,DU,NR Pt. performed ue exs with constant rests and recuperation duuring her ue exs, no c/o's or issues with tx!  03/14/18 1119          Point: Precautions (Done)     Description: Instruct learner(s) on prescribed precautions during self-care and functional transfers.   Learning Progress Summary     Learner Status Readiness Method Response Comment Documented by    Patient Done Acceptance E,D  GEORGES BUSTAMANTE,NR Pt. Nwb Rle, ue exs performed with no shd. flex exs sec. previous rotator injuries in past, rests required, but no issues with tx!  03/17/18 1055     Done Acceptance JAIME PATEL DU,NR Pt. performed ue exs with constant rests and recuperation duuring her ue exs, no c/o's or issues with tx!  03/14/18 1119     Done Acceptance JAIME PATEL,NR   03/11/18 1426    Family Done Acceptance JAIME PATEL,NR   03/11/18 1426          Point: Body mechanics (Done)     Description: Instruct learner(s) on proper positioning and spine alignment during self-care, functional mobility activities and/or exercises.   Learning Progress Summary     Learner Status Readiness Method Response Comment Documented by    Patient Done Acceptance JAIME PATEL DU,NR Pt. Nwb Rle, ue exs performed with no shd. flex exs sec. previous rotator injuries in past, rests required, but no issues with tx!  03/17/18 1055     Done Acceptance JAIME PATEL DU,NR Pt. performed ue exs with constant rests and recuperation duuring her ue exs, no c/o's or issues with tx!  03/14/18 1119     Done Acceptance JAIME PATELNR   03/11/18 1426    Family Done Acceptance JAIME PATEL,NR   03/11/18 1426                      User Key     Initials Effective Dates Name Provider Type Discipline     08/02/16 -  JUDY Salazar/L Occupational Therapist OT     08/02/16 -  YASIR Singh/BALAJI Occupational Therapy Assistant OT     08/02/16 -  YASIR Melendez/L Occupational Therapy Assistant OT                  OT Recommendation and Plan     Plan of Care Review  Plan of Care Reviewed With: patient  Plan of Care Reviewed With: patient  Outcome Summary: Pt mod A for rolling L in bed with bed rail and draw sheet. Pt completed BUE strengthening post bed bath to build endurance to assist with maintaining NWB status of RLE. Continue OT POC        Outcome Measures     Row Name 03/19/18 1400 03/18/18 1000 03/17/18 1000       How much help from another person do you currently need...     Turning from your back to your side while in flat bed without using bedrails?  -- 2  -NW 2  -NW    Moving from lying on back to sitting on the side of a flat bed without bedrails?  -- 2  -NW 2  -NW    Moving to and from a bed to a chair (including a wheelchair)?  -- 1  -NW 1  -NW    Standing up from a chair using your arms (e.g., wheelchair, bedside chair)?  -- 1  -NW 1  -NW    Climbing 3-5 steps with a railing?  -- 1  -NW 1  -NW    To walk in hospital room?  -- 1  -NW 1  -NW    AM-PAC 6 Clicks Score  -- 8  -NW 8  -NW       How much help from another is currently needed...    Putting on and taking off regular lower body clothing? 2  -TS  --  --    Bathing (including washing, rinsing, and drying) 2  -TS  --  --    Toileting (which includes using toilet bed pan or urinal) 2  -TS  --  --    Putting on and taking off regular upper body clothing 3  -TS  --  --    Taking care of personal grooming (such as brushing teeth) 3  -TS  --  --    Eating meals 4  -TS  --  --    Score 16  -TS  --  --       Functional Assessment    Outcome Measure Options AM-PAC 6 Clicks Daily Activity (OT)  -TS AM-PAC 6 Clicks Basic Mobility (PT)  -NW AM-PAC 6 Clicks Basic Mobility (PT)  -NW    Row Name 03/17/18 0700             How much help from another is currently needed...    Putting on and taking off regular lower body clothing? 2  -CJ      Bathing (including washing, rinsing, and drying) 2  -CJ      Toileting (which includes using toilet bed pan or urinal) 2  -CJ      Putting on and taking off regular upper body clothing 2  -CJ      Taking care of personal grooming (such as brushing teeth) 3  -CJ      Eating meals 3  -CJ      Score 14  -CJ         Functional Assessment    Outcome Measure Options AM-PAC 6 Clicks Daily Activity (OT)  -        User Key  (r) = Recorded By, (t) = Taken By, (c) = Cosigned By    Initials Name Provider Type     YASIR Singh/BALAJI Occupational Therapy Assistant     YASIR Melendez/BALAJI  Occupational Therapy Assistant    CORNELIUS Justice, VERNELL Physical Therapy Assistant           Time Calculation:         Time Calculation- OT     Row Name 03/19/18 1410             Time Calculation- OT    OT Start Time 1339  -TS      OT Stop Time 1410  -TS      OT Time Calculation (min) 31 min  -TS      Total Timed Code Minutes- OT 31 minute(s)  -TS      OT Received On 03/19/18  -TS        User Key  (r) = Recorded By, (t) = Taken By, (c) = Cosigned By    Initials Name Provider Type     YASIR Melendez/L Occupational Therapy Assistant           Therapy Charges for Today     Code Description Service Date Service Provider Modifiers Qty    98376324834 HC OT SELF CARE/MGMT/TRAIN EA 15 MIN 3/19/2018 YASIR Melendez/L GO, KX 1    17570988710 HC OT THER PROC EA 15 MIN 3/19/2018 YASIR Melendez/L GO, KX 1          OT G-codes  OT Professional Judgement Used?: Yes  OT Functional Scales Options: AM-PAC 6 Clicks Daily Activity (OT)  Score: 14  Functional Limitation: Self care  Self Care Current Status (): At least 40 percent but less than 60 percent impaired, limited or restricted  Self Care Goal Status (): At least 40 percent but less than 60 percent impaired, limited or restricted (at higher score than 14)    CYNDEE Mcclelland  3/19/2018

## 2018-03-20 LAB
ANION GAP SERPL CALCULATED.3IONS-SCNC: 4 MMOL/L (ref 4–13)
ARTERIAL PATENCY WRIST A: POSITIVE
ATMOSPHERIC PRESS: 743 MMHG
BASE EXCESS BLDA CALC-SCNC: 11.2 MMOL/L (ref 0–2)
BDY SITE: ABNORMAL
BODY TEMPERATURE: 37 C
BUN BLD-MCNC: 57 MG/DL (ref 5–21)
BUN/CREAT SERPL: 64.8 (ref 7–25)
CALCIUM SPEC-SCNC: 8.2 MG/DL (ref 8.4–10.4)
CHLORIDE SERPL-SCNC: 94 MMOL/L (ref 98–110)
CO2 SERPL-SCNC: 38 MMOL/L (ref 24–31)
CREAT BLD-MCNC: 0.88 MG/DL (ref 0.5–1.4)
GAS FLOW AIRWAY: 3 LPM
GFR SERPL CREATININE-BSD FRML MDRD: 61 ML/MIN/1.73
GLUCOSE BLD-MCNC: 106 MG/DL (ref 70–100)
GLUCOSE BLDC GLUCOMTR-MCNC: 168 MG/DL (ref 70–130)
GLUCOSE BLDC GLUCOMTR-MCNC: 233 MG/DL (ref 70–130)
GLUCOSE BLDC GLUCOMTR-MCNC: 299 MG/DL (ref 70–130)
HCO3 BLDA-SCNC: 36.5 MMOL/L (ref 20–26)
Lab: ABNORMAL
MODALITY: ABNORMAL
PCO2 BLDA: 49.7 MM HG (ref 35–45)
PH BLDA: 7.47 PH UNITS (ref 7.35–7.45)
PO2 BLDA: 69.8 MM HG (ref 83–108)
POTASSIUM BLD-SCNC: 4.6 MMOL/L (ref 3.5–5.3)
SAO2 % BLDCOA: 95.2 % (ref 94–99)
SODIUM BLD-SCNC: 136 MMOL/L (ref 135–145)
VENTILATOR MODE: ABNORMAL

## 2018-03-20 PROCEDURE — 97530 THERAPEUTIC ACTIVITIES: CPT

## 2018-03-20 PROCEDURE — 94799 UNLISTED PULMONARY SVC/PX: CPT

## 2018-03-20 PROCEDURE — 94660 CPAP INITIATION&MGMT: CPT

## 2018-03-20 PROCEDURE — 36600 WITHDRAWAL OF ARTERIAL BLOOD: CPT

## 2018-03-20 PROCEDURE — 82803 BLOOD GASES ANY COMBINATION: CPT

## 2018-03-20 PROCEDURE — 63710000001 INSULIN LISPRO (HUMAN) PER 5 UNITS: Performed by: INTERNAL MEDICINE

## 2018-03-20 PROCEDURE — 93005 ELECTROCARDIOGRAM TRACING: CPT | Performed by: INTERNAL MEDICINE

## 2018-03-20 PROCEDURE — 80048 BASIC METABOLIC PNL TOTAL CA: CPT | Performed by: NURSE PRACTITIONER

## 2018-03-20 PROCEDURE — 93010 ELECTROCARDIOGRAM REPORT: CPT | Performed by: INTERNAL MEDICINE

## 2018-03-20 PROCEDURE — 63710000001 INSULIN DETEMIR PER 5 UNITS: Performed by: NURSE PRACTITIONER

## 2018-03-20 PROCEDURE — 82962 GLUCOSE BLOOD TEST: CPT

## 2018-03-20 RX ORDER — DILTIAZEM HYDROCHLORIDE 120 MG/1
120 CAPSULE, COATED, EXTENDED RELEASE ORAL
Status: DISCONTINUED | OUTPATIENT
Start: 2018-03-20 | End: 2018-03-20

## 2018-03-20 RX ORDER — ASPIRIN 81 MG/1
81 TABLET, CHEWABLE ORAL DAILY
Status: DISCONTINUED | OUTPATIENT
Start: 2018-03-20 | End: 2018-03-21 | Stop reason: HOSPADM

## 2018-03-20 RX ADMIN — IPRATROPIUM BROMIDE AND ALBUTEROL SULFATE 3 ML: 2.5; .5 SOLUTION RESPIRATORY (INHALATION) at 19:44

## 2018-03-20 RX ADMIN — TAMSULOSIN HYDROCHLORIDE 0.4 MG: 0.4 CAPSULE ORAL at 08:30

## 2018-03-20 RX ADMIN — NYSTATIN 500000 UNITS: 100000 SUSPENSION ORAL at 18:00

## 2018-03-20 RX ADMIN — DILTIAZEM HYDROCHLORIDE 5 MG/HR: 5 INJECTION INTRAVENOUS at 13:19

## 2018-03-20 RX ADMIN — DILTIAZEM HYDROCHLORIDE 120 MG: 120 CAPSULE, COATED, EXTENDED RELEASE ORAL at 09:57

## 2018-03-20 RX ADMIN — LEVOTHYROXINE SODIUM 50 MCG: 50 TABLET ORAL at 05:37

## 2018-03-20 RX ADMIN — INSULIN LISPRO 4 UNITS: 100 INJECTION, SOLUTION INTRAVENOUS; SUBCUTANEOUS at 21:58

## 2018-03-20 RX ADMIN — GABAPENTIN 600 MG: 300 CAPSULE ORAL at 18:47

## 2018-03-20 RX ADMIN — BUDESONIDE AND FORMOTEROL FUMARATE DIHYDRATE 2 PUFF: 160; 4.5 AEROSOL RESPIRATORY (INHALATION) at 19:45

## 2018-03-20 RX ADMIN — INSULIN DETEMIR 25 UNITS: 100 INJECTION, SOLUTION SUBCUTANEOUS at 22:12

## 2018-03-20 RX ADMIN — METOPROLOL SUCCINATE 50 MG: 50 TABLET, FILM COATED, EXTENDED RELEASE ORAL at 08:30

## 2018-03-20 RX ADMIN — HYDROXYUREA 500 MG: 500 CAPSULE ORAL at 08:30

## 2018-03-20 RX ADMIN — FUROSEMIDE 40 MG: 40 TABLET ORAL at 08:30

## 2018-03-20 RX ADMIN — IPRATROPIUM BROMIDE AND ALBUTEROL SULFATE 3 ML: 2.5; .5 SOLUTION RESPIRATORY (INHALATION) at 11:02

## 2018-03-20 RX ADMIN — ASPIRIN 81 MG 81 MG: 81 TABLET ORAL at 16:01

## 2018-03-20 RX ADMIN — NYSTATIN 500000 UNITS: 100000 SUSPENSION ORAL at 08:30

## 2018-03-20 RX ADMIN — PANTOPRAZOLE SODIUM 40 MG: 40 TABLET, DELAYED RELEASE ORAL at 05:37

## 2018-03-20 RX ADMIN — IPRATROPIUM BROMIDE AND ALBUTEROL SULFATE 3 ML: 2.5; .5 SOLUTION RESPIRATORY (INHALATION) at 16:28

## 2018-03-20 RX ADMIN — ATORVASTATIN CALCIUM 10 MG: 10 TABLET, FILM COATED ORAL at 21:59

## 2018-03-20 RX ADMIN — GABAPENTIN 300 MG: 300 CAPSULE ORAL at 08:30

## 2018-03-20 RX ADMIN — IPRATROPIUM BROMIDE AND ALBUTEROL SULFATE 3 ML: 2.5; .5 SOLUTION RESPIRATORY (INHALATION) at 08:04

## 2018-03-20 RX ADMIN — APIXABAN 5 MG: 5 TABLET, FILM COATED ORAL at 21:59

## 2018-03-20 RX ADMIN — FOLIC ACID 1 MG: 1 TABLET ORAL at 08:30

## 2018-03-20 RX ADMIN — INSULIN LISPRO 2 UNITS: 100 INJECTION, SOLUTION INTRAVENOUS; SUBCUTANEOUS at 12:02

## 2018-03-20 RX ADMIN — BUDESONIDE AND FORMOTEROL FUMARATE DIHYDRATE 2 PUFF: 160; 4.5 AEROSOL RESPIRATORY (INHALATION) at 08:12

## 2018-03-20 RX ADMIN — FUROSEMIDE 40 MG: 40 TABLET ORAL at 18:47

## 2018-03-20 RX ADMIN — INSULIN LISPRO 6 UNITS: 100 INJECTION, SOLUTION INTRAVENOUS; SUBCUTANEOUS at 18:47

## 2018-03-20 RX ADMIN — DILTIAZEM HYDROCHLORIDE 30 MG: 30 TABLET, FILM COATED ORAL at 18:27

## 2018-03-20 RX ADMIN — NYSTATIN 500000 UNITS: 100000 SUSPENSION ORAL at 21:59

## 2018-03-20 NOTE — PLAN OF CARE
Problem: Patient Care Overview (Adult)  Goal: Plan of Care Review  Outcome: Ongoing (interventions implemented as appropriate)   03/20/18 1397   Coping/Psychosocial Response Interventions   Plan Of Care Reviewed With patient   Patient Care Overview   Progress no change   Outcome Evaluation   Outcome Summary/Follow up Plan no c/o pain today...doesn't move in bed much...working with PT...safety maintained...continued a-fib with HR in 120's...po dose cardizem given but no change to HR...started on cardizem gtt...currently infusing at 15 mg/hr...continue to monitor...one area of incision continues to drain moderate amount serous drainage...dressing changed...n/v WNL...right prevalon boot remains in place...pt encouraged to reposition...estrada in place...cath care done...will continue to monitor....awaiting d/c to SNF for rehab       Problem: Fall Risk (Adult)  Goal: Absence of Falls  Outcome: Ongoing (interventions implemented as appropriate)      Problem: Wound (Includes Pressure Injury) (Adult)  Goal: Signs and Symptoms of Listed Potential Problems Will be Absent, Minimized or Managed (Wound)  Outcome: Ongoing (interventions implemented as appropriate)

## 2018-03-20 NOTE — PLAN OF CARE
Problem: Patient Care Overview  Goal: Plan of Care Review  Outcome: Ongoing (interventions implemented as appropriate)   03/20/18 1030   Coping/Psychosocial   Plan of Care Reviewed With patient   OTHER   Outcome Summary PT tx completed. Pt supine in bed. States she doesn't want to get up or do any PT. Lots of encouragement to get her to participate. She states she has given up. Max A of 2 bed mobility, sit edge of bed x 13 minutes S. Performed A-AAROM BLE's 2 sets of 10. Attempted standing, but she is unalble. Also not able to maintain NWB status. Plan for discharge to SNF.   Plan of Care Review   Progress no change

## 2018-03-20 NOTE — PROGRESS NOTES
Continued Stay Note  [facility]     Patient Name: Tamy Sher  MRN: [id number]  Today's Date: 3/20/2018    Admit Date: 3/8/2018          Discharge Plan     Row Name 03/20/18 1042       Plan    Plan [facility]    Patient/Family in Agreement with Plan yes    Final Discharge Disposition Code 03 - skilled nursing facility (SNF)    Final Note Plan discharge today to [facility], skilled. SW spoke to Inessa in admissions at [facility] and she is aware of planned discharge to [facility] today. Discharge summary to be faxed to facility at [phone]. Patient's nurse will call report to [phone]. Patient to transport by EMS.             Expected Discharge Date and Time     Expected Discharge Date Expected Discharge Time    Mar 18, 2018             CORTES Mcnamara

## 2018-03-20 NOTE — PROGRESS NOTES
"    [facility] Medicine Services  INPATIENT PROGRESS NOTE    Length of Stay: 12  Date of Admission: 3/8/2018  Primary Care Physician: Barry Foley MD    Subjective   Chief Complaint:  Follow up  HPI   Patient has no new complaints  I was planning to release her and upon evaluation of her record from last night and early today I noted the followin.  Afib with RVR.  She does not known any history of rhythm disturbance.   Her EKg on March 10 was afib but prior to this on 2018, she was NSR.  Reapeat EKg done today showed she has afib with RVR.  She scores at least 3 on her CHADS VAsc 2 score (HTN, DM, Hx of \"ministroke\", age)  2.  He HCO2 in [facility] is rising.  She has chronic respiratory failure on home oxygen.  She is requiring 3 lPM compared to her norm of 2LPM via NC.   I checked her ABG.     Results for PRICE LY (MRN [id number]) as of 3/20/2018 18:04   Ref. Range 3/20/2018 07:55   pH, Arterial Latest Ref Range: 7.350 - 7.450 pH units 7.474 (H)   pCO2, Arterial Latest Ref Range: 35.0 - 45.0 mm Hg 49.7 (H)   pO2, Arterial Latest Ref Range: 83.0 - 108.0 mm Hg 69.8 (L)   HCO3, Arterial Latest Ref Range: 20.0 - 26.0 mmol/L 36.5 (H)   Base Excess, Arterial Latest Ref Range: 0.0 - 2.0 mmol/L 11.2 (H)   O2 Saturation, Arterial Latest Ref Range: 94.0 - 99.0 % 95.2   Site Unknown Right Radial   Brian's Test Unknown Positive   Modality Unknown Nasal Cannula   Flow Rate Latest Units: lpm 3.0   Ventilator Mode Unknown NA   Barometric Pressure for Blood Gas Latest Units: mmHg 743     Review of Systems     All pertinent negatives and positives are as above. All other systems have been reviewed and are negative unless otherwise stated.     Objective    Temp:  [97.6 °F (36.4 °C)-97.9 °F (36.6 °C)] 97.6 °F (36.4 °C)  Heart Rate:  [104-117] 106  Resp:  [16-20] 20  BP: (139-169)/(85-86) 139/86  Physical Exam    Constitutional: She is oriented to person, place, and time. She appears " well-developed and well-nourished. No distress.   Obese body habitus   HENT:   Head: Normocephalic and atraumatic.   Neck: Normal range of motion. Neck supple.   Cardiovascular: Normal rate, irregular rhythm, normal heart sounds and intact distal pulses.  Exam reveals no gallop and no friction rub.    No murmur heard.  Atrial fibrillation with frequent PVC's rate currently 115- 125  Pulmonary/Chest: Effort normal and breath sounds normal. She has no wheezes. She has no rales.   Abdominal: Soft. Bowel sounds are normal. She exhibits no distension. There is no tenderness.   Musculoskeletal: She exhibits edema (Moderate Upper and lower extremity edema, skin wrinkling) and tenderness (right leg). - improve   Neurological: She is alert and oriented to person, place, and time.   Skin: Skin is warm and dry. No erythema.   Right leg with healing incisions, som wound bruising noted; well coaptated wound; noted steri strip on upper thigh  wound   Psychiatric: She has a normal mood and affect. Her behavior is normal. Judgment and thought content normal.   Vitals reviewed.       Results Review:  I have reviewed the labs, radiology results, and diagnostic studies.    Laboratory Data:            Results from last 7 days  Lab Units 03/20/18  0422 03/17/18  0418 03/16/18  0441   SODIUM mmol/L 136 139 136   POTASSIUM mmol/L 4.6 4.5 5.3   CHLORIDE mmol/L 94* 98 99   CO2 mmol/L 38.0* 33.0* 30.0   BUN mg/dL 57* 60* 66*   CREATININE mg/dL 0.88 0.83 0.86   CALCIUM mg/dL 8.2* 8.7 8.9   GLUCOSE mg/dL 106* 107* 161*       Culture Data:        Radiology Data:   Imaging Results (last 24 hours)     ** No results found for the last 24 hours. **          I have reviewed the patient current medications.     Assessment/Plan     Hospital Problem List     * (Principal)Closed displaced comminuted fracture of shaft of right femur    Pain of right thigh    Fall    Class 2 obesity with serious comorbidity in adult    Type 2 diabetes mellitus with  neurologic complication, with long-term current use of insulin    STUART (acute kidney injury)    Chronic diastolic heart failure    Pleural effusion, left    Hypoxia      In addition to aboveabove    EKG - afib; patient in agreement for anticoagulation (discussed with Dr. Bundy); cut back aspirin 81 mg/daily; started on cardizem drip as HR remains poorly controlled.   Chronic hypoxic resp failure on home oxygen - keep o2 sat > 90%  ABG - noted mild hypercarbia        + hx of mini stroke  DM (accu check 106, 168)  HTN - controlled; cpt  Chronic diastolic hf      Will fine tune prior to discharge to NH        Fidencio Rodriguez MD   03/20/18   8:31 AM    I rechecked her tonight. Family is at bedside.    She is no apparent distress. HR is about 110; oxygen sat is 95% on nasal cannula.  She is hemodynamically stable.    I will start her on cardizem  Po and stop Cardizem drip once it is started.  I discussed with her nurse the plan of care while we were at bedside.  My goal is HR < 110.  Fidencio Rodriguez MD  03/20/18  6:38 PM

## 2018-03-20 NOTE — PLAN OF CARE
Problem: Fall Risk (Adult)  Goal: Absence of Falls  Outcome: Ongoing (interventions implemented as appropriate)      Problem: Orthopaedic Fracture (Adult)  Goal: Signs and Symptoms of Listed Potential Problems Will be Absent or Manageable (Orthopaedic Fracture)  Outcome: Ongoing (interventions implemented as appropriate)      Problem: Patient Care Overview  Goal: Plan of Care Review   03/20/18 0405   Coping/Psychosocial   Plan of Care Reviewed With patient   OTHER   Outcome Summary C/o some discomfort in right foot. Foot repositioned and pt. states feels better. Two small incision sites on lateral right knee oozing moderate amount serous fluid. Drsg applied. Incision to right upper thigh (which steri strips were placed per dayshift after wound opened some) also oozing mod amount serous drainage. Drsg also applied. Other incisions CDI EDIL. Right foot in prevalon boot. Turned Q2. Left forearm red/swollen, but not warm or hard. FC in place. Pain meds offered, but refused.    Plan of Care Review   Progress no change       Problem: Wound (Includes Pressure Injury) (Adult)  Goal: Signs and Symptoms of Listed Potential Problems Will be Absent, Minimized or Managed (Wound)  Outcome: Ongoing (interventions implemented as appropriate)

## 2018-03-21 VITALS
BODY MASS INDEX: 41.48 KG/M2 | HEART RATE: 96 BPM | DIASTOLIC BLOOD PRESSURE: 61 MMHG | HEIGHT: 60 IN | TEMPERATURE: 97.5 F | RESPIRATION RATE: 18 BRPM | OXYGEN SATURATION: 94 % | SYSTOLIC BLOOD PRESSURE: 133 MMHG | WEIGHT: 211.3 LBS

## 2018-03-21 LAB
GLUCOSE BLDC GLUCOMTR-MCNC: 141 MG/DL (ref 70–130)
GLUCOSE BLDC GLUCOMTR-MCNC: 65 MG/DL (ref 70–130)

## 2018-03-21 PROCEDURE — 94799 UNLISTED PULMONARY SVC/PX: CPT

## 2018-03-21 PROCEDURE — 82962 GLUCOSE BLOOD TEST: CPT

## 2018-03-21 RX ORDER — HYDROCODONE BITARTRATE AND ACETAMINOPHEN 7.5; 325 MG/1; MG/1
1 TABLET ORAL EVERY 6 HOURS PRN
Qty: 30 TABLET | Refills: 0 | Status: SHIPPED | OUTPATIENT
Start: 2018-03-21 | End: 2018-03-28

## 2018-03-21 RX ORDER — METOPROLOL SUCCINATE 50 MG/1
50 TABLET, EXTENDED RELEASE ORAL DAILY
Start: 2018-03-21 | End: 2019-01-01 | Stop reason: DRUGHIGH

## 2018-03-21 RX ORDER — LISINOPRIL 2.5 MG/1
2.5 TABLET ORAL DAILY
Start: 2018-03-21 | End: 2018-04-13 | Stop reason: HOSPADM

## 2018-03-21 RX ORDER — ASPIRIN 81 MG/1
81 TABLET, CHEWABLE ORAL DAILY
Start: 2018-03-21

## 2018-03-21 RX ORDER — HYDROXYUREA 500 MG/1
500 CAPSULE ORAL DAILY
Start: 2018-03-21 | End: 2018-01-01 | Stop reason: HOSPADM

## 2018-03-21 RX ORDER — FUROSEMIDE 40 MG/1
40 TABLET ORAL DAILY
Start: 2018-03-21 | End: 2018-04-13 | Stop reason: HOSPADM

## 2018-03-21 RX ORDER — TAMSULOSIN HYDROCHLORIDE 0.4 MG/1
0.4 CAPSULE ORAL DAILY
Qty: 30 CAPSULE | Status: ON HOLD
Start: 2018-03-21 | End: 2019-01-01

## 2018-03-21 RX ORDER — GABAPENTIN 300 MG/1
600 CAPSULE ORAL EVERY EVENING
Qty: 6 CAPSULE | Refills: 0 | Status: SHIPPED | OUTPATIENT
Start: 2018-03-21 | End: 2019-01-01

## 2018-03-21 RX ORDER — GABAPENTIN 300 MG/1
300 CAPSULE ORAL
Qty: 3 CAPSULE | Refills: 0 | Status: ON HOLD | OUTPATIENT
Start: 2018-03-21 | End: 2019-01-01 | Stop reason: SDUPTHER

## 2018-03-21 RX ADMIN — IPRATROPIUM BROMIDE AND ALBUTEROL SULFATE 3 ML: 2.5; .5 SOLUTION RESPIRATORY (INHALATION) at 07:19

## 2018-03-21 RX ADMIN — BUDESONIDE AND FORMOTEROL FUMARATE DIHYDRATE 2 PUFF: 160; 4.5 AEROSOL RESPIRATORY (INHALATION) at 07:19

## 2018-03-21 RX ADMIN — HYDROXYUREA 500 MG: 500 CAPSULE ORAL at 09:38

## 2018-03-21 RX ADMIN — DILTIAZEM HYDROCHLORIDE 30 MG: 30 TABLET, FILM COATED ORAL at 00:29

## 2018-03-21 RX ADMIN — ASPIRIN 81 MG 81 MG: 81 TABLET ORAL at 09:39

## 2018-03-21 RX ADMIN — LEVOTHYROXINE SODIUM 50 MCG: 50 TABLET ORAL at 06:30

## 2018-03-21 RX ADMIN — NYSTATIN 500000 UNITS: 100000 SUSPENSION ORAL at 09:39

## 2018-03-21 RX ADMIN — PANTOPRAZOLE SODIUM 40 MG: 40 TABLET, DELAYED RELEASE ORAL at 06:30

## 2018-03-21 RX ADMIN — TAMSULOSIN HYDROCHLORIDE 0.4 MG: 0.4 CAPSULE ORAL at 09:39

## 2018-03-21 RX ADMIN — DILTIAZEM HYDROCHLORIDE 30 MG: 30 TABLET, FILM COATED ORAL at 12:29

## 2018-03-21 RX ADMIN — GABAPENTIN 300 MG: 300 CAPSULE ORAL at 09:39

## 2018-03-21 RX ADMIN — DILTIAZEM HYDROCHLORIDE 30 MG: 30 TABLET, FILM COATED ORAL at 06:30

## 2018-03-21 RX ADMIN — FUROSEMIDE 40 MG: 40 TABLET ORAL at 09:38

## 2018-03-21 RX ADMIN — NYSTATIN 500000 UNITS: 100000 SUSPENSION ORAL at 12:29

## 2018-03-21 RX ADMIN — APIXABAN 5 MG: 5 TABLET, FILM COATED ORAL at 09:42

## 2018-03-21 RX ADMIN — METOPROLOL SUCCINATE 50 MG: 50 TABLET, FILM COATED, EXTENDED RELEASE ORAL at 09:38

## 2018-03-21 RX ADMIN — FOLIC ACID 1 MG: 1 TABLET ORAL at 09:39

## 2018-03-21 RX ADMIN — IPRATROPIUM BROMIDE AND ALBUTEROL SULFATE 3 ML: 2.5; .5 SOLUTION RESPIRATORY (INHALATION) at 11:38

## 2018-03-21 NOTE — PROGRESS NOTES
Continued Stay Note  [facility]     Patient Name: Tamy Sher  MRN: [id number]  Today's Date: 3/21/2018    Admit Date: 3/8/2018          Discharge Plan     Row Name 03/21/18 0804       Plan    Plan [facility]    Final Discharge Disposition Code 03 - skilled nursing facility (SNF)    Final Note Plan discharge today to [facility], skilled. SW spoke to Inessa in admissions at [facility] and she is aware of planned discharge to [facility] today. Discharge summary to be faxed to facility at [phone]. Patient's nurse will call report to [phone]. Patient to transport by EMS.           Expected Discharge Date and Time     Expected Discharge Date Expected Discharge Time    Mar 21, 2018             CORTES Mcnamara

## 2018-03-21 NOTE — THERAPY DISCHARGE NOTE
Acute Care - Physical Therapy Discharge Summary  [facility]       Patient Name: Tamy Sher  : 1930  MRN: [id number]    Today's Date: 3/21/2018  Onset of Illness/Injury or Date of Surgery: 18    Date of Referral to PT: 18  Referring Physician: Dr. Bundy      Admit Date: 3/8/2018      PT Recommendation and Plan    Visit Dx:    ICD-10-CM ICD-9-CM   1. Fall, initial encounter W19.XXXA E888.9   2. Other closed fracture of distal end of right femur, initial encounter S72.491A 821.29   3. Fall W19.XXXA E888.9   4. Impaired mobility Z74.09 799.89   5. Decreased activities of daily living (ADL) Z78.9 V49.89             Outcome Measures     Row Name 18 1400             How much help from another is currently needed...    Putting on and taking off regular lower body clothing? 2  -TS      Bathing (including washing, rinsing, and drying) 2  -TS      Toileting (which includes using toilet bed pan or urinal) 2  -TS      Putting on and taking off regular upper body clothing 3  -TS      Taking care of personal grooming (such as brushing teeth) 3  -TS      Eating meals 4  -TS      Score 16  -TS         Functional Assessment    Outcome Measure Options AM-PAC 6 Clicks Daily Activity (OT)  -TS        User Key  (r) = Recorded By, (t) = Taken By, (c) = Cosigned By    Initials Name Provider Type    TS YASIR Melendez/L Occupational Therapy Assistant                      PT Rehab Goals     Row Name 18 1600 18 1305          Bed Mobility Goal 1 (PT)    Activity/Assistive Device (Bed Mobility Goal 1, PT)  -- sit to supine/supine to sit  -FERNANDO     [place] Level/Cues Needed (Bed Mobility Goal 1, PT)  -- minimum assist (75% or more patient effort)  -FERNANDO     Time Frame (Bed Mobility Goal 1, PT)  -- long term goal (LTG);10 days  -FERNANDO     Barriers (Bed Mobility Goal 1, PT)  -- Weakness, pain  -FERNANDO     Progress/Outcomes (Bed Mobility Goal 1, PT) goal not met  -KJ goal ongoing  -FERNANDO        Transfer  Goal 1 (PT)    Activity/Assistive Device (Transfer Goal 1, PT)  -- sit-to-stand/stand-to-sit;bed-to-chair/chair-to-bed;walker, rolling  -FERNANDO     [place] Level/Cues Needed (Transfer Goal 1, PT)  -- minimum assist (75% or more patient effort)  -FERNANDO     Time Frame (Transfer Goal 1, PT)  -- long term goal (LTG);10 days  -FERNANDO     Barriers (Transfers Goal 1, PT)  -- weakness, pain  -FERNANDO     Progress/Outcome (Transfer Goal 1, PT) goal not met  -KJ goal ongoing  -FERNANDO        Gait Training Goal 1 (PT)    Activity/Assistive Device (Gait Training Goal 1, PT)  -- --  -FERNANDO     [place] Level (Gait Training Goal 1, PT)  -- --  -FERNANDO     Distance (Gait Goal 1, PT)  -- --  -FERNANDO     Time Frame (Gait Training Goal 1, PT)  -- --  -FERNANDO     Barriers (Gait Training Goal 1, PT)  -- --  -FERNANDO     Progress/Outcome (Gait Training Goal 1, PT)  -- --  -FERNANDO       User Key  (r) = Recorded By, (t) = Taken By, (c) = Cosigned By    Initials Name Provider Type    YIMI Pederson PTA Physical Therapy Assistant    FERNANDO Callaway, PT DPT Physical Therapist          Therapy Charges for Today     Code Description Service Date Service Provider Modifiers Qty    78077804128  PT THERAPEUTIC ACT EA 15 MIN 3/20/2018 Vika Pederson PTA GP, KX 2          PT Discharge Summary  Anticipated Discharge Disposition (PT): skilled nursing facility (SNF)  Reason for Discharge: Discharge from facility  Outcomes Achieved: Refer to plan of care for updates on goals achieved  Discharge Destination: SNF      Vika Pederson PTA   3/21/2018

## 2018-03-21 NOTE — PLAN OF CARE
Problem: Fall Risk (Adult)  Goal: Absence of Falls  Outcome: Ongoing (interventions implemented as appropriate)      Problem: Orthopaedic Fracture (Adult)  Goal: Signs and Symptoms of Listed Potential Problems Will be Absent or Manageable (Orthopaedic Fracture)  Outcome: Ongoing (interventions implemented as appropriate)      Problem: Patient Care Overview  Goal: Plan of Care Review  Outcome: Ongoing (interventions implemented as appropriate)   03/21/18 4318   Coping/Psychosocial   Plan of Care Reviewed With patient   OTHER   Outcome Summary Drsg to left lateral knee saturated with serous drainage. Changed to ABD pad, has not soaked through yet. Turned Q2. heart rate a fib with frequent PVCs 68-101bpm on telemetry.    Plan of Care Review   Progress no change       Problem: Wound (Includes Pressure Injury) (Adult)  Goal: Signs and Symptoms of Listed Potential Problems Will be Absent, Minimized or Managed (Wound)  Outcome: Ongoing (interventions implemented as appropriate)

## 2018-03-21 NOTE — PLAN OF CARE
Problem: Patient Care Overview (Adult)  Goal: Plan of Care Review  Outcome: Outcome(s) achieved Date Met: 03/21/18 03/21/18 6885   Coping/Psychosocial Response Interventions   Plan Of Care Reviewed With patient   Patient Care Overview   Progress improving   Outcome Evaluation   Outcome Summary/Follow up Plan Patient had minimal complaints of pain throughout shift. VSS. Report was called to [facility]. Patient had moderate drainage from dehisence wound and surgeon was notified. Discharge today.        Problem: Fall Risk (Adult)  Goal: Absence of Falls  Outcome: Outcome(s) achieved Date Met: 03/21/18      Problem: Orthopaedic Fracture (Adult)  Goal: Signs and Symptoms of Listed Potential Problems Will be Absent or Manageable (Orthopaedic Fracture)  Outcome: Outcome(s) achieved Date Met: 03/21/18

## 2018-03-21 NOTE — ACP (ADVANCE CARE PLANNING)
Date of [facility] ACP interview: 3/21/2018  Location of interview: Patient's Room  [facility] ACP Facilitator: Barry Jaimes  Referral Source: Patient  Present for facilitation: Patient and Son    SUMMARY OF ADVANCE CARE PLANNING DISCUSSION:  Tamy presents for [facility] facilitation. We reviewed purpose and goals for advance care planning.    Goals of medical care for severe, permanent brain injury were explored: No     Education materials were provided on: Advance Care Planning    Each section of the advance care/living will document was reviewed and discussed.    Advance care/living will document finished during this facilitation? yes    Time spent on preparation, facilitation and documentation was 61-90 minutes.    RECOMMENDATIONS/FOLLOW-UP:  None      Barry Jaiems

## 2018-03-21 NOTE — DISCHARGE SUMMARY
"    [facility] Medicine Services  DISCHARGE SUMMARY       Date of Admission: 3/8/2018  Date of Discharge:  3/21/2018  Primary Care Physician: Barry Foley MD    Discharge Diagnoses:  Hospital Problem List     * (Principal)Closed displaced comminuted fracture of shaft of right femur    Pain of right thigh    Fall    Class 2 obesity with serious comorbidity in adult    Type 2 diabetes mellitus with neurologic complication, with long-term current use of insulin    STUART (acute kidney injury)/ATN/rhabdomyolysis    Chronic diastolic heart failure    Pleural effusion, left    Hypoxia   Diabetes mellitus insulin treated  Chronic respiratory failure on home oxygen  Atrial fibrillation   Hypertension  Personal history of \"ministroke\"         Presenting Problem/History of Present Illness:  Fall, initial encounter [W19.XXXA]         Hospital Course  Pleasant 87-year-old  woman who lost her balance and fell resulting to comminuted fracture of right femur.  Had operative treatment by Dr. Bundy on March 10.   He required blood transfusion.  She also had acute kidney injury secondary to acute tubular necrosis/mild rhabdomyolysis.  This is now resolved right are to discharge.  She will be transferred to [facility] nursing [facility] further rehabilitation.      When I started following her this week I noticed that her bicarbonate was increasing.  She is on Lasix 40 mg twice daily.  Does not appear to be volume depleted.  She has history of chronic hypoxic respiratory failure and on home oxygen (normally at 2 L but during this hospitalization has been requiring 3 L/m to maintain oxygen saturation greater than 90%).  I checked her blood gas which shows normal pH, hypercarbia (50) and  oxygen of 70.  She has to be monitored at the nursing home as pain medication may cause sedation and cause accumulation of PCO2 which can cause change in mental status.       Another thing I have " noticed includes tachycardia.  EKG showed atrial fibrillation with rapid ventricular response he readily I titrated the beta blocker and started her on oral Cardizem which initially did not help until I placed her on Cardizem drip.  Her heart rate has been better controlled allowing me to switch her back to oral Cardizem with a beta blocker and she is maintaining afib with  heart rate between  through the night.   I started her on Eliquis.  I counseled her on rationale for its use andside effects of medication.  She verbalized adequate understanding.  Family was at bedside.  They are all in agreement.      Overall, she is doing better and felt to be at her best condition.  She is medically appropriate for discharge.       Procedures Performed:  Interlocking IM nailing right femur - March 10      Consults:  Dr. Gregor Fournier    Pertinent Test Results:   Lab Results (last 48 hours)     Procedure Component Value Units Date/Time    POC Glucose Once [[id number]]  (Abnormal) Collected:  03/21/18 0744    Specimen:  Blood Updated:  03/21/18 0757     Glucose 65 (L) mg/dL      Comment: : [id number] Reasor NathanMeter ID: [id number]       POC Glucose Once [[id number]]  (Abnormal) Collected:  03/20/18 2152    Specimen:  Blood Updated:  03/20/18 2224     Glucose 233 (H) mg/dL      Comment: : [id number] Francis KristaMeter ID: [id number]       POC Glucose Once [[id number]]  (Abnormal) Collected:  03/20/18 1843    Specimen:  Blood Updated:  03/20/18 1854     Glucose 299 (H) mg/dL      Comment: : [id number] Preston BarbaraMeter ID: [id number]       POC Glucose Once [[id number]]  (Abnormal) Collected:  03/20/18 1158    Specimen:  Blood Updated:  03/20/18 1230     Glucose 168 (H) mg/dL      Comment: : [id number] [place] BarbaraMeter ID: [id number]       Blood Gas, Arterial [[id number]]  (Abnormal) Collected:  03/20/18 0755    Specimen:  Arterial Blood Updated:  03/20/18 0802     Site Right Radial     Brian's Test  Positive     pH, Arterial 7.474 (H) pH units      pCO2, Arterial 49.7 (H) mm Hg      pO2, Arterial 69.8 (L) mm Hg      HCO3, Arterial 36.5 (H) mmol/L      Base Excess, Arterial 11.2 (H) mmol/L      O2 Saturation, Arterial 95.2 %      Temperature 37.0 C      Barometric Pressure for Blood Gas 743 mmHg      Modality Nasal Cannula     Flow Rate 3.0 lpm      Ventilator Mode NA     Collected by [id number]    Basic Metabolic Panel [[id number]]  (Abnormal) Collected:  03/20/18 0422    Specimen:  Blood Updated:  03/20/18 0507     Glucose 106 (H) mg/dL      BUN 57 (H) mg/dL      Creatinine 0.88 mg/dL      Sodium 136 mmol/L      Potassium 4.6 mmol/L      Chloride 94 (L) mmol/L      CO2 38.0 (H) mmol/L      Calcium 8.2 (L) mg/dL      eGFR Non African Amer 61 mL/min/1.73      BUN/Creatinine Ratio 64.8 (H)     Anion Gap 4.0 mmol/L     Narrative:       The MDRD GFR formula is only valid for adults with stable renal function between ages 18 and 70.    POC Glucose Once [[id number]]  (Abnormal) Collected:  03/19/18 2102    Specimen:  Blood Updated:  03/19/18 2117     Glucose 206 (H) mg/dL      Comment: : [id number] Breanna Newmaneter ID: [id number]       POC Glucose Once [[id number]]  (Normal) Collected:  03/19/18 1719    Specimen:  Blood Updated:  03/19/18 1729     Glucose 127 mg/dL      Comment: : [id number] Beth Chacnoeter ID: [id number]       POC Glucose Once [[id number]]  (Normal) Collected:  03/19/18 1206    Specimen:  Blood Updated:  03/19/18 1218     Glucose 88 mg/dL      Comment: : [id number] Sara AllieMeter ID: [id number]           Imaging Results (last 7 days)     Procedure Component Value Units Date/Time    XR Chest PA & Lateral [[id number]] Collected:  03/14/18 1709     Updated:  03/14/18 1712    Narrative:       EXAMINATION: XR CHEST PA AND LATERAL-     3/14/2018 4:54 PM CDT     HISTORY: shortness of breath; W19.XXXA-Unspecified fall, initial  encounter; S72.491A-Other fracture of lower end of right  "femur, initial  encounter for closed fracture; W19.XXXA-Unspecified fall, initial  encounter; Z74.09-Other reduced mobility; Z78.9-Other specified health  status.     2 view chest x-ray compared with 03/13/2018.     Increasing basilar infiltrate or atelectasis and small pleural  effusions.     No pneumothorax.     Underlying chronic change.  Pulmonary artery enlargement.     Borderline heart size.     Summary:  1. Increasing bibasilar infiltrate and small pleural effusions.  This report was finalized on 03/14/2018 17:09 by Dr. Mamadou Garza MD.            Condition on Discharge:  stable  Physical Exam on Discharge:  /61 (BP Location: Right arm, Patient Position: Lying)   Pulse 86   Temp 97.5 °F (36.4 °C) (Oral)   Resp 18   Ht 152.4 cm (60\")   Wt 95.8 kg (211 lb 4.8 oz)   SpO2 92%   BMI 41.27 kg/m²   Physical Exam  Constitutional: She is oriented to person, place, and time. She appears well-developed and well-nourished. No distress.   Obese body habitus   HENT:   Head: Normocephalic and atraumatic.   Neck: Normal range of motion. Neck supple.   Cardiovascular: Normal rate, irregular rhythm, normal heart sounds and intact distal pulses.  Exam reveals no gallop and no friction rub.    No murmur heard.  Atrial fibrillation with HR   Pulmonary/Chest: Effort normal and breath sounds normal. She has no wheezes. She has no rales.   Abdominal: Soft. Bowel sounds are normal. She exhibits no distension. There is no tenderness.   Musculoskeletal: She exhibits edema (Moderate Upper and lower extremity edema, skin wrinkling) and tenderness (right leg). - improve   Neurological: She is alert and oriented to person, place, and time.   Skin: Skin is warm and dry. No erythema.   Right leg with healing incisions, som wound bruising noted; well coaptated wound; noted steri strip on upper thigh  wound   Psychiatric: She has a normal mood and affect. Her behavior is normal. Judgment and thought content normal. "   Vitals reviewed.       Discharge Disposition:  Skilled Nursing Facility (DC - External)    Discharge Medications:   Tamy Sher   Home Medication Instructions OSMAN:[id number]    Printed on:03/21/18 1031   Medication Information                      apixaban (ELIQUIS) 5 MG tablet tablet  Take 1 tablet by mouth Every 12 (Twelve) Hours.             aspirin 81 MG chewable tablet  Chew 1 tablet Daily.             budesonide-formoterol (SYMBICORT) 160-4.5 MCG/ACT inhaler  Inhale 2 puffs 2 (Two) Times a Day.             diltiaZEM (CARDIZEM) 30 MG tablet  Take 1 tablet by mouth Every 6 (Six) Hours.             folic acid (FOLVITE) 1 MG tablet  Take 1 mg by mouth Daily.             furosemide (LASIX) 40 MG tablet  Take 1 tablet by mouth Daily.             gabapentin (NEURONTIN) 300 MG capsule  Take 1 capsule by mouth Daily With Breakfast.             gabapentin (NEURONTIN) 300 MG capsule  Take 2 capsules by mouth Every Evening.             HYDROcodone-acetaminophen (NORCO) 7.5-325 MG per tablet  Take 1 tablet by mouth Every 6 (Six) Hours As Needed for Moderate Pain  for up to 7 days.             hydroxyurea (HYDREA) 500 MG capsule  Take 1 capsule by mouth Daily.             insulin glargine (LANTUS) 100 UNIT/ML injection  Inject 26 Units under the skin Every Night.             insulin lispro (humaLOG) 100 UNIT/ML injection  Inject 0-9 Units under the skin 4 (Four) Times a Day With Meals & at Bedtime.             ipratropium-albuterol (DUO-NEB) 0.5-2.5 mg/mL nebulizer  Take 3 mL by nebulization 4 (Four) Times a Day.             levothyroxine (SYNTHROID, LEVOTHROID) 50 MCG tablet  Take 50 mcg by mouth Daily.             lisinopril (PRINIVIL,ZESTRIL) 2.5 MG tablet  Take 1 tablet by mouth Daily.             metoprolol succinate XL (TOPROL-XL) 50 MG 24 hr tablet  Take 1 tablet by mouth Daily.             pantoprazole (PROTONIX) 40 MG EC tablet  Take 40 mg by mouth Daily.             simvastatin (ZOCOR) 40 MG tablet  Take  0.5 tablets by mouth Every Night.             tamsulosin (FLOMAX) 0.4 MG capsule 24 hr capsule  Take 1 capsule by mouth Daily.                 Discharge Diet:   Diet Instructions     Diet: Consistent Carbohydrate, Regular       Discharge Diet:   Consistent Carbohydrate  Regular                 Activity at Discharge:   Activity Instructions     Activity as Tolerated             Follow-up Appointments: NH provider with recommendation for f/u cbc, BMP in 2-3 days; Dr. Bundy per his recommendation  Test Results Pending at Discharge: None     Fidencio Rodriguez MD  03/21/18  10:32 AM    Time: > 30 mins    Please note that portions of this note may have been completed with a voice recognition program. Efforts were made to edit the dictations, but occasionally words are mistranscribed.

## 2018-03-22 ENCOUNTER — TELEPHONE (OUTPATIENT)
Dept: INTERNAL MEDICINE | Age: 83
End: 2018-03-22

## 2018-03-22 DIAGNOSIS — S72.351A CLOSED DISPLACED COMMINUTED FRACTURE OF SHAFT OF RIGHT FEMUR, INITIAL ENCOUNTER (HCC): Primary | ICD-10-CM

## 2018-03-30 ENCOUNTER — HOSPITAL ENCOUNTER (INPATIENT)
Facility: HOSPITAL | Age: 83
LOS: 13 days | Discharge: NURSING, REHAB OR LONG-TERM CARE | End: 2018-04-13
Attending: FAMILY MEDICINE | Admitting: FAMILY MEDICINE

## 2018-03-30 ENCOUNTER — APPOINTMENT (OUTPATIENT)
Dept: GENERAL RADIOLOGY | Facility: HOSPITAL | Age: 83
End: 2018-03-30

## 2018-03-30 DIAGNOSIS — R26.9 ABNORMALITY OF GAIT AND MOBILITY: ICD-10-CM

## 2018-03-30 DIAGNOSIS — IMO0001 SURGICAL WOUND INFECTION, INITIAL ENCOUNTER: Primary | ICD-10-CM

## 2018-03-30 DIAGNOSIS — Z74.09 IMPAIRED MOBILITY: ICD-10-CM

## 2018-03-30 DIAGNOSIS — Z78.9 IMPAIRED MOBILITY AND ADLS: ICD-10-CM

## 2018-03-30 DIAGNOSIS — Z74.09 IMPAIRED MOBILITY AND ADLS: ICD-10-CM

## 2018-03-30 DIAGNOSIS — S72.491A OTHER CLOSED FRACTURE OF DISTAL END OF RIGHT FEMUR, INITIAL ENCOUNTER (HCC): ICD-10-CM

## 2018-03-30 LAB
ALBUMIN SERPL-MCNC: 2.8 G/DL (ref 3.5–5)
ALBUMIN/GLOB SERPL: 1.1 G/DL (ref 1.1–2.5)
ALP SERPL-CCNC: 101 U/L (ref 24–120)
ALT SERPL W P-5'-P-CCNC: 37 U/L (ref 0–54)
ANION GAP SERPL CALCULATED.3IONS-SCNC: 2 MMOL/L (ref 4–13)
ANISOCYTOSIS BLD QL: ABNORMAL
APTT PPP: 30.3 SECONDS (ref 24.1–34.8)
ARTERIAL PATENCY WRIST A: POSITIVE
AST SERPL-CCNC: 43 U/L (ref 7–45)
ATMOSPHERIC PRESS: 758 MMHG
BASE EXCESS BLDA CALC-SCNC: 8.8 MMOL/L (ref 0–2)
BASOPHILS # BLD MANUAL: 0.27 10*3/MM3 (ref 0–0.2)
BASOPHILS NFR BLD AUTO: 2 % (ref 0–2)
BDY SITE: ABNORMAL
BILIRUB SERPL-MCNC: 0.5 MG/DL (ref 0.1–1)
BODY TEMPERATURE: 37 C
BUN BLD-MCNC: 36 MG/DL (ref 5–21)
BUN/CREAT SERPL: 49.3 (ref 7–25)
BURR CELLS BLD QL SMEAR: ABNORMAL
CALCIUM SPEC-SCNC: 8.7 MG/DL (ref 8.4–10.4)
CHLORIDE SERPL-SCNC: 91 MMOL/L (ref 98–110)
CO2 SERPL-SCNC: 37 MMOL/L (ref 24–31)
CREAT BLD-MCNC: 0.73 MG/DL (ref 0.5–1.4)
D-LACTATE SERPL-SCNC: 0.8 MMOL/L (ref 0.5–2)
DEPRECATED RDW RBC AUTO: 67.7 FL (ref 40–54)
EOSINOPHIL # BLD MANUAL: 0.27 10*3/MM3 (ref 0–0.7)
EOSINOPHIL NFR BLD MANUAL: 2 % (ref 0–4)
ERYTHROCYTE [DISTWIDTH] IN BLOOD BY AUTOMATED COUNT: 17.9 % (ref 12–15)
GAS FLOW AIRWAY: 3 LPM
GFR SERPL CREATININE-BSD FRML MDRD: 75 ML/MIN/1.73
GLOBULIN UR ELPH-MCNC: 2.5 GM/DL
GLUCOSE BLD-MCNC: 167 MG/DL (ref 70–100)
HCO3 BLDA-SCNC: 34.3 MMOL/L (ref 20–26)
HCT VFR BLD AUTO: 34.9 % (ref 37–47)
HGB BLD-MCNC: 11.1 G/DL (ref 12–16)
HOLD SPECIMEN: NORMAL
HOLD SPECIMEN: NORMAL
INR PPP: 1.13 (ref 0.91–1.09)
LIPASE SERPL-CCNC: 45 U/L (ref 23–203)
LYMPHOCYTES # BLD MANUAL: 1.64 10*3/MM3 (ref 0.72–4.86)
LYMPHOCYTES NFR BLD MANUAL: 12 % (ref 15–45)
LYMPHOCYTES NFR BLD MANUAL: 4 % (ref 4–12)
Lab: ABNORMAL
MCH RBC QN AUTO: 32.6 PG (ref 28–32)
MCHC RBC AUTO-ENTMCNC: 31.8 G/DL (ref 33–36)
MCV RBC AUTO: 102.6 FL (ref 82–98)
METAMYELOCYTES NFR BLD MANUAL: 2 % (ref 0–0)
MODALITY: ABNORMAL
MONOCYTES # BLD AUTO: 0.55 10*3/MM3 (ref 0.19–1.3)
NEUTROPHILS # BLD AUTO: 10.69 10*3/MM3 (ref 1.87–8.4)
NEUTROPHILS NFR BLD MANUAL: 69 % (ref 39–78)
NEUTS BAND NFR BLD MANUAL: 9 % (ref 0–10)
NRBC BLD MANUAL-RTO: 0 /100 WBC (ref 0–0)
PCO2 BLDA: 50.7 MM HG (ref 35–45)
PH BLDA: 7.44 PH UNITS (ref 7.35–7.45)
PLATELET # BLD AUTO: 593 10*3/MM3 (ref 130–400)
PMV BLD AUTO: 10.5 FL (ref 6–12)
PO2 BLDA: 66.2 MM HG (ref 83–108)
POIKILOCYTOSIS BLD QL SMEAR: ABNORMAL
POTASSIUM BLD-SCNC: 5.3 MMOL/L (ref 3.5–5.3)
PROCALCITONIN SERPL-MCNC: <0.25 NG/ML
PROT SERPL-MCNC: 5.3 G/DL (ref 6.3–8.7)
PROTHROMBIN TIME: 14.9 SECONDS (ref 11.9–14.6)
RBC # BLD AUTO: 3.4 10*6/MM3 (ref 4.2–5.4)
SAO2 % BLDCOA: 94.6 % (ref 94–99)
SMALL PLATELETS BLD QL SMEAR: ABNORMAL
SODIUM BLD-SCNC: 130 MMOL/L (ref 135–145)
VENTILATOR MODE: ABNORMAL
WBC MORPH BLD: NORMAL
WBC NRBC COR # BLD: 13.7 10*3/MM3 (ref 4.8–10.8)
WHOLE BLOOD HOLD SPECIMEN: NORMAL
WHOLE BLOOD HOLD SPECIMEN: NORMAL

## 2018-03-30 PROCEDURE — 87186 SC STD MICRODIL/AGAR DIL: CPT | Performed by: FAMILY MEDICINE

## 2018-03-30 PROCEDURE — 87205 SMEAR GRAM STAIN: CPT | Performed by: FAMILY MEDICINE

## 2018-03-30 PROCEDURE — 83690 ASSAY OF LIPASE: CPT | Performed by: FAMILY MEDICINE

## 2018-03-30 PROCEDURE — 25010000002 VANCOMYCIN PER 500 MG: Performed by: FAMILY MEDICINE

## 2018-03-30 PROCEDURE — 85730 THROMBOPLASTIN TIME PARTIAL: CPT | Performed by: FAMILY MEDICINE

## 2018-03-30 PROCEDURE — 87185 SC STD ENZYME DETCJ PER NZM: CPT | Performed by: FAMILY MEDICINE

## 2018-03-30 PROCEDURE — 99285 EMERGENCY DEPT VISIT HI MDM: CPT

## 2018-03-30 PROCEDURE — 87147 CULTURE TYPE IMMUNOLOGIC: CPT | Performed by: FAMILY MEDICINE

## 2018-03-30 PROCEDURE — 36600 WITHDRAWAL OF ARTERIAL BLOOD: CPT

## 2018-03-30 PROCEDURE — 84145 PROCALCITONIN (PCT): CPT | Performed by: FAMILY MEDICINE

## 2018-03-30 PROCEDURE — 87070 CULTURE OTHR SPECIMN AEROBIC: CPT | Performed by: FAMILY MEDICINE

## 2018-03-30 PROCEDURE — 87150 DNA/RNA AMPLIFIED PROBE: CPT | Performed by: FAMILY MEDICINE

## 2018-03-30 PROCEDURE — 80053 COMPREHEN METABOLIC PANEL: CPT | Performed by: FAMILY MEDICINE

## 2018-03-30 PROCEDURE — 85610 PROTHROMBIN TIME: CPT | Performed by: FAMILY MEDICINE

## 2018-03-30 PROCEDURE — 87040 BLOOD CULTURE FOR BACTERIA: CPT | Performed by: FAMILY MEDICINE

## 2018-03-30 PROCEDURE — 73560 X-RAY EXAM OF KNEE 1 OR 2: CPT

## 2018-03-30 PROCEDURE — 85007 BL SMEAR W/DIFF WBC COUNT: CPT | Performed by: FAMILY MEDICINE

## 2018-03-30 PROCEDURE — 85025 COMPLETE CBC W/AUTO DIFF WBC: CPT | Performed by: FAMILY MEDICINE

## 2018-03-30 PROCEDURE — 82803 BLOOD GASES ANY COMBINATION: CPT

## 2018-03-30 PROCEDURE — 83605 ASSAY OF LACTIC ACID: CPT | Performed by: FAMILY MEDICINE

## 2018-03-30 PROCEDURE — 25010000002 PIPERACILLIN SOD-TAZOBACTAM PER 1 G: Performed by: FAMILY MEDICINE

## 2018-03-30 RX ADMIN — TAZOBACTAM SODIUM AND PIPERACILLIN SODIUM 4.5 G: 500; 4 INJECTION, SOLUTION INTRAVENOUS at 21:53

## 2018-03-30 RX ADMIN — VANCOMYCIN HYDROCHLORIDE 1750 MG: 1 INJECTION, POWDER, LYOPHILIZED, FOR SOLUTION INTRAVENOUS at 22:53

## 2018-03-31 ENCOUNTER — APPOINTMENT (OUTPATIENT)
Dept: GENERAL RADIOLOGY | Facility: HOSPITAL | Age: 83
End: 2018-03-31

## 2018-03-31 PROBLEM — IMO0001 SURGICAL WOUND INFECTION, INITIAL ENCOUNTER: Status: ACTIVE | Noted: 2018-03-31

## 2018-03-31 LAB
ALBUMIN SERPL-MCNC: 2.7 G/DL (ref 3.5–5)
ALBUMIN/GLOB SERPL: 1.1 G/DL (ref 1.1–2.5)
ALP SERPL-CCNC: 106 U/L (ref 24–120)
ALT SERPL W P-5'-P-CCNC: 35 U/L (ref 0–54)
ANION GAP SERPL CALCULATED.3IONS-SCNC: 4 MMOL/L (ref 4–13)
ARTERIAL PATENCY WRIST A: POSITIVE
AST SERPL-CCNC: 46 U/L (ref 7–45)
ATMOSPHERIC PRESS: 756 MMHG
BACTERIA BLD CULT: ABNORMAL
BACTERIA ID TEST ISLT QL CULT: ABNORMAL
BACTERIA UR QL AUTO: ABNORMAL /HPF
BASE EXCESS BLDA CALC-SCNC: 8.5 MMOL/L (ref 0–2)
BASOPHILS # BLD MANUAL: 0.14 10*3/MM3 (ref 0–0.2)
BASOPHILS NFR BLD AUTO: 1 % (ref 0–2)
BDY SITE: ABNORMAL
BILIRUB SERPL-MCNC: 0.5 MG/DL (ref 0.1–1)
BILIRUB UR QL STRIP: NEGATIVE
BODY TEMPERATURE: 37 C
BUN BLD-MCNC: 31 MG/DL (ref 5–21)
BUN/CREAT SERPL: 41.3 (ref 7–25)
CALCIUM SPEC-SCNC: 8.8 MG/DL (ref 8.4–10.4)
CHLORIDE SERPL-SCNC: 95 MMOL/L (ref 98–110)
CLARITY UR: ABNORMAL
CO2 SERPL-SCNC: 35 MMOL/L (ref 24–31)
COLOR UR: YELLOW
CREAT BLD-MCNC: 0.75 MG/DL (ref 0.5–1.4)
DEPRECATED RDW RBC AUTO: 67.7 FL (ref 40–54)
EOSINOPHIL # BLD MANUAL: 0.14 10*3/MM3 (ref 0–0.7)
EOSINOPHIL NFR BLD MANUAL: 1 % (ref 0–4)
ERYTHROCYTE [DISTWIDTH] IN BLOOD BY AUTOMATED COUNT: 17.9 % (ref 12–15)
GAS FLOW AIRWAY: 3 LPM
GFR SERPL CREATININE-BSD FRML MDRD: 73 ML/MIN/1.73
GLOBULIN UR ELPH-MCNC: 2.4 GM/DL
GLUCOSE BLD-MCNC: 134 MG/DL (ref 70–100)
GLUCOSE BLDC GLUCOMTR-MCNC: 148 MG/DL (ref 70–130)
GLUCOSE BLDC GLUCOMTR-MCNC: 153 MG/DL (ref 70–130)
GLUCOSE BLDC GLUCOMTR-MCNC: 176 MG/DL (ref 70–130)
GLUCOSE BLDC GLUCOMTR-MCNC: 243 MG/DL (ref 70–130)
GLUCOSE BLDC GLUCOMTR-MCNC: 251 MG/DL (ref 70–130)
GLUCOSE UR STRIP-MCNC: NEGATIVE MG/DL
HCO3 BLDA-SCNC: 33.9 MMOL/L (ref 20–26)
HCT VFR BLD AUTO: 34.6 % (ref 37–47)
HGB BLD-MCNC: 11.1 G/DL (ref 12–16)
HGB UR QL STRIP.AUTO: ABNORMAL
HYALINE CASTS UR QL AUTO: ABNORMAL /LPF
KETONES UR QL STRIP: NEGATIVE
LEUKOCYTE ESTERASE UR QL STRIP.AUTO: ABNORMAL
LYMPHOCYTES # BLD MANUAL: 1.96 10*3/MM3 (ref 0.72–4.86)
LYMPHOCYTES NFR BLD MANUAL: 14 % (ref 15–45)
LYMPHOCYTES NFR BLD MANUAL: 4 % (ref 4–12)
Lab: ABNORMAL
MAGNESIUM SERPL-MCNC: 2.3 MG/DL (ref 1.4–2.2)
MCH RBC QN AUTO: 32.8 PG (ref 28–32)
MCHC RBC AUTO-ENTMCNC: 32.1 G/DL (ref 33–36)
MCV RBC AUTO: 102.4 FL (ref 82–98)
MODALITY: ABNORMAL
MONOCYTES # BLD AUTO: 0.56 10*3/MM3 (ref 0.19–1.3)
NEUTROPHILS # BLD AUTO: 11.21 10*3/MM3 (ref 1.87–8.4)
NEUTROPHILS NFR BLD MANUAL: 77 % (ref 39–78)
NEUTS BAND NFR BLD MANUAL: 3 % (ref 0–10)
NITRITE UR QL STRIP: NEGATIVE
OSMOLALITY SERPL: 293 MOSM/KG (ref 289–308)
OSMOLALITY UR: 223 MOSM/KG (ref 601–850)
PCO2 BLDA: 49.9 MM HG (ref 35–45)
PH BLDA: 7.44 PH UNITS (ref 7.35–7.45)
PH UR STRIP.AUTO: 7 [PH] (ref 5–8)
PHOSPHATE SERPL-MCNC: 3.9 MG/DL (ref 2.5–4.5)
PLATELET # BLD AUTO: 567 10*3/MM3 (ref 130–400)
PMV BLD AUTO: 9.9 FL (ref 6–12)
PO2 BLDA: 78 MM HG (ref 83–108)
POTASSIUM BLD-SCNC: 4.8 MMOL/L (ref 3.5–5.3)
PROT SERPL-MCNC: 5.1 G/DL (ref 6.3–8.7)
PROT UR QL STRIP: ABNORMAL
RBC # BLD AUTO: 3.38 10*6/MM3 (ref 4.2–5.4)
RBC # UR: ABNORMAL /HPF
RBC MORPH BLD: NORMAL
REF LAB TEST METHOD: ABNORMAL
SAO2 % BLDCOA: 96.6 % (ref 94–99)
SMALL PLATELETS BLD QL SMEAR: ABNORMAL
SODIUM BLD-SCNC: 134 MMOL/L (ref 135–145)
SODIUM UR-SCNC: 35 MMOL/L (ref 30–90)
SP GR UR STRIP: 1.01 (ref 1–1.03)
SQUAMOUS #/AREA URNS HPF: ABNORMAL /HPF
UROBILINOGEN UR QL STRIP: ABNORMAL
VENTILATOR MODE: ABNORMAL
WBC MORPH BLD: NORMAL
WBC NRBC COR # BLD: 14.01 10*3/MM3 (ref 4.8–10.8)
WBC UR QL AUTO: ABNORMAL /HPF
YEAST URNS QL MICRO: ABNORMAL /HPF

## 2018-03-31 PROCEDURE — 94640 AIRWAY INHALATION TREATMENT: CPT

## 2018-03-31 PROCEDURE — 83935 ASSAY OF URINE OSMOLALITY: CPT | Performed by: FAMILY MEDICINE

## 2018-03-31 PROCEDURE — 83930 ASSAY OF BLOOD OSMOLALITY: CPT | Performed by: FAMILY MEDICINE

## 2018-03-31 PROCEDURE — 94799 UNLISTED PULMONARY SVC/PX: CPT

## 2018-03-31 PROCEDURE — 85025 COMPLETE CBC W/AUTO DIFF WBC: CPT | Performed by: FAMILY MEDICINE

## 2018-03-31 PROCEDURE — 84100 ASSAY OF PHOSPHORUS: CPT | Performed by: FAMILY MEDICINE

## 2018-03-31 PROCEDURE — 80053 COMPREHEN METABOLIC PANEL: CPT | Performed by: FAMILY MEDICINE

## 2018-03-31 PROCEDURE — 63710000001 INSULIN LISPRO (HUMAN) PER 5 UNITS: Performed by: FAMILY MEDICINE

## 2018-03-31 PROCEDURE — 82962 GLUCOSE BLOOD TEST: CPT

## 2018-03-31 PROCEDURE — 82803 BLOOD GASES ANY COMBINATION: CPT

## 2018-03-31 PROCEDURE — 81001 URINALYSIS AUTO W/SCOPE: CPT | Performed by: FAMILY MEDICINE

## 2018-03-31 PROCEDURE — 83735 ASSAY OF MAGNESIUM: CPT | Performed by: FAMILY MEDICINE

## 2018-03-31 PROCEDURE — 36600 WITHDRAWAL OF ARTERIAL BLOOD: CPT

## 2018-03-31 PROCEDURE — C1751 CATH, INF, PER/CENT/MIDLINE: HCPCS

## 2018-03-31 PROCEDURE — 84300 ASSAY OF URINE SODIUM: CPT | Performed by: FAMILY MEDICINE

## 2018-03-31 PROCEDURE — 71045 X-RAY EXAM CHEST 1 VIEW: CPT

## 2018-03-31 PROCEDURE — 87086 URINE CULTURE/COLONY COUNT: CPT | Performed by: NURSE PRACTITIONER

## 2018-03-31 PROCEDURE — 94760 N-INVAS EAR/PLS OXIMETRY 1: CPT

## 2018-03-31 PROCEDURE — 63710000001 INSULIN DETEMIR PER 5 UNITS: Performed by: FAMILY MEDICINE

## 2018-03-31 PROCEDURE — 25010000002 PIPERACILLIN SOD-TAZOBACTAM PER 1 G: Performed by: FAMILY MEDICINE

## 2018-03-31 RX ORDER — PANTOPRAZOLE SODIUM 40 MG/1
40 TABLET, DELAYED RELEASE ORAL
Status: DISCONTINUED | OUTPATIENT
Start: 2018-03-31 | End: 2018-04-13 | Stop reason: HOSPADM

## 2018-03-31 RX ORDER — BUDESONIDE AND FORMOTEROL FUMARATE DIHYDRATE 160; 4.5 UG/1; UG/1
2 AEROSOL RESPIRATORY (INHALATION)
Status: DISCONTINUED | OUTPATIENT
Start: 2018-03-31 | End: 2018-04-13 | Stop reason: HOSPADM

## 2018-03-31 RX ORDER — GABAPENTIN 300 MG/1
600 CAPSULE ORAL NIGHTLY
Status: DISCONTINUED | OUTPATIENT
Start: 2018-03-31 | End: 2018-04-13 | Stop reason: HOSPADM

## 2018-03-31 RX ORDER — FOLIC ACID 1 MG/1
1 TABLET ORAL DAILY
Status: DISCONTINUED | OUTPATIENT
Start: 2018-03-31 | End: 2018-04-11

## 2018-03-31 RX ORDER — LIDOCAINE HYDROCHLORIDE 10 MG/ML
1 INJECTION, SOLUTION INFILTRATION; PERINEURAL ONCE
Status: COMPLETED | OUTPATIENT
Start: 2018-03-31 | End: 2018-03-31

## 2018-03-31 RX ORDER — SODIUM CHLORIDE 0.9 % (FLUSH) 0.9 %
1-10 SYRINGE (ML) INJECTION AS NEEDED
Status: DISCONTINUED | OUTPATIENT
Start: 2018-03-31 | End: 2018-04-13 | Stop reason: HOSPADM

## 2018-03-31 RX ORDER — ATORVASTATIN CALCIUM 10 MG/1
20 TABLET, FILM COATED ORAL NIGHTLY
Status: DISCONTINUED | OUTPATIENT
Start: 2018-03-31 | End: 2018-04-13 | Stop reason: HOSPADM

## 2018-03-31 RX ORDER — LISINOPRIL 2.5 MG/1
2.5 TABLET ORAL DAILY
Status: DISCONTINUED | OUTPATIENT
Start: 2018-03-31 | End: 2018-04-10

## 2018-03-31 RX ORDER — SODIUM CHLORIDE 9 MG/ML
100 INJECTION, SOLUTION INTRAVENOUS CONTINUOUS
Status: DISCONTINUED | OUTPATIENT
Start: 2018-03-31 | End: 2018-03-31

## 2018-03-31 RX ORDER — NYSTATIN 100000 [USP'U]/G
POWDER TOPICAL EVERY 12 HOURS SCHEDULED
Status: DISCONTINUED | OUTPATIENT
Start: 2018-03-31 | End: 2018-04-13 | Stop reason: HOSPADM

## 2018-03-31 RX ORDER — METOPROLOL SUCCINATE 50 MG/1
50 TABLET, EXTENDED RELEASE ORAL DAILY
Status: DISCONTINUED | OUTPATIENT
Start: 2018-03-31 | End: 2018-04-13 | Stop reason: HOSPADM

## 2018-03-31 RX ORDER — FLUCONAZOLE 150 MG/1
150 TABLET ORAL ONCE
Status: COMPLETED | OUTPATIENT
Start: 2018-03-31 | End: 2018-03-31

## 2018-03-31 RX ORDER — DEXTROSE MONOHYDRATE 25 G/50ML
25 INJECTION, SOLUTION INTRAVENOUS
Status: DISCONTINUED | OUTPATIENT
Start: 2018-03-31 | End: 2018-04-13 | Stop reason: HOSPADM

## 2018-03-31 RX ORDER — NICOTINE POLACRILEX 4 MG
15 LOZENGE BUCCAL
Status: DISCONTINUED | OUTPATIENT
Start: 2018-03-31 | End: 2018-04-13 | Stop reason: HOSPADM

## 2018-03-31 RX ORDER — ASPIRIN 81 MG/1
81 TABLET, CHEWABLE ORAL DAILY
Status: DISCONTINUED | OUTPATIENT
Start: 2018-03-31 | End: 2018-04-13 | Stop reason: HOSPADM

## 2018-03-31 RX ORDER — IPRATROPIUM BROMIDE AND ALBUTEROL SULFATE 2.5; .5 MG/3ML; MG/3ML
3 SOLUTION RESPIRATORY (INHALATION)
Status: DISCONTINUED | OUTPATIENT
Start: 2018-03-31 | End: 2018-03-31

## 2018-03-31 RX ORDER — LEVOTHYROXINE SODIUM 0.05 MG/1
50 TABLET ORAL
Status: DISCONTINUED | OUTPATIENT
Start: 2018-03-31 | End: 2018-04-13 | Stop reason: HOSPADM

## 2018-03-31 RX ORDER — MAGNESIUM CARB/ALUMINUM HYDROX 105-160MG
296 TABLET,CHEWABLE ORAL DAILY PRN
Status: DISCONTINUED | OUTPATIENT
Start: 2018-03-31 | End: 2018-04-13 | Stop reason: HOSPADM

## 2018-03-31 RX ORDER — DOCUSATE SODIUM 100 MG/1
100 CAPSULE, LIQUID FILLED ORAL 2 TIMES DAILY
Status: DISCONTINUED | OUTPATIENT
Start: 2018-03-31 | End: 2018-04-13 | Stop reason: HOSPADM

## 2018-03-31 RX ORDER — HYDROXYUREA 500 MG/1
500 CAPSULE ORAL DAILY
Status: DISCONTINUED | OUTPATIENT
Start: 2018-03-31 | End: 2018-04-13 | Stop reason: HOSPADM

## 2018-03-31 RX ORDER — TAMSULOSIN HYDROCHLORIDE 0.4 MG/1
0.4 CAPSULE ORAL DAILY
Status: DISCONTINUED | OUTPATIENT
Start: 2018-03-31 | End: 2018-04-11

## 2018-03-31 RX ORDER — FUROSEMIDE 40 MG/1
40 TABLET ORAL DAILY
Status: DISCONTINUED | OUTPATIENT
Start: 2018-03-31 | End: 2018-04-04

## 2018-03-31 RX ORDER — GABAPENTIN 300 MG/1
300 CAPSULE ORAL
Status: DISCONTINUED | OUTPATIENT
Start: 2018-03-31 | End: 2018-04-13 | Stop reason: HOSPADM

## 2018-03-31 RX ADMIN — FUROSEMIDE 40 MG: 40 TABLET ORAL at 08:37

## 2018-03-31 RX ADMIN — INSULIN DETEMIR 26 UNITS: 100 INJECTION, SOLUTION SUBCUTANEOUS at 21:33

## 2018-03-31 RX ADMIN — INSULIN LISPRO 4 UNITS: 100 INJECTION, SOLUTION INTRAVENOUS; SUBCUTANEOUS at 21:33

## 2018-03-31 RX ADMIN — INSULIN LISPRO 2 UNITS: 100 INJECTION, SOLUTION INTRAVENOUS; SUBCUTANEOUS at 08:31

## 2018-03-31 RX ADMIN — INSULIN LISPRO 3 UNITS: 100 INJECTION, SOLUTION INTRAVENOUS; SUBCUTANEOUS at 17:08

## 2018-03-31 RX ADMIN — GABAPENTIN 600 MG: 300 CAPSULE ORAL at 01:20

## 2018-03-31 RX ADMIN — NYSTATIN: 100000 POWDER TOPICAL at 14:36

## 2018-03-31 RX ADMIN — INSULIN LISPRO 2 UNITS: 100 INJECTION, SOLUTION INTRAVENOUS; SUBCUTANEOUS at 12:24

## 2018-03-31 RX ADMIN — SODIUM CHLORIDE 100 ML/HR: 9 INJECTION, SOLUTION INTRAVENOUS at 11:06

## 2018-03-31 RX ADMIN — SODIUM CHLORIDE 100 ML/HR: 9 INJECTION, SOLUTION INTRAVENOUS at 01:20

## 2018-03-31 RX ADMIN — LISINOPRIL 2.5 MG: 2.5 TABLET ORAL at 08:33

## 2018-03-31 RX ADMIN — TAZOBACTAM SODIUM AND PIPERACILLIN SODIUM 3.38 G: 375; 3 INJECTION, SOLUTION INTRAVENOUS at 11:58

## 2018-03-31 RX ADMIN — NYSTATIN: 100000 POWDER TOPICAL at 21:37

## 2018-03-31 RX ADMIN — DILTIAZEM HYDROCHLORIDE 30 MG: 30 TABLET, FILM COATED ORAL at 05:16

## 2018-03-31 RX ADMIN — PANTOPRAZOLE SODIUM 40 MG: 40 TABLET, DELAYED RELEASE ORAL at 05:15

## 2018-03-31 RX ADMIN — ALBUTEROL SULFATE 1.25 MG: 2.5 SOLUTION RESPIRATORY (INHALATION) at 20:37

## 2018-03-31 RX ADMIN — IPRATROPIUM BROMIDE 0.5 MG: 0.5 SOLUTION RESPIRATORY (INHALATION) at 20:37

## 2018-03-31 RX ADMIN — DILTIAZEM HYDROCHLORIDE 30 MG: 30 TABLET, FILM COATED ORAL at 01:20

## 2018-03-31 RX ADMIN — ASPIRIN 81 MG 81 MG: 81 TABLET ORAL at 08:37

## 2018-03-31 RX ADMIN — TAZOBACTAM SODIUM AND PIPERACILLIN SODIUM 3.38 G: 375; 3 INJECTION, SOLUTION INTRAVENOUS at 20:06

## 2018-03-31 RX ADMIN — BUDESONIDE AND FORMOTEROL FUMARATE DIHYDRATE 2 PUFF: 160; 4.5 AEROSOL RESPIRATORY (INHALATION) at 06:48

## 2018-03-31 RX ADMIN — LIDOCAINE HYDROCHLORIDE 1 ML: 10 INJECTION, SOLUTION EPIDURAL; INFILTRATION; INTRACAUDAL; PERINEURAL at 15:47

## 2018-03-31 RX ADMIN — DOCUSATE SODIUM 100 MG: 100 CAPSULE, LIQUID FILLED ORAL at 14:35

## 2018-03-31 RX ADMIN — IPRATROPIUM BROMIDE AND ALBUTEROL SULFATE 3 ML: 2.5; .5 SOLUTION RESPIRATORY (INHALATION) at 06:48

## 2018-03-31 RX ADMIN — ATORVASTATIN CALCIUM 20 MG: 10 TABLET, FILM COATED ORAL at 01:20

## 2018-03-31 RX ADMIN — TAZOBACTAM SODIUM AND PIPERACILLIN SODIUM 3.38 G: 375; 3 INJECTION, SOLUTION INTRAVENOUS at 04:16

## 2018-03-31 RX ADMIN — TAMSULOSIN HYDROCHLORIDE 0.4 MG: 0.4 CAPSULE ORAL at 08:33

## 2018-03-31 RX ADMIN — DILTIAZEM HYDROCHLORIDE 30 MG: 30 TABLET, FILM COATED ORAL at 17:08

## 2018-03-31 RX ADMIN — GABAPENTIN 600 MG: 300 CAPSULE ORAL at 21:34

## 2018-03-31 RX ADMIN — IPRATROPIUM BROMIDE AND ALBUTEROL SULFATE 3 ML: 2.5; .5 SOLUTION RESPIRATORY (INHALATION) at 10:23

## 2018-03-31 RX ADMIN — APIXABAN 5 MG: 5 TABLET, FILM COATED ORAL at 01:20

## 2018-03-31 RX ADMIN — DOCUSATE SODIUM 100 MG: 100 CAPSULE, LIQUID FILLED ORAL at 21:33

## 2018-03-31 RX ADMIN — APIXABAN 5 MG: 5 TABLET, FILM COATED ORAL at 21:34

## 2018-03-31 RX ADMIN — Medication 296 ML: at 14:36

## 2018-03-31 RX ADMIN — ATORVASTATIN CALCIUM 20 MG: 10 TABLET, FILM COATED ORAL at 21:33

## 2018-03-31 RX ADMIN — FLUCONAZOLE 150 MG: 150 TABLET ORAL at 16:08

## 2018-03-31 RX ADMIN — BUDESONIDE AND FORMOTEROL FUMARATE DIHYDRATE 2 PUFF: 160; 4.5 AEROSOL RESPIRATORY (INHALATION) at 20:38

## 2018-03-31 RX ADMIN — POLYETHYLENE GLYCOL (3350) 17 G: 17 POWDER, FOR SOLUTION ORAL at 14:35

## 2018-03-31 RX ADMIN — FOLIC ACID 1 MG: 1 TABLET ORAL at 08:33

## 2018-03-31 RX ADMIN — METOPROLOL SUCCINATE 50 MG: 50 TABLET, FILM COATED, EXTENDED RELEASE ORAL at 08:32

## 2018-03-31 RX ADMIN — IPRATROPIUM BROMIDE AND ALBUTEROL SULFATE 3 ML: 2.5; .5 SOLUTION RESPIRATORY (INHALATION) at 14:27

## 2018-03-31 RX ADMIN — APIXABAN 5 MG: 5 TABLET, FILM COATED ORAL at 08:32

## 2018-03-31 RX ADMIN — DILTIAZEM HYDROCHLORIDE 30 MG: 30 TABLET, FILM COATED ORAL at 11:58

## 2018-03-31 RX ADMIN — HYDROXYUREA 500 MG: 500 CAPSULE ORAL at 08:31

## 2018-03-31 RX ADMIN — LEVOTHYROXINE SODIUM 50 MCG: 50 TABLET ORAL at 05:15

## 2018-03-31 RX ADMIN — GABAPENTIN 300 MG: 300 CAPSULE ORAL at 08:31

## 2018-04-01 LAB
ANION GAP SERPL CALCULATED.3IONS-SCNC: 4 MMOL/L (ref 4–13)
B-LACTAMASE USUAL SUSC ISLT: POSITIVE
BACTERIA SPEC AEROBE CULT: ABNORMAL
BUN BLD-MCNC: 30 MG/DL (ref 5–21)
BUN/CREAT SERPL: 41.1 (ref 7–25)
CALCIUM SPEC-SCNC: 8.7 MG/DL (ref 8.4–10.4)
CHLORIDE SERPL-SCNC: 96 MMOL/L (ref 98–110)
CO2 SERPL-SCNC: 36 MMOL/L (ref 24–31)
CREAT BLD-MCNC: 0.73 MG/DL (ref 0.5–1.4)
DEPRECATED RDW RBC AUTO: 69 FL (ref 40–54)
EOSINOPHIL # BLD MANUAL: 0.45 10*3/MM3 (ref 0–0.7)
EOSINOPHIL NFR BLD MANUAL: 4 % (ref 0–4)
ERYTHROCYTE [DISTWIDTH] IN BLOOD BY AUTOMATED COUNT: 18.1 % (ref 12–15)
GFR SERPL CREATININE-BSD FRML MDRD: 75 ML/MIN/1.73
GLUCOSE BLD-MCNC: 114 MG/DL (ref 70–100)
GLUCOSE BLDC GLUCOMTR-MCNC: 131 MG/DL (ref 70–130)
GLUCOSE BLDC GLUCOMTR-MCNC: 192 MG/DL (ref 70–130)
GLUCOSE BLDC GLUCOMTR-MCNC: 231 MG/DL (ref 70–130)
GLUCOSE BLDC GLUCOMTR-MCNC: 82 MG/DL (ref 70–130)
GRAM STN SPEC: ABNORMAL
HCT VFR BLD AUTO: 31.3 % (ref 37–47)
HGB BLD-MCNC: 10.1 G/DL (ref 12–16)
HYPOCHROMIA BLD QL: NORMAL
ISOLATED FROM: ABNORMAL
LYMPHOCYTES # BLD MANUAL: 2.46 10*3/MM3 (ref 0.72–4.86)
LYMPHOCYTES NFR BLD MANUAL: 22 % (ref 15–45)
LYMPHOCYTES NFR BLD MANUAL: 4 % (ref 4–12)
MACROCYTES BLD QL SMEAR: NORMAL
MCH RBC QN AUTO: 33.2 PG (ref 28–32)
MCHC RBC AUTO-ENTMCNC: 32.3 G/DL (ref 33–36)
MCV RBC AUTO: 103 FL (ref 82–98)
MONOCYTES # BLD AUTO: 0.45 10*3/MM3 (ref 0.19–1.3)
NEUTROPHILS # BLD AUTO: 7.83 10*3/MM3 (ref 1.87–8.4)
NEUTROPHILS NFR BLD MANUAL: 68 % (ref 39–78)
NEUTS BAND NFR BLD MANUAL: 2 % (ref 0–10)
NRBC BLD MANUAL-RTO: 0 /100 WBC (ref 0–0)
PLATELET # BLD AUTO: 516 10*3/MM3 (ref 130–400)
PMV BLD AUTO: 10.1 FL (ref 6–12)
POTASSIUM BLD-SCNC: 4.7 MMOL/L (ref 3.5–5.3)
RBC # BLD AUTO: 3.04 10*6/MM3 (ref 4.2–5.4)
SMALL PLATELETS BLD QL SMEAR: NORMAL
SODIUM BLD-SCNC: 136 MMOL/L (ref 135–145)
VANCOMYCIN TROUGH SERPL-MCNC: 15.76 MCG/ML (ref 10–20)
WBC MORPH BLD: NORMAL
WBC NRBC COR # BLD: 11.18 10*3/MM3 (ref 4.8–10.8)

## 2018-04-01 PROCEDURE — 25010000002 VANCOMYCIN PER 500 MG: Performed by: FAMILY MEDICINE

## 2018-04-01 PROCEDURE — 94799 UNLISTED PULMONARY SVC/PX: CPT

## 2018-04-01 PROCEDURE — 97162 PT EVAL MOD COMPLEX 30 MIN: CPT | Performed by: PHYSICAL THERAPIST

## 2018-04-01 PROCEDURE — 80202 ASSAY OF VANCOMYCIN: CPT

## 2018-04-01 PROCEDURE — 94760 N-INVAS EAR/PLS OXIMETRY 1: CPT

## 2018-04-01 PROCEDURE — 25010000002 PIPERACILLIN SOD-TAZOBACTAM PER 1 G: Performed by: FAMILY MEDICINE

## 2018-04-01 PROCEDURE — 63710000001 INSULIN LISPRO (HUMAN) PER 5 UNITS: Performed by: FAMILY MEDICINE

## 2018-04-01 PROCEDURE — 63710000001 INSULIN DETEMIR PER 5 UNITS: Performed by: FAMILY MEDICINE

## 2018-04-01 PROCEDURE — 85025 COMPLETE CBC W/AUTO DIFF WBC: CPT | Performed by: NURSE PRACTITIONER

## 2018-04-01 PROCEDURE — 82962 GLUCOSE BLOOD TEST: CPT

## 2018-04-01 PROCEDURE — 97110 THERAPEUTIC EXERCISES: CPT

## 2018-04-01 PROCEDURE — 80048 BASIC METABOLIC PNL TOTAL CA: CPT | Performed by: NURSE PRACTITIONER

## 2018-04-01 PROCEDURE — G8988 SELF CARE GOAL STATUS: HCPCS

## 2018-04-01 PROCEDURE — 85007 BL SMEAR W/DIFF WBC COUNT: CPT | Performed by: NURSE PRACTITIONER

## 2018-04-01 PROCEDURE — G8987 SELF CARE CURRENT STATUS: HCPCS

## 2018-04-01 PROCEDURE — 97166 OT EVAL MOD COMPLEX 45 MIN: CPT

## 2018-04-01 PROCEDURE — G8978 MOBILITY CURRENT STATUS: HCPCS | Performed by: PHYSICAL THERAPIST

## 2018-04-01 PROCEDURE — G8979 MOBILITY GOAL STATUS: HCPCS | Performed by: PHYSICAL THERAPIST

## 2018-04-01 RX ORDER — DOCUSATE SODIUM 100 MG/1
100 CAPSULE, LIQUID FILLED ORAL DAILY
COMMUNITY

## 2018-04-01 RX ORDER — HYDROCODONE BITARTRATE AND ACETAMINOPHEN 7.5; 325 MG/1; MG/1
1 TABLET ORAL EVERY 6 HOURS PRN
Status: ON HOLD | COMMUNITY
End: 2018-01-01

## 2018-04-01 RX ADMIN — IPRATROPIUM BROMIDE 0.5 MG: 0.5 SOLUTION RESPIRATORY (INHALATION) at 14:34

## 2018-04-01 RX ADMIN — PANTOPRAZOLE SODIUM 40 MG: 40 TABLET, DELAYED RELEASE ORAL at 05:18

## 2018-04-01 RX ADMIN — DOCUSATE SODIUM 100 MG: 100 CAPSULE, LIQUID FILLED ORAL at 09:33

## 2018-04-01 RX ADMIN — IPRATROPIUM BROMIDE 0.5 MG: 0.5 SOLUTION RESPIRATORY (INHALATION) at 10:27

## 2018-04-01 RX ADMIN — DILTIAZEM HYDROCHLORIDE 30 MG: 30 TABLET, FILM COATED ORAL at 17:06

## 2018-04-01 RX ADMIN — GABAPENTIN 300 MG: 300 CAPSULE ORAL at 09:33

## 2018-04-01 RX ADMIN — ALBUTEROL SULFATE 1.25 MG: 2.5 SOLUTION RESPIRATORY (INHALATION) at 19:10

## 2018-04-01 RX ADMIN — DOCUSATE SODIUM 100 MG: 100 CAPSULE, LIQUID FILLED ORAL at 21:01

## 2018-04-01 RX ADMIN — INSULIN LISPRO 2 UNITS: 100 INJECTION, SOLUTION INTRAVENOUS; SUBCUTANEOUS at 17:11

## 2018-04-01 RX ADMIN — GABAPENTIN 600 MG: 300 CAPSULE ORAL at 21:01

## 2018-04-01 RX ADMIN — DILTIAZEM HYDROCHLORIDE 30 MG: 30 TABLET, FILM COATED ORAL at 05:19

## 2018-04-01 RX ADMIN — APIXABAN 5 MG: 5 TABLET, FILM COATED ORAL at 09:33

## 2018-04-01 RX ADMIN — TAMSULOSIN HYDROCHLORIDE 0.4 MG: 0.4 CAPSULE ORAL at 09:32

## 2018-04-01 RX ADMIN — NYSTATIN: 100000 POWDER TOPICAL at 09:46

## 2018-04-01 RX ADMIN — TAZOBACTAM SODIUM AND PIPERACILLIN SODIUM 3.38 G: 375; 3 INJECTION, SOLUTION INTRAVENOUS at 12:22

## 2018-04-01 RX ADMIN — POLYETHYLENE GLYCOL (3350) 17 G: 17 POWDER, FOR SOLUTION ORAL at 09:32

## 2018-04-01 RX ADMIN — LISINOPRIL 2.5 MG: 2.5 TABLET ORAL at 09:33

## 2018-04-01 RX ADMIN — TAZOBACTAM SODIUM AND PIPERACILLIN SODIUM 3.38 G: 375; 3 INJECTION, SOLUTION INTRAVENOUS at 20:17

## 2018-04-01 RX ADMIN — METOPROLOL SUCCINATE 50 MG: 50 TABLET, FILM COATED, EXTENDED RELEASE ORAL at 09:33

## 2018-04-01 RX ADMIN — HYDROXYUREA 500 MG: 500 CAPSULE ORAL at 09:32

## 2018-04-01 RX ADMIN — FUROSEMIDE 40 MG: 40 TABLET ORAL at 09:33

## 2018-04-01 RX ADMIN — APIXABAN 5 MG: 5 TABLET, FILM COATED ORAL at 21:02

## 2018-04-01 RX ADMIN — VANCOMYCIN HYDROCHLORIDE 1750 MG: 1 INJECTION, POWDER, LYOPHILIZED, FOR SOLUTION INTRAVENOUS at 00:04

## 2018-04-01 RX ADMIN — INSULIN DETEMIR 26 UNITS: 100 INJECTION, SOLUTION SUBCUTANEOUS at 21:00

## 2018-04-01 RX ADMIN — TAZOBACTAM SODIUM AND PIPERACILLIN SODIUM 3.38 G: 375; 3 INJECTION, SOLUTION INTRAVENOUS at 03:19

## 2018-04-01 RX ADMIN — ALBUTEROL SULFATE 1.25 MG: 2.5 SOLUTION RESPIRATORY (INHALATION) at 06:42

## 2018-04-01 RX ADMIN — ALBUTEROL SULFATE 1.25 MG: 2.5 SOLUTION RESPIRATORY (INHALATION) at 10:27

## 2018-04-01 RX ADMIN — ASPIRIN 81 MG 81 MG: 81 TABLET ORAL at 09:32

## 2018-04-01 RX ADMIN — DILTIAZEM HYDROCHLORIDE 30 MG: 30 TABLET, FILM COATED ORAL at 00:04

## 2018-04-01 RX ADMIN — IPRATROPIUM BROMIDE 0.5 MG: 0.5 SOLUTION RESPIRATORY (INHALATION) at 19:10

## 2018-04-01 RX ADMIN — ATORVASTATIN CALCIUM 20 MG: 10 TABLET, FILM COATED ORAL at 21:01

## 2018-04-01 RX ADMIN — BUDESONIDE AND FORMOTEROL FUMARATE DIHYDRATE 2 PUFF: 160; 4.5 AEROSOL RESPIRATORY (INHALATION) at 19:12

## 2018-04-01 RX ADMIN — NYSTATIN: 100000 POWDER TOPICAL at 21:04

## 2018-04-01 RX ADMIN — BUDESONIDE AND FORMOTEROL FUMARATE DIHYDRATE 2 PUFF: 160; 4.5 AEROSOL RESPIRATORY (INHALATION) at 06:41

## 2018-04-01 RX ADMIN — FOLIC ACID 1 MG: 1 TABLET ORAL at 09:33

## 2018-04-01 RX ADMIN — INSULIN LISPRO 3 UNITS: 100 INJECTION, SOLUTION INTRAVENOUS; SUBCUTANEOUS at 21:00

## 2018-04-01 RX ADMIN — DILTIAZEM HYDROCHLORIDE 30 MG: 30 TABLET, FILM COATED ORAL at 12:22

## 2018-04-01 RX ADMIN — LEVOTHYROXINE SODIUM 50 MCG: 50 TABLET ORAL at 05:18

## 2018-04-01 RX ADMIN — IPRATROPIUM BROMIDE 0.5 MG: 0.5 SOLUTION RESPIRATORY (INHALATION) at 06:41

## 2018-04-01 RX ADMIN — ALBUTEROL SULFATE 1.25 MG: 2.5 SOLUTION RESPIRATORY (INHALATION) at 14:35

## 2018-04-01 NOTE — PLAN OF CARE
Problem: Patient Care Overview  Goal: Plan of Care Review   04/01/18 0942   Coping/Psychosocial   Plan of Care Reviewed With patient   OTHER   Outcome Summary PT eval done. She showed good effort at sitting at the EOB x 15 min. Worked on pushing through left leg to start to work left standing muscles. Advised nursing that a lift may not be the best option due to her right femur fracture but if it is used, then one person needs to be in charge of supporting her right femur to decrease the stress to her femur.

## 2018-04-01 NOTE — PLAN OF CARE
Problem: Patient Care Overview  Goal: Plan of Care Review  Outcome: Ongoing (interventions implemented as appropriate)   04/01/18 5331   Coping/Psychosocial   Plan of Care Reviewed With patient;family   OTHER   Outcome Summary Pt's progress with ot is good, no issues or c/o's with ue exs to increase her transfer ability!

## 2018-04-01 NOTE — PLAN OF CARE
Problem: Patient Care Overview  Goal: Plan of Care Review  Outcome: Ongoing (interventions implemented as appropriate)   04/01/18 1834   Coping/Psychosocial   Plan of Care Reviewed With patient;family   Plan of Care Review   Progress improving   OTHER   Outcome Summary Molases and Milk enema given with 2 large results, incont of Bowel, estrada inplace for retention, care performed x2 this shift, dressing orders for dry guaze and medipore each shift, Prevalon boot in place, up to neuro chair today, no c/o pain during this shift, continue to turn Q2       Problem: Skin Injury Risk (Adult)  Goal: Identify Related Risk Factors and Signs and Symptoms  Outcome: Outcome(s) achieved Date Met: 04/01/18  Black area noted to left heel-prevalon boot inplace   04/01/18 1834   Skin Injury Risk (Adult)   Related Risk Factors (Skin Injury Risk) body weight extremes;advanced age;edema   , unable to stage, valentini with wound care has been consulted, som area is red and excoriated, turn q2    Problem: Wound (Includes Pressure Injury) (Adult)  Goal: Signs and Symptoms of Listed Potential Problems Will be Absent, Minimized or Managed (Wound)  Outcome: Outcome(s) achieved Date Met: 04/01/18  Right thigh, right knee x 2 areas of open weeping and purulent drainage- MRSA positive in knee culture-contact precautions   04/01/18 1834   Goal/Outcome Evaluation   Problems Assessed (Wound) all;delayed wound healing;infection   Problems Present (Wound) infection

## 2018-04-01 NOTE — PLAN OF CARE
Problem: Fall Risk (Adult)  Goal: Identify Related Risk Factors and Signs and Symptoms  Outcome: Ongoing (interventions implemented as appropriate)    Goal: Absence of Fall  Outcome: Ongoing (interventions implemented as appropriate)      Problem: Infection, Risk/Actual (Adult)  Goal: Identify Related Risk Factors and Signs and Symptoms  Outcome: Ongoing (interventions implemented as appropriate)    Goal: Infection Prevention/Resolution  Outcome: Ongoing (interventions implemented as appropriate)      Problem: Patient Care Overview  Goal: Plan of Care Review  Outcome: Ongoing (interventions implemented as appropriate)   04/01/18 0253   Coping/Psychosocial   Plan of Care Reviewed With patient   Plan of Care Review   Progress no change   OTHER   Outcome Summary VSS, safety maintained, dressings to right knee, leg and upper thigh in place with moist drainage, pulse ox in place with 2L O2 per NC, estrada in place, estrada care provided, contact precautions maintained, no c/o pain, will continue to monitor     Goal: Individualization and Mutuality  Outcome: Ongoing (interventions implemented as appropriate)    Goal: Discharge Needs Assessment  Outcome: Ongoing (interventions implemented as appropriate)    Goal: Interprofessional Rounds/Family Conf  Outcome: Ongoing (interventions implemented as appropriate)

## 2018-04-01 NOTE — THERAPY EVALUATION
Acute Care - Occupational Therapy Initial Evaluation  [facility]     Patient Name: Tamy Sher  : 1930  MRN: [id number]  Today's Date: 2018  Onset of Illness/Injury or Date of Surgery: 18  Date of Referral to OT: 18  Referring Physician: Dr. Andrew    Admit Date: 3/30/2018       ICD-10-CM ICD-9-CM   1. Surgical wound infection, initial encounter T81.4XXA 998.59   2. Abnormality of gait and mobility R26.9 781.2   3. Other closed fracture of distal end of right femur, initial encounter S72.491A 821.29   4. Impaired mobility and ADLs Z74.09 799.89     Patient Active Problem List   Diagnosis   • Ischemic chest pain   • Pneumonia of both lungs due to infectious organism   • Fall   • Closed displaced comminuted fracture of shaft of right femur   • Pain of right thigh   • Class 2 obesity with serious comorbidity in adult   • Type 2 diabetes mellitus with neurologic complication, with long-term current use of insulin   • STUART (acute kidney injury)   • Chronic diastolic heart failure   • Pleural effusion, left   • Hypoxia   • Surgical wound infection, initial encounter     Past Medical History:   Diagnosis Date   • Constipation    • Diabetes mellitus    • Diarrhea    • Diastolic dysfunction, left ventricle    • Esophagitis    • Exertional shortness of breath    • Hyperlipidemia    • Hypertension    • Lumbar spondylitis    • Osteoarthritis    • Peripheral vascular disease    • Sinusitis    • Stroke      Past Surgical History:   Procedure Laterality Date   • FEMUR SUPRACONDYLAR FRACTURE REPAIR Right 3/10/2018    Procedure: FEMUR SUPRACONDYLAR NAIL;  Surgeon: Nima Bundy MD;  Location: [facility];  Service: Orthopedics   • GALLBLADDER SURGERY     • HYSTERECTOMY     • JOINT REPLACEMENT     • REPLACEMENT TOTAL KNEE Left           OT ASSESSMENT FLOWSHEET (last 72 hours)      Occupational Therapy Evaluation     Row Name 18 0900 18 0828                OT Evaluation Time/Intention    Subjective  Information  -- complains of;weakness;fatigue  -CS       Document Type  -- evaluation  -CS       Mode of Treatment  -- occupational therapy  -CS          General Information    Patient Profile Reviewed?  -- yes  -CS       Onset of Illness/Injury or Date of Surgery  -- 03/30/18  -CS       Referring Physician  -- Dr. Andrew  -CS       Patient Observations  -- alert;cooperative;agree to therapy  -CS       General Observations of Patient  -- Upon entering room, pt supine in bed with HOB elevated, pt on 3L O2 via NC, cont. pulse ox, estrada cath, PICC line to RUE  -CS       Prior Level of Function  -- max assist:;dependent:;dressing;bathing;transfer;mod assist:;bed mobility  -CS       Equipment Currently Used at Home  -- --   Pt has all needed equipment at [facility]  -CS       Pertinent History of Current Functional Problem  -- Pt presents with post-surgical wound infection to R knee, XR revealed acute R distal femur fx; Pt had IM lisa placed on 3/10/18 following a fall for periprosthetic femur fx and has been NWB on RLE since  -CS       Existing Precautions/Restrictions  -- fall;non-weight bearing   NWB RLE (per previous stay); clarification from Dr. Bundy  -CS       Risks Reviewed  -- patient:;LOB;dizziness;increased discomfort;nausea/vomiting  -CS       Benefits Reviewed  -- patient:;improve function;increase strength;increase independence;increase balance;decrease pain  -CS          Relationship/Environment    Lives With  -- facility resident   [facility]  -CS          Resource/Environmental Concerns    Current Living Arrangements  -- extended care facility  -CS       Resource/Environmental Concerns  -- none  -CS          Cognitive Assessment/Interventions    Additional Documentation  -- Cognitive Assessment/Intervention (Group)  -CS          Cognitive Assessment/Intervention- PT/OT    Orientation Status (Cognition)  -- oriented x 4  -CS       Follows Commands (Cognition)  -- WNL  -CS       Personal Safety Interventions   -- gait belt;fall prevention program maintained;nonskid shoes/slippers when out of bed;supervised activity  -          Safety Issues, Functional Mobility    Impairments Affecting Function (Mobility)  -- strength;endurance/activity tolerance;balance;pain;range of motion (ROM)  -CS          Mobility Assessment/Treatment    Extremity Weight-bearing Status  -- right lower extremity  -CS       Right Lower Extremity (Weight-bearing Status)  -- non weight-bearing (NWB)  -CS          Bed Mobility Assessment/Treatment    Bed Mobility Assessment/Treatment  -- supine-sit;sit-supine;scooting/bridging  -CS       Scooting/Bridging [place] (Bed Mobility)  -- maximum assist (25% patient effort);2 person assist   scooting up in bed while supine, pt assist with LLE and BUEs  -       Supine-Sit [place] (Bed Mobility)  -- moderate assist (50% patient effort);2 person assist;verbal cues  -CS       Sit-Supine [place] (Bed Mobility)  -- maximum assist (25% patient effort);2 person assist;verbal cues  -       Bed Mobility, Safety Issues  -- decreased use of legs for bridging/pushing  -       Assistive Device (Bed Mobility)  -- head of bed elevated;bed rails;draw sheet  -          Functional Mobility    Functional Mobility- Comment  -- Deferred due to weakness  -          Transfer Assessment/Treatment    Comment (Transfers)  -- Deferred due to weakness, however pt was able to perform prepartory standing trials by pushing through her arms and her LLE to increased strength and WBing  -          ADL Assessment/Intervention    BADL Assessment/Intervention  -- lower body dressing  -          Lower Body Dressing Assessment/Training    Lower Body Dressing [place] Level  -- don;socks;dependent (less than 25% patient effort)  -       Lower Body Dressing Position  -- edge of bed sitting  -CS          BADL Safety/Performance    Impairments, BADL Safety/Performance  -- pain;strength;range of motion  -           General ROM    GENERAL ROM COMMENTS  -- BUE AROM WFL  -CS          General Assessment (Manual Muscle Testing)    Comment, General Manual Muscle Testing (MMT) Assessment  -- Farrukh shoulder strength: 3+/5, Farrukh elbow strength (extension: 3+/5, flexion: 4-/5), farrukh grasp strength: 4/5  -CS          Motor Assessment/Interventions    Additional Documentation  -- Balance (Group)  -CS          Balance    Balance  -- static sitting balance  -CS          Static Sitting Balance    Level of [place] (Unsupported Sitting, Static Balance)  -- standby assist  -CS       Sitting Position (Unsupported Sitting, Static Balance)  -- sitting on edge of bed  -CS       Time Able to Maintain Position (Unsupported Sitting, Static Balance)  -- more than 5 minutes   approx 15 min  -CS       Comment (Unsupported Sitting, Static Balance)  -- Pt does require support of RLE due to difficulty bending her knee when sitting EOB  -CS          Positioning and Restraints    Pre-Treatment Position  -- in bed  -CS       Post Treatment Position  -- bed  -CS       In Bed  -- fowlers;call light within reach;encouraged to call for assist;with family/caregiver;side rails up x2;R multipodus  -CS          Pain Assessment    Additional Documentation  -- Pain Scale: FACES Pre/Post-Treatment (Group)  -CS          Pain Scale: Numbers Pre/Post-Treatment    Pain Location - Side  -- Right  -CS       Pain Location  -- knee  -CS       Pain Intervention(s)  -- Repositioned;Ambulation/increased activity  -CS          Pain Scale: FACES Pre/Post-Treatment    Pain: FACES Scale, Pretreatment  -- 0-->no hurt  -CS       Pain: FACES Scale, Post-Treatment  -- 4-->hurts little more   during treatment pain rating  -CS          Respiratory WDL    Respiratory WDL WDL   Simultaneous filing. User may not have seen previous data.  -CS (r) TB (t)  --       Rhythm/Pattern, Respiratory unlabored;pattern regular;depth regular;no shortness of breath reported   Simultaneous filing. User may  not have seen previous data.  -CS (r) TB (t)  --       Expansion/Accessory Muscles/Retractions no use of accessory muscles;no retractions;expansion symmetric   Simultaneous filing. User may not have seen previous data.  -CS (r) TB (t)  --       Cough Frequency infrequent   Simultaneous filing. User may not have seen previous data.  -CS (r) TB (t)  --       Cough Type good;nonproductive   Simultaneous filing. User may not have seen previous data.  -CS (r) TB (t)  --          Breath Sounds    Breath Sounds All Fields   Simultaneous filing. User may not have seen previous data.  -CS (r) TB (t)  --       All Lung Fields Breath Sounds Anterior:;clear;diminished   Simultaneous filing. User may not have seen previous data.  -CS (r) TB (t)  --          Skin WDL    Skin WDL ex;color;characteristics;all   Simultaneous filing. User may not have seen previous data.  -CS (r) TB (t)  --       Skin Color/Characteristics redness blanchable;redness nonblanchable;jayesh   Simultaneous filing. User may not have seen previous data.  -CS (r) TB (t)  --       Skin Moisture dry   Simultaneous filing. User may not have seen previous data.  -CS (r) TB (t)  --       Skin Integrity bruised (ecchymotic);incision;wound   Simultaneous filing. User may not have seen previous data.  -CS (r) TB (t)  --          Wound 03/10/18 0934 Right medial knee incision    Wound - Properties Group Date first assessed: 03/10/18  -SD Time first assessed: 0934  -SD Present On Admission : no  -SD, surgical wound  Side: Right  -SD Orientation: medial  -SD Location: knee  -SD Type: incision  -SD Additional Comments: adaptic, 4x4, soft roll, ace  -SD    Dressing Appearance copious drainage   Simultaneous filing. User may not have seen previous data.  -CS (r) TB (t)  --       Base yellow   Simultaneous filing. User may not have seen previous data.  -CS (r) TB (t)  --       Periwound warm;redness;swelling   Simultaneous filing. User may not have seen previous data.  -CS  (r) TB (t)  --       Periwound Temperature warm   Simultaneous filing. User may not have seen previous data.  -CS (r) TB (t)  --       Periwound Skin Turgor --   edematous Simultaneous filing. User may not have seen previous data.  -CS (r) TB (t)  --       Drainage Characteristics/Odor purulent   Simultaneous filing. User may not have seen previous data.  -CS (r) TB (t)  --       Drainage Amount small   Simultaneous filing. User may not have seen previous data.  -CS (r) TB (t)  --          Wound 03/31/18 0043 Right knee incision    Wound - Properties Group Date first assessed: 03/31/18  -MB Time first assessed: 0043  -MB Present On Admission : yes;picture taken  -MB Side: Right  -MB Location: knee  -MB Type: incision  -MB Additional Comments: wound dehiscence  -MB    Dressing Appearance moist drainage   Simultaneous filing. User may not have seen previous data.  -CS (r) TB (t)  --       Closure --   mepilex Simultaneous filing. User may not have seen previous data.  -CS (r) TB (t)  --       Base yellow   Simultaneous filing. User may not have seen previous data.  -CS (r) TB (t)  --       Periwound redness;swelling;moist;warm   Simultaneous filing. User may not have seen previous data.  -CS (r) TB (t)  --       Drainage Characteristics/Odor purulent   Simultaneous filing. User may not have seen previous data.  -CS (r) TB (t)  --       Drainage Amount small   Simultaneous filing. User may not have seen previous data.  -CS (r) TB (t)  --          [REMOVED] Wound 03/31/18 0040 Left heel    Wound - Properties Group Date first assessed: 03/31/18  -MB Time first assessed: 0040  -MB Present On Admission : yes;picture taken  -MB Side: Left  -MB Location: heel  -MB Stage, Pressure Injury: deep tissue injury  -MB Resolution Date: 03/31/18  -DB, this injury is on the right heel  Resolution Time: 1641  -DB       Wound 03/31/18 0240 perirectal    Wound - Properties Group Date first assessed: 03/31/18  -MB Time first assessed:  0240  -MB Present On Admission : yes;picture taken  -MB Location: perirectal  -MB Stage, Pressure Injury: deep tissue injury  -MB    Dressing Appearance moist drainage   Simultaneous filing. User may not have seen previous data.  -CS (r) TB (t)  --          Wound 03/31/18 0012 Right upper thigh surgical    Wound - Properties Group Date first assessed: 03/31/18  -MB Time first assessed: 0012  -MB Present On Admission : yes  -MB Side: Right  -MB Orientation: upper  -MB Location: thigh  -MB Type: surgical  -MB, open incision     Dressing Appearance moist drainage   Simultaneous filing. User may not have seen previous data.  -CS (r) TB (t)  --       Drainage Amount scant   Simultaneous filing. User may not have seen previous data.  -CS (r) TB (t)  --          Wound 03/31/18 1600 Right heel    Wound - Properties Group Date first assessed: 03/31/18  -DB Time first assessed: 1600  -DB Side: Right  -DB Location: heel  -DB Stage, Pressure Injury: deep tissue injury  -DB       Plan of Care Review    Plan of Care Reviewed With  -- patient  -CS          Clinical Impression (OT)    Date of Referral to OT  -- 03/31/18  -CS       OT Diagnosis  -- Impaired ADLs and functional mobility  -CS       Criteria for Skilled Therapeutic Interventions Met (OT Eval)  -- yes;treatment indicated  -CS       Rehab Potential (OT Eval)  -- good, to achieve stated therapy goals  -CS       Therapy Frequency (OT Eval)  -- daily  -CS       Predicted Duration of Therapy Intervention (OT Eval)  -- until hospital discharge  -CS       Care Plan Review (OT)  -- evaluation/treatment results reviewed;care plan/treatment goals reviewed;risks/benefits reviewed;current/potential barriers reviewed  -CS       Anticipated Discharge Disposition (OT)  -- skilled nursing facility (SNF)  -CS          Vital Signs    O2 Delivery Intra Treatment  -- supplemental O2  -CS       Post SpO2 (%)  -- 94  -CS          Planned OT Interventions    Planned Therapy Interventions (OT  Jeanineal)  -- adaptive equipment training;BADL retraining;functional balance retraining;occupation/activity based interventions;ROM/therapeutic exercise;strengthening exercise;transfer/mobility retraining  -CS          OT Goals    Bed Mobility Goal Selection (OT)  -- bed mobility, OT goal 1  -CS       Bathing Goal Selection (OT)  -- bathing, OT goal 1  -CS       Dressing Goal Selection (OT)  -- dressing, OT goal 1  -CS       Strength Goal Selection (OT)  -- strength, OT goal 1  -CS       Additional Documentation  -- Strength Goal Selection (OT) (Row)  -CS          Bed Mobility Goal 1 (OT)    Activity/Assistive Device (Bed Mobility Goal 1, OT)  -- rolling to left;rolling to right;sit to supine/supine to sit;bed rails  -CS       [place] Level/Cues Needed (Bed Mobility Goal 1, OT)  -- minimum assist (75% or more patient effort);verbal cues required  -CS       Time Frame (Bed Mobility Goal 1, OT)  -- 2 weeks  -CS       Barriers (Bed Mobility Goal 1, OT)  -- .  -CS       Progress/Outcomes (Bed Mobility Goal 1, OT)  -- goal ongoing  -CS          Bathing Goal 1 (OT)    Activity/Assistive Device (Bathing Goal 1, OT)  -- bathing skills, all   sitting EOB  -CS       [place] Level/Cues Needed (Bathing Goal 1, OT)  -- moderate assist (50-74% patient effort)  -CS       Time Frame (Bathing Goal 1, OT)  -- 2 weeks  -CS       Barriers (Bathing Goal 1, OT)  -- .  -CS       Progress/Outcomes (Bathing Goal 1, OT)  -- goal ongoing  -CS          Dressing Goal 1 (OT)    Activity/Assistive Device (Dressing Goal 1, OT)  -- upper body dressing   sitting EOB  -CS       [place]/Cues Needed (Dressing Goal 1, OT)  -- set-up required;supervision required  -CS       Time Frame (Dressing Goal 1, OT)  -- 2 weeks  -CS       Progress/Outcome (Dressing Goal 1, OT)  -- goal ongoing  -CS          Strength Goal 1 (OT)    Strength Goal 1 (OT)  -- Pt will increase BUE strength to 4/5 for increased I with ADLs and functional mobility  -CS        Time Frame (Strength Goal 1, OT)  -- 2 weeks  -CS       Barriers (Strength Goal 1, OT)  -- .  -CS       Progress/Outcome (Strength Goal 1, OT)  -- goal ongoing  -CS         User Key  (r) = Recorded By, (t) = Taken By, (c) = Cosigned By    Initials Name Effective Dates    TB Tay Lau, PT 08/02/16 -     CS Kathleen Benson, OTR/L 08/02/16 -     DB Thuy Dobbins, RNA 06/02/17 -     SANDRA Joshi RN 08/02/16 -     SAVANNA Pro, RNA 05/26/17 -            Occupational Therapy Education     Title: PT OT SLP Therapies (Active)     Topic: Occupational Therapy (Active)     Point: ADL training (Done)     Description: Instruct learner(s) on proper safety adaptation and remediation techniques during self care or transfers.   Instruct in proper use of assistive devices.   Learning Progress Summary     Learner Status Readiness Method Response Comment Documented by    Patient Done Acceptance E VU,NR benefits of activity, OT POC, bed mobility, strengthening, safety  04/01/18 0946                      User Key     Initials Effective Dates Name Provider Type Discipline     08/02/16 -  Kathleen Benson, OTR/L Occupational Therapist OT                  OT Recommendation and Plan  Outcome Summary/Treatment Plan (OT)  Anticipated Discharge Disposition (OT): skilled nursing facility (SNF)  Planned Therapy Interventions (OT Eval): adaptive equipment training, BADL retraining, functional balance retraining, occupation/activity based interventions, ROM/therapeutic exercise, strengthening exercise, transfer/mobility retraining  Therapy Frequency (OT Eval): daily  Plan of Care Review  Plan of Care Reviewed With: patient  Plan of Care Reviewed With: patient  Outcome Summary: OT evaluation completed.  Pt demo need for skilled OT services due to requiring mod-max A x2 for bed mobility and max A for all lower body ADLs to include dressing, and likely bathing and toileting as well.  Pt is limited due to her pain in RLE,  weight bearing status, impaired strength, and decreased AROM.  Pt was able to sit EOB x15 min with SBA/CGA.  OT will cont to follow to increaser her overall BUE strength and sitting tolerance for increased I with ADLs and functional mobility.  It is recommended for pt to discharge to SNF upon hospital discharge.          Outcome Measures     Row Name 04/01/18 0900 04/01/18 0828          How much help from another person do you currently need...    Turning from your back to your side while in flat bed without using bedrails? 2  -TB  --     Moving from lying on back to sitting on the side of a flat bed without bedrails? 2  -TB  --     Moving to and from a bed to a chair (including a wheelchair)? 1  -TB  --     Standing up from a chair using your arms (e.g., wheelchair, bedside chair)? 1  -TB  --     Climbing 3-5 steps with a railing? 1  -TB  --     To walk in hospital room? 1  -TB  --     AM-PAC 6 Clicks Score 8  -TB  --        How much help from another is currently needed...    Putting on and taking off regular lower body clothing?  -- 1  -CS     Bathing (including washing, rinsing, and drying)  -- 2  -CS     Toileting (which includes using toilet bed pan or urinal)  -- 1  -CS     Putting on and taking off regular upper body clothing  -- 3  -CS     Taking care of personal grooming (such as brushing teeth)  -- 3  -CS     Eating meals  -- 4  -CS     Score  -- 14  -CS        Functional Assessment    Outcome Measure Options AM-PAC 6 Clicks Basic Mobility (PT)  -TB AM-PAC 6 Clicks Daily Activity (OT)  -CS       User Key  (r) = Recorded By, (t) = Taken By, (c) = Cosigned By    Initials Name Provider Type    TB Tay Lau, PT Physical Therapist    CS Kathleen Benson OTR/L Occupational Therapist          Time Calculation:   OT Start Time: 0828  OT Stop Time: 0925  OT Time Calculation (min): 57 min    Therapy Charges for Today     Code Description Service Date Service Provider Modifiers Qty    34422919767 HC OT  SELFCARE CURRENT 4/1/2018 Kathleen Benson OTR/L GO, CK 1    76910359586 HC OT SELFCARE PROJECTED 4/1/2018 Kathleen S Marcelino OTR/L [place], CJ 1    78238480757 HC OT EVAL MOD COMPLEXITY 4 4/1/2018 Kathleen Benson OTR/L [place], KX 1          OT G-codes  OT Professional Judgement Used?: Yes  OT Functional Scales Options: AM-PAC 6 Clicks Daily Activity (OT)  Score: 14  Functional Limitation: Self care  Self Care Current Status (): At least 40 percent but less than 60 percent impaired, limited or restricted  Self Care Goal Status (): At least 20 percent but less than 40 percent impaired, limited or restricted    Kathleen Benson OTR/L  4/1/2018

## 2018-04-01 NOTE — THERAPY EVALUATION
Acute Care - Physical Therapy Initial Evaluation  [facility]     Patient Name: Tamy Sher  : 1930  MRN: [id number]  Today's Date: 2018   Onset of Illness/Injury or Date of Surgery: 18  Date of Referral to PT: 18  Referring Physician: Dr. Andrew      Admit Date: 3/30/2018    Visit Dx:     ICD-10-CM ICD-9-CM   1. Surgical wound infection, initial encounter T81.4XXA 998.59   2. Abnormality of gait and mobility R26.9 781.2   3. Other closed fracture of distal end of right femur, initial encounter S72.491A 821.29     Patient Active Problem List   Diagnosis   • Ischemic chest pain   • Pneumonia of both lungs due to infectious organism   • Fall   • Closed displaced comminuted fracture of shaft of right femur   • Pain of right thigh   • Class 2 obesity with serious comorbidity in adult   • Type 2 diabetes mellitus with neurologic complication, with long-term current use of insulin   • STUART (acute kidney injury)   • Chronic diastolic heart failure   • Pleural effusion, left   • Hypoxia   • Surgical wound infection, initial encounter     Past Medical History:   Diagnosis Date   • Constipation    • Diabetes mellitus    • Diarrhea    • Diastolic dysfunction, left ventricle    • Esophagitis    • Exertional shortness of breath    • Hyperlipidemia    • Hypertension    • Lumbar spondylitis    • Osteoarthritis    • Peripheral vascular disease    • Sinusitis    • Stroke      Past Surgical History:   Procedure Laterality Date   • FEMUR SUPRACONDYLAR FRACTURE REPAIR Right 3/10/2018    Procedure: FEMUR SUPRACONDYLAR NAIL;  Surgeon: Nima Bundy MD;  Location: [facility];  Service: Orthopedics   • GALLBLADDER SURGERY     • HYSTERECTOMY     • JOINT REPLACEMENT     • REPLACEMENT TOTAL KNEE Left         PT ASSESSMENT (last 72 hours)      Physical Therapy Evaluation     Row Name 18 0900          PT Evaluation Time/Intention    Subjective Information complains of;weakness;fatigue  -TB     Document Type  evaluation  -TB     Mode of Treatment physical therapy  -TB     Total Evaluation Minutes, Physical Therapy 60  -TB     Row Name 04/01/18 0900          General Information    Patient Profile Reviewed? yes  -TB     Onset of Illness/Injury or Date of Surgery 03/30/18  -TB     Referring Physician Dr. Andrew  -TB     Patient Observations alert;cooperative;agree to therapy  -TB     Patient/Family Observations Pt obese, in bed with NC, FC; right knee drsg intact, no redness or warmth noted; unstagable pressure sore right heel with black eschar; right heel protector in place  -TB     Prior Level of Function max assist:;dependent:  -TB     Equipment Currently Used at Home --   Pt has all needed equipment at [facility]  -TB     Pertinent History of Current Functional Problem Pt presents with post-surgical wound infection to R knee, XR revealed acute R distal femur fx; Pt had IM lisa placed on 3/10/18 following a fall for periprosthetic femur fx and has been NWB on RLE since  -TB     Existing Precautions/Restrictions fall;non-weight bearing   NWB RLE (per previous stay); clarification from Dr. Bundy  -TB     Risks Reviewed patient:;LOB;increased discomfort  -TB     Benefits Reviewed patient:;improve function;increase independence;increase strength;increase balance  -TB     Barriers to Rehab physical barrier  -TB     Row Name 04/01/18 0900          Relationship/Environment    Primary Source of Support/Comfort extended family  -TB     Lives With facility resident  -TB     Family Caregiver if Needed none  -TB     Row Name 04/01/18 0900          Resource/Environmental Concerns    Current Living Arrangements [facility] care [facility]  -TB     Row Name 04/01/18 0900          Cognitive Assessment/Intervention- PT/OT    Orientation Status (Cognition) oriented x 4  -TB     Follows Commands (Cognition) WNL  -TB     Row Name 04/01/18 0900          Safety Issues, Functional Mobility    Impairments Affecting Function (Mobility)  pain;endurance/activity tolerance;range of motion (ROM);strength  -TB     [facility] Name 04/01/18 0900          Mobility Assessment/Treatment    Extremity Weight-bearing Status right lower extremity  -TB     Right Lower Extremity (Weight-bearing Status) non weight-bearing (NWB)  -TB     [facility] Name 04/01/18 0900          Bed Mobility Assessment/Treatment    Bed Mobility Assessment/Treatment supine-sit-supine;scooting/bridging  -TB     Scooting/Bridging [place] (Bed Mobility) maximum assist (25% patient effort);2 person assist  -TB     Supine-Sit [place] (Bed Mobility) moderate assist (50% patient effort)  -TB     Sit-Supine [place] (Bed Mobility) maximum assist (25% patient effort)  -TB     Bed Mobility, Safety Issues decreased use of legs for bridging/pushing  -TB     Assistive Device (Bed Mobility) draw sheet;bed rails  -[facility] Name 04/01/18 0900          Transfer Assessment/Treatment    Comment (Transfers) deferred due to weakness  -TB     Row Name 04/01/18 0900          Gait/Stairs Assessment/Training    Comment (Gait/Stairs) deferred  -TB     Row Name 04/01/18 0900          General ROM    RT Lower Ext Rt Knee Extension/Flexion  -[facility] Name 04/01/18 0900          Right Lower Ext    Rt Knee Extension/Flexion PROM 10-45  -TB     Row Name 04/01/18 0900          General Assessment (Manual Muscle Testing)    Comment, General Manual Muscle Testing (MMT) Assessment not strong enough to support weight with LE or to bridge; able to generate quad contraction sustained on right  -TB     [facility] Name 04/01/18 0900          Vision Assessment/Intervention    Visual Impairment/Limitations corrective lenses full time  -TB     Row Name 04/01/18 0900          Pain Assessment    Additional Documentation Pain Scale: Numbers Pre/Post-Treatment (Group)  -TB     Row Name 04/01/18 0900          Pain Scale: Numbers Pre/Post-Treatment    Pain Scale: Numbers, Pretreatment 0/10 - no pain  -TB     Pain Location - Side Right  -TB      Pain Location --   tight  -TB     Pre/Post Treatment Pain Comment only pain with movement; no pain at rest  -TB     Row Name             Wound 03/10/18 0934 Right medial knee incision    Wound - Properties Group Date first assessed: 03/10/18  -SD Time first assessed: 0934  -SD Present On Admission : no  -SD, surgical wound  Side: Right  -SD Orientation: medial  -SD Location: knee  -SD Type: incision  -SD Additional Comments: adaptic, 4x4, soft roll, ace  -SD    Row Name             Wound 03/31/18 0043 Right knee incision    Wound - Properties Group Date first assessed: 03/31/18  -MB Time first assessed: 0043  -MB Present On Admission : yes;picture taken  -MB Side: Right  -MB Location: knee  -MB Type: incision  -MB Additional Comments: wound dehiscence  -MB    Row Name             [REMOVED] Wound 03/31/18 0040 Left heel    Wound - Properties Group Date first assessed: 03/31/18  -MB Time first assessed: 0040  -MB Present On Admission : yes;picture taken  -MB Side: Left  -MB Location: heel  -MB Stage, Pressure Injury: deep tissue injury  -MB Resolution Date: 03/31/18  -DB, this injury is on the right heel  Resolution Time: 1641  -DB    Row Name             Wound 03/31/18 0240 perirectal    Wound - Properties Group Date first assessed: 03/31/18  -MB Time first assessed: 0240  -MB Present On Admission : yes;picture taken  -MB Location: perirectal  -MB Stage, Pressure Injury: deep tissue injury  -MB    Row Name             Wound 03/31/18 0012 Right upper thigh surgical    Wound - Properties Group Date first assessed: 03/31/18  -MB Time first assessed: 0012  -MB Present On Admission : yes  -MB Side: Right  -MB Orientation: upper  -MB Location: thigh  -MB Type: surgical  -MB, open incision     Row Name 04/01/18 0900          Wound 03/31/18 1600 Right heel    Wound - Properties Group Date first assessed: 03/31/18  -DB Time first assessed: 1600  -DB Side: Right  -DB Location: heel  -DB Stage, Pressure Injury: deep tissue  injury  -DB    Dressing Appearance open to air   prevalon boot in place  -TB     Row Name 04/01/18 0900          Coping    Observed Emotional State calm;cooperative  -TB     Verbalized Emotional State acceptance  -TB     Row Name 04/01/18 0900          Plan of Care Review    Plan of Care Reviewed With patient  -TB     Row Name 04/01/18 0900          Physical Therapy Clinical Impression    Date of Referral to PT 03/31/18  -TB     PT Diagnosis (PT Clinical Impression) general weakness  -TB     Prognosis (PT Clinical Impression) per MD  -TB     Functional Level at Time of Evaluation (PT Clinical Impression) mod to max asst/dependent  -TB     Patient/Family Goals Statement (PT Clinical Impression) improve function, dec pain  -TB     Criteria for Skilled Interventions Met (PT Clinical Impression) yes  -TB     Pathology/Pathophysiology Noted (Describe Specifically for Each System) musculoskeletal  -TB     Impairments Found (describe specific impairments) gait, locomotion, and balance;aerobic capacity/endurance;muscle performance  -TB     Functional Limitations in Following Categories (Describe Specific Limitations) self-care;home management  -TB     Rehab Potential (PT Clinical Summary) good, to achieve stated therapy goals  -TB     Predicted Duration of Therapy (PT) LOS  -TB     Care Plan Review (PT) evaluation/treatment results reviewed  -TB     Row Name 04/01/18 0900          Vital Signs    O2 Delivery Intra Treatment supplemental O2  -TB     Post SpO2 (%) 94  -TB     Row Name 04/01/18 0900          Physical Therapy Goals    Bed Mobility Goal Selection (PT) bed mobility, PT goal 1  -TB     Transfer Goal Selection (PT) transfer, PT goal 1  -TB     Row Name 04/01/18 0900          Bed Mobility Goal 1 (PT)    Activity/Assistive Device (Bed Mobility Goal 1, PT) sit to supine/supine to sit  -TB     [place] Level/Cues Needed (Bed Mobility Goal 1, PT) minimum assist (75% or more patient effort)  -TB     Time Frame (Bed  Mobility Goal 1, PT) long term goal (LTG);by discharge  -TB     Barriers (Bed Mobility Goal 1, PT) physical  -TB     Progress/Outcomes (Bed Mobility Goal 1, PT) good progress toward goal  -TB     Row Name 04/01/18 0900          Transfer Goal 1 (PT)    Activity/Assistive Device (Transfer Goal 1, PT) wheelchair transfer   with sliding board  -TB     [place] Level/Cues Needed (Transfer Goal 1, PT) minimum assist (75% or more patient effort)  -TB     Time Frame (Transfer Goal 1, PT) long term goal (LTG);by discharge  -TB     Barriers (Transfers Goal 1, PT) physical  -TB     Progress/Outcome (Transfer Goal 1, PT) good progress toward goal  -TB     Row Name 04/01/18 0900          Patient Education Goal (PT)    Activity (Patient Education Goal, PT) Knowledgable of precautions of right leg and HEP for muscle contraction  -TB     [place]/Cues/Accuracy (Memory Goal 2, PT) demonstrates adequately  -TB     Time Frame (Patient Education Goal, PT) long term goal (LTG);by discharge  -TB     Barriers (Patient Education Goal, PT) physical  -TB     Progress/Outcome (Patient Education Goal, PT) continuing progress toward goal  -TB     Row Name 04/01/18 0900          Plan for Physical Therapy Intervention    Strengthening may try tilt table to work on standing, NWB right  -TB     Row Name 04/01/18 0900          Positioning and Restraints    Pre-Treatment Position in bed  -TB     Post Treatment Position bed  -TB     In Bed fowlers;call light within reach  -TB     Row Name 04/01/18 0900          Living Environment    Home Accessibility tub/shower is not walk in  -TB       User Key  (r) = Recorded By, (t) = Taken By, (c) = Cosigned By    Initials Name Provider Type    TB Tay Lau, PT Physical Therapist    DB Thuy Dobbins, RNA Registered Nurse    SANDRA Joshi, RN Registered Nurse    SAVANNA Pro, RNA Registered Nurse          Physical Therapy Education     Title: PT OT SLP Therapies (Active)     Topic:  Physical Therapy (Active)     Point: Mobility training (Active)    Learning Progress Summary     Learner Status Readiness Method Response Comment Documented by    Patient Active Acceptance E NR   04/01/18 0941          Point: Home exercise program (Active)    Learning Progress Summary     Learner Status Readiness Method Response Comment Documented by    Patient Active Acceptance E NR   04/01/18 0941          Point: Body mechanics (Active)    Learning Progress Summary     Learner Status Readiness Method Response Comment Documented by    Patient Active Acceptance E NR   04/01/18 0941          Point: Precautions (Active)    Learning Progress Summary     Learner Status Readiness Method Response Comment Documented by    Patient Active Acceptance E NR   04/01/18 0941                      User Key     Initials Effective Dates Name Provider Type Discipline     08/02/16 -  Tay Lau, PT Physical Therapist PT                PT Recommendation and Plan  Anticipated Discharge Disposition (PT): skilled nursing facility (SNF)  Planned Therapy Interventions (PT Eval): bed mobility training, transfer training, strengthening, ROM (range of motion)  Therapy Frequency (PT Clinical Impression): 2 times/day  Outcome Summary/Treatment Plan (PT)  Anticipated Equipment Needs at Discharge (PT):  (has equipment at ECF)  Anticipated Discharge Disposition (PT): skilled nursing facility (SNF)  Plan of Care Reviewed With: patient  Outcome Summary: PT eval done. She showed good effort at sitting at the EOB x 15 min. Worked on pushing through left leg to start to work left standing muscles. Advised nursing that a lift may not be the best option due to her right femur fracture but if it is used, then one person needs to be in charge of supporting her right femur to decrease the stress to her femur.           Outcome Measures     Row Name 04/01/18 0900 04/01/18 0828          How much help from another person do you currently need...     Turning from your back to your side while in flat bed without using bedrails? 2  -TB  --     Moving from lying on back to sitting on the side of a flat bed without bedrails? 2  -TB  --     Moving to and from a bed to a chair (including a wheelchair)? 1  -TB  --     Standing up from a chair using your arms (e.g., wheelchair, bedside chair)? 1  -TB  --     Climbing 3-5 steps with a railing? 1  -TB  --     To walk in hospital room? 1  -TB  --     AM-PAC 6 Clicks Score 8  -TB  --        How much help from another is currently needed...    Putting on and taking off regular lower body clothing?  -- 1  -CS     Bathing (including washing, rinsing, and drying)  -- 2  -CS     Toileting (which includes using toilet bed pan or urinal)  -- 1  -CS     Putting on and taking off regular upper body clothing  -- 3  -CS     Taking care of personal grooming (such as brushing teeth)  -- 3  -CS     Eating meals  -- 4  -CS     Score  -- 14  -CS        Functional Assessment    Outcome Measure Options AM-PAC 6 Clicks Basic Mobility (PT)  -TB AM-PAC 6 Clicks Daily Activity (OT)  -CS       User Key  (r) = Recorded By, (t) = Taken By, (c) = Cosigned By    Initials Name Provider Type    TB Tay Lau PT Physical Therapist    CS Kathleen Benson, OTR/L Occupational Therapist           Time Calculation:         PT Charges     Row Name 04/01/18 0946             Time Calculation    Start Time 0840  -TB      Stop Time 0940  -TB      Time Calculation (min) 60 min  -TB      PT Received On 04/01/18  -TB      PT Goal Re-Cert Due Date 04/11/18  -TB        User Key  (r) = Recorded By, (t) = Taken By, (c) = Cosigned By    Initials Name Provider Type    TB Tay Lau PT Physical Therapist          Therapy Charges for Today     Code Description Service Date Service Provider Modifiers Qty    72348844783 HC PT MOBILITY CURRENT 4/1/2018 Tay Lau PT GP, CM 1    99502227393 HC PT MOBILITY PROJECTED 4/1/2018 Tay Lau PT GP, CL 1     [id number]  PT EVAL MOD COMPLEXITY 4 4/1/2018 Tay Lau, PT GP, KX 1          PT G-Codes  Outcome Measure Options: AM-PAC 6 Clicks Basic Mobility (PT)  Score: 8  Functional Limitation: Mobility: Walking and moving around  Mobility: Walking and Moving Around Current Status (): At least 80 percent but less than 100 percent impaired, limited or restricted  Mobility: Walking and Moving Around Goal Status (): At least 60 percent but less than 80 percent impaired, limited or restricted      Tay Lau, PT  4/1/2018

## 2018-04-01 NOTE — PLAN OF CARE
Problem: Patient Care Overview  Goal: Plan of Care Review  Outcome: Ongoing (interventions implemented as appropriate)   04/01/18 0904   Coping/Psychosocial   Plan of Care Reviewed With patient   Plan of Care Review   Progress no change   OTHER   Outcome Summary OT evaluation completed. Pt demo need for skilled OT services due to requiring mod-max A x2 for bed mobility and max A for all lower body ADLs to include dressing, and likely bathing and toileting as well. Pt is limited due to her pain in RLE, weight bearing status, impaired strength, and decreased AROM. Pt was able to sit EOB x15 min with SBA/CGA. OT will cont to follow to increaser her overall BUE strength and sitting tolerance for increased I with ADLs and functional mobility. It is recommended for pt to discharge to SNF upon hospital discharge.

## 2018-04-01 NOTE — PROGRESS NOTES
This patient is post intramedullary nailing of a supracondylar periprosthetic femur fracture.  She presented back with infection to the distal aspect of her wound.  From a clinical standpoint she does not have an effusion and this does not appear to be osteomyelitis however there remains a possibility.  Her wound remained stable plan continue with dressing changes every shift and IV antibiotics

## 2018-04-01 NOTE — PROGRESS NOTES
"    [facility] Medicine Services  INPATIENT PROGRESS NOTE    Length of Stay: 1  Date of Admission: 3/30/2018  Primary Care Physician: Barry Foley MD    Subjective   Chief Complaint: follow up right leg wound  HPI   Dressing was just changed to her right leg wound. Family at bedside. Still no bowel movement. Pt states she feels \"ok.\" No other complaints.     Review of Systems   All pertinent negatives and positives are as above. All other systems have been reviewed and are negative unless otherwise stated.     Objective    Temp:  [97.8 °F (36.6 °C)-98.4 °F (36.9 °C)] 97.8 °F (36.6 °C)  Heart Rate:  [63-96] 89  Resp:  [16-22] 22  BP: (124-154)/(55-82) 150/79  Physical Exam   Constitutional: She is oriented to person, place, and time. She appears well-developed and well-nourished.   HENT:   Head: Normocephalic and atraumatic.   Eyes: Conjunctivae and EOM are normal. Pupils are equal, round, and reactive to light.   Neck: Neck supple. No JVD present. No thyromegaly present.   Cardiovascular: Normal rate, regular rhythm, normal heart sounds and intact distal pulses.  Exam reveals no gallop and no friction rub.    No murmur heard.  Pulmonary/Chest: Effort normal and breath sounds normal. No respiratory distress. She has no wheezes. She has no rales. She exhibits no tenderness.   Abdominal: Soft. Bowel sounds are normal. She exhibits distension. There is no tenderness. There is no rebound and no guarding.   Hyperactive bowel sounds.    Musculoskeletal: Normal range of motion. She exhibits no edema, tenderness or deformity.   Right leg with dry dressing noted.    Lymphadenopathy:     She has no cervical adenopathy.   Neurological: She is alert and oriented to person, place, and time. She displays normal reflexes. No cranial nerve deficit. She exhibits normal muscle tone.   Skin: Skin is warm and dry. No rash noted.   Psychiatric: She has a normal mood and affect. Her behavior is " normal. Judgment and thought content normal.     Results Review:  I have reviewed the labs, radiology results, and diagnostic studies.    Laboratory Data:     Results from last 7 days  Lab Units 04/01/18 0456 03/31/18  0513 03/30/18  1903   WBC 10*3/mm3 11.18* 14.01* 13.70*   HEMOGLOBIN g/dL 10.1* 11.1* 11.1*   HEMATOCRIT % 31.3* 34.6* 34.9*   PLATELETS 10*3/mm3 516* 567* 593*        Results from last 7 days  Lab Units 04/01/18  0456 03/31/18  0513 03/30/18  1903   SODIUM mmol/L 136 134* 130*   POTASSIUM mmol/L 4.7 4.8 5.3   CHLORIDE mmol/L 96* 95* 91*   CO2 mmol/L 36.0* 35.0* 37.0*   BUN mg/dL 30* 31* 36*   CREATININE mg/dL 0.73 0.75 0.73   CALCIUM mg/dL 8.7 8.8 8.7   BILIRUBIN mg/dL  --  0.5 0.5   ALK PHOS U/L  --  106 101   ALT (SGPT) U/L  --  35 37   AST (SGOT) U/L  --  46* 43   GLUCOSE mg/dL 114* 134* 167*       Culture Data:   Blood Culture   Date Value Ref Range Status   03/30/2018 No growth at 24 hours  Preliminary   03/30/2018 Abnormal Stain (A)  Final   03/30/2018 Mixed Gram Positive Manda (A)  Final     Urine Culture   Date Value Ref Range Status   03/31/2018 <10,000 CFU/mL Gram Negative Bacilli (A)  Preliminary     Wound Culture   Date Value Ref Range Status   03/30/2018 Heavy growth (4+) Staphylococcus aureus, MRSA (A)  Final       Radiology Data:   Imaging Results (last 24 hours)     ** No results found for the last 24 hours. **          I have reviewed the patient current medications.     Assessment/Plan   Assessment:  1. Right knee postoperative wound dehiscence- wound culture MRSA positive  2. Recent closed right femur fracture s/p IM nailing 3/10   3. Leukocytosis-improving.   4. Chronic diastolic heart failure with preserved ejection fraction  5. Insulin dependent Diabetes Mellitus, Type 2, hgb A1c 6.4  6. Atrial fibrillation (recent diagnosis)- chronically anticoagulated with Eliquis  7. Morbid obesity, BMI 40.4  8. Hypertension  9. Hyperlipidemia  10. Hyponatremia, improved  11. Macrocytic  anemia  12. Thrombocytosis, likely reactive  13. Constipation    Plan:  1. Vancomycin/Zosyn day 2  2. Will give milk and molasses enema  3. Blood culture shows contaminant.   4. Appreciate Dr. Bundy.   5. Continue dressing changes.   6. Has received 11 units lispro in the past 24 hours.     Discharge Planning: I expect the patient to be discharged to SNF in ? days.    Jody Hernandez, BULMARO   04/01/18   1:01 PM     Chart reviewed.   Patient examained   Agree with assessment and plan.    Manda Andrew,   04/01/18  3:15 PM

## 2018-04-01 NOTE — PROGRESS NOTES
"Infectious Diseases Progress Note    Patient:  Tamy Sher  YOB: 1930  MRN: [id number]   Admit date: 3/30/2018   Admitting Physician: Manda Andrew DO  Primary Care Physician: Barry Foley MD    Chief Complaint/Interval History:  She is without new symptoms.  She seems to be tolerating antibiotic treatment.  No new problems.  No rash or skin itching.    Intake/Output Summary (Last 24 hours) at 04/01/18 1703  Last data filed at 04/01/18 1222   Gross per 24 hour   Intake              150 ml   Output             1600 ml   Net            -1450 ml     Allergies: No Known Allergies  Current Scheduled Medications:     albuterol 1.25 mg Nebulization 4x Daily - RT   And      ipratropium 0.5 mg Nebulization 4x Daily - RT   apixaban 5 mg Oral Q12H   aspirin 81 mg Oral Daily   atorvastatin 20 mg Oral Nightly   budesonide-formoterol 2 puff Inhalation BID - RT   diltiaZEM 30 mg Oral Q6H   docusate sodium 100 mg Oral BID   folic acid 1 mg Oral Daily   furosemide 40 mg Oral Daily   gabapentin 300 mg Oral Daily With Breakfast   gabapentin 600 mg Oral Nightly   hydroxyurea 500 mg Oral Daily   insulin detemir 26 Units Subcutaneous Nightly   insulin lispro 2-7 Units Subcutaneous 4x Daily With Meals & Nightly   levothyroxine 50 mcg Oral Q AM   lisinopril 2.5 mg Oral Daily   metoprolol succinate XL 50 mg Oral Daily   nystatin  Topical Q12H   pantoprazole 40 mg Oral Q AM   piperacillin-tazobactam 3.375 g Intravenous Q8H   polyethylene glycol 17 g Oral Daily   tamsulosin 0.4 mg Oral Daily   vancomycin 1,750 mg Intravenous Q24H     Current PRN Medications:  dextrose  •  dextrose  •  glucagon (human recombinant)  •  magnesium citrate  •  Pharmacy to Dose Zosyn  •  sodium chloride    Review of Systems no cardiopulmonary complaints.    Vital Signs:  /64 (BP Location: Left arm, Patient Position: Lying)   Pulse 107   Temp 97.4 °F (36.3 °C) (Oral)   Resp 18   Ht 152.4 cm (60\")   Wt 93.9 kg (207 lb 0.2 oz)  " " SpO2 96%   BMI 40.43 kg/m²     Physical Exam  Vital signs reviewed.  Right knee exam stable to slightly improved.  Line/IV site: No erythema, warmth, induration, or tenderness.    Lab Results:  CBC:   Results from last 7 days  Lab Units 04/01/18  0456 03/31/18  0513 03/30/18  1903   WBC 10*3/mm3 11.18* 14.01* 13.70*   HEMOGLOBIN g/dL 10.1* 11.1* 11.1*   HEMATOCRIT % 31.3* 34.6* 34.9*   PLATELETS 10*3/mm3 516* 567* 593*     BMP:  Results from last 7 days  Lab Units 04/01/18  0456 03/31/18  0513 03/30/18  1903   SODIUM mmol/L 136 134* 130*   POTASSIUM mmol/L 4.7 4.8 5.3   CHLORIDE mmol/L 96* 95* 91*   CO2 mmol/L 36.0* 35.0* 37.0*   BUN mg/dL 30* 31* 36*   CREATININE mg/dL 0.73 0.75 0.73   GLUCOSE mg/dL 114* 134* 167*   CALCIUM mg/dL 8.7 8.8 8.7   ALT (SGPT) U/L  --  35 37     Culture Results:   Blood Culture   Date Value Ref Range Status   03/30/2018 No growth at 24 hours  Preliminary   03/30/2018 Abnormal Stain (A)  Final   03/30/2018 Mixed Gram Positive Manda (A)  Final     Urine Culture   Date Value Ref Range Status   03/31/2018 <10,000 CFU/mL Gram Negative Bacilli (A)  Preliminary     Wound Culture   Date Value Ref Range Status   03/30/2018 Heavy growth (4+) Staphylococcus aureus, MRSA (A)  Final   Susceptibility      Staphylococcus aureus, MRSA     FERN     Clindamycin >=8 ug/ml Resistant     Erythromycin >=8 ug/ml Resistant     Gentamicin <=0.5 ug/ml\"><=0.5 ug/ml Susceptible     Inducible Clindamycin Resistance NEG  Negative     Levofloxacin >=8 ug/ml Resistant     Oxacillin >=4 ug/ml Resistant     Penicillin G >=0.5 ug/ml Resistant     Tetracycline <=1 ug/ml\"><=1 ug/ml Susceptible     Trimethoprim + Sulfamethoxazole <=10 ug/ml\"><=10 ug/ml Susceptible     Vancomycin <=0.5 ug/ml\"><=0.5 ug/ml Susceptible      Radiology: None  Additional Studies Reviewed: None    Impression:   MRSA infection right knee.    Recommendations:   Continue IV vancomycin treatment    Ari Jaquez MD  "

## 2018-04-01 NOTE — PROGRESS NOTES
"Pharmacy Dosing Service  Antimicrobial  VANCOMYCIN     Assessment/Action/Plan:  Trough to be obtained prior to next dose (trough ordered for 23:00 tonight). Pharmacy will follow-up accordingly.       Subjective:  Tamy Sher is currently on VANCOMYCIN 1750 mg IV every 24 hours for the treatment of SSTI (postsurgical wound infection of right knee with dehiscence).     Complicating factors:  Concomitant Zosyn therapy  Advanced age  Morbid obesity  Diabetes mellitus    Objective:  87 y.o. female Ht: 152.4 cm (60\"); Wt: 93.9 kg (207 lb 0.2 oz) Body mass index is 40.43 kg/m².  Estimated Creatinine Clearance: 50.8 mL/min (by C-G formula based on SCr of 0.73 mg/dL).   Lab Results   Component Value Date    CREATININE 0.73 04/01/2018    CREATININE 0.75 03/31/2018    CREATININE 0.73 03/30/2018      Lab Results   Component Value Date    WBC 11.18 (H) 04/01/2018    WBC 14.01 (H) 03/31/2018    WBC 13.70 (H) 03/30/2018        Culture Results:  Microbiology Results (last 10 days)     Procedure Component Value - Date/Time    Urine Culture - Urine, Urine, Clean Catch [[id number]]  (Abnormal) Collected:  03/31/18 0520    Lab Status:  Preliminary result Specimen:  Urine from Urine, Clean Catch Updated:  04/01/18 0614     Urine Culture --      <10,000 CFU/mL Gram Negative Bacilli (A)    Narrative:       Probable contaminant, suggest recollection.    Blood Culture - Blood, [[id number]]  (Normal) Collected:  03/30/18 2154    Lab Status:  Preliminary result Specimen:  Blood from Arm, Left Updated:  04/01/18 0001     Blood Culture No growth at 24 hours    Blood Culture - Blood, [[id number]]  (Abnormal) Collected:  03/30/18 2153    Lab Status:  Final result Specimen:  Blood from Wrist, Left Updated:  04/01/18 0808     Blood Culture Abnormal Stain (A)      Mixed Gram Positive Manda (A)     Isolated from Aerobic and Anaerobic Bottles     Gram Stain Result Gram positive cocci    Narrative:       Probable contaminant    Blood Culture ID, PCR - " Blood, [[id number]]  (Abnormal) Collected:  03/30/18 2153    Lab Status:  Final result Specimen:  Blood from Wrist, Left Updated:  03/31/18 1659     BCID, PCR Streptococcus spp, not A, B, or pneumoniae. Identification by BCID PCR. (C)     BCID, PCR 2 Staphylococcus spp, not aureus. Identification by BCID PCR. (C)    Wound Culture - Surgical Site, Knee, Right [[id number]]  (Abnormal)  (Susceptibility) Collected:  03/30/18 1903    Lab Status:  Final result Specimen:  Surgical Site from Knee, Right Updated:  04/01/18 0653     Wound Culture --      Heavy growth (4+) Staphylococcus aureus, MRSA (A)     BETA LACTAMASE Positive     Gram Stain Result Few (2+) WBCs seen      Few (2+) Gram positive cocci    Susceptibility      Staphylococcus aureus, MRSA     FERN     Clindamycin >=8 ug/ml Resistant     Erythromycin >=8 ug/ml Resistant     Gentamicin <=0.5 ug/ml Susceptible     Inducible Clindamycin Resistance NEG  Negative     Levofloxacin >=8 ug/ml Resistant     Oxacillin >=4 ug/ml Resistant     Penicillin G >=0.5 ug/ml Resistant     Tetracycline <=1 ug/ml Susceptible     Trimethoprim + Sulfamethoxazole <=10 ug/ml Susceptible     Vancomycin <=0.5 ug/ml Susceptible                          Ata Lincoln RPH PharmD  04/01/18 10:11 AM

## 2018-04-02 LAB
ANION GAP SERPL CALCULATED.3IONS-SCNC: 5 MMOL/L (ref 4–13)
ANISOCYTOSIS BLD QL: ABNORMAL
BACTERIA SPEC AEROBE CULT: ABNORMAL
BASOPHILS # BLD MANUAL: 0.24 10*3/MM3 (ref 0–0.2)
BASOPHILS NFR BLD AUTO: 2.1 % (ref 0–2)
BUN BLD-MCNC: 26 MG/DL (ref 5–21)
BUN/CREAT SERPL: 35.6 (ref 7–25)
CALCIUM SPEC-SCNC: 8.8 MG/DL (ref 8.4–10.4)
CHLORIDE SERPL-SCNC: 96 MMOL/L (ref 98–110)
CO2 SERPL-SCNC: 32 MMOL/L (ref 24–31)
CREAT BLD-MCNC: 0.73 MG/DL (ref 0.5–1.4)
DEPRECATED RDW RBC AUTO: 66.9 FL (ref 40–54)
EOSINOPHIL # BLD MANUAL: 0.24 10*3/MM3 (ref 0–0.7)
EOSINOPHIL NFR BLD MANUAL: 2.1 % (ref 0–7)
ERYTHROCYTE [DISTWIDTH] IN BLOOD BY AUTOMATED COUNT: 18.1 % (ref 12–15)
GFR SERPL CREATININE-BSD FRML MDRD: 75 ML/MIN/1.73
GIANT PLATELETS: ABNORMAL
GLUCOSE BLD-MCNC: 58 MG/DL (ref 70–100)
GLUCOSE BLDC GLUCOMTR-MCNC: 128 MG/DL (ref 70–130)
GLUCOSE BLDC GLUCOMTR-MCNC: 156 MG/DL (ref 70–130)
GLUCOSE BLDC GLUCOMTR-MCNC: 193 MG/DL (ref 70–130)
GLUCOSE BLDC GLUCOMTR-MCNC: 208 MG/DL (ref 70–130)
GLUCOSE BLDC GLUCOMTR-MCNC: 45 MG/DL (ref 70–130)
GLUCOSE BLDC GLUCOMTR-MCNC: 50 MG/DL (ref 70–130)
HCT VFR BLD AUTO: 32 % (ref 37–47)
HGB BLD-MCNC: 10.3 G/DL (ref 12–16)
HYPOCHROMIA BLD QL: ABNORMAL
LYMPHOCYTES # BLD MANUAL: 2.48 10*3/MM3 (ref 0.8–7)
LYMPHOCYTES NFR BLD MANUAL: 21.9 % (ref 24–44)
LYMPHOCYTES NFR BLD MANUAL: 7.3 % (ref 0–12)
MACROCYTES BLD QL SMEAR: ABNORMAL
MCH RBC QN AUTO: 32.6 PG (ref 28–32)
MCHC RBC AUTO-ENTMCNC: 32.2 G/DL (ref 33–36)
MCV RBC AUTO: 101.3 FL (ref 82–98)
MONOCYTES # BLD AUTO: 0.83 10*3/MM3 (ref 0–1)
NEUTROPHILS # BLD AUTO: 7.55 10*3/MM3 (ref 1–11)
NEUTROPHILS NFR BLD MANUAL: 65.6 % (ref 37–80)
NEUTS BAND NFR BLD MANUAL: 1 % (ref 0–5)
PLATELET # BLD AUTO: 508 10*3/MM3 (ref 130–400)
PMV BLD AUTO: 10 FL (ref 6–12)
POTASSIUM BLD-SCNC: 5 MMOL/L (ref 3.5–5.3)
RBC # BLD AUTO: 3.16 10*6/MM3 (ref 4.2–5.4)
SMALL PLATELETS BLD QL SMEAR: ABNORMAL
SODIUM BLD-SCNC: 133 MMOL/L (ref 135–145)
WBC MORPH BLD: NORMAL
WBC NRBC COR # BLD: 11.32 10*3/MM3 (ref 4.8–10.8)

## 2018-04-02 PROCEDURE — 94799 UNLISTED PULMONARY SVC/PX: CPT

## 2018-04-02 PROCEDURE — 63710000001 INSULIN DETEMIR PER 5 UNITS: Performed by: NURSE PRACTITIONER

## 2018-04-02 PROCEDURE — 85007 BL SMEAR W/DIFF WBC COUNT: CPT | Performed by: NURSE PRACTITIONER

## 2018-04-02 PROCEDURE — 94760 N-INVAS EAR/PLS OXIMETRY 1: CPT

## 2018-04-02 PROCEDURE — 80048 BASIC METABOLIC PNL TOTAL CA: CPT | Performed by: NURSE PRACTITIONER

## 2018-04-02 PROCEDURE — 94640 AIRWAY INHALATION TREATMENT: CPT

## 2018-04-02 PROCEDURE — 97535 SELF CARE MNGMENT TRAINING: CPT

## 2018-04-02 PROCEDURE — 25010000002 PIPERACILLIN SOD-TAZOBACTAM PER 1 G: Performed by: FAMILY MEDICINE

## 2018-04-02 PROCEDURE — 82962 GLUCOSE BLOOD TEST: CPT

## 2018-04-02 PROCEDURE — 25010000002 VANCOMYCIN PER 500 MG: Performed by: FAMILY MEDICINE

## 2018-04-02 PROCEDURE — 85025 COMPLETE CBC W/AUTO DIFF WBC: CPT | Performed by: NURSE PRACTITIONER

## 2018-04-02 PROCEDURE — 63710000001 INSULIN LISPRO (HUMAN) PER 5 UNITS: Performed by: FAMILY MEDICINE

## 2018-04-02 PROCEDURE — 97530 THERAPEUTIC ACTIVITIES: CPT

## 2018-04-02 RX ADMIN — IPRATROPIUM BROMIDE 0.5 MG: 0.5 SOLUTION RESPIRATORY (INHALATION) at 19:15

## 2018-04-02 RX ADMIN — GABAPENTIN 600 MG: 300 CAPSULE ORAL at 21:08

## 2018-04-02 RX ADMIN — APIXABAN 5 MG: 5 TABLET, FILM COATED ORAL at 21:08

## 2018-04-02 RX ADMIN — FOLIC ACID 1 MG: 1 TABLET ORAL at 08:46

## 2018-04-02 RX ADMIN — NYSTATIN: 100000 POWDER TOPICAL at 09:00

## 2018-04-02 RX ADMIN — DOCUSATE SODIUM 100 MG: 100 CAPSULE, LIQUID FILLED ORAL at 08:46

## 2018-04-02 RX ADMIN — POLYETHYLENE GLYCOL (3350) 17 G: 17 POWDER, FOR SOLUTION ORAL at 08:46

## 2018-04-02 RX ADMIN — IPRATROPIUM BROMIDE 0.5 MG: 0.5 SOLUTION RESPIRATORY (INHALATION) at 15:57

## 2018-04-02 RX ADMIN — DILTIAZEM HYDROCHLORIDE 30 MG: 30 TABLET, FILM COATED ORAL at 18:00

## 2018-04-02 RX ADMIN — INSULIN DETEMIR 23 UNITS: 100 INJECTION, SOLUTION SUBCUTANEOUS at 22:14

## 2018-04-02 RX ADMIN — GABAPENTIN 300 MG: 300 CAPSULE ORAL at 08:46

## 2018-04-02 RX ADMIN — PANTOPRAZOLE SODIUM 40 MG: 40 TABLET, DELAYED RELEASE ORAL at 05:21

## 2018-04-02 RX ADMIN — ATORVASTATIN CALCIUM 20 MG: 10 TABLET, FILM COATED ORAL at 22:13

## 2018-04-02 RX ADMIN — APIXABAN 5 MG: 5 TABLET, FILM COATED ORAL at 08:46

## 2018-04-02 RX ADMIN — HYDROXYUREA 500 MG: 500 CAPSULE ORAL at 08:46

## 2018-04-02 RX ADMIN — DILTIAZEM HYDROCHLORIDE 30 MG: 30 TABLET, FILM COATED ORAL at 00:26

## 2018-04-02 RX ADMIN — DOCUSATE SODIUM 100 MG: 100 CAPSULE, LIQUID FILLED ORAL at 21:08

## 2018-04-02 RX ADMIN — BUDESONIDE AND FORMOTEROL FUMARATE DIHYDRATE 2 PUFF: 160; 4.5 AEROSOL RESPIRATORY (INHALATION) at 07:07

## 2018-04-02 RX ADMIN — LISINOPRIL 2.5 MG: 2.5 TABLET ORAL at 08:46

## 2018-04-02 RX ADMIN — DEXTROSE MONOHYDRATE 25 G: 500 INJECTION PARENTERAL at 07:53

## 2018-04-02 RX ADMIN — DILTIAZEM HYDROCHLORIDE 30 MG: 30 TABLET, FILM COATED ORAL at 05:21

## 2018-04-02 RX ADMIN — ASPIRIN 81 MG 81 MG: 81 TABLET ORAL at 08:46

## 2018-04-02 RX ADMIN — INSULIN LISPRO 3 UNITS: 100 INJECTION, SOLUTION INTRAVENOUS; SUBCUTANEOUS at 18:00

## 2018-04-02 RX ADMIN — LEVOTHYROXINE SODIUM 50 MCG: 50 TABLET ORAL at 05:22

## 2018-04-02 RX ADMIN — BUDESONIDE AND FORMOTEROL FUMARATE DIHYDRATE 2 PUFF: 160; 4.5 AEROSOL RESPIRATORY (INHALATION) at 19:15

## 2018-04-02 RX ADMIN — ALBUTEROL SULFATE 1.25 MG: 2.5 SOLUTION RESPIRATORY (INHALATION) at 07:07

## 2018-04-02 RX ADMIN — TAZOBACTAM SODIUM AND PIPERACILLIN SODIUM 3.38 G: 375; 3 INJECTION, SOLUTION INTRAVENOUS at 03:38

## 2018-04-02 RX ADMIN — TAMSULOSIN HYDROCHLORIDE 0.4 MG: 0.4 CAPSULE ORAL at 08:46

## 2018-04-02 RX ADMIN — VANCOMYCIN HYDROCHLORIDE 1750 MG: 1 INJECTION, POWDER, LYOPHILIZED, FOR SOLUTION INTRAVENOUS at 00:26

## 2018-04-02 RX ADMIN — METOPROLOL SUCCINATE 50 MG: 50 TABLET, FILM COATED, EXTENDED RELEASE ORAL at 08:46

## 2018-04-02 RX ADMIN — FUROSEMIDE 40 MG: 40 TABLET ORAL at 08:46

## 2018-04-02 RX ADMIN — NYSTATIN 1 APPLICATION: 100000 POWDER TOPICAL at 21:09

## 2018-04-02 RX ADMIN — INSULIN LISPRO 2 UNITS: 100 INJECTION, SOLUTION INTRAVENOUS; SUBCUTANEOUS at 22:13

## 2018-04-02 RX ADMIN — IPRATROPIUM BROMIDE 0.5 MG: 0.5 SOLUTION RESPIRATORY (INHALATION) at 07:07

## 2018-04-02 RX ADMIN — ALBUTEROL SULFATE 1.25 MG: 2.5 SOLUTION RESPIRATORY (INHALATION) at 19:15

## 2018-04-02 RX ADMIN — IPRATROPIUM BROMIDE 0.5 MG: 0.5 SOLUTION RESPIRATORY (INHALATION) at 10:43

## 2018-04-02 RX ADMIN — COLLAGENASE SANTYL: 250 OINTMENT TOPICAL at 14:00

## 2018-04-02 RX ADMIN — DILTIAZEM HYDROCHLORIDE 30 MG: 30 TABLET, FILM COATED ORAL at 12:00

## 2018-04-02 RX ADMIN — ALBUTEROL SULFATE 1.25 MG: 2.5 SOLUTION RESPIRATORY (INHALATION) at 15:57

## 2018-04-02 RX ADMIN — ALBUTEROL SULFATE 1.25 MG: 2.5 SOLUTION RESPIRATORY (INHALATION) at 10:43

## 2018-04-02 NOTE — PROGRESS NOTES
[facility] Medicine Services  INPATIENT PROGRESS NOTE    Length of Stay: 2  Date of Admission: 3/30/2018  Primary Care Physician: Barry Foley MD    Subjective   Chief Complaint: follow up surgical wound infection.  HPI   Pt had large bowel movement with milk and molasses enema yesterday. She has been up in neuro chair. No family at bedside. She states she feels pretty good. Is chronically on oxygen at 2L.     Review of Systems   All pertinent negatives and positives are as above. All other systems have been reviewed and are negative unless otherwise stated.     Objective    Temp:  [97.4 °F (36.3 °C)-98.8 °F (37.1 °C)] 97.7 °F (36.5 °C)  Heart Rate:  [] 88  Resp:  [16-22] 16  BP: (130-146)/(57-77) 130/60  Physical Exam   Constitutional: She is oriented to person, place, and time. She appears well-developed and well-nourished.   HENT:   Head: Normocephalic and atraumatic.   Eyes: Conjunctivae and EOM are normal. Pupils are equal, round, and reactive to light.   Neck: Neck supple. No JVD present. No thyromegaly present.   Cardiovascular: Normal rate, regular rhythm, normal heart sounds and intact distal pulses.  Exam reveals no gallop and no friction rub.    No murmur heard.  afib 80-91 on telemetry.    Pulmonary/Chest: Effort normal and breath sounds normal. No respiratory distress. She has no wheezes. She has no rales. She exhibits no tenderness.   Abdominal: Soft. Bowel sounds are normal. She exhibits no distension. There is no tenderness. There is no rebound and no guarding.   Musculoskeletal: Normal range of motion. She exhibits no edema, tenderness or deformity.   Lymphadenopathy:     She has no cervical adenopathy.   Neurological: She is alert and oriented to person, place, and time. She displays normal reflexes. No cranial nerve deficit. She exhibits normal muscle tone.   Skin: Skin is warm and dry. No rash noted. There is erythema (open area bottom of incision  with mild surrounding erythema. ).   Psychiatric: She has a normal mood and affect. Her behavior is normal. Judgment and thought content normal.     Results Review:  I have reviewed the labs, radiology results, and diagnostic studies.    Laboratory Data:     Results from last 7 days  Lab Units 04/02/18 0455 04/01/18 0456 03/31/18  0513   WBC 10*3/mm3 11.32* 11.18* 14.01*   HEMOGLOBIN g/dL 10.3* 10.1* 11.1*   HEMATOCRIT % 32.0* 31.3* 34.6*   PLATELETS 10*3/mm3 508* 516* 567*       Results from last 7 days  Lab Units 04/02/18 0455 04/01/18 0456 03/31/18  0513 03/30/18  1903   SODIUM mmol/L 133* 136 134* 130*   POTASSIUM mmol/L 5.0 4.7 4.8 5.3   CHLORIDE mmol/L 96* 96* 95* 91*   CO2 mmol/L 32.0* 36.0* 35.0* 37.0*   BUN mg/dL 26* 30* 31* 36*   CREATININE mg/dL 0.73 0.73 0.75 0.73   CALCIUM mg/dL 8.8 8.7 8.8 8.7   BILIRUBIN mg/dL  --   --  0.5 0.5   ALK PHOS U/L  --   --  106 101   ALT (SGPT) U/L  --   --  35 37   AST (SGOT) U/L  --   --  46* 43   GLUCOSE mg/dL 58* 114* 134* 167*     Culture Data:   Blood Culture   Date Value Ref Range Status   03/30/2018 No growth at 2 days  Preliminary   03/30/2018 Abnormal Stain (A)  Final   03/30/2018 Mixed Gram Positive Manda (A)  Final     Urine Culture   Date Value Ref Range Status   03/31/2018 <10,000 CFU/mL Gram Negative Bacilli (A)  Final   03/31/2018 <10,000 CFU/mL Mixed Gram Positive Manda (A)  Final     Wound Culture   Date Value Ref Range Status   03/30/2018 Heavy growth (4+) Staphylococcus aureus, MRSA (A)  Final     Radiology Data:   Imaging Results (last 24 hours)     ** No results found for the last 24 hours. **        I have reviewed the patient current medications.     Assessment/Plan   Assessment:  1. Right knee postoperative wound dehiscence- wound culture MRSA positive  2. Recent closed right femur fracture s/p IM nailing 3/10   3. Leukocytosis-improving.   4. Chronic diastolic heart failure with preserved ejection fraction  5. Insulin dependent Diabetes  Mellitus, Type 2, hgb A1c 6.4  6. Atrial fibrillation (recent diagnosis)- chronically anticoagulated with Eliquis  7. Morbid obesity, BMI 40.4  8. Hypertension  9. Hyperlipidemia  10. Hyponatremia  11. Macrocytic anemia  12. Thrombocytosis, likely reactive  13. Constipation     Plan:  1. Vancomycin day 3. Will discontinue Zosyn  2. Dr. Turner to assess today.   3. Will await Dr. Mead's antibiotic options and hopefully will discharge to [facility] in 1-2 days.   4. Labs in am.     Discharge Planning: I expect the patient to be discharged to SNF in ? days.    BULMARO Cortés   04/02/18   10:43 AM   I personally evaluated and examined the patient in conjunction with BULMARO Wylie and agree with the assessment, treatment plan, and disposition of the patient as recorded by her. My history, exam, and further recommendations are:     She is doing ok  Afebrile  NAD  Eating breakfast  Vitals:    04/02/18 1225   BP: 165/85   Pulse: 67   Resp: 16   Temp: 97.8 °F (36.6 °C)   SpO2: 98%   She is on abx for MRSA wound infection on right knee.  Appreciate ID input.  There is serous discharge.  No pus.  Minimal surrounding erythema.  The rest of the wound on upper thigh and lateral distal thigh  looks well.   [facility]      Fidencio Rodriguez MD  04/02/18  2:17 PM

## 2018-04-02 NOTE — CONSULTS
Thiss an 87-year-old  female patient who3 weeks ago had a repair of a supra condylar fracture of the right femur and has developed open wounds of the right hip and anterior right knee area.  Both wounds are covered with thick, necrotic slough with moderate inflammation.    Recommendation: Daily dressing with SANTYL for enzymatic debridement, Adaptic and a border gauze.

## 2018-04-02 NOTE — CONSULTS
Consult  [facility]      Tamy Sher (6/7/1930)  4/2/2018    Reason for Consult: wound infection right knee  Requesting Physician: He Reich MD      CHIEF COMPLAINT:  Right knee pain    History Obtained From: chart family     HISTORY OF PRESENT ILLNESS:                The patient is a 87 y.o. female who presents with above chief complaint. She is posted a fixation of a som-prosthetic femur fracture on 3/13/2018 with an IM nail.  She has progressed well post-operatively.  The patient was recently noted to have a foul smelling drainage and redness from the distal incision site.  She was [facility] evaluation and has been admitted.  MRSA was cultured from the incision site in the emergency department.    Orthopedics was consulted on this patient.    Past Medical History:    Past Medical History:   Diagnosis Date   • Constipation    • Diabetes mellitus    • Diarrhea    • Diastolic dysfunction, left ventricle    • Esophagitis    • Exertional shortness of breath    • Hyperlipidemia    • Hypertension    • Lumbar spondylitis    • Osteoarthritis    • Peripheral vascular disease    • Sinusitis    • Stroke        Past Surgical History:    Past Surgical History:   Procedure Laterality Date   • FEMUR SUPRACONDYLAR FRACTURE REPAIR Right 3/10/2018    Procedure: FEMUR SUPRACONDYLAR NAIL;  Surgeon: Nima Bundy MD;  Location: [facility];  Service: Orthopedics   • GALLBLADDER SURGERY     • HYSTERECTOMY     • JOINT REPLACEMENT     • REPLACEMENT TOTAL KNEE Left        Current Medications:     Current Facility-Administered Medications:   •  albuterol (PROVENTIL) nebulizer solution 0.5% 2.5 mg/0.5mL, 1.25 mg, Nebulization, 4x Daily - RT, 1.25 mg at 04/02/18 1557 **AND** ipratropium (ATROVENT) nebulizer solution 0.5 mg, 0.5 mg, Nebulization, 4x Daily - RT, Latoya Emery APRN, 0.5 mg at 04/02/18 1557  •  apixaban (ELIQUIS) tablet 5 mg, 5 mg, Oral,  Q12H, He Reich MD, 5 mg at 04/02/18 0846  •  aspirin chewable tablet 81 mg, 81 mg, Oral, Daily, He Reich MD, 81 mg at 04/02/18 0846  •  atorvastatin (LIPITOR) tablet 20 mg, 20 mg, Oral, Nightly, He Reich MD, 20 mg at 04/01/18 2101  •  budesonide-formoterol (SYMBICORT) 160-4.5 MCG/ACT inhaler 2 puff, 2 puff, Inhalation, BID - RT, He Reich MD, 2 puff at 04/02/18 0707  •  collagenase ointment, , Topical, Q24H, Joseph Turner MD  •  dextrose (D50W) solution 25 g, 25 g, Intravenous, Q15 Min PRN, He Reich MD, 25 g at 04/02/18 0753  •  dextrose (GLUTOSE) oral gel 15 g, 15 g, Oral, Q15 Min PRN, He Reich MD  •  diltiaZEM (CARDIZEM) tablet 30 mg, 30 mg, Oral, Q6H, He Reich MD, 30 mg at 04/02/18 1200  •  docusate sodium (COLACE) capsule 100 mg, 100 mg, Oral, BID, Manda Andrew DO, 100 mg at 04/02/18 0846  •  folic acid (FOLVITE) tablet 1 mg, 1 mg, Oral, Daily, He Reich MD, 1 mg at 04/02/18 0846  •  furosemide (LASIX) tablet 40 mg, 40 mg, Oral, Daily, He Reich MD, 40 mg at 04/02/18 0846  •  gabapentin (NEURONTIN) capsule 300 mg, 300 mg, Oral, Daily With Breakfast, He Reich MD, 300 mg at 04/02/18 0846  •  gabapentin (NEURONTIN) capsule 600 mg, 600 mg, Oral, Nightly, He Reich MD, 600 mg at 04/01/18 2101  •  glucagon (human recombinant) (GLUCAGEN DIAGNOSTIC) injection 1 mg, 1 mg, Subcutaneous, PRN, He Reich MD  •  hydroxyurea (HYDREA) capsule 500 mg, 500 mg, Oral, Daily, He Reich MD, 500 mg at 04/02/18 0846  •  insulin detemir (LEVEMIR) injection 23 Units, 23 Units, Subcutaneous, Nightly, BULMARO Cortés  •  insulin lispro (humaLOG) injection 2-7 Units, 2-7 Units, Subcutaneous, 4x Daily With Meals & Nightly, He Reich MD, 3 Units at 04/01/18 2100  •  levothyroxine (SYNTHROID, LEVOTHROID) tablet 50 mcg, 50 mcg, Oral, Q AM, He Reich MD, 50 mcg at 04/02/18 0522  •  lisinopril  (PRINIVIL,ZESTRIL) tablet 2.5 mg, 2.5 mg, Oral, Daily, He Reich MD, 2.5 mg at 04/02/18 0846  •  magnesium citrate solution 296 mL, 296 mL, Oral, Daily PRN, Manda Andrew DO, 296 mL at 03/31/18 1436  •  metoprolol succinate XL (TOPROL-XL) 24 hr tablet 50 mg, 50 mg, Oral, Daily, He Reich MD, 50 mg at 04/02/18 0846  •  nystatin (MYCOSTATIN) powder, , Topical, Q12H, Manda Andrew DO  •  pantoprazole (PROTONIX) EC tablet 40 mg, 40 mg, Oral, Q AM, He Reich MD, 40 mg at 04/02/18 0521  •  polyethylene glycol 3350 powder (packet), 17 g, Oral, Daily, Manda Andrew DO, 17 g at 04/02/18 0846  •  sodium chloride 0.9 % flush 1-10 mL, 1-10 mL, Intravenous, PRN, He Reich MD  •  tamsulosin (FLOMAX) 24 hr capsule 0.4 mg, 0.4 mg, Oral, Daily, He Reich MD, 0.4 mg at 04/02/18 0846  •  vancomycin (VANCOCIN) 1,750 mg in sodium chloride 0.9 % 500 mL IVPB, 1,750 mg, Intravenous, Q24H, He Reich MD, 1,750 mg at 04/02/18 0026    Allergies:  Review of patient's allergies indicates no known allergies.    Social History:   Social History     Social History   • Marital status:      Social History Main Topics   • Smoking status: Never Smoker   • Smokeless tobacco: Never Used   • Alcohol use No   • Drug use: No   • Sexual activity: No     Other Topics Concern   • Not on file       Family History:   Family History   Problem Relation Age of Onset   • No Known Problems Mother    • No Known Problems Father        REVIEW OF SYSTEMS:    All systems were reviewed and negative except for:  Musculoskeletal: positive for  joint pain, joint swelling and See HPI    PHYSICAL EXAM:        General Appearance:    Alert, cooperative, in no acute distress   Head:    Normocephalic, without obvious abnormality, atraumatic   Eyes:            Vision intact, EOM intact     Ears:    Ears appear intact with no abnormalities noted   Neck:   No adenopathy, supple, trachea midline, no thyromegaly    Back:     No kyphosis present, no scoliosis present, no skin lesions,      erythema or scars, no tenderness to palpation,   range of motion normal   Lungs:     Clear, respirations regular, even and unlabored    Heart:    Regular rhythm and normal rate, no edema   Chest Wall:    No abnormalities observed   Abdomen:     Normal bowel sounds, no masses, no organomegaly, soft        non-tender, non-distended, no guarding   Rectal:     Deferred   Extremities:   No effusion or warmth about the joint.  Moves all extremities well, no edema, no cyanosis, no             redness   Pulses:   Pulses palpable and equal bilaterally   Skin:   Redness and drainage noted from incision site. No bleeding, bruising or rash   Lymph nodes:   No palpable adenopathy   Neurologic:   Cranial nerves 2 - 12 grossly intact, alert and oriented times 3.         DATA:    Lab Results (last 24 hours)     Procedure Component Value Units Date/Time    POC Glucose Once [[id number]]  (Normal) Collected:  04/02/18 1024    Specimen:  Blood Updated:  04/02/18 1036     Glucose 128 mg/dL      Comment: : [id number] Phong MotionSavvy LLCMeter ID: [id number]       Urine Culture - Urine, Urine, Clean Catch [[id number]]  (Abnormal) Collected:  03/31/18 0520    Specimen:  Urine from Urine, Clean Catch Updated:  04/02/18 0826     Urine Culture --      <10,000 CFU/mL Gram Negative Bacilli (A)      <10,000 CFU/mL Mixed Gram Positive Manda (A)    Narrative:       Probable contaminant, suggest recollection.    POC Glucose Once [[id number]]  (Abnormal) Collected:  04/02/18 0800    Specimen:  Blood Updated:  04/02/18 0812     Glucose 156 (H) mg/dL      Comment: : [id number] Phong FrankMeter ID: [id number]       POC Glucose Once [[id number]]  (Abnormal) Collected:  04/02/18 0747    Specimen:  Blood Updated:  04/02/18 0807     Glucose 50 (L) mg/dL      Comment: : [id number] Phong FrankMeter ID: [id number]       POC Glucose Once [[id number]]  (Abnormal) Collected:  04/02/18  0744    Specimen:  Blood Updated:  04/02/18 0807     Glucose 45 (C) mg/dL      Comment: : [id number] Phong Gacriaer ID: [id number]       CBC & Differential [[id number]] Collected:  04/02/18 0455    Specimen:  Blood Updated:  04/02/18 0607    Narrative:       The following orders were created for panel order CBC & Differential.  Procedure                               Abnormality         Status                     ---------                               -----------         ------                     Manual Differential[[id number]]          Abnormal            Final result               CBC Auto Differential[[id number]]        Abnormal            Final result                 Please view results for these tests on the individual orders.    CBC Auto Differential [[id number]]  (Abnormal) Collected:  04/02/18 0455    Specimen:  Blood Updated:  04/02/18 0607     WBC 11.32 (H) 10*3/mm3      RBC 3.16 (L) 10*6/mm3      Hemoglobin 10.3 (L) g/dL      Hematocrit 32.0 (L) %      .3 (H) fL      MCH 32.6 (H) pg      MCHC 32.2 (L) g/dL      RDW 18.1 (H) %      RDW-SD 66.9 (H) fl      MPV 10.0 fL      Platelets 508 (H) 10*3/mm3     Manual Differential [[id number]]  (Abnormal) Collected:  04/02/18 0455    Specimen:  Blood Updated:  04/02/18 0607     Neutrophil % 65.6 %      Lymphocyte % 21.9 (L) %      Monocyte % 7.3 %      Eosinophil % 2.1 %      Basophil % 2.1 (H) %      Bands %  1.0 %      Neutrophils Absolute 7.55 10*3/mm3      Lymphocytes Absolute 2.48 10*3/mm3      Monocytes Absolute 0.83 10*3/mm3      Eosinophils Absolute 0.24 10*3/mm3      Basophils Absolute 0.24 (H) 10*3/mm3      Anisocytosis Mod/2+     Hypochromia Slight/1+     Macrocytes Mod/2+     WBC Morphology Normal     Platelet Estimate Increased     Giant Platelets Slight/1+    Basic Metabolic Panel [[id number]]  (Abnormal) Collected:  04/02/18 0455    Specimen:  Blood Updated:  04/02/18 0528     Glucose 58 (L) mg/dL      BUN 26 (H) mg/dL      Creatinine 0.73  mg/dL      Sodium 133 (L) mmol/L      Potassium 5.0 mmol/L      Chloride 96 (L) mmol/L      CO2 32.0 (H) mmol/L      Calcium 8.8 mg/dL      eGFR Non African Amer 75 mL/min/1.73      BUN/Creatinine Ratio 35.6 (H)     Anion Gap 5.0 mmol/L     Narrative:       The MDRD GFR formula is only valid for adults with stable renal function between ages 18 and 70.    Blood Culture - Blood, [[id number]]  (Normal) Collected:  03/30/18 2154    Specimen:  Blood from Arm, Left Updated:  04/02/18 0001     Blood Culture No growth at 2 days    Vancomycin, Trough [[id number]]  (Normal) Collected:  04/01/18 2302    Specimen:  Blood Updated:  04/01/18 2324     Vancomycin Trough 15.76 mcg/mL     POC Glucose Once [[id number]]  (Abnormal) Collected:  04/01/18 2015    Specimen:  Blood Updated:  04/01/18 2026     Glucose 231 (H) mg/dL      Comment: : [id number] Inocencia King ID: [id number]       POC Glucose Once [[id number]]  (Abnormal) Collected:  04/01/18 1653    Specimen:  Blood Updated:  04/01/18 1704     Glucose 192 (H) mg/dL      Comment: : [id number] Luiz Connell ID: [id number]             Radiology:   Imaging Results (last 7 days)     Procedure Component Value Units Date/Time    XR Chest 1 View [[id number]] Collected:  03/31/18 0851     Updated:  03/31/18 0855    Narrative:       EXAMINATION: XR CHEST 1 VW-     3/31/2018 7:38 AM CDT     HISTORY: leukocytosis; T81.4XXA-Infection following a procedure, initial  encounter.     One view chest x-ray compared with 03/14/2018.     Bibasilar infiltrate with pleural fluid is very similar as compared with  2 weeks ago.  Persistent or recurrent pneumonia favored.     No pneumothorax.     Cardiomegaly is unchanged.     Summary:  1. Bibasilar infiltrate and pleural fluid, left greater than right.  2. Very similar appearance compared with the previous exam.  This report was finalized on 03/31/2018 08:52 by Dr. Mamadou Garza MD.    XR Knee 1 or 2 View Right [[id number]] Collected:   03/30/18 2203     Updated:  03/30/18 2213    Narrative:       EXAMINATION: XR KNEE 1 OR 2 VW RIGHT- 3/30/2018 10:03 PM CDT     HISTORY: Surgical wound dehiscence and infection     COMPARISON: 3/8/18     FINDINGS:  There has been previously knee arthroplasty. A femoral lisa and screws   are also noted. There is redemonstration of an acute distal femur  fracture.  Diffuse soft tissue swelling is noted. No soft tissue air is  seen. Vascular calcifications are present. No perihardware lucency is  seen to suggest infection or loosening.       Impression:       Post surgical and post-traumatic changes of the knee and distal femur.  This report was finalized on 03/30/2018 22:09 by Dr. Mohan Pinzon MD.          Imaging was brought up and reviewed and I agree with the radiology findings.    IMPRESSION/RECOMMENDATIONS:    Active Problems:    Surgical wound infection, initial encounter      Assessment: surgical wound dehiscence right knee post IM nailing for som-prosthetic distal femur fracture.    Plan:  1) The patient has been admitted.  Plan on consult to infectious disease.  Infection does not appear to be affecting the joint at this point.        Electronically signed by Nima Bundy MD4:25 PM4/2/2018

## 2018-04-02 NOTE — THERAPY TREATMENT NOTE
Acute Care - Occupational Therapy Treatment Note  [facility]     Patient Name: Tamy Sher  : 1930  MRN: [id number]  Today's Date: 2018  Onset of Illness/Injury or Date of Surgery: 18  Date of Referral to OT: 18  Referring Physician: Dr. Andrew    Admit Date: 3/30/2018       ICD-10-CM ICD-9-CM   1. Surgical wound infection, initial encounter T81.4XXA 998.59   2. Abnormality of gait and mobility R26.9 781.2   3. Other closed fracture of distal end of right femur, initial encounter S72.491A 821.29   4. Impaired mobility and ADLs Z74.09 799.89     Patient Active Problem List   Diagnosis   • Ischemic chest pain   • Pneumonia of both lungs due to infectious organism   • Fall   • Closed displaced comminuted fracture of shaft of right femur   • Pain of right thigh   • Class 2 obesity with serious comorbidity in adult   • Type 2 diabetes mellitus with neurologic complication, with long-term current use of insulin   • STUART (acute kidney injury)   • Chronic diastolic heart failure   • Pleural effusion, left   • Hypoxia   • Surgical wound infection, initial encounter     Past Medical History:   Diagnosis Date   • Constipation    • Diabetes mellitus    • Diarrhea    • Diastolic dysfunction, left ventricle    • Esophagitis    • Exertional shortness of breath    • Hyperlipidemia    • Hypertension    • Lumbar spondylitis    • Osteoarthritis    • Peripheral vascular disease    • Sinusitis    • Stroke      Past Surgical History:   Procedure Laterality Date   • FEMUR SUPRACONDYLAR FRACTURE REPAIR Right 3/10/2018    Procedure: FEMUR SUPRACONDYLAR NAIL;  Surgeon: Nima Bundy MD;  Location: [facility];  Service: Orthopedics   • GALLBLADDER SURGERY     • HYSTERECTOMY     • JOINT REPLACEMENT     • REPLACEMENT TOTAL KNEE Left        Therapy Treatment    Therapy Treatment / Health Promotion    Treatment Time/Intention  Discipline: occupational therapy assistant (18 0936 : Jasmyn Goode,  YASIR/L)  Document Type: therapy note (daily note) (04/02/18 0936 : YASIR Melendez/L)  Subjective Information: complains of, weakness, fatigue (04/02/18 0936 : MELVINA MelendezA/L)  Patient Effort: good (04/02/18 0936 : Jasmyn Goode COOLEY/L)  Existing Precautions/Restrictions: fall, non-weight bearing (NWB RLE) (04/02/18 0936 : MELVINA MelendezA/L)  Treatment Considerations/Comments: Contact precautions (04/02/18 0936 : YASIR Melendez/L)  Plan of Care Review  Plan of Care Reviewed With: patient (04/02/18 1039 : YASIR Melendez/L)    Vitals/Pain/Safety  Pain Scale: Numbers Pre/Post-Treatment  Pain Scale: Numbers, Pretreatment: 0/10 - no pain (04/02/18 0936 : YASIR Melendez/L)  Positioning and Restraints  Pre-Treatment Position: in bed (04/02/18 0936 : YASIR Melendez/L)  Post Treatment Position: bed (04/02/18 0936 : YASIR Melendez/L)  In Bed: side lying left, call light within reach, encouraged to call for assist, side rails up x3, exit alarm on, pillow between legs (RLE prevlon boot) (04/02/18 0936 : YASIR Melendez/L)    Mobility,ADL,Motor, Modality  Bed Mobility Assessment/Treatment  Bed Mobility Assessment/Treatment: rolling left, rolling right, scooting/bridging (04/02/18 0936 : MELVINA MelnedezA/L)  Rolling Left [place] (Bed Mobility): minimum assist (75% patient effort), moderate assist (50% patient effort) (04/02/18 0936 : MELVINA MelendezA/L)  Rolling Right [place] (Bed Mobility): minimum assist (75% patient effort), moderate assist (50% patient effort) (04/02/18 0936 : CYNDEE Melendez)  Scooting/Bridging [place] (Bed Mobility): maximum assist (25% patient effort) (04/02/18 0936 : CYNDEE Melendez)  Assistive Device (Bed Mobility): draw sheet, bed rails, head of bed elevated (04/02/18 0936 : Jasmyn N Russel, COOLEY/L)  ADL Assessment/Intervention  BADL  Assessment/Intervention: grooming (04/02/18 0936 : YASIR Melendez/L)  Grooming Assessment/Training  [place] Level (Grooming): hair care, combing/brushing, wash face, hands, set up (04/02/18 0936 : Jasmyn Goode COOLEY/L)  Grooming Position: long sitting (04/02/18 0936 : MELVINA MelendezA/L)              ROM/MMT             Sensory, Edema, Orthotics          Cognition, Communication, Swallow  Cognitive Assessment/Intervention- PT/OT  Personal Safety Interventions: fall prevention program maintained, elopement precautions initiated (04/02/18 0936 : YASIR Melendez/L)    Outcome Summary           OT Rehab Goals     Row Name 04/01/18 0828             Bed Mobility Goal 1 (OT)    Activity/Assistive Device (Bed Mobility Goal 1, OT) rolling to left;rolling to right;sit to supine/supine to sit;bed rails  -CS      [place] Level/Cues Needed (Bed Mobility Goal 1, OT) minimum assist (75% or more patient effort);verbal cues required  -CS      Time Frame (Bed Mobility Goal 1, OT) 2 weeks  -CS      Barriers (Bed Mobility Goal 1, OT) .  -CS      Progress/Outcomes (Bed Mobility Goal 1, OT) goal ongoing  -CS         Bathing Goal 1 (OT)    Activity/Assistive Device (Bathing Goal 1, OT) bathing skills, all   sitting EOB  -CS      [place] Level/Cues Needed (Bathing Goal 1, OT) moderate assist (50-74% patient effort)  -CS      Time Frame (Bathing Goal 1, OT) 2 weeks  -CS      Barriers (Bathing Goal 1, OT) .  -CS      Progress/Outcomes (Bathing Goal 1, OT) goal ongoing  -CS         Dressing Goal 1 (OT)    Activity/Assistive Device (Dressing Goal 1, OT) upper body dressing   sitting EOB  -CS      [place]/Cues Needed (Dressing Goal 1, OT) set-up required;supervision required  -CS      Time Frame (Dressing Goal 1, OT) 2 weeks  -CS      Progress/Outcome (Dressing Goal 1, OT) goal ongoing  -CS         Strength Goal 1 (OT)    Strength Goal 1 (OT) Pt will increase BUE strength to 4/5 for  increased I with ADLs and functional mobility  -CS      Time Frame (Strength Goal 1, OT) 2 weeks  -CS      Barriers (Strength Goal 1, OT) .  -CS      Progress/Outcome (Strength Goal 1, OT) goal ongoing  -CS        User Key  (r) = Recorded By, (t) = Taken By, (c) = Cosigned By    Initials Name Provider Type    CS Kathleen Benson, OTR/L Occupational Therapist        Occupational Therapy Education     Title: PT OT SLP Therapies (Active)     Topic: Occupational Therapy (Done)     Point: ADL training (Done)     Description: Instruct learner(s) on proper safety adaptation and remediation techniques during self care or transfers.   Instruct in proper use of assistive devices.   Learning Progress Summary     Learner Status Readiness Method Response Comment Documented by    Patient Done Acceptance AMANDA BUSTAMANTE benefits of activity, bed mobility, ADLs  04/02/18 1038     Done Acceptance AMANDA BUSTAMANTE,GWENDOLYN benefits of activity, OT POC, bed mobility, strengthening, safety  04/01/18 0946          Point: Home exercise program (Done)     Description: Instruct learner(s) on appropriate technique for monitoring, assisting and/or progressing therapeutic exercises/activities.   Learning Progress Summary     Learner Status Readiness Method Response Comment Documented by    Patient Done Acceptance JAIME PATEL DU,NR Pt. performed ue exs to increase her bed mobility/transfer ability sec. Nwb status! on [facility] 04/01/18 1449    Family Done Acceptance JAIME PATEL DU,NR Pt. performed ue exs to increase her bed mobility/transfer ability sec. Nwb status! on [facility] 04/01/18 1449                      User Key     Initials Effective Dates Name Provider Type [facility] 08/02/16 -  YASIR Singh/L Occupational Therapy Assistant OT     08/02/16 -  YASIR Melendez/L Occupational Therapy Assistant OT     08/02/16 -  Kathleen Benson, OTR/L Occupational Therapist OT                  OT Recommendation and Plan     Plan of Care Review  Plan of Care  Reviewed With: patient  Plan of Care Reviewed With: patient  Outcome Summary: Pt min/mod A for bed mobility with bed rails and draw sheet. Pt completed grooming in bed with set up. Pt demonstrates decreased endurance and UE strength. Pt would benefit from SNF at discharge for continued rehab. Continue OT POC         Outcome Measures     Row Name 04/02/18 1000 04/01/18 1333 04/01/18 0900       How much help from another person do you currently need...    Turning from your back to your side while in flat bed without using bedrails?  --  -- 2  -TB    Moving from lying on back to sitting on the side of a flat bed without bedrails?  --  -- 2  -TB    Moving to and from a bed to a chair (including a wheelchair)?  --  -- 1  -TB    Standing up from a chair using your arms (e.g., wheelchair, bedside chair)?  --  -- 1  -TB    Climbing 3-5 steps with a railing?  --  -- 1  -TB    To walk in hospital room?  --  -- 1  -TB    AM-PAC 6 Clicks Score  --  -- 8  -TB       How much help from another is currently needed...    Putting on and taking off regular lower body clothing? 1  -TS 1  -CJ  --    Bathing (including washing, rinsing, and drying) 2  -TS 2  -CJ  --    Toileting (which includes using toilet bed pan or urinal) 2  -TS 1  -CJ  --    Putting on and taking off regular upper body clothing 3  -TS 3  -CJ  --    Taking care of personal grooming (such as brushing teeth) 3  -TS 3  -CJ  --    Eating meals 4  -TS 4  -CJ  --    Score 15  -TS 14  -CJ  --       Functional Assessment    Outcome Measure Options AM-PAC 6 Clicks Daily Activity (OT)  -TS AM-PAC 6 Clicks Daily Activity (OT)  -CJ AM-PAC 6 Clicks Basic Mobility (PT)  -TB    Row Name 04/01/18 0828             How much help from another is currently needed...    Putting on and taking off regular lower body clothing? 1  -CS      Bathing (including washing, rinsing, and drying) 2  -CS      Toileting (which includes using toilet bed pan or urinal) 1  -CS      Putting on and taking  off regular upper body clothing 3  -CS      Taking care of personal grooming (such as brushing teeth) 3  -CS      Eating meals 4  -CS      Score 14  -CS         Functional Assessment    Outcome Measure Options AM-PAC 6 Clicks Daily Activity (OT)  -CS        User Key  (r) = Recorded By, (t) = Taken By, (c) = Cosigned By    Initials Name Provider Type    TB Tay Lau, PT Physical Therapist    CJ Zoran Jaimes, COOLEY/L Occupational Therapy Assistant    TS MELVINA MelendezA/L Occupational Therapy Assistant    CHRISTINA Benson, OTR/L Occupational Therapist           Time Calculation:         Time Calculation- OT     Row Name 04/02/18 1040             Time Calculation- OT    OT Start Time 0936  -TS      OT Stop Time 1005  -TS      OT Time Calculation (min) 29 min  -TS      Total Timed Code Minutes- OT 29 minute(s)  -TS      OT Received On 04/02/18  -TS        User Key  (r) = Recorded By, (t) = Taken By, (c) = Cosigned By    Initials Name Provider Type     MELVINA MelendezA/L Occupational Therapy Assistant           Therapy Charges for Today     Code Description Service Date Service Provider Modifiers Qty    [id number] HC OT SELF CARE/MGMT/TRAIN EA 15 MIN 4/2/2018 YASIR Melendez/L GO, KX 2          OT G-codes  OT Professional Judgement Used?: Yes  OT Functional Scales Options: AM-PAC 6 Clicks Daily Activity (OT)  Score: 14  Functional Limitation: Self care  Self Care Current Status (): At least 40 percent but less than 60 percent impaired, limited or restricted  Self Care Goal Status (): At least 20 percent but less than 40 percent impaired, limited or restricted    YASIR Mcclelland/BALAJI  4/2/2018

## 2018-04-02 NOTE — PROGRESS NOTES
Continued Stay Note   [place]     Patient Name: Tamy Sher  MRN: [id number]  Today's Date: 4/2/2018    Admit Date: 3/30/2018          Discharge Plan     Row Name 04/02/18 1030       Plan    Plan [facility] vs [facility]    Patient/Family in Agreement with Plan yes    Plan Comments SW received a call from Adrienne at [facility]. Adrienne states patient is accepted; however, d/t cost of Zosyn, patient would not be accepted if ordered any longer than the current end date of 4/7.                 IZAIAH McnamaraW

## 2018-04-02 NOTE — PROGRESS NOTES
Continued Stay Note  [facility]     Patient Name: Tamy Sher  MRN: [id number]  Today's Date: 4/2/2018    Admit Date: 3/30/2018          Discharge Plan     Row Name 04/02/18 1352       Plan    Plan [facility]    Patient/Family in Agreement with Plan yes    Plan Comments Glory has been dc'd. SW spoke to patient's son, Jeremi, and he says that he and his brothers would like to accept bed offer at [facility]. SW notified Adrienne in admissions that family would like to accept bed offer.               Discharge Codes    No documentation.           IZAIAH McnamaraW

## 2018-04-02 NOTE — PROGRESS NOTES
"Pharmacy Dosing Service  Pharmacokinetics  Vancomycin Follow-up Evaluation    Assessment/Action/Plan:  Vancomycin trough = 15.76 (~23 hours post dose). SCr stable, WBC remain elevated. Will continue current dose and frequency at this time. Pharmacy will continue to monitor renal function and adjust dose accordingly.     Subjective:  Tamy Sher is a 87 y.o. 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    Complicating factors:  Diabetes mellitus  Morbid obesity  Loop diuretic therapy  Concomitant Zosyn therapy    Objective:  Ht: 152.4 cm (60\"); Wt: 93.9 kg (207 lb 0.2 oz)  Estimated Creatinine Clearance: 50.8 mL/min (by C-G formula based on SCr of 0.73 mg/dL).   Lab Results   Component Value Date    CREATININE 0.73 04/01/2018    CREATININE 0.75 03/31/2018    CREATININE 0.73 03/30/2018      Lab Results   Component Value Date    WBC 11.18 (H) 04/01/2018    WBC 14.01 (H) 03/31/2018    WBC 13.70 (H) 03/30/2018         Lab Results   Component Value Date    VANCOTROUGH 15.76 04/01/2018       Culture Results:  Microbiology Results (last 10 days)       Procedure Component Value - Date/Time    Urine Culture - Urine, Urine, Clean Catch [[id number]]  (Abnormal) Collected:  03/31/18 0520    Lab Status:  Preliminary result Specimen:  Urine from Urine, Clean Catch Updated:  04/01/18 0614     Urine Culture --      <10,000 CFU/mL Gram Negative Bacilli (A)    Narrative:       Probable contaminant, suggest recollection.    Blood Culture - Blood, [[id number]]  (Normal) Collected:  03/30/18 2154    Lab Status:  Preliminary result Specimen:  Blood from Arm, Left Updated:  04/01/18 0001     Blood Culture No growth at 24 hours    Blood Culture - Blood, [[id number]]  (Abnormal) Collected:  03/30/18 2153    Lab Status:  Final result Specimen:  Blood from Wrist, Left Updated:  04/01/18 0808     Blood Culture Abnormal " Stain (A)      Mixed Gram Positive Manda (A)     Isolated from Aerobic and Anaerobic Bottles     Gram Stain Result Gram positive cocci    Narrative:       Probable contaminant    Blood Culture ID, PCR - Blood, [[id number]]  (Abnormal) Collected:  03/30/18 2153    Lab Status:  Final result Specimen:  Blood from Wrist, Left Updated:  03/31/18 1659     BCID, PCR Streptococcus spp, not A, B, or pneumoniae. Identification by BCID PCR. (C)     BCID, PCR 2 Staphylococcus spp, not aureus. Identification by BCID PCR. (C)    Wound Culture - Surgical Site, Knee, Right [[id number]]  (Abnormal)  (Susceptibility) Collected:  03/30/18 1903    Lab Status:  Final result Specimen:  Surgical Site from Knee, Right Updated:  04/01/18 0653     Wound Culture --      Heavy growth (4+) Staphylococcus aureus, MRSA (A)     BETA LACTAMASE Positive     Gram Stain Result Few (2+) WBCs seen      Few (2+) Gram positive cocci    Susceptibility        Staphylococcus aureus, MRSA     FERN     Clindamycin >=8 ug/ml Resistant     Erythromycin >=8 ug/ml Resistant     Gentamicin <=0.5 ug/ml Susceptible     Inducible Clindamycin Resistance NEG  Negative     Levofloxacin >=8 ug/ml Resistant     Oxacillin >=4 ug/ml Resistant     Penicillin G >=0.5 ug/ml Resistant     Tetracycline <=1 ug/ml Susceptible     Trimethoprim + Sulfamethoxazole <=10 ug/ml Susceptible     Vancomycin <=0.5 ug/ml Susceptible                              Robbie Deshpande [facility]   04/01/18 11:26 PM

## 2018-04-02 NOTE — PLAN OF CARE
Problem: Patient Care Overview  Goal: Plan of Care Review  Outcome: Ongoing (interventions implemented as appropriate)   04/02/18 1039   Coping/Psychosocial   Plan of Care Reviewed With patient   Coping/Psychosocial   Patient Agreement with Plan of Care agrees   Plan of Care Review   Progress improving   OTHER   Outcome Summary Pt min/mod A for bed mobility with bed rails and draw sheet. Pt completed grooming in bed with set up. Pt demonstrates decreased endurance and UE strength. Pt would benefit from SNF at discharge for continued rehab. Continue OT POC

## 2018-04-02 NOTE — THERAPY TREATMENT NOTE
Acute Care - Physical Therapy Treatment Note  [facility]     Patient Name: Tamy Sher  : 1930  MRN: [id number]  Today's Date: 2018  Onset of Illness/Injury or Date of Surgery: 18  Date of Referral to PT: 18  Referring Physician: Dr. Andrew    Admit Date: 3/30/2018    Visit Dx:    ICD-10-CM ICD-9-CM   1. Surgical wound infection, initial encounter T81.4XXA 998.59   2. Abnormality of gait and mobility R26.9 781.2   3. Other closed fracture of distal end of right femur, initial encounter S72.491A 821.29   4. Impaired mobility and ADLs Z74.09 799.89   5. Impaired mobility Z74.09 799.89     Patient Active Problem List   Diagnosis   • Ischemic chest pain   • Pneumonia of both lungs due to infectious organism   • Fall   • Closed displaced comminuted fracture of shaft of right femur   • Pain of right thigh   • Class 2 obesity with serious comorbidity in adult   • Type 2 diabetes mellitus with neurologic complication, with long-term current use of insulin   • STUART (acute kidney injury)   • Chronic diastolic heart failure   • Pleural effusion, left   • Hypoxia   • Surgical wound infection, initial encounter       Therapy Treatment    Therapy Treatment / Health Promotion    Treatment Time/Intention  Discipline: physical therapy assistant (18 1131 : Dianna Calloway PTA)  Document Type: therapy note (daily note) (18 1131 : Dianna Calloway PTA)  Subjective Information: complains of, pain (18 1131 : Dianna Calloway PTA)  Patient Effort: adequate (18 1131 : Dianna Calloway PTA)  Comment: with OT (18 0951 : Dianna Calloway PTA)  Reason Treatment Not Performed: unavailable for treatment (18 0951 : Dianna Calloway PTA)  Existing Precautions/Restrictions: non-weight bearing (NWB RLE) (18 1131 : Dianna Calloway PTA)  Treatment Considerations/Comments: NWB RLE (18 1131 : Dianna Calloway PTA)  Plan of Care Review  Plan of Care Reviewed  With: patient (04/02/18 1327 : Dianna Calloway PTA)    Vitals/Pain/Safety  Pain Scale: Numbers Pre/Post-Treatment  Pain Location - Side: Right (04/02/18 1131 : Dianna Calloway PTA)  Pain Location: knee (04/02/18 1131 : Dianna Calloway PTA)  Pre/Post Treatment Pain Comment: only hurts when moved (04/02/18 1131 : Dianna Calloway PTA)  Pain Intervention(s): Repositioned (04/02/18 1131 : Dianna Calloway PTA)  Pain Scale: Word Pre/Post-Treatment  Pain Location - Side: Right (04/02/18 1131 : Dianna Calloway PTA)  Pain Location: knee (04/02/18 1131 : Dianna Calloway PTA)  Pain Intervention(s): Repositioned (04/02/18 1131 : Dianna Calloway PTA)  Pain Scale: FACES Pre/Post-Treatment  Pain: FACES Scale, Pretreatment: 4-->hurts little more (04/02/18 1131 : Dianna Calloway PTA)  Pain: FACES Scale, Post-Treatment: 0-->no hurt (04/02/18 1131 : Dianna Calloway PTA)  Pain Location - Side: Right (04/02/18 1131 : Dianna Calloway PTA)  Pain Location: knee (04/02/18 1131 : Dianna Calloway PTA)  Pain Intervention(s): Repositioned (04/02/18 1131 : Dianna Calloway PTA)  Positioning and Restraints  Pre-Treatment Position: in bed (04/02/18 1131 : Dianna Calloway PTA)  Post Treatment Position: bed (04/02/18 1131 : Dianna Calloway PTA)  In Bed: fowlers, call light within reach, encouraged to call for assist, exit alarm on, L heel elevated, R multipodus (04/02/18 1131 : Dianna Calloway PTA)    Mobility,ADL,Motor, Modality  Bed Mobility Assessment/Treatment  Rolling Left [place] (Bed Mobility): maximum assist (25% patient effort), 2 person assist (04/02/18 1131 : Dianna Calloway PTA)  Rolling Right [place] (Bed Mobility): maximum assist (25% patient effort), 2 person assist (04/02/18 1131 : Dianna Calloway, VERNELL)  Scooting/Bridging [place] (Bed Mobility): dependent (less than 25% patient effort), 2 person assist (04/02/18 1131 : Dianna Calloway,  VERNELL)  Supine-Sit [place] (Bed Mobility): maximum assist (25% patient effort), 2 person assist (04/02/18 1131 : Dianna Calloway PTA)  Sit-Supine [place] (Bed Mobility): maximum assist (25% patient effort), 2 person assist (04/02/18 1131 : Dianna Calloway PTA)  Bed Mobility, Safety Issues: decreased use of arms for pushing/pulling, decreased use of legs for bridging/pushing (04/02/18 1131 : Dianna Calloway PTA)  Assistive Device (Bed Mobility): bed rails, draw sheet (bed in trendelenberg for scooting) (04/02/18 1131 : Dianna Calloway PTA)  Comment (Bed Mobility): bed in trendelenberg for scooting up (04/02/18 1131 : Dianna Calloway PTA)     Therapeutic Exercise  Lower Extremity (Therapeutic Exercise): LAQ (long arc quad), bilateral, marching while seated (04/02/18 1131 : Dianna Calloway PTA)  Lower Extremity Range of Motion (Therapeutic Exercise): ankle dorsiflexion/plantar flexion, bilateral (04/02/18 1131 : Dianna Calloway PTA)  Exercise Type (Therapeutic Exercise): AROM (active range of motion) (04/02/18 1131 : Dianna Calloway PTA)  Position (Therapeutic Exercise): seated (04/02/18 1131 : Dianna Calloway PTA)  Sets/Reps (Therapeutic Exercise): 10 x 2 (04/02/18 1131 : Dianna Calloway PTA)  Static Sitting Balance  Level of [place] (Unsupported Sitting, Static Balance): standby assist (04/02/18 1131 : Dianna Calloway PTA)  Sitting Position (Unsupported Sitting, Static Balance): sitting on edge of bed (04/02/18 1131 : Dianna Calloway PTA)  Time Able to Maintain Position (Unsupported Sitting, Static Balance): more than 5 minutes (04/02/18 1131 : Dianna Calloway PTA)        ROM/MMT             Sensory, Edema, Orthotics          Cognition, Communication, Swallow  Cognitive Assessment/Intervention- PT/OT  Personal Safety Interventions: elopement precautions initiated, fall prevention program maintained, gait belt, nonskid shoes/slippers when out of bed  (04/02/18 1131 : Dianna Calloway, PTA)    Outcome Summary               PT Rehab Goals     Row Name 04/01/18 0900             Bed Mobility Goal 1 (PT)    Activity/Assistive Device (Bed Mobility Goal 1, PT) sit to supine/supine to sit  -TB      [place] Level/Cues Needed (Bed Mobility Goal 1, PT) minimum assist (75% or more patient effort)  -TB      Time Frame (Bed Mobility Goal 1, PT) long term goal (LTG);by discharge  -TB      Barriers (Bed Mobility Goal 1, PT) physical  -TB      Progress/Outcomes (Bed Mobility Goal 1, PT) good progress toward goal  -TB         Transfer Goal 1 (PT)    Activity/Assistive Device (Transfer Goal 1, PT) wheelchair transfer   with sliding board  -TB      [place] Level/Cues Needed (Transfer Goal 1, PT) minimum assist (75% or more patient effort)  -TB      Time Frame (Transfer Goal 1, PT) long term goal (LTG);by discharge  -TB      Barriers (Transfers Goal 1, PT) physical  -TB      Progress/Outcome (Transfer Goal 1, PT) good progress toward goal  -TB         Patient Education Goal (PT)    Activity (Patient Education Goal, PT) Knowledgable of precautions of right leg and HEP for muscle contraction  -TB      [place]/Cues/Accuracy (Memory Goal 2, PT) demonstrates adequately  -TB      Time Frame (Patient Education Goal, PT) long term goal (LTG);by discharge  -TB      Barriers (Patient Education Goal, PT) physical  -TB      Progress/Outcome (Patient Education Goal, PT) continuing progress toward goal  -TB        User Key  (r) = Recorded By, (t) = Taken By, (c) = Cosigned By    Initials Name Provider Type    TB Tay Lau, PT Physical Therapist          Physical Therapy Education     Title: PT OT SLP Therapies (Active)     Topic: Physical Therapy (Active)     Point: Mobility training (Active)    Learning Progress Summary     Learner Status Readiness Method Response Comment Documented by    Patient Active Acceptance E,D NR bed mobility, exercise, plan of care CW 04/02/18  1327     Active Acceptance E NR   04/01/18 0941          Point: Home exercise program (Active)    Learning Progress Summary     Learner Status Readiness Method Response Comment Documented by    Patient Active Acceptance E,D NR bed mobility, exercise, plan of care  04/02/18 1327     Active Acceptance E NR   04/01/18 0941          Point: Body mechanics (Active)    Learning Progress Summary     Learner Status Readiness Method Response Comment Documented by    Patient Active Acceptance E,D NR bed mobility, exercise, plan of care  04/02/18 1327     Active Acceptance E NR   04/01/18 0941          Point: Precautions (Active)    Learning Progress Summary     Learner Status Readiness Method Response Comment Documented by    Patient Active Acceptance E NR   04/01/18 0941                      User Key     Initials Effective Dates Name Provider Type Discipline     08/02/16 -  Tay Lau, PT Physical Therapist PT     06/22/15 -  Dianna Calloway, PTA Physical Therapy Assistant PT                    PT Recommendation and Plan     Plan of Care Reviewed With: patient  Progress: no change  Outcome Summary: Max assist of 2 required for supine to sit.  Pt sat EOB and completed active BLE exercises.  Able to maintain sitting balance sba.  Pt requires max assist for rolling and scooting up in bed.  Will continue to benefit from therapy to increase strtength and improve functional mobility.          Outcome Measures     Row Name 04/02/18 1300 04/02/18 1000 04/01/18 1333       How much help from another person do you currently need...    Turning from your back to your side while in flat bed without using bedrails? 2  -CW  --  --    Moving from lying on back to sitting on the side of a flat bed without bedrails? 2  -CW  --  --    Moving to and from a bed to a chair (including a wheelchair)? 1  -CW  --  --    Standing up from a chair using your arms (e.g., wheelchair, bedside chair)? 1  -CW  --  --    Climbing 3-5  steps with a railing? 1  -CW  --  --    To walk in hospital room? 1  -CW  --  --    AM-PAC 6 Clicks Score 8  -CW  --  --       How much help from another is currently needed...    Putting on and taking off regular lower body clothing?  -- 1  -TS 1  -CJ    Bathing (including washing, rinsing, and drying)  -- 2  -TS 2  -CJ    Toileting (which includes using toilet bed pan or urinal)  -- 2  -TS 1  -CJ    Putting on and taking off regular upper body clothing  -- 3  -TS 3  -CJ    Taking care of personal grooming (such as brushing teeth)  -- 3  -TS 3  -CJ    Eating meals  -- 4  -TS 4  -CJ    Score  -- 15  -TS 14  -CJ       Functional Assessment    Outcome Measure Options AM-PAC 6 Clicks Basic Mobility (PT)  -CW AM-PAC 6 Clicks Daily Activity (OT)  -TS AM-PAC 6 Clicks Daily Activity (OT)  -CJ    Row Name 04/01/18 0900 04/01/18 0828          How much help from another person do you currently need...    Turning from your back to your side while in flat bed without using bedrails? 2  -TB  --     Moving from lying on back to sitting on the side of a flat bed without bedrails? 2  -TB  --     Moving to and from a bed to a chair (including a wheelchair)? 1  -TB  --     Standing up from a chair using your arms (e.g., wheelchair, bedside chair)? 1  -TB  --     Climbing 3-5 steps with a railing? 1  -TB  --     To walk in hospital room? 1  -TB  --     AM-PAC 6 Clicks Score 8  -TB  --        How much help from another is currently needed...    Putting on and taking off regular lower body clothing?  -- 1  -CS     Bathing (including washing, rinsing, and drying)  -- 2  -CS     Toileting (which includes using toilet bed pan or urinal)  -- 1  -CS     Putting on and taking off regular upper body clothing  -- 3  -CS     Taking care of personal grooming (such as brushing teeth)  -- 3  -CS     Eating meals  -- 4  -CS     Score  -- 14  -CS        Functional Assessment    Outcome Measure Options AM-PAC 6 Clicks Basic Mobility (PT)  -TB AM-PAC 6  Clicks Daily Activity (OT)  -CS       User Key  (r) = Recorded By, (t) = Taken By, (c) = Cosigned By    Initials Name Provider Type    TB Tay Lua, PT Physical Therapist    CJ Zoran Jaimes, COOLEY/L Occupational Therapy Assistant    TS Jasmyn Goode, COOLEY/L Occupational Therapy Assistant    CS Kathleen Benson, OTR/L Occupational Therapist    CW Dianna Calloway PTA Physical Therapy Assistant           Time Calculation:         PT Charges     Row Name 04/02/18 1330             Time Calculation    Start Time 1131  -CW      Stop Time 1200  -CW      Time Calculation (min) 29 min  -CW      PT Received On 04/02/18  -CW      PT Goal Re-Cert Due Date 04/11/18  -CW         Time Calculation- PT    Total Timed Code Minutes- PT 29 minute(s)  -CW        User Key  (r) = Recorded By, (t) = Taken By, (c) = Cosigned By    Initials Name Provider Type    CW Dianna Calloway PTA Physical Therapy Assistant          Therapy Charges for Today     Code Description Service Date Service Provider Modifiers Qty    [id number] HC PT THERAPEUTIC ACT EA 15 MIN 4/2/2018 Dianna Calloway PTA GP, KX 2          PT G-Codes  Outcome Measure Options: AM-PAC 6 Clicks Basic Mobility (PT)  Score: 8  Functional Limitation: Mobility: Walking and moving around  Mobility: Walking and Moving Around Current Status (): At least 80 percent but less than 100 percent impaired, limited or restricted  Mobility: Walking and Moving Around Goal Status (): At least 60 percent but less than 80 percent impaired, limited or restricted    Dianna Calloway PTA  4/2/2018

## 2018-04-02 NOTE — PLAN OF CARE
Problem: Patient Care Overview  Goal: Plan of Care Review  Outcome: Ongoing (interventions implemented as appropriate)   04/02/18 1327   Coping/Psychosocial   Plan of Care Reviewed With patient   Plan of Care Review   Progress no change   OTHER   Outcome Summary Max assist of 2 required for supine to sit. Pt sat EOB and completed active BLE exercises. Able to maintain sitting balance sba. Pt requires max assist for rolling and scooting up in bed. Will continue to benefit from therapy to increase strength and improve functional mobility.

## 2018-04-02 NOTE — PLAN OF CARE
Problem: Fall Risk (Adult)  Goal: Identify Related Risk Factors and Signs and Symptoms  Outcome: Ongoing (interventions implemented as appropriate)    Goal: Absence of Fall  Outcome: Ongoing (interventions implemented as appropriate)      Problem: Infection, Risk/Actual (Adult)  Goal: Identify Related Risk Factors and Signs and Symptoms  Outcome: Ongoing (interventions implemented as appropriate)    Goal: Infection Prevention/Resolution  Outcome: Ongoing (interventions implemented as appropriate)      Problem: Patient Care Overview  Goal: Plan of Care Review  Outcome: Ongoing (interventions implemented as appropriate)   04/02/18 0316   Coping/Psychosocial   Plan of Care Reviewed With patient   Plan of Care Review   Progress no change   OTHER   Outcome Summary VSS, safety maintained, estrada in place, estrada care provided, dressings changed qshift, gauze and medipore, prevalon boot in place to right foot, iv antibiotics continued, no c/o pain, will continue to monitor     Goal: Individualization and Mutuality  Outcome: Ongoing (interventions implemented as appropriate)    Goal: Discharge Needs Assessment  Outcome: Ongoing (interventions implemented as appropriate)    Goal: Interprofessional Rounds/Family Conf  Outcome: Ongoing (interventions implemented as appropriate)      Problem: Skin Injury Risk (Adult)  Goal: Skin Health and Integrity  Outcome: Ongoing (interventions implemented as appropriate)

## 2018-04-03 ENCOUNTER — APPOINTMENT (OUTPATIENT)
Dept: CT IMAGING | Facility: HOSPITAL | Age: 83
End: 2018-04-03

## 2018-04-03 ENCOUNTER — APPOINTMENT (OUTPATIENT)
Dept: GENERAL RADIOLOGY | Facility: HOSPITAL | Age: 83
End: 2018-04-03

## 2018-04-03 LAB
ARTERIAL PATENCY WRIST A: POSITIVE
ATMOSPHERIC PRESS: 742 MMHG
BASE EXCESS BLDA CALC-SCNC: 7.1 MMOL/L (ref 0–2)
BDY SITE: ABNORMAL
BODY TEMPERATURE: 37 C
GAS FLOW AIRWAY: 4 LPM
GLUCOSE BLDC GLUCOMTR-MCNC: 113 MG/DL (ref 70–130)
GLUCOSE BLDC GLUCOMTR-MCNC: 132 MG/DL (ref 70–130)
GLUCOSE BLDC GLUCOMTR-MCNC: 139 MG/DL (ref 70–130)
GLUCOSE BLDC GLUCOMTR-MCNC: 207 MG/DL (ref 70–130)
HCO3 BLDA-SCNC: 32.4 MMOL/L (ref 20–26)
Lab: ABNORMAL
MODALITY: ABNORMAL
PCO2 BLDA: 48.4 MM HG (ref 35–45)
PH BLDA: 7.43 PH UNITS (ref 7.35–7.45)
PO2 BLDA: 57.8 MM HG (ref 83–108)
SAO2 % BLDCOA: 91.3 % (ref 94–99)
VENTILATOR MODE: ABNORMAL

## 2018-04-03 PROCEDURE — 94660 CPAP INITIATION&MGMT: CPT

## 2018-04-03 PROCEDURE — 94799 UNLISTED PULMONARY SVC/PX: CPT

## 2018-04-03 PROCEDURE — 25010000002 VANCOMYCIN 10 G RECONSTITUTED SOLUTION: Performed by: INTERNAL MEDICINE

## 2018-04-03 PROCEDURE — 63710000001 INSULIN LISPRO (HUMAN) PER 5 UNITS: Performed by: FAMILY MEDICINE

## 2018-04-03 PROCEDURE — 71045 X-RAY EXAM CHEST 1 VIEW: CPT

## 2018-04-03 PROCEDURE — 94760 N-INVAS EAR/PLS OXIMETRY 1: CPT

## 2018-04-03 PROCEDURE — 71275 CT ANGIOGRAPHY CHEST: CPT

## 2018-04-03 PROCEDURE — 25010000002 IOPAMIDOL 61 % SOLUTION: Performed by: INTERNAL MEDICINE

## 2018-04-03 PROCEDURE — 97530 THERAPEUTIC ACTIVITIES: CPT

## 2018-04-03 PROCEDURE — 36600 WITHDRAWAL OF ARTERIAL BLOOD: CPT

## 2018-04-03 PROCEDURE — 97110 THERAPEUTIC EXERCISES: CPT

## 2018-04-03 PROCEDURE — 63710000001 INSULIN DETEMIR PER 5 UNITS: Performed by: NURSE PRACTITIONER

## 2018-04-03 PROCEDURE — 82962 GLUCOSE BLOOD TEST: CPT

## 2018-04-03 PROCEDURE — 97535 SELF CARE MNGMENT TRAINING: CPT

## 2018-04-03 PROCEDURE — 82803 BLOOD GASES ANY COMBINATION: CPT

## 2018-04-03 RX ADMIN — IPRATROPIUM BROMIDE 0.5 MG: 0.5 SOLUTION RESPIRATORY (INHALATION) at 10:30

## 2018-04-03 RX ADMIN — HYDROXYUREA 500 MG: 500 CAPSULE ORAL at 09:20

## 2018-04-03 RX ADMIN — ALBUTEROL SULFATE 1.25 MG: 2.5 SOLUTION RESPIRATORY (INHALATION) at 10:30

## 2018-04-03 RX ADMIN — LISINOPRIL 2.5 MG: 2.5 TABLET ORAL at 09:18

## 2018-04-03 RX ADMIN — INSULIN LISPRO 3 UNITS: 100 INJECTION, SOLUTION INTRAVENOUS; SUBCUTANEOUS at 22:29

## 2018-04-03 RX ADMIN — NYSTATIN: 100000 POWDER TOPICAL at 22:29

## 2018-04-03 RX ADMIN — APIXABAN 5 MG: 5 TABLET, FILM COATED ORAL at 09:18

## 2018-04-03 RX ADMIN — DOCUSATE SODIUM 100 MG: 100 CAPSULE, LIQUID FILLED ORAL at 09:29

## 2018-04-03 RX ADMIN — TAMSULOSIN HYDROCHLORIDE 0.4 MG: 0.4 CAPSULE ORAL at 09:27

## 2018-04-03 RX ADMIN — DILTIAZEM HYDROCHLORIDE 30 MG: 30 TABLET, FILM COATED ORAL at 00:00

## 2018-04-03 RX ADMIN — IPRATROPIUM BROMIDE 0.5 MG: 0.5 SOLUTION RESPIRATORY (INHALATION) at 19:54

## 2018-04-03 RX ADMIN — GABAPENTIN 300 MG: 300 CAPSULE ORAL at 09:17

## 2018-04-03 RX ADMIN — FUROSEMIDE 40 MG: 40 TABLET ORAL at 09:18

## 2018-04-03 RX ADMIN — BUDESONIDE AND FORMOTEROL FUMARATE DIHYDRATE 2 PUFF: 160; 4.5 AEROSOL RESPIRATORY (INHALATION) at 06:54

## 2018-04-03 RX ADMIN — ASPIRIN 81 MG 81 MG: 81 TABLET ORAL at 10:15

## 2018-04-03 RX ADMIN — DILTIAZEM HYDROCHLORIDE 30 MG: 30 TABLET, FILM COATED ORAL at 22:28

## 2018-04-03 RX ADMIN — COLLAGENASE SANTYL: 250 OINTMENT TOPICAL at 10:16

## 2018-04-03 RX ADMIN — IOPAMIDOL 100 ML: 612 INJECTION, SOLUTION INTRAVENOUS at 19:15

## 2018-04-03 RX ADMIN — ALBUTEROL SULFATE 1.25 MG: 2.5 SOLUTION RESPIRATORY (INHALATION) at 14:30

## 2018-04-03 RX ADMIN — ALBUTEROL SULFATE 1.25 MG: 2.5 SOLUTION RESPIRATORY (INHALATION) at 19:54

## 2018-04-03 RX ADMIN — NYSTATIN: 100000 POWDER TOPICAL at 09:33

## 2018-04-03 RX ADMIN — INSULIN DETEMIR 23 UNITS: 100 INJECTION, SOLUTION SUBCUTANEOUS at 22:30

## 2018-04-03 RX ADMIN — IPRATROPIUM BROMIDE 0.5 MG: 0.5 SOLUTION RESPIRATORY (INHALATION) at 06:54

## 2018-04-03 RX ADMIN — GABAPENTIN 600 MG: 300 CAPSULE ORAL at 22:29

## 2018-04-03 RX ADMIN — FOLIC ACID 1 MG: 1 TABLET ORAL at 09:18

## 2018-04-03 RX ADMIN — APIXABAN 5 MG: 5 TABLET, FILM COATED ORAL at 22:27

## 2018-04-03 RX ADMIN — PANTOPRAZOLE SODIUM 40 MG: 40 TABLET, DELAYED RELEASE ORAL at 06:11

## 2018-04-03 RX ADMIN — VANCOMYCIN HYDROCHLORIDE 1500 MG: 10 INJECTION, POWDER, LYOPHILIZED, FOR SOLUTION INTRAVENOUS at 00:03

## 2018-04-03 RX ADMIN — METOPROLOL SUCCINATE 50 MG: 50 TABLET, FILM COATED, EXTENDED RELEASE ORAL at 09:28

## 2018-04-03 RX ADMIN — DILTIAZEM HYDROCHLORIDE 30 MG: 30 TABLET, FILM COATED ORAL at 12:56

## 2018-04-03 RX ADMIN — LEVOTHYROXINE SODIUM 50 MCG: 50 TABLET ORAL at 06:11

## 2018-04-03 RX ADMIN — ALBUTEROL SULFATE 1.25 MG: 2.5 SOLUTION RESPIRATORY (INHALATION) at 06:54

## 2018-04-03 RX ADMIN — ATORVASTATIN CALCIUM 20 MG: 10 TABLET, FILM COATED ORAL at 23:30

## 2018-04-03 RX ADMIN — DOCUSATE SODIUM 100 MG: 100 CAPSULE, LIQUID FILLED ORAL at 22:28

## 2018-04-03 RX ADMIN — DILTIAZEM HYDROCHLORIDE 30 MG: 30 TABLET, FILM COATED ORAL at 06:48

## 2018-04-03 RX ADMIN — IPRATROPIUM BROMIDE 0.5 MG: 0.5 SOLUTION RESPIRATORY (INHALATION) at 14:30

## 2018-04-03 RX ADMIN — BUDESONIDE AND FORMOTEROL FUMARATE DIHYDRATE 2 PUFF: 160; 4.5 AEROSOL RESPIRATORY (INHALATION) at 20:03

## 2018-04-03 NOTE — PROGRESS NOTES
"    [facility] Medicine Services  INPATIENT PROGRESS NOTE    Length of Stay: 3  Date of Admission: 3/30/2018  Primary Care Physician: Barry Foley MD    Subjective   Chief Complaint: hypoxia  HPI   Pt complains of worsening shortness of breath today. Son at bedside wanting to know when she was going to be discharged. She had SpO2 of 80% on 2L (her home oxygen flow) with sitting on side of bed. Pt states she feels exhausted today. States she feels \"out of it.\" She is currently on 4L nasal cannula and saturating only 90-91%. Had a bowel movement this am. Dressings remain intact on her right knee/hip.    Review of Systems  All pertinent negatives and positives are as above. All other systems have been reviewed and are negative unless otherwise stated.     Objective    Temp:  [97.8 °F (36.6 °C)-98.5 °F (36.9 °C)] 97.8 °F (36.6 °C)  Heart Rate:  [] 109  Resp:  [16-22] 22  BP: (135-150)/(77-90) (P) 169/98  Physical Exam   Constitutional: She is oriented to person, place, and time. She appears well-developed and well-nourished.   HENT:   Head: Normocephalic and atraumatic.   Eyes: Conjunctivae and EOM are normal. Pupils are equal, round, and reactive to light.   Neck: Neck supple. No JVD present. No thyromegaly present.   Cardiovascular: Normal rate, regular rhythm, normal heart sounds and intact distal pulses.  Exam reveals no gallop and no friction rub.    No murmur heard.  afib 116 currently has been     Pulmonary/Chest: Effort normal and breath sounds normal. No respiratory distress. She has no wheezes. She has no rales. She exhibits no tenderness.   Diminished in the bases otherwise clear. No wheezes noted.    Abdominal: Soft. Bowel sounds are normal. She exhibits no distension. There is no tenderness. There is no rebound and no guarding.   Musculoskeletal: Normal range of motion. She exhibits no edema, tenderness or deformity.   Lymphadenopathy:     She has no " cervical adenopathy.   Neurological: She is alert and oriented to person, place, and time. She displays normal reflexes. No cranial nerve deficit. She exhibits normal muscle tone.   Skin: Skin is warm and dry. No rash noted.   Dressing to right knee incision. Clean dry and intact.    Psychiatric: She has a normal mood and affect. Her behavior is normal. Judgment and thought content normal.      Results Review:  I have reviewed the labs, radiology results, and diagnostic studies.    Laboratory Data:     Results from last 7 days  Lab Units 04/02/18 0455 04/01/18 0456 03/31/18  0513   WBC 10*3/mm3 11.32* 11.18* 14.01*   HEMOGLOBIN g/dL 10.3* 10.1* 11.1*   HEMATOCRIT % 32.0* 31.3* 34.6*   PLATELETS 10*3/mm3 508* 516* 567*       Results from last 7 days  Lab Units 04/02/18 0455 04/01/18 0456 03/31/18  0513 03/30/18  1903   SODIUM mmol/L 133* 136 134* 130*   POTASSIUM mmol/L 5.0 4.7 4.8 5.3   CHLORIDE mmol/L 96* 96* 95* 91*   CO2 mmol/L 32.0* 36.0* 35.0* 37.0*   BUN mg/dL 26* 30* 31* 36*   CREATININE mg/dL 0.73 0.73 0.75 0.73   CALCIUM mg/dL 8.8 8.7 8.8 8.7   BILIRUBIN mg/dL  --   --  0.5 0.5   ALK PHOS U/L  --   --  106 101   ALT (SGPT) U/L  --   --  35 37   AST (SGOT) U/L  --   --  46* 43   GLUCOSE mg/dL 58* 114* 134* 167*       Culture Data:   Blood Culture   Date Value Ref Range Status   03/30/2018 No growth at 3 days  Preliminary   03/30/2018 Abnormal Stain (A)  Final   03/30/2018 Mixed Gram Positive Manda (A)  Final     Urine Culture   Date Value Ref Range Status   03/31/2018 <10,000 CFU/mL Gram Negative Bacilli (A)  Final   03/31/2018 <10,000 CFU/mL Mixed Gram Positive Manda (A)  Final     Wound Culture   Date Value Ref Range Status   03/30/2018 Heavy growth (4+) Staphylococcus aureus, MRSA (A)  Final     Radiology Data:   Imaging Results (last 24 hours)     Procedure Component Value Units Date/Time    XR Chest 1 View [[id number]] Updated:  04/03/18 0720          I have reviewed the patient current medications.      Assessment/Plan   Assessment:  1. Right knee postoperative wound dehiscence- wound culture MRSA positive  2. Recent closed right femur fracture s/p IM nailing 3/10   3. Leukocytosis-mildly worse today  4. Chronic diastolic heart failure with preserved ejection fraction  5. Insulin dependent Diabetes Mellitus, Type 2, hgb A1c 6.4  6. Atrial fibrillation (recent diagnosis)- chronically anticoagulated with Eliquis  7. Morbid obesity, BMI 40.4  8. Hypertension  9. Hyperlipidemia  10. Hyponatremia-wrose as well.   11. Macrocytic anemia  12. Thrombocytosis, likely reactive  13. Constipation-improved.   14. Chronic obstructive pulmonary disease   15. Acute on chronic hypoxic respiratory failure.     Plan:  1. Vancomycin day 4-will continue for 2 weeks post discharge. Dr. Mead's recommendations were reviewed  2. STAT CXR if negative, will consider CTA  3. STAT ABG's.   4. Will hold discharge today until hypoxia can be sorted.     Discharge Planning: I expect the patient to be discharged to SNF in ? days.    BULMARO Cortés   04/03/18   1:32 PM   I personally evaluated and examined the patient in conjunction with BULMARO Wylie and agree with the assessment, treatment plan, and disposition of the patient as recorded by her. My history, exam, and further recommendations are:     Been in constant communication with frankie on Patient's progress.  I am told she is worsening.  I saw her while the RT was preparing to place her on NIPPV.  Her blood gas showed hypoxia.  Ideally, I would place CPAP but I think it is reasonable to use BIPAP with minimal difference in the I and E pressure at 40% fio2 as this appears to be better tolerated by the patient per RT recommendation.  Her o2 sat is now 98%.      She has diminished inspiratory effort.  I didn't  Hear any crackles or wheezes at time of exam.  I reviewed with Frankie the CXr and compared this from previous.  I agrree with radiologist.      Will request CTA of chest.   Patient has been on Eliquis for afib prior to discharge last hospitalization.  I would think she would be covered for dvt treatment  But as I recall coming back from my off week, she was not on Eliquis and I could not remember if she was on lovenox for post operative dvt prophylaxis.  She is on Lasix.    The wound looks better.  Minimal serous d/c today compared yesterday.  No obvious odor.  Erythema improving.    Appreciate abx recommendation from Dr. Mead as outlined below:  Antibiotic recommendations:  1.  Diagnosis-right knee wound infection-hopefully superficial.  Deeper infection not entirely excluded.  2.  Access right arm PICC line  3.  Microbiology-MRSA  4.  Suggest vancomycin 1500 mg IV daily through April 15, 2018 (that would be 2 weeks)  5.  Suggest CMP, CBC, CRP, and vancomycin trough weekly.  Goal vancomycin trough 10-20.  Monitor renal function more often if changes in creatinine.  6.  Follow-up appointment in 2 weeks  7.  Will consider longer IV  antibiotic treatment or possibly transition to oral antibiotics at follow-up.    I spoke to his son about the plan of care.  He is agreeable.  Cont abx    Fidencio Rodriguez MD  04/03/18  3:21 PM

## 2018-04-03 NOTE — PROGRESS NOTES
"Infectious Diseases Progress Note    Patient:  Tamy Sher  YOB: 1930  MRN: [id number]   Admit date: 3/30/2018   Admitting Physician: Fidencio Rodriguez,*  Primary Care Physician: Barry Foley MD    Chief Complaint/Interval History: She is without new symptoms.  Son at bedside.  She is comfortable at present.  No rash or skin itching with antibiotic treatment.  No pain at present.    Intake/Output Summary (Last 24 hours) at 04/02/18 2111  Last data filed at 04/02/18 2020   Gross per 24 hour   Intake              240 ml   Output             2100 ml   Net            -1860 ml     Allergies: No Known Allergies  Current Scheduled Medications:     albuterol 1.25 mg Nebulization 4x Daily - RT   And      ipratropium 0.5 mg Nebulization 4x Daily - RT   apixaban 5 mg Oral Q12H   aspirin 81 mg Oral Daily   atorvastatin 20 mg Oral Nightly   budesonide-formoterol 2 puff Inhalation BID - RT   collagenase  Topical Q24H   diltiaZEM 30 mg Oral Q6H   docusate sodium 100 mg Oral BID   folic acid 1 mg Oral Daily   furosemide 40 mg Oral Daily   gabapentin 300 mg Oral Daily With Breakfast   gabapentin 600 mg Oral Nightly   hydroxyurea 500 mg Oral Daily   insulin detemir 23 Units Subcutaneous Nightly   insulin lispro 2-7 Units Subcutaneous 4x Daily With Meals & Nightly   levothyroxine 50 mcg Oral Q AM   lisinopril 2.5 mg Oral Daily   metoprolol succinate XL 50 mg Oral Daily   nystatin  Topical Q12H   pantoprazole 40 mg Oral Q AM   polyethylene glycol 17 g Oral Daily   tamsulosin 0.4 mg Oral Daily   vancomycin 1,750 mg Intravenous Q24H     Current PRN Medications:  dextrose  •  dextrose  •  glucagon (human recombinant)  •  magnesium citrate  •  sodium chloride    Review of Systems See HPI    Vital Signs:  /90 (BP Location: Left arm, Patient Position: Lying)   Pulse 88   Temp 98.2 °F (36.8 °C) (Oral)   Resp 22   Ht 152.4 cm (60\")   Wt 93.9 kg (207 lb 0.2 oz)   SpO2 93%   BMI 40.43 kg/m²     Physical " Exam  Vital signs reviewed.  Right knee most inferior aspect of her incision with some serous drainage.  There was faint erythema distal to the incision which seems to be fading.  I do not think there is any significant knee effusion.  There is no generalized erythema around the knee.  Skin without rash  Line/IV(Right arm PICC) site: No erythema, warmth, induration, or tenderness.    Lab Results:  CBC:   Results from last 7 days  Lab Units 04/02/18 0455 04/01/18 0456 03/31/18  0513 03/30/18  1903   WBC 10*3/mm3 11.32* 11.18* 14.01* 13.70*   HEMOGLOBIN g/dL 10.3* 10.1* 11.1* 11.1*   HEMATOCRIT % 32.0* 31.3* 34.6* 34.9*   PLATELETS 10*3/mm3 508* 516* 567* 593*     BMP:  Results from last 7 days  Lab Units 04/02/18 0455 04/01/18 0456 03/31/18  0513 03/30/18  1903   SODIUM mmol/L 133* 136 134* 130*   POTASSIUM mmol/L 5.0 4.7 4.8 5.3   CHLORIDE mmol/L 96* 96* 95* 91*   CO2 mmol/L 32.0* 36.0* 35.0* 37.0*   BUN mg/dL 26* 30* 31* 36*   CREATININE mg/dL 0.73 0.73 0.75 0.73   GLUCOSE mg/dL 58* 114* 134* 167*   CALCIUM mg/dL 8.8 8.7 8.8 8.7   ALT (SGPT) U/L  --   --  35 37     Culture Results:   Blood Culture   Date Value Ref Range Status   03/30/2018 No growth at 2 days  Preliminary   03/30/2018 Abnormal Stain (A)  Final   03/30/2018 Mixed Gram Positive Manda (A)  Final     Urine Culture   Date Value Ref Range Status   03/31/2018 <10,000 CFU/mL Gram Negative Bacilli (A)  Final   03/31/2018 <10,000 CFU/mL Mixed Gram Positive Manda (A)  Final     Wound Culture   Date Value Ref Range Status   03/30/2018 Heavy growth (4+) Staphylococcus aureus, MRSA (A)  Final     Radiology: None  Additional Studies Reviewed: None    Impression:   Hopefully just superficial wound infection distal aspect of her right knee incision.  Explained to the patient and her son it is hard to ask include some tracking to hardware or a deeper infection.  She does not seem to have septic arthritis.  Favor treating with a few weeks of IV antibiotic therapy  possibly followed by more chronic oral treatment/suppression.    Recommendations:   Okay with me to nursing home for IV antibiotics and wound care  Would have the following antibiotic recommendations for her nursing home    Antibiotic recommendations:  1.  Diagnosis-right knee wound infection-hopefully superficial.  Deeper infection not entirely excluded.  2.  Access right arm PICC line  3.  Microbiology-MRSA  4.  Suggest vancomycin 1500 mg IV daily through April 15, 2018 (that would be 2 weeks)  5.  Suggest CMP, CBC, CRP, and vancomycin trough weekly.  Goal vancomycin trough 10-20.  Monitor renal function more often if changes in creatinine.  6.  Follow-up appointment in 2 weeks  7.  Will consider longer antibiotic treatment versus or possibly transition to oral antibiotics at follow-up.    Ari Jaquez MD

## 2018-04-03 NOTE — PROGRESS NOTES
This patient is here for an infection of her right knee post IM nailing of a periprosthetic femur fracture.  The wound overall is looking better.  The erythema is beginning to resolve.  She still has some serous drainage from the wound but it is slowly beginning to granulate and close    Plan continue with IV antibiotics and every shift dressing changes

## 2018-04-03 NOTE — THERAPY TREATMENT NOTE
Acute Care - Occupational Therapy Treatment Note  [facility]     Patient Name: Tamy Sher  : 1930  MRN: [id number]  Today's Date: 4/3/2018  Onset of Illness/Injury or Date of Surgery: 18  Date of Referral to OT: 18  Referring Physician: Dr. Andrew    Admit Date: 3/30/2018       ICD-10-CM ICD-9-CM   1. Surgical wound infection, initial encounter T81.4XXA 998.59   2. Abnormality of gait and mobility R26.9 781.2   3. Other closed fracture of distal end of right femur, initial encounter S72.491A 821.29   4. Impaired mobility and ADLs Z74.09 799.89   5. Impaired mobility Z74.09 799.89     Patient Active Problem List   Diagnosis   • Ischemic chest pain   • Pneumonia of both lungs due to infectious organism   • Fall   • Closed displaced comminuted fracture of shaft of right femur   • Pain of right thigh   • Class 2 obesity with serious comorbidity in adult   • Type 2 diabetes mellitus with neurologic complication, with long-term current use of insulin   • STUART (acute kidney injury)   • Chronic diastolic heart failure   • Pleural effusion, left   • Hypoxia   • Surgical wound infection, initial encounter     Past Medical History:   Diagnosis Date   • Constipation    • Diabetes mellitus    • Diarrhea    • Diastolic dysfunction, left ventricle    • Esophagitis    • Exertional shortness of breath    • Hyperlipidemia    • Hypertension    • Lumbar spondylitis    • Osteoarthritis    • Peripheral vascular disease    • Sinusitis    • Stroke      Past Surgical History:   Procedure Laterality Date   • FEMUR SUPRACONDYLAR FRACTURE REPAIR Right 3/10/2018    Procedure: FEMUR SUPRACONDYLAR NAIL;  Surgeon: Nima Bundy MD;  Location: [facility];  Service: Orthopedics   • GALLBLADDER SURGERY     • HYSTERECTOMY     • JOINT REPLACEMENT     • REPLACEMENT TOTAL KNEE Left        Therapy Treatment    Therapy Treatment / Health Promotion    Treatment Time/Intention  Discipline: occupational therapy assistant ([id number]  : CYNDEE Melendez)  Document Type: therapy note (daily note) (04/03/18 1105 : CYNDEE Melendez)  Subjective Information: complains of, weakness, fatigue (04/03/18 1105 : CYNDEE Melendez)  Patient Effort: adequate (required encouragement) (04/03/18 1105 : CYNDEE Melendez)  Comment: pt required encouragement to participate. Pt would only agree to roll to L in bed and get off bottom. Pt was educated on benefits of activity and mobility with therapy (04/03/18 1105 : CYNDEE Melendez)  Existing Precautions/Restrictions: fall, oxygen therapy device and L/min, non-weight bearing (NWB RLE) (04/03/18 1105 : CYNDEE Melendez)  Plan of Care Review  Plan of Care Reviewed With: patient (04/03/18 1159 : Jasmyn N Russel, COOLEY/L)    Vitals/Pain/Safety  Pain Scale: Numbers Pre/Post-Treatment  Pain Scale: Numbers, Pretreatment: 0/10 - no pain (04/03/18 1105 : CYNDEE Melendez)  Positioning and Restraints  Pre-Treatment Position: in bed (04/03/18 1105 : CYNDEE Melendez)  Post Treatment Position: bed (04/03/18 1105 : CYNDEE Melendez)  In Bed: side lying left, fowlers, call light within reach, encouraged to call for assist, exit alarm on, with family/caregiver, side rails up x2, pillow between legs (R multipodus boot) (04/03/18 1105 : Jasmyn N Russel, COOLEY/L)    Mobility,ADL,Motor, Modality  Bed Mobility Assessment/Treatment  Rolling Left [place] (Bed Mobility): minimum assist (75% patient effort) (04/03/18 1105 : CYNDEE Melendez)  Assistive Device (Bed Mobility): bed rails, draw sheet, head of bed elevated (04/03/18 1105 : Jasmyn Goode, YASIR/BALAJI)                 ROM/MMT             Sensory, Edema, Orthotics          Cognition, Communication, Swallow  Cognitive Assessment/Intervention- PT/OT  Personal Safety Interventions: elopement precautions initiated, fall prevention program maintained (04/03/18 1107  : YASIR Melendez/L)    Outcome Summary           OT Rehab Goals     Row Name 04/01/18 0828             Bed Mobility Goal 1 (OT)    Activity/Assistive Device (Bed Mobility Goal 1, OT) rolling to left;rolling to right;sit to supine/supine to sit;bed rails  -CS      [place] Level/Cues Needed (Bed Mobility Goal 1, OT) minimum assist (75% or more patient effort);verbal cues required  -CS      Time Frame (Bed Mobility Goal 1, OT) 2 weeks  -CS      Barriers (Bed Mobility Goal 1, OT) .  -CS      Progress/Outcomes (Bed Mobility Goal 1, OT) goal ongoing  -CS         Bathing Goal 1 (OT)    Activity/Assistive Device (Bathing Goal 1, OT) bathing skills, all   sitting EOB  -CS      Weeping [place] Level/Cues Needed (Bathing Goal 1, OT) moderate assist (50-74% patient effort)  -CS      Time Frame (Bathing Goal 1, OT) 2 weeks  -CS      Barriers (Bathing Goal 1, OT) .  -CS      Progress/Outcomes (Bathing Goal 1, OT) goal ongoing  -CS         Dressing Goal 1 (OT)    Activity/Assistive Device (Dressing Goal 1, OT) upper body dressing   sitting EOB  -CS      Weeping Water/Cues Needed (Dressing Goal 1, OT) set-up required;supervision required  -CS      Time Frame (Dressing Goal 1, OT) 2 weeks  -CS      Progress/Outcome (Dressing Goal 1, OT) goal ongoing  -CS         Strength Goal 1 (OT)    Strength Goal 1 (OT) Pt will increase BUE strength to 4/5 for increased I with ADLs and functional mobility  -CS      Time Frame (Strength Goal 1, OT) 2 weeks  -CS      Barriers (Strength Goal 1, OT) .  -CS      Progress/Outcome (Strength Goal 1, OT) goal ongoing  -CS        User Key  (r) = Recorded By, (t) = Taken By, (c) = Cosigned By    Initials Name Provider Type    CS Kathleen Benson OTR/L Occupational Therapist        Occupational Therapy Education     Title: PT OT SLP Therapies (Active)     Topic: Occupational Therapy (Done)     Point: ADL training (Done)     Description: Instruct learner(s) on proper safety adaptation and  remediation techniques during self care or transfers.   Instruct in proper use of assistive devices.   Learning Progress Summary     Learner Status Readiness Method Response Comment Documented by    Patient Done Acceptance E VU,NR benefits of activity, bed mobility  04/03/18 1157     Done Acceptance E VU benefits of activity, bed mobility, ADLs  04/02/18 1038     Done Acceptance E VU,NR benefits of activity, OT POC, bed mobility, strengthening, safety  04/01/18 0946          Point: Home exercise program (Done)     Description: Instruct learner(s) on appropriate technique for monitoring, assisting and/or progressing therapeutic exercises/activities.   Learning Progress Summary     Learner Status Readiness Method Response Comment Documented by    Patient Done Acceptance AMANDA,JAIME BUSTAMANTE,DU,NR Pt. performed ue exs to increase her bed mobility/transfer ability sec. Nwb status! on [facility] 04/01/18 1449    Family Done Acceptance AMANDAD ROBBY,DU,NR Pt. performed ue exs to increase her bed mobility/transfer ability sec. Nwb status! on [facility] 04/01/18 1449                      User Key     Initials Effective Dates Name Provider Type Discipline     08/02/16 -  Zoran Jaimes, COOLEY/L Occupational Therapy Assistant OT     08/02/16 -  Jasmyn Goode COOLEY/L Occupational Therapy Assistant OT     08/02/16 -  JUDY Arguelles/L Occupational Therapist OT                  OT Recommendation and Plan     Plan of Care Review  Plan of Care Reviewed With: patient  Plan of Care Reviewed With: patient  Outcome Summary: Pt provided education on benefits of activity and participation with therapy. Pt min A to roll to L in bed with bed rail and draw sheet. Pt states she will be discharging to SNF today for continued rehab. Continue OT POC         Outcome Measures     Row Name 04/03/18 1100 04/03/18 1000 04/02/18 1300       How much help from another person do you currently need...    Turning from your back to your side while in flat bed  without using bedrails?  -- 2  -CW 2  -CW    Moving from lying on back to sitting on the side of a flat bed without bedrails?  -- 2  -CW 2  -CW    Moving to and from a bed to a chair (including a wheelchair)?  -- 1  -CW 1  -CW    Standing up from a chair using your arms (e.g., wheelchair, bedside chair)?  -- 1  -CW 1  -CW    Climbing 3-5 steps with a railing?  -- 1  -CW 1  -CW    To walk in hospital room?  -- 1  -CW 1  -CW    AM-PAC 6 Clicks Score  -- 8  -CW 8  -CW       How much help from another is currently needed...    Putting on and taking off regular lower body clothing? 2  -TS  --  --    Bathing (including washing, rinsing, and drying) 2  -TS  --  --    Toileting (which includes using toilet bed pan or urinal) 2  -TS  --  --    Putting on and taking off regular upper body clothing 3  -TS  --  --    Taking care of personal grooming (such as brushing teeth) 3  -TS  --  --    Eating meals 4  -TS  --  --    Score 16  -TS  --  --       Functional Assessment    Outcome Measure Options AM-PAC 6 Clicks Daily Activity (OT)  -TS AM-PAC 6 Clicks Basic Mobility (PT)  -CW AM-PAC 6 Clicks Basic Mobility (PT)  -CW    Row Name 04/02/18 1000 04/01/18 1333 04/01/18 0900       How much help from another person do you currently need...    Turning from your back to your side while in flat bed without using bedrails?  --  -- 2  -TB    Moving from lying on back to sitting on the side of a flat bed without bedrails?  --  -- 2  -TB    Moving to and from a bed to a chair (including a wheelchair)?  --  -- 1  -TB    Standing up from a chair using your arms (e.g., wheelchair, bedside chair)?  --  -- 1  -TB    Climbing 3-5 steps with a railing?  --  -- 1  -TB    To walk in hospital room?  --  -- 1  -TB    AM-PAC 6 Clicks Score  --  -- 8  -TB       How much help from another is currently needed...    Putting on and taking off regular lower body clothing? 1  -TS 1  -CJ  --    Bathing (including washing, rinsing, and drying) 2  -TS 2  -CJ   --    Toileting (which includes using toilet bed pan or urinal) 2  -TS 1  -CJ  --    Putting on and taking off regular upper body clothing 3  -TS 3  -CJ  --    Taking care of personal grooming (such as brushing teeth) 3  -TS 3  -CJ  --    Eating meals 4  -TS 4  -CJ  --    Score 15  -TS 14  -CJ  --       Functional Assessment    Outcome Measure Options AM-PAC 6 Clicks Daily Activity (OT)  -TS AM-PAC 6 Clicks Daily Activity (OT)  -CJ AM-PAC 6 Clicks Basic Mobility (PT)  -TB    Row Name 04/01/18 0828             How much help from another is currently needed...    Putting on and taking off regular lower body clothing? 1  -CS      Bathing (including washing, rinsing, and drying) 2  -CS      Toileting (which includes using toilet bed pan or urinal) 1  -CS      Putting on and taking off regular upper body clothing 3  -CS      Taking care of personal grooming (such as brushing teeth) 3  -CS      Eating meals 4  -CS      Score 14  -CS         Functional Assessment    Outcome Measure Options AM-PAC 6 Clicks Daily Activity (OT)  -CS        User Key  (r) = Recorded By, (t) = Taken By, (c) = Cosigned By    Initials Name Provider Type    TB Tay Lau, PT Physical Therapist    CJ MELVINA SinghA/L Occupational Therapy Assistant    TS YASIR Melendez/L Occupational Therapy Assistant    CS Kathleen Benson, OTR/L Occupational Therapist    CW Dianna Calloway, PTA Physical Therapy Assistant           Time Calculation:         Time Calculation- OT     Row Name 04/03/18 1201             Time Calculation- OT    OT Start Time 1105  -TS      OT Stop Time 1120  -TS      OT Time Calculation (min) 15 min  -TS      Total Timed Code Minutes- OT 15 minute(s)  -TS      OT Received On 04/03/18  -TS        User Key  (r) = Recorded By, (t) = Taken By, (c) = Cosigned By    Initials Name Provider Type    TS YASIR Melendez/L Occupational Therapy Assistant           Therapy Charges for Today     Code Description  Service Date Service Provider Modifiers Qty    46147050749 HC OT SELF CARE/MGMT/TRAIN EA 15 MIN 4/2/2018 YASIR Melendez/L GO, KX 2    67409381927 HC OT SELF CARE/MGMT/TRAIN EA 15 MIN 4/3/2018 YASIR Melendez/L GO, KX 1          OT G-codes  OT Professional Judgement Used?: Yes  OT Functional Scales Options: AM-PAC 6 Clicks Daily Activity (OT)  Score: 14  Functional Limitation: Self care  Self Care Current Status (): At least 40 percent but less than 60 percent impaired, limited or restricted  Self Care Goal Status (): At least 20 percent but less than 40 percent impaired, limited or restricted    CYNDEE Mcclelland  4/3/2018

## 2018-04-03 NOTE — PLAN OF CARE
Problem: Patient Care Overview  Goal: Plan of Care Review  Outcome: Ongoing (interventions implemented as appropriate)   04/03/18 5411   Coping/Psychosocial   Plan of Care Reviewed With patient   Plan of Care Review   Progress improving   OTHER   Outcome Summary Pt provided education on benefits of activity and participation with therapy. Pt min A to roll to L in bed with bed rail and draw sheet. Pt states she will be discharging to SNF today for continued rehab. Continue OT POC

## 2018-04-03 NOTE — PLAN OF CARE
Problem: Patient Care Overview  Goal: Plan of Care Review  Outcome: Ongoing (interventions implemented as appropriate)   04/03/18 1027   Coping/Psychosocial   Plan of Care Reviewed With patient   Plan of Care Review   Progress no change   OTHER   Outcome Summary Pt requires max assist of 2 for all bed mobility. Sat EOB and completed AAROM BLE sitting exercises. Pt c/o shortness of breath. O2 reading of 80% on 2 L. Increased to 4 L pt O2 increased to 90%. Notified nsg. Will continue to benefit from therapy to increase strength and improve functional mobility.

## 2018-04-03 NOTE — PLAN OF CARE
Problem: Fall Risk (Adult)  Goal: Identify Related Risk Factors and Signs and Symptoms  Outcome: Ongoing (interventions implemented as appropriate)    Goal: Absence of Fall  Outcome: Ongoing (interventions implemented as appropriate)      Problem: Infection, Risk/Actual (Adult)  Goal: Identify Related Risk Factors and Signs and Symptoms  Outcome: Ongoing (interventions implemented as appropriate)    Goal: Infection Prevention/Resolution  Outcome: Ongoing (interventions implemented as appropriate)      Problem: Patient Care Overview  Goal: Plan of Care Review  Outcome: Ongoing (interventions implemented as appropriate)   04/03/18 0300   Coping/Psychosocial   Plan of Care Reviewed With patient   Coping/Psychosocial   Patient Agreement with Plan of Care agrees   Plan of Care Review   Progress no change   OTHER   Outcome Summary vss; alert and oriented x 4; no c/o pain; right hip dressing c/d/i; righ knee dressing with dried drainage; prevalon boot on right foot; bedrest; continue to monitor     Goal: Individualization and Mutuality  Outcome: Ongoing (interventions implemented as appropriate)

## 2018-04-03 NOTE — PLAN OF CARE
Problem: Fall Risk (Adult)  Goal: Identify Related Risk Factors and Signs and Symptoms  Outcome: Outcome(s) achieved Date Met: 04/03/18    Goal: Absence of Fall  Outcome: Ongoing (interventions implemented as appropriate)      Problem: Infection, Risk/Actual (Adult)  Goal: Identify Related Risk Factors and Signs and Symptoms  Outcome: Ongoing (interventions implemented as appropriate)    Goal: Infection Prevention/Resolution  Outcome: Ongoing (interventions implemented as appropriate)      Problem: Patient Care Overview  Goal: Plan of Care Review  Outcome: Ongoing (interventions implemented as appropriate)   04/03/18 1159 04/03/18 0898   Coping/Psychosocial   Plan of Care Reviewed With patient --    Plan of Care Review   Progress improving --    OTHER   Outcome Summary --  Pt. had no c/o pain this shift. Safety maintained. Pt c/o SOB this shift. MD notified. CXR ordred, CT chest ordred. Cont to monitor.     Goal: Individualization and Mutuality  Outcome: Ongoing (interventions implemented as appropriate)    Goal: Discharge Needs Assessment  Outcome: Ongoing (interventions implemented as appropriate)    Goal: Interprofessional Rounds/Family Conf  Outcome: Ongoing (interventions implemented as appropriate)      Problem: Skin Injury Risk (Adult)  Goal: Skin Health and Integrity  Outcome: Ongoing (interventions implemented as appropriate)

## 2018-04-03 NOTE — PROGRESS NOTES
Infectious Diseases Progress Note    Patient:  Tamy Sher  YOB: 1930  MRN: [id number]   Admit date: 3/30/2018   Admitting Physician: Fidencio Rodriguez,*  Primary Care Physician: Barry Foley MD    Chief Complaint/Interval History: She is without new symptoms. Spoke with hospitalist. She may have a bed at [facility] for today. Yesterday I placed recommendations for her IV antibiotics at nursing home. She has no new symptoms. No pain at her line site. No rash or skin itching.    Intake/Output Summary (Last 24 hours) at 04/03/18 0602  Last data filed at 04/03/18 0334   Gross per 24 hour   Intake              360 ml   Output             2150 ml   Net            -1790 ml     Allergies: No Known Allergies  Current Scheduled Medications:     albuterol 1.25 mg Nebulization 4x Daily - RT   And      ipratropium 0.5 mg Nebulization 4x Daily - RT   apixaban 5 mg Oral Q12H   aspirin 81 mg Oral Daily   atorvastatin 20 mg Oral Nightly   budesonide-formoterol 2 puff Inhalation BID - RT   collagenase  Topical Q24H   diltiaZEM 30 mg Oral Q6H   docusate sodium 100 mg Oral BID   folic acid 1 mg Oral Daily   furosemide 40 mg Oral Daily   gabapentin 300 mg Oral Daily With Breakfast   gabapentin 600 mg Oral Nightly   hydroxyurea 500 mg Oral Daily   insulin detemir 23 Units Subcutaneous Nightly   insulin lispro 2-7 Units Subcutaneous 4x Daily With Meals & Nightly   levothyroxine 50 mcg Oral Q AM   lisinopril 2.5 mg Oral Daily   metoprolol succinate XL 50 mg Oral Daily   nystatin  Topical Q12H   pantoprazole 40 mg Oral Q AM   polyethylene glycol 17 g Oral Daily   tamsulosin 0.4 mg Oral Daily   vancomycin 1,500 mg Intravenous Q24H     Current PRN Medications:  dextrose  •  dextrose  •  glucagon (human recombinant)  •  magnesium citrate  •  sodium chloride    Review of Systems no cough or shortness of breath. No pain at present. No diarrhea.    Vital Signs:  /85 (BP Location: Left arm, Patient Position:  "Lying)   Pulse 81   Temp 98.5 °F (36.9 °C)   Resp 22   Ht 152.4 cm (60\")   Wt 93.9 kg (207 lb 0.2 oz)   SpO2 94%   BMI 40.43 kg/m²     Physical Exam  Vital signs reviewed.  Heart without murmur  Skin without rash  Right knee distal incision open. There is some serous drainage. No purulent drainage. Previously there was erythema somewhat distal right knee incision on the anterior shin. That erythema seems to be fading. I do not feel there is any significant knee effusion.  Line/IV site: No erythema, warmth, induration, or tenderness.    Lab Results:  CBC:   Results from last 7 days  Lab Units 04/02/18  0455 04/01/18  0456 03/31/18  0513 03/30/18  1903   WBC 10*3/mm3 11.32* 11.18* 14.01* 13.70*   HEMOGLOBIN g/dL 10.3* 10.1* 11.1* 11.1*   HEMATOCRIT % 32.0* 31.3* 34.6* 34.9*   PLATELETS 10*3/mm3 508* 516* 567* 593*     BMP:  Results from last 7 days  Lab Units 04/02/18  0455 04/01/18  0456 03/31/18  0513 03/30/18  1903   SODIUM mmol/L 133* 136 134* 130*   POTASSIUM mmol/L 5.0 4.7 4.8 5.3   CHLORIDE mmol/L 96* 96* 95* 91*   CO2 mmol/L 32.0* 36.0* 35.0* 37.0*   BUN mg/dL 26* 30* 31* 36*   CREATININE mg/dL 0.73 0.73 0.75 0.73   GLUCOSE mg/dL 58* 114* 134* 167*   CALCIUM mg/dL 8.8 8.7 8.8 8.7   ALT (SGPT) U/L  --   --  35 37     Culture Results:   Blood Culture   Date Value Ref Range Status   03/30/2018 No growth at 3 days  Preliminary   03/30/2018 Abnormal Stain (A)  Final   03/30/2018 Mixed Gram Positive Manda (A)  Final      Ref Range & Units 4d ago   BCID, PCR No organism detected by BCID PCR., Negative by BCID PCR. Culture to Follow. Streptococcus spp, not A, B, or pneumoniae. Identification by BCID PCR.     BCID, PCR 2 No organism detected by BCID PCR., Negative by BCID PCR. Culture to Follow. Staphylococcus spp, not aureus. Identification by BCID PCR.       Urine Culture   Date Value Ref Range Status   03/31/2018 <10,000 CFU/mL Gram Negative Bacilli (A)  Final   03/31/2018 <10,000 CFU/mL Mixed Gram Positive " "Manda (A)  Final     Wound Culture   Date Value Ref Range Status   03/30/2018 Heavy growth (4+) Staphylococcus aureus, MRSA (A)  Final   Susceptibility      Staphylococcus aureus, MRSA     FERN     Clindamycin >=8 ug/ml Resistant     Erythromycin >=8 ug/ml Resistant     Gentamicin <=0.5 ug/ml\"><=0.5 ug/ml Susceptible     Inducible Clindamycin Resistance NEG  Negative     Levofloxacin >=8 ug/ml Resistant     Oxacillin >=4 ug/ml Resistant     Penicillin G >=0.5 ug/ml Resistant     Tetracycline <=1 ug/ml\"><=1 ug/ml Susceptible     Trimethoprim + Sulfamethoxazole <=10 ug/ml\"><=10 ug/ml Susceptible     Vancomycin <=0.5 ug/ml\"><=0.5 ug/ml Susceptible      Radiology: None  Additional Studies Reviewed: None    Impression:   1. Hopefully superficial wound infection with MRSA. Hard to exclude deeper wound infection. At her age and comorbidities the best approach seems to be an attempt at control with IV antibiotics followed by short or longer term chronic oral and a microbial treatment.  2. Positive blood cultures on admission for 2 different organisms-these are likely contaminants. I don't think she had bacteremia.    Recommendations:   Okay with me to nursing home  Please see antibiotic recommendations below  Discussed with hospitalist this a.m.  Will sign off for now  Follow-up as outlined below  Please call if any questions    Antibiotic recommendations:  1.  Diagnosis-right knee wound infection-hopefully superficial.  Deeper infection not entirely excluded.  2.  Access right arm PICC line  3.  Microbiology-MRSA  4.  Suggest vancomycin 1500 mg IV daily through April 15, 2018 (that would be 2 weeks)  5.  Suggest CMP, CBC, CRP, and vancomycin trough weekly.  Goal vancomycin trough 10-20.  Monitor renal function more often if changes in creatinine.  6.  Follow-up appointment in 2 weeks  7.  Will consider longer IV  antibiotic treatment or possibly transition to oral antibiotics at follow-up.    Ari Jaquez MD  "

## 2018-04-03 NOTE — THERAPY TREATMENT NOTE
Acute Care - Physical Therapy Treatment Note  [facility]     Patient Name: Tamy Sher  : 1930  MRN: [id number]  Today's Date: 4/3/2018  Onset of Illness/Injury or Date of Surgery: 18  Date of Referral to PT: 18  Referring Physician: Dr. Andrew    Admit Date: 3/30/2018    Visit Dx:    ICD-10-CM ICD-9-CM   1. Surgical wound infection, initial encounter T81.4XXA 998.59   2. Abnormality of gait and mobility R26.9 781.2   3. Other closed fracture of distal end of right femur, initial encounter S72.491A 821.29   4. Impaired mobility and ADLs Z74.09 799.89   5. Impaired mobility Z74.09 799.89     Patient Active Problem List   Diagnosis   • Ischemic chest pain   • Pneumonia of both lungs due to infectious organism   • Fall   • Closed displaced comminuted fracture of shaft of right femur   • Pain of right thigh   • Class 2 obesity with serious comorbidity in adult   • Type 2 diabetes mellitus with neurologic complication, with long-term current use of insulin   • STUART (acute kidney injury)   • Chronic diastolic heart failure   • Pleural effusion, left   • Hypoxia   • Surgical wound infection, initial encounter       Therapy Treatment    Therapy Treatment / Health Promotion    Treatment Time/Intention  Discipline: physical therapy assistant (18 : Dianna Calloway PTA)  Document Type: therapy note (daily note) (18 : Dianna Calloway PTA)  Subjective Information: complains of, weakness, fatigue, pain (18 : Dianna Calloway PTA)  Patient Effort: adequate (18 : Dianna Calloway PTA)  Comment: Pt requires much enouragement to participate. Verbal cues to open eyes and participate. (18 : Dianna Calloway PTA)  Existing Precautions/Restrictions: non-weight bearing (NWB RLE) (18 : Dianna Calloway PTA)  Treatment Considerations/Comments: NWB RLE (18 : Dianna Calloway PTA)  Plan of Care Review  Plan of Care Reviewed  With: patient (04/03/18 1027 : Dianna Calloway PTA)    Vitals/Pain/Safety  Vital Signs  O2 Delivery Pre Treatment: supplemental O2 (2 L) (04/03/18 0900 : Dianna Calloway PTA)  Intra SpO2 (%): 80 (04/03/18 0900 : Dianna Calloway PTA)  O2 Delivery Intra Treatment: supplemental O2 (2 L - increased to 4 L - notified  nsg) (04/03/18 0900 : Dianna Calloway PTA)  Post SpO2 (%): 90 (04/03/18 0900 : Dianna Calloway PTA)  O2 Delivery Post Treatment: supplemental O2 (4 L) (04/03/18 0900 : Dianna Calloway PTA)  Pre Patient Position: Supine (04/03/18 0900 : Dianna Calloway PTA)  Intra Patient Position: Sitting (04/03/18 0900 : Dianna Calloway PTA)  Post Patient Position: Supine (04/03/18 0900 : Dianna Calloway PTA)  Pain Scale: Numbers Pre/Post-Treatment  Pain Location - Side: Right (04/03/18 0900 : Dianna Calloway PTA)  Pain Location: knee (04/03/18 0900 : Dianna Calloway PTA)  Pre/Post Treatment Pain Comment: only hurts when moved (04/03/18 0900 : Dianna Calloway PTA)  Pain Intervention(s): Repositioned (04/03/18 0900 : Dianna Calloway PTA)  Pain Scale: Word Pre/Post-Treatment  Pain Location - Side: Right (04/03/18 0900 : Dianna Calloway PTA)  Pain Location: knee (04/03/18 0900 : Dianna Calloway PTA)  Pain Intervention(s): Repositioned (04/03/18 0900 : Dianna Calloway PTA)  Pain Scale: FACES Pre/Post-Treatment  Pain: FACES Scale, Pretreatment: 4-->hurts little more (04/03/18 0900 : Dianna Calloway PTA)  Pain: FACES Scale, Post-Treatment: 0-->no hurt (04/03/18 0900 : Dianna Calloway PTA)  Pain Location - Side: Right (04/03/18 0900 : Dianna Claloway PTA)  Pain Location: knee (04/03/18 0900 : Dianna Calloway PTA)  Pain Intervention(s): Repositioned (04/03/18 0900 : Dianna Calloway PTA)  Safety Issues, Functional Mobility  Impairments Affecting Function (Mobility): endurance/activity tolerance, pain, strength (04/03/18 0900 : Dianna Calloway,  VERNELL)  Positioning and Restraints  Pre-Treatment Position: in bed (04/03/18 0900 : Dianna Calloway PTA)  Post Treatment Position: bed (04/03/18 0900 : Dianna Calloway PTA)  In Bed: supine, call light within reach, encouraged to call for assist, exit alarm on, with nsg, notified nsg (04/03/18 0900 : Dianna Calloway PTA)    Mobility,ADL,Motor, Modality  Mobility Assessment/Intervention  Extremity Weight-bearing Status: right lower extremity (04/03/18 0900 : Dianna Calloway PTA)  Right Lower Extremity (Weight-bearing Status): non weight-bearing (NWB) (04/03/18 0900 : Dianna Calloway PTA)  Bed Mobility Assessment/Treatment  Rolling Left Campbell (Bed Mobility): maximum assist (25% patient effort), 2 person assist (04/03/18 0900 : Dianna Calloway PTA)  Rolling Right [place] (Bed Mobility): maximum assist (25% patient effort), 2 person assist (04/03/18 0900 : Dianna Calloway PTA)  Scooting/Bridging Campbell (Bed Mobility): dependent (less than 25% patient effort), 2 person assist (04/03/18 0900 : Dianna Calloway PTA)  Supine-Sit [place] (Bed Mobility): maximum assist (25% patient effort), 2 person assist (04/03/18 0900 : Dianna Calloway PTA)  Sit-Supine Campbell (Bed Mobility): maximum assist (25% patient effort), 2 person assist (04/03/18 0900 : Dianna Calloway PTA)  Bed Mobility, Safety Issues: decreased use of arms for pushing/pulling, decreased use of legs for bridging/pushing (04/03/18 0900 : Dianna Calloway PTA)  Assistive Device (Bed Mobility): bed rails, draw sheet (bed in trendelenberg for scooting) (04/03/18 0900 : Dianna Calloway PTA)  Comment (Bed Mobility): bed in trendelenberg for scooting (04/03/18 0900 : Dianna Calloway PTA)  Transfer Assessment/Treatment  Comment (Transfers): not appropriate due to weakneses (04/03/18 0900 : Dianna Calloway, VERNELL)     Therapeutic Exercise  Exercise Type (Therapeutic Exercise): TORREY (active  assistive range of motion), AROM (active range of motion) (04/03/18 0900 : Dianna Calloway PTA)  Position (Therapeutic Exercise): seated, supine (04/03/18 0900 : Dianna Calloway PTA)  Sets/Reps (Therapeutic Exercise): 10 x 2 (04/03/18 0900 : Dianna Calloway PTA)  Static Sitting Balance  Level of [place] (Unsupported Sitting, Static Balance): standby assist (04/03/18 0900 : Dianna Calloway PTA)  Sitting Position (Unsupported Sitting, Static Balance): sitting on edge of bed (04/03/18 0900 : Dianna Calloway PTA)  Time Able to Maintain Position (Unsupported Sitting, Static Balance): more than 5 minutes (04/03/18 0900 : Dianna Calloway PTA)        ROM/MMT             Sensory, Edema, Orthotics          Cognition, Communication, Swallow  Cognitive Assessment/Intervention- PT/OT  Personal Safety Interventions: elopement precautions initiated, fall prevention program maintained, gait belt, nonskid shoes/slippers when out of bed (04/03/18 0900 : Dianna Calloway PTA)  Speaking Valve  Post SpO2 (%): 90 (04/03/18 0900 : Dianna Calloway PTA)  General Eating/Swallowing Observations  Post SpO2 (%): 90 (04/03/18 0900 : Dianna Calloway PTA)    Outcome Summary               PT Rehab Goals     Row Name 04/01/18 0900             Bed Mobility Goal 1 (PT)    Activity/Assistive Device (Bed Mobility Goal 1, PT) sit to supine/supine to sit  -TB      [place] Level/Cues Needed (Bed Mobility Goal 1, PT) minimum assist (75% or more patient effort)  -TB      Time Frame (Bed Mobility Goal 1, PT) long term goal (LTG);by discharge  -TB      Barriers (Bed Mobility Goal 1, PT) physical  -TB      Progress/Outcomes (Bed Mobility Goal 1, PT) good progress toward goal  -TB         Transfer Goal 1 (PT)    Activity/Assistive Device (Transfer Goal 1, PT) wheelchair transfer   with sliding board  -TB      [place] Level/Cues Needed (Transfer Goal 1, PT) minimum assist (75% or more patient effort)  -TB       Time Frame (Transfer Goal 1, PT) long term goal (LTG);by discharge  -TB      Barriers (Transfers Goal 1, PT) physical  -TB      Progress/Outcome (Transfer Goal 1, PT) good progress toward goal  -TB         Patient Education Goal (PT)    Activity (Patient Education Goal, PT) Knowledgable of precautions of right leg and HEP for muscle contraction  -TB      [place]/Cues/Accuracy (Memory Goal 2, PT) demonstrates adequately  -TB      Time Frame (Patient Education Goal, PT) long term goal (LTG);by discharge  -TB      Barriers (Patient Education Goal, PT) physical  -TB      Progress/Outcome (Patient Education Goal, PT) continuing progress toward goal  -TB        User Key  (r) = Recorded By, (t) = Taken By, (c) = Cosigned By    Initials Name Provider Type    TB Tay Lau, PT Physical Therapist          Physical Therapy Education     Title: PT OT SLP Therapies (Active)     Topic: Physical Therapy (Active)     Point: Mobility training (Active)    Learning Progress Summary     Learner Status Readiness Method Response Comment Documented by    Patient Active Acceptance E,D NR bed mobility, exercise, plan of care, benefits of activity  04/03/18 1027     Active Acceptance E,D NR bed mobility, exercise, plan of care CW 04/02/18 1327     Active Acceptance E NR  TB 04/01/18 0941          Point: Home exercise program (Active)    Learning Progress Summary     Learner Status Readiness Method Response Comment Documented by    Patient Active Acceptance E,D NR bed mobility, exercise, plan of care, benefits of activity CW 04/03/18 1027     Active Acceptance E,D NR bed mobility, exercise, plan of care CW 04/02/18 1327     Active Acceptance E NR  TB 04/01/18 0941          Point: Body mechanics (Active)    Learning Progress Summary     Learner Status Readiness Method Response Comment Documented by    Patient Active Acceptance E,D NR bed mobility, exercise, plan of care, benefits of activity  04/03/18 1027     Active Acceptance  E,D NR bed mobility, exercise, plan of care  04/02/18 1327     Active Acceptance E NR   04/01/18 0941          Point: Precautions (Active)    Learning Progress Summary     Learner Status Readiness Method Response Comment Documented by    Patient Active Acceptance E,D NR bed mobility, exercise, plan of care, benefits of activity  04/03/18 1027     Active Acceptance E NR   04/01/18 0941                      User Key     Initials Effective Dates Name Provider Type Discipline     08/02/16 -  Tay Lau, PT Physical Therapist PT     06/22/15 -  Dianna Calloway, PTA Physical Therapy Assistant PT                    PT Recommendation and Plan     Plan of Care Reviewed With: patient  Progress: no change  Outcome Summary: Pt requires max assist of 2 for all bed mobility.  Sat EOB and completed AAROM BLE sitting exercises. Pt c/o shortness of breath. O2 reading of 80% on 2 L. Increased to 4 L pt O2 increased to 90%.  Notified nsg.  Will continue to benefit from therapy to increase strength and improve functional mobility.          Outcome Measures     Row Name 04/03/18 1000 04/02/18 1300 04/02/18 1000       How much help from another person do you currently need...    Turning from your back to your side while in flat bed without using bedrails? 2  -CW 2  -CW  --    Moving from lying on back to sitting on the side of a flat bed without bedrails? 2  -CW 2  -CW  --    Moving to and from a bed to a chair (including a wheelchair)? 1  -CW 1  -CW  --    Standing up from a chair using your arms (e.g., wheelchair, bedside chair)? 1  -CW 1  -CW  --    Climbing 3-5 steps with a railing? 1  -CW 1  -CW  --    To walk in hospital room? 1  -CW 1  -CW  --    AM-PAC 6 Clicks Score 8  -CW 8  -CW  --       How much help from another is currently needed...    Putting on and taking off regular lower body clothing?  --  -- 1  -TS    Bathing (including washing, rinsing, and drying)  --  -- 2  -TS    Toileting (which includes  using toilet bed pan or urinal)  --  -- 2  -TS    Putting on and taking off regular upper body clothing  --  -- 3  -TS    Taking care of personal grooming (such as brushing teeth)  --  -- 3  -TS    Eating meals  --  -- 4  -TS    Score  --  -- 15  -TS       Functional Assessment    Outcome Measure Options AM-PAC 6 Clicks Basic Mobility (PT)  -CW AM-PAC 6 Clicks Basic Mobility (PT)  -CW AM-PAC 6 Clicks Daily Activity (OT)  -TS    Row Name 04/01/18 1333 04/01/18 0900 04/01/18 0828       How much help from another person do you currently need...    Turning from your back to your side while in flat bed without using bedrails?  -- 2  -TB  --    Moving from lying on back to sitting on the side of a flat bed without bedrails?  -- 2  -TB  --    Moving to and from a bed to a chair (including a wheelchair)?  -- 1  -TB  --    Standing up from a chair using your arms (e.g., wheelchair, bedside chair)?  -- 1  -TB  --    Climbing 3-5 steps with a railing?  -- 1  -TB  --    To walk in hospital room?  -- 1  -TB  --    AM-PAC 6 Clicks Score  -- 8  -TB  --       How much help from another is currently needed...    Putting on and taking off regular lower body clothing? 1  -CJ  -- 1  -CS    Bathing (including washing, rinsing, and drying) 2  -CJ  -- 2  -CS    Toileting (which includes using toilet bed pan or urinal) 1  -CJ  -- 1  -CS    Putting on and taking off regular upper body clothing 3  -CJ  -- 3  -CS    Taking care of personal grooming (such as brushing teeth) 3  -CJ  -- 3  -CS    Eating meals 4  -CJ  -- 4  -CS    Score 14  -CJ  -- 14  -CS       Functional Assessment    Outcome Measure Options AM-PAC 6 Clicks Daily Activity (OT)  -CJ AM-PAC 6 Clicks Basic Mobility (PT)  -TB AM-PAC 6 Clicks Daily Activity (OT)  -CS      User Key  (r) = Recorded By, (t) = Taken By, (c) = Cosigned By    Initials Name Provider Type    TB Tay Lau, PT Physical Therapist    CJ MELVINA SinghA/L Occupational Therapy Assistant    TS  Jasmyn Goode, COOLEY/L Occupational Therapy Assistant    CHRISTINA Benson, OTR/L Occupational Therapist    CW Dianna Calloway PTA Physical Therapy Assistant           Time Calculation:         PT Charges     Row Name 04/03/18 1031             Time Calculation    Start Time 0900  -CW      Stop Time 0942  -CW      Time Calculation (min) 42 min  -CW      PT Received On 04/03/18  -CW      PT Goal Re-Cert Due Date 04/11/18  -CW         Time Calculation- PT    Total Timed Code Minutes- PT 42 minute(s)  -CW        User Key  (r) = Recorded By, (t) = Taken By, (c) = Cosigned By    Initials Name Provider Type    CW Dianna Calloway PTA Physical Therapy Assistant          Therapy Charges for Today     Code Description Service Date Service Provider Modifiers Qty    61819078751 HC PT THERAPEUTIC ACT EA 15 MIN 4/2/2018 Dianna Calloway PTA GP, KX 2    24731879591 HC PT THERAPEUTIC ACT EA 15 MIN 4/3/2018 Dianna Calloway PTA GP, KX 2    36826921729 HC PT THER PROC EA 15 MIN 4/3/2018 Dianna Calloway PTA GP, KX 1          PT G-Codes  Outcome Measure Options: AM-PAC 6 Clicks Basic Mobility (PT)  Score: 8  Functional Limitation: Mobility: Walking and moving around  Mobility: Walking and Moving Around Current Status (): At least 80 percent but less than 100 percent impaired, limited or restricted  Mobility: Walking and Moving Around Goal Status (): At least 60 percent but less than 80 percent impaired, limited or restricted    Dianna Calloway PTA  4/3/2018

## 2018-04-04 ENCOUNTER — APPOINTMENT (OUTPATIENT)
Dept: CARDIOLOGY | Facility: HOSPITAL | Age: 83
End: 2018-04-04
Attending: INTERNAL MEDICINE

## 2018-04-04 LAB
ANION GAP SERPL CALCULATED.3IONS-SCNC: 6 MMOL/L (ref 4–13)
BUN BLD-MCNC: 27 MG/DL (ref 5–21)
BUN/CREAT SERPL: 38.6 (ref 7–25)
CALCIUM SPEC-SCNC: 8.7 MG/DL (ref 8.4–10.4)
CHLORIDE SERPL-SCNC: 92 MMOL/L (ref 98–110)
CO2 SERPL-SCNC: 33 MMOL/L (ref 24–31)
CREAT BLD-MCNC: 0.7 MG/DL (ref 0.5–1.4)
DEPRECATED RDW RBC AUTO: 65.3 FL (ref 40–54)
ERYTHROCYTE [DISTWIDTH] IN BLOOD BY AUTOMATED COUNT: 18 % (ref 12–15)
GFR SERPL CREATININE-BSD FRML MDRD: 79 ML/MIN/1.73
GLUCOSE BLD-MCNC: 173 MG/DL (ref 70–100)
GLUCOSE BLDC GLUCOMTR-MCNC: 155 MG/DL (ref 70–130)
GLUCOSE BLDC GLUCOMTR-MCNC: 155 MG/DL (ref 70–130)
GLUCOSE BLDC GLUCOMTR-MCNC: 160 MG/DL (ref 70–130)
GLUCOSE BLDC GLUCOMTR-MCNC: 163 MG/DL (ref 70–130)
HCT VFR BLD AUTO: 31.2 % (ref 37–47)
HGB BLD-MCNC: 10.3 G/DL (ref 12–16)
MCH RBC QN AUTO: 32.9 PG (ref 28–32)
MCHC RBC AUTO-ENTMCNC: 33 G/DL (ref 33–36)
MCV RBC AUTO: 99.7 FL (ref 82–98)
PLATELET # BLD AUTO: 534 10*3/MM3 (ref 130–400)
PMV BLD AUTO: 10.4 FL (ref 6–12)
POTASSIUM BLD-SCNC: 4.7 MMOL/L (ref 3.5–5.3)
RBC # BLD AUTO: 3.13 10*6/MM3 (ref 4.2–5.4)
SODIUM BLD-SCNC: 131 MMOL/L (ref 135–145)
WBC NRBC COR # BLD: 13.83 10*3/MM3 (ref 4.8–10.8)

## 2018-04-04 PROCEDURE — 97110 THERAPEUTIC EXERCISES: CPT

## 2018-04-04 PROCEDURE — 25010000002 PERFLUTREN 6.52 MG/ML SUSPENSION: Performed by: INTERNAL MEDICINE

## 2018-04-04 PROCEDURE — 94660 CPAP INITIATION&MGMT: CPT

## 2018-04-04 PROCEDURE — 93325 DOPPLER ECHO COLOR FLOW MAPG: CPT | Performed by: INTERNAL MEDICINE

## 2018-04-04 PROCEDURE — 82962 GLUCOSE BLOOD TEST: CPT

## 2018-04-04 PROCEDURE — 93308 TTE F-UP OR LMTD: CPT

## 2018-04-04 PROCEDURE — 80048 BASIC METABOLIC PNL TOTAL CA: CPT | Performed by: NURSE PRACTITIONER

## 2018-04-04 PROCEDURE — 93321 DOPPLER ECHO F-UP/LMTD STD: CPT | Performed by: INTERNAL MEDICINE

## 2018-04-04 PROCEDURE — 25010000002 VANCOMYCIN 10 G RECONSTITUTED SOLUTION: Performed by: INTERNAL MEDICINE

## 2018-04-04 PROCEDURE — 63710000001 INSULIN DETEMIR PER 5 UNITS: Performed by: NURSE PRACTITIONER

## 2018-04-04 PROCEDURE — 25010000002 FUROSEMIDE PER 20 MG: Performed by: NURSE PRACTITIONER

## 2018-04-04 PROCEDURE — 94799 UNLISTED PULMONARY SVC/PX: CPT

## 2018-04-04 PROCEDURE — 97530 THERAPEUTIC ACTIVITIES: CPT

## 2018-04-04 PROCEDURE — 93308 TTE F-UP OR LMTD: CPT | Performed by: INTERNAL MEDICINE

## 2018-04-04 PROCEDURE — 80202 ASSAY OF VANCOMYCIN: CPT | Performed by: INTERNAL MEDICINE

## 2018-04-04 PROCEDURE — 93325 DOPPLER ECHO COLOR FLOW MAPG: CPT

## 2018-04-04 PROCEDURE — 93321 DOPPLER ECHO F-UP/LMTD STD: CPT

## 2018-04-04 PROCEDURE — 63710000001 INSULIN LISPRO (HUMAN) PER 5 UNITS: Performed by: FAMILY MEDICINE

## 2018-04-04 PROCEDURE — 25010000002 FUROSEMIDE PER 20 MG: Performed by: INTERNAL MEDICINE

## 2018-04-04 PROCEDURE — 85027 COMPLETE CBC AUTOMATED: CPT | Performed by: NURSE PRACTITIONER

## 2018-04-04 RX ORDER — FUROSEMIDE 10 MG/ML
40 INJECTION INTRAMUSCULAR; INTRAVENOUS ONCE
Status: COMPLETED | OUTPATIENT
Start: 2018-04-04 | End: 2018-04-04

## 2018-04-04 RX ORDER — FUROSEMIDE 10 MG/ML
40 INJECTION INTRAMUSCULAR; INTRAVENOUS EVERY 12 HOURS
Status: DISCONTINUED | OUTPATIENT
Start: 2018-04-04 | End: 2018-04-07

## 2018-04-04 RX ADMIN — TAMSULOSIN HYDROCHLORIDE 0.4 MG: 0.4 CAPSULE ORAL at 08:45

## 2018-04-04 RX ADMIN — INSULIN LISPRO 2 UNITS: 100 INJECTION, SOLUTION INTRAVENOUS; SUBCUTANEOUS at 08:45

## 2018-04-04 RX ADMIN — NYSTATIN 1 APPLICATION: 100000 POWDER TOPICAL at 08:46

## 2018-04-04 RX ADMIN — Medication 10 ML: at 22:34

## 2018-04-04 RX ADMIN — INSULIN LISPRO 2 UNITS: 100 INJECTION, SOLUTION INTRAVENOUS; SUBCUTANEOUS at 22:34

## 2018-04-04 RX ADMIN — DILTIAZEM HYDROCHLORIDE 30 MG: 30 TABLET, FILM COATED ORAL at 10:44

## 2018-04-04 RX ADMIN — Medication 10 ML: at 06:55

## 2018-04-04 RX ADMIN — ALBUTEROL SULFATE 1.25 MG: 2.5 SOLUTION RESPIRATORY (INHALATION) at 06:16

## 2018-04-04 RX ADMIN — HYDROXYUREA 500 MG: 500 CAPSULE ORAL at 08:45

## 2018-04-04 RX ADMIN — APIXABAN 5 MG: 5 TABLET, FILM COATED ORAL at 21:14

## 2018-04-04 RX ADMIN — APIXABAN 5 MG: 5 TABLET, FILM COATED ORAL at 08:45

## 2018-04-04 RX ADMIN — ASPIRIN 81 MG 81 MG: 81 TABLET ORAL at 08:45

## 2018-04-04 RX ADMIN — DOCUSATE SODIUM 100 MG: 100 CAPSULE, LIQUID FILLED ORAL at 08:45

## 2018-04-04 RX ADMIN — DOCUSATE SODIUM 100 MG: 100 CAPSULE, LIQUID FILLED ORAL at 21:16

## 2018-04-04 RX ADMIN — METOPROLOL SUCCINATE 50 MG: 50 TABLET, FILM COATED, EXTENDED RELEASE ORAL at 08:45

## 2018-04-04 RX ADMIN — IPRATROPIUM BROMIDE 0.5 MG: 0.5 SOLUTION RESPIRATORY (INHALATION) at 14:50

## 2018-04-04 RX ADMIN — NYSTATIN: 100000 POWDER TOPICAL at 21:16

## 2018-04-04 RX ADMIN — PANTOPRAZOLE SODIUM 40 MG: 40 TABLET, DELAYED RELEASE ORAL at 06:32

## 2018-04-04 RX ADMIN — DILTIAZEM HYDROCHLORIDE 30 MG: 30 TABLET, FILM COATED ORAL at 21:14

## 2018-04-04 RX ADMIN — POLYETHYLENE GLYCOL (3350) 17 G: 17 POWDER, FOR SOLUTION ORAL at 08:45

## 2018-04-04 RX ADMIN — BUDESONIDE AND FORMOTEROL FUMARATE DIHYDRATE 2 PUFF: 160; 4.5 AEROSOL RESPIRATORY (INHALATION) at 21:06

## 2018-04-04 RX ADMIN — IPRATROPIUM BROMIDE 0.5 MG: 0.5 SOLUTION RESPIRATORY (INHALATION) at 10:53

## 2018-04-04 RX ADMIN — Medication 10 ML: at 00:16

## 2018-04-04 RX ADMIN — ATORVASTATIN CALCIUM 20 MG: 10 TABLET, FILM COATED ORAL at 21:15

## 2018-04-04 RX ADMIN — BUDESONIDE AND FORMOTEROL FUMARATE DIHYDRATE 2 PUFF: 160; 4.5 AEROSOL RESPIRATORY (INHALATION) at 06:18

## 2018-04-04 RX ADMIN — FUROSEMIDE 40 MG: 10 INJECTION, SOLUTION INTRAMUSCULAR; INTRAVENOUS at 06:55

## 2018-04-04 RX ADMIN — IPRATROPIUM BROMIDE 0.5 MG: 0.5 SOLUTION RESPIRATORY (INHALATION) at 06:16

## 2018-04-04 RX ADMIN — INSULIN LISPRO 2 UNITS: 100 INJECTION, SOLUTION INTRAVENOUS; SUBCUTANEOUS at 18:25

## 2018-04-04 RX ADMIN — DILTIAZEM HYDROCHLORIDE 30 MG: 30 TABLET, FILM COATED ORAL at 04:54

## 2018-04-04 RX ADMIN — ALBUTEROL SULFATE 1.25 MG: 2.5 SOLUTION RESPIRATORY (INHALATION) at 10:53

## 2018-04-04 RX ADMIN — LEVOTHYROXINE SODIUM 50 MCG: 50 TABLET ORAL at 06:32

## 2018-04-04 RX ADMIN — FUROSEMIDE 40 MG: 10 INJECTION, SOLUTION INTRAMUSCULAR; INTRAVENOUS at 22:34

## 2018-04-04 RX ADMIN — ALBUTEROL SULFATE 1.25 MG: 2.5 SOLUTION RESPIRATORY (INHALATION) at 21:05

## 2018-04-04 RX ADMIN — INSULIN DETEMIR 23 UNITS: 100 INJECTION, SOLUTION SUBCUTANEOUS at 22:32

## 2018-04-04 RX ADMIN — COLLAGENASE SANTYL 1 APPLICATION: 250 OINTMENT TOPICAL at 10:44

## 2018-04-04 RX ADMIN — ALBUTEROL SULFATE 1.25 MG: 2.5 SOLUTION RESPIRATORY (INHALATION) at 14:50

## 2018-04-04 RX ADMIN — GABAPENTIN 300 MG: 300 CAPSULE ORAL at 08:45

## 2018-04-04 RX ADMIN — PERFLUTREN 9.78 MG: 6.52 INJECTION, SUSPENSION INTRAVENOUS at 16:41

## 2018-04-04 RX ADMIN — DILTIAZEM HYDROCHLORIDE 30 MG: 30 TABLET, FILM COATED ORAL at 16:52

## 2018-04-04 RX ADMIN — GABAPENTIN 600 MG: 300 CAPSULE ORAL at 22:34

## 2018-04-04 RX ADMIN — IPRATROPIUM BROMIDE 0.5 MG: 0.5 SOLUTION RESPIRATORY (INHALATION) at 21:06

## 2018-04-04 RX ADMIN — INSULIN LISPRO 2 UNITS: 100 INJECTION, SOLUTION INTRAVENOUS; SUBCUTANEOUS at 12:44

## 2018-04-04 RX ADMIN — VANCOMYCIN HYDROCHLORIDE 1500 MG: 10 INJECTION, POWDER, LYOPHILIZED, FOR SOLUTION INTRAVENOUS at 00:16

## 2018-04-04 RX ADMIN — LISINOPRIL 2.5 MG: 2.5 TABLET ORAL at 08:52

## 2018-04-04 RX ADMIN — FOLIC ACID 1 MG: 1 TABLET ORAL at 08:45

## 2018-04-04 RX ADMIN — FUROSEMIDE 40 MG: 10 INJECTION, SOLUTION INTRAMUSCULAR; INTRAVENOUS at 10:44

## 2018-04-04 NOTE — PLAN OF CARE
Problem: Patient Care Overview  Goal: Plan of Care Review  Outcome: Ongoing (interventions implemented as appropriate)   04/04/18 1402   Coping/Psychosocial   Plan of Care Reviewed With patient;family   OTHER   Outcome Summary Pt. progressing good with ue exs, rests of 1-2 min. ea. required during ue exs, Bipap on during ue exs!

## 2018-04-04 NOTE — PLAN OF CARE
Problem: Patient Care Overview  Goal: Plan of Care Review  Outcome: Ongoing (interventions implemented as appropriate)   04/04/18 1102   Coping/Psychosocial   Plan of Care Reviewed With patient   Plan of Care Review   Progress no change   OTHER   Outcome Summary Pt needs lots of encouraagement to work w/ PT. Bed mobility max x2 draw sheeet. Sat EOB x20 min working on sitting balance and breathing b/c pt get anxious

## 2018-04-04 NOTE — PROGRESS NOTES
Patient's knee infection appears to be improving daily.  The erythema is resolving of the wound has serous drainage and no purulent drainage is noted.  Plan continue the antibiotics and dressings

## 2018-04-04 NOTE — PROGRESS NOTES
[facility] Medicine Services  INPATIENT PROGRESS NOTE    Length of Stay: 4  Date of Admission: 3/30/2018  Primary Care Physician: Barry Foley MD    Subjective   Chief Complaint: follow up volume overload  HPI   Pt became more short of breath yesterday CTA showed volume overload. She has been started on lasix bid. BiPAP was started yesterday. Pt states she feels better today. No cough. States she likes how the BiPAP makes her feel. States she thinks she breathes better on it. Explained she has too much fluid on board and we are working on taking it off. She thanked me. No family at bedside.     Review of Systems   All pertinent negatives and positives are as above. All other systems have been reviewed and are negative unless otherwise stated.     Objective    Temp:  [97.5 °F (36.4 °C)-98.3 °F (36.8 °C)] 98.3 °F (36.8 °C)  Heart Rate:  [] 75  Resp:  [16-20] 17  BP: (133-171)/(75-98) 154/88  FiO2 (%):  [30 %-40 %] 30 %  Physical Exam   Constitutional: She is oriented to person, place, and time. She appears well-developed and well-nourished.   obese   HENT:   Head: Normocephalic and atraumatic.   Eyes: Conjunctivae and EOM are normal. Pupils are equal, round, and reactive to light.   Neck: Neck supple. No JVD present. No thyromegaly present.   Cardiovascular: Normal rate, normal heart sounds and intact distal pulses.  Exam reveals no gallop and no friction rub.    No murmur heard.  afib 85-92 with PVC's.    Pulmonary/Chest: Effort normal and breath sounds normal. No respiratory distress. She has no wheezes. She has no rales. She exhibits no tenderness.   Diminished in the bases.    Abdominal: Soft. Bowel sounds are normal. She exhibits no distension. There is no tenderness. There is no rebound and no guarding.   Musculoskeletal: Normal range of motion. She exhibits no edema, tenderness or deformity.   Lymphadenopathy:     She has no cervical adenopathy.   Neurological:  She is alert and oriented to person, place, and time. She displays normal reflexes. No cranial nerve deficit. She exhibits normal muscle tone.   Skin: Skin is warm and dry. No rash noted.   Psychiatric: She has a normal mood and affect. Her behavior is normal. Judgment and thought content normal.     Results Review:  I have reviewed the labs, radiology results, and diagnostic studies.    Laboratory Data:     Results from last 7 days  Lab Units 04/04/18 0434 04/02/18 0455 04/01/18  0456   WBC 10*3/mm3 13.83* 11.32* 11.18*   HEMOGLOBIN g/dL 10.3* 10.3* 10.1*   HEMATOCRIT % 31.2* 32.0* 31.3*   PLATELETS 10*3/mm3 534* 508* 516*       Results from last 7 days  Lab Units 04/04/18 0434 04/02/18 0455 04/01/18  0456 03/31/18  0513 03/30/18  1903   SODIUM mmol/L 131* 133* 136 134* 130*   POTASSIUM mmol/L 4.7 5.0 4.7 4.8 5.3   CHLORIDE mmol/L 92* 96* 96* 95* 91*   CO2 mmol/L 33.0* 32.0* 36.0* 35.0* 37.0*   BUN mg/dL 27* 26* 30* 31* 36*   CREATININE mg/dL 0.70 0.73 0.73 0.75 0.73   CALCIUM mg/dL 8.7 8.8 8.7 8.8 8.7   BILIRUBIN mg/dL  --   --   --  0.5 0.5   ALK PHOS U/L  --   --   --  106 101   ALT (SGPT) U/L  --   --   --  35 37   AST (SGOT) U/L  --   --   --  46* 43   GLUCOSE mg/dL 173* 58* 114* 134* 167*       Culture Data:   Blood Culture   Date Value Ref Range Status   03/30/2018 No growth at 4 days  Preliminary   03/30/2018 Abnormal Stain (A)  Final   03/30/2018 Mixed Gram Positive Manda (A)  Final     Urine Culture   Date Value Ref Range Status   03/31/2018 <10,000 CFU/mL Gram Negative Bacilli (A)  Final   03/31/2018 <10,000 CFU/mL Mixed Gram Positive Manda (A)  Final     Wound Culture   Date Value Ref Range Status   03/30/2018 Heavy growth (4+) Staphylococcus aureus, MRSA (A)  Final       Radiology Data:   Imaging Results (last 24 hours)     Procedure Component Value Units Date/Time    XR Chest 1 View [[id number]] Collected:  04/04/18 0743     Updated:  04/04/18 0747    Narrative:       EXAMINATION: XR CHEST 1 VW-      4/3/2018 11:00 AM CDT     HISTORY: worsening hypoxia; T81.4XXA-Infection following a procedure,  initial encounter; R26.9-Unspecified abnormalities of gait and mobility;  S72.491A-Other fracture of lower end of right femur, initial encounter  for closed fracture; Z74.09-Other reduced mobility; Z74.09-Other reduced  mobility.     One view chest x-ray compared with 03/31/2018.     Interval partial clearing of the lung bases. There is persistent left  basilar consolidation.  Decreased pleural fluid.     The upper lobes are essentially clear.     Heart size is stable.     No new or increased lung disease.  No pneumothorax.     Summary:  1. Partial clearing of bibasilar infiltrate with decreased pleural  fluid.  This report was finalized on 04/04/2018 07:44 by Dr. Mamadou Garza MD.    CT Angiogram Chest With & Without Contrast [[id number]] Collected:  04/03/18 2206     Updated:  04/03/18 2215    Narrative:       CT chest angiogram dated 4/3/2018 6:22 PM CDT      HISTORY: Worsening hypoxia. COPD.     COMPARISON: 1/29/2018.      DLP: 554 mGy cm. Automated exposure control was utilized to diminish  patient radiation dose.     TECHNIQUE: Helical tomographic images of the chest were obtained after  the administration of intravenous contrast following angiogram protocol.  Additionally, 3D MIP reconstructions in the coronal and sagittal planes  were provided.        FINDINGS:    The imaged portion of the base of the neck appears unremarkable.      There is adequate enhancement of the pulmonary arteries to evaluate for  central and segmental pulmonary emboli. There are no filling defects  within the main, lobar, segmental or visualized subsegmental pulmonary  arteries. The pulmonary vessels are within normal limits.      Moderate bilateral pleural effusions are present with associated  compressive atelectasis within the lower lobes. Patchy bilateral  groundglass disease is present within both lungs felt to represent  either a  pneumonitis or perhaps pulmonary edema.. The trachea and  bronchial tree are patent.      There is moderate cardiomegaly. Coronary calcifications are noted in the  LAD and circumflex distribution as well as the right coronary  distribution.. There is no pericardial effusion.      No enlarged axillary or mediastinal lymph nodes are present.      The osseous structures of the thorax and surrounding soft tissues  demonstrate no acute process.      The patient has undergone prior cholecystectomy. There is stranding  within the subcutaneous tissues of the chest wall suggesting third  spacing of fluid..        Impression:       1. No evidence of pulmonary embolus. The thoracic aorta is normal in  caliber no dissection or aneurysm.  2. Extensive coronary calcifications a moderate cardiomegaly.  3. Moderate bilateral pleural effusions with compressive atelectasis of  the lower lobes and lingular segment of the left upper lobe. In those  portions of the lungs which are aerated there is patchy groundglass  disease for which I would favor pulmonary edema over an interstitial  pneumonitis. There is also third spacing of fluid with some thickening  of the skin and stranding within the subcutaneous tissues suggesting  developing anasarca/third spacing of fluid..        This report was finalized on 04/03/2018 22:12 by Dr. John Zimmerman MD.        I have reviewed the patient current medications.     Assessment/Plan   Assessment:  1. Right knee postoperative wound dehiscence- wound culture MRSA positive  2. Recent closed right femur fracture s/p IM nailing 3/10   3. Leukocytosis-mildly worse today  4. Chronic diastolic heart failure with preserved ejection fraction  5. Insulin dependent Diabetes Mellitus, Type 2, hgb A1c 6.4  6. Atrial fibrillation (recent diagnosis)- chronically anticoagulated with Eliquis  7. Morbid obesity, BMI 40.4  8. Hypertension  9. Hyperlipidemia  10. Hyponatremia  11. Macrocytic anemia  12.  Thrombocytosis, likely reactive  13. Constipation-improved.   14. Chronic obstructive pulmonary disease   15. Acute on chronic hypoxic respiratory failure.  16. Acute volume overload      Plan:  1. Vancomycin day 5-will continue for 2 weeks post discharge. Dr. Mead's recommendations were reviewed  2. Will continue BiPAP  3. Limited echo is pending.   4. Twice daily Lasix for now.   5. Repeat labs in am.   6. Continue local wound care as well.   7. May need albumin to assist with 3rd spacing.     Discharge Planning: I expect the patient to be discharged to SNF in ? days.    BULMARO Cortés   04/04/18   2:56 PM     I personally evaluated and examined the patient in conjunction with  BULMARO Wylie and agree with the assessment, treatment plan, and disposition of the patient as recorded by her. My history, exam, and further recommendations are:     Sob  Limited chest inspiratory movement  On BIPAP  Alert and oriented x 3  Vitals:    04/04/18 1450   BP: 154/88   Pulse: 75   Resp: 17   Temp:    SpO2: 100%   Diminished breath sound  Right knee surgical wound looks clean and dry w/o any discharge    Diurese and f/u bmp  Limited echo  Reposition patient to improve work of breathing - discussed with PT        Fidencio Rodriguez MD  04/04/18  4:41 PM

## 2018-04-04 NOTE — PROGRESS NOTES
"Pharmacy Dosing Service  Pharmacokinetics  Vancomycin Follow-up Evaluation    Assessment/Action/Plan:  Vancomycin trough ordered for 0000 on 4/5/18 prior to 0100 dose. Dose decreased from 1750 mg Q24h on 4/3/18. Trough ordered to evaluate dose prior to patient's discharge to nursing home on Vancomycin. Pharmacy will continue to monitor renal function and adjust dose accordingly.     Subjective:  Tamy Sher is a 87 y.o. female currently on Vancomycin 1500 mg IV every 24 hours for the treatment of MRSA surgical wound infection, day 5 of therapy.    Objective:  Ht: 152.4 cm (60\"); Wt: 95.3 kg (210 lb)  Estimated Creatinine Clearance: 51.2 mL/min (by C-G formula based on SCr of 0.7 mg/dL).   Lab Results   Component Value Date    CREATININE 0.70 04/04/2018    CREATININE 0.73 04/02/2018    CREATININE 0.73 04/01/2018      Lab Results   Component Value Date    WBC 13.83 (H) 04/04/2018    WBC 11.32 (H) 04/02/2018    WBC 11.18 (H) 04/01/2018         Lab Results   Component Value Date    VANCOTROUGH 15.76 04/01/2018       Culture Results:  Microbiology Results (last 10 days)       Procedure Component Value - Date/Time    Urine Culture - Urine, Urine, Clean Catch [[id number]]  (Abnormal) Collected:  03/31/18 0520    Lab Status:  Final result Specimen:  Urine from Urine, Clean Catch Updated:  04/02/18 0826     Urine Culture --      <10,000 CFU/mL Gram Negative Bacilli (A)      <10,000 CFU/mL Mixed Gram Positive Manda (A)    Narrative:       Probable contaminant, suggest recollection.    Blood Culture - Blood, [[id number]]  (Normal) Collected:  03/30/18 2154    Lab Status:  Preliminary result Specimen:  Blood from Arm, Left Updated:  04/04/18 0001     Blood Culture No growth at 4 days    Blood Culture - Blood, [[id number]]  (Abnormal) Collected:  03/30/18 2153    Lab Status:  Final result Specimen:  Blood from Wrist, Left Updated:  04/01/18 0808     Blood Culture Abnormal Stain (A)      Mixed Gram Positive Manda (A)     " Isolated from Aerobic and Anaerobic Bottles     Gram Stain Result Gram positive cocci    Narrative:       Probable contaminant    Blood Culture ID, PCR - Blood, [[id number]]  (Abnormal) Collected:  03/30/18 2153    Lab Status:  Final result Specimen:  Blood from Wrist, Left Updated:  03/31/18 1659     BCID, PCR Streptococcus spp, not A, B, or pneumoniae. Identification by BCID PCR. (C)     BCID, PCR 2 Staphylococcus spp, not aureus. Identification by BCID PCR. (C)    Wound Culture - Surgical Site, Knee, Right [[id number]]  (Abnormal)  (Susceptibility) Collected:  03/30/18 1903    Lab Status:  Final result Specimen:  Surgical Site from Knee, Right Updated:  04/01/18 0653     Wound Culture --      Heavy growth (4+) Staphylococcus aureus, MRSA (A)     BETA LACTAMASE Positive     Gram Stain Result Few (2+) WBCs seen      Few (2+) Gram positive cocci    Susceptibility        Staphylococcus aureus, MRSA     FERN     Clindamycin >=8 ug/ml Resistant     Erythromycin >=8 ug/ml Resistant     Gentamicin <=0.5 ug/ml Susceptible     Inducible Clindamycin Resistance NEG  Negative     Levofloxacin >=8 ug/ml Resistant     Oxacillin >=4 ug/ml Resistant     Penicillin G >=0.5 ug/ml Resistant     Tetracycline <=1 ug/ml Susceptible     Trimethoprim + Sulfamethoxazole <=10 ug/ml Susceptible     Vancomycin <=0.5 ug/ml Susceptible                              Paty Huitron, PharmD   04/04/18 11:12 AM

## 2018-04-04 NOTE — PLAN OF CARE
Problem: Fall Risk (Adult)  Goal: Absence of Fall  Outcome: Ongoing (interventions implemented as appropriate)      Problem: Infection, Risk/Actual (Adult)  Goal: Identify Related Risk Factors and Signs and Symptoms  Outcome: Ongoing (interventions implemented as appropriate)    Goal: Infection Prevention/Resolution  Outcome: Ongoing (interventions implemented as appropriate)      Problem: Patient Care Overview  Goal: Plan of Care Review  Outcome: Ongoing (interventions implemented as appropriate)   04/04/18 0537   Coping/Psychosocial   Plan of Care Reviewed With patient   Plan of Care Review   Progress improving   OTHER   Outcome Summary No c/o pain this shift. 94% on bipap through the night. No c/o sob this shift. Dressing change done to right hip and knee. Noted drainage from right hip wound. Edema to right hip and both legs, arms, suprapubic area. Estrada intact, estrada care done every 4 hours. Reposition every 2 hours. Safety maintained.     Goal: Individualization and Mutuality  Outcome: Ongoing (interventions implemented as appropriate)    Goal: Discharge Needs Assessment  Outcome: Ongoing (interventions implemented as appropriate)    Goal: Interprofessional Rounds/Family Conf  Outcome: Ongoing (interventions implemented as appropriate)      Problem: Skin Injury Risk (Adult)  Goal: Skin Health and Integrity  Outcome: Ongoing (interventions implemented as appropriate)

## 2018-04-05 LAB
ANION GAP SERPL CALCULATED.3IONS-SCNC: 4 MMOL/L (ref 4–13)
BACTERIA SPEC AEROBE CULT: NORMAL
BH CV ECHO MEAS - AO MAX PG (FULL): 5.1 MMHG
BH CV ECHO MEAS - AO MAX PG: 11 MMHG
BH CV ECHO MEAS - AO MEAN PG (FULL): 3 MMHG
BH CV ECHO MEAS - AO MEAN PG: 6 MMHG
BH CV ECHO MEAS - AO ROOT AREA (BSA CORRECTED): 1.4
BH CV ECHO MEAS - AO ROOT AREA: 5.3 CM^2
BH CV ECHO MEAS - AO ROOT DIAM: 2.6 CM
BH CV ECHO MEAS - AO V2 MAX: 166 CM/SEC
BH CV ECHO MEAS - AO V2 MEAN: 119 CM/SEC
BH CV ECHO MEAS - AO V2 VTI: 39.3 CM
BH CV ECHO MEAS - AVA(I,A): 2 CM^2
BH CV ECHO MEAS - AVA(I,D): 2 CM^2
BH CV ECHO MEAS - AVA(V,A): 2.1 CM^2
BH CV ECHO MEAS - AVA(V,D): 2.1 CM^2
BH CV ECHO MEAS - BSA(HAYCOCK): 2.1 M^2
BH CV ECHO MEAS - BSA: 1.9 M^2
BH CV ECHO MEAS - BZI_BMI: 41 KILOGRAMS/M^2
BH CV ECHO MEAS - BZI_METRIC_HEIGHT: 152.4 CM
BH CV ECHO MEAS - BZI_METRIC_WEIGHT: 95.3 KG
BH CV ECHO MEAS - CONTRAST EF 4CH: 33.2 ML/M^2
BH CV ECHO MEAS - EDV(CUBED): 140.6 ML
BH CV ECHO MEAS - EDV(MOD-SP4): 117 ML
BH CV ECHO MEAS - EDV(TEICH): 129.5 ML
BH CV ECHO MEAS - EF(CUBED): 43.1 %
BH CV ECHO MEAS - EF(MOD-SP4): 33.2 %
BH CV ECHO MEAS - EF(TEICH): 35.5 %
BH CV ECHO MEAS - ESV(CUBED): 80.1 ML
BH CV ECHO MEAS - ESV(MOD-SP4): 78.2 ML
BH CV ECHO MEAS - ESV(TEICH): 83.5 ML
BH CV ECHO MEAS - FS: 17.1 %
BH CV ECHO MEAS - IVS/LVPW: 1.2
BH CV ECHO MEAS - IVSD: 1.2 CM
BH CV ECHO MEAS - LA DIMENSION: 3.7 CM
BH CV ECHO MEAS - LA/AO: 1.4
BH CV ECHO MEAS - LV DIASTOLIC VOL/BSA (35-75): 61.4 ML/M^2
BH CV ECHO MEAS - LV MASS(C)D: 229 GRAMS
BH CV ECHO MEAS - LV MASS(C)DI: 120.2 GRAMS/M^2
BH CV ECHO MEAS - LV MAX PG: 5.9 MMHG
BH CV ECHO MEAS - LV MEAN PG: 3 MMHG
BH CV ECHO MEAS - LV SYSTOLIC VOL/BSA (12-30): 41 ML/M^2
BH CV ECHO MEAS - LV V1 MAX: 121 CM/SEC
BH CV ECHO MEAS - LV V1 MEAN: 81.5 CM/SEC
BH CV ECHO MEAS - LV V1 VTI: 27.1 CM
BH CV ECHO MEAS - LVIDD: 5.2 CM
BH CV ECHO MEAS - LVIDS: 4.3 CM
BH CV ECHO MEAS - LVLD AP4: 8.3 CM
BH CV ECHO MEAS - LVLS AP4: 7.8 CM
BH CV ECHO MEAS - LVOT AREA (M): 2.8 CM^2
BH CV ECHO MEAS - LVOT AREA: 2.8 CM^2
BH CV ECHO MEAS - LVOT DIAM: 1.9 CM
BH CV ECHO MEAS - LVPWD: 1 CM
BH CV ECHO MEAS - MR ALIAS VEL: 30.8 CM/SEC
BH CV ECHO MEAS - MR ERO: 0.11 CM^2
BH CV ECHO MEAS - MR FLOW RATE: 48.4 CM^3/SEC
BH CV ECHO MEAS - MR MAX PG: 76.7 MMHG
BH CV ECHO MEAS - MR MAX VEL: 438 CM/SEC
BH CV ECHO MEAS - MR MEAN PG: 54 MMHG
BH CV ECHO MEAS - MR MEAN VEL: 351 CM/SEC
BH CV ECHO MEAS - MR PISA RADIUS: 0.5 CM
BH CV ECHO MEAS - MR PISA: 1.6 CM^2
BH CV ECHO MEAS - MR VOLUME: 17.7 ML
BH CV ECHO MEAS - MR VTI: 160 CM
BH CV ECHO MEAS - MV A MAX VEL: 33.8 CM/SEC
BH CV ECHO MEAS - MV DEC TIME: 0.23 SEC
BH CV ECHO MEAS - MV E MAX VEL: 131.8 CM/SEC
BH CV ECHO MEAS - MV E/A: 3.9
BH CV ECHO MEAS - PA MAX PG: 1.4 MMHG
BH CV ECHO MEAS - PA V2 MAX: 59.2 CM/SEC
BH CV ECHO MEAS - RAP SYSTOLE: 5 MMHG
BH CV ECHO MEAS - RVSP: 35.7 MMHG
BH CV ECHO MEAS - SI(AO): 109.5 ML/M^2
BH CV ECHO MEAS - SI(CUBED): 31.8 ML/M^2
BH CV ECHO MEAS - SI(LVOT): 40.3 ML/M^2
BH CV ECHO MEAS - SI(MOD-SP4): 20.4 ML/M^2
BH CV ECHO MEAS - SI(TEICH): 24.1 ML/M^2
BH CV ECHO MEAS - SV(AO): 208.7 ML
BH CV ECHO MEAS - SV(CUBED): 60.5 ML
BH CV ECHO MEAS - SV(LVOT): 76.8 ML
BH CV ECHO MEAS - SV(MOD-SP4): 38.8 ML
BH CV ECHO MEAS - SV(TEICH): 46 ML
BH CV ECHO MEAS - TR MAX VEL: 277 CM/SEC
BUN BLD-MCNC: 29 MG/DL (ref 5–21)
BUN/CREAT SERPL: 34.9 (ref 7–25)
CALCIUM SPEC-SCNC: 9 MG/DL (ref 8.4–10.4)
CHLORIDE SERPL-SCNC: 92 MMOL/L (ref 98–110)
CO2 SERPL-SCNC: 37 MMOL/L (ref 24–31)
CREAT BLD-MCNC: 0.83 MG/DL (ref 0.5–1.4)
E/E' RATIO: 52.8
GFR SERPL CREATININE-BSD FRML MDRD: 65 ML/MIN/1.73
GLUCOSE BLD-MCNC: 105 MG/DL (ref 70–100)
GLUCOSE BLDC GLUCOMTR-MCNC: 113 MG/DL (ref 70–130)
GLUCOSE BLDC GLUCOMTR-MCNC: 146 MG/DL (ref 70–130)
GLUCOSE BLDC GLUCOMTR-MCNC: 161 MG/DL (ref 70–130)
GLUCOSE BLDC GLUCOMTR-MCNC: 88 MG/DL (ref 70–130)
LEFT ATRIUM VOLUME INDEX: 48.5 ML/M2
LEFT ATRIUM VOLUME: 92.6 CM3
LV EF 2D ECHO EST: 55 %
MAXIMAL PREDICTED HEART RATE: 133 BPM
POTASSIUM BLD-SCNC: 4.5 MMOL/L (ref 3.5–5.3)
SODIUM BLD-SCNC: 133 MMOL/L (ref 135–145)
STRESS TARGET HR: 113 BPM
VANCOMYCIN SERPL-MCNC: 19.32 MCG/ML
VANCOMYCIN TROUGH SERPL-MCNC: 25.41 MCG/ML (ref 10–20)

## 2018-04-05 PROCEDURE — 63710000001 INSULIN LISPRO (HUMAN) PER 5 UNITS: Performed by: FAMILY MEDICINE

## 2018-04-05 PROCEDURE — 97110 THERAPEUTIC EXERCISES: CPT

## 2018-04-05 PROCEDURE — 80202 ASSAY OF VANCOMYCIN: CPT | Performed by: SURGERY

## 2018-04-05 PROCEDURE — 25010000002 FUROSEMIDE PER 20 MG: Performed by: INTERNAL MEDICINE

## 2018-04-05 PROCEDURE — 94799 UNLISTED PULMONARY SVC/PX: CPT

## 2018-04-05 PROCEDURE — 97535 SELF CARE MNGMENT TRAINING: CPT

## 2018-04-05 PROCEDURE — 63710000001 INSULIN DETEMIR PER 5 UNITS: Performed by: NURSE PRACTITIONER

## 2018-04-05 PROCEDURE — 97530 THERAPEUTIC ACTIVITIES: CPT

## 2018-04-05 PROCEDURE — 87086 URINE CULTURE/COLONY COUNT: CPT | Performed by: NURSE PRACTITIONER

## 2018-04-05 PROCEDURE — 25010000002 ALBUMIN HUMAN 25% PER 50 ML: Performed by: NURSE PRACTITIONER

## 2018-04-05 PROCEDURE — 80048 BASIC METABOLIC PNL TOTAL CA: CPT | Performed by: INTERNAL MEDICINE

## 2018-04-05 PROCEDURE — P9046 ALBUMIN (HUMAN), 25%, 20 ML: HCPCS | Performed by: NURSE PRACTITIONER

## 2018-04-05 PROCEDURE — 82962 GLUCOSE BLOOD TEST: CPT

## 2018-04-05 RX ORDER — ALBUMIN (HUMAN) 12.5 G/50ML
25 SOLUTION INTRAVENOUS ONCE
Status: COMPLETED | OUTPATIENT
Start: 2018-04-05 | End: 2018-04-05

## 2018-04-05 RX ADMIN — DILTIAZEM HYDROCHLORIDE 30 MG: 30 TABLET, FILM COATED ORAL at 06:05

## 2018-04-05 RX ADMIN — DILTIAZEM HYDROCHLORIDE 30 MG: 30 TABLET, FILM COATED ORAL at 10:22

## 2018-04-05 RX ADMIN — Medication 10 ML: at 22:56

## 2018-04-05 RX ADMIN — GABAPENTIN 600 MG: 300 CAPSULE ORAL at 21:48

## 2018-04-05 RX ADMIN — BUDESONIDE AND FORMOTEROL FUMARATE DIHYDRATE 2 PUFF: 160; 4.5 AEROSOL RESPIRATORY (INHALATION) at 06:17

## 2018-04-05 RX ADMIN — ALBUTEROL SULFATE 1.25 MG: 2.5 SOLUTION RESPIRATORY (INHALATION) at 06:17

## 2018-04-05 RX ADMIN — ASPIRIN 81 MG 81 MG: 81 TABLET ORAL at 08:59

## 2018-04-05 RX ADMIN — GABAPENTIN 300 MG: 300 CAPSULE ORAL at 08:59

## 2018-04-05 RX ADMIN — NYSTATIN: 100000 POWDER TOPICAL at 08:58

## 2018-04-05 RX ADMIN — ALBUTEROL SULFATE 1.25 MG: 2.5 SOLUTION RESPIRATORY (INHALATION) at 10:37

## 2018-04-05 RX ADMIN — LEVOTHYROXINE SODIUM 50 MCG: 50 TABLET ORAL at 06:05

## 2018-04-05 RX ADMIN — TAMSULOSIN HYDROCHLORIDE 0.4 MG: 0.4 CAPSULE ORAL at 08:58

## 2018-04-05 RX ADMIN — FOLIC ACID 1 MG: 1 TABLET ORAL at 08:59

## 2018-04-05 RX ADMIN — NYSTATIN: 100000 POWDER TOPICAL at 21:49

## 2018-04-05 RX ADMIN — METOPROLOL SUCCINATE 50 MG: 50 TABLET, FILM COATED, EXTENDED RELEASE ORAL at 08:58

## 2018-04-05 RX ADMIN — FUROSEMIDE 40 MG: 10 INJECTION, SOLUTION INTRAMUSCULAR; INTRAVENOUS at 22:55

## 2018-04-05 RX ADMIN — ALBUMIN HUMAN 25 G: 0.25 SOLUTION INTRAVENOUS at 21:46

## 2018-04-05 RX ADMIN — INSULIN DETEMIR 23 UNITS: 100 INJECTION, SOLUTION SUBCUTANEOUS at 21:49

## 2018-04-05 RX ADMIN — IPRATROPIUM BROMIDE 0.5 MG: 0.5 SOLUTION RESPIRATORY (INHALATION) at 10:36

## 2018-04-05 RX ADMIN — INSULIN LISPRO 2 UNITS: 100 INJECTION, SOLUTION INTRAVENOUS; SUBCUTANEOUS at 21:47

## 2018-04-05 RX ADMIN — FUROSEMIDE 40 MG: 10 INJECTION, SOLUTION INTRAMUSCULAR; INTRAVENOUS at 10:22

## 2018-04-05 RX ADMIN — ALBUTEROL SULFATE 1.25 MG: 2.5 SOLUTION RESPIRATORY (INHALATION) at 14:57

## 2018-04-05 RX ADMIN — BUDESONIDE AND FORMOTEROL FUMARATE DIHYDRATE 2 PUFF: 160; 4.5 AEROSOL RESPIRATORY (INHALATION) at 19:28

## 2018-04-05 RX ADMIN — DILTIAZEM HYDROCHLORIDE 30 MG: 30 TABLET, FILM COATED ORAL at 21:48

## 2018-04-05 RX ADMIN — COLLAGENASE SANTYL: 250 OINTMENT TOPICAL at 08:58

## 2018-04-05 RX ADMIN — ATORVASTATIN CALCIUM 20 MG: 10 TABLET, FILM COATED ORAL at 21:47

## 2018-04-05 RX ADMIN — PANTOPRAZOLE SODIUM 40 MG: 40 TABLET, DELAYED RELEASE ORAL at 06:05

## 2018-04-05 RX ADMIN — DILTIAZEM HYDROCHLORIDE 30 MG: 30 TABLET, FILM COATED ORAL at 16:11

## 2018-04-05 RX ADMIN — IPRATROPIUM BROMIDE 0.5 MG: 0.5 SOLUTION RESPIRATORY (INHALATION) at 06:17

## 2018-04-05 RX ADMIN — APIXABAN 5 MG: 5 TABLET, FILM COATED ORAL at 08:59

## 2018-04-05 RX ADMIN — Medication 10 ML: at 21:46

## 2018-04-05 RX ADMIN — IPRATROPIUM BROMIDE 0.5 MG: 0.5 SOLUTION RESPIRATORY (INHALATION) at 19:24

## 2018-04-05 RX ADMIN — LISINOPRIL 2.5 MG: 2.5 TABLET ORAL at 08:58

## 2018-04-05 RX ADMIN — APIXABAN 5 MG: 5 TABLET, FILM COATED ORAL at 21:48

## 2018-04-05 RX ADMIN — HYDROXYUREA 500 MG: 500 CAPSULE ORAL at 08:59

## 2018-04-05 RX ADMIN — IPRATROPIUM BROMIDE 0.5 MG: 0.5 SOLUTION RESPIRATORY (INHALATION) at 14:57

## 2018-04-05 RX ADMIN — ALBUTEROL SULFATE: 2.5 SOLUTION RESPIRATORY (INHALATION) at 19:23

## 2018-04-05 NOTE — PLAN OF CARE
Problem: Fall Risk (Adult)  Goal: Absence of Fall  Outcome: Ongoing (interventions implemented as appropriate)      Problem: Infection, Risk/Actual (Adult)  Goal: Identify Related Risk Factors and Signs and Symptoms  Outcome: Outcome(s) achieved Date Met: 04/04/18    Goal: Infection Prevention/Resolution  Outcome: Ongoing (interventions implemented as appropriate)      Problem: Patient Care Overview  Goal: Plan of Care Review  Outcome: Ongoing (interventions implemented as appropriate)   04/04/18 1929   Coping/Psychosocial   Plan of Care Reviewed With patient   Plan of Care Review   Progress no change   OTHER   Outcome Summary No c/o of pain. Up with PT. Pt now on 3L O2 NC sats 93-97%.        Problem: Skin Injury Risk (Adult)  Goal: Skin Health and Integrity  Outcome: Ongoing (interventions implemented as appropriate)      Problem: Wound (Includes Pressure Injury) (Adult)  Goal: Signs and Symptoms of Listed Potential Problems Will be Absent, Minimized or Managed (Wound)  Outcome: Ongoing (interventions implemented as appropriate)

## 2018-04-05 NOTE — PLAN OF CARE
Problem: Fall Risk (Adult)  Goal: Absence of Fall  Outcome: Ongoing (interventions implemented as appropriate)      Problem: Infection, Risk/Actual (Adult)  Goal: Infection Prevention/Resolution  Outcome: Ongoing (interventions implemented as appropriate)      Problem: Patient Care Overview  Goal: Plan of Care Review  Outcome: Ongoing (interventions implemented as appropriate)   04/05/18 7108   Coping/Psychosocial   Plan of Care Reviewed With patient   Plan of Care Review   Progress no change   OTHER   Outcome Summary No c/o of pain. Drsg changes every shift. Clear yellow drainage. Up with PT.        Problem: Skin Injury Risk (Adult)  Goal: Skin Health and Integrity  Outcome: Ongoing (interventions implemented as appropriate)      Problem: Wound (Includes Pressure Injury) (Adult)  Goal: Signs and Symptoms of Listed Potential Problems Will be Absent, Minimized or Managed (Wound)  Outcome: Ongoing (interventions implemented as appropriate)

## 2018-04-05 NOTE — PROGRESS NOTES
[facility] Medicine Services  INPATIENT PROGRESS NOTE    Length of Stay: 5  Date of Admission: 3/30/2018  Primary Care Physician: Barry Foley MD    Subjective   Chief Complaint: follow up volume overload.   HPI   Pt has diuresed well. She is negative 6 liters this admission. He states she is exhausted from working with therapy. States she doesn't know how her breathing is today. Right knee wound is much improved. No bowel movement today. States she does not want anything else for her bowels. No family at bedside. She is currently on 2L/Nasal cannula. States she has a band of pressure that will go up under her ribs. This is not a new sensation for her. States she has had multiple x-rays in the past and nothing was ever found.     Review of Systems   All pertinent negatives and positives are as above. All other systems have been reviewed and are negative unless otherwise stated.     Objective    Temp:  [97.5 °F (36.4 °C)-98.3 °F (36.8 °C)] 98.3 °F (36.8 °C)  Heart Rate:  [] 112  Resp:  [17-22] 18  BP: (131-163)/(63-95) 163/79  FiO2 (%):  [30 %] 30 %  Physical Exam   Constitutional: She is oriented to person, place, and time. She appears well-developed and well-nourished.   HENT:   Head: Normocephalic and atraumatic.   Eyes: Conjunctivae and EOM are normal. Pupils are equal, round, and reactive to light.   Neck: Neck supple. No JVD present. No thyromegaly present.   Cardiovascular: Normal rate, normal heart sounds and intact distal pulses.  Exam reveals no gallop and no friction rub.    No murmur heard.  afib  on telemetry.    Pulmonary/Chest: Effort normal and breath sounds normal. No respiratory distress. She has no wheezes. She has no rales. She exhibits no tenderness.   Shallow respirations.    Abdominal: Soft. Bowel sounds are normal. She exhibits no distension. There is no tenderness. There is no rebound and no guarding.   Musculoskeletal: Normal range of  motion. She exhibits no edema, tenderness or deformity.   Lymphadenopathy:     She has no cervical adenopathy.   Neurological: She is alert and oriented to person, place, and time. She displays normal reflexes. No cranial nerve deficit. She exhibits normal muscle tone.   Skin: Skin is warm and dry. No rash noted. There is erythema (granulation tissue in right knee wound. less surrounding erythema. ).   Psychiatric: She has a normal mood and affect. Her behavior is normal. Judgment and thought content normal.     Results Review:  I have reviewed the labs, radiology results, and diagnostic studies.    Laboratory Data:     Results from last 7 days  Lab Units 04/04/18  0434 04/02/18  0455 04/01/18  0456   WBC 10*3/mm3 13.83* 11.32* 11.18*   HEMOGLOBIN g/dL 10.3* 10.3* 10.1*   HEMATOCRIT % 31.2* 32.0* 31.3*   PLATELETS 10*3/mm3 534* 508* 516*        Results from last 7 days  Lab Units 04/05/18  0436 04/04/18  0434 04/02/18  0455  03/31/18  0513 03/30/18  1903   SODIUM mmol/L 133* 131* 133*  < > 134* 130*   POTASSIUM mmol/L 4.5 4.7 5.0  < > 4.8 5.3   CHLORIDE mmol/L 92* 92* 96*  < > 95* 91*   CO2 mmol/L 37.0* 33.0* 32.0*  < > 35.0* 37.0*   BUN mg/dL 29* 27* 26*  < > 31* 36*   CREATININE mg/dL 0.83 0.70 0.73  < > 0.75 0.73   CALCIUM mg/dL 9.0 8.7 8.8  < > 8.8 8.7   BILIRUBIN mg/dL  --   --   --   --  0.5 0.5   ALK PHOS U/L  --   --   --   --  106 101   ALT (SGPT) U/L  --   --   --   --  35 37   AST (SGOT) U/L  --   --   --   --  46* 43   GLUCOSE mg/dL 105* 173* 58*  < > 134* 167*   < > = values in this interval not displayed.    Culture Data:   Blood Culture   Date Value Ref Range Status   03/30/2018 No growth at 5 days  Final   03/30/2018 Abnormal Stain (A)  Final   03/30/2018 Mixed Gram Positive Manda (A)  Final     Urine Culture   Date Value Ref Range Status   03/31/2018 <10,000 CFU/mL Gram Negative Bacilli (A)  Final   03/31/2018 <10,000 CFU/mL Mixed Gram Positive Manda (A)  Final     Wound Culture   Date Value Ref  Range Status   03/30/2018 Heavy growth (4+) Staphylococcus aureus, MRSA (A)  Final     Radiology Data:   Imaging Results (last 24 hours)     ** No results found for the last 24 hours. **          I have reviewed the patient current medications.     Assessment/Plan   Assessment:  1. Right knee postoperative wound dehiscence- wound culture MRSA positive  2. Recent closed right femur fracture s/p IM nailing 3/10   3. Leukocytosis-mildly worse today  4. Chronic diastolic heart failure with preserved ejection fraction  5. Insulin dependent Diabetes Mellitus, Type 2, hgb A1c 6.4  6. Atrial fibrillation (recent diagnosis)- chronically anticoagulated with Eliquis  7. Morbid obesity, BMI 40.4  8. Hypertension  9. Hyperlipidemia  10. Hyponatremia  11. Macrocytic anemia  12. Thrombocytosis, likely reactive  13. Constipation-improved.   14. Chronic obstructive pulmonary disease   15. Acute on chronic hypoxic respiratory failure.  16. Acute volume overload     Plan:  1. Worsening leukocytosis today.   2. Will continue Lasix for now.   3. Repeat labs in am  4. Will repeat CXR in am.   5. Anticipate vancomycin stop date of 4/15  6. Repeat labs yamileth m  7. PT/OT following. Pt requires a great deal of encouragement to participate with therapy.     Discharge Planning: I expect the patient to be discharged to SNF in ? days.    BULMARO Cortés   04/05/18   1:17 PM   I personally evaluated and examined the patient in conjunction with BULMARO Wylie and agree with the assessment, treatment plan, and disposition of the patient as recorded by her. My history, exam, and further recommendations are:   No new complaints.  States she has band like sensation under her skin upper abd.  She has no rashes underneath her gown.  Non tender to palpation. No guarding.  I looked back at her cta of chest yesterday but none hd been mentioned concerning finding.  She has some swelling of her abdominal wall which I believe is third space losing.    She has dependent edema. Will diurese some more.  Will consider albumin pre diuretic.  Monitor electrolytes, wt and fluid status.  Will consider reimaging if change in status.        Fidencio Rodriguez MD  04/05/18  3:30 PM

## 2018-04-05 NOTE — PROGRESS NOTES
Patient's knee wound infection is progressing well.  Erythema is resolving wound is beginning to granulate and the infection appears to be resolving plan continue with antibiotics

## 2018-04-05 NOTE — PLAN OF CARE
Problem: Patient Care Overview  Goal: Plan of Care Review  Outcome: Ongoing (interventions implemented as appropriate)      Problem: Patient Care Overview  Goal: Plan of Care Review  Outcome: Ongoing (interventions implemented as appropriate)   04/05/18 1433   Coping/Psychosocial   Plan of Care Reviewed With patient;family   OTHER   Outcome Summary Pt. required max. assist from this jaramillo/l and Pta. Paige to transfer from neuro-chair back to bed, ue exs performed to increase her act. azra and transfer ability, Nwb Rle.!

## 2018-04-05 NOTE — PLAN OF CARE
Problem: Patient Care Overview  Goal: Plan of Care Review  Outcome: Ongoing (interventions implemented as appropriate)   04/05/18 1201   Coping/Psychosocial   Plan of Care Reviewed With patient   Plan of Care Review   Progress improving   OTHER   Outcome Summary Pt doing better today. Monitored o2 during rx pt on 2L o2. stayed 88% and above. Pt tolerated BLE A-AaROM ex in supin x10 then sat pt up EOB mod x2 using draaw sheet. worked on breathing and then pt tolerated BLE ex's AROM x10 sitting. Once got pt laid bk down help nsg move pt over to Neuro chair,

## 2018-04-05 NOTE — PROGRESS NOTES
Continued Stay Note   [facility]     Patient Name: Tamy Sher  MRN: [id number]  Today's Date: 4/5/2018    Admit Date: 3/30/2018          Discharge Plan     Row Name 04/05/18 1509       Plan    Plan [facility]    Patient/Family in Agreement with Plan yes    Plan Comments SW following for discharge to [facility].                 IZAIAH McnamaraW

## 2018-04-05 NOTE — PLAN OF CARE
Problem: Fall Risk (Adult)  Goal: Absence of Fall  Outcome: Ongoing (interventions implemented as appropriate)      Problem: Infection, Risk/Actual (Adult)  Goal: Infection Prevention/Resolution  Outcome: Ongoing (interventions implemented as appropriate)      Problem: Patient Care Overview  Goal: Plan of Care Review  Outcome: Ongoing (interventions implemented as appropriate)   04/05/18 0515   Coping/Psychosocial   Plan of Care Reviewed With patient   Plan of Care Review   Progress no change   OTHER   Outcome Summary No c/o pain. On 3 liters oxygen per nc, O2 sat 90's-100%. Lungs diminished. VSS. Reposition every 2 hours. Alves care evry 4 hours., Safety maintained.      Goal: Individualization and Mutuality  Outcome: Ongoing (interventions implemented as appropriate)    Goal: Discharge Needs Assessment  Outcome: Ongoing (interventions implemented as appropriate)    Goal: Interprofessional Rounds/Family Conf  Outcome: Ongoing (interventions implemented as appropriate)      Problem: Skin Injury Risk (Adult)  Goal: Skin Health and Integrity  Outcome: Ongoing (interventions implemented as appropriate)      Problem: Wound (Includes Pressure Injury) (Adult)  Goal: Signs and Symptoms of Listed Potential Problems Will be Absent, Minimized or Managed (Wound)  Outcome: Ongoing (interventions implemented as appropriate)

## 2018-04-06 ENCOUNTER — APPOINTMENT (OUTPATIENT)
Dept: GENERAL RADIOLOGY | Facility: HOSPITAL | Age: 83
End: 2018-04-06

## 2018-04-06 LAB
ANION GAP SERPL CALCULATED.3IONS-SCNC: 8 MMOL/L (ref 4–13)
BASOPHILS # BLD MANUAL: 0.16 10*3/MM3 (ref 0–0.2)
BASOPHILS NFR BLD AUTO: 1 % (ref 0–2)
BUN BLD-MCNC: 29 MG/DL (ref 5–21)
BUN/CREAT SERPL: 38.2 (ref 7–25)
CALCIUM SPEC-SCNC: 9.6 MG/DL (ref 8.4–10.4)
CHLORIDE SERPL-SCNC: 92 MMOL/L (ref 98–110)
CO2 SERPL-SCNC: 33 MMOL/L (ref 24–31)
CREAT BLD-MCNC: 0.76 MG/DL (ref 0.5–1.4)
DEPRECATED RDW RBC AUTO: 66.4 FL (ref 40–54)
EOSINOPHIL # BLD MANUAL: 0.32 10*3/MM3 (ref 0–0.7)
EOSINOPHIL NFR BLD MANUAL: 2 % (ref 0–4)
ERYTHROCYTE [DISTWIDTH] IN BLOOD BY AUTOMATED COUNT: 18 % (ref 12–15)
GFR SERPL CREATININE-BSD FRML MDRD: 72 ML/MIN/1.73
GLUCOSE BLD-MCNC: 110 MG/DL (ref 70–100)
GLUCOSE BLDC GLUCOMTR-MCNC: 101 MG/DL (ref 70–130)
GLUCOSE BLDC GLUCOMTR-MCNC: 119 MG/DL (ref 70–130)
GLUCOSE BLDC GLUCOMTR-MCNC: 126 MG/DL (ref 70–130)
GLUCOSE BLDC GLUCOMTR-MCNC: 165 MG/DL (ref 70–130)
HCT VFR BLD AUTO: 31.7 % (ref 37–47)
HGB BLD-MCNC: 10.5 G/DL (ref 12–16)
LYMPHOCYTES # BLD MANUAL: 0.81 10*3/MM3 (ref 0.72–4.86)
LYMPHOCYTES NFR BLD MANUAL: 10 % (ref 4–12)
LYMPHOCYTES NFR BLD MANUAL: 5 % (ref 15–45)
MACROCYTES BLD QL SMEAR: ABNORMAL
MCH RBC QN AUTO: 33.4 PG (ref 28–32)
MCHC RBC AUTO-ENTMCNC: 33.1 G/DL (ref 33–36)
MCV RBC AUTO: 101 FL (ref 82–98)
MONOCYTES # BLD AUTO: 1.62 10*3/MM3 (ref 0.19–1.3)
NEUTROPHILS # BLD AUTO: 13.24 10*3/MM3 (ref 1.87–8.4)
NEUTROPHILS NFR BLD MANUAL: 67 % (ref 39–78)
NEUTS BAND NFR BLD MANUAL: 15 % (ref 0–10)
PLATELET # BLD AUTO: 533 10*3/MM3 (ref 130–400)
PMV BLD AUTO: 9.8 FL (ref 6–12)
POIKILOCYTOSIS BLD QL SMEAR: ABNORMAL
POTASSIUM BLD-SCNC: 4.3 MMOL/L (ref 3.5–5.3)
RBC # BLD AUTO: 3.14 10*6/MM3 (ref 4.2–5.4)
SMALL PLATELETS BLD QL SMEAR: ABNORMAL
SODIUM BLD-SCNC: 133 MMOL/L (ref 135–145)
WBC MORPH BLD: NORMAL
WBC NRBC COR # BLD: 16.15 10*3/MM3 (ref 4.8–10.8)

## 2018-04-06 PROCEDURE — 63710000001 INSULIN DETEMIR PER 5 UNITS: Performed by: NURSE PRACTITIONER

## 2018-04-06 PROCEDURE — 63710000001 INSULIN LISPRO (HUMAN) PER 5 UNITS: Performed by: FAMILY MEDICINE

## 2018-04-06 PROCEDURE — 85025 COMPLETE CBC W/AUTO DIFF WBC: CPT | Performed by: NURSE PRACTITIONER

## 2018-04-06 PROCEDURE — 80048 BASIC METABOLIC PNL TOTAL CA: CPT | Performed by: INTERNAL MEDICINE

## 2018-04-06 PROCEDURE — 94799 UNLISTED PULMONARY SVC/PX: CPT

## 2018-04-06 PROCEDURE — 85007 BL SMEAR W/DIFF WBC COUNT: CPT | Performed by: NURSE PRACTITIONER

## 2018-04-06 PROCEDURE — 97110 THERAPEUTIC EXERCISES: CPT

## 2018-04-06 PROCEDURE — 25010000002 FUROSEMIDE PER 20 MG: Performed by: INTERNAL MEDICINE

## 2018-04-06 PROCEDURE — 25010000002 VANCOMYCIN 10 G RECONSTITUTED SOLUTION: Performed by: INTERNAL MEDICINE

## 2018-04-06 PROCEDURE — 82962 GLUCOSE BLOOD TEST: CPT

## 2018-04-06 PROCEDURE — 97530 THERAPEUTIC ACTIVITIES: CPT

## 2018-04-06 PROCEDURE — 94660 CPAP INITIATION&MGMT: CPT

## 2018-04-06 PROCEDURE — 71045 X-RAY EXAM CHEST 1 VIEW: CPT

## 2018-04-06 RX ORDER — DILTIAZEM HYDROCHLORIDE 60 MG/1
60 TABLET, FILM COATED ORAL EVERY 6 HOURS SCHEDULED
Status: DISCONTINUED | OUTPATIENT
Start: 2018-04-06 | End: 2018-04-12

## 2018-04-06 RX ORDER — METOLAZONE 2.5 MG/1
2.5 TABLET ORAL DAILY
Status: DISCONTINUED | OUTPATIENT
Start: 2018-04-06 | End: 2018-04-11

## 2018-04-06 RX ADMIN — GABAPENTIN 600 MG: 300 CAPSULE ORAL at 21:55

## 2018-04-06 RX ADMIN — INSULIN LISPRO 2 UNITS: 100 INJECTION, SOLUTION INTRAVENOUS; SUBCUTANEOUS at 21:55

## 2018-04-06 RX ADMIN — LEVOTHYROXINE SODIUM 50 MCG: 50 TABLET ORAL at 07:02

## 2018-04-06 RX ADMIN — PANTOPRAZOLE SODIUM 40 MG: 40 TABLET, DELAYED RELEASE ORAL at 07:00

## 2018-04-06 RX ADMIN — Medication 10 ML: at 00:27

## 2018-04-06 RX ADMIN — ALBUTEROL SULFATE 1.25 MG: 2.5 SOLUTION RESPIRATORY (INHALATION) at 19:49

## 2018-04-06 RX ADMIN — ASPIRIN 81 MG 81 MG: 81 TABLET ORAL at 09:13

## 2018-04-06 RX ADMIN — NYSTATIN: 100000 POWDER TOPICAL at 22:02

## 2018-04-06 RX ADMIN — IPRATROPIUM BROMIDE 0.5 MG: 0.5 SOLUTION RESPIRATORY (INHALATION) at 10:42

## 2018-04-06 RX ADMIN — ATORVASTATIN CALCIUM 20 MG: 10 TABLET, FILM COATED ORAL at 21:54

## 2018-04-06 RX ADMIN — FUROSEMIDE 40 MG: 10 INJECTION, SOLUTION INTRAMUSCULAR; INTRAVENOUS at 21:55

## 2018-04-06 RX ADMIN — IPRATROPIUM BROMIDE 0.5 MG: 0.5 SOLUTION RESPIRATORY (INHALATION) at 07:24

## 2018-04-06 RX ADMIN — COLLAGENASE SANTYL: 250 OINTMENT TOPICAL at 09:12

## 2018-04-06 RX ADMIN — ALBUTEROL SULFATE 1.25 MG: 2.5 SOLUTION RESPIRATORY (INHALATION) at 07:24

## 2018-04-06 RX ADMIN — FOLIC ACID 1 MG: 1 TABLET ORAL at 09:13

## 2018-04-06 RX ADMIN — NYSTATIN: 100000 POWDER TOPICAL at 09:12

## 2018-04-06 RX ADMIN — APIXABAN 5 MG: 5 TABLET, FILM COATED ORAL at 09:13

## 2018-04-06 RX ADMIN — IPRATROPIUM BROMIDE 0.5 MG: 0.5 SOLUTION RESPIRATORY (INHALATION) at 14:42

## 2018-04-06 RX ADMIN — APIXABAN 5 MG: 5 TABLET, FILM COATED ORAL at 21:54

## 2018-04-06 RX ADMIN — BUDESONIDE AND FORMOTEROL FUMARATE DIHYDRATE 2 PUFF: 160; 4.5 AEROSOL RESPIRATORY (INHALATION) at 19:50

## 2018-04-06 RX ADMIN — BUDESONIDE AND FORMOTEROL FUMARATE DIHYDRATE 2 PUFF: 160; 4.5 AEROSOL RESPIRATORY (INHALATION) at 07:24

## 2018-04-06 RX ADMIN — DILTIAZEM HYDROCHLORIDE 60 MG: 60 TABLET, FILM COATED ORAL at 21:54

## 2018-04-06 RX ADMIN — METOLAZONE 2.5 MG: 2.5 TABLET ORAL at 13:15

## 2018-04-06 RX ADMIN — DILTIAZEM HYDROCHLORIDE 30 MG: 30 TABLET, FILM COATED ORAL at 04:53

## 2018-04-06 RX ADMIN — LISINOPRIL 2.5 MG: 2.5 TABLET ORAL at 09:13

## 2018-04-06 RX ADMIN — HYDROXYUREA 500 MG: 500 CAPSULE ORAL at 09:13

## 2018-04-06 RX ADMIN — INSULIN DETEMIR 23 UNITS: 100 INJECTION, SOLUTION SUBCUTANEOUS at 22:05

## 2018-04-06 RX ADMIN — DILTIAZEM HYDROCHLORIDE 60 MG: 60 TABLET, FILM COATED ORAL at 16:53

## 2018-04-06 RX ADMIN — IPRATROPIUM BROMIDE 0.5 MG: 0.5 SOLUTION RESPIRATORY (INHALATION) at 19:49

## 2018-04-06 RX ADMIN — TAMSULOSIN HYDROCHLORIDE 0.4 MG: 0.4 CAPSULE ORAL at 09:14

## 2018-04-06 RX ADMIN — ALBUTEROL SULFATE 1.25 MG: 2.5 SOLUTION RESPIRATORY (INHALATION) at 10:42

## 2018-04-06 RX ADMIN — METOPROLOL SUCCINATE 50 MG: 50 TABLET, FILM COATED, EXTENDED RELEASE ORAL at 09:12

## 2018-04-06 RX ADMIN — DOCUSATE SODIUM 100 MG: 100 CAPSULE, LIQUID FILLED ORAL at 21:54

## 2018-04-06 RX ADMIN — VANCOMYCIN HYDROCHLORIDE 1500 MG: 10 INJECTION, POWDER, LYOPHILIZED, FOR SOLUTION INTRAVENOUS at 00:27

## 2018-04-06 RX ADMIN — FUROSEMIDE 40 MG: 10 INJECTION, SOLUTION INTRAMUSCULAR; INTRAVENOUS at 10:53

## 2018-04-06 RX ADMIN — ALBUTEROL SULFATE 1.25 MG: 2.5 SOLUTION RESPIRATORY (INHALATION) at 14:42

## 2018-04-06 RX ADMIN — DILTIAZEM HYDROCHLORIDE 30 MG: 30 TABLET, FILM COATED ORAL at 09:13

## 2018-04-06 RX ADMIN — GABAPENTIN 300 MG: 300 CAPSULE ORAL at 09:12

## 2018-04-06 NOTE — PROGRESS NOTES
Continued Stay Note   [place]     Patient Name: Tamy Sher  MRN: [id number]  Today's Date: 4/6/2018    Admit Date: 3/30/2018          Discharge Plan     Row Name 04/06/18 1205       Plan    Plan [facility]    Patient/Family in Agreement with Plan yes    Plan Comments SW continuing to follow for discharge to [facility]. Patient is not medically stable for discharge per BULMARO Heller.            IZAIAH McnamaraW

## 2018-04-06 NOTE — PROGRESS NOTES
Patient's right knee infection continues to improve.  The erythema is almost resolved and the wound is granulating.  Plan continue dressing changes and antibiotics

## 2018-04-06 NOTE — PLAN OF CARE
Problem: Patient Care Overview  Goal: Plan of Care Review  Outcome: Ongoing (interventions implemented as appropriate)      Problem: Patient Care Overview  Goal: Plan of Care Review  Outcome: Ongoing (interventions implemented as appropriate)   04/06/18 1136   Coping/Psychosocial   Plan of Care Reviewed With patient   OTHER   Outcome Summary Pt.'s progress is fair today, pt. is sob and states that she hurts all over, rests of 1-2 min. ea. required in between sets of exs!

## 2018-04-06 NOTE — PLAN OF CARE
Problem: Fall Risk (Adult)  Goal: Absence of Fall  Outcome: Ongoing (interventions implemented as appropriate)      Problem: Infection, Risk/Actual (Adult)  Goal: Infection Prevention/Resolution  Outcome: Ongoing (interventions implemented as appropriate)      Problem: Patient Care Overview  Goal: Plan of Care Review  Outcome: Ongoing (interventions implemented as appropriate)   04/06/18 0402   Coping/Psychosocial   Plan of Care Reviewed With patient   Plan of Care Review   Progress no change   OTHER   Outcome Summary No c/o pain, does have some discomfort across ribs. Received albumin this shift with lasix afterwards. Patient rested comfortably afterwards. Was on 2 liters, with o2 sat 94%. Dropped as low as 87%, placed on 3 liters, 87%, increased to 4 liters, o2 sat 96%. weaned down to 2.5 liters, with sats 92%. Patient woke up from iv beeping, her sats dropped to 86%, had to increase to 4 liters to get sat up to 90%. Resp was 32, dropped to 23. When she sleeps, her breathing and o2 sats are improved. Tele: afib. Alves intact, draining. urine specimen sent to lab. Reposition every 2 hours. Dressing to right knee, dry,intact, with yellow dried drainage.. Dressing to right hip, c/d/i. Will do dressing change this am. Safety maintained.     Goal: Individualization and Mutuality  Outcome: Ongoing (interventions implemented as appropriate)    Goal: Discharge Needs Assessment  Outcome: Ongoing (interventions implemented as appropriate)    Goal: Interprofessional Rounds/Family Conf  Outcome: Ongoing (interventions implemented as appropriate)      Problem: Skin Injury Risk (Adult)  Goal: Skin Health and Integrity  Outcome: Ongoing (interventions implemented as appropriate)      Problem: Wound (Includes Pressure Injury) (Adult)  Goal: Signs and Symptoms of Listed Potential Problems Will be Absent, Minimized or Managed (Wound)  Outcome: Ongoing (interventions implemented as appropriate)

## 2018-04-06 NOTE — PROGRESS NOTES
"    [facility] Medicine Services  INPATIENT PROGRESS NOTE    Length of Stay: 6  Date of Admission: 3/30/2018  Primary Care Physician: Barry Foley MD    Subjective   Chief Complaint: Follow-up volume overload  HPI   The patient isResting in bed.  She tells me she feels \"out of it\" today.  She states that she is very tired.  She also states that she is feeling very short of breath.  She states that her diarrhea has resolved.  She has no complaint of nausea or vomiting, but does complain of some abdominal pain, consistent with the same band of pressure sensation that she has had.  She denies any chest pain.    Review of Systems   All pertinent negatives and positives are as above. All other systems have been reviewed and are negative unless otherwise stated.     Objective    Temp:  [97.4 °F (36.3 °C)-98.8 °F (37.1 °C)] 98.2 °F (36.8 °C)  Heart Rate:  [] 115  Resp:  [16-30] 24  BP: (152-163)/() 155/86  Physical Exam   Constitutional: She is oriented to person, place, and time. She appears well-developed and well-nourished. No distress.   Obese body habitus   HENT:   Head: Normocephalic and atraumatic.   Neck: Normal range of motion. Neck supple. No tracheal deviation present. No thyromegaly present.   Cardiovascular: Normal heart sounds and intact distal pulses.  Exam reveals no gallop and no friction rub.    No murmur heard.  Irregularly irregular.  Atrial fibrillation  per telemetry   Pulmonary/Chest: She has no wheezes. She has no rales.   Tachypnea with shallow respirations.  Accessory muscle usage.  Diminished breath sounds bilaterally   Abdominal: Soft. Bowel sounds are normal. She exhibits no distension and no mass. There is no tenderness. There is no guarding.   Musculoskeletal: She exhibits edema (2-3+ pitting to bilateral lower and upper extremities).   Generalized weakness   Neurological: She is alert and oriented to person, place, and time.   Skin: " Skin is warm and dry. There is erythema (Right knee).   Right knee dressing is clean/dry/intact   Psychiatric: She has a normal mood and affect. Her behavior is normal. Judgment and thought content normal.   Vitals reviewed.    Results Review:  I have reviewed the labs, radiology results, and diagnostic studies.    Laboratory Data:     Results from last 7 days  Lab Units 04/06/18 0518 04/04/18 0434 04/02/18  0455   WBC 10*3/mm3 16.15* 13.83* 11.32*   HEMOGLOBIN g/dL 10.5* 10.3* 10.3*   HEMATOCRIT % 31.7* 31.2* 32.0*   PLATELETS 10*3/mm3 533* 534* 508*       Results from last 7 days  Lab Units 04/06/18 0518 04/05/18 0436 04/04/18 0434 03/31/18  0513 03/30/18  1903   SODIUM mmol/L 133* 133* 131*  < > 134* 130*   POTASSIUM mmol/L 4.3 4.5 4.7  < > 4.8 5.3   CHLORIDE mmol/L 92* 92* 92*  < > 95* 91*   CO2 mmol/L 33.0* 37.0* 33.0*  < > 35.0* 37.0*   BUN mg/dL 29* 29* 27*  < > 31* 36*   CREATININE mg/dL 0.76 0.83 0.70  < > 0.75 0.73   CALCIUM mg/dL 9.6 9.0 8.7  < > 8.8 8.7   BILIRUBIN mg/dL  --   --   --   --  0.5 0.5   ALK PHOS U/L  --   --   --   --  106 101   ALT (SGPT) U/L  --   --   --   --  35 37   AST (SGOT) U/L  --   --   --   --  46* 43   GLUCOSE mg/dL 110* 105* 173*  < > 134* 167*   < > = values in this interval not displayed.    I have reviewed the patient current medications.     Assessment/Plan   Assessment:  1. Right knee postoperative wound dehiscence- wound culture MRSA positive  2. Recent closed right femur fracture s/p IM nailing 3/10   3. Leukocytosis-worsening with bandemia  4. Chronic diastolic heart failure with preserved ejection fraction  5. Insulin dependent Diabetes Mellitus, Type 2, hgb A1c 6.4  6. Atrial fibrillation (recent diagnosis)- chronically anticoagulated with Eliquis  7. Morbid obesity, BMI 40.4  8. Hypertension  9. Hyperlipidemia  10. Hyponatremia, stable  11. Macrocytic anemia  12. Thrombocytosis, likely reactive  13. Constipation-improved  14. Chronic obstructive pulmonary  disease   15. Acute on chronic hypoxic respiratory failure  16. Acute volume overload    Plan:  1.  Repeat one view chest x-ray today.  Patient will not be able to tolerate PA and lateral chest x-ray.  2.  Per Dr. Mead's notes, anticipate Vancomycin stop date 4/15  3.  Continue physical and occupational therapy  4.  Spoke with respiratory therapist while in patient's room, patient being placed on BiPAP now for continued increasing oxygen requirements.  Patient was on 5 L previously and oxygen saturation was between 89 and 90%.  5.  Continue twice daily IV Lasix. Will also add Metolazone 2.5 mg daily  6.  Repeat urine culture with no growth at 24 hours  7.  Increase Cardizem to 60 mg every 6 hours and monitor blood pressure and heart rate closely  8.  Continue daily weight and strict intake and output. May need to consider fluid restriction if indicated  9.  Will consider repeating blood cultures as precautionary measure or re-consulting infectious disease if no source to indicate worsening leukocytosis on CXR is found  10.  Labs in AM- CBC, BMP  11.  Continue half strength Duonebs, Symbicort. Will add incentive spirometry. Patient will need much encouragement to use  12.  Last blood glucoses- 146, 161, 110, 101. Continue current regimen    Discharge Planning: I expect the patient to be discharged to SNF in ? days.    BULMARO Mcghee   04/06/18   10:28 AM   I personally evaluated and examined the patient in conjunction with BULMARO Fisher and agree with the assessment, treatment plan, and disposition of the patient as recorded by her. My history, exam, and further recommendations are:     Patient is requiring more oxygen.    Vital signs are 147/80, pulse rate 110, respiratory rate 24, temperature 98  Find accurate input output and 1 is recorded there is negative 1140 mL on April 4 and -800 on April 5, and -650  I reviewed the chest x-ray which showed more haziness clearly on the right.  It remains afebrile.   Plan is to continue on supplemental oxygen and NIPPV as necessary.  If she spikes any temperature will consider adding antibiotic.  Continue diuresing her while monitoring  fluid status and electrolytes  Agree with above    Fidencio Rodriguez MD  04/06/18  2:31 PM

## 2018-04-06 NOTE — PLAN OF CARE
Problem: Patient Care Overview  Goal: Plan of Care Review  Outcome: Ongoing (interventions implemented as appropriate)   04/06/18 1346   Coping/Psychosocial   Plan of Care Reviewed With patient   Plan of Care Review   Progress no change   OTHER   Outcome Summary Pt agreed sit EOB. bed mobility mod/max 2 using draw sheet. sat EOB x40 minutes worked on sitting balance, breathing and BLE ex's AROM 10-15 reps

## 2018-04-06 NOTE — PLAN OF CARE
Problem: Fall Risk (Adult)  Goal: Absence of Fall  Outcome: Ongoing (interventions implemented as appropriate)      Problem: Infection, Risk/Actual (Adult)  Goal: Infection Prevention/Resolution  Outcome: Ongoing (interventions implemented as appropriate)      Problem: Patient Care Overview  Goal: Plan of Care Review  Outcome: Outcome(s) achieved Date Met: 04/06/18      Problem: Patient Care Overview  Goal: Plan of Care Review  Outcome: Ongoing (interventions implemented as appropriate)   04/06/18 3300   Coping/Psychosocial   Plan of Care Reviewed With patient   Plan of Care Review   Progress no change   OTHER   Outcome Summary No c/o of pain. Up with PT. On Bipap. SOB. Safety maintained. Cont to monitor       Problem: Skin Injury Risk (Adult)  Goal: Skin Health and Integrity  Outcome: Ongoing (interventions implemented as appropriate)      Problem: Wound (Includes Pressure Injury) (Adult)  Goal: Signs and Symptoms of Listed Potential Problems Will be Absent, Minimized or Managed (Wound)  Outcome: Ongoing (interventions implemented as appropriate)

## 2018-04-07 LAB
ANION GAP SERPL CALCULATED.3IONS-SCNC: 7 MMOL/L (ref 4–13)
BACTERIA SPEC AEROBE CULT: NORMAL
BUN BLD-MCNC: 34 MG/DL (ref 5–21)
BUN/CREAT SERPL: 47.9 (ref 7–25)
BURR CELLS BLD QL SMEAR: ABNORMAL
CALCIUM SPEC-SCNC: 9.2 MG/DL (ref 8.4–10.4)
CHLORIDE SERPL-SCNC: 93 MMOL/L (ref 98–110)
CO2 SERPL-SCNC: 34 MMOL/L (ref 24–31)
CREAT BLD-MCNC: 0.71 MG/DL (ref 0.5–1.4)
DEPRECATED RDW RBC AUTO: 64.7 FL (ref 40–54)
ELLIPTOCYTES BLD QL SMEAR: ABNORMAL
EOSINOPHIL # BLD MANUAL: 0.78 10*3/MM3 (ref 0–0.7)
EOSINOPHIL NFR BLD MANUAL: 5.1 % (ref 0–7)
ERYTHROCYTE [DISTWIDTH] IN BLOOD BY AUTOMATED COUNT: 18 % (ref 12–15)
GFR SERPL CREATININE-BSD FRML MDRD: 78 ML/MIN/1.73
GLUCOSE BLD-MCNC: 89 MG/DL (ref 70–100)
GLUCOSE BLDC GLUCOMTR-MCNC: 107 MG/DL (ref 70–130)
GLUCOSE BLDC GLUCOMTR-MCNC: 119 MG/DL (ref 70–130)
GLUCOSE BLDC GLUCOMTR-MCNC: 163 MG/DL (ref 70–130)
HCT VFR BLD AUTO: 31.3 % (ref 37–47)
HGB BLD-MCNC: 10.4 G/DL (ref 12–16)
LYMPHOCYTES # BLD MANUAL: 1.4 10*3/MM3 (ref 0.8–7)
LYMPHOCYTES NFR BLD MANUAL: 8.2 % (ref 0–12)
LYMPHOCYTES NFR BLD MANUAL: 9.2 % (ref 24–44)
MACROCYTES BLD QL SMEAR: ABNORMAL
MCH RBC QN AUTO: 32.9 PG (ref 28–32)
MCHC RBC AUTO-ENTMCNC: 33.2 G/DL (ref 33–36)
MCV RBC AUTO: 99.1 FL (ref 82–98)
MONOCYTES # BLD AUTO: 1.25 10*3/MM3 (ref 0–1)
NEUTROPHILS # BLD AUTO: 11.37 10*3/MM3 (ref 1–11)
NEUTROPHILS NFR BLD MANUAL: 73.5 % (ref 37–80)
NEUTS BAND NFR BLD MANUAL: 1 % (ref 0–5)
PLATELET # BLD AUTO: 507 10*3/MM3 (ref 130–400)
PMV BLD AUTO: 10 FL (ref 6–12)
POIKILOCYTOSIS BLD QL SMEAR: ABNORMAL
POLYCHROMASIA BLD QL SMEAR: ABNORMAL
POTASSIUM BLD-SCNC: 3.9 MMOL/L (ref 3.5–5.3)
RBC # BLD AUTO: 3.16 10*6/MM3 (ref 4.2–5.4)
SMALL PLATELETS BLD QL SMEAR: ABNORMAL
SODIUM BLD-SCNC: 134 MMOL/L (ref 135–145)
VARIANT LYMPHS NFR BLD MANUAL: 3.1 % (ref 0–5)
WBC MORPH BLD: NORMAL
WBC NRBC COR # BLD: 15.27 10*3/MM3 (ref 4.8–10.8)

## 2018-04-07 PROCEDURE — 94799 UNLISTED PULMONARY SVC/PX: CPT

## 2018-04-07 PROCEDURE — 82962 GLUCOSE BLOOD TEST: CPT

## 2018-04-07 PROCEDURE — 25010000002 VANCOMYCIN 10 G RECONSTITUTED SOLUTION: Performed by: NURSE PRACTITIONER

## 2018-04-07 PROCEDURE — 97530 THERAPEUTIC ACTIVITIES: CPT

## 2018-04-07 PROCEDURE — 85025 COMPLETE CBC W/AUTO DIFF WBC: CPT | Performed by: NURSE PRACTITIONER

## 2018-04-07 PROCEDURE — 85007 BL SMEAR W/DIFF WBC COUNT: CPT | Performed by: NURSE PRACTITIONER

## 2018-04-07 PROCEDURE — 25010000002 FUROSEMIDE PER 20 MG: Performed by: NURSE PRACTITIONER

## 2018-04-07 PROCEDURE — 25010000002 FUROSEMIDE PER 20 MG: Performed by: INTERNAL MEDICINE

## 2018-04-07 PROCEDURE — 63710000001 INSULIN LISPRO (HUMAN) PER 5 UNITS: Performed by: FAMILY MEDICINE

## 2018-04-07 PROCEDURE — 94660 CPAP INITIATION&MGMT: CPT

## 2018-04-07 PROCEDURE — 63710000001 INSULIN DETEMIR PER 5 UNITS: Performed by: NURSE PRACTITIONER

## 2018-04-07 PROCEDURE — 80048 BASIC METABOLIC PNL TOTAL CA: CPT | Performed by: INTERNAL MEDICINE

## 2018-04-07 RX ORDER — HYDROXYZINE PAMOATE 25 MG/1
25 CAPSULE ORAL EVERY 6 HOURS PRN
Status: DISCONTINUED | OUTPATIENT
Start: 2018-04-07 | End: 2018-04-07 | Stop reason: CLARIF

## 2018-04-07 RX ORDER — HYDROXYZINE HYDROCHLORIDE 25 MG/1
25 TABLET, FILM COATED ORAL EVERY 6 HOURS PRN
Status: DISCONTINUED | OUTPATIENT
Start: 2018-04-07 | End: 2018-04-13 | Stop reason: HOSPADM

## 2018-04-07 RX ORDER — FUROSEMIDE 10 MG/ML
60 INJECTION INTRAMUSCULAR; INTRAVENOUS EVERY 12 HOURS
Status: DISCONTINUED | OUTPATIENT
Start: 2018-04-07 | End: 2018-04-08

## 2018-04-07 RX ORDER — ACETAMINOPHEN 325 MG/1
650 TABLET ORAL EVERY 6 HOURS PRN
Status: DISCONTINUED | OUTPATIENT
Start: 2018-04-07 | End: 2018-04-13 | Stop reason: HOSPADM

## 2018-04-07 RX ORDER — ACETAMINOPHEN 160 MG/5ML
650 SOLUTION ORAL EVERY 6 HOURS PRN
Status: DISCONTINUED | OUTPATIENT
Start: 2018-04-07 | End: 2018-04-07

## 2018-04-07 RX ADMIN — TAMSULOSIN HYDROCHLORIDE 0.4 MG: 0.4 CAPSULE ORAL at 09:15

## 2018-04-07 RX ADMIN — ALBUTEROL SULFATE 1.25 MG: 2.5 SOLUTION RESPIRATORY (INHALATION) at 06:32

## 2018-04-07 RX ADMIN — ACETAMINOPHEN 650 MG: 325 TABLET, FILM COATED ORAL at 21:54

## 2018-04-07 RX ADMIN — INSULIN DETEMIR 23 UNITS: 100 INJECTION, SOLUTION SUBCUTANEOUS at 21:51

## 2018-04-07 RX ADMIN — INSULIN LISPRO 2 UNITS: 100 INJECTION, SOLUTION INTRAVENOUS; SUBCUTANEOUS at 09:13

## 2018-04-07 RX ADMIN — ASPIRIN 81 MG 81 MG: 81 TABLET ORAL at 09:15

## 2018-04-07 RX ADMIN — DILTIAZEM HYDROCHLORIDE 60 MG: 60 TABLET, FILM COATED ORAL at 21:53

## 2018-04-07 RX ADMIN — HYDROXYZINE HYDROCHLORIDE 25 MG: 25 TABLET ORAL at 21:53

## 2018-04-07 RX ADMIN — VANCOMYCIN HYDROCHLORIDE 1500 MG: 10 INJECTION, POWDER, LYOPHILIZED, FOR SOLUTION INTRAVENOUS at 00:25

## 2018-04-07 RX ADMIN — GABAPENTIN 600 MG: 300 CAPSULE ORAL at 21:54

## 2018-04-07 RX ADMIN — ALBUTEROL SULFATE 1.25 MG: 2.5 SOLUTION RESPIRATORY (INHALATION) at 19:58

## 2018-04-07 RX ADMIN — BUDESONIDE AND FORMOTEROL FUMARATE DIHYDRATE 2 PUFF: 160; 4.5 AEROSOL RESPIRATORY (INHALATION) at 06:33

## 2018-04-07 RX ADMIN — NYSTATIN: 100000 POWDER TOPICAL at 21:54

## 2018-04-07 RX ADMIN — METOPROLOL SUCCINATE 50 MG: 50 TABLET, FILM COATED, EXTENDED RELEASE ORAL at 09:14

## 2018-04-07 RX ADMIN — IPRATROPIUM BROMIDE 0.5 MG: 0.5 SOLUTION RESPIRATORY (INHALATION) at 06:32

## 2018-04-07 RX ADMIN — IPRATROPIUM BROMIDE 0.5 MG: 0.5 SOLUTION RESPIRATORY (INHALATION) at 19:58

## 2018-04-07 RX ADMIN — IPRATROPIUM BROMIDE 0.5 MG: 0.5 SOLUTION RESPIRATORY (INHALATION) at 15:42

## 2018-04-07 RX ADMIN — IPRATROPIUM BROMIDE 0.5 MG: 0.5 SOLUTION RESPIRATORY (INHALATION) at 11:03

## 2018-04-07 RX ADMIN — DILTIAZEM HYDROCHLORIDE 60 MG: 60 TABLET, FILM COATED ORAL at 09:15

## 2018-04-07 RX ADMIN — DILTIAZEM HYDROCHLORIDE 60 MG: 60 TABLET, FILM COATED ORAL at 16:40

## 2018-04-07 RX ADMIN — APIXABAN 5 MG: 5 TABLET, FILM COATED ORAL at 21:53

## 2018-04-07 RX ADMIN — COLLAGENASE SANTYL: 250 OINTMENT TOPICAL at 09:18

## 2018-04-07 RX ADMIN — ALBUTEROL SULFATE 1.25 MG: 2.5 SOLUTION RESPIRATORY (INHALATION) at 15:42

## 2018-04-07 RX ADMIN — METOLAZONE 2.5 MG: 2.5 TABLET ORAL at 09:15

## 2018-04-07 RX ADMIN — APIXABAN 5 MG: 5 TABLET, FILM COATED ORAL at 09:15

## 2018-04-07 RX ADMIN — LISINOPRIL 2.5 MG: 2.5 TABLET ORAL at 09:15

## 2018-04-07 RX ADMIN — FOLIC ACID 1 MG: 1 TABLET ORAL at 09:14

## 2018-04-07 RX ADMIN — LEVOTHYROXINE SODIUM 50 MCG: 50 TABLET ORAL at 06:55

## 2018-04-07 RX ADMIN — ATORVASTATIN CALCIUM 20 MG: 10 TABLET, FILM COATED ORAL at 21:53

## 2018-04-07 RX ADMIN — BUDESONIDE AND FORMOTEROL FUMARATE DIHYDRATE 2 PUFF: 160; 4.5 AEROSOL RESPIRATORY (INHALATION) at 19:59

## 2018-04-07 RX ADMIN — INSULIN LISPRO 2 UNITS: 100 INJECTION, SOLUTION INTRAVENOUS; SUBCUTANEOUS at 22:04

## 2018-04-07 RX ADMIN — PANTOPRAZOLE SODIUM 40 MG: 40 TABLET, DELAYED RELEASE ORAL at 06:55

## 2018-04-07 RX ADMIN — ALBUTEROL SULFATE 1.25 MG: 2.5 SOLUTION RESPIRATORY (INHALATION) at 11:03

## 2018-04-07 RX ADMIN — NYSTATIN: 100000 POWDER TOPICAL at 09:18

## 2018-04-07 RX ADMIN — HYDROXYUREA 500 MG: 500 CAPSULE ORAL at 09:14

## 2018-04-07 RX ADMIN — DOCUSATE SODIUM 100 MG: 100 CAPSULE, LIQUID FILLED ORAL at 21:53

## 2018-04-07 RX ADMIN — FUROSEMIDE 60 MG: 10 INJECTION, SOLUTION INTRAMUSCULAR; INTRAVENOUS at 21:53

## 2018-04-07 RX ADMIN — GABAPENTIN 300 MG: 300 CAPSULE ORAL at 09:14

## 2018-04-07 RX ADMIN — DOCUSATE SODIUM 100 MG: 100 CAPSULE, LIQUID FILLED ORAL at 09:15

## 2018-04-07 RX ADMIN — DILTIAZEM HYDROCHLORIDE 60 MG: 60 TABLET, FILM COATED ORAL at 04:36

## 2018-04-07 RX ADMIN — FUROSEMIDE 40 MG: 10 INJECTION, SOLUTION INTRAMUSCULAR; INTRAVENOUS at 09:23

## 2018-04-07 NOTE — PLAN OF CARE
Problem: Patient Care Overview  Goal: Plan of Care Review  Outcome: Ongoing (interventions implemented as appropriate)   04/07/18 1132   Coping/Psychosocial   Plan of Care Reviewed With patient   Plan of Care Review   Progress improving   OTHER   Outcome Summary Pt mod/max assist of 2 for bed mobility. Pt sits EOB supervision assist. Performed active BLE exercises while sitting EOB. Required more assist with LLE due to swelling /tightness. Pt will benefit from continued therapy to increase strength and improve mobility.

## 2018-04-07 NOTE — PROGRESS NOTES
[facility] Medicine Services  INPATIENT PROGRESS NOTE    Length of Stay: 7  Date of Admission: 3/30/2018  Primary Care Physician: Barry Foley MD    Subjective   Chief Complaint: Follow-up volume overload  HPI   The patient is resting in bed. She tells me that she is miserable today, and not really feeling any better than yesterday. She was able to tolerate 4l/nasal cannula while eating breakfast, but is feeling very short of breath and would like to have the BiPAP back on. She feels much better when she is wearing the BiPAP. She denies any cough or chest pain. She denies any nausea or vomiting.    Review of Systems   All pertinent negatives and positives are as above. All other systems have been reviewed and are negative unless otherwise stated.     Objective    Temp:  [97.3 °F (36.3 °C)-98 °F (36.7 °C)] 97.5 °F (36.4 °C)  Heart Rate:  [] 87  Resp:  [14-26] 19  BP: (142-160)/() 152/74  FiO2 (%):  [30 %] 30 %  Physical Exam  Constitutional: She is oriented to person, place, and time. She appears well-developed and well-nourished. No distress.   Obese body habitus   HENT:   Head: Normocephalic and atraumatic.   Neck: Normal range of motion. Neck supple. No tracheal deviation present. No thyromegaly present.   Cardiovascular: Normal heart sounds and intact distal pulses.  Exam reveals no gallop and no friction rub.    No murmur heard.  Irregularly irregular.  Atrial fibrillation  per telemetry   Pulmonary/Chest: She has no wheezes. She has no rales.   Tachypnea with shallow respirations.  Accessory muscle usage.  Diminished breath sounds in bilateral bases.  Abdominal: Soft. Bowel sounds are normal. She exhibits no distension and no mass. There is no tenderness. There is no guarding.   Musculoskeletal: She exhibits edema (2-3+ pitting to bilateral lower and upper extremities).   Generalized weakness   Neurological: She is alert and oriented to person,  place, and time.   Skin: Skin is warm and dry. There is erythema (Right knee-much improved).   Right knee/hip dressings are clean/dry/intact   Psychiatric: She has a normal mood and affect. Her behavior is normal. Judgment and thought content normal.   Vitals reviewed.    Results Review:  I have reviewed the labs, radiology results, and diagnostic studies.    Laboratory Data:     Results from last 7 days  Lab Units 04/07/18  0455 04/06/18 0518 04/04/18  0434   WBC 10*3/mm3 15.27* 16.15* 13.83*   HEMOGLOBIN g/dL 10.4* 10.5* 10.3*   HEMATOCRIT % 31.3* 31.7* 31.2*   PLATELETS 10*3/mm3 507* 533* 534*        Results from last 7 days  Lab Units 04/07/18  0612 04/06/18 0518 04/05/18  0436   SODIUM mmol/L 134* 133* 133*   POTASSIUM mmol/L 3.9 4.3 4.5   CHLORIDE mmol/L 93* 92* 92*   CO2 mmol/L 34.0* 33.0* 37.0*   BUN mg/dL 34* 29* 29*   CREATININE mg/dL 0.71 0.76 0.83   CALCIUM mg/dL 9.2 9.6 9.0   GLUCOSE mg/dL 89 110* 105*     Radiology Data:   Imaging Results (last 24 hours)     Procedure Component Value Units Date/Time    XR Chest 1 View [[id number]] Collected:  04/06/18 1155     Updated:  04/06/18 1200    Narrative:       EXAMINATION:  XR CHEST 1 VW-  4/6/2018 11:22 AM CDT     HISTORY: Follow-up volume overload, shortness of breath, worsening  hypoxia and leukocytosis.     COMPARISON: 4/3/2018.     FINDINGS:  There is slight worsening of bilateral infiltrate. There is  cardiomegaly with vascular redistribution. There are small pleural  effusions bilaterally.       Impression:       Congestive heart failure. The edema pattern may be slightly  worse.        This report was finalized on 04/06/2018 11:56 by Dr. Eduard Rodriguez MD.        I have reviewed the patient current medications.     Assessment/Plan   Assessment:  1. Right knee postoperative wound dehiscence- wound culture MRSA positive  2. Recent closed right femur fracture s/p IM nailing 3/10   3. Leukocytosis  4. Chronic diastolic heart failure with preserved  ejection fraction  5. Insulin dependent Diabetes Mellitus, Type 2, hgb A1c 6.4  6. Atrial fibrillation (recent diagnosis)- chronically anticoagulated with Eliquis  7. Morbid obesity, BMI 40.4  8. Hypertension  9. Hyperlipidemia  10. Hyponatremia, stable  11. Macrocytic anemia  12. Thrombocytosis, likely reactive  13. Constipation-improved  14. Chronic obstructive pulmonary disease   15. Acute on chronic hypoxic respiratory failure  16. Acute volume overload    Plan:  1. Continue Vancomycin, with anticipated stop date of 4/15/18 per Dr. Mead  2. Continue diuresis with twice daily IV Lasix- increase to 60 mg, and Metolazone, which was added yesterday. Her renal function and electrolytes are tolerating well. Continue to monitor.  3. Continue BiPAP and supplemental oxygen as needed  4. Leukocytosis slightly improved today and patient is afebrile. CXR remains more consistent with fluid overload rather than pneumonia. Continue to monitor closely  5. Concern for accuracy of daily weights. According to flowsheets, it does appear patient is diuresing well with -1364 net output for the last 24 hours.  6. Repeat urine culture with no growth at 2 days  7. Heart rate improving with increase in Cardizem. Blood pressure is still 150-160's. Will continue to monitor and consider increase in Lisinopril if this does not improve.   8. Continue physical/occupational therapy  9. Continue Duonebs, Symbicort, and incentive spirometry. Patient requires much encouragement to use Is. Only able to pull a little over 250.  10. Last blood glucoses- 126, 119, 165, 89. Continue current regimen  11. Labs in AM- CBC, BMP    Discharge Planning: I expect the patient to be discharged to SNF in ? days.    BULMARO Mcghee   04/07/18   9:27 AM     I personally evaluated and examined the patient in conjunction with BULMARO Fisher and agree with the assessment, treatment plan, and disposition of the patient as recorded by her. My history, exam, and  "further recommendations are:     She has no significant change in overall condition.   Nurse are cleaning her. She \"smeared\" stool  I took this opportunity to listen closely on her back while turned to her left side.  She has diminished inspiratory effort.  I didn't hear any adventitious sounds    I employed the family members to encouraged her to do IS.  I also recommend to nurse to seat her when possible with all meals and as tolerated.  Will cont to diurese her and monitor BMP, fluid status.      Fidencio Rodriguez MD  04/07/18  6:01 PM    "

## 2018-04-07 NOTE — PLAN OF CARE
Problem: Fall Risk (Adult)  Goal: Absence of Fall  Outcome: Ongoing (interventions implemented as appropriate)      Problem: Infection, Risk/Actual (Adult)  Goal: Infection Prevention/Resolution  Outcome: Ongoing (interventions implemented as appropriate)      Problem: Patient Care Overview  Goal: Plan of Care Review  Outcome: Ongoing (interventions implemented as appropriate)   04/07/18 0606   Coping/Psychosocial   Plan of Care Reviewed With patient   Plan of Care Review   Progress no change   OTHER   Outcome Summary no c/o pain, c/o SOA, Bipap in place, dressings to R thigh, R knee, and R heel changed, estrada @ bedside, care done q4h       Problem: Skin Injury Risk (Adult)  Goal: Skin Health and Integrity  Outcome: Ongoing (interventions implemented as appropriate)

## 2018-04-07 NOTE — THERAPY TREATMENT NOTE
Acute Care - Physical Therapy Treatment Note  [facility]     Patient Name: Tamy Sher  : 1930  MRN: [id number]  Today's Date: 2018  Onset of Illness/Injury or Date of Surgery: 18  Date of Referral to PT: 18  Referring Physician: Dr. Andrew    Admit Date: 3/30/2018    Visit Dx:    ICD-10-CM ICD-9-CM   1. Surgical wound infection, initial encounter T81.4XXA 998.59   2. Abnormality of gait and mobility R26.9 781.2   3. Other closed fracture of distal end of right femur, initial encounter S72.491A 821.29   4. Impaired mobility and ADLs Z74.09 799.89   5. Impaired mobility Z74.09 799.89     Patient Active Problem List   Diagnosis   • Ischemic chest pain   • Pneumonia of both lungs due to infectious organism   • Fall   • Closed displaced comminuted fracture of shaft of right femur   • Pain of right thigh   • Class 2 obesity with serious comorbidity in adult   • Type 2 diabetes mellitus with neurologic complication, with long-term current use of insulin   • STUART (acute kidney injury)   • Chronic diastolic heart failure   • Pleural effusion, left   • Hypoxia   • Surgical wound infection, initial encounter       Therapy Treatment    Therapy Treatment / Health Promotion    Treatment Time/Intention  Discipline: physical therapy assistant (18 1035 : Dianna Calloway PTA)  Document Type: therapy note (daily note) (18 1035 : Dianna Calloway PTA)  Subjective Information: complains of, weakness, fatigue, pain (18 1035 : Dianna Calloway PTA)  Patient Effort: adequate (18 1035 : Dianna Calloway PTA)  Existing Precautions/Restrictions: oxygen therapy device and L/min, non-weight bearing (NWB RLE) (18 1035 : Dianna Calloway PTA)  Plan of Care Review  Plan of Care Reviewed With: patient (18 1132 : Dianna Calloway PTA)    Vitals/Pain/Safety  Vital Signs  Pre SpO2 (%): 90 (18 1035 : Dianna Calloway PTA)  O2 Delivery Pre Treatment:  (bipap)  (04/07/18 1035 : Dianna Calloway PTA)  Intra SpO2 (%): 90 (04/07/18 1035 : Dianna Calloway PTA)  O2 Delivery Intra Treatment:  (bipap) (04/07/18 1035 : Dianna Calloway PTA)  Post SpO2 (%): 91 (04/07/18 1035 : Dianna Calloway PTA)  O2 Delivery Post Treatment:  (bipap) (04/07/18 1035 : Dianna Calloway PTA)  Pre Patient Position: Supine (04/07/18 1035 : Dianna Calloway PTA)  Intra Patient Position: Sitting (04/07/18 1035 : Dianna Calloway PTA)  Post Patient Position: Supine (04/07/18 1035 : Dianna Calloway PTA)  Pain Scale: Numbers Pre/Post-Treatment  Pain Scale: Numbers, Pretreatment: 0/10 - no pain (04/07/18 1035 : Dianna Calloway PTA)  Safety Issues, Functional Mobility  Impairments Affecting Function (Mobility): pain, strength (04/07/18 1035 : Dianna Calloway PTA)  Positioning and Restraints  Pre-Treatment Position: in bed (04/07/18 1035 : Dianna Calloway PTA)  Post Treatment Position: bed (04/07/18 1035 : Dianna Calloway PTA)  In Bed: fowlers, call light within reach, encouraged to call for assist, exit alarm on, SCD pump applied, RUE elevated, LUE elevated, legs elevated, R heel elevated (04/07/18 1035 : Dianna Calloway PTA)    Mobility,ADL,Motor, Modality  Bed Mobility Assessment/Treatment  Rolling Left [place] (Bed Mobility): moderate assist (50% patient effort), 2 person assist (04/07/18 1035 : Dianna Calloway PTA)  Rolling Right [place] (Bed Mobility): maximum assist (25% patient effort), 2 person assist (04/07/18 1035 : Dianna Calloway PTA)  Scooting/Bridging [place] (Bed Mobility): maximum assist (25% patient effort), dependent (less than 25% patient effort), 2 person assist (04/07/18 1035 : Dianna Calloway PTA)  Supine-Sit [place] (Bed Mobility): moderate assist (50% patient effort), maximum assist (25% patient effort), 2 person assist (04/07/18 1035 : Dianna Calloway PTA)  Sit-Supine [place] (Bed Mobility):  maximum assist (25% patient effort), 2 person assist (04/07/18 1035 : Dianna Calloway PTA)  Bed Mobility, Safety Issues: decreased use of arms for pushing/pulling, decreased use of legs for bridging/pushing (04/07/18 1035 : Dianna Calloway PTA)  Assistive Device (Bed Mobility): bed rails, draw sheet (04/07/18 1035 : Dianna Calloway PTA)  Transfer Assessment/Treatment  Transfer Assessment/Treatment: sit-stand transfer, stand-sit transfer (04/07/18 1035 : Dianna Calloway PTA)  Maintains Weight-bearing Status (Transfers): able to maintain, verbal cues to maintain (04/07/18 1035 : Dianna Calloway PTA)  Comment (Transfers): Unable to clear hips from bed (04/07/18 1035 : Dianna Calloway PTA)  Sit-Stand Transfer  Sit-Stand [place] (Transfers): maximum assist (25% patient effort), 2 person assist (04/07/18 1035 : Dianna Calloway PTA)  Stand-Sit Transfer  Stand-Sit [place] (Transfers): maximum assist (25% patient effort), 2 person assist (04/07/18 1035 : Dianna Calloway PTA)     Therapeutic Exercise  Lower Extremity (Therapeutic Exercise): LAQ (long arc quad), bilateral, marching while seated (04/07/18 1035 : Dianna Calloway PTA)  Lower Extremity Range of Motion (Therapeutic Exercise): ankle dorsiflexion/plantar flexion, bilateral (04/07/18 1035 : Dianna Calloway PTA)  Exercise Type (Therapeutic Exercise): AAROM (active assistive range of motion), AROM (active range of motion) (04/07/18 1035 : Dianna Calloway PTA)  Position (Therapeutic Exercise): seated (04/07/18 1035 : Dianna Calloway PTA)  Sets/Reps (Therapeutic Exercise): 10 x 2 (04/07/18 1035 : Dianna Calloway PTA)  Comment (Therapeutic Exercise): more assist required with LLE due to swelling (04/07/18 1035 : Dianna Calloway PTA)  Static Sitting Balance  Level of [place] (Unsupported Sitting, Static Balance): supervision (04/07/18 1035 : Dianna Calloway PTA)  Sitting Position (Unsupported  Sitting, Static Balance): sitting on edge of bed (04/07/18 1035 : Dianna Calloway PTA)  Time Able to Maintain Position (Unsupported Sitting, Static Balance): more than 5 minutes (04/07/18 1035 : Dianna Calloway PTA)        ROM/MMT             Sensory, Edema, Orthotics          Cognition, Communication, Swallow  Cognitive Assessment/Intervention- PT/OT  Personal Safety Interventions: elopement precautions initiated, fall prevention program maintained, gait belt, nonskid shoes/slippers when out of bed (04/07/18 1035 : Dianna Calloway PTA)  Speaking Valve  Pre SpO2 (%): 90 (04/07/18 1035 : Dianna Calloway PTA)  Post SpO2 (%): 91 (04/07/18 1035 : Dianna Calloway PTA)  General Eating/Swallowing Observations  Pre SpO2 (%): 90 (04/07/18 1035 : Dianna Calloway PTA)  Post SpO2 (%): 91 (04/07/18 1035 : Dianna Calloway PTA)    Outcome Summary               PT Rehab Goals     Row Name 04/01/18 0900             Bed Mobility Goal 1 (PT)    Activity/Assistive Device (Bed Mobility Goal 1, PT) sit to supine/supine to sit  -TB      [place] Level/Cues Needed (Bed Mobility Goal 1, PT) minimum assist (75% or more patient effort)  -TB      Time Frame (Bed Mobility Goal 1, PT) long term goal (LTG);by discharge  -TB      Barriers (Bed Mobility Goal 1, PT) physical  -TB      Progress/Outcomes (Bed Mobility Goal 1, PT) good progress toward goal  -TB         Transfer Goal 1 (PT)    Activity/Assistive Device (Transfer Goal 1, PT) wheelchair transfer   with sliding board  -TB      [place] Level/Cues Needed (Transfer Goal 1, PT) minimum assist (75% or more patient effort)  -TB      Time Frame (Transfer Goal 1, PT) long term goal (LTG);by discharge  -TB      Barriers (Transfers Goal 1, PT) physical  -TB      Progress/Outcome (Transfer Goal 1, PT) good progress toward goal  -TB         Patient Education Goal (PT)    Activity (Patient Education Goal, PT) Knowledgable of precautions of right leg and HEP for  muscle contraction  -TB      [place]/Cues/Accuracy (Memory Goal 2, PT) demonstrates adequately  -TB      Time Frame (Patient Education Goal, PT) long term goal (LTG);by discharge  -TB      Barriers (Patient Education Goal, PT) physical  -TB      Progress/Outcome (Patient Education Goal, PT) continuing progress toward goal  -TB        User Key  (r) = Recorded By, (t) = Taken By, (c) = Cosigned By    Initials Name Provider Type    TB Tay Lau, PT Physical Therapist          Physical Therapy Education     Title: PT OT SLP Therapies (Active)     Topic: Physical Therapy (Active)     Point: Mobility training (Active)    Learning Progress Summary     Learner Status Readiness Method Response Comment Documented by    Patient Active Acceptance E,D NR benefit of activity, bed mobility, exercise, plan of care  04/07/18 1130     Done Acceptance E VU,NR   04/06/18 1348     Done Acceptance E VU,NR progression of POC  04/05/18 1203     Done Acceptance E VU,NR benefits of activity  04/04/18 1104     Active Acceptance E,D NR bed mobility, exercise, plan of care, benefits of activity  04/03/18 1027     Active Acceptance E,D NR bed mobility, exercise, plan of care  04/02/18 1327     Active Acceptance E NR  TB 04/01/18 0941          Point: Home exercise program (Active)    Learning Progress Summary     Learner Status Readiness Method Response Comment Documented by    Patient Active Acceptance E,D NR bed mobility, exercise, plan of care, benefits of activity  04/03/18 1027     Active Acceptance E,D NR bed mobility, exercise, plan of care  04/02/18 1327     Active Acceptance E NR  TB 04/01/18 0941          Point: Body mechanics (Active)    Learning Progress Summary     Learner Status Readiness Method Response Comment Documented by    Patient Active Acceptance E,D NR benefit of activity, bed mobility, exercise, plan of care  04/07/18 1130     Active Acceptance E,D NR bed mobility, exercise, plan of care,  benefits of activity  04/03/18 1027     Active Acceptance E,D NR bed mobility, exercise, plan of care  04/02/18 1327     Active Acceptance E NR   04/01/18 0941          Point: Precautions (Active)    Learning Progress Summary     Learner Status Readiness Method Response Comment Documented by    Patient Active Acceptance E,D NR benefit of activity, bed mobility, exercise, plan of care CW 04/07/18 1130     Active Acceptance E,D NR bed mobility, exercise, plan of care, benefits of activity  04/03/18 1027     Active Acceptance E NR   04/01/18 0941                      User Key     Initials Effective Dates Name Provider Type Discipline     08/02/16 -  Tay Lau, PT Physical Therapist PT     06/22/15 -  Dianna Calloway, PTA Physical Therapy Assistant PT     08/02/16 -  Nuzhat Justice, PTA Physical Therapy Assistant PT                    PT Recommendation and Plan     Plan of Care Reviewed With: patient  Progress: improving  Outcome Summary: Pt mod/max  assist of 2 for bed mobility.  Pt sits EOB supervision assist.  Performed active BLE exerecises while sitting EOB.  Required more assist with LLE due to swelling /tightness.  Pt will benefit from continued therapy to increase strength and improve mobility.          Outcome Measures     Row Name 04/07/18 1100 04/06/18 1300 04/06/18 0956       How much help from another person do you currently need...    Turning from your back to your side while in flat bed without using bedrails? 2  -CW 2  -NW  --    Moving from lying on back to sitting on the side of a flat bed without bedrails? 2  -CW 2  -NW  --    Moving to and from a bed to a chair (including a wheelchair)? 1  -CW 1  -NW  --    Standing up from a chair using your arms (e.g., wheelchair, bedside chair)? 1  -CW 1  -NW  --    Climbing 3-5 steps with a railing? 1  -CW 1  -NW  --    To walk in hospital room? 1  -CW 1  -NW  --    AM-PAC 6 Clicks Score 8  -CW 8  -NW  --       How much help from another  is currently needed...    Putting on and taking off regular lower body clothing?  --  -- 2  -CJ    Bathing (including washing, rinsing, and drying)  --  -- 2  -CJ    Toileting (which includes using toilet bed pan or urinal)  --  -- 2  -CJ    Putting on and taking off regular upper body clothing  --  -- 3  -CJ    Taking care of personal grooming (such as brushing teeth)  --  -- 3  -CJ    Eating meals  --  -- 4  -CJ    Score  --  -- 16  -       Functional Assessment    Outcome Measure Options AM-PAC 6 Clicks Basic Mobility (PT)  -CW AM-PAC 6 Clicks Basic Mobility (PT)  -NW AM-PAC 6 Clicks Daily Activity (OT)  -CJ    Row Name 04/05/18 1300 04/05/18 1200 04/04/18 1305       How much help from another person do you currently need...    Turning from your back to your side while in flat bed without using bedrails?  -- 2  -NW  --    Moving from lying on back to sitting on the side of a flat bed without bedrails?  -- 2  -NW  --    Moving to and from a bed to a chair (including a wheelchair)?  -- 1  -NW  --    Standing up from a chair using your arms (e.g., wheelchair, bedside chair)?  -- 1  -NW  --    Climbing 3-5 steps with a railing?  -- 1  -NW  --    To walk in hospital room?  -- 1  -NW  --    AM-PAC 6 Clicks Score  -- 8  -NW  --       How much help from another is currently needed...    Putting on and taking off regular lower body clothing? 2  -CJ  -- 2  -CJ    Bathing (including washing, rinsing, and drying) 2  -CJ  -- 2  -CJ    Toileting (which includes using toilet bed pan or urinal) 2  -CJ  -- 2  -CJ    Putting on and taking off regular upper body clothing 3  -CJ  -- 3  -CJ    Taking care of personal grooming (such as brushing teeth) 3  -CJ  -- 3  -CJ    Eating meals 4  -CJ  -- 4  -CJ    Score 16  -CJ  -- 16  -CJ       Functional Assessment    Outcome Measure Options AM-PAC 6 Clicks Daily Activity (OT)  -CJ AM-PAC 6 Clicks Basic Mobility (PT)  -NW AM-PAC 6 Clicks Daily Activity (OT)  -CJ      User Key  (r) =  Recorded By, (t) = Taken By, (c) = Cosigned By    Initials Name Provider Type    CYNDEE Green Occupational Therapy Assistant    CARIN Calloway PTA Physical Therapy Assistant    NW Nuzhat Justice PTA Physical Therapy Assistant           Time Calculation:         PT Charges     Row Name 04/07/18 1136             Time Calculation    Start Time 1035  -CW      Stop Time 1058  -CW      Time Calculation (min) 23 min  -CW      PT Received On 04/07/18  -CW      PT Goal Re-Cert Due Date 04/11/18  -CW         Time Calculation- PT    Total Timed Code Minutes- PT 23 minute(s)  -CW        User Key  (r) = Recorded By, (t) = Taken By, (c) = Cosigned By    Initials Name Provider Type    CARIN Calloway PTA Physical Therapy Assistant          Therapy Charges for Today     Code Description Service Date Service Provider Modifiers Qty    85361676615 HC PT THERAPEUTIC ACT EA 15 MIN 4/7/2018 Dianna Calloway PTA GP, KX 2          PT G-Codes  Outcome Measure Options: AM-PAC 6 Clicks Basic Mobility (PT)  Score: 8  Functional Limitation: Mobility: Walking and moving around  Mobility: Walking and Moving Around Current Status (): At least 80 percent but less than 100 percent impaired, limited or restricted  Mobility: Walking and Moving Around Goal Status (): At least 60 percent but less than 80 percent impaired, limited or restricted    Dianna Calloway PTA  4/7/2018

## 2018-04-07 NOTE — PROGRESS NOTES
"Pharmacy Dosing Service  Pharmacokinetics  Vancomycin Follow-up Evaluation    Assessment/Action/Plan:  Ordered level for 4/8 @ 0000 prior to 4 th new dose after dose decrease from vancomycin 1750mg q24h to vancomycin 1500 mg q24h 2/2 to supra therapeutic level. Renal function stable, WBC trending down. Pharmacy will continue to monitor renal function and adjust dose accordingly.     Subjective:  Tamy Sher is a 87 y.o. female currently on Vancomycin 1500 mg IV every 24 hours for the treatment of SSTI, day 8 of therapy.    Objective:  Ht: 152.4 cm (60\"); Wt: 96.2 kg (212 lb)  Estimated Creatinine Clearance: 51.5 mL/min (by C-G formula based on SCr of 0.71 mg/dL).   Lab Results   Component Value Date    CREATININE 0.71 04/07/2018    CREATININE 0.76 04/06/2018    CREATININE 0.83 04/05/2018      Lab Results   Component Value Date    WBC 15.27 (H) 04/07/2018    WBC 16.15 (H) 04/06/2018    WBC 13.83 (H) 04/04/2018         Lab Results   Component Value Date    VANCOTROUGH 25.41 (H) 04/04/2018    VANCORANDOM 19.32 04/05/2018       Culture Results:  Microbiology Results (last 10 days)       Procedure Component Value - Date/Time    Urine Culture - Urine, Urine, Catheter [[id number]]  (Normal) Collected:  04/05/18 2158    Lab Status:  Final result Specimen:  Urine from Urine, Catheter Updated:  04/07/18 0745     Urine Culture No growth at 2 days    Urine Culture - Urine, Urine, Clean Catch [[id number]]  (Abnormal) Collected:  03/31/18 0520    Lab Status:  Final result Specimen:  Urine from Urine, Clean Catch Updated:  04/02/18 0826     Urine Culture --      <10,000 CFU/mL Gram Negative Bacilli (A)      <10,000 CFU/mL Mixed Gram Positive Manda (A)    Narrative:       Probable contaminant, suggest recollection.    Blood Culture - Blood, [[id number]]  (Normal) Collected:  03/30/18 2154    Lab Status:  Final result Specimen:  Blood from Arm, Left Updated:  04/05/18 0001     Blood Culture No growth at 5 days    Blood Culture - " Blood, [[id number]]  (Abnormal) Collected:  03/30/18 2153    Lab Status:  Final result Specimen:  Blood from Wrist, Left Updated:  04/01/18 0808     Blood Culture Abnormal Stain (A)      Mixed Gram Positive Manda (A)     Isolated from Aerobic and Anaerobic Bottles     Gram Stain Result Gram positive cocci    Narrative:       Probable contaminant    Blood Culture ID, PCR - Blood, [[id number]]  (Abnormal) Collected:  03/30/18 2153    Lab Status:  Final result Specimen:  Blood from Wrist, Left Updated:  03/31/18 1659     BCID, PCR Streptococcus spp, not A, B, or pneumoniae. Identification by BCID PCR. (C)     BCID, PCR 2 Staphylococcus spp, not aureus. Identification by BCID PCR. (C)    Wound Culture - Surgical Site, Knee, Right [[id number]]  (Abnormal)  (Susceptibility) Collected:  03/30/18 1903    Lab Status:  Final result Specimen:  Surgical Site from Knee, Right Updated:  04/01/18 0653     Wound Culture --      Heavy growth (4+) Staphylococcus aureus, MRSA (A)     BETA LACTAMASE Positive     Gram Stain Result Few (2+) WBCs seen      Few (2+) Gram positive cocci    Susceptibility        Staphylococcus aureus, MRSA     FERN     Clindamycin >=8 ug/ml Resistant     Erythromycin >=8 ug/ml Resistant     Gentamicin <=0.5 ug/ml Susceptible     Inducible Clindamycin Resistance NEG  Negative     Levofloxacin >=8 ug/ml Resistant     Oxacillin >=4 ug/ml Resistant     Penicillin G >=0.5 ug/ml Resistant     Tetracycline <=1 ug/ml Susceptible     Trimethoprim + Sulfamethoxazole <=10 ug/ml Susceptible     Vancomycin <=0.5 ug/ml Susceptible                              Jody Cat [facility]   04/07/18 11:03 AM

## 2018-04-08 ENCOUNTER — APPOINTMENT (OUTPATIENT)
Dept: GENERAL RADIOLOGY | Facility: HOSPITAL | Age: 83
End: 2018-04-08

## 2018-04-08 LAB
AMYLASE SERPL-CCNC: 48 U/L (ref 30–110)
ANION GAP SERPL CALCULATED.3IONS-SCNC: 7 MMOL/L (ref 4–13)
ANISOCYTOSIS BLD QL: ABNORMAL
BASOPHILS # BLD MANUAL: 0.14 10*3/MM3 (ref 0–0.2)
BASOPHILS NFR BLD AUTO: 1 % (ref 0–2)
BUN BLD-MCNC: 35 MG/DL (ref 5–21)
BUN/CREAT SERPL: 45.5 (ref 7–25)
CALCIUM SPEC-SCNC: 9.1 MG/DL (ref 8.4–10.4)
CHLORIDE SERPL-SCNC: 92 MMOL/L (ref 98–110)
CO2 SERPL-SCNC: 35 MMOL/L (ref 24–31)
CREAT BLD-MCNC: 0.77 MG/DL (ref 0.5–1.4)
DEPRECATED RDW RBC AUTO: 64.8 FL (ref 40–54)
EOSINOPHIL # BLD MANUAL: 0.87 10*3/MM3 (ref 0–0.7)
EOSINOPHIL NFR BLD MANUAL: 6.1 % (ref 0–7)
ERYTHROCYTE [DISTWIDTH] IN BLOOD BY AUTOMATED COUNT: 18.2 % (ref 12–15)
GFR SERPL CREATININE-BSD FRML MDRD: 71 ML/MIN/1.73
GLUCOSE BLD-MCNC: 83 MG/DL (ref 70–100)
GLUCOSE BLDC GLUCOMTR-MCNC: 128 MG/DL (ref 70–130)
GLUCOSE BLDC GLUCOMTR-MCNC: 166 MG/DL (ref 70–130)
GLUCOSE BLDC GLUCOMTR-MCNC: 170 MG/DL (ref 70–130)
GLUCOSE BLDC GLUCOMTR-MCNC: 76 MG/DL (ref 70–130)
HCT VFR BLD AUTO: 30.5 % (ref 37–47)
HGB BLD-MCNC: 10 G/DL (ref 12–16)
LIPASE SERPL-CCNC: 29 U/L (ref 23–203)
LYMPHOCYTES # BLD MANUAL: 1.73 10*3/MM3 (ref 0.8–7)
LYMPHOCYTES NFR BLD MANUAL: 12.1 % (ref 24–44)
LYMPHOCYTES NFR BLD MANUAL: 6.1 % (ref 0–12)
MACROCYTES BLD QL SMEAR: ABNORMAL
MCH RBC QN AUTO: 32.3 PG (ref 28–32)
MCHC RBC AUTO-ENTMCNC: 32.8 G/DL (ref 33–36)
MCV RBC AUTO: 98.4 FL (ref 82–98)
METAMYELOCYTES NFR BLD MANUAL: 2 % (ref 0–0)
MONOCYTES # BLD AUTO: 0.87 10*3/MM3 (ref 0–1)
NEUTROPHILS # BLD AUTO: 10.11 10*3/MM3 (ref 1–11)
NEUTROPHILS NFR BLD MANUAL: 66.7 % (ref 37–80)
NEUTS BAND NFR BLD MANUAL: 4 % (ref 0–5)
PLATELET # BLD AUTO: 520 10*3/MM3 (ref 130–400)
PMV BLD AUTO: 9.4 FL (ref 6–12)
POLYCHROMASIA BLD QL SMEAR: ABNORMAL
POTASSIUM BLD-SCNC: 3.3 MMOL/L (ref 3.5–5.3)
RBC # BLD AUTO: 3.1 10*6/MM3 (ref 4.2–5.4)
SMALL PLATELETS BLD QL SMEAR: ABNORMAL
SODIUM BLD-SCNC: 134 MMOL/L (ref 135–145)
VANCOMYCIN TROUGH SERPL-MCNC: 25.24 MCG/ML (ref 10–20)
VARIANT LYMPHS NFR BLD MANUAL: 2 % (ref 0–5)
WBC MORPH BLD: NORMAL
WBC NRBC COR # BLD: 14.3 10*3/MM3 (ref 4.8–10.8)

## 2018-04-08 PROCEDURE — 82962 GLUCOSE BLOOD TEST: CPT

## 2018-04-08 PROCEDURE — 25010000002 VANCOMYCIN 10 G RECONSTITUTED SOLUTION: Performed by: NURSE PRACTITIONER

## 2018-04-08 PROCEDURE — 63710000001 INSULIN DETEMIR PER 5 UNITS: Performed by: NURSE PRACTITIONER

## 2018-04-08 PROCEDURE — 25010000002 FUROSEMIDE PER 20 MG: Performed by: NURSE PRACTITIONER

## 2018-04-08 PROCEDURE — 83690 ASSAY OF LIPASE: CPT | Performed by: INTERNAL MEDICINE

## 2018-04-08 PROCEDURE — 94799 UNLISTED PULMONARY SVC/PX: CPT

## 2018-04-08 PROCEDURE — 85007 BL SMEAR W/DIFF WBC COUNT: CPT | Performed by: NURSE PRACTITIONER

## 2018-04-08 PROCEDURE — 74018 RADEX ABDOMEN 1 VIEW: CPT

## 2018-04-08 PROCEDURE — 80202 ASSAY OF VANCOMYCIN: CPT | Performed by: INTERNAL MEDICINE

## 2018-04-08 PROCEDURE — 85025 COMPLETE CBC W/AUTO DIFF WBC: CPT | Performed by: NURSE PRACTITIONER

## 2018-04-08 PROCEDURE — 63710000001 INSULIN LISPRO (HUMAN) PER 5 UNITS: Performed by: FAMILY MEDICINE

## 2018-04-08 PROCEDURE — 82150 ASSAY OF AMYLASE: CPT | Performed by: INTERNAL MEDICINE

## 2018-04-08 PROCEDURE — 80048 BASIC METABOLIC PNL TOTAL CA: CPT | Performed by: INTERNAL MEDICINE

## 2018-04-08 RX ORDER — POTASSIUM CHLORIDE 750 MG/1
20 CAPSULE, EXTENDED RELEASE ORAL DAILY
Status: DISCONTINUED | OUTPATIENT
Start: 2018-04-09 | End: 2018-04-13 | Stop reason: HOSPADM

## 2018-04-08 RX ORDER — LIDOCAINE 50 MG/G
2 PATCH TOPICAL
Status: DISCONTINUED | OUTPATIENT
Start: 2018-04-08 | End: 2018-04-13 | Stop reason: HOSPADM

## 2018-04-08 RX ORDER — POTASSIUM CHLORIDE 750 MG/1
40 CAPSULE, EXTENDED RELEASE ORAL ONCE
Status: COMPLETED | OUTPATIENT
Start: 2018-04-08 | End: 2018-04-08

## 2018-04-08 RX ORDER — ESCITALOPRAM OXALATE 10 MG/1
10 TABLET ORAL DAILY
Status: DISCONTINUED | OUTPATIENT
Start: 2018-04-08 | End: 2018-04-13 | Stop reason: HOSPADM

## 2018-04-08 RX ADMIN — INSULIN DETEMIR 20 UNITS: 100 INJECTION, SOLUTION SUBCUTANEOUS at 21:32

## 2018-04-08 RX ADMIN — IPRATROPIUM BROMIDE 0.5 MG: 0.5 SOLUTION RESPIRATORY (INHALATION) at 11:03

## 2018-04-08 RX ADMIN — APIXABAN 5 MG: 5 TABLET, FILM COATED ORAL at 09:07

## 2018-04-08 RX ADMIN — FOLIC ACID 1 MG: 1 TABLET ORAL at 09:07

## 2018-04-08 RX ADMIN — METOLAZONE 2.5 MG: 2.5 TABLET ORAL at 09:07

## 2018-04-08 RX ADMIN — ACETAMINOPHEN 650 MG: 325 TABLET, FILM COATED ORAL at 21:18

## 2018-04-08 RX ADMIN — COLLAGENASE SANTYL: 250 OINTMENT TOPICAL at 09:13

## 2018-04-08 RX ADMIN — ALBUTEROL SULFATE 1.25 MG: 2.5 SOLUTION RESPIRATORY (INHALATION) at 15:05

## 2018-04-08 RX ADMIN — NYSTATIN: 100000 POWDER TOPICAL at 09:13

## 2018-04-08 RX ADMIN — DILTIAZEM HYDROCHLORIDE 60 MG: 60 TABLET, FILM COATED ORAL at 05:06

## 2018-04-08 RX ADMIN — ASPIRIN 81 MG 81 MG: 81 TABLET ORAL at 09:06

## 2018-04-08 RX ADMIN — METOPROLOL SUCCINATE 50 MG: 50 TABLET, FILM COATED, EXTENDED RELEASE ORAL at 09:07

## 2018-04-08 RX ADMIN — VANCOMYCIN HYDROCHLORIDE 1500 MG: 10 INJECTION, POWDER, LYOPHILIZED, FOR SOLUTION INTRAVENOUS at 00:49

## 2018-04-08 RX ADMIN — DILTIAZEM HYDROCHLORIDE 60 MG: 60 TABLET, FILM COATED ORAL at 18:33

## 2018-04-08 RX ADMIN — ALBUTEROL SULFATE 1.25 MG: 2.5 SOLUTION RESPIRATORY (INHALATION) at 11:03

## 2018-04-08 RX ADMIN — DILTIAZEM HYDROCHLORIDE 60 MG: 60 TABLET, FILM COATED ORAL at 09:07

## 2018-04-08 RX ADMIN — GABAPENTIN 600 MG: 300 CAPSULE ORAL at 21:18

## 2018-04-08 RX ADMIN — TAMSULOSIN HYDROCHLORIDE 0.4 MG: 0.4 CAPSULE ORAL at 09:07

## 2018-04-08 RX ADMIN — DILTIAZEM HYDROCHLORIDE 60 MG: 60 TABLET, FILM COATED ORAL at 21:18

## 2018-04-08 RX ADMIN — LIDOCAINE 2 PATCH: 50 PATCH CUTANEOUS at 18:32

## 2018-04-08 RX ADMIN — ALBUTEROL SULFATE 1.25 MG: 2.5 SOLUTION RESPIRATORY (INHALATION) at 07:25

## 2018-04-08 RX ADMIN — LISINOPRIL 2.5 MG: 2.5 TABLET ORAL at 09:08

## 2018-04-08 RX ADMIN — ESCITSLOPRAM 10 MG: 10 TABLET ORAL at 18:32

## 2018-04-08 RX ADMIN — HYDROXYZINE HYDROCHLORIDE 25 MG: 25 TABLET ORAL at 21:19

## 2018-04-08 RX ADMIN — INSULIN LISPRO 2 UNITS: 100 INJECTION, SOLUTION INTRAVENOUS; SUBCUTANEOUS at 21:32

## 2018-04-08 RX ADMIN — POTASSIUM CHLORIDE 40 MEQ: 750 CAPSULE, EXTENDED RELEASE ORAL at 09:04

## 2018-04-08 RX ADMIN — ATORVASTATIN CALCIUM 20 MG: 10 TABLET, FILM COATED ORAL at 21:19

## 2018-04-08 RX ADMIN — NYSTATIN: 100000 POWDER TOPICAL at 21:23

## 2018-04-08 RX ADMIN — BUDESONIDE AND FORMOTEROL FUMARATE DIHYDRATE 2 PUFF: 160; 4.5 AEROSOL RESPIRATORY (INHALATION) at 19:24

## 2018-04-08 RX ADMIN — GABAPENTIN 300 MG: 300 CAPSULE ORAL at 09:06

## 2018-04-08 RX ADMIN — IPRATROPIUM BROMIDE 0.5 MG: 0.5 SOLUTION RESPIRATORY (INHALATION) at 07:25

## 2018-04-08 RX ADMIN — PANTOPRAZOLE SODIUM 40 MG: 40 TABLET, DELAYED RELEASE ORAL at 05:27

## 2018-04-08 RX ADMIN — BUMETANIDE 1 MG/HR: 0.25 INJECTION INTRAMUSCULAR; INTRAVENOUS at 18:32

## 2018-04-08 RX ADMIN — APIXABAN 5 MG: 5 TABLET, FILM COATED ORAL at 21:19

## 2018-04-08 RX ADMIN — FUROSEMIDE 60 MG: 10 INJECTION, SOLUTION INTRAMUSCULAR; INTRAVENOUS at 09:14

## 2018-04-08 RX ADMIN — LEVOTHYROXINE SODIUM 50 MCG: 50 TABLET ORAL at 05:27

## 2018-04-08 RX ADMIN — HYDROXYUREA 500 MG: 500 CAPSULE ORAL at 09:06

## 2018-04-08 RX ADMIN — ALBUTEROL SULFATE 1.25 MG: 2.5 SOLUTION RESPIRATORY (INHALATION) at 19:24

## 2018-04-08 RX ADMIN — DOCUSATE SODIUM 100 MG: 100 CAPSULE, LIQUID FILLED ORAL at 21:19

## 2018-04-08 RX ADMIN — BUDESONIDE AND FORMOTEROL FUMARATE DIHYDRATE 2 PUFF: 160; 4.5 AEROSOL RESPIRATORY (INHALATION) at 07:35

## 2018-04-08 RX ADMIN — IPRATROPIUM BROMIDE 0.5 MG: 0.5 SOLUTION RESPIRATORY (INHALATION) at 15:02

## 2018-04-08 RX ADMIN — DOCUSATE SODIUM 100 MG: 100 CAPSULE, LIQUID FILLED ORAL at 09:07

## 2018-04-08 RX ADMIN — IPRATROPIUM BROMIDE 0.5 MG: 0.5 SOLUTION RESPIRATORY (INHALATION) at 19:24

## 2018-04-08 NOTE — PROGRESS NOTES
" Pharmacy Dosing Service  Pharmacokinetics  Vancomycin Follow-up Evaluation     Assessment/Action/Plan:  Trough drawn ~24h post dose is supra therapeutic (=25.24). Pharmacy unable to evaluate level prior to next dose being administered. Decrease dose to vancomycin 1250 mg q24h to begin ~36h post last dose with random level ordered 1h prior ( 4/9 @ 1100, next dose scheduled 4/9 @ 1200). Renal function stable, WBC trending down. Pharmacy will continue to monitor renal function and adjust dose accordingly.      Subjective:  Tamy Sher is a 87 y.o. female currently on Vancomycin 1500 mg IV every 24 hours for the treatment of MRSA SSTI, day 9 of therapy.     Objective:  Ht: 152.4 cm (60\"); Wt: 94.3 kg (208 lb)  Estimated Creatinine Clearance: 50.8 mL/min (by C-G formula based on SCr of 0.77 mg/dL).         Lab Results   Component Value Date     CREATININE 0.77 04/08/2018     CREATININE 0.71 04/07/2018     CREATININE 0.76 04/06/2018            Lab Results   Component Value Date     WBC 14.30 (H) 04/08/2018     WBC 15.27 (H) 04/07/2018     WBC 16.15 (H) 04/06/2018               Lab Results   Component Value Date     VANCOTROUGH 25.24 (H) 04/08/2018     VANCORANDOM 19.32 04/05/2018         Culture Results:          Microbiology Results (last 10 days)      Procedure Component Value - Date/Time     Urine Culture - Urine, Urine, Catheter [[id number]]  (Normal) Collected:  04/05/18 2158     Lab Status:  Final result Specimen:  Urine from Urine, Catheter Updated:  04/07/18 0745       Urine Culture No growth at 2 days     Urine Culture - Urine, Urine, Clean Catch [[id number]]  (Abnormal) Collected:  03/31/18 0520     Lab Status:  Final result Specimen:  Urine from Urine, Clean Catch Updated:  04/02/18 0826       Urine Culture --         <10,000 CFU/mL Gram Negative Bacilli (A)         <10,000 CFU/mL Mixed Gram Positive Manda (A)     Narrative:        Probable contaminant, suggest recollection.     Blood Culture - Blood, " [[id number]]  (Normal) Collected:  03/30/18 2154     Lab Status:  Final result Specimen:  Blood from Arm, Left Updated:  04/05/18 0001       Blood Culture No growth at 5 days     Blood Culture - Blood, [[id number]]  (Abnormal) Collected:  03/30/18 2153     Lab Status:  Final result Specimen:  Blood from Wrist, Left Updated:  04/01/18 0808       Blood Culture Abnormal Stain (A)         Mixed Gram Positive Manda (A)       Isolated from Aerobic and Anaerobic Bottles       Gram Stain Result Gram positive cocci     Narrative:        Probable contaminant     Blood Culture ID, PCR - Blood, [[id number]]  (Abnormal) Collected:  03/30/18 2153     Lab Status:  Final result Specimen:  Blood from Wrist, Left Updated:  03/31/18 1659       BCID, PCR Streptococcus spp, not A, B, or pneumoniae. Identification by BCID PCR. (C)       BCID, PCR 2 Staphylococcus spp, not aureus. Identification by BCID PCR. (C)     Wound Culture - Surgical Site, Knee, Right [[id number]]  (Abnormal)  (Susceptibility) Collected:  03/30/18 1903     Lab Status:  Final result Specimen:  Surgical Site from Knee, Right Updated:  04/01/18 0653       Wound Culture --         Heavy growth (4+) Staphylococcus aureus, MRSA (A)       BETA LACTAMASE Positive       Gram Stain Result Few (2+) WBCs seen         Few (2+) Gram positive cocci             Susceptibility                Staphylococcus aureus, MRSA        FERN        Clindamycin >=8 ug/ml Resistant        Erythromycin >=8 ug/ml Resistant        Gentamicin <=0.5 ug/ml Susceptible        Inducible Clindamycin Resistance NEG  Negative        Levofloxacin >=8 ug/ml Resistant        Oxacillin >=4 ug/ml Resistant        Penicillin G >=0.5 ug/ml Resistant        Tetracycline <=1 ug/ml Susceptible        Trimethoprim + Sulfamethoxazole <=10 ug/ml Susceptible        Vancomycin <=0.5 ug/ml Susceptible

## 2018-04-08 NOTE — PLAN OF CARE
Problem: Patient Care Overview  Goal: Plan of Care Review   04/08/18 9006   OTHER   Outcome Summary patient c/o being uncomfortable and SOA at bedtime, called Dr Herndon, got an order for vistaril and tylenol, gave both, patient seemed to be resting comfortably throughout the night, O2 sat 90s on Bipap, IV Abx continue, dressing changes to R leg and foot done this am

## 2018-04-08 NOTE — PROGRESS NOTES
"    [facility] Medicine Services  INPATIENT PROGRESS NOTE    Length of Stay: 8  Date of Admission: 3/30/2018  Primary Care Physician: Barry Foley MD    Subjective   Chief Complaint: Follow-up volume overload  HPI   The patient is resting in bed with family at bedside. She tells me that she continues to feel miserable and she doesn't feel as though anything is helping except wearing the BiPAP. Upon my exam, she has just eaten and is wearing 4l/nasal cannula. She desaturates into the low 80's when conversing, but otherwise stays between 90-93%. She tells me that she is just old and tired and doesn't feel like doing anything. When I mention working with therapy, she states, \"please don't make me\". Her family tells me that she seems much more depressed in the last few days. She complains of the same band of pressure in her ribs, more so on the right side. She denies any nausea or vomiting. Her appetite is poor but she is drinking her Ensure.    Review of Systems   All pertinent negatives and positives are as above. All other systems have been reviewed and are negative unless otherwise stated.     Objective    Temp:  [97.6 °F (36.4 °C)-98 °F (36.7 °C)] 97.8 °F (36.6 °C)  Heart Rate:  [71-93] 85  Resp:  [14-24] 14  BP: (127-157)/(58-89) 147/82  FiO2 (%):  [30 %-35 %] 35 %  Physical Exam  Constitutional: She is oriented to person, place, and time. She appears well-developed and well-nourished. No distress.   Obese body habitus   HENT:   Head: Normocephalic and atraumatic.   Neck: Normal range of motion. Neck supple. No tracheal deviation present. No thyromegaly present.   Cardiovascular: Normal heart sounds and intact distal pulses.  Exam reveals no gallop and no friction rub.    No murmur heard.  Irregularly irregular.  Atrial fibrillation  per telemetry   Pulmonary/Chest: She has no wheezes. She has no rales.   Tachypnea with shallow respirations.  Accessory muscle usage. "  Diminished breath sounds in bilateral bases.  Abdominal: Soft. Bowel sounds are normal. She exhibits no distension and no mass. There is no tenderness. There is no guarding.   Musculoskeletal: She exhibits edema (2-3+ pitting to bilateral lower and upper extremities).   Generalized weakness   Neurological: She is alert and oriented to person, place, and time.   Skin: Skin is warm and dry. There is erythema (Right knee-much improved).   Right knee/hip dressings are clean/dry/intact   Psychiatric: She has a normal mood and affect. Her behavior is normal. Judgment and thought content normal.   Vitals reviewed.    Results Review:  I have reviewed the labs, radiology results, and diagnostic studies.    Laboratory Data:     Results from last 7 days  Lab Units 04/08/18  0622 04/07/18  0455 04/06/18  0518   WBC 10*3/mm3 14.30* 15.27* 16.15*   HEMOGLOBIN g/dL 10.0* 10.4* 10.5*   HEMATOCRIT % 30.5* 31.3* 31.7*   PLATELETS 10*3/mm3 520* 507* 533*        Results from last 7 days  Lab Units 04/08/18  0622 04/07/18  0612 04/06/18  0518   SODIUM mmol/L 134* 134* 133*   POTASSIUM mmol/L 3.3* 3.9 4.3   CHLORIDE mmol/L 92* 93* 92*   CO2 mmol/L 35.0* 34.0* 33.0*   BUN mg/dL 35* 34* 29*   CREATININE mg/dL 0.77 0.71 0.76   CALCIUM mg/dL 9.1 9.2 9.6   GLUCOSE mg/dL 83 89 110*     I have reviewed the patient current medications.     Assessment/Plan   Assessment:  1. Right knee postoperative wound dehiscence- wound culture MRSA positive  2. Recent closed right femur fracture s/p IM nailing 3/10   3. Leukocytosis, improving  4. Chronic diastolic heart failure with preserved ejection fraction  5. Insulin dependent Diabetes Mellitus, Type 2, hgb A1c 6.4  6. Atrial fibrillation (recent diagnosis)- chronically anticoagulated with Eliquis  7. Morbid obesity, BMI 40.4  8. Hypertension  9. Hyperlipidemia  10. Hyponatremia, stable  11. Macrocytic anemia  12. Thrombocytosis, likely reactive  13. Constipation-improved  14. Chronic obstructive  pulmonary disease   15. Acute on chronic hypoxic respiratory failure  16. Acute volume overload  17. Hypokalemia, mild    Plan:  1. Continue Vancomycin, with anticipated stop date of 4/15/18 per Dr. Mead  2. Replace potassium and add daily potassium supplementation  3. Repeat CXR in AM  4. Discontinue Lasix and place on Bumex drip  5. Will start Lexapro  6. Lidocaine patches to area under right breast causing pain  7. Continue Symbicort, Duonebs, and incentive spirometry. Continue supplemental oxygen/BiPAP as tolerated  8. Last blood glucoses- 107, 83, 76, 128. Decrease Levemir to 20 units nightly.  9. Continue physical/occupational therapy  10. Continue daily weight and strict intake and output  11. Labs in AM- CBC, BMP  12. Change to conditional code per living will  13. Will have palliative care come speak to patient and family tomorrow    Discharge Planning: I expect the patient to be discharged to SNF in ? days.    BULMARO Mcghee   04/08/18   1:44 PM   I personally evaluated and examined the patient in conjunction with BULMARO Fisher and agree with the assessment, treatment plan, and disposition of the patient as recorded by her.      I believe she has more room for diuresis.  Lasix doesn't seem to work very well (only about 2L off in the past 2 days) despite increasing the dose.   Agree with switch to bumex drip. Agree with adding antidepressant.  Follow volume and electrolyte status.  I ordered KUB as she complains of band like discomfort around her upper abd.  I also am checking amylase and lipase.  May need CT of abd/pelvis but I would prefer it will be with contrast however I am concern of her recent hx of STUART.  She had prior cholecystectomy; she has stranding of her SQ tissue of chest wall suggesting 3rd spacing of fluid on her CTA chest.  She has no rashes.  This symptom is chronic but as concern as she is with her family, I opted to revisit and order the above test.    Fidencio Rodriguez,  MD  04/08/18  2:39 PM

## 2018-04-08 NOTE — PLAN OF CARE
"Problem: Fall Risk (Adult)  Goal: Absence of Fall  Outcome: Ongoing (interventions implemented as appropriate)      Problem: Infection, Risk/Actual (Adult)  Goal: Infection Prevention/Resolution  Outcome: Ongoing (interventions implemented as appropriate)      Problem: Patient Care Overview  Goal: Plan of Care Review  Outcome: Ongoing (interventions implemented as appropriate)   04/07/18 2006   Coping/Psychosocial   Plan of Care Reviewed With patient   Plan of Care Review   Progress improving   OTHER   Outcome Summary Pt c/o \"feeling miserable\", pt remains SOA and on BIPAP through most of shift. cont pulse ox remains in palce with pt sats 90-95%. Pt on 4L o2 through meals. Pt drsg changed to R leg.       Problem: Skin Injury Risk (Adult)  Goal: Skin Health and Integrity  Outcome: Ongoing (interventions implemented as appropriate)      Problem: Wound (Includes Pressure Injury) (Adult)  Goal: Signs and Symptoms of Listed Potential Problems Will be Absent, Minimized or Managed (Wound)  Outcome: Ongoing (interventions implemented as appropriate)        "

## 2018-04-08 NOTE — PLAN OF CARE
Problem: Fall Risk (Adult)  Goal: Absence of Fall  Outcome: Ongoing (interventions implemented as appropriate)      Problem: Infection, Risk/Actual (Adult)  Goal: Infection Prevention/Resolution  Outcome: Ongoing (interventions implemented as appropriate)      Problem: Patient Care Overview  Goal: Plan of Care Review  Outcome: Ongoing (interventions implemented as appropriate)      Problem: Skin Injury Risk (Adult)  Goal: Skin Health and Integrity  Outcome: Ongoing (interventions implemented as appropriate)      Problem: Wound (Includes Pressure Injury) (Adult)  Goal: Signs and Symptoms of Listed Potential Problems Will be Absent, Minimized or Managed (Wound)  Outcome: Ongoing (interventions implemented as appropriate)

## 2018-04-09 ENCOUNTER — APPOINTMENT (OUTPATIENT)
Dept: GENERAL RADIOLOGY | Facility: HOSPITAL | Age: 83
End: 2018-04-09

## 2018-04-09 LAB
ANION GAP SERPL CALCULATED.3IONS-SCNC: 6 MMOL/L (ref 4–13)
ANISOCYTOSIS BLD QL: ABNORMAL
BASOPHILS # BLD MANUAL: 0.3 10*3/MM3 (ref 0–0.2)
BASOPHILS NFR BLD AUTO: 2 % (ref 0–2)
BUN BLD-MCNC: 36 MG/DL (ref 5–21)
BUN/CREAT SERPL: 47.4 (ref 7–25)
BURR CELLS BLD QL SMEAR: ABNORMAL
CALCIUM SPEC-SCNC: 9.2 MG/DL (ref 8.4–10.4)
CHLORIDE SERPL-SCNC: 91 MMOL/L (ref 98–110)
CLUMPED PLATELETS: PRESENT
CO2 SERPL-SCNC: 37 MMOL/L (ref 24–31)
CREAT BLD-MCNC: 0.76 MG/DL (ref 0.5–1.4)
DEPRECATED RDW RBC AUTO: 65.3 FL (ref 40–54)
ELLIPTOCYTES BLD QL SMEAR: ABNORMAL
EOSINOPHIL # BLD MANUAL: 1.49 10*3/MM3 (ref 0–0.7)
EOSINOPHIL NFR BLD MANUAL: 10 % (ref 0–4)
ERYTHROCYTE [DISTWIDTH] IN BLOOD BY AUTOMATED COUNT: 18.2 % (ref 12–15)
GFR SERPL CREATININE-BSD FRML MDRD: 72 ML/MIN/1.73
GLUCOSE BLD-MCNC: 71 MG/DL (ref 70–100)
GLUCOSE BLDC GLUCOMTR-MCNC: 104 MG/DL (ref 70–130)
GLUCOSE BLDC GLUCOMTR-MCNC: 120 MG/DL (ref 70–130)
GLUCOSE BLDC GLUCOMTR-MCNC: 123 MG/DL (ref 70–130)
GLUCOSE BLDC GLUCOMTR-MCNC: 170 MG/DL (ref 70–130)
GLUCOSE BLDC GLUCOMTR-MCNC: 81 MG/DL (ref 70–130)
HCT VFR BLD AUTO: 30.5 % (ref 37–47)
HGB BLD-MCNC: 10.2 G/DL (ref 12–16)
LYMPHOCYTES # BLD MANUAL: 2.23 10*3/MM3 (ref 0.72–4.86)
LYMPHOCYTES NFR BLD MANUAL: 15 % (ref 15–45)
LYMPHOCYTES NFR BLD MANUAL: 4 % (ref 4–12)
MACROCYTES BLD QL SMEAR: ABNORMAL
MCH RBC QN AUTO: 33.2 PG (ref 28–32)
MCHC RBC AUTO-ENTMCNC: 33.4 G/DL (ref 33–36)
MCV RBC AUTO: 99.3 FL (ref 82–98)
METAMYELOCYTES NFR BLD MANUAL: 1 % (ref 0–0)
MONOCYTES # BLD AUTO: 0.6 10*3/MM3 (ref 0.19–1.3)
NEUTROPHILS # BLD AUTO: 10.13 10*3/MM3 (ref 1.87–8.4)
NEUTROPHILS NFR BLD MANUAL: 68 % (ref 39–78)
PLATELET # BLD AUTO: 477 10*3/MM3 (ref 130–400)
PMV BLD AUTO: 9.6 FL (ref 6–12)
POIKILOCYTOSIS BLD QL SMEAR: ABNORMAL
POTASSIUM BLD-SCNC: 3.9 MMOL/L (ref 3.5–5.3)
RBC # BLD AUTO: 3.07 10*6/MM3 (ref 4.2–5.4)
SMALL PLATELETS BLD QL SMEAR: ABNORMAL
SODIUM BLD-SCNC: 134 MMOL/L (ref 135–145)
VANCOMYCIN SERPL-MCNC: 26.16 MCG/ML
WBC MORPH BLD: NORMAL
WBC NRBC COR # BLD: 14.89 10*3/MM3 (ref 4.8–10.8)

## 2018-04-09 PROCEDURE — 97110 THERAPEUTIC EXERCISES: CPT

## 2018-04-09 PROCEDURE — 82962 GLUCOSE BLOOD TEST: CPT

## 2018-04-09 PROCEDURE — 71045 X-RAY EXAM CHEST 1 VIEW: CPT

## 2018-04-09 PROCEDURE — 85025 COMPLETE CBC W/AUTO DIFF WBC: CPT | Performed by: NURSE PRACTITIONER

## 2018-04-09 PROCEDURE — 85007 BL SMEAR W/DIFF WBC COUNT: CPT | Performed by: NURSE PRACTITIONER

## 2018-04-09 PROCEDURE — 94799 UNLISTED PULMONARY SVC/PX: CPT

## 2018-04-09 PROCEDURE — 63710000001 INSULIN DETEMIR PER 5 UNITS: Performed by: NURSE PRACTITIONER

## 2018-04-09 PROCEDURE — 80202 ASSAY OF VANCOMYCIN: CPT | Performed by: INTERNAL MEDICINE

## 2018-04-09 PROCEDURE — 25010000002 VANCOMYCIN 10 G RECONSTITUTED SOLUTION: Performed by: INTERNAL MEDICINE

## 2018-04-09 PROCEDURE — 97530 THERAPEUTIC ACTIVITIES: CPT

## 2018-04-09 PROCEDURE — 80048 BASIC METABOLIC PNL TOTAL CA: CPT | Performed by: INTERNAL MEDICINE

## 2018-04-09 RX ORDER — SIMETHICONE 80 MG
80 TABLET,CHEWABLE ORAL
Status: DISCONTINUED | OUTPATIENT
Start: 2018-04-09 | End: 2018-04-10

## 2018-04-09 RX ORDER — GUAIFENESIN 600 MG/1
1200 TABLET, EXTENDED RELEASE ORAL EVERY 12 HOURS SCHEDULED
Status: DISCONTINUED | OUTPATIENT
Start: 2018-04-09 | End: 2018-04-11

## 2018-04-09 RX ORDER — VANCOMYCIN HYDROCHLORIDE 1 G/200ML
1000 INJECTION, SOLUTION INTRAVENOUS
Status: DISCONTINUED | OUTPATIENT
Start: 2018-04-11 | End: 2018-04-11

## 2018-04-09 RX ADMIN — BUDESONIDE AND FORMOTEROL FUMARATE DIHYDRATE 2 PUFF: 160; 4.5 AEROSOL RESPIRATORY (INHALATION) at 06:23

## 2018-04-09 RX ADMIN — IPRATROPIUM BROMIDE 0.5 MG: 0.5 SOLUTION RESPIRATORY (INHALATION) at 15:11

## 2018-04-09 RX ADMIN — GABAPENTIN 600 MG: 300 CAPSULE ORAL at 21:17

## 2018-04-09 RX ADMIN — DILTIAZEM HYDROCHLORIDE 60 MG: 60 TABLET, FILM COATED ORAL at 05:32

## 2018-04-09 RX ADMIN — ALBUTEROL SULFATE 1.25 MG: 2.5 SOLUTION RESPIRATORY (INHALATION) at 19:02

## 2018-04-09 RX ADMIN — POTASSIUM CHLORIDE 20 MEQ: 750 CAPSULE, EXTENDED RELEASE ORAL at 08:41

## 2018-04-09 RX ADMIN — LISINOPRIL 2.5 MG: 2.5 TABLET ORAL at 08:40

## 2018-04-09 RX ADMIN — VANCOMYCIN HYDROCHLORIDE 1250 MG: 10 INJECTION, POWDER, LYOPHILIZED, FOR SOLUTION INTRAVENOUS at 11:48

## 2018-04-09 RX ADMIN — DOCUSATE SODIUM 100 MG: 100 CAPSULE, LIQUID FILLED ORAL at 21:16

## 2018-04-09 RX ADMIN — SIMETHICONE 80 MG: 80 TABLET, CHEWABLE ORAL at 16:58

## 2018-04-09 RX ADMIN — SIMETHICONE 80 MG: 80 TABLET, CHEWABLE ORAL at 10:55

## 2018-04-09 RX ADMIN — ALBUTEROL SULFATE 1.25 MG: 2.5 SOLUTION RESPIRATORY (INHALATION) at 10:15

## 2018-04-09 RX ADMIN — DILTIAZEM HYDROCHLORIDE 60 MG: 60 TABLET, FILM COATED ORAL at 21:16

## 2018-04-09 RX ADMIN — INSULIN DETEMIR 15 UNITS: 100 INJECTION, SOLUTION SUBCUTANEOUS at 21:16

## 2018-04-09 RX ADMIN — GABAPENTIN 300 MG: 300 CAPSULE ORAL at 08:39

## 2018-04-09 RX ADMIN — ALBUTEROL SULFATE 1.25 MG: 2.5 SOLUTION RESPIRATORY (INHALATION) at 06:22

## 2018-04-09 RX ADMIN — NYSTATIN: 100000 POWDER TOPICAL at 21:21

## 2018-04-09 RX ADMIN — DOCUSATE SODIUM 100 MG: 100 CAPSULE, LIQUID FILLED ORAL at 08:41

## 2018-04-09 RX ADMIN — IPRATROPIUM BROMIDE 0.5 MG: 0.5 SOLUTION RESPIRATORY (INHALATION) at 06:23

## 2018-04-09 RX ADMIN — ESCITSLOPRAM 10 MG: 10 TABLET ORAL at 08:41

## 2018-04-09 RX ADMIN — APIXABAN 5 MG: 5 TABLET, FILM COATED ORAL at 08:39

## 2018-04-09 RX ADMIN — LIDOCAINE 2 PATCH: 50 PATCH CUTANEOUS at 10:55

## 2018-04-09 RX ADMIN — TAMSULOSIN HYDROCHLORIDE 0.4 MG: 0.4 CAPSULE ORAL at 08:41

## 2018-04-09 RX ADMIN — BUMETANIDE 1 MG/HR: 0.25 INJECTION INTRAMUSCULAR; INTRAVENOUS at 22:36

## 2018-04-09 RX ADMIN — FOLIC ACID 1 MG: 1 TABLET ORAL at 08:40

## 2018-04-09 RX ADMIN — METOPROLOL SUCCINATE 50 MG: 50 TABLET, FILM COATED, EXTENDED RELEASE ORAL at 08:41

## 2018-04-09 RX ADMIN — BUDESONIDE AND FORMOTEROL FUMARATE DIHYDRATE 2 PUFF: 160; 4.5 AEROSOL RESPIRATORY (INHALATION) at 19:02

## 2018-04-09 RX ADMIN — ASPIRIN 81 MG 81 MG: 81 TABLET ORAL at 08:40

## 2018-04-09 RX ADMIN — NYSTATIN: 100000 POWDER TOPICAL at 08:45

## 2018-04-09 RX ADMIN — METOLAZONE 2.5 MG: 2.5 TABLET ORAL at 08:41

## 2018-04-09 RX ADMIN — BUMETANIDE 1 MG/HR: 0.25 INJECTION INTRAMUSCULAR; INTRAVENOUS at 05:31

## 2018-04-09 RX ADMIN — HYDROXYUREA 500 MG: 500 CAPSULE ORAL at 08:39

## 2018-04-09 RX ADMIN — ALBUTEROL SULFATE 1.25 MG: 2.5 SOLUTION RESPIRATORY (INHALATION) at 15:11

## 2018-04-09 RX ADMIN — PANTOPRAZOLE SODIUM 40 MG: 40 TABLET, DELAYED RELEASE ORAL at 05:32

## 2018-04-09 RX ADMIN — IPRATROPIUM BROMIDE 0.5 MG: 0.5 SOLUTION RESPIRATORY (INHALATION) at 19:03

## 2018-04-09 RX ADMIN — SIMETHICONE 80 MG: 80 TABLET, CHEWABLE ORAL at 21:16

## 2018-04-09 RX ADMIN — GUAIFENESIN 1200 MG: 600 TABLET, EXTENDED RELEASE ORAL at 10:55

## 2018-04-09 RX ADMIN — APIXABAN 5 MG: 5 TABLET, FILM COATED ORAL at 21:16

## 2018-04-09 RX ADMIN — DILTIAZEM HYDROCHLORIDE 60 MG: 60 TABLET, FILM COATED ORAL at 16:58

## 2018-04-09 RX ADMIN — ATORVASTATIN CALCIUM 20 MG: 10 TABLET, FILM COATED ORAL at 21:16

## 2018-04-09 RX ADMIN — COLLAGENASE SANTYL: 250 OINTMENT TOPICAL at 08:43

## 2018-04-09 RX ADMIN — IPRATROPIUM BROMIDE 0.5 MG: 0.5 SOLUTION RESPIRATORY (INHALATION) at 10:15

## 2018-04-09 RX ADMIN — LEVOTHYROXINE SODIUM 50 MCG: 50 TABLET ORAL at 05:32

## 2018-04-09 RX ADMIN — GUAIFENESIN 1200 MG: 600 TABLET, EXTENDED RELEASE ORAL at 21:16

## 2018-04-09 RX ADMIN — DILTIAZEM HYDROCHLORIDE 60 MG: 60 TABLET, FILM COATED ORAL at 10:55

## 2018-04-09 NOTE — PLAN OF CARE
Problem: Fall Risk (Adult)  Goal: Absence of Fall  Outcome: Ongoing (interventions implemented as appropriate)      Problem: Infection, Risk/Actual (Adult)  Goal: Infection Prevention/Resolution  Outcome: Ongoing (interventions implemented as appropriate)      Problem: Patient Care Overview  Goal: Plan of Care Review  Outcome: Ongoing (interventions implemented as appropriate)   04/08/18 1939   Coping/Psychosocial   Plan of Care Reviewed With patient   Plan of Care Review   Progress no change   OTHER   Outcome Summary Pt cont. c/o feeling like band across chest d/t fluid. Pt generalized swelling remains, pt started on bumex gtt. Pt f/c remains in place with cath care complete. pt SOA on BIPAP other than while eating. Pt c/o no appetite and being miserable in general. Drsg changed to R knee, thigh and heel       Problem: Skin Injury Risk (Adult)  Goal: Skin Health and Integrity  Outcome: Ongoing (interventions implemented as appropriate)      Problem: Wound (Includes Pressure Injury) (Adult)  Goal: Signs and Symptoms of Listed Potential Problems Will be Absent, Minimized or Managed (Wound)  Outcome: Ongoing (interventions implemented as appropriate)

## 2018-04-09 NOTE — PLAN OF CARE
Problem: Fall Risk (Adult)  Goal: Absence of Fall  Outcome: Ongoing (interventions implemented as appropriate)      Problem: Infection, Risk/Actual (Adult)  Goal: Infection Prevention/Resolution  Outcome: Ongoing (interventions implemented as appropriate)      Problem: Patient Care Overview  Goal: Plan of Care Review  Outcome: Ongoing (interventions implemented as appropriate)   04/09/18 0424   Coping/Psychosocial   Plan of Care Reviewed With patient   Plan of Care Review   Progress no change   OTHER   Outcome Summary SOA continues, 4L O2 with meals, Bipap otherwise, Bumex drip started today, Afib with multiform PVCs continue, Cardizem continues, code status changed to conditional code today, chronic estrada continues, incontinent stool, turning       Problem: Skin Injury Risk (Adult)  Goal: Skin Health and Integrity  Outcome: Ongoing (interventions implemented as appropriate)

## 2018-04-09 NOTE — PLAN OF CARE
Problem: Fall Risk (Adult)  Goal: Absence of Fall  Outcome: Ongoing (interventions implemented as appropriate)      Problem: Infection, Risk/Actual (Adult)  Goal: Infection Prevention/Resolution  Outcome: Ongoing (interventions implemented as appropriate)      Problem: Patient Care Overview  Goal: Plan of Care Review  Outcome: Ongoing (interventions implemented as appropriate)   04/09/18 4206   Coping/Psychosocial   Plan of Care Reviewed With patient;family   Plan of Care Review   Progress no change   OTHER   Outcome Summary Alert and orientated. Perrla. Weakness. Right knee and hip changed per order. No complaints of pain. Safety maintained. Family at bedside. Up to neuro chair today. Bed alarm. Vss. Bumex drip in place. Iv vanco as ordered. Continue accu checks achs. Lidocaine patched under right breast as ordered.     Goal: Individualization and Mutuality  Outcome: Ongoing (interventions implemented as appropriate)    Goal: Discharge Needs Assessment  Outcome: Ongoing (interventions implemented as appropriate)    Goal: Interprofessional Rounds/Family Conf  Outcome: Ongoing (interventions implemented as appropriate)      Problem: Skin Injury Risk (Adult)  Goal: Skin Health and Integrity  Outcome: Ongoing (interventions implemented as appropriate)      Problem: Wound (Includes Pressure Injury) (Adult)  Goal: Signs and Symptoms of Listed Potential Problems Will be Absent, Minimized or Managed (Wound)  Outcome: Ongoing (interventions implemented as appropriate)

## 2018-04-09 NOTE — PROGRESS NOTES
"Pharmacy Dosing Service  Pharmacokinetics  Vancomycin Follow-up Evaluation    Assessment/Action/Plan:  Vancomycin level supratherapeutic (=26.16) approx 35 hrs post-dose; however, dose already given today prior to evaluating level.  Will hold vanco x 48 hrs, then adjust dose to 1000 mg IV Q36H for remainder of therapy (through 4/15/18).  Pharmacy will continue to monitor renal function and adjust dose accordingly.     Subjective:  Tamy Sher is a 87 y.o. female currently on Vancomycin 1250 mg IV every 24 hours for the treatment of SSTI (post-op wound infection - Rt. Knee), day 11 of therapy.    Objective:  Ht: 152.4 cm (60\"); Wt: 94.5 kg (208 lb 6.4 oz)  Estimated Creatinine Clearance: 50.9 mL/min (by C-G formula based on SCr of 0.76 mg/dL).   Lab Results   Component Value Date    CREATININE 0.76 04/09/2018    CREATININE 0.77 04/08/2018    CREATININE 0.71 04/07/2018      Lab Results   Component Value Date    WBC 14.89 (H) 04/09/2018    WBC 14.30 (H) 04/08/2018    WBC 15.27 (H) 04/07/2018         Lab Results   Component Value Date    VANCOTROUGH 25.24 (H) 04/08/2018    VANCORANDOM 26.16 04/09/2018       Culture Results:  Microbiology Results (last 10 days)       Procedure Component Value - Date/Time    Urine Culture - Urine, Urine, Catheter [[id number]]  (Normal) Collected:  04/05/18 2158    Lab Status:  Final result Specimen:  Urine from Urine, Catheter Updated:  04/07/18 0745     Urine Culture No growth at 2 days    Urine Culture - Urine, Urine, Clean Catch [[id number]]  (Abnormal) Collected:  03/31/18 0520    Lab Status:  Final result Specimen:  Urine from Urine, Clean Catch Updated:  04/02/18 0826     Urine Culture --      <10,000 CFU/mL Gram Negative Bacilli (A)      <10,000 CFU/mL Mixed Gram Positive Manda (A)    Narrative:       Probable contaminant, suggest recollection.    Blood Culture - Blood, [[id number]]  (Normal) Collected:  03/30/18 2154    Lab Status:  Final result Specimen:  Blood from Arm, Left " Updated:  04/05/18 0001     Blood Culture No growth at 5 days    Blood Culture - Blood, [[id number]]  (Abnormal) Collected:  03/30/18 2153    Lab Status:  Final result Specimen:  Blood from Wrist, Left Updated:  04/01/18 0808     Blood Culture Abnormal Stain (A)      Mixed Gram Positive Manda (A)     Isolated from Aerobic and Anaerobic Bottles     Gram Stain Result Gram positive cocci    Narrative:       Probable contaminant    Blood Culture ID, PCR - Blood, [[id number]]  (Abnormal) Collected:  03/30/18 2153    Lab Status:  Final result Specimen:  Blood from Wrist, Left Updated:  03/31/18 1659     BCID, PCR Streptococcus spp, not A, B, or pneumoniae. Identification by BCID PCR. (C)     BCID, PCR 2 Staphylococcus spp, not aureus. Identification by BCID PCR. (C)    Wound Culture - Surgical Site, Knee, Right [[id number]]  (Abnormal)  (Susceptibility) Collected:  03/30/18 1903    Lab Status:  Final result Specimen:  Surgical Site from Knee, Right Updated:  04/01/18 0653     Wound Culture --      Heavy growth (4+) Staphylococcus aureus, MRSA (A)     BETA LACTAMASE Positive     Gram Stain Result Few (2+) WBCs seen      Few (2+) Gram positive cocci    Susceptibility        Staphylococcus aureus, MRSA     FERN     Clindamycin >=8 ug/ml Resistant     Erythromycin >=8 ug/ml Resistant     Gentamicin <=0.5 ug/ml Susceptible     Inducible Clindamycin Resistance NEG  Negative     Levofloxacin >=8 ug/ml Resistant     Oxacillin >=4 ug/ml Resistant     Penicillin G >=0.5 ug/ml Resistant     Tetracycline <=1 ug/ml Susceptible     Trimethoprim + Sulfamethoxazole <=10 ug/ml Susceptible     Vancomycin <=0.5 ug/ml Susceptible                              Juliana Boggs RPH   04/09/18 5:08 PM

## 2018-04-09 NOTE — PLAN OF CARE
Problem: Patient Care Overview  Goal: Plan of Care Review  Outcome: Ongoing (interventions implemented as appropriate)   04/09/18 0170   Coping/Psychosocial   Plan of Care Reviewed With patient   Plan of Care Review   Progress no change   OTHER   Outcome Summary Pt deferred sitting EOB with therapy. Assisted nsg with transfer bed to neuro chair. Pt max of 2 assist for bed mobility. Will continue to benefit from therapy to increase strength and improve mobility.

## 2018-04-09 NOTE — CONSULTS
"Palliative Care Initial Consult   Attending Physician: Manda Andrew DO  Referring Provider: BULMARO Farooq/Fidencio Rodriguez MD    Reason for Referral:  assistance with clarification of goals of care and Patient and family support    Code Status: Conditional Code   Advanced Directives: Advance Directive Status: Patient has advance directive, copy in chart   Family/Support: Rhett leblanc at bedside   Goals of Care: TBD.    HPI:  87-year-old female with past medical history significant for stroke, diastolic dysfunction, COPD, peripheral vascular disease, atrial fibrillation on chronic anticoagulation, diabetes mellitus, hypertension, hyperlipidemia, osteoarthritis, morbid obesity, and lumbar spondylitis Palliative Care Spoke With: patient and family Patient reports prior to her femur fracture she was independent in all her ADLs and was able to shop, cook, and clean without difficulty. Patient had lived at home independently prior to hospitalization after femur fracture in March 2018.  After discharge patient was discharged to the [facility] for subacute rehabilitation with the plan to return home. Patient reports that \"it has been one thing after the other\" and she is feeling like she may not be able to return home again. Family report that she is getting \"down and depressed\". Patient reports that she is a little better than yesterday. She denies shortness of breath. Patient presented to [facility] on 3/30/2018 related to wound dehiscence and postsurgical wound infection that was found to be positive for methicillin-resistant Staphylococcus aureus per wound culture.  Patient had IM nailing for a periprosthetic right distal femur fracture 3 weeks prior to this hospitalization on 3/13/2018. Radiology imaging from 04/09/2018 revealed congestive heart failure with bilateral pleural effusions, left greater than right. Left basilar consolidation is present, which may represent " atelectasis. Pneumonia cannot entirely be excluded.  Blood and urine cultures are negative. Was consulted to assist with assistance with clarification of goals of care and Patient and family support    ROS:  Negative except as otherwise noted above.  History obtained from chart review, the patient and family      Past Medical History:   Diagnosis Date   • Constipation    • Diabetes mellitus    • Diarrhea    • Diastolic dysfunction, left ventricle    • Esophagitis    • Exertional shortness of breath    • Hyperlipidemia    • Hypertension    • Lumbar spondylitis    • Osteoarthritis    • Peripheral vascular disease    • Sinusitis    • Stroke      Past Surgical History:   Procedure Laterality Date   • FEMUR SUPRACONDYLAR FRACTURE REPAIR Right 3/10/2018    Procedure: FEMUR SUPRACONDYLAR NAIL;  Surgeon: Nima Bundy MD;  Location: [facility];  Service: Orthopedics   • GALLBLADDER SURGERY     • HYSTERECTOMY     • JOINT REPLACEMENT     • REPLACEMENT TOTAL KNEE Left      Social History     Social History   • Marital status:      Spouse name: N/A   • Number of children: N/A   • Years of education: N/A     Occupational History   • Not on file.     Social History Main Topics   • Smoking status: Never Smoker   • Smokeless tobacco: Never Used   • Alcohol use No   • Drug use: No   • Sexual activity: No     Other Topics Concern   • Not on file     Social History Narrative   • No narrative on file     Current Facility-Administered Medications   Medication Dose Route Frequency Provider Last Rate Last Dose   • acetaminophen (TYLENOL) tablet 650 mg  650 mg Oral Q6H PRN Rufino Herndon MD   650 mg at 04/08/18 2118   • albuterol (PROVENTIL) nebulizer solution 0.5% 2.5 mg/0.5mL  1.25 mg Nebulization 4x Daily - RT Latoya DOUG Emery, APRN   1.25 mg at 04/09/18 1015    And   • ipratropium (ATROVENT) nebulizer solution 0.5 mg  0.5 mg Nebulization 4x Daily - RT Latoya K Elizabetheltgagan, APRN   0.5 mg at 04/09/18 1015   • apixaban  (ELIQUIS) tablet 5 mg  5 mg Oral Q12H He Reich MD   5 mg at 04/09/18 0839   • aspirin chewable tablet 81 mg  81 mg Oral Daily He Reich MD   81 mg at 04/09/18 0840   • atorvastatin (LIPITOR) tablet 20 mg  20 mg Oral Nightly He Reich MD   20 mg at 04/08/18 2119   • budesonide-formoterol (SYMBICORT) 160-4.5 MCG/ACT inhaler 2 puff  2 puff Inhalation BID - RT He Reich MD   2 puff at 04/09/18 0623   • bumetanide (BUMEX) 10 mg in sodium chloride 0.9 % 100 mL (0.1 mg/mL) infusion  1 mg/hr Intravenous Continuous BULMARO Mcghee 10 mL/hr at 04/09/18 0531 1 mg/hr at 04/09/18 0531   • collagenase ointment   Topical Q24H Joseph Turner MD       • dextrose (D50W) solution 25 g  25 g Intravenous Q15 Min PRN He Reich MD   25 g at 04/02/18 0753   • dextrose (GLUTOSE) oral gel 15 g  15 g Oral Q15 Min PRN He Reich MD       • diltiaZEM (CARDIZEM) tablet 60 mg  60 mg Oral Q6H BULMARO Mcghee   60 mg at 04/09/18 0532   • docusate sodium (COLACE) capsule 100 mg  100 mg Oral BID Manda Andrew DO   100 mg at 04/09/18 0841   • escitalopram (LEXAPRO) tablet 10 mg  10 mg Oral Daily BULMARO Mcghee   10 mg at 04/09/18 0841   • folic acid (FOLVITE) tablet 1 mg  1 mg Oral Daily He Reich MD   1 mg at 04/09/18 0840   • gabapentin (NEURONTIN) capsule 300 mg  300 mg Oral Daily With Breakfast He Reich MD   300 mg at 04/09/18 0839   • gabapentin (NEURONTIN) capsule 600 mg  600 mg Oral Nightly He Reich MD   600 mg at 04/08/18 2118   • glucagon (human recombinant) (GLUCAGEN DIAGNOSTIC) injection 1 mg  1 mg Subcutaneous PRN He Reich MD       • guaiFENesin (MUCINEX) 12 hr tablet 1,200 mg  1,200 mg Oral Q12H BULMARO Mcghee       • hydroxyurea (HYDREA) capsule 500 mg  500 mg Oral Daily He Reich MD   500 mg at 04/09/18 0839   • hydrOXYzine (ATARAX) tablet 25 mg  25 mg Oral Q6H PRN Rufino Herndon MD   25 mg at  04/08/18 2119   • insulin detemir (LEVEMIR) injection 15 Units  15 Units Subcutaneous Nightly BULMARO Mcghee       • insulin lispro (humaLOG) injection 2-7 Units  2-7 Units Subcutaneous 4x Daily With Meals & Nightly He Reich MD   2 Units at 04/08/18 2132   • levothyroxine (SYNTHROID, LEVOTHROID) tablet 50 mcg  50 mcg Oral Q AM He Reich MD   50 mcg at 04/09/18 0532   • lidocaine (LIDODERM) 5 % 2 patch  2 patch Transdermal Q24H BULMARO Mgchee   2 patch at 04/08/18 1832   • lisinopril (PRINIVIL,ZESTRIL) tablet 2.5 mg  2.5 mg Oral Daily He Reich MD   2.5 mg at 04/09/18 0840   • magnesium citrate solution 296 mL  296 mL Oral Daily PRN Manda Andrew DO   296 mL at 03/31/18 1436   • metOLazone (ZAROXOLYN) tablet 2.5 mg  2.5 mg Oral Daily BULMARO Mcghee   2.5 mg at 04/09/18 0841   • metoprolol succinate XL (TOPROL-XL) 24 hr tablet 50 mg  50 mg Oral Daily He Reich MD   50 mg at 04/09/18 0841   • nystatin (MYCOSTATIN) powder   Topical Q12H Manda Andrew DO       • pantoprazole (PROTONIX) EC tablet 40 mg  40 mg Oral Q AM He Reich MD   40 mg at 04/09/18 0532   • potassium chloride (MICRO-K) CR capsule 20 mEq  20 mEq Oral Daily BULMARO Mcghee   20 mEq at 04/09/18 0841   • simethicone (MYLICON) chewable tablet 80 mg  80 mg Oral 4x Daily AC & at Bedtime BULMARO Mcghee       • sodium chloride 0.9 % flush 1-10 mL  1-10 mL Intravenous PRN He Reich MD   10 mL at 04/06/18 0027   • tamsulosin (FLOMAX) 24 hr capsule 0.4 mg  0.4 mg Oral Daily He Reich MD   0.4 mg at 04/09/18 0841   • vancomycin 1250 mg/250 mL 0.9% NS IVPB ([facility])  1,250 mg Intravenous Q24H Fidencio Rodriguez MD           bumetanide 1 mg/hr Last Rate: 1 mg/hr (04/09/18 0531)     •  acetaminophen  •  dextrose  •  dextrose  •  glucagon (human recombinant)  •  hydrOXYzine  •  magnesium citrate  •  sodium chloride  No Known Allergies    Current medication  "reviewed for route, type, dose and frequency and are current per MAR at time of dictation.      /80 (BP Location: Left arm, Patient Position: Lying)   Pulse 77   Temp 98 °F (36.7 °C) (Axillary)   Resp 18   Ht 152.4 cm (60\")   Wt 94.5 kg (208 lb 6.4 oz)   SpO2 94%   BMI 40.70 kg/m²     Intake/Output Summary (Last 24 hours) at 04/09/18 1023  Last data filed at 04/09/18 0300   Gross per 24 hour   Intake                0 ml   Output             2250 ml   Net            -2250 ml         Labs:     Results from last 7 days  Lab Units 04/09/18  0603   WBC 10*3/mm3 14.89*   HEMOGLOBIN g/dL 10.2*   HEMATOCRIT % 30.5*   PLATELETS 10*3/mm3 477*       Results from last 7 days  Lab Units 04/09/18  0603   SODIUM mmol/L 134*   POTASSIUM mmol/L 3.9   CHLORIDE mmol/L 91*   CO2 mmol/L 37.0*   BUN mg/dL 36*   CREATININE mg/dL 0.76   CALCIUM mg/dL 9.2   GLUCOSE mg/dL 71         pH No results found for: PHART   pO2 No results found for: PO2ART   pCO2 No results found for: QKH0JOV   HCO3 No results found for: ZXW2LSG   METRIC,MODALITY,FIO2)@  Imaging Results (last 72 hours)     Procedure Component Value Units Date/Time    XR Chest 1 View [[id number]] Collected:  04/09/18 0708     Updated:  04/09/18 0713    Narrative:       EXAMINATION: XR CHEST 1 VW- 4/9/2018 7:08 AM CDT     HISTORY: Follow-up volume overload; T81.4XXA-Infection following a  procedure, initial encounter; R26.9-Unspecified abnormalities of gait  and mobility; S72.491A-Other fracture of lower end of right femur,  initial encounter for closed fracture; Z74.09-Other reduced mobility;  Z74.09-Other reduced mobility.     REPORT: Comparison is made with the study from 4/6/2018.     There are persistent and diffuse hazy airspace opacities bilaterally,  with partial consolidation of the left base and small pleural effusions  greater on the left. No pneumothorax is identified. There is mild  cardiomegaly. Overall, there is no significant change.       Impression:       " Congestive heart failure with bilateral pleural effusions  left greater than right. Left basilar consolidation is present, which  may represent atelectasis. Pneumonia cannot entirely be excluded.  This report was finalized on 04/09/2018 07:10 by Dr. Addison Rivero MD.    XR Abdomen KUB [[id number]] Collected:  04/08/18 1649     Updated:  04/08/18 1654    Narrative:       XR ABDOMEN KUB- 4/8/2018 4:38 PM CDT     HISTORY: abdominal pain; T81.4XXA-Infection following a procedure,  initial encounter; R26.9-Unspecified abnormalities of gait and mobility;  S72.491A-Other fracture of lower end of right femur, initial encounter  for closed fracture; Z74.09-Other reduced mobility; Z74.09-Other reduced  mobility       COMPARISON: None     FINDINGS:  Prominent gaseous distention of the colon. The small bowel is relatively  decompressed. No pathologic calcification or organomegaly is visualized.     Evaluation for free intraperitoneal air is limited on a supine  radiograph, but there are no definite findings of free intraperitoneal  air. Incidentally noted splenic artery calcification and cholecystectomy  clips. Alves catheter also identified.     The osseous structures demonstrate no acute abnormality.        Impression:       1. Prominent gaseous distention of the colon. Overall nonobstructive  bowel gas pattern.         This report was finalized on 04/08/2018 16:51 by Dr Franck Pretty, .    XR Chest 1 View [[id number]] Collected:  04/06/18 1155     Updated:  04/06/18 1200    Narrative:       EXAMINATION:  XR CHEST 1 VW-  4/6/2018 11:22 AM CDT     HISTORY: Follow-up volume overload, shortness of breath, worsening  hypoxia and leukocytosis.     COMPARISON: 4/3/2018.     FINDINGS:  There is slight worsening of bilateral infiltrate. There is  cardiomegaly with vascular redistribution. There are small pleural  effusions bilaterally.       Impression:       Congestive heart failure. The edema pattern may be slightly  worse.         This report was finalized on 04/06/2018 11:56 by Dr. Eduard Rodriguez MD.          Diagnostics: Reviewed    Patient Active Problem List   Diagnosis   • Ischemic chest pain   • Pneumonia of both lungs due to infectious organism   • Fall   • Closed displaced comminuted fracture of shaft of right femur   • Pain of right thigh   • Class 2 obesity with serious comorbidity in adult   • Type 2 diabetes mellitus with neurologic complication, with long-term current use of insulin   • STUART (acute kidney injury)   • Chronic diastolic heart failure   • Pleural effusion, left   • Hypoxia   • Surgical wound infection, initial encounter       Physical Exam:    General Appearance: alert, appears stated age, fatigued, cooperative, morbidly obese and no apparrent acute distress observed.  Head: normocephalic, without obvious abnormality and atraumatic  Eyes: lids and lashes normal, conjunctivae and sclerae normal, no icterus, no pallor, corneas clear and PERRLA  Throat: no oral lesions, no thrush and oral mucosa moist  Lungs: clear to auscultation, respirations regular, respirations even and respirations unlabored  Heart: irregular  Abdomen: normal bowel sounds, soft non-tender and no guarding, protuberant  Extremities: moves extremities well and no cyanosis, edema 1+ lower bilateral and venous status dermatitis  Pulses: posteria tibial decreased bilateral  Skin: temperature normal, skin thickening and discoloration bilateral lower extremities, surgical wound to above and below right knee bandage in place below the knee  Neurologic: Mental Status orientated to person, place, time and situation, Speech normal content and execusion, Reflexes normal and symmetric  Mood-depressed        Impression/Problem List:  Palliative Care Topics Discussed: palliative care, goals of care, care options and discharge options     1. Congestive heart failure, diastolic dysfunction, LVEF 55%, 4/9/2018; cardiomegaly, moderate, per CT 04/03/2018  2. Pleural  effusions-bilateral, left greater than right  3. Mitral value regurgitation-mild  4. Coronary artery disease-extensive, LAD, circumflex, and RCA, per CT angiogram on 04/03/2018  5. Anasarca?-per CT, thickening of the skin and stranding within the subcutaneous tissues   6. peripheral vascular disease  7. diabetes mellitus  8. Hypertension  9. Hyperlipidemia   10. Osteoarthritis  11. lumbar spondylitis  12. Post-surgical infection-MRSA    Patient status: Disease state: Controlled with current treatments.  Functional status: Palliative Performance Scale Score: Performance 40% based on the following measures: Ambulation: Mainly in bed, Activity and Evidence of Disease: Unable to do any work, extensive evidence of disease, Self-Care: Mainly assistance required,  Intake: Normal or reduced, LOC: Full, drowsy or confusion   Mental status: alert, oriented and depressed.  Nutritional status: no nutritional compromise at this time. Body mass index is 40.7 kg/m².  Psychosocial issues: none identified.  Family support: The patient receives support from her children..  POA/Healthcare surrogate-advance directive on file    Recommendations/Plan:  1. plan: Goals of care include CODE STATUS Aggressive DNR. Patient reports her current code status is in line with her goals of care at this time. She reports she has also completed a living will and her four children are aware of her wishes. We reviewed and initiated the medical orders for scope of treatment (MOST) form, again in line with the patient's current goals of care, to ensure her continuity of care. Patient and family are aware this form will need to be signed by the physician prior to her discharge.        Thank you for this consult and allowing us to participate in patient's plan of care. Palliative Care Team will continue to follow patient as needed. Please call if there are symptom management needs.     Time spent: 90 minutes spent reviewing medical and medication records,  assessing and examining patient, discussing with family, answering questions, providing some guidance about a plan and documentation of care, and coordinating care with other healthcare members, with > 50% time spent face to face.     Yessy Peterson, APRN  4/9/2018

## 2018-04-09 NOTE — PROGRESS NOTES
Continued Stay Note   [place]     Patient Name: Tamy Sher  MRN: [id number]  Today's Date: 4/9/2018    Admit Date: 3/30/2018          Discharge Plan     Row Name 04/09/18 1605       Plan    Plan [facility] [place]    Patient/Family in Agreement with Plan yes    Plan Comments SW continuing to follow for patient to be ready for discharge to [facility].                 CORTES Mcnamara

## 2018-04-09 NOTE — PROGRESS NOTES
[facility] ER Medicine Services  INPATIENT PROGRESS NOTE    Length of Stay: 9  Date of Admission: 3/30/2018  Primary Care Physician: Barry Foley MD    Subjective   Chief Complaint: Follow-up volume overload  HPI   The patient is resting in bed with her sons at bedside.  She tells me that she really does not know if she feels better or not today.  She is still having shortness of breath, but is tolerating nasal cannula better today.  She is starting to have a productive cough with clear/tan sputum.  She denies any chest pain.  She still has upper abdominal pain, but states it is a little bit better today.  She did have a bowel movement this morning.  She actually seems in a little better spirits today.    Review of Systems   All pertinent negatives and positives are as above. All other systems have been reviewed and are negative unless otherwise stated.     Objective    Temp:  [97.3 °F (36.3 °C)-98.2 °F (36.8 °C)] 98 °F (36.7 °C)  Heart Rate:  [66-93] 86  Resp:  [14-22] 18  BP: (113-156)/(66-83) 156/80  FiO2 (%):  [35 %] 35 %  Physical Exam  Constitutional: She is oriented to person, place, and time. She appears well-developed and well-nourished. No distress.   Obese body habitus   HENT:   Head: Normocephalic and atraumatic.   Neck: Normal range of motion. Neck supple. No tracheal deviation present. No thyromegaly present.   Cardiovascular: Normal heart sounds and intact distal pulses.  Exam reveals no gallop and no friction rub.    No murmur heard.  Irregularly irregular.  Atrial fibrillation 54-93 per telemetry   Pulmonary/Chest: She has no wheezes. She has no rales.    shallow respirations.  Diminished breath sounds in bilateral bases, but also difficult to assess due to body habitus  Abdominal: Soft. Bowel sounds are normal. She exhibits no distension and no mass. There is no tenderness. There is no guarding.   Musculoskeletal: She exhibits edema (2+ pitting to bilateral  lower and upper extremities-looks improved from yesterday and skin is starting to wrinkle).   Generalized weakness   Neurological: She is alert and oriented to person, place, and time.   Skin: Skin is warm and dry. There is erythema (Right knee-much improved, almost resolved).   Right knee/hip dressings are clean/dry/intact   Psychiatric: She has a normal mood and affect. Her behavior is normal. Judgment and thought content normal.   Vitals reviewed.    Results Review:  I have reviewed the labs, radiology results, and diagnostic studies.    Laboratory Data:     Results from last 7 days  Lab Units 04/09/18  0603 04/08/18 0622 04/07/18  0455   WBC 10*3/mm3 14.89* 14.30* 15.27*   HEMOGLOBIN g/dL 10.2* 10.0* 10.4*   HEMATOCRIT % 30.5* 30.5* 31.3*   PLATELETS 10*3/mm3 477* 520* 507*       Results from last 7 days  Lab Units 04/09/18  0603 04/08/18 0622 04/07/18  0612   SODIUM mmol/L 134* 134* 134*   POTASSIUM mmol/L 3.9 3.3* 3.9   CHLORIDE mmol/L 91* 92* 93*   CO2 mmol/L 37.0* 35.0* 34.0*   BUN mg/dL 36* 35* 34*   CREATININE mg/dL 0.76 0.77 0.71   CALCIUM mg/dL 9.2 9.1 9.2   GLUCOSE mg/dL 71 83 89     Radiology Data:   Imaging Results (last 24 hours)     Procedure Component Value Units Date/Time    XR Chest 1 View [[id number]] Collected:  04/09/18 0708     Updated:  04/09/18 0713    Narrative:       EXAMINATION: XR CHEST 1 VW- 4/9/2018 7:08 AM CDT     HISTORY: Follow-up volume overload; T81.4XXA-Infection following a  procedure, initial encounter; R26.9-Unspecified abnormalities of gait  and mobility; S72.491A-Other fracture of lower end of right femur,  initial encounter for closed fracture; Z74.09-Other reduced mobility;  Z74.09-Other reduced mobility.     REPORT: Comparison is made with the study from 4/6/2018.     There are persistent and diffuse hazy airspace opacities bilaterally,  with partial consolidation of the left base and small pleural effusions  greater on the left. No pneumothorax is identified. There is  mild  cardiomegaly. Overall, there is no significant change.       Impression:       Congestive heart failure with bilateral pleural effusions  left greater than right. Left basilar consolidation is present, which  may represent atelectasis. Pneumonia cannot entirely be excluded.  This report was finalized on 04/09/2018 07:10 by Dr. Addison Rivero MD.    XR Abdomen KUB [[id number]] Collected:  04/08/18 1649     Updated:  04/08/18 1654    Narrative:       XR ABDOMEN KUB- 4/8/2018 4:38 PM CDT     HISTORY: abdominal pain; T81.4XXA-Infection following a procedure,  initial encounter; R26.9-Unspecified abnormalities of gait and mobility;  S72.491A-Other fracture of lower end of right femur, initial encounter  for closed fracture; Z74.09-Other reduced mobility; Z74.09-Other reduced  mobility       COMPARISON: None     FINDINGS:  Prominent gaseous distention of the colon. The small bowel is relatively  decompressed. No pathologic calcification or organomegaly is visualized.     Evaluation for free intraperitoneal air is limited on a supine  radiograph, but there are no definite findings of free intraperitoneal  air. Incidentally noted splenic artery calcification and cholecystectomy  clips. Alves catheter also identified.     The osseous structures demonstrate no acute abnormality.        Impression:       1. Prominent gaseous distention of the colon. Overall nonobstructive  bowel gas pattern.         This report was finalized on 04/08/2018 16:51 by Dr Franck Pretty, .        I have reviewed the patient current medications.     Assessment/Plan   Assessment:  1. Right knee postoperative wound dehiscence- wound culture MRSA positive  2. Recent closed right femur fracture s/p IM nailing 3/10   3. Leukocytosis, stable  4. Chronic diastolic heart failure with preserved ejection fraction  5. Insulin dependent Diabetes Mellitus, Type 2, hgb A1c 6.4  6. Atrial fibrillation (recent diagnosis)- chronically anticoagulated with  Eliquis  7. Morbid obesity, BMI 40.4  8. Hypertension  9. Hyperlipidemia  10. Hyponatremia, stable  11. Macrocytic anemia  12. Thrombocytosis, likely reactive  13. Constipation-improved  14. Chronic obstructive pulmonary disease   15. Acute on chronic hypoxic respiratory failure  16. Acute volume overload  17. Hypokalemia, mild. Improved     Plan:  1. Continue Vancomycin, with anticipated stop date of 4/15/18 per Dr. Jaquez  2. Continue metolazone and Bumex drip, patient's net output -2250 yesterday, seems to be diuresing better than with Lasix. Continue strict intake and output and daily weights.  Renal function and electrolytes are stable.  3. KUB yesterday shows prominent gaseous distention of the colon. Do not think that CT is warranted at this time but can be considered for worsening symptoms. Amylase/lipase normal.  Will add simethicone before meals.  4. Continue Symbicort, Duonebs, incentive spirometry, supplemental oxygen/BiPAP as tolerated.  We will also add Mucinex  5. Last blood glucoses- 166, 170, 71. Will further decrease Levemir to 15 units  6. Palliative care to see patient and family today to discuss goals of care  7.  Repeat chest x-ray today shows no significant change from x-ray on 04/06/2018.  There is a left basilar consolidation present, which represents atelectasis versus pneumonia.  Have encouraged patient to use incentive spirometer.  Her leukocytosis is stable, and she is afebrile so we will hold off on any additional antibiotic treatment at this time and monitor her closely.  8.  Tylenol and lidocaine patches for pain.  Patient states she really does not want to take anything stronger.  9.  Continue physical/occupational therapy with much encouragement  10.  Labs in a.m.-CBC, BMP    Discharge Planning: I expect the patient to be discharged to SNF in ? days.    Latoya Emery, APRN   04/09/18   8:37 AM     Chart reviewed.   Patient examined.   Agree with assessment and plan.   Manda  Ne Andrew DO  04/09/18  4:42 PM

## 2018-04-09 NOTE — THERAPY TREATMENT NOTE
Acute Care - Physical Therapy Treatment Note  [facility]     Patient Name: Tamy Sher  : 1930  MRN: [id number]  Today's Date: 2018  Onset of Illness/Injury or Date of Surgery: 18  Date of Referral to PT: 18  Referring Physician: Dr. Andrew    Admit Date: 3/30/2018    Visit Dx:    ICD-10-CM ICD-9-CM   1. Surgical wound infection, initial encounter T81.4XXA 998.59   2. Abnormality of gait and mobility R26.9 781.2   3. Other closed fracture of distal end of right femur, initial encounter S72.491A 821.29   4. Impaired mobility and ADLs Z74.09 799.89   5. Impaired mobility Z74.09 799.89     Patient Active Problem List   Diagnosis   • Ischemic chest pain   • Pneumonia of both lungs due to infectious organism   • Fall   • Closed displaced comminuted fracture of shaft of right femur   • Pain of right thigh   • Class 2 obesity with serious comorbidity in adult   • Type 2 diabetes mellitus with neurologic complication, with long-term current use of insulin   • STUART (acute kidney injury)   • Chronic diastolic heart failure   • Pleural effusion, left   • Hypoxia   • Surgical wound infection, initial encounter       Therapy Treatment    Therapy Treatment / Health Promotion    Treatment Time/Intention  Discipline: physical therapy assistant (18 1451 : Yolie Francis PTA)  Document Type: therapy note (daily note) (18 1451 : Yolie Francis PTA)  Subjective Information: complains of, fatigue, weakness, nausea/vomiting (18 1451 : Yolie Francis PTA)  Patient Effort: adequate (18 1137 : Dianna Calloway PTA)  Existing Precautions/Restrictions: fall, non-weight bearing (18 1451 : Yolie Francis PTA)  Treatment Considerations/Comments: NWB RLE (18 1451 : Yolie Francis PTA)  Plan of Care Review  Plan of Care Reviewed With: patient (18 1332 : Dianna Calloway PTA)    Vitals/Pain/Safety  Vital Signs  O2 Delivery Pre Treatment: supplemental O2 (5L) (18 1451 :  Yolie Francis PTA)  Pain Assessment  Additional Documentation: Pain Scale: Numbers Pre/Post-Treatment (Group) (04/09/18 1451 : Yolie Francis PTA)  Pain Scale: Numbers Pre/Post-Treatment  Pain Scale: Numbers, Pretreatment: 0/10 - no pain (04/09/18 1451 : Yolie Francis PTA)  Pain Scale: FACES Pre/Post-Treatment  Pain: FACES Scale, Pretreatment: 0-->no hurt (04/09/18 1137 : Dianna Calloway PTA)  Safety Issues, Functional Mobility  Impairments Affecting Function (Mobility): pain, strength, endurance/activity tolerance (04/09/18 1137 : Dianna Calloway PTA)  Positioning and Restraints  Pre-Treatment Position: sitting in chair/recliner (04/09/18 1451 : Yolie Francis PTA)  Post Treatment Position: chair (04/09/18 1451 : Yolie Francis PTA)  In Chair: reclined, call light within reach, encouraged to call for assist, with family/caregiver (04/09/18 1451 : Yolie Francis PTA)    Mobility,ADL,Motor, Modality  Mobility Assessment/Intervention  Extremity Weight-bearing Status: right lower extremity (04/09/18 1451 : Yolie Francis PTA)  Right Lower Extremity (Weight-bearing Status): non weight-bearing (NWB) (04/09/18 1451 : Yolie Francis PTA)  Bed Mobility Assessment/Treatment  Rolling Left Walsh (Bed Mobility): maximum assist (25% patient effort), 2 person assist (04/09/18 1137 : Dianna Calloway PTA)  Rolling Right Walsh (Bed Mobility): maximum assist (25% patient effort), 2 person assist (04/09/18 1137 : Dianna Calloway PTA)  Scooting/Bridging Walsh (Bed Mobility): maximum assist (25% patient effort), dependent (less than 25% patient effort), 2 person assist (04/09/18 1137 : Dianna Calloway PTA)  Assistive Device (Bed Mobility): draw sheet (04/09/18 1137 : Dianna Calloway PTA)  Comment (Bed Mobility): in neuro chair (04/09/18 1451 : Yolie Francis PTA)  Transfer Assessment/Treatment  Comment (Transfers): Pt deferred sitting EOB. Assisted nsg to transfer pt to neuro  chair. (04/09/18 1137 : Dianna Calloway PTA)     Therapeutic Exercise  Comment (Therapeutic Exercise): CLEO AAROM X 20 (04/09/18 1451 : Yolie Francis PTA)           ROM/MMT  General ROM  GENERAL ROM COMMENTS: CLEO AAROM x 20  (04/09/18 1451 : Yolie Francis PTA)          Sensory, Edema, Orthotics          Cognition, Communication, Swallow  Cognitive Assessment/Intervention- PT/OT  Personal Safety Interventions: elopement precautions initiated, fall prevention program maintained, gait belt, nonskid shoes/slippers when out of bed (04/09/18 1137 : Dianna Calloway PTA)    Outcome Summary               PT Rehab Goals     Row Name 04/01/18 0900             Bed Mobility Goal 1 (PT)    Activity/Assistive Device (Bed Mobility Goal 1, PT) sit to supine/supine to sit  -TB      [place] Level/Cues Needed (Bed Mobility Goal 1, PT) minimum assist (75% or more patient effort)  -TB      Time Frame (Bed Mobility Goal 1, PT) long term goal (LTG);by discharge  -TB      Barriers (Bed Mobility Goal 1, PT) physical  -TB      Progress/Outcomes (Bed Mobility Goal 1, PT) good progress toward goal  -TB         Transfer Goal 1 (PT)    Activity/Assistive Device (Transfer Goal 1, PT) wheelchair transfer   with sliding board  -TB      [place] Level/Cues Needed (Transfer Goal 1, PT) minimum assist (75% or more patient effort)  -TB      Time Frame (Transfer Goal 1, PT) long term goal (LTG);by discharge  -TB      Barriers (Transfers Goal 1, PT) physical  -TB      Progress/Outcome (Transfer Goal 1, PT) good progress toward goal  -TB         Patient Education Goal (PT)    Activity (Patient Education Goal, PT) Knowledgable of precautions of right leg and HEP for muscle contraction  -TB      [place]/Cues/Accuracy (Memory Goal 2, PT) demonstrates adequately  -TB      Time Frame (Patient Education Goal, PT) long term goal (LTG);by discharge  -TB      Barriers (Patient Education Goal, PT) physical  -TB      Progress/Outcome (Patient  Education Goal, PT) continuing progress toward goal  -TB        User Key  (r) = Recorded By, (t) = Taken By, (c) = Cosigned By    Initials Name Provider Type    TB Tay Lau, PT Physical Therapist          Physical Therapy Education     Title: PT OT SLP Therapies (Active)     Topic: Physical Therapy (Active)     Point: Mobility training (Done)    Learning Progress Summary     Learner Status Readiness Method Response Comment Documented by    Patient Done Acceptance E VU,NR Plan of care, benefits of activity, bed mobility  04/09/18 1331     Active Acceptance E,D NR benefit of activity, bed mobility, exercise, plan of care  04/07/18 1130     Done Acceptance E VU,NR   04/06/18 1348     Done Acceptance E VU,NR progression of POC  04/05/18 1203     Done Acceptance E VU,NR benefits of activity  04/04/18 1104     Active Acceptance E,D NR bed mobility, exercise, plan of care, benefits of activity  04/03/18 1027     Active Acceptance E,D NR bed mobility, exercise, plan of care  04/02/18 1327     Active Acceptance E NR   04/01/18 0941          Point: Home exercise program (Active)    Learning Progress Summary     Learner Status Readiness Method Response Comment Documented by    Patient Active Acceptance E,D NR bed mobility, exercise, plan of care, benefits of activity  04/03/18 1027     Active Acceptance E,D NR bed mobility, exercise, plan of care  04/02/18 1327     Active Acceptance E NR   04/01/18 0941          Point: Body mechanics (Done)    Learning Progress Summary     Learner Status Readiness Method Response Comment Documented by    Patient Done Acceptance E VU,NR Plan of care, benefits of activity, bed mobility  04/09/18 1331     Active Acceptance E,D NR benefit of activity, bed mobility, exercise, plan of care  04/07/18 1130     Active Acceptance E,D NR bed mobility, exercise, plan of care, benefits of activity  04/03/18 1027     Active Acceptance E,D NR bed mobility, exercise, plan of  care CW 04/02/18 1327     Active Acceptance E NR  TB 04/01/18 0941          Point: Precautions (Active)    Learning Progress Summary     Learner Status Readiness Method Response Comment Documented by    Patient Active Acceptance E,D NR benefit of activity, bed mobility, exercise, plan of care CW 04/07/18 1130     Active Acceptance E,D NR bed mobility, exercise, plan of care, benefits of activity CW 04/03/18 1027     Active Acceptance E NR  TB 04/01/18 0941                      User Key     Initials Effective Dates Name Provider Type Discipline     08/02/16 -  Tay Lau, PT Physical Therapist PT     06/22/15 -  Dianna Calloway, PTA Physical Therapy Assistant PT    NW 08/02/16 -  Nuzhat Justice, PTA Physical Therapy Assistant PT                    PT Recommendation and Plan                Outcome Measures     Row Name 04/09/18 1425 04/09/18 1300 04/07/18 1100       How much help from another person do you currently need...    Turning from your back to your side while in flat bed without using bedrails?  -- 2  -CW 2  -CW    Moving from lying on back to sitting on the side of a flat bed without bedrails?  -- 2  -CW 2  -CW    Moving to and from a bed to a chair (including a wheelchair)?  -- 1  -CW 1  -CW    Standing up from a chair using your arms (e.g., wheelchair, bedside chair)?  -- 1  -CW 1  -CW    Climbing 3-5 steps with a railing?  -- 1  -CW 1  -CW    To walk in hospital room?  -- 1  -CW 1  -CW    AM-PAC 6 Clicks Score  -- 8  -CW 8  -CW       How much help from another is currently needed...    Putting on and taking off regular lower body clothing? 2  -CJ  --  --    Bathing (including washing, rinsing, and drying) 2  -CJ  --  --    Toileting (which includes using toilet bed pan or urinal) 2  -CJ  --  --    Putting on and taking off regular upper body clothing 3  -CJ  --  --    Taking care of personal grooming (such as brushing teeth) 3  -CJ  --  --    Eating meals 4  -CJ  --  --    Score 16  -CJ  --   --       Functional Assessment    Outcome Measure Options AM-PAC 6 Clicks Daily Activity (OT)  - AM-PAC 6 Clicks Basic Mobility (PT)  -CW AM-PAC 6 Clicks Basic Mobility (PT)  -CW      User Key  (r) = Recorded By, (t) = Taken By, (c) = Cosigned By    Initials Name Provider Type     YASIR Singh/BALAJI Occupational Therapy Assistant    CW Dianna Calloway, VERNELL Physical Therapy Assistant           Time Calculation:         PT Charges     Row Name 04/09/18 1517 04/09/18 1333          Time Calculation    Start Time 1451  -AH 1137  -CW     Stop Time 1514  -AH 1200  -CW     Time Calculation (min) 23 min  - 23 min  -CW     PT Received On  -- 04/09/18  -CW     PT Goal Re-Cert Due Date  -- 04/11/18  -CW        Time Calculation- PT    Total Timed Code Minutes- PT 23 minute(s)  -AH 23 minute(s)  -CW       User Key  (r) = Recorded By, (t) = Taken By, (c) = Cosigned By    Initials Name Provider Type     Yolie Francis PTA Physical Therapy Assistant     Dianna Calloway, VERNELL Physical Therapy Assistant          Therapy Charges for Today     Code Description Service Date Service Provider Modifiers Qty    [id number] HC PT THER PROC EA 15 MIN 4/9/2018 Yolie Francis PTA GP, KX 2          PT G-Codes  Outcome Measure Options: AM-PAC 6 Clicks Daily Activity (OT)  Score: 8  Functional Limitation: Mobility: Walking and moving around  Mobility: Walking and Moving Around Current Status (): At least 80 percent but less than 100 percent impaired, limited or restricted  Mobility: Walking and Moving Around Goal Status (): At least 60 percent but less than 80 percent impaired, limited or restricted    Yolie Francis PTA  4/9/2018

## 2018-04-09 NOTE — PLAN OF CARE
Problem: Patient Care Overview  Goal: Plan of Care Review  Outcome: Ongoing (interventions implemented as appropriate)   04/09/18 0064   Coping/Psychosocial   Plan of Care Reviewed With patient;family   Coping/Psychosocial   Consent Given to Review Plan with Pt. progressing as expected, 5L/o2 and numerous rests of 1-2 min. ea. required!

## 2018-04-10 LAB
ANION GAP SERPL CALCULATED.3IONS-SCNC: 5 MMOL/L (ref 4–13)
ANISOCYTOSIS BLD QL: ABNORMAL
BUN BLD-MCNC: 37 MG/DL (ref 5–21)
BUN/CREAT SERPL: 52.1 (ref 7–25)
CALCIUM SPEC-SCNC: 8.6 MG/DL (ref 8.4–10.4)
CHLORIDE SERPL-SCNC: 90 MMOL/L (ref 98–110)
CO2 SERPL-SCNC: 40 MMOL/L (ref 24–31)
CREAT BLD-MCNC: 0.71 MG/DL (ref 0.5–1.4)
DEPRECATED RDW RBC AUTO: 67.7 FL (ref 40–54)
EOSINOPHIL # BLD MANUAL: 0.84 10*3/MM3 (ref 0–0.7)
EOSINOPHIL NFR BLD MANUAL: 6.1 % (ref 0–7)
ERYTHROCYTE [DISTWIDTH] IN BLOOD BY AUTOMATED COUNT: 18.2 % (ref 12–15)
GFR SERPL CREATININE-BSD FRML MDRD: 78 ML/MIN/1.73
GLUCOSE BLD-MCNC: 65 MG/DL (ref 70–100)
GLUCOSE BLDC GLUCOMTR-MCNC: 134 MG/DL (ref 70–130)
GLUCOSE BLDC GLUCOMTR-MCNC: 134 MG/DL (ref 70–130)
GLUCOSE BLDC GLUCOMTR-MCNC: 195 MG/DL (ref 70–130)
GLUCOSE BLDC GLUCOMTR-MCNC: 77 MG/DL (ref 70–130)
HCT VFR BLD AUTO: 31.2 % (ref 37–47)
HGB BLD-MCNC: 10.1 G/DL (ref 12–16)
HYPOCHROMIA BLD QL: ABNORMAL
LYMPHOCYTES # BLD MANUAL: 1.13 10*3/MM3 (ref 0.8–7)
LYMPHOCYTES NFR BLD MANUAL: 7.1 % (ref 0–12)
LYMPHOCYTES NFR BLD MANUAL: 8.2 % (ref 24–44)
MCH RBC QN AUTO: 32.7 PG (ref 28–32)
MCHC RBC AUTO-ENTMCNC: 32.4 G/DL (ref 33–36)
MCV RBC AUTO: 101 FL (ref 82–98)
MONOCYTES # BLD AUTO: 0.98 10*3/MM3 (ref 0–1)
NEUTROPHILS # BLD AUTO: 10.67 10*3/MM3 (ref 1–11)
NEUTROPHILS NFR BLD MANUAL: 75.5 % (ref 37–80)
NEUTS BAND NFR BLD MANUAL: 2 % (ref 0–5)
PLATELET # BLD AUTO: 482 10*3/MM3 (ref 130–400)
PMV BLD AUTO: 9.4 FL (ref 6–12)
POIKILOCYTOSIS BLD QL SMEAR: ABNORMAL
POTASSIUM BLD-SCNC: 3.7 MMOL/L (ref 3.5–5.3)
RBC # BLD AUTO: 3.09 10*6/MM3 (ref 4.2–5.4)
SCHISTOCYTES BLD QL SMEAR: ABNORMAL
SMALL PLATELETS BLD QL SMEAR: ABNORMAL
SODIUM BLD-SCNC: 135 MMOL/L (ref 135–145)
VARIANT LYMPHS NFR BLD MANUAL: 1 % (ref 0–5)
WBC MORPH BLD: NORMAL
WBC NRBC COR # BLD: 13.76 10*3/MM3 (ref 4.8–10.8)

## 2018-04-10 PROCEDURE — 80048 BASIC METABOLIC PNL TOTAL CA: CPT | Performed by: INTERNAL MEDICINE

## 2018-04-10 PROCEDURE — 63710000001 INSULIN DETEMIR PER 5 UNITS: Performed by: NURSE PRACTITIONER

## 2018-04-10 PROCEDURE — 94799 UNLISTED PULMONARY SVC/PX: CPT

## 2018-04-10 PROCEDURE — 85025 COMPLETE CBC W/AUTO DIFF WBC: CPT | Performed by: NURSE PRACTITIONER

## 2018-04-10 PROCEDURE — 82962 GLUCOSE BLOOD TEST: CPT

## 2018-04-10 PROCEDURE — 97110 THERAPEUTIC EXERCISES: CPT

## 2018-04-10 PROCEDURE — 97530 THERAPEUTIC ACTIVITIES: CPT

## 2018-04-10 PROCEDURE — 85007 BL SMEAR W/DIFF WBC COUNT: CPT | Performed by: NURSE PRACTITIONER

## 2018-04-10 PROCEDURE — 63710000001 INSULIN LISPRO (HUMAN) PER 5 UNITS: Performed by: FAMILY MEDICINE

## 2018-04-10 RX ORDER — LISINOPRIL 2.5 MG/1
2.5 TABLET ORAL ONCE
Status: COMPLETED | OUTPATIENT
Start: 2018-04-10 | End: 2018-04-10

## 2018-04-10 RX ORDER — MEGESTROL ACETATE 40 MG/ML
400 SUSPENSION ORAL DAILY
Status: DISCONTINUED | OUTPATIENT
Start: 2018-04-10 | End: 2018-04-13 | Stop reason: HOSPADM

## 2018-04-10 RX ORDER — BUMETANIDE 1 MG/1
1 TABLET ORAL DAILY
Status: DISCONTINUED | OUTPATIENT
Start: 2018-04-10 | End: 2018-04-13 | Stop reason: HOSPADM

## 2018-04-10 RX ORDER — SIMETHICONE 80 MG
80 TABLET,CHEWABLE ORAL 4 TIMES DAILY PRN
Status: DISCONTINUED | OUTPATIENT
Start: 2018-04-10 | End: 2018-04-13 | Stop reason: HOSPADM

## 2018-04-10 RX ORDER — LISINOPRIL 5 MG/1
5 TABLET ORAL DAILY
Status: DISCONTINUED | OUTPATIENT
Start: 2018-04-11 | End: 2018-04-13 | Stop reason: HOSPADM

## 2018-04-10 RX ADMIN — ALBUTEROL SULFATE 1.25 MG: 2.5 SOLUTION RESPIRATORY (INHALATION) at 14:24

## 2018-04-10 RX ADMIN — DILTIAZEM HYDROCHLORIDE 60 MG: 60 TABLET, FILM COATED ORAL at 09:04

## 2018-04-10 RX ADMIN — IPRATROPIUM BROMIDE 0.5 MG: 0.5 SOLUTION RESPIRATORY (INHALATION) at 06:10

## 2018-04-10 RX ADMIN — LEVOTHYROXINE SODIUM 50 MCG: 50 TABLET ORAL at 06:22

## 2018-04-10 RX ADMIN — BUDESONIDE AND FORMOTEROL FUMARATE DIHYDRATE 2 PUFF: 160; 4.5 AEROSOL RESPIRATORY (INHALATION) at 21:02

## 2018-04-10 RX ADMIN — BUMETANIDE 1 MG/HR: 0.25 INJECTION INTRAMUSCULAR; INTRAVENOUS at 09:01

## 2018-04-10 RX ADMIN — ESCITSLOPRAM 10 MG: 10 TABLET ORAL at 09:00

## 2018-04-10 RX ADMIN — SIMETHICONE 80 MG: 80 TABLET, CHEWABLE ORAL at 09:01

## 2018-04-10 RX ADMIN — TAMSULOSIN HYDROCHLORIDE 0.4 MG: 0.4 CAPSULE ORAL at 09:00

## 2018-04-10 RX ADMIN — HYDROXYUREA 500 MG: 500 CAPSULE ORAL at 09:00

## 2018-04-10 RX ADMIN — ALBUTEROL SULFATE 1.25 MG: 2.5 SOLUTION RESPIRATORY (INHALATION) at 10:26

## 2018-04-10 RX ADMIN — GABAPENTIN 600 MG: 300 CAPSULE ORAL at 22:54

## 2018-04-10 RX ADMIN — ALBUTEROL SULFATE 1.25 MG: 2.5 SOLUTION RESPIRATORY (INHALATION) at 06:11

## 2018-04-10 RX ADMIN — FOLIC ACID 1 MG: 1 TABLET ORAL at 09:01

## 2018-04-10 RX ADMIN — APIXABAN 5 MG: 5 TABLET, FILM COATED ORAL at 22:54

## 2018-04-10 RX ADMIN — BUMETANIDE 1 MG: 1 TABLET ORAL at 12:41

## 2018-04-10 RX ADMIN — POTASSIUM CHLORIDE 20 MEQ: 750 CAPSULE, EXTENDED RELEASE ORAL at 08:59

## 2018-04-10 RX ADMIN — NYSTATIN: 100000 POWDER TOPICAL at 22:58

## 2018-04-10 RX ADMIN — GUAIFENESIN 1200 MG: 600 TABLET, EXTENDED RELEASE ORAL at 22:55

## 2018-04-10 RX ADMIN — INSULIN DETEMIR 5 UNITS: 100 INJECTION, SOLUTION SUBCUTANEOUS at 23:03

## 2018-04-10 RX ADMIN — DILTIAZEM HYDROCHLORIDE 60 MG: 60 TABLET, FILM COATED ORAL at 04:39

## 2018-04-10 RX ADMIN — ATORVASTATIN CALCIUM 20 MG: 10 TABLET, FILM COATED ORAL at 22:54

## 2018-04-10 RX ADMIN — DILTIAZEM HYDROCHLORIDE 60 MG: 60 TABLET, FILM COATED ORAL at 16:33

## 2018-04-10 RX ADMIN — DOCUSATE SODIUM 100 MG: 100 CAPSULE, LIQUID FILLED ORAL at 09:00

## 2018-04-10 RX ADMIN — IPRATROPIUM BROMIDE 0.5 MG: 0.5 SOLUTION RESPIRATORY (INHALATION) at 10:26

## 2018-04-10 RX ADMIN — LISINOPRIL 2.5 MG: 2.5 TABLET ORAL at 09:01

## 2018-04-10 RX ADMIN — METOLAZONE 2.5 MG: 2.5 TABLET ORAL at 09:00

## 2018-04-10 RX ADMIN — GUAIFENESIN 1200 MG: 600 TABLET, EXTENDED RELEASE ORAL at 09:00

## 2018-04-10 RX ADMIN — LISINOPRIL 2.5 MG: 2.5 TABLET ORAL at 12:41

## 2018-04-10 RX ADMIN — INSULIN LISPRO 2 UNITS: 100 INJECTION, SOLUTION INTRAVENOUS; SUBCUTANEOUS at 22:55

## 2018-04-10 RX ADMIN — GABAPENTIN 300 MG: 300 CAPSULE ORAL at 09:00

## 2018-04-10 RX ADMIN — PANTOPRAZOLE SODIUM 40 MG: 40 TABLET, DELAYED RELEASE ORAL at 06:22

## 2018-04-10 RX ADMIN — IPRATROPIUM BROMIDE 0.5 MG: 0.5 SOLUTION RESPIRATORY (INHALATION) at 14:24

## 2018-04-10 RX ADMIN — ASPIRIN 81 MG 81 MG: 81 TABLET ORAL at 09:01

## 2018-04-10 RX ADMIN — APIXABAN 5 MG: 5 TABLET, FILM COATED ORAL at 09:01

## 2018-04-10 RX ADMIN — LIDOCAINE 2 PATCH: 50 PATCH CUTANEOUS at 09:03

## 2018-04-10 RX ADMIN — METOPROLOL SUCCINATE 50 MG: 50 TABLET, FILM COATED, EXTENDED RELEASE ORAL at 09:00

## 2018-04-10 RX ADMIN — IPRATROPIUM BROMIDE 0.5 MG: 0.5 SOLUTION RESPIRATORY (INHALATION) at 20:59

## 2018-04-10 RX ADMIN — DOCUSATE SODIUM 100 MG: 100 CAPSULE, LIQUID FILLED ORAL at 22:55

## 2018-04-10 RX ADMIN — COLLAGENASE SANTYL: 250 OINTMENT TOPICAL at 09:04

## 2018-04-10 RX ADMIN — BUDESONIDE AND FORMOTEROL FUMARATE DIHYDRATE 2 PUFF: 160; 4.5 AEROSOL RESPIRATORY (INHALATION) at 06:10

## 2018-04-10 RX ADMIN — NYSTATIN: 100000 POWDER TOPICAL at 09:04

## 2018-04-10 RX ADMIN — MEGESTROL ACETATE 400 MG: 40 SUSPENSION ORAL at 12:41

## 2018-04-10 RX ADMIN — DILTIAZEM HYDROCHLORIDE 60 MG: 60 TABLET, FILM COATED ORAL at 22:56

## 2018-04-10 NOTE — PROGRESS NOTES
[facility]  INPATIENT PROGRESS NOTE    Length of Stay: 10  Date of Admission: 3/30/2018  Primary Care Physician: Barry Foley MD    Subjective   Chief Complaint: Follow-up shortness of breath  HPI   The patient is resting in bed. She tells me that she really does feel better today and she is starting to feel like a human again. She still has some shortness of breath, but this is improved and she has not required BiPAP since yesterday afternoon. She denies any chest pain. Her upper abdominal pain is much better after lidocaine patch placement. She tells me she has belched quite a bit since yesterday. She does have a poor appetite. She is drinking Glucerna but is not really eating much.     Review of Systems   All pertinent negatives and positives are as above. All other systems have been reviewed and are negative unless otherwise stated.     Objective    Temp:  [97.2 °F (36.2 °C)-98 °F (36.7 °C)] 97.5 °F (36.4 °C)  Heart Rate:  [67-80] 67  Resp:  [16-20] 18  BP: (145-178)/(64-96) 150/65  Physical Exam  Constitutional: She is oriented to person, place, and time. She appears well-developed and well-nourished. No distress.   Obese body habitus   HENT:   Head: Normocephalic and atraumatic.   Neck: Normal range of motion. Neck supple. No tracheal deviation present. No thyromegaly present.   Cardiovascular: Normal heart sounds and intact distal pulses.  Exam reveals no gallop and no friction rub.    No murmur heard.  Irregularly irregular.  Atrial fibrillation 57-74 per telemetry   Pulmonary/Chest: She has no wheezes. She has no rales.   Able to take deeper breaths today.  Diminished breath sounds in bilateral bases, but also difficult to assess due to body habitus  Abdominal: Soft. Bowel sounds are normal. She exhibits no distension and no mass. There is no tenderness. There is no guarding.   Musculoskeletal: She exhibits edema (2+ pitting to bilateral lower  and upper extremities-looks improved and skin is starting to wrinkle).   Generalized weakness   Neurological: She is alert and oriented to person, place, and time.   Skin: Skin is warm and dry. There is no erythema.    Right knee/hip dressings are clean/dry/intact   Psychiatric: She has a normal mood and affect. Her behavior is normal. Judgment and thought content normal.   Vitals reviewed.    Results Review:  I have reviewed the labs, radiology results, and diagnostic studies.    Laboratory Data:     Results from last 7 days  Lab Units 04/10/18  0610 04/09/18  0603 04/08/18  0622   WBC 10*3/mm3 13.76* 14.89* 14.30*   HEMOGLOBIN g/dL 10.1* 10.2* 10.0*   HEMATOCRIT % 31.2* 30.5* 30.5*   PLATELETS 10*3/mm3 482* 477* 520*       Results from last 7 days  Lab Units 04/10/18  0610 04/09/18  0603 04/08/18 0622   SODIUM mmol/L 135 134* 134*   POTASSIUM mmol/L 3.7 3.9 3.3*   CHLORIDE mmol/L 90* 91* 92*   CO2 mmol/L 40.0* 37.0* 35.0*   BUN mg/dL 37* 36* 35*   CREATININE mg/dL 0.71 0.76 0.77   CALCIUM mg/dL 8.6 9.2 9.1   GLUCOSE mg/dL 65* 71 83     I have reviewed the patient current medications.     Assessment/Plan   Assessment:  1. Right knee postoperative wound dehiscence- wound culture MRSA positive  2. Recent closed right femur fracture s/p IM nailing 3/10   3. Leukocytosis, stable  4. Chronic diastolic heart failure with preserved ejection fraction  5. Insulin dependent Diabetes Mellitus, Type 2, hgb A1c 6.4  6. Atrial fibrillation (recent diagnosis)- chronically anticoagulated with Eliquis  7. Morbid obesity, BMI 40.4  8. Hypertension  9. Hyperlipidemia  10. Hyponatremia, stable  11. Macrocytic anemia  12. Thrombocytosis, likely reactive. Improving  13. Constipation-improved  14. Chronic obstructive pulmonary disease   15. Acute on chronic hypoxic respiratory failure  16. Acute volume overload  17. Hypokalemia, mild. Improved    Plan:  1. Continue Vancomycin, with anticipated stop date of 4/15/18 per Dr. aJquez  2.  Patient has lost 7 pounds in 2 days on Bumex drip and has diuresed over 6 liters. Will discontinue drip and convert to oral Bumex in addition to Metolazone  3. Nutrition consult. Will start Megace  4. Continue Symbicort, Duonebs, Mucinex, and incentive spirometry. Supplemental oxygen as needed.   5. Last blood glucoses- 104, 123, 65, 77. Decrease Levemir to 5 units nightly.   6. Increase Lisinopril to 5 mg and monitor kidney function and electrolytes closely.   7. Continue physical/occupational therapy with much encouragement  8. Labs in AM- BMP, CBC    Discharge Planning: I expect the patient to be discharged to SNF in ? days.    Latoya Emery, BULMARO   04/10/18   10:54 AM     Chart reviewed.  Patient examined.   Agree with assessment and plan.  Resolving volume overload.  Encouraged to use IS several times a day, rationale explained to patient.  Manda Andrew,   04/10/18  3:06 PM

## 2018-04-10 NOTE — PLAN OF CARE
Problem: Patient Care Overview  Goal: Plan of Care Review  Outcome: Ongoing (interventions implemented as appropriate)   04/10/18 1108   Coping/Psychosocial   Plan of Care Reviewed With patient   Plan of Care Review   Progress no change   OTHER   Outcome Summary Pts PO intake is fair, 50% avg over last 3 documented meals. Pt reported she is drinking Glucerna but not always TID. Pt stated her stomach was queezy this AM and she did not eat much nor able to drink the glucerna. Will continue to follow.

## 2018-04-10 NOTE — PROGRESS NOTES
"Palliative Care Daily Progress Note     support for patient/family    S: Medical record reviewed. Events noted. O2-3L, denies shortness of breath, nausea, or pain. Last BM 2 days ago with is \"normal\" for her. States she is feeling better today. Awaiting her son, Vicente, to arrive this morning. Only complaint is she would like coffee.    ROS:  Negative except as otherwise noted above.  History obtained from chart review and the patient    O: Code Status: Conditional Code   Advanced Directives: Advance Directive Status: Patient has advance directive, copy in chart   Goals of Care: Ongoing.     /65 (BP Location: Left arm, Patient Position: Lying)   Pulse 75   Temp 97.5 °F (36.4 °C) (Oral)   Resp 20   Ht 152.4 cm (60\")   Wt 91.4 kg (201 lb 7 oz)   SpO2 95%   BMI 39.34 kg/m²     Intake/Output Summary (Last 24 hours) at 04/10/18 0817  Last data filed at 04/10/18 0624   Gross per 24 hour   Intake              200 ml   Output             4175 ml   Net            -3975 ml         Labs:     Results from last 7 days  Lab Units 04/10/18  0610   WBC 10*3/mm3 13.76*   HEMOGLOBIN g/dL 10.1*   HEMATOCRIT % 31.2*   PLATELETS 10*3/mm3 482*       Results from last 7 days  Lab Units 04/10/18  0610   SODIUM mmol/L 135   POTASSIUM mmol/L 3.7   CHLORIDE mmol/L 90*   CO2 mmol/L 40.0*   BUN mg/dL 37*   CREATININE mg/dL 0.71   CALCIUM mg/dL 8.6   GLUCOSE mg/dL 65*     Imaging Results (last 72 hours)     Procedure Component Value Units Date/Time    XR Chest 1 View [[id number]] Collected:  04/09/18 0708     Updated:  04/09/18 0713    Narrative:       EXAMINATION: XR CHEST 1 VW- 4/9/2018 7:08 AM CDT     HISTORY: Follow-up volume overload; T81.4XXA-Infection following a  procedure, initial encounter; R26.9-Unspecified abnormalities of gait  and mobility; S72.491A-Other fracture of lower end of right femur,  initial encounter for closed fracture; Z74.09-Other reduced mobility;  Z74.09-Other reduced mobility.     REPORT: Comparison is " made with the study from 4/6/2018.     There are persistent and diffuse hazy airspace opacities bilaterally,  with partial consolidation of the left base and small pleural effusions  greater on the left. No pneumothorax is identified. There is mild  cardiomegaly. Overall, there is no significant change.       Impression:       Congestive heart failure with bilateral pleural effusions  left greater than right. Left basilar consolidation is present, which  may represent atelectasis. Pneumonia cannot entirely be excluded.  This report was finalized on 04/09/2018 07:10 by Dr. Addison Rivero MD.    XR Abdomen KUB [[id number]] Collected:  04/08/18 1649     Updated:  04/08/18 1654    Narrative:       XR ABDOMEN KUB- 4/8/2018 4:38 PM CDT     HISTORY: abdominal pain; T81.4XXA-Infection following a procedure,  initial encounter; R26.9-Unspecified abnormalities of gait and mobility;  S72.491A-Other fracture of lower end of right femur, initial encounter  for closed fracture; Z74.09-Other reduced mobility; Z74.09-Other reduced  mobility       COMPARISON: None     FINDINGS:  Prominent gaseous distention of the colon. The small bowel is relatively  decompressed. No pathologic calcification or organomegaly is visualized.     Evaluation for free intraperitoneal air is limited on a supine  radiograph, but there are no definite findings of free intraperitoneal  air. Incidentally noted splenic artery calcification and cholecystectomy  clips. Alves catheter also identified.     The osseous structures demonstrate no acute abnormality.        Impression:       1. Prominent gaseous distention of the colon. Overall nonobstructive  bowel gas pattern.         This report was finalized on 04/08/2018 16:51 by Dr Franck Pretty, .          Diagnostics: Reviewed    Patient Active Problem List   Diagnosis   • Ischemic chest pain   • Pneumonia of both lungs due to infectious organism   • Fall   • Closed displaced comminuted fracture of shaft of  right femur   • Pain of right thigh   • Class 2 obesity with serious comorbidity in adult   • Type 2 diabetes mellitus with neurologic complication, with long-term current use of insulin   • STUART (acute kidney injury)   • Chronic diastolic heart failure   • Pleural effusion, left   • Hypoxia   • Surgical wound infection, initial encounter       Physical Exam:  General Appearance: alert, appears stated age, fatigued, cooperative, morbidly obese and no apparrent acute distress observed.  Head: normocephalic, without obvious abnormality and atraumatic  Eyes: lids and lashes normal, conjunctivae and sclerae normal, no icterus, no pallor, corneas clear and PERRLA  Throat: no oral lesions, no thrush and oral mucosa moist  Lungs: clear to auscultation, respirations regular, respirations even and respirations unlabored  Heart: irregular  Abdomen: normal bowel sounds, soft non-tender and no guarding, protuberant  Extremities: moves extremities well and no cyanosis, edema 2+ lower bilateral and venous status dermatitis  Pulses: posteria tibial decreased bilateral  Skin: temperature normal, skin thickening and discoloration bilateral lower extremities, surgical wound to above and below right knee bandage in place below the knee  Neurologic: Mental Status orientated to person, place, time and situation, Speech normal content and execusion, Reflexes normal and symmetric  Mood-depressed/flat           Impression/Problem List:  Palliative Care Topics Discussed: palliative care, goals of care, care options and discharge options      1. Congestive heart failure, diastolic dysfunction, LVEF 55%, 4/9/2018; cardiomegaly, moderate, per CT 04/03/2018  2. Pleural effusions-bilateral, left greater than right  3. Mitral value regurgitation-mild  4. Coronary artery disease-extensive, LAD, circumflex, and RCA, per CT angiogram on 04/03/2018  5. Anasarca?-per CT, thickening of the skin and stranding within the subcutaneous tissues   6.  peripheral vascular disease  7. diabetes mellitus  8. Hypertension  9. Hyperlipidemia   10. Osteoarthritis  11. lumbar spondylitis  12. Post-surgical infection-MRSA     Patient status: Disease state: Controlled with current treatments.  Functional status: Palliative Performance Scale Score: Performance 40% based on the following measures: Ambulation: Mainly in bed, Activity and Evidence of Disease: Unable to do any work, extensive evidence of disease, Self-Care: Mainly assistance required,  Intake: Normal or reduced, LOC: Full, drowsy or confusion   Mental status: alert, oriented and depressed.  Nutritional status: no nutritional compromise at this time. Body mass index is 40.7 kg/m².  Psychosocial issues: none identified.  Family support: The patient receives support from her children..  POA/Healthcare surrogate-advance directive on file     Recommendations/Plan:  1. plan: Goals of care include CODE STATUS Aggressive DNR. Initiated medical orders for scope of treatment (MOST) form at bedside, again in line with the patient's current goals of care, to ensure her continuity of care. Patient and family are aware this form will need to be signed by the physician prior to her discharge. Awaiting discharge to skilled nursing facility for rehab.           Thank you for allowing us to participate in patient's plan of care. Palliative Care Team will continue to follow patient.     Time spent: 20 minutes spent reviewing medical and medication records, assessing and examining patient, discussing with family, answering questions, providing some guidance about a plan and documentation of care, and coordinating care with other healthcare members, with > 50% time spent face to face.     Yessy Peterson, BULMARO  4/10/2018

## 2018-04-10 NOTE — PROGRESS NOTES
This patient is progressing well with her right knee wound infection erythema is resolved she still has some serous fluid plan continue with antibiotics.  When she is 8 weeks post surgery we can begin toe-touch weightbearing

## 2018-04-10 NOTE — THERAPY TREATMENT NOTE
Acute Care - Physical Therapy Treatment Note  [facility]     Patient Name: Tamy Sher  : 1930  MRN: [id number]  Today's Date: 4/10/2018  Onset of Illness/Injury or Date of Surgery: 18  Date of Referral to PT: 18  Referring Physician: Dr. Andrew    Admit Date: 3/30/2018    Visit Dx:    ICD-10-CM ICD-9-CM   1. Surgical wound infection, initial encounter T81.4XXA 998.59   2. Abnormality of gait and mobility R26.9 781.2   3. Other closed fracture of distal end of right femur, initial encounter S72.491A 821.29   4. Impaired mobility and ADLs Z74.09 799.89   5. Impaired mobility Z74.09 799.89     Patient Active Problem List   Diagnosis   • Ischemic chest pain   • Pneumonia of both lungs due to infectious organism   • Fall   • Closed displaced comminuted fracture of shaft of right femur   • Pain of right thigh   • Class 2 obesity with serious comorbidity in adult   • Type 2 diabetes mellitus with neurologic complication, with long-term current use of insulin   • STUART (acute kidney injury)   • Chronic diastolic heart failure   • Pleural effusion, left   • Hypoxia   • Surgical wound infection, initial encounter       Therapy Treatment          Rehabilitation Treatment Summary     Row Name 04/10/18 1342 04/10/18 1155 04/10/18 1010       Treatment Time/Intention    Discipline physical therapy assistant  -NW physical therapy assistant  -KJ occupational therapy assistant  -CJ    Document Type therapy note (daily note)  -NW therapy note (daily note)  -KJ therapy note (daily note)  -    Subjective Information complains of;weakness;fatigue  - no complaints  -KJ complains of;weakness;fatigue  -    Mode of Treatment  -- physical therapy  -KJ individual therapy;occupational therapy  -CJ    Patient Effort  -- fair  -KJ good  -CJ    Existing Precautions/Restrictions fall;oxygen therapy device and L/min  -  -- fall;oxygen therapy device and L/min;other (see comments)   Nwb Rle.!  -CJ    Treatment  Considerations/Comments NWB RLE  -AH NWB RLE  -KJ  --    Recorded by [] Yolie Francis, [facility] 04/10/18 1410 04/10/18 1410  [NW] Nuzhat Justice, [facility] 04/10/18 1357 04/10/18 1357 [KJ] Vika Pederson, [facility] 04/10/18 1212 04/10/18 1212 [CJ] MELVINA SinghA/BALAJI 04/10/18 1137 04/10/18 1137    Row Name 04/10/18 1342 04/10/18 1010          Vital Signs    O2 Delivery Pre Treatment  -- supplemental O2   3L/o2  -CJ     Intra SpO2 (%) 91  -  --     O2 Delivery Intra Treatment supplemental O2   3L  - supplemental O2   3L/o2  -CJ     O2 Delivery Post Treatment  -- supplemental O2   3L/o2  -CJ     Pre Patient Position  -- Sitting  -CJ     Intra Patient Position Sitting  - Sitting  -CJ     Post Patient Position  -- Sitting  -CJ     Recorded by [] Yolie Francis, [facility] 04/10/18 1410 04/10/18 1410 [CJ] MELVINA SinghA/BALAJI 04/10/18 1137 04/10/18 1137     Row Name 04/10/18 1342 04/10/18 1155          Mobility Assessment/Intervention    Extremity Weight-bearing Status right lower extremity  - right lower extremity  -KJ     Right Lower Extremity (Weight-bearing Status) non weight-bearing (NWB)  - non weight-bearing (NWB)  -KJ     Recorded by [AH] Yolie Francis, [facility] 04/10/18 1410 04/10/18 1410 [KJ] Vika Pederson, [facility] 04/10/18 1212 04/10/18 1212     Row Name 04/10/18 1342 04/10/18 1010          Bed Mobility Assessment/Treatment    Supine-Sit Kings (Bed Mobility) maximum assist (25% patient effort);2 person assist;verbal cues  -  --     Sit-Supine Kings (Bed Mobility) maximum assist (25% patient effort);2 person assist;verbal cues  -  --     Comment (Bed Mobility)  -- fowlers in bed!  -CJ     Recorded by [] Yolie Francis, PTA 04/10/18 1410 04/10/18 1410 [CJ] MELVINA SinghA/BALAJI 04/10/18 1137 04/10/18 1137     Row Name 04/10/18 1010             Motor Skills Assessment/Interventions    Additional Documentation Therapeutic Exercise (Group);Therapeutic Exercise Interventions (Group)  -CJ       Recorded by [CJ] YASIR Singh/BALAJI 04/10/18 1137 04/10/18 1137      Row Name 04/10/18 1342 04/10/18 1155 04/10/18 1010       Therapeutic Exercise    Upper Extremity (Therapeutic Exercise)  --  -- bicep curl, bilateral;tricep extension, bilateral;wand exercises  -    Upper Extremity Range of Motion (Therapeutic Exercise)  --  -- shoulder flexion/extension, bilateral;elbow flexion/extension, bilateral  -    Weight/Resistance (Therapeutic Exercise)  --  -- 2 pounds;3 pounds  -    Exercise Type (Therapeutic Exercise) AAROM (active assistive range of motion)  - AAROM (active assistive range of motion)  -KJ AROM (active range of motion)  -    Position (Therapeutic Exercise) seated  - supine  -KJ seated  -    Sets/Reps (Therapeutic Exercise)  -- 2 sets of 20  -KJ 2 sets x 20 reps!  -    Equipment (Therapeutic Exercise)  --  -- dowel lisa/wand;free weight, barbell  -    Expected Outcome (Therapeutic Exercise)  --  -- improve functional stability;improve functional tolerance, self-care activity;improve motor control;improve neuromuscular control;improve performance, BADLs;improve performance, fine motor coordination skills;improve performance, gait skills;improve performance, transfer skills;improve postural control;increase active range of motion  -    Recorded by [] Yolie Francis, [facility] 04/10/18 1412 04/10/18 1412 [KJ] Vika Pederson, [facility] 04/10/18 1212 04/10/18 1212 [CJ] YASIR Singh/BALAJI 04/10/18 1137 04/10/18 1137    Row Name 04/10/18 1155 04/10/18 1010          Positioning and Restraints    Pre-Treatment Position in bed  -KJ in bed  -CJ     Post Treatment Position bed  - bed  -CJ     In Bed  -- fowlers;call light within reach;encouraged to call for assist;side rails up x2;exit alarm on  -CJ     Recorded by [KJ] Vika Pederson, [facility] 04/10/18 1212 04/10/18 1212 [CJ] YASIR Singh/BALAJI 04/10/18 1137 04/10/18 1137     Row Name 04/10/18 1155 04/10/18 1010          Pain Scale:  Numbers Pre/Post-Treatment    Pain Scale: Numbers, Pretreatment 0/10 - no pain  -KJ 0/10 - no pain  -CJ     Pain Scale: Numbers, Post-Treatment  -- 0/10 - no pain  -CJ     Recorded by [KJ] Vika Pederson, PTA 04/10/18 1212 04/10/18 1212 [CJ] Zoran SEWELL Theodore, COOLEY/L 04/10/18 1137 04/10/18 1137     Row Name                Wound 03/31/18 0043 Right knee incision    Wound - Properties Group Date first assessed: 03/31/18 [MB] Time first assessed: 0043 [MB] Present On Admission : yes;picture taken [MB] Side: Right [MB] Location: knee [MB] Type: incision [MB] Additional Comments: wound dehiscence [MB] Recorded by:  [MB] Edward Pro, JOVANNY 03/31/18 0246 03/31/18 0246    Row Name                Wound 03/31/18 0240 perirectal    Wound - Properties Group Date first assessed: 03/31/18 [MB] Time first assessed: 0240 [MB] Present On Admission : yes;picture taken [MB] Location: perirectal [MB] Stage, Pressure Injury: deep tissue injury [MB] Recorded by:  [MB] JOVANNY Frausto 03/31/18 0255 03/31/18 0255    Row Name                Wound 03/31/18 0012 Right upper thigh surgical    Wound - Properties Group Date first assessed: 03/31/18 [MB] Time first assessed: 0012 [MB] Present On Admission : yes [MB] Side: Right [MB2] Orientation: upper [MB2] Location: thigh [MB2] Type: surgical [MB2], open incision  Recorded by:  [MB] JOVANNY Frausto 03/31/18 0612 03/31/18 0612 [MB2] JOVANNY Frausto 03/31/18 0615 03/31/18 0615    Row Name                Wound 03/31/18 1600 Right heel    Wound - Properties Group Date first assessed: 03/31/18 [DB] Time first assessed: 1600 [DB] Side: Right [DB] Location: heel [DB] Stage, Pressure Injury: deep tissue injury [DB] Recorded by:  [DB] Thuy Dobbins, JOVANNY 03/31/18 1647 03/31/18 1647    Row Name 04/10/18 1010             Outcome Summary/Treatment Plan (OT)    Daily Summary of Progress (OT) progress toward functional goals as expected  -CJ      Barriers to Overall Progress (OT)  weakness Nwb Rle.!  -CJ      Plan for Continued Treatment (OT) continue with ot poc!  -CJ      Recorded by [CJ] Zoran Jaimes, COOLEY/L 04/10/18 1137 04/10/18 1137        User Key  (r) = Recorded By, (t) = Taken By, (c) = Cosigned By    Initials Name Effective Dates Discipline     Yolie HEATH Penny, PTA 08/02/16 -  PT    KJ Vika Pederson, PTA 08/02/16 -  PT    CJ Zoran Jaimes, COOLEY/L 08/02/16 -  OT    NW Nuzhat HEATH Vinh, PTA 08/02/16 -  PT    DB Thuy CARPIO Shilpi, RNA 06/02/17 -  Nurse    MB Edward Pro, RNA 05/26/17 -  Nurse          Wound 03/31/18 0043 Right knee incision (Active)   Dressing Appearance dry;intact 4/10/2018  9:01 AM       Wound 03/31/18 0240 perirectal (Active)   Dressing Appearance open to air 4/10/2018 12:47 AM   Base pink 4/9/2018  8:00 PM       Wound 03/31/18 0012 Right upper thigh surgical (Active)   Dressing Appearance dry;intact 4/10/2018 12:47 AM       Wound 03/31/18 1600 Right heel (Active)   Dressing Appearance dry;intact 4/10/2018 12:47 AM   Drainage Amount none 4/9/2018  8:00 PM   Dressing Care, Wound foam 4/10/2018 12:47 AM             Physical Therapy Education     Title: PT OT SLP Therapies (Active)     Topic: Physical Therapy (Active)     Point: Mobility training (Done)    Learning Progress Summary     Learner Status Readiness Method Response Comment Documented by    Patient Done Acceptance E VU,NR Plan of care, benefits of activity, bed mobility CW 04/09/18 1331     Active Acceptance E,D NR benefit of activity, bed mobility, exercise, plan of care CW 04/07/18 1130     Done Acceptance E VU,NR  NW 04/06/18 1348     Done Acceptance E VU,NR progression of POC NW 04/05/18 1203     Done Acceptance E VU,NR benefits of activity NW 04/04/18 1104     Active Acceptance E,D NR bed mobility, exercise, plan of care, benefits of activity CW 04/03/18 1027     Active Acceptance E,D NR bed mobility, exercise, plan of care CW 04/02/18 1327     Active Acceptance E NR  TB 04/01/18 0965           Point: Home exercise program (Active)    Learning Progress Summary     Learner Status Readiness Method Response Comment Documented by    Patient Active Acceptance E,D NR bed mobility, exercise, plan of care, benefits of activity CW 04/03/18 1027     Active Acceptance E,D NR bed mobility, exercise, plan of care CW 04/02/18 1327     Active Acceptance E NR   04/01/18 0941          Point: Body mechanics (Done)    Learning Progress Summary     Learner Status Readiness Method Response Comment Documented by    Patient Done Acceptance E VU,NR Plan of care, benefits of activity, bed mobility CW 04/09/18 1331     Active Acceptance E,D NR benefit of activity, bed mobility, exercise, plan of care  04/07/18 1130     Active Acceptance E,D NR bed mobility, exercise, plan of care, benefits of activity  04/03/18 1027     Active Acceptance E,D NR bed mobility, exercise, plan of care  04/02/18 1327     Active Acceptance E NR   04/01/18 0941          Point: Precautions (Active)    Learning Progress Summary     Learner Status Readiness Method Response Comment Documented by    Patient Active Acceptance E,D NR benefit of activity, bed mobility, exercise, plan of care  04/07/18 1130     Active Acceptance E,D NR bed mobility, exercise, plan of care, benefits of activity  04/03/18 1027     Active Acceptance E NR   04/01/18 0941                      User Key     Initials Effective Dates Name Provider Type Discipline     08/02/16 -  Tay Lau, PT Physical Therapist PT     06/22/15 -  Dianna Calloway PTA Physical Therapy Assistant PT     08/02/16 -  Nuzhat Justice PTA Physical Therapy Assistant PT                    PT Recommendation and Plan     Plan of Care Reviewed With: patient  Progress: no change  Outcome Summary: PT tx completed. Pt supine in bed, somewhat confused. Agrees to BLE exercises. AAROM performed. SNF recommended for con'td PT.          Outcome Measures     Row Name 04/10/18 1010 04/09/18  1425 04/09/18 1300       How much help from another person do you currently need...    Turning from your back to your side while in flat bed without using bedrails?  --  -- 2  -CW    Moving from lying on back to sitting on the side of a flat bed without bedrails?  --  -- 2  -CW    Moving to and from a bed to a chair (including a wheelchair)?  --  -- 1  -CW    Standing up from a chair using your arms (e.g., wheelchair, bedside chair)?  --  -- 1  -CW    Climbing 3-5 steps with a railing?  --  -- 1  -CW    To walk in hospital room?  --  -- 1  -CW    AM-PAC 6 Clicks Score  --  -- 8  -CW       How much help from another is currently needed...    Putting on and taking off regular lower body clothing? 2  -CJ 2  -CJ  --    Bathing (including washing, rinsing, and drying) 2  -CJ 2  -CJ  --    Toileting (which includes using toilet bed pan or urinal) 2  -CJ 2  -CJ  --    Putting on and taking off regular upper body clothing 3  -CJ 3  -CJ  --    Taking care of personal grooming (such as brushing teeth) 3  -CJ 3  -CJ  --    Eating meals 4  -CJ 4  -CJ  --    Score 16  -CJ 16  -CJ  --       Functional Assessment    Outcome Measure Options  -- AM-PAC 6 Clicks Daily Activity (OT)  -CJ AM-PAC 6 Clicks Basic Mobility (PT)  -CW      User Key  (r) = Recorded By, (t) = Taken By, (c) = Cosigned By    Initials Name Provider Type     CYNDEE Singh Occupational Therapy Assistant    CARIN Calloway PTA Physical Therapy Assistant           Time Calculation:           PT G-Codes  Outcome Measure Options: AM-PAC 6 Clicks Daily Activity (OT)  Score: 8  Functional Limitation: Mobility: Walking and moving around  Mobility: Walking and Moving Around Current Status (): At least 80 percent but less than 100 percent impaired, limited or restricted  Mobility: Walking and Moving Around Goal Status (): At least 60 percent but less than 80 percent impaired, limited or restricted    Vika Pederson, VERNELL  4/10/2018

## 2018-04-10 NOTE — PLAN OF CARE
Problem: Fall Risk (Adult)  Goal: Absence of Fall  Outcome: Ongoing (interventions implemented as appropriate)      Problem: Infection, Risk/Actual (Adult)  Goal: Infection Prevention/Resolution  Outcome: Ongoing (interventions implemented as appropriate)      Problem: Patient Care Overview  Goal: Plan of Care Review  Outcome: Ongoing (interventions implemented as appropriate)   04/10/18 0409   Coping/Psychosocial   Plan of Care Reviewed With patient   Plan of Care Review   Progress no change   OTHER   Outcome Summary O2 sat 96-98% on 5l per nasal cannula, decrease to 4liters, Continue to monitor. Alert and oriented. Dressing to right knee and right hip c/d/i. Denies any pain this shift. Chronic estrada. On bumex drip. Vanc trough at 1100 today. Reposition every 2 hours. Safety maintained.      Goal: Individualization and Mutuality  Outcome: Ongoing (interventions implemented as appropriate)    Goal: Discharge Needs Assessment  Outcome: Ongoing (interventions implemented as appropriate)    Goal: Interprofessional Rounds/Family Conf  Outcome: Ongoing (interventions implemented as appropriate)      Problem: Skin Injury Risk (Adult)  Goal: Skin Health and Integrity  Outcome: Ongoing (interventions implemented as appropriate)      Problem: Wound (Includes Pressure Injury) (Adult)  Goal: Signs and Symptoms of Listed Potential Problems Will be Absent, Minimized or Managed (Wound)  Outcome: Ongoing (interventions implemented as appropriate)

## 2018-04-10 NOTE — PLAN OF CARE
Problem: Patient Care Overview  Goal: Plan of Care Review  Outcome: Ongoing (interventions implemented as appropriate)      Problem: Patient Care Overview  Goal: Plan of Care Review  Outcome: Ongoing (interventions implemented as appropriate)   04/10/18 1138   Coping/Psychosocial   Plan of Care Reviewed With patient   OTHER   Outcome Summary Pt. progressing as expected, o2 is lowered to 3L/o2 today from 5L/o2 from yewtserdays tx, no issues with ue exs!

## 2018-04-11 LAB
ANION GAP SERPL CALCULATED.3IONS-SCNC: ABNORMAL MMOL/L (ref 4–13)
ANISOCYTOSIS BLD QL: ABNORMAL
BUN BLD-MCNC: 37 MG/DL (ref 5–21)
BUN/CREAT SERPL: 50 (ref 7–25)
CALCIUM SPEC-SCNC: 8.6 MG/DL (ref 8.4–10.4)
CHLORIDE SERPL-SCNC: 87 MMOL/L (ref 98–110)
CO2 SERPL-SCNC: >40 MMOL/L (ref 24–31)
CREAT BLD-MCNC: 0.74 MG/DL (ref 0.5–1.4)
DEPRECATED RDW RBC AUTO: 66.5 FL (ref 40–54)
EOSINOPHIL # BLD MANUAL: 1.25 10*3/MM3 (ref 0–0.7)
EOSINOPHIL NFR BLD MANUAL: 9 % (ref 0–7)
ERYTHROCYTE [DISTWIDTH] IN BLOOD BY AUTOMATED COUNT: 18.2 % (ref 12–15)
GFR SERPL CREATININE-BSD FRML MDRD: 74 ML/MIN/1.73
GLUCOSE BLD-MCNC: 150 MG/DL (ref 70–100)
GLUCOSE BLDC GLUCOMTR-MCNC: 156 MG/DL (ref 70–130)
GLUCOSE BLDC GLUCOMTR-MCNC: 199 MG/DL (ref 70–130)
GLUCOSE BLDC GLUCOMTR-MCNC: 206 MG/DL (ref 70–130)
GLUCOSE BLDC GLUCOMTR-MCNC: 250 MG/DL (ref 70–130)
HCT VFR BLD AUTO: 31.6 % (ref 37–47)
HGB BLD-MCNC: 10.4 G/DL (ref 12–16)
LYMPHOCYTES # BLD MANUAL: 0.83 10*3/MM3 (ref 0.8–7)
LYMPHOCYTES NFR BLD MANUAL: 1 % (ref 0–12)
LYMPHOCYTES NFR BLD MANUAL: 6 % (ref 24–44)
MACROCYTES BLD QL SMEAR: ABNORMAL
MCH RBC QN AUTO: 32.9 PG (ref 28–32)
MCHC RBC AUTO-ENTMCNC: 32.9 G/DL (ref 33–36)
MCV RBC AUTO: 100 FL (ref 82–98)
METAMYELOCYTES NFR BLD MANUAL: 2 % (ref 0–0)
MONOCYTES # BLD AUTO: 0.14 10*3/MM3 (ref 0–1)
MYELOCYTES NFR BLD MANUAL: 1 % (ref 0–0)
NEUTROPHILS # BLD AUTO: 10.99 10*3/MM3 (ref 1–11)
NEUTROPHILS NFR BLD MANUAL: 76 % (ref 37–80)
NEUTS BAND NFR BLD MANUAL: 3 % (ref 0–5)
PLATELET # BLD AUTO: 475 10*3/MM3 (ref 130–400)
PMV BLD AUTO: 9.3 FL (ref 6–12)
POIKILOCYTOSIS BLD QL SMEAR: ABNORMAL
POTASSIUM BLD-SCNC: 3.3 MMOL/L (ref 3.5–5.3)
RBC # BLD AUTO: 3.16 10*6/MM3 (ref 4.2–5.4)
SMALL PLATELETS BLD QL SMEAR: ABNORMAL
SMUDGE CELLS BLD QL SMEAR: ABNORMAL
SODIUM BLD-SCNC: 133 MMOL/L (ref 135–145)
TOXIC GRANULATION: ABNORMAL
VANCOMYCIN TROUGH SERPL-MCNC: 19.93 MCG/ML (ref 10–20)
VARIANT LYMPHS NFR BLD MANUAL: 2 % (ref 0–5)
WBC NRBC COR # BLD: 13.91 10*3/MM3 (ref 4.8–10.8)

## 2018-04-11 PROCEDURE — 82962 GLUCOSE BLOOD TEST: CPT

## 2018-04-11 PROCEDURE — 94799 UNLISTED PULMONARY SVC/PX: CPT

## 2018-04-11 PROCEDURE — 80048 BASIC METABOLIC PNL TOTAL CA: CPT | Performed by: INTERNAL MEDICINE

## 2018-04-11 PROCEDURE — 94660 CPAP INITIATION&MGMT: CPT

## 2018-04-11 PROCEDURE — 85025 COMPLETE CBC W/AUTO DIFF WBC: CPT | Performed by: NURSE PRACTITIONER

## 2018-04-11 PROCEDURE — 25010000002 VANCOMYCIN PER 500 MG: Performed by: FAMILY MEDICINE

## 2018-04-11 PROCEDURE — 85007 BL SMEAR W/DIFF WBC COUNT: CPT | Performed by: NURSE PRACTITIONER

## 2018-04-11 PROCEDURE — 97110 THERAPEUTIC EXERCISES: CPT

## 2018-04-11 PROCEDURE — 94760 N-INVAS EAR/PLS OXIMETRY 1: CPT

## 2018-04-11 PROCEDURE — 63710000001 INSULIN LISPRO (HUMAN) PER 5 UNITS: Performed by: FAMILY MEDICINE

## 2018-04-11 PROCEDURE — 63710000001 INSULIN DETEMIR PER 5 UNITS: Performed by: NURSE PRACTITIONER

## 2018-04-11 PROCEDURE — 80202 ASSAY OF VANCOMYCIN: CPT | Performed by: FAMILY MEDICINE

## 2018-04-11 PROCEDURE — 97530 THERAPEUTIC ACTIVITIES: CPT

## 2018-04-11 RX ORDER — POTASSIUM CHLORIDE 750 MG/1
40 CAPSULE, EXTENDED RELEASE ORAL ONCE
Status: COMPLETED | OUTPATIENT
Start: 2018-04-11 | End: 2018-04-11

## 2018-04-11 RX ORDER — VANCOMYCIN HYDROCHLORIDE 1 G/200ML
1000 INJECTION, SOLUTION INTRAVENOUS
Status: COMPLETED | OUTPATIENT
Start: 2018-04-13 | End: 2018-04-13

## 2018-04-11 RX ADMIN — MEGESTROL ACETATE 400 MG: 40 SUSPENSION ORAL at 08:59

## 2018-04-11 RX ADMIN — APIXABAN 5 MG: 5 TABLET, FILM COATED ORAL at 08:57

## 2018-04-11 RX ADMIN — NYSTATIN: 100000 POWDER TOPICAL at 22:26

## 2018-04-11 RX ADMIN — METOPROLOL SUCCINATE 50 MG: 50 TABLET, FILM COATED, EXTENDED RELEASE ORAL at 08:56

## 2018-04-11 RX ADMIN — LEVOTHYROXINE SODIUM 50 MCG: 50 TABLET ORAL at 05:42

## 2018-04-11 RX ADMIN — ASPIRIN 81 MG 81 MG: 81 TABLET ORAL at 08:57

## 2018-04-11 RX ADMIN — COLLAGENASE SANTYL: 250 OINTMENT TOPICAL at 08:58

## 2018-04-11 RX ADMIN — LIDOCAINE 2 PATCH: 50 PATCH CUTANEOUS at 08:58

## 2018-04-11 RX ADMIN — DILTIAZEM HYDROCHLORIDE 60 MG: 60 TABLET, FILM COATED ORAL at 22:25

## 2018-04-11 RX ADMIN — IPRATROPIUM BROMIDE 0.5 MG: 0.5 SOLUTION RESPIRATORY (INHALATION) at 15:05

## 2018-04-11 RX ADMIN — DILTIAZEM HYDROCHLORIDE 60 MG: 60 TABLET, FILM COATED ORAL at 15:17

## 2018-04-11 RX ADMIN — INSULIN LISPRO 3 UNITS: 100 INJECTION, SOLUTION INTRAVENOUS; SUBCUTANEOUS at 22:28

## 2018-04-11 RX ADMIN — BUDESONIDE AND FORMOTEROL FUMARATE DIHYDRATE 2 PUFF: 160; 4.5 AEROSOL RESPIRATORY (INHALATION) at 07:05

## 2018-04-11 RX ADMIN — DOCUSATE SODIUM 100 MG: 100 CAPSULE, LIQUID FILLED ORAL at 08:57

## 2018-04-11 RX ADMIN — DOCUSATE SODIUM 100 MG: 100 CAPSULE, LIQUID FILLED ORAL at 22:24

## 2018-04-11 RX ADMIN — INSULIN LISPRO 4 UNITS: 100 INJECTION, SOLUTION INTRAVENOUS; SUBCUTANEOUS at 18:04

## 2018-04-11 RX ADMIN — GABAPENTIN 300 MG: 300 CAPSULE ORAL at 08:57

## 2018-04-11 RX ADMIN — POTASSIUM CHLORIDE 40 MEQ: 750 CAPSULE, EXTENDED RELEASE ORAL at 15:17

## 2018-04-11 RX ADMIN — VANCOMYCIN HYDROCHLORIDE 1000 MG: 1 INJECTION, SOLUTION INTRAVENOUS at 11:38

## 2018-04-11 RX ADMIN — INSULIN DETEMIR 10 UNITS: 100 INJECTION, SOLUTION SUBCUTANEOUS at 22:28

## 2018-04-11 RX ADMIN — LISINOPRIL 5 MG: 5 TABLET ORAL at 08:58

## 2018-04-11 RX ADMIN — APIXABAN 5 MG: 5 TABLET, FILM COATED ORAL at 22:25

## 2018-04-11 RX ADMIN — GABAPENTIN 600 MG: 300 CAPSULE ORAL at 22:28

## 2018-04-11 RX ADMIN — IPRATROPIUM BROMIDE 0.5 MG: 0.5 SOLUTION RESPIRATORY (INHALATION) at 19:00

## 2018-04-11 RX ADMIN — ATORVASTATIN CALCIUM 20 MG: 10 TABLET, FILM COATED ORAL at 22:25

## 2018-04-11 RX ADMIN — METOLAZONE 2.5 MG: 2.5 TABLET ORAL at 08:57

## 2018-04-11 RX ADMIN — TAMSULOSIN HYDROCHLORIDE 0.4 MG: 0.4 CAPSULE ORAL at 08:57

## 2018-04-11 RX ADMIN — DILTIAZEM HYDROCHLORIDE 60 MG: 60 TABLET, FILM COATED ORAL at 10:00

## 2018-04-11 RX ADMIN — POTASSIUM CHLORIDE 20 MEQ: 750 CAPSULE, EXTENDED RELEASE ORAL at 08:57

## 2018-04-11 RX ADMIN — ALBUTEROL SULFATE 1.25 MG: 2.5 SOLUTION RESPIRATORY (INHALATION) at 07:05

## 2018-04-11 RX ADMIN — ALBUTEROL SULFATE 1.25 MG: 2.5 SOLUTION RESPIRATORY (INHALATION) at 18:59

## 2018-04-11 RX ADMIN — INSULIN LISPRO 2 UNITS: 100 INJECTION, SOLUTION INTRAVENOUS; SUBCUTANEOUS at 12:02

## 2018-04-11 RX ADMIN — INSULIN LISPRO 2 UNITS: 100 INJECTION, SOLUTION INTRAVENOUS; SUBCUTANEOUS at 08:58

## 2018-04-11 RX ADMIN — PANTOPRAZOLE SODIUM 40 MG: 40 TABLET, DELAYED RELEASE ORAL at 05:42

## 2018-04-11 RX ADMIN — NYSTATIN: 100000 POWDER TOPICAL at 08:58

## 2018-04-11 RX ADMIN — IPRATROPIUM BROMIDE 0.5 MG: 0.5 SOLUTION RESPIRATORY (INHALATION) at 07:03

## 2018-04-11 RX ADMIN — HYDROXYUREA 500 MG: 500 CAPSULE ORAL at 08:57

## 2018-04-11 RX ADMIN — FOLIC ACID 1 MG: 1 TABLET ORAL at 08:57

## 2018-04-11 RX ADMIN — BUMETANIDE 1 MG: 1 TABLET ORAL at 08:58

## 2018-04-11 RX ADMIN — GUAIFENESIN 1200 MG: 600 TABLET, EXTENDED RELEASE ORAL at 08:57

## 2018-04-11 RX ADMIN — BUDESONIDE AND FORMOTEROL FUMARATE DIHYDRATE 2 PUFF: 160; 4.5 AEROSOL RESPIRATORY (INHALATION) at 19:00

## 2018-04-11 RX ADMIN — ALBUTEROL SULFATE 1.25 MG: 2.5 SOLUTION RESPIRATORY (INHALATION) at 15:06

## 2018-04-11 RX ADMIN — ESCITSLOPRAM 10 MG: 10 TABLET ORAL at 08:57

## 2018-04-11 NOTE — PROGRESS NOTES
"Pharmacy Dosing Service  Antimicrobial  VANCOMYCIN    Assessment/Action/Plan:  Trough level returned within therapeutic range, but at upper end ~48 hours post-dose. Dosage adjusted to 1000 mg IV q48h x 1 more dose to provide treatment through 4/15/18 as recommended by ID service. Pharmacy will continue to follow.      Subjective:  Tamy Sher is currently on VANCOMYCIN 1000 mg IV every 48 hours for the treatment of SSTI (post-op wound infection - Rt. Knee), day 13 of therapy; last dose currently due 4/15/18.    Objective:  Lab Results   Component Value Date    VANCOTROUGH 19.93 04/11/2018    VANCORANDOM 26.16 04/09/2018         87 y.o. female Ht: 152.4 cm (60\"); Wt: 89.2 kg (196 lb 9 oz) Body mass index is 38.39 kg/m².  Estimated Creatinine Clearance: 49.3 mL/min (by C-G formula based on SCr of 0.74 mg/dL).   Lab Results   Component Value Date    CREATININE 0.74 04/11/2018    CREATININE 0.71 04/10/2018    CREATININE 0.76 04/09/2018      Lab Results   Component Value Date    WBC 13.91 (H) 04/11/2018    WBC 13.76 (H) 04/10/2018    WBC 14.89 (H) 04/09/2018        Culture Results:  Microbiology Results (last 10 days)     Procedure Component Value - Date/Time    Urine Culture - Urine, Urine, Catheter [[id number]]  (Normal) Collected:  04/05/18 1882    Lab Status:  Final result Specimen:  Urine from Urine, Catheter Updated:  04/07/18 0745     Urine Culture No growth at 2 days          Ata Lincoln RPH PharmD  04/11/18 5:58 PM    "

## 2018-04-11 NOTE — PROGRESS NOTES
[facility] Medicine Services  INPATIENT PROGRESS NOTE    Length of Stay: 11  Date of Admission: 3/30/2018  Primary Care Physician: Barry Foley MD    Subjective   Chief Complaint: Follow-up shortness of breath  HPI   The patient is resting in bed with son at bedside. She tells me she feels ok today, except hear head feels kind of heavy and she gets dizzy at times. Her shortness of breath is much improved. She denies any chest pain. She denies nausea or vomiting. She would like for us to re-evaluate her medications and see if any of them can be discontinued, she feels like she is taking way too many medications. The pain under her right breast/rib area is much improved with lidocaine patches.     Review of Systems   All pertinent negatives and positives are as above. All other systems have been reviewed and are negative unless otherwise stated.     Objective    Temp:  [97.8 °F (36.6 °C)-98 °F (36.7 °C)] 98 °F (36.7 °C)  Heart Rate:  [67-87] 73  Resp:  [14-18] 18  BP: (148-159)/(59-83) 148/78  Physical Exam  Constitutional: She is oriented to person, place, and time. She appears well-developed and well-nourished. No distress.   Obese body habitus   HENT:   Head: Normocephalic and atraumatic.   Neck: Normal range of motion. Neck supple. No tracheal deviation present. No thyromegaly present.   Cardiovascular: Normal heart sounds and intact distal pulses.  Exam reveals no gallop and no friction rub.    No murmur heard.  Irregularly irregular.  Atrial fibrillation 69-92 per telemetry   Pulmonary/Chest: She has no wheezes. She has no rales.   Diminished breath sounds in bilateral bases, but also difficult to assess due to body habitus  Abdominal: Soft. Bowel sounds are normal. She exhibits no distension and no mass. There is no tenderness. There is no guarding.   Musculoskeletal: She exhibits edema (2+ pitting to bilateral lower and upper extremities-looks improved and skin is  starting to wrinkle).   Generalized weakness   Neurological: She is alert and oriented to person, place, and time.   Skin: Skin is warm and dry. There is no erythema.    Right knee/hip dressings are clean/dry/intact   Psychiatric: She has a normal mood and affect. Her behavior is normal. Judgment and thought content normal.   Vitals reviewed.    Results Review:  I have reviewed the labs, radiology results, and diagnostic studies.    Laboratory Data:     Results from last 7 days  Lab Units 04/11/18  0552 04/10/18  0610 04/09/18  0603   WBC 10*3/mm3 13.91* 13.76* 14.89*   HEMOGLOBIN g/dL 10.4* 10.1* 10.2*   HEMATOCRIT % 31.6* 31.2* 30.5*   PLATELETS 10*3/mm3 475* 482* 477*       Results from last 7 days  Lab Units 04/11/18  0552 04/10/18  0610 04/09/18  0603   SODIUM mmol/L 133* 135 134*   POTASSIUM mmol/L 3.3* 3.7 3.9   CHLORIDE mmol/L 87* 90* 91*   CO2 mmol/L >40.0* 40.0* 37.0*   BUN mg/dL 37* 37* 36*   CREATININE mg/dL 0.74 0.71 0.76   CALCIUM mg/dL 8.6 8.6 9.2   GLUCOSE mg/dL 150* 65* 71     I have reviewed the patient current medications.     Assessment/Plan   Assessment:  1. Right knee postoperative wound dehiscence- wound culture MRSA positive  2. Recent closed right femur fracture s/p IM nailing 3/10   3. Leukocytosis, stable  4. Acute on Chronic diastolic heart failure with preserved ejection fraction  5. Insulin dependent Diabetes Mellitus, Type 2, hgb A1c 6.4  6. Atrial fibrillation (recent diagnosis)- chronically anticoagulated with Eliquis  7. Morbid obesity, BMI 40.4  8. Hypertension  9. Hyperlipidemia  10. Hyponatremia, stable  11. Macrocytic anemia  12. Thrombocytosis, likely reactive. Improving  13. Constipation-improved  14. Chronic obstructive pulmonary disease   15. Acute on chronic hypoxic respiratory failure  16. Acute volume overload, resolving  17. Hypokalemia, mild    Plan:  1. Continue Vancomycin, with anticipated stop date of 4/15/18 per Dr. Mead  2. Continue Bumex only at this time and  discontinue Metolazone, monitor her closely.  Patient has lost 16 pounds in the last 5 days  3. Continue Symbicort, Duonebs, and incentive spirometry. Supplemental oxygen as needed.   4. Last blood glucoses- 195, 150, 156, 199. Increase nighttime Levemir to 10 units and continue sliding scale insulin as needed.  5. Blood pressure slightly better with increase in Lisinopril. Renal function stable. Continue to monitor  6. Continue physical and occupational therapy with much improvement  7. Will discontinue folic acid as folate level has been normal when checked in February, and Flomax since patient has indwelling estrada  8. Repeat CXR in AM  9. Labs in AM- CBC, BMP    Discharge Planning: I expect the patient to be discharged to SNF in 1-2 days.    BULMARO Mcghee   04/11/18   1:26 PM     Chart reviewed.   Patient examined.   Agree with assessment and plan.  Up in Chair with PT  Manda Andrew DO  04/11/18  1:57 PM

## 2018-04-11 NOTE — PLAN OF CARE
Problem: Fall Risk (Adult)  Goal: Absence of Fall  Outcome: Ongoing (interventions implemented as appropriate)      Problem: Patient Care Overview  Goal: Plan of Care Review  Outcome: Ongoing (interventions implemented as appropriate)   04/11/18 7365   Coping/Psychosocial   Plan of Care Reviewed With patient   Plan of Care Review   Progress no change   OTHER   Outcome Summary Pt has had no c/o pain this shift. C/o slight dizziness, vitals stable. No neuro changes. Abx cont. Drsgs c/d/i. Lift team was able to get pt up in chair. Safety maintained. Will continue to monitor.      Goal: Individualization and Mutuality  Outcome: Ongoing (interventions implemented as appropriate)    Goal: Discharge Needs Assessment  Outcome: Ongoing (interventions implemented as appropriate)    Goal: Interprofessional Rounds/Family Conf  Outcome: Ongoing (interventions implemented as appropriate)      Problem: Skin Injury Risk (Adult)  Goal: Skin Health and Integrity  Outcome: Ongoing (interventions implemented as appropriate)

## 2018-04-11 NOTE — THERAPY TREATMENT NOTE
Acute Care - Physical Therapy Treatment Note  [facility]     Patient Name: Tamy Sher  : 1930  MRN: [id number]  Today's Date: 2018  Onset of Illness/Injury or Date of Surgery: 18  Date of Referral to PT: 18  Referring Physician: Dr. Andrew    Admit Date: 3/30/2018    Visit Dx:    ICD-10-CM ICD-9-CM   1. Surgical wound infection, initial encounter T81.4XXA 998.59   2. Abnormality of gait and mobility R26.9 781.2   3. Other closed fracture of distal end of right femur, initial encounter S72.491A 821.29   4. Impaired mobility and ADLs Z74.09 799.89   5. Impaired mobility Z74.09 799.89     Patient Active Problem List   Diagnosis   • Ischemic chest pain   • Pneumonia of both lungs due to infectious organism   • Fall   • Closed displaced comminuted fracture of shaft of right femur   • Pain of right thigh   • Class 2 obesity with serious comorbidity in adult   • Type 2 diabetes mellitus with neurologic complication, with long-term current use of insulin   • STUART (acute kidney injury)   • Chronic diastolic heart failure   • Pleural effusion, left   • Hypoxia   • Surgical wound infection, initial encounter       Therapy Treatment          Rehabilitation Treatment Summary     Row Name 18 1431             Treatment Time/Intention    Discipline physical therapy assistant  -      Document Type therapy note (daily note)  -AH2      Subjective Information complains of;weakness;fatigue;pain  -2      Existing Precautions/Restrictions fall;non-weight bearing;oxygen therapy device and L/min  -2      Treatment Considerations/Comments NWB RLE  -2      Recorded by [AH] Yolie Francis, [facility] 18 1431 18 1431  [AH2] Yolie Francis, PTA 18 1525 18 1525      Row Name 18 1431             Vital Signs    Pre Systolic BP Rehab 143  -AH      Pre Treatment Diastolic BP 44  -AH      Intra Systolic BP Rehab 144  -AH      Intra Treatment Diastolic BP 53  -AH      Pre SpO2 (%) 95  -AH       O2 Delivery Pre Treatment supplemental O2   2.5L  -AH      Pre Patient Position Supine  -AH      Intra Patient Position Sitting  -AH      Recorded by [] Yolie Francis, hospitals 04/11/18 1525 04/11/18 1525      Row Name 04/11/18 1431             Bed Mobility Assessment/Treatment    Supine-Sit Barron (Bed Mobility) maximum assist (25% patient effort);2 person assist;verbal cues  -      Sit-Supine Barron (Bed Mobility) maximum assist (25% patient effort);2 person assist;verbal cues  -AH      Bed Mobility, Safety Issues decreased use of arms for pushing/pulling;decreased use of legs for bridging/pushing  -      Assistive Device (Bed Mobility) draw sheet  -      Recorded by [] Yolie Francis, hospitals 04/11/18 1525 04/11/18 1525      Row Name 04/11/18 1431             Therapeutic Exercise    Comment (Therapeutic Exercise) pt sat EOB 20 minutes,BLE AROM 10 x 2 reps sitting EOB  -AH      Recorded by [] Yolie Francis, VERNELL 04/11/18 1525 04/11/18 1525      Row Name 04/11/18 1431             Static Sitting Balance    Level of Barron (Unsupported Sitting, Static Balance) supervision  -      Sitting Position (Unsupported Sitting, Static Balance) sitting on edge of bed  -      Recorded by [] Yolie Francis, hospitals 04/11/18 1525 04/11/18 1525      Row Name 04/11/18 1431             Positioning and Restraints    Pre-Treatment Position in bed  -AH      Post Treatment Position bed  -AH      In Bed fowlers;call light within reach;encouraged to call for assist;exit alarm on;R waffle boot  -      Recorded by [] Yolie Francis, VERNELL 04/11/18 1525 04/11/18 1525      Row Name 04/11/18 1431             Pain Assessment    Additional Documentation Pain Scale: FACES Pre/Post-Treatment (Group)  -      Recorded by [] oYlie Francis, hospitals 04/11/18 1525 04/11/18 1525      Row Name 04/11/18 1431             Pain Scale: Numbers Pre/Post-Treatment    Pain Location - Side Right  -AH      Pain Location hip  -AH       Pain Intervention(s) Repositioned  -AH      Recorded by [AH] Yolie HEATH Francis, PTA 04/11/18 1525 04/11/18 1525      Row Name 04/11/18 1431             Pain Scale: FACES Pre/Post-Treatment    Pain: FACES Scale, Pretreatment 4-->hurts little more  -AH      Recorded by [AH] Yolie Francis, PTA 04/11/18 1525 04/11/18 1525      Row Name                Wound 03/31/18 0043 Right knee incision    Wound - Properties Group Date first assessed: 03/31/18 [MB] Time first assessed: 0043 [MB] Present On Admission : yes;picture taken [MB] Side: Right [MB] Location: knee [MB] Type: incision [MB] Additional Comments: wound dehiscence [MB] Recorded by:  [MB] Edward Pro, RNA 03/31/18 0246 03/31/18 0246    Row Name                Wound 03/31/18 0240 perirectal    Wound - Properties Group Date first assessed: 03/31/18 [MB] Time first assessed: 0240 [MB] Present On Admission : yes;picture taken [MB] Location: perirectal [MB] Stage, Pressure Injury: deep tissue injury [MB] Recorded by:  [MB] Edward Pro, RNA 03/31/18 0255 03/31/18 0255    Row Name                Wound 03/31/18 0012 Right upper thigh surgical    Wound - Properties Group Date first assessed: 03/31/18 [MB] Time first assessed: 0012 [MB] Present On Admission : yes [MB] Side: Right [MB2] Orientation: upper [MB2] Location: thigh [MB2] Type: surgical [MB2], open incision  Recorded by:  [MB] Edward Pro, JVOANNY 03/31/18 0612 03/31/18 0612 [MB2] Edward Pro, RNA 03/31/18 0615 03/31/18 0615    Row Name                Wound 03/31/18 1600 Right heel    Wound - Properties Group Date first assessed: 03/31/18 [DB] Time first assessed: 1600 [DB] Side: Right [DB] Location: heel [DB] Stage, Pressure Injury: deep tissue injury [DB] Recorded by:  [DB] Thuy Dobbins, RNA 03/31/18 1647 03/31/18 1647      User Key  (r) = Recorded By, (t) = Taken By, (c) = Cosigned By    Initials Name Effective Dates Discipline     Yolie Francis, PTA 08/02/16 -  PT    KATELYN CARPIO  Shilpi, RNA 06/02/17 -  Nurse    SAVANNA Pro, RNA 05/26/17 -  Nurse          Wound 03/31/18 0043 Right knee incision (Active)   Dressing Appearance dry;intact 4/11/2018  8:45 AM   Drainage Characteristics/Odor serosanguineous 4/10/2018 10:00 PM   Drainage Amount none 4/11/2018  8:45 AM       Wound 03/31/18 0240 perirectal (Active)   Dressing Appearance open to air 4/11/2018  8:45 AM   Base pink 4/11/2018  8:45 AM       Wound 03/31/18 0012 Right upper thigh surgical (Active)   Dressing Appearance dry;intact 4/11/2018  8:45 AM   Drainage Amount none 4/11/2018  8:45 AM       Wound 03/31/18 1600 Right heel (Active)   Dressing Appearance dry;intact 4/11/2018  8:45 AM   Drainage Amount none 4/11/2018  8:45 AM   Dressing Care, Wound foam 4/11/2018  8:45 AM             Physical Therapy Education     Title: PT OT SLP Therapies (Active)     Topic: Physical Therapy (Active)     Point: Mobility training (Done)    Learning Progress Summary     Learner Status Readiness Method Response Comment Documented by    Patient Done Acceptance E VU bed mobility  04/11/18 1525     Done Acceptance E VU,NR Plan of care, benefits of activity, bed mobility  04/09/18 1331     Active Acceptance E,D NR benefit of activity, bed mobility, exercise, plan of care  04/07/18 1130     Done Acceptance E VU,NR  NW 04/06/18 1348     Done Acceptance E VU,NR progression of POC NW 04/05/18 1203     Done Acceptance E VU,NR benefits of activity  04/04/18 1104     Active Acceptance E,D NR bed mobility, exercise, plan of care, benefits of activity CW 04/03/18 1027     Active Acceptance E,D NR bed mobility, exercise, plan of care  04/02/18 1327     Active Acceptance E NR  TB 04/01/18 0941          Point: Home exercise program (Active)    Learning Progress Summary     Learner Status Readiness Method Response Comment Documented by    Patient Active Acceptance E,D NR bed mobility, exercise, plan of care, benefits of activity CW 04/03/18 1027      Active Acceptance E,D NR bed mobility, exercise, plan of care CW 04/02/18 1327     Active Acceptance E NR  TB 04/01/18 0941          Point: Body mechanics (Done)    Learning Progress Summary     Learner Status Readiness Method Response Comment Documented by    Patient Done Acceptance E VU,NR Plan of care, benefits of activity, bed mobility  04/09/18 1331     Active Acceptance E,D NR benefit of activity, bed mobility, exercise, plan of care  04/07/18 1130     Active Acceptance E,D NR bed mobility, exercise, plan of care, benefits of activity CW 04/03/18 1027     Active Acceptance E,D NR bed mobility, exercise, plan of care CW 04/02/18 1327     Active Acceptance E NR  TB 04/01/18 0941          Point: Precautions (Active)    Learning Progress Summary     Learner Status Readiness Method Response Comment Documented by    Patient Active Acceptance E,D NR benefit of activity, bed mobility, exercise, plan of care  04/07/18 1130     Active Acceptance E,D NR bed mobility, exercise, plan of care, benefits of activity  04/03/18 1027     Active Acceptance E NR   04/01/18 0941                      User Key     Initials Effective Dates Name Provider Type Discipline     08/02/16 -  Tay Lau, PT Physical Therapist PT     08/02/16 -  Yolie Francis, PTA Physical Therapy Assistant PT     06/22/15 -  Dianna Calloway, PTA Physical Therapy Assistant PT     08/02/16 -  Nuzhat Justice, PTA Physical Therapy Assistant PT                    PT Recommendation and Plan     Plan of Care Reviewed With: patient  Progress: no change  Outcome Summary: pt trans to EOB mod-max of 2, sat EOB 20 minutes performed BLE exercises 10 reps x 2 sets. Pt trans BTB max of 2. Pt would benefit from SNF          Outcome Measures     Row Name 04/11/18 1500 04/10/18 1010 04/09/18 1425       How much help from another person do you currently need...    Turning from your back to your side while in flat bed without using bedrails? 2  -   --  --    Moving from lying on back to sitting on the side of a flat bed without bedrails? 2  -AH  --  --    Moving to and from a bed to a chair (including a wheelchair)? 1  -AH  --  --    Standing up from a chair using your arms (e.g., wheelchair, bedside chair)? 1  -AH  --  --    Climbing 3-5 steps with a railing? 1  -AH  --  --    To walk in hospital room? 1  -  --  --    AM-PAC 6 Clicks Score 8  -AH  --  --       How much help from another is currently needed...    Putting on and taking off regular lower body clothing?  -- 2  -CJ 2  -CJ    Bathing (including washing, rinsing, and drying)  -- 2  -CJ 2  -CJ    Toileting (which includes using toilet bed pan or urinal)  -- 2  -CJ 2  -CJ    Putting on and taking off regular upper body clothing  -- 3  -CJ 3  -CJ    Taking care of personal grooming (such as brushing teeth)  -- 3  -CJ 3  -CJ    Eating meals  -- 4  -CJ 4  -CJ    Score  -- 16  -CJ 16  -CJ       Functional Assessment    Outcome Measure Options AM-PAC 6 Clicks Basic Mobility (PT)  -  -- AM-PAC 6 Clicks Daily Activity (OT)  -    Row Name 04/09/18 1300             How much help from another person do you currently need...    Turning from your back to your side while in flat bed without using bedrails? 2  -CW      Moving from lying on back to sitting on the side of a flat bed without bedrails? 2  -CW      Moving to and from a bed to a chair (including a wheelchair)? 1  -CW      Standing up from a chair using your arms (e.g., wheelchair, bedside chair)? 1  -CW      Climbing 3-5 steps with a railing? 1  -CW      To walk in hospital room? 1  -CW      AM-PAC 6 Clicks Score 8  -CW         Functional Assessment    Outcome Measure Options AM-PAC 6 Clicks Basic Mobility (PT)  -        User Key  (r) = Recorded By, (t) = Taken By, (c) = Cosigned By    Initials Name Provider Type     Yolie Francis, VERNELL Physical Therapy Assistant    CYNDEE Green Occupational Therapy Assistant    CARIN CARPIO  VERNELL Calloway Physical Therapy Assistant           Time Calculation:         PT Charges     Row Name 04/11/18 1528             Time Calculation    Start Time 1431  -      Stop Time 1500  -      Time Calculation (min) 29 min  -      PT Received On 04/11/18  -      PT Goal Re-Cert Due Date 04/11/18  -         Time Calculation- PT    Total Timed Code Minutes- PT 29 minute(s)  -        User Key  (r) = Recorded By, (t) = Taken By, (c) = Cosigned By    Initials Name Provider Type     Yolie Francis PTA Physical Therapy Assistant          Therapy Charges for Today     Code Description Service Date Service Provider Modifiers Qty    63796995224 HC PT THER SUPP EA 15 MIN 4/10/2018 Yolie Francis PTA GP 3    65893245627 HC PT THERAPEUTIC ACT EA 15 MIN 4/11/2018 Yolie Francis PTA GP, KX 2          PT G-Codes  Outcome Measure Options: AM-PAC 6 Clicks Basic Mobility (PT)  Score: 8  Functional Limitation: Mobility: Walking and moving around  Mobility: Walking and Moving Around Current Status (): At least 80 percent but less than 100 percent impaired, limited or restricted  Mobility: Walking and Moving Around Goal Status (): At least 60 percent but less than 80 percent impaired, limited or restricted    Yolie Francis PTA  4/11/2018

## 2018-04-11 NOTE — DISCHARGE SUMMARY
[facility]  DISCHARGE SUMMARY       Date of Admission: 3/30/2018  Date of Discharge:  4/13/2018  Primary Care Physician: Barry Foley MD    Presenting Problem/History of Present Illness:  Surgical wound infection, initial encounter [T81.4XXA]     Final Discharge Diagnoses:  1. Right knee postoperative wound dehiscence- wound culture MRSA positive  2. Recent closed right femur fracture s/p IM nailing 3/10   3. Leukocytosis, stable  4. Acute on Chronic diastolic heart failure with preserved ejection fraction  5. Insulin dependent Diabetes Mellitus, Type 2, hgb A1c 6.4  6. Atrial fibrillation (recent diagnosis)- chronically anticoagulated with Eliquis  7. Morbid obesity, BMI 40.4  8. Hypertension  9. Hyperlipidemia  10. Hyponatremia, stable  11. Macrocytic anemia  12. Thrombocytosis, likely reactive. Improving  13. Constipation-improved  14. Chronic obstructive pulmonary disease   15. Acute on chronic hypoxic respiratory failure  16. Acute volume overload, resolving  17. Hypokalemia, mild    Consults:   1.  Dr. Nima Bundy-orthopedics  2.  Dr. Ari Mead- infectious diseases  3.  Dr. Joseph Turner- wound care    Procedures Performed:   Results for orders placed during the hospital encounter of 03/30/18   Adult Transthoracic Echo Limited W/ Cont if Necessary Per Protocol    Narrative · Left ventricular systolic function is normal. Estimated EF = 55%.  · Left ventricular diastolic dysfunction.  · Mild mitral valve regurgitation is present  · Compared to 1/30/18 RVSP normal now        Pertinent Test Results:   Imaging Results (all)     Procedure Component Value Units Date/Time    CT Chest Without Contrast [[id number]] Collected:  04/12/18 2028     Updated:  04/12/18 2037    Narrative:       CT CHEST without contrast dated 4/12/2018 5:54 PM CDT     HISTORY: infiltrate vs. volume overload.; T81.4XXA-Infection following a  procedure, initial encounter;  R26.9-Unspecified abnormalities of gait  and mobility; S72.491A-Other fracture of lower end of right femur,  initial encounter for closed fracture; Z74.09-Other reduced mobility;  Z74.09-Other reduced mobility     COMPARISON: 4/3/2018     DLP: 489 mGy cm. Automated exposure control was utilized to diminish  patient radiation dose.     TECHNIQUE: Serial helical tomographic images of the chest were acquired.  Bone and soft tissue algorithms were provided. Coronal reformatted  images were also provided for review.     FINDINGS:  The imaged portion of the neck and thyroid gland is unremarkable.      There is persistent rather diffuse groundglass disease within both  lungs. I would favor interstitial edema over a diffuse interstitial  pneumonitis.. Moderate bilateral pleural effusions are present stable  from the previous study. There is extensive atelectasis/consolidation  within the lower lobes related to the effusions as previously noted..  The trachea and bronchial tree are patent.     The heart, great vessels, and pulmonary vessels are normal in appearance  within limits of a noncontrast study. Coronary artery calcifications are  noted in the LAD distribution. There are also coronary calcifications in  the right coronary and circumflex distributions. There is no pericardial  effusion. No enlarged axillary or mediastinal lymph nodes are present.      No acute osseous abnormalities are present. As noted on the previous  examination there is stranding within the subcutaneous tissues  suggesting some third spacing of fluid..     There is a cortical cyst of the upper pole the left kidney. The adrenals  are normal in appearance.       Impression:       1. Persistent moderate bilateral pleural effusions with compressive  atelectasis within both lower lobes. In those portions of the lungs  which are aerated there is rather diffuse groundglass disease showing  slight progression from the previous study. I would favor  interstitial  edema over an interstitial pneumonitis. The effusions are stable from  the prior exam.  2. Extensive coronary calcifications. There is moderate cardiomegaly.  3. No enlarged mediastinal or axillary lymphadenopathy.  4. There is some stranding within the subcutaneous tissues as noted  previously suggesting some third spacing of fluid. I feel this has shown  perhaps some slight improvement from the previous study..      4/12/2018  This report was finalized on 04/12/2018 20:34 by Dr. John Zimmerman MD.    XR Chest 1 View [[id number]] Collected:  04/12/18 0717     Updated:  04/12/18 0722    Narrative:       EXAMINATION:   XR CHEST 1 VW-  4/12/2018 7:17 AM CDT     HISTORY: Follow-up pleural effusion     Frontal upright radiograph of the chest 4/12/2018 4:22 AM CDT     COMPARISON: April 9, 2018.     FINDINGS:   Infiltrate in the mid right lung is noted. This may be atelectasis or  possibly an inflammatory process. Posterior right pleural effusion is  noted. Left pleural effusions present. Atelectasis left lower lobe is  noted.. Cardiac silhouettes mildly enlarged.      The osseous structures and surrounding soft tissues demonstrate no acute  abnormality.       Impression:       1. Persistent infiltrate in the right lung and bilateral posterior  pleural effusions this may be congestive failure inflammatory etiology  cannot be excluded.        This report was finalized on 04/12/2018 07:19 by Dr. Klever Hooker MD.    XR Chest 1 View [[id number]] Collected:  04/09/18 0708     Updated:  04/09/18 0713    Narrative:       EXAMINATION: XR CHEST 1 VW- 4/9/2018 7:08 AM CDT     HISTORY: Follow-up volume overload; T81.4XXA-Infection following a  procedure, initial encounter; R26.9-Unspecified abnormalities of gait  and mobility; S72.491A-Other fracture of lower end of right femur,  initial encounter for closed fracture; Z74.09-Other reduced mobility;  Z74.09-Other reduced mobility.     REPORT: Comparison is made with  the study from 4/6/2018.     There are persistent and diffuse hazy airspace opacities bilaterally,  with partial consolidation of the left base and small pleural effusions  greater on the left. No pneumothorax is identified. There is mild  cardiomegaly. Overall, there is no significant change.       Impression:       Congestive heart failure with bilateral pleural effusions  left greater than right. Left basilar consolidation is present, which  may represent atelectasis. Pneumonia cannot entirely be excluded.  This report was finalized on 04/09/2018 07:10 by Dr. Addison Rivero MD.    XR Abdomen KUB [[id number]] Collected:  04/08/18 1649     Updated:  04/08/18 1654    Narrative:       XR ABDOMEN KUB- 4/8/2018 4:38 PM CDT     HISTORY: abdominal pain; T81.4XXA-Infection following a procedure,  initial encounter; R26.9-Unspecified abnormalities of gait and mobility;  S72.491A-Other fracture of lower end of right femur, initial encounter  for closed fracture; Z74.09-Other reduced mobility; Z74.09-Other reduced  mobility       COMPARISON: None     FINDINGS:  Prominent gaseous distention of the colon. The small bowel is relatively  decompressed. No pathologic calcification or organomegaly is visualized.     Evaluation for free intraperitoneal air is limited on a supine  radiograph, but there are no definite findings of free intraperitoneal  air. Incidentally noted splenic artery calcification and cholecystectomy  clips. Alves catheter also identified.     The osseous structures demonstrate no acute abnormality.        Impression:       1. Prominent gaseous distention of the colon. Overall nonobstructive  bowel gas pattern.         This report was finalized on 04/08/2018 16:51 by Dr Franck Pretty, .    XR Chest 1 View [[id number]] Collected:  04/06/18 1155     Updated:  04/06/18 1200    Narrative:       EXAMINATION:  XR CHEST 1 VW-  4/6/2018 11:22 AM CDT     HISTORY: Follow-up volume overload, shortness of breath,  worsening  hypoxia and leukocytosis.     COMPARISON: 4/3/2018.     FINDINGS:  There is slight worsening of bilateral infiltrate. There is  cardiomegaly with vascular redistribution. There are small pleural  effusions bilaterally.       Impression:       Congestive heart failure. The edema pattern may be slightly  worse.        This report was finalized on 04/06/2018 11:56 by Dr. Eduard Rodriguez MD.    XR Chest 1 View [[id number]] Collected:  04/04/18 0743     Updated:  04/04/18 0747    Narrative:       EXAMINATION: XR CHEST 1 VW-     4/3/2018 11:00 AM CDT     HISTORY: worsening hypoxia; T81.4XXA-Infection following a procedure,  initial encounter; R26.9-Unspecified abnormalities of gait and mobility;  S72.491A-Other fracture of lower end of right femur, initial encounter  for closed fracture; Z74.09-Other reduced mobility; Z74.09-Other reduced  mobility.     One view chest x-ray compared with 03/31/2018.     Interval partial clearing of the lung bases. There is persistent left  basilar consolidation.  Decreased pleural fluid.     The upper lobes are essentially clear.     Heart size is stable.     No new or increased lung disease.  No pneumothorax.     Summary:  1. Partial clearing of bibasilar infiltrate with decreased pleural  fluid.  This report was finalized on 04/04/2018 07:44 by Dr. Mamadou Garza MD.    CT Angiogram Chest With & Without Contrast [[id number]] Collected:  04/03/18 2206     Updated:  04/03/18 2215    Narrative:       CT chest angiogram dated 4/3/2018 6:22 PM CDT      HISTORY: Worsening hypoxia. COPD.     COMPARISON: 1/29/2018.      DLP: 554 mGy cm. Automated exposure control was utilized to diminish  patient radiation dose.     TECHNIQUE: Helical tomographic images of the chest were obtained after  the administration of intravenous contrast following angiogram protocol.  Additionally, 3D MIP reconstructions in the coronal and sagittal planes  were provided.        FINDINGS:    The imaged portion of  the base of the neck appears unremarkable.      There is adequate enhancement of the pulmonary arteries to evaluate for  central and segmental pulmonary emboli. There are no filling defects  within the main, lobar, segmental or visualized subsegmental pulmonary  arteries. The pulmonary vessels are within normal limits.      Moderate bilateral pleural effusions are present with associated  compressive atelectasis within the lower lobes. Patchy bilateral  groundglass disease is present within both lungs felt to represent  either a pneumonitis or perhaps pulmonary edema.. The trachea and  bronchial tree are patent.      There is moderate cardiomegaly. Coronary calcifications are noted in the  LAD and circumflex distribution as well as the right coronary  distribution.. There is no pericardial effusion.      No enlarged axillary or mediastinal lymph nodes are present.      The osseous structures of the thorax and surrounding soft tissues  demonstrate no acute process.      The patient has undergone prior cholecystectomy. There is stranding  within the subcutaneous tissues of the chest wall suggesting third  spacing of fluid..        Impression:       1. No evidence of pulmonary embolus. The thoracic aorta is normal in  caliber no dissection or aneurysm.  2. Extensive coronary calcifications a moderate cardiomegaly.  3. Moderate bilateral pleural effusions with compressive atelectasis of  the lower lobes and lingular segment of the left upper lobe. In those  portions of the lungs which are aerated there is patchy groundglass  disease for which I would favor pulmonary edema over an interstitial  pneumonitis. There is also third spacing of fluid with some thickening  of the skin and stranding within the subcutaneous tissues suggesting  developing anasarca/third spacing of fluid..        This report was finalized on 04/03/2018 22:12 by Dr. John Zimmerman MD.    XR Chest 1 View [[id number]] Collected:  03/31/18 0851      Updated:  03/31/18 0855    Narrative:       EXAMINATION: XR CHEST 1 VW-     3/31/2018 7:38 AM CDT     HISTORY: leukocytosis; T81.4XXA-Infection following a procedure, initial  encounter.     One view chest x-ray compared with 03/14/2018.     Bibasilar infiltrate with pleural fluid is very similar as compared with  2 weeks ago.  Persistent or recurrent pneumonia favored.     No pneumothorax.     Cardiomegaly is unchanged.     Summary:  1. Bibasilar infiltrate and pleural fluid, left greater than right.  2. Very similar appearance compared with the previous exam.  This report was finalized on 03/31/2018 08:52 by Dr. Mamadou Garza MD.    XR Knee 1 or 2 View Right [[id number]] Collected:  03/30/18 2203     Updated:  03/30/18 2213    Narrative:       EXAMINATION: XR KNEE 1 OR 2 VW RIGHT- 3/30/2018 10:03 PM CDT     HISTORY: Surgical wound dehiscence and infection     COMPARISON: 3/8/18     FINDINGS:  There has been previously knee arthroplasty. A femoral lisa and screws   are also noted. There is redemonstration of an acute distal femur  fracture.  Diffuse soft tissue swelling is noted. No soft tissue air is  seen. Vascular calcifications are present. No perihardware lucency is  seen to suggest infection or loosening.       Impression:       Post surgical and post-traumatic changes of the knee and distal femur.  This report was finalized on 03/30/2018 22:09 by Dr. Mohan Pinzon MD.        Lab Results (last 7 days)     Procedure Component Value Units Date/Time    POC Glucose Once [[id number]]  (Abnormal) Collected:  04/13/18 1107    Specimen:  Blood Updated:  04/13/18 1139     Glucose 177 (H) mg/dL      Comment: : [id number] [address] [facility] AllieMeter ID: [id number]       POC Glucose Once [[id number]]  (Normal) Collected:  04/13/18 0837    Specimen:  Blood Updated:  04/13/18 0848     Glucose 130 mg/dL      Comment: : [id number] Revber AllieMeter ID: [id number]       Basic Metabolic Panel [[id number]]  (Abnormal)  Collected:  04/13/18 0620    Specimen:  Blood Updated:  04/13/18 0701     Glucose 129 (H) mg/dL      BUN 39 (H) mg/dL      Creatinine 0.83 mg/dL      Sodium 132 (L) mmol/L      Potassium 4.6 mmol/L      Chloride 88 (L) mmol/L      CO2 >40.0 (C) mmol/L      Calcium 8.6 mg/dL      eGFR Non African Amer 65 mL/min/1.73      BUN/Creatinine Ratio 47.0 (H)     Anion Gap -- mmol/L      Comment: Unable to calculate Anion Gap.       Narrative:       The MDRD GFR formula is only valid for adults with stable renal function between ages 18 and 70.    POC Glucose Once [[id number]]  (Abnormal) Collected:  04/12/18 2204    Specimen:  Blood Updated:  04/12/18 2215     Glucose 214 (H) mg/dL      Comment: : [id number] Marga SkipolaMeter ID: [id number]       POC Glucose Once [[id number]]  (Abnormal) Collected:  04/12/18 1714    Specimen:  Blood Updated:  04/12/18 1725     Glucose 205 (H) mg/dL      Comment: : [id number] [employer]DogsMeter ID: [id number]       POC Glucose Once [[id number]]  (Abnormal) Collected:  04/12/18 1143    Specimen:  Blood Updated:  04/12/18 1154     Glucose 210 (H) mg/dL      Comment: : [id number] WeissBeergerMeter ID: [id number]       C-reactive Protein [[id number]]  (Normal) Collected:  04/12/18 0655    Specimen:  Blood Updated:  04/12/18 0816     C-Reactive Protein 0.71 mg/dL     POC Glucose Once [[id number]]  (Abnormal) Collected:  04/12/18 0753    Specimen:  Blood Updated:  04/12/18 0804     Glucose 153 (H) mg/dL      Comment: : [id number] WeissBeergerMeter ID: [id number]       Comprehensive Metabolic Panel [[id number]]  (Abnormal) Collected:  04/12/18 0655    Specimen:  Blood Updated:  04/12/18 0746     Glucose 153 (H) mg/dL      BUN 37 (H) mg/dL      Creatinine 0.76 mg/dL      Sodium 132 (L) mmol/L      Potassium 4.1 mmol/L      Chloride 87 (L) mmol/L      CO2 40.0 (H) mmol/L      Calcium 8.6 mg/dL      Total Protein 5.0 (L) g/dL      Albumin 2.80 (L) g/dL      ALT (SGPT) 32 U/L      AST  (SGOT) 25 U/L      Alkaline Phosphatase 85 U/L      Total Bilirubin 0.4 mg/dL      eGFR Non African Amer 72 mL/min/1.73      Globulin 2.2 gm/dL      A/G Ratio 1.3 g/dL      BUN/Creatinine Ratio 48.7 (H)     Anion Gap 5.0 mmol/L     Narrative:       The MDRD GFR formula is only valid for adults with stable renal function between ages 18 and 70.    CBC (No Diff) [[id number]]  (Abnormal) Collected:  04/12/18 0655    Specimen:  Blood Updated:  04/12/18 0732     WBC 14.26 (H) 10*3/mm3      RBC 3.08 (L) 10*6/mm3      Hemoglobin 10.2 (L) g/dL      Hematocrit 30.9 (L) %      .3 (H) fL      MCH 33.1 (H) pg      MCHC 33.0 g/dL      RDW 18.5 (H) %      RDW-SD 67.1 (H) fl      MPV 9.5 fL      Platelets 445 (H) 10*3/mm3     POC Glucose Once [[id number]]  (Abnormal) Collected:  04/11/18 2217    Specimen:  Blood Updated:  04/11/18 2228     Glucose 206 (H) mg/dL      Comment: : [id number] Breanna Herbert ID: [id number]       POC Glucose Once [[id number]]  (Abnormal) Collected:  04/11/18 1751    Specimen:  Blood Updated:  04/11/18 1802     Glucose 250 (H) mg/dL      Comment: : [id number] Adrian BridestoryMeter ID: [id number]       Vancomycin, Trough [[id number]]  (Normal) Collected:  04/11/18 1124    Specimen:  Blood Updated:  04/11/18 1159     Vancomycin Trough 19.93 mcg/mL     POC Glucose Once [[id number]]  (Abnormal) Collected:  04/11/18 1136    Specimen:  Blood Updated:  04/11/18 1147     Glucose 199 (H) mg/dL      Comment: : [id number] Adrian BridestoryMeter ID: [id number]       POC Glucose Once [[id number]]  (Abnormal) Collected:  04/11/18 0750    Specimen:  Blood Updated:  04/11/18 0801     Glucose 156 (H) mg/dL      Comment: : [id number] Summer Shadematthew CrewsMeter ID: [id number]       CBC & Differential [[id number]] Collected:  04/11/18 0552    Specimen:  Blood Updated:  04/11/18 0722    Narrative:       The following orders were created for panel order CBC & Differential.  Procedure                                Abnormality         Status                     ---------                               -----------         ------                     Manual Differential[[id number]]          Abnormal            Final result               CBC Auto Differential[[id number]]        Abnormal            Final result                 Please view results for these tests on the individual orders.    CBC Auto Differential [[id number]]  (Abnormal) Collected:  04/11/18 0552    Specimen:  Blood Updated:  04/11/18 0721     WBC 13.91 (H) 10*3/mm3      RBC 3.16 (L) 10*6/mm3      Hemoglobin 10.4 (L) g/dL      Hematocrit 31.6 (L) %      .0 (H) fL      MCH 32.9 (H) pg      MCHC 32.9 (L) g/dL      RDW 18.2 (H) %      RDW-SD 66.5 (H) fl      MPV 9.3 fL      Platelets 475 (H) 10*3/mm3     Manual Differential [[id number]]  (Abnormal) Collected:  04/11/18 0552    Specimen:  Blood Updated:  04/11/18 0721     Neutrophil % 76.0 %      Lymphocyte % 6.0 (L) %      Monocyte % 1.0 %      Eosinophil % 9.0 (H) %      Bands %  3.0 %      Metamyelocyte % 2.0 (H) %      Myelocyte % 1.0 (H) %      Atypical Lymphocyte % 2.0 %      Neutrophils Absolute 10.99 10*3/mm3      Lymphocytes Absolute 0.83 10*3/mm3      Monocytes Absolute 0.14 10*3/mm3      Eosinophils Absolute 1.25 (H) 10*3/mm3      Anisocytosis Mod/2+     Macrocytes Slight/1+     Poikilocytes Slight/1+     Smudge Cells Slight/1+     Toxic Granulation Slight/1+     Platelet Estimate Increased    Basic Metabolic Panel [[id number]]  (Abnormal) Collected:  04/11/18 0552    Specimen:  Blood Updated:  04/11/18 0624     Glucose 150 (H) mg/dL      BUN 37 (H) mg/dL      Creatinine 0.74 mg/dL      Sodium 133 (L) mmol/L      Potassium 3.3 (L) mmol/L      Chloride 87 (L) mmol/L      CO2 >40.0 (C) mmol/L      Calcium 8.6 mg/dL      eGFR Non African Amer 74 mL/min/1.73      BUN/Creatinine Ratio 50.0 (H)     Anion Gap -- mmol/L      Comment: Unable to calculate Anion Gap.       Narrative:       The MDRD GFR formula is only  valid for adults with stable renal function between ages 18 and 70.    POC Glucose Once [[id number]]  (Abnormal) Collected:  04/10/18 2223    Specimen:  Blood Updated:  04/10/18 2234     Glucose 195 (H) mg/dL      Comment: : [id number] Ander RileyucMeter ID: [id number]       POC Glucose Once [[id number]]  (Abnormal) Collected:  04/10/18 1626    Specimen:  Blood Updated:  04/10/18 1648     Glucose 134 (H) mg/dL      Comment: : [id number] Srini NelsonseyMeter ID: [id number]       POC Glucose Once [[id number]]  (Abnormal) Collected:  04/10/18 1237    Specimen:  Blood Updated:  04/10/18 1249     Glucose 134 (H) mg/dL      Comment: : [id number] Milligan OmarseyMeter ID: [id number]       POC Glucose Once [[id number]]  (Normal) Collected:  04/10/18 0852    Specimen:  Blood Updated:  04/10/18 0903     Glucose 77 mg/dL      Comment: : [id number] Srini OmarseyMeter ID: [id number]       CBC & Differential [[id number]] Collected:  04/10/18 0610    Specimen:  Blood Updated:  04/10/18 0715    Narrative:       The following orders were created for panel order CBC & Differential.  Procedure                               Abnormality         Status                     ---------                               -----------         ------                     Manual Differential[[id number]]          Abnormal            Final result               CBC Auto Differential[[id number]]        Abnormal            Final result                 Please view results for these tests on the individual orders.    CBC Auto Differential [[id number]]  (Abnormal) Collected:  04/10/18 0610    Specimen:  Blood Updated:  04/10/18 0715     WBC 13.76 (H) 10*3/mm3      RBC 3.09 (L) 10*6/mm3      Hemoglobin 10.1 (L) g/dL      Hematocrit 31.2 (L) %      .0 (H) fL      MCH 32.7 (H) pg      MCHC 32.4 (L) g/dL      RDW 18.2 (H) %      RDW-SD 67.7 (H) fl      MPV 9.4 fL      Platelets 482 (H) 10*3/mm3     Manual Differential [[id number]]  (Abnormal) Collected:   04/10/18 0610    Specimen:  Blood Updated:  04/10/18 0715     Neutrophil % 75.5 %      Lymphocyte % 8.2 (L) %      Monocyte % 7.1 %      Eosinophil % 6.1 %      Bands %  2.0 %      Atypical Lymphocyte % 1.0 %      Neutrophils Absolute 10.67 10*3/mm3      Lymphocytes Absolute 1.13 10*3/mm3      Monocytes Absolute 0.98 10*3/mm3      Eosinophils Absolute 0.84 (H) 10*3/mm3      Anisocytosis Slight/1+     Hypochromia Slight/1+     Poikilocytes Slight/1+     Schistocytes Slight/1+     WBC Morphology Normal     Platelet Estimate Increased    Basic Metabolic Panel [[id number]]  (Abnormal) Collected:  04/10/18 0610    Specimen:  Blood Updated:  04/10/18 0657     Glucose 65 (L) mg/dL      BUN 37 (H) mg/dL      Creatinine 0.71 mg/dL      Sodium 135 mmol/L      Potassium 3.7 mmol/L      Chloride 90 (L) mmol/L      CO2 40.0 (H) mmol/L      Calcium 8.6 mg/dL      eGFR Non African Amer 78 mL/min/1.73      BUN/Creatinine Ratio 52.1 (H)     Anion Gap 5.0 mmol/L     Narrative:       The MDRD GFR formula is only valid for adults with stable renal function between ages 18 and 70.    POC Glucose Once [[id number]]  (Normal) Collected:  04/09/18 2112    Specimen:  Blood Updated:  04/09/18 2123     Glucose 123 mg/dL      Comment: : [id number] Luis Alberto Kisha Rome ID: [id number]       POC Glucose Once [[id number]]  (Normal) Collected:  04/09/18 1656    Specimen:  Blood Updated:  04/09/18 1711     Glucose 104 mg/dL      Comment: : [id number] Srini AragonMetvahe ID: [id number]       POC Glucose Once [[id number]]  (Normal) Collected:  04/09/18 1159    Specimen:  Blood Updated:  04/09/18 1210     Glucose 120 mg/dL      Comment: : [id number] Srini AragonMeter ID: [id number]       Vancomycin, Random [[id number]] Collected:  04/09/18 1122    Specimen:  Blood Updated:  04/09/18 1159     Vancomycin Random 26.16 mcg/mL     POC Glucose Once [[id number]]  (Normal) Collected:  04/09/18 0838    Specimen:  Blood Updated:  04/09/18 0849     Glucose  81 mg/dL      Comment: : [id number] Srini AragonMeter ID: [id number]       POC Glucose Once [[id number]]  (Abnormal) Collected:  04/07/18 2150    Specimen:  Blood Updated:  04/09/18 0731     Glucose 170 (H) mg/dL      Comment: : [id number] Adri SalmeronMeter ID: [id number]       CBC & Differential [[id number]] Collected:  04/09/18 0603    Specimen:  Blood Updated:  04/09/18 0649    Narrative:       The following orders were created for panel order CBC & Differential.  Procedure                               Abnormality         Status                     ---------                               -----------         ------                     Manual Differential[[id number]]                                                         CBC Auto Differential[[id number]]        Abnormal            Final result                 Please view results for these tests on the individual orders.    CBC Auto Differential [[id number]]  (Abnormal) Collected:  04/09/18 0603    Specimen:  Blood Updated:  04/09/18 0649     WBC 14.89 (H) 10*3/mm3      RBC 3.07 (L) 10*6/mm3      Hemoglobin 10.2 (L) g/dL      Hematocrit 30.5 (L) %      MCV 99.3 (H) fL      MCH 33.2 (H) pg      MCHC 33.4 g/dL      RDW 18.2 (H) %      RDW-SD 65.3 (H) fl      MPV 9.6 fL      Platelets 477 (H) 10*3/mm3     Manual Differential [[id number]]  (Abnormal) Collected:  04/09/18 0603    Specimen:  Blood Updated:  04/09/18 0649     Neutrophil % 68.0 %      Lymphocyte % 15.0 %      Monocyte % 4.0 %      Eosinophil % 10.0 (H) %      Basophil % 2.0 %      Metamyelocyte % 1.0 (H) %      Neutrophils Absolute 10.13 (H) 10*3/mm3      Lymphocytes Absolute 2.23 10*3/mm3      Monocytes Absolute 0.60 10*3/mm3      Eosinophils Absolute 1.49 (H) 10*3/mm3      Basophils Absolute 0.30 (H) 10*3/mm3      Anisocytosis Slight/1+     Crenated RBC's Slight/1+     Elliptocytes Slight/1+     Macrocytes Slight/1+     Poikilocytes Slight/1+     WBC Morphology Normal     Platelet Estimate Increased      Clumped Platelets Present    Basic Metabolic Panel [[id number]]  (Abnormal) Collected:  04/09/18 0603    Specimen:  Blood Updated:  04/09/18 0638     Glucose 71 mg/dL      BUN 36 (H) mg/dL      Creatinine 0.76 mg/dL      Sodium 134 (L) mmol/L      Potassium 3.9 mmol/L      Chloride 91 (L) mmol/L      CO2 37.0 (H) mmol/L      Calcium 9.2 mg/dL      eGFR Non African Amer 72 mL/min/1.73      BUN/Creatinine Ratio 47.4 (H)     Anion Gap 6.0 mmol/L     Narrative:       The MDRD GFR formula is only valid for adults with stable renal function between ages 18 and 70.    POC Glucose Once [[id number]]  (Abnormal) Collected:  04/08/18 2118    Specimen:  Blood Updated:  04/08/18 2148     Glucose 170 (H) mg/dL      Comment: : [id number] Adri Welch AnnMeter ID: [id number]       POC Glucose Once [[id number]]  (Abnormal) Collected:  04/08/18 1844    Specimen:  Blood Updated:  04/08/18 1905     Glucose 166 (H) mg/dL      Comment: : [id number] Hard KatyMeter ID: [id number]       Lipase [[id number]]  (Normal) Collected:  04/08/18 0622    Specimen:  Blood Updated:  04/08/18 1619     Lipase 29 U/L     Amylase [[id number]]  (Normal) Collected:  04/08/18 0622    Specimen:  Blood Updated:  04/08/18 1619     Amylase 48 U/L     POC Glucose Once [[id number]]  (Normal) Collected:  04/08/18 1251    Specimen:  Blood Updated:  04/08/18 1310     Glucose 128 mg/dL      Comment: : [id number] Hard KatyMeter ID: [id number]       POC Glucose Once [[id number]]  (Normal) Collected:  04/08/18 0807    Specimen:  Blood Updated:  04/08/18 0836     Glucose 76 mg/dL      Comment: : [id number] Hard KatyMeter ID: [id number]       CBC & Differential [[id number]] Collected:  04/08/18 0622    Specimen:  Blood Updated:  04/08/18 0710    Narrative:       The following orders were created for panel order CBC & Differential.  Procedure                               Abnormality         Status                     ---------                                -----------         ------                     Manual Differential[[id number]]          Abnormal            Final result               CBC Auto Differential[[id number]]        Abnormal            Final result                 Please view results for these tests on the individual orders.    CBC Auto Differential [[id number]]  (Abnormal) Collected:  04/08/18 0622    Specimen:  Blood Updated:  04/08/18 0710     WBC 14.30 (H) 10*3/mm3      RBC 3.10 (L) 10*6/mm3      Hemoglobin 10.0 (L) g/dL      Hematocrit 30.5 (L) %      MCV 98.4 (H) fL      MCH 32.3 (H) pg      MCHC 32.8 (L) g/dL      RDW 18.2 (H) %      RDW-SD 64.8 (H) fl      MPV 9.4 fL      Platelets 520 (H) 10*3/mm3     Manual Differential [[id number]]  (Abnormal) Collected:  04/08/18 0622    Specimen:  Blood Updated:  04/08/18 0710     Neutrophil % 66.7 %      Lymphocyte % 12.1 (L) %      Monocyte % 6.1 %      Eosinophil % 6.1 %      Basophil % 1.0 %      Bands %  4.0 %      Metamyelocyte % 2.0 (H) %      Atypical Lymphocyte % 2.0 %      Neutrophils Absolute 10.11 10*3/mm3      Lymphocytes Absolute 1.73 10*3/mm3      Monocytes Absolute 0.87 10*3/mm3      Eosinophils Absolute 0.87 (H) 10*3/mm3      Basophils Absolute 0.14 10*3/mm3      Anisocytosis Slight/1+     Macrocytes Slight/1+     Polychromasia Slight/1+     WBC Morphology Normal     Platelet Estimate Increased    Basic Metabolic Panel [[id number]]  (Abnormal) Collected:  04/08/18 0622    Specimen:  Blood Updated:  04/08/18 0651     Glucose 83 mg/dL      BUN 35 (H) mg/dL      Creatinine 0.77 mg/dL      Sodium 134 (L) mmol/L      Potassium 3.3 (L) mmol/L      Chloride 92 (L) mmol/L      CO2 35.0 (H) mmol/L      Calcium 9.1 mg/dL      eGFR Non African Amer 71 mL/min/1.73      BUN/Creatinine Ratio 45.5 (H)     Anion Gap 7.0 mmol/L     Narrative:       The MDRD GFR formula is only valid for adults with stable renal function between ages 18 and 70.    Vancomycin, Trough Please collect prior to 0100 dose on 4/8  [[id number]]  (Abnormal) Collected:  04/08/18 0006    Specimen:  Blood Updated:  04/08/18 0053     Vancomycin Trough 25.24 (H) mcg/mL     POC Glucose Once [[id number]]  (Normal) Collected:  04/07/18 1642    Specimen:  Blood Updated:  04/07/18 1702     Glucose 107 mg/dL      Comment: : [id number] Hard KatyMeter ID: [id number]       POC Glucose Once [[id number]]  (Normal) Collected:  04/07/18 1143    Specimen:  Blood Updated:  04/07/18 1204     Glucose 119 mg/dL      Comment: : [id number] Hard KatyMeter ID: [id number]       POC Glucose Once [[id number]]  (Abnormal) Collected:  04/07/18 0908    Specimen:  Blood Updated:  04/07/18 0928     Glucose 163 (H) mg/dL      Comment: : [id number] Hard KatyMeter ID: [id number]       Urine Culture - Urine, Urine, Catheter [[id number]]  (Normal) Collected:  04/05/18 2158    Specimen:  Urine from Urine, Catheter Updated:  04/07/18 0745     Urine Culture No growth at 2 days    Basic Metabolic Panel [[id number]]  (Abnormal) Collected:  04/07/18 0612    Specimen:  Blood Updated:  04/07/18 0704     Glucose 89 mg/dL      BUN 34 (H) mg/dL      Creatinine 0.71 mg/dL      Sodium 134 (L) mmol/L      Potassium 3.9 mmol/L      Chloride 93 (L) mmol/L      CO2 34.0 (H) mmol/L      Calcium 9.2 mg/dL      eGFR Non African Amer 78 mL/min/1.73      BUN/Creatinine Ratio 47.9 (H)     Anion Gap 7.0 mmol/L     Narrative:       The MDRD GFR formula is only valid for adults with stable renal function between ages 18 and 70.    CBC & Differential [[id number]] Collected:  04/07/18 0455    Specimen:  Blood Updated:  04/07/18 0549    Narrative:       The following orders were created for panel order CBC & Differential.  Procedure                               Abnormality         Status                     ---------                               -----------         ------                     Manual Differential[[id number]]          Abnormal            Final result               CBC Auto  Differential[[id number]]        Abnormal            Final result                 Please view results for these tests on the individual orders.    Manual Differential [[id number]]  (Abnormal) Collected:  04/07/18 0455    Specimen:  Blood Updated:  04/07/18 0549     Neutrophil % 73.5 %      Lymphocyte % 9.2 (L) %      Monocyte % 8.2 %      Eosinophil % 5.1 %      Bands %  1.0 %      Atypical Lymphocyte % 3.1 %      Neutrophils Absolute 11.37 (H) 10*3/mm3      Lymphocytes Absolute 1.40 10*3/mm3      Monocytes Absolute 1.25 (H) 10*3/mm3      Eosinophils Absolute 0.78 (H) 10*3/mm3      Crenated RBC's Slight/1+     Elliptocytes Slight/1+     Macrocytes Slight/1+     Poikilocytes Slight/1+     Polychromasia Slight/1+     WBC Morphology Normal     Platelet Estimate Increased    CBC Auto Differential [[id number]]  (Abnormal) Collected:  04/07/18 0455    Specimen:  Blood Updated:  04/07/18 0549     WBC 15.27 (H) 10*3/mm3      RBC 3.16 (L) 10*6/mm3      Hemoglobin 10.4 (L) g/dL      Hematocrit 31.3 (L) %      MCV 99.1 (H) fL      MCH 32.9 (H) pg      MCHC 33.2 g/dL      RDW 18.0 (H) %      RDW-SD 64.7 (H) fl      MPV 10.0 fL      Platelets 507 (H) 10*3/mm3     POC Glucose Once [[id number]]  (Abnormal) Collected:  04/06/18 2005    Specimen:  Blood Updated:  04/06/18 2020     Glucose 165 (H) mg/dL      Comment: : [id number] Adri SalmeronMeter ID: [id number]       POC Glucose Once [[id number]]  (Normal) Collected:  04/06/18 1719    Specimen:  Blood Updated:  04/06/18 1746     Glucose 119 mg/dL      Comment: : [id number] Prashant Cordova ID: [id number]           Chief Complaint on Day of Discharge: None    Hospital Course:  Mrs. Sher is an 87-year-old  female who follows Dr. Barry Foley for primary care.  She has a past medical history significant for diabetes mellitus type II, chronic diastolic heart failure, hyperlipidemia, hypertension, CVA, and peripheral vascular disease.  The patient was recently admitted to  our facility from 03/08/2018 through 03/21/2018 after repair of a right femur fracture.  She presented to the [facility] emergency department on 03/30/2018 for postoperative wound dehiscence.  The patient was admitted to the hospitalist service for further evaluation and management with orthopedics to be consulted.    Per orthopedics, wound infection did not appear to be affecting the patient's joint, therefore infectious disease was consulted to guide antibiotic management.  The patient was originally placed on vancomycin and Zosyn IV.  Zosyn was discontinued after 3 days of dosing.  The patient's wound was positive for methicillin-resistant Staphylococcus aureus.  She has been continued on vancomycin since admission and will need to do so until 04/18/2018 per infectious disease. Will need to discontinue PICC line on 4/18 and begin doxycycline 100 mg twice daily starting 4/19.     Once antibiotic recommendations were made, the patient was felt to be stable number for discharge when she developed worsening shortness of breath.  Blood gases and chest x-ray were obtained.  The patient was found to be acutely hypoxic with a PO2 of 57 on 4 L nasal cannula.  The patient was placed on BiPAP.  2-D echocardiogram was obtained which showed left ventricular diastolic dysfunction with normal systolic function and estimated ejection fraction of 55%.  The patient was placed on twice daily Lasix.  She was also given a dose of albumin with a dose of Lasix.  Metolazone was added to her regimen with Lasix.  The patient was still requiring BiPAP in complaining of shortness of breath, so decision was made to change her to a Bumex drip.  With the addition of this, the patient has improved significantly.  She has now been weaned to oral Bumex.  She is now only requiring 2 L of oxygen per nasal cannula which is her regular home dosage.  She has lost  17 pounds since her highest weight of 212. Repeat CT yesterday shows  compressive atelectasis and she has been encouraged to use her Incentive spirometry. She will continue on bumex daily 1 mg but will need bmp and cbc on Monday. She will likely need dose reduction of her bumex at some point. She has chronic respiratory failure with oxygen at 2L at all times. She will need to continue duonebs as well for her COPD.     Some medication adjustments have been made while patient has been hospitalized.  Her Cardizem has been increased to 240 mg with good response from her blood pressure and heart rate.  Her lisinopril has also been increased from 2.5-5 mg.  We have added Lexapro and Megace to her medication regimen.    She has had problems with urinary retention and now has a chronic estrada cath. She will require Santyl ointment to her right knee daily with cleansing of wound with sterile qtip as recommended by Dr. Turner from wound care. Her wound in granulating well without any significant erythema.     She has chronic atrial fibrillation and her rate has been improved with cardizem institution. She is maintained on Eliquis chronically as well.     Dr. Mead's recommendations for antibiotics are as follows.:  Nursing home antibiotic recommendations:  1.  The nursing home attending should agree with these accommodations and cosign these recommendations prior to instituting.  2.  Diagnosis-probable superficial wound infection distal aspect of right knee incision.  Hard to exclude deeper infection, but findings seem most consistent with more superficial infection.  3.  Microbiology-MRSA  4.  Access-PICC line  5.  Antibiotic recommendation:  Vancomycin 1000 mg IV every other day through Wednesday, 04/18/2018  Stop vancomycin after the 04/18/2018 dose  Begin doxycycline 100 mg orally every 12 hours on 04/19/2018  6.  Pull PICC line on 04/18/2018 following her last dose of vancomycin  7.  Follow-up with me in 2-3 weeks    Condition on Discharge:  stable    Physical Exam on Discharge:  BP  "130/74 (BP Location: Left arm, Patient Position: Lying)   Pulse 78   Temp 97.6 °F (36.4 °C) (Oral)   Resp 18   Ht 152.4 cm (60\")   Wt 88.6 kg (195 lb 5 oz)   SpO2 99%   BMI 38.14 kg/m²   Physical Exam   Constitutional: She is oriented to person, place, and time. She appears well-developed and well-nourished.   HENT:   Head: Normocephalic and atraumatic.   Eyes: Conjunctivae and EOM are normal. Pupils are equal, round, and reactive to light.   Neck: Neck supple. No JVD present. No thyromegaly present.   Cardiovascular: Regular rhythm, normal heart sounds and intact distal pulses.  Exam reveals no gallop and no friction rub.    No murmur heard.  afib 52-61 with PVC's and pauses.    Pulmonary/Chest: Effort normal. No respiratory distress. She has no wheezes. She has no rales. She exhibits no tenderness.   Diminished bilaterally.    Abdominal: Soft. Bowel sounds are normal. She exhibits no distension. There is no tenderness. There is no rebound and no guarding.   Musculoskeletal: Normal range of motion. She exhibits no edema, tenderness or deformity.   Lymphadenopathy:     She has no cervical adenopathy.   Neurological: She is alert and oriented to person, place, and time. She displays normal reflexes. No cranial nerve deficit. She exhibits normal muscle tone.   Skin: Skin is warm and dry. No rash noted.   Wound right knee healing well without any erythema.    Psychiatric: She has a normal mood and affect. Her behavior is normal. Judgment and thought content normal.   flat       Discharge Disposition:  Skilled Nursing Facility (DC - External)    Discharge Medications:   Tamy Sher   Home Medication Instructions OSMAN:[id number]    Printed on:04/13/18 1431   Medication Information                      apixaban (ELIQUIS) 5 MG tablet tablet  Take 1 tablet by mouth Every 12 (Twelve) Hours.             aspirin 81 MG chewable tablet  Chew 1 tablet Daily.             budesonide-formoterol (SYMBICORT) 160-4.5 MCG/ACT " inhaler  Inhale 2 puffs 2 (Two) Times a Day.             bumetanide (BUMEX) 1 MG tablet  Take 1 tablet by mouth Daily.             collagenase 250 UNIT/GM ointment  Apply  topically Daily.             diltiaZEM CD (CARDIZEM CD) 240 MG 24 hr capsule  Take 1 capsule by mouth Daily.             docusate sodium (COLACE) 100 MG capsule  Take 100 mg by mouth Daily.             doxycycline (MONODOX) 50 MG capsule  Take 2 capsules by mouth Every 12 (Twelve) Hours.             escitalopram (LEXAPRO) 10 MG tablet  Take 1 tablet by mouth Daily.             folic acid (FOLVITE) 1 MG tablet  Take 1 mg by mouth Daily.             gabapentin (NEURONTIN) 300 MG capsule  Take 1 capsule by mouth Daily With Breakfast.             gabapentin (NEURONTIN) 300 MG capsule  Take 2 capsules by mouth Every Evening.             HYDROcodone-acetaminophen (NORCO) 7.5-325 MG per tablet  Take 1 tablet by mouth Every 6 (Six) Hours As Needed for Moderate Pain .             hydroxyurea (HYDREA) 500 MG capsule  Take 1 capsule by mouth Daily.             insulin detemir (LEVEMIR) 100 UNIT/ML injection  Inject 10 Units under the skin Every Night.             insulin lispro (humaLOG) 100 UNIT/ML injection  Inject 2-7 Units under the skin 4 (Four) Times a Day With Meals & at Bedtime.             ipratropium-albuterol (DUO-NEB) 0.5-2.5 mg/mL nebulizer  Take 3 mL by nebulization 4 (Four) Times a Day.             levothyroxine (SYNTHROID, LEVOTHROID) 50 MCG tablet  Take 50 mcg by mouth Daily.             lidocaine (LIDODERM) 5 %  Place 2 patches on the skin Daily. Remove & Discard patch within 12 hours or as directed by MD             lisinopril (PRINIVIL,ZESTRIL) 5 MG tablet  Take 1 tablet by mouth Daily.             megestrol (MEGACE) 40 MG/ML suspension  Take 10 mL by mouth Daily.             metoprolol succinate XL (TOPROL-XL) 50 MG 24 hr tablet  Take 1 tablet by mouth Daily.             pantoprazole (PROTONIX) 40 MG EC tablet  Take 40 mg by mouth  Daily.             potassium chloride (MICRO-K) 10 MEQ CR capsule  Take 2 capsules by mouth Daily.             simethicone (MYLICON) 80 MG chewable tablet  Chew 1 tablet 4 (Four) Times a Day As Needed for Flatulence.             simvastatin (ZOCOR) 40 MG tablet  Take 0.5 tablets by mouth Every Night.             tamsulosin (FLOMAX) 0.4 MG capsule 24 hr capsule  Take 1 capsule by mouth Daily.             vancomycin (VANCOCIN) 1-5 GM/200ML-% IVPB  Infuse 200 mL into a venous catheter Every Other Day for 3 days.                 Discharge Diet:   Diet Instructions     Diet: Consistent Carbohydrate, Cardiac; Thin       Discharge Diet:   Consistent Carbohydrate  Cardiac       Fluid Consistency:  Thin          Activity at Discharge:   Activity Instructions     Discharge Activity Restrictions       NON-WEIGHT BEARING RIGHT LOWER EXTREMITY.          Discharge Care Plan/Instructions: She will be discharged to [facility] today via ambulance.    Follow-up Appointments:   1. Nursing home provider iwthin a week.   2. Cbc, BMP on Monday  3. Dr. Bundy-  4. Dr. Jaquez-2-3    Test Results Pending at Discharge: none    Manda Andrew DO  04/13/18  2:31 PM    Time: 50 min.     Chart reviewed.  Patient examined   Agree with assessment and plan.   Manda Andrew DO  04/13/18  2:31 PM

## 2018-04-11 NOTE — PLAN OF CARE
Problem: Patient Care Overview  Goal: Plan of Care Review  Outcome: Ongoing (interventions implemented as appropriate)   04/11/18 8235   Coping/Psychosocial   Plan of Care Reviewed With patient   Coping/Psychosocial   Patient Agreement with Plan of Care agrees   Plan of Care Review   Progress no change   OTHER   Outcome Summary pt trans to EOB mod-max of 2, sat EOB 20 minutes performed BLE exercises 10 reps x 2 sets. Pt trans BTB max of 2. Pt would benefit from SNF

## 2018-04-11 NOTE — PLAN OF CARE
Problem: Fall Risk (Adult)  Goal: Absence of Fall  Outcome: Ongoing (interventions implemented as appropriate)      Problem: Infection, Risk/Actual (Adult)  Goal: Infection Prevention/Resolution  Outcome: Ongoing (interventions implemented as appropriate)      Problem: Patient Care Overview  Goal: Plan of Care Review   04/11/18 0316   Coping/Psychosocial   Plan of Care Reviewed With patient   Plan of Care Review   Progress no change   OTHER   Outcome Summary vss; alert and oriented x 4; no c/o pain; dressings changed; refused bipap for sleep; accuchecks; chronic estrada; turn every two hours; bed alarm; continue to monitor     Goal: Individualization and Mutuality  Outcome: Ongoing (interventions implemented as appropriate)    Goal: Discharge Needs Assessment  Outcome: Ongoing (interventions implemented as appropriate)    Goal: Interprofessional Rounds/Family Conf  Outcome: Ongoing (interventions implemented as appropriate)      Problem: Skin Injury Risk (Adult)  Goal: Skin Health and Integrity  Outcome: Ongoing (interventions implemented as appropriate)      Problem: Wound (Includes Pressure Injury) (Adult)  Goal: Signs and Symptoms of Listed Potential Problems Will be Absent, Minimized or Managed (Wound)  Outcome: Ongoing (interventions implemented as appropriate)

## 2018-04-11 NOTE — PLAN OF CARE
Problem: Fall Risk (Adult)  Goal: Absence of Fall  Outcome: Ongoing (interventions implemented as appropriate)      Problem: Infection, Risk/Actual (Adult)  Goal: Infection Prevention/Resolution  Outcome: Ongoing (interventions implemented as appropriate)      Problem: Patient Care Overview  Goal: Plan of Care Review  Outcome: Ongoing (interventions implemented as appropriate)   04/10/18 1942   Coping/Psychosocial   Plan of Care Reviewed With patient   Plan of Care Review   Progress no change   OTHER   Outcome Summary Alert and orientated. Perrla. 2.5 liters oxygen NC. Wound dressings changed as ordered. VSS. Continue accu checks as ordered.Safety maintained. Continue to turn every two hours.     Goal: Individualization and Mutuality  Outcome: Ongoing (interventions implemented as appropriate)    Goal: Discharge Needs Assessment  Outcome: Ongoing (interventions implemented as appropriate)    Goal: Interprofessional Rounds/Family Conf  Outcome: Ongoing (interventions implemented as appropriate)      Problem: Skin Injury Risk (Adult)  Goal: Skin Health and Integrity  Outcome: Ongoing (interventions implemented as appropriate)      Problem: Wound (Includes Pressure Injury) (Adult)  Goal: Signs and Symptoms of Listed Potential Problems Will be Absent, Minimized or Managed (Wound)  Outcome: Ongoing (interventions implemented as appropriate)

## 2018-04-12 ENCOUNTER — APPOINTMENT (OUTPATIENT)
Dept: CT IMAGING | Facility: HOSPITAL | Age: 83
End: 2018-04-12

## 2018-04-12 ENCOUNTER — APPOINTMENT (OUTPATIENT)
Dept: GENERAL RADIOLOGY | Facility: HOSPITAL | Age: 83
End: 2018-04-12

## 2018-04-12 LAB
ALBUMIN SERPL-MCNC: 2.8 G/DL (ref 3.5–5)
ALBUMIN/GLOB SERPL: 1.3 G/DL (ref 1.1–2.5)
ALP SERPL-CCNC: 85 U/L (ref 24–120)
ALT SERPL W P-5'-P-CCNC: 32 U/L (ref 0–54)
ANION GAP SERPL CALCULATED.3IONS-SCNC: 5 MMOL/L (ref 4–13)
AST SERPL-CCNC: 25 U/L (ref 7–45)
BILIRUB SERPL-MCNC: 0.4 MG/DL (ref 0.1–1)
BUN BLD-MCNC: 37 MG/DL (ref 5–21)
BUN/CREAT SERPL: 48.7 (ref 7–25)
CALCIUM SPEC-SCNC: 8.6 MG/DL (ref 8.4–10.4)
CHLORIDE SERPL-SCNC: 87 MMOL/L (ref 98–110)
CO2 SERPL-SCNC: 40 MMOL/L (ref 24–31)
CREAT BLD-MCNC: 0.76 MG/DL (ref 0.5–1.4)
CRP SERPL-MCNC: 0.71 MG/DL (ref 0–0.99)
DEPRECATED RDW RBC AUTO: 67.1 FL (ref 40–54)
ERYTHROCYTE [DISTWIDTH] IN BLOOD BY AUTOMATED COUNT: 18.5 % (ref 12–15)
GFR SERPL CREATININE-BSD FRML MDRD: 72 ML/MIN/1.73
GLOBULIN UR ELPH-MCNC: 2.2 GM/DL
GLUCOSE BLD-MCNC: 153 MG/DL (ref 70–100)
GLUCOSE BLDC GLUCOMTR-MCNC: 153 MG/DL (ref 70–130)
GLUCOSE BLDC GLUCOMTR-MCNC: 205 MG/DL (ref 70–130)
GLUCOSE BLDC GLUCOMTR-MCNC: 210 MG/DL (ref 70–130)
GLUCOSE BLDC GLUCOMTR-MCNC: 214 MG/DL (ref 70–130)
HCT VFR BLD AUTO: 30.9 % (ref 37–47)
HGB BLD-MCNC: 10.2 G/DL (ref 12–16)
MCH RBC QN AUTO: 33.1 PG (ref 28–32)
MCHC RBC AUTO-ENTMCNC: 33 G/DL (ref 33–36)
MCV RBC AUTO: 100.3 FL (ref 82–98)
PLATELET # BLD AUTO: 445 10*3/MM3 (ref 130–400)
PMV BLD AUTO: 9.5 FL (ref 6–12)
POTASSIUM BLD-SCNC: 4.1 MMOL/L (ref 3.5–5.3)
PROT SERPL-MCNC: 5 G/DL (ref 6.3–8.7)
RBC # BLD AUTO: 3.08 10*6/MM3 (ref 4.2–5.4)
SODIUM BLD-SCNC: 132 MMOL/L (ref 135–145)
WBC NRBC COR # BLD: 14.26 10*3/MM3 (ref 4.8–10.8)

## 2018-04-12 PROCEDURE — 94660 CPAP INITIATION&MGMT: CPT

## 2018-04-12 PROCEDURE — 25010000002 ONDANSETRON PER 1 MG: Performed by: FAMILY MEDICINE

## 2018-04-12 PROCEDURE — 63710000001 INSULIN DETEMIR PER 5 UNITS: Performed by: NURSE PRACTITIONER

## 2018-04-12 PROCEDURE — 94799 UNLISTED PULMONARY SVC/PX: CPT

## 2018-04-12 PROCEDURE — 94760 N-INVAS EAR/PLS OXIMETRY 1: CPT

## 2018-04-12 PROCEDURE — 71045 X-RAY EXAM CHEST 1 VIEW: CPT

## 2018-04-12 PROCEDURE — 86140 C-REACTIVE PROTEIN: CPT | Performed by: NURSE PRACTITIONER

## 2018-04-12 PROCEDURE — 63710000001 INSULIN LISPRO (HUMAN) PER 5 UNITS: Performed by: FAMILY MEDICINE

## 2018-04-12 PROCEDURE — 85027 COMPLETE CBC AUTOMATED: CPT | Performed by: NURSE PRACTITIONER

## 2018-04-12 PROCEDURE — 82962 GLUCOSE BLOOD TEST: CPT

## 2018-04-12 PROCEDURE — 71250 CT THORAX DX C-: CPT

## 2018-04-12 PROCEDURE — 80053 COMPREHEN METABOLIC PANEL: CPT | Performed by: NURSE PRACTITIONER

## 2018-04-12 RX ORDER — LACTULOSE 20 G/30ML
20 SOLUTION ORAL ONCE
Status: COMPLETED | OUTPATIENT
Start: 2018-04-12 | End: 2018-04-12

## 2018-04-12 RX ORDER — ONDANSETRON 2 MG/ML
4 INJECTION INTRAMUSCULAR; INTRAVENOUS EVERY 6 HOURS PRN
Status: DISCONTINUED | OUTPATIENT
Start: 2018-04-12 | End: 2018-04-13 | Stop reason: HOSPADM

## 2018-04-12 RX ORDER — GUAIFENESIN 600 MG/1
600 TABLET, EXTENDED RELEASE ORAL EVERY 12 HOURS SCHEDULED
Status: DISCONTINUED | OUTPATIENT
Start: 2018-04-12 | End: 2018-04-13 | Stop reason: HOSPADM

## 2018-04-12 RX ORDER — DILTIAZEM HYDROCHLORIDE 240 MG/1
240 CAPSULE, COATED, EXTENDED RELEASE ORAL
Status: DISCONTINUED | OUTPATIENT
Start: 2018-04-12 | End: 2018-04-13 | Stop reason: HOSPADM

## 2018-04-12 RX ADMIN — LEVOTHYROXINE SODIUM 50 MCG: 50 TABLET ORAL at 05:18

## 2018-04-12 RX ADMIN — COLLAGENASE SANTYL: 250 OINTMENT TOPICAL at 05:24

## 2018-04-12 RX ADMIN — ASPIRIN 81 MG 81 MG: 81 TABLET ORAL at 09:38

## 2018-04-12 RX ADMIN — LIDOCAINE 2 PATCH: 50 PATCH CUTANEOUS at 09:37

## 2018-04-12 RX ADMIN — METOPROLOL SUCCINATE 50 MG: 50 TABLET, FILM COATED, EXTENDED RELEASE ORAL at 09:38

## 2018-04-12 RX ADMIN — INSULIN LISPRO 3 UNITS: 100 INJECTION, SOLUTION INTRAVENOUS; SUBCUTANEOUS at 18:00

## 2018-04-12 RX ADMIN — IPRATROPIUM BROMIDE 0.5 MG: 0.5 SOLUTION RESPIRATORY (INHALATION) at 14:58

## 2018-04-12 RX ADMIN — IPRATROPIUM BROMIDE 0.5 MG: 0.5 SOLUTION RESPIRATORY (INHALATION) at 06:48

## 2018-04-12 RX ADMIN — DILTIAZEM HYDROCHLORIDE 240 MG: 240 CAPSULE, EXTENDED RELEASE ORAL at 15:48

## 2018-04-12 RX ADMIN — DOCUSATE SODIUM 100 MG: 100 CAPSULE, LIQUID FILLED ORAL at 09:39

## 2018-04-12 RX ADMIN — LACTULOSE 20 G: 20 SOLUTION ORAL at 15:48

## 2018-04-12 RX ADMIN — ALBUTEROL SULFATE 1.25 MG: 2.5 SOLUTION RESPIRATORY (INHALATION) at 21:22

## 2018-04-12 RX ADMIN — DILTIAZEM HYDROCHLORIDE 60 MG: 60 TABLET, FILM COATED ORAL at 05:18

## 2018-04-12 RX ADMIN — LISINOPRIL 5 MG: 5 TABLET ORAL at 09:39

## 2018-04-12 RX ADMIN — MEGESTROL ACETATE 400 MG: 40 SUSPENSION ORAL at 09:39

## 2018-04-12 RX ADMIN — ONDANSETRON 4 MG: 2 INJECTION INTRAMUSCULAR; INTRAVENOUS at 19:46

## 2018-04-12 RX ADMIN — INSULIN LISPRO 3 UNITS: 100 INJECTION, SOLUTION INTRAVENOUS; SUBCUTANEOUS at 12:43

## 2018-04-12 RX ADMIN — DILTIAZEM HYDROCHLORIDE 60 MG: 60 TABLET, FILM COATED ORAL at 11:37

## 2018-04-12 RX ADMIN — GABAPENTIN 300 MG: 300 CAPSULE ORAL at 09:39

## 2018-04-12 RX ADMIN — IPRATROPIUM BROMIDE 0.5 MG: 0.5 SOLUTION RESPIRATORY (INHALATION) at 21:23

## 2018-04-12 RX ADMIN — HYDROXYUREA 500 MG: 500 CAPSULE ORAL at 09:39

## 2018-04-12 RX ADMIN — APIXABAN 5 MG: 5 TABLET, FILM COATED ORAL at 22:12

## 2018-04-12 RX ADMIN — INSULIN DETEMIR 10 UNITS: 100 INJECTION, SOLUTION SUBCUTANEOUS at 22:13

## 2018-04-12 RX ADMIN — NYSTATIN: 100000 POWDER TOPICAL at 22:12

## 2018-04-12 RX ADMIN — BUMETANIDE 1 MG: 1 TABLET ORAL at 09:39

## 2018-04-12 RX ADMIN — POTASSIUM CHLORIDE 20 MEQ: 750 CAPSULE, EXTENDED RELEASE ORAL at 09:39

## 2018-04-12 RX ADMIN — POLYETHYLENE GLYCOL (3350) 17 G: 17 POWDER, FOR SOLUTION ORAL at 05:18

## 2018-04-12 RX ADMIN — INSULIN LISPRO 3 UNITS: 100 INJECTION, SOLUTION INTRAVENOUS; SUBCUTANEOUS at 22:13

## 2018-04-12 RX ADMIN — BUDESONIDE AND FORMOTEROL FUMARATE DIHYDRATE 2 PUFF: 160; 4.5 AEROSOL RESPIRATORY (INHALATION) at 06:57

## 2018-04-12 RX ADMIN — GUAIFENESIN 600 MG: 600 TABLET, EXTENDED RELEASE ORAL at 22:12

## 2018-04-12 RX ADMIN — BUDESONIDE AND FORMOTEROL FUMARATE DIHYDRATE 2 PUFF: 160; 4.5 AEROSOL RESPIRATORY (INHALATION) at 20:00

## 2018-04-12 RX ADMIN — INSULIN LISPRO 2 UNITS: 100 INJECTION, SOLUTION INTRAVENOUS; SUBCUTANEOUS at 09:40

## 2018-04-12 RX ADMIN — ALBUTEROL SULFATE 1.25 MG: 2.5 SOLUTION RESPIRATORY (INHALATION) at 14:58

## 2018-04-12 RX ADMIN — COLLAGENASE SANTYL: 250 OINTMENT TOPICAL at 09:39

## 2018-04-12 RX ADMIN — NYSTATIN: 100000 POWDER TOPICAL at 09:40

## 2018-04-12 RX ADMIN — ALBUTEROL SULFATE 1.25 MG: 2.5 SOLUTION RESPIRATORY (INHALATION) at 06:49

## 2018-04-12 RX ADMIN — ALBUTEROL SULFATE 1.25 MG: 2.5 SOLUTION RESPIRATORY (INHALATION) at 11:10

## 2018-04-12 RX ADMIN — ESCITSLOPRAM 10 MG: 10 TABLET ORAL at 09:39

## 2018-04-12 RX ADMIN — PANTOPRAZOLE SODIUM 40 MG: 40 TABLET, DELAYED RELEASE ORAL at 05:18

## 2018-04-12 RX ADMIN — IPRATROPIUM BROMIDE 0.5 MG: 0.5 SOLUTION RESPIRATORY (INHALATION) at 11:09

## 2018-04-12 RX ADMIN — APIXABAN 5 MG: 5 TABLET, FILM COATED ORAL at 09:38

## 2018-04-12 NOTE — PROGRESS NOTES
[facility] Medicine Services  INPATIENT PROGRESS NOTE    Length of Stay: 12  Date of Admission: 3/30/2018  Primary Care Physician: Barry Foley MD    Subjective   Chief Complaint: follow up volume overload  HPI   Pt is sitting in bed. States her breathing is Ok. Started having a cough last pm with clear sputum production. Is only sitting on side of bed with therapy.  States she has not been using her incentive spirometer much. No bowel movement for 2 days now. Right knee incision is much improved from 6 days ago. No family at bedside.     Review of Systems   All pertinent negatives and positives are as above. All other systems have been reviewed and are negative unless otherwise stated.     Objective    Temp:  [97.2 °F (36.2 °C)-98.2 °F (36.8 °C)] 97.2 °F (36.2 °C)  Heart Rate:  [62-89] 89  Resp:  [12-22] 16  BP: (136-154)/(51-73) 153/73  Physical Exam   Constitutional: She is oriented to person, place, and time. She appears well-developed and well-nourished.   HENT:   Head: Normocephalic and atraumatic.   Eyes: Conjunctivae and EOM are normal. Pupils are equal, round, and reactive to light.   Neck: Neck supple. No JVD present. No thyromegaly present.   Cardiovascular: Normal rate, regular rhythm, normal heart sounds and intact distal pulses.  Exam reveals no gallop and no friction rub.    No murmur heard.  afib 70-81 with PVC's. Did have bradycardia down to 29. Had 2.38 second pause.    Pulmonary/Chest: Effort normal and breath sounds normal. No respiratory distress. She has no wheezes. She has no rales. She exhibits no tenderness.   Abdominal: Soft. Bowel sounds are normal. She exhibits no distension. There is no tenderness. There is no rebound and no guarding.   Musculoskeletal: Normal range of motion. She exhibits no edema, tenderness or deformity.   Lymphadenopathy:     She has no cervical adenopathy.   Neurological: She is alert and oriented to person, place, and  time. She displays normal reflexes. No cranial nerve deficit. She exhibits normal muscle tone.   Skin: Skin is warm and dry. No rash noted.   Psychiatric: She has a normal mood and affect. Her behavior is normal. Judgment and thought content normal.     Results Review:  I have reviewed the labs, radiology results, and diagnostic studies.    Laboratory Data:     Results from last 7 days  Lab Units 04/12/18  0655 04/11/18  0552 04/10/18  0610   WBC 10*3/mm3 14.26* 13.91* 13.76*   HEMOGLOBIN g/dL 10.2* 10.4* 10.1*   HEMATOCRIT % 30.9* 31.6* 31.2*   PLATELETS 10*3/mm3 445* 475* 482*       Results from last 7 days  Lab Units 04/12/18  0655 04/11/18  0552 04/10/18  0610   SODIUM mmol/L 132* 133* 135   POTASSIUM mmol/L 4.1 3.3* 3.7   CHLORIDE mmol/L 87* 87* 90*   CO2 mmol/L 40.0* >40.0* 40.0*   BUN mg/dL 37* 37* 37*   CREATININE mg/dL 0.76 0.74 0.71   CALCIUM mg/dL 8.6 8.6 8.6   BILIRUBIN mg/dL 0.4  --   --    ALK PHOS U/L 85  --   --    ALT (SGPT) U/L 32  --   --    AST (SGOT) U/L 25  --   --    GLUCOSE mg/dL 153* 150* 65*       Culture Data:   Urine Culture   Date Value Ref Range Status   04/05/2018 No growth at 2 days  Final       Radiology Data:   Imaging Results (last 24 hours)     Procedure Component Value Units Date/Time    XR Chest 1 View [[id number]] Collected:  04/12/18 0717     Updated:  04/12/18 0722    Narrative:       EXAMINATION:   XR CHEST 1 VW-  4/12/2018 7:17 AM CDT     HISTORY: Follow-up pleural effusion     Frontal upright radiograph of the chest 4/12/2018 4:22 AM CDT     COMPARISON: April 9, 2018.     FINDINGS:   Infiltrate in the mid right lung is noted. This may be atelectasis or  possibly an inflammatory process. Posterior right pleural effusion is  noted. Left pleural effusions present. Atelectasis left lower lobe is  noted.. Cardiac silhouettes mildly enlarged.      The osseous structures and surrounding soft tissues demonstrate no acute  abnormality.       Impression:       1. Persistent  infiltrate in the right lung and bilateral posterior  pleural effusions this may be congestive failure inflammatory etiology  cannot be excluded.        This report was finalized on 04/12/2018 07:19 by Dr. Klever Hooker MD.        I have reviewed the patient current medications.     Assessment/Plan   Assessment:  1. Right knee postoperative wound dehiscence- wound culture MRSA positive  2. Recent closed right femur fracture s/p IM nailing 3/10   3. Leukocytosis, stable  4. Acute on Chronic diastolic heart failure with preserved ejection fraction  5. Insulin dependent Diabetes Mellitus, Type 2, hgb A1c 6.4  6. Atrial fibrillation (recent diagnosis)- chronically anticoagulated with Eliquis  7. Morbid obesity, BMI 40.4  8. Hypertension  9. Hyperlipidemia  10. Hyponatremia, stable  11. Macrocytic anemia  12. Thrombocytosis, likely reactive. Improving  13. Constipation-improved  14. Chronic obstructive pulmonary disease   15. Acute on chronic hypoxic respiratory failure  16. Acute volume overload, resolving  17. Hypokalemia, mild     Plan:  1. She has gained 2 pounds off IV Bumex.   2. Will continue oral bumex for now.   3. I instructed her to use her incentive spirometry and gave it to her.   4. She needs to mobilize with Physical therapy as much as possible. This was discussed with her as well.   5. Lactulose X1.     Discharge Planning: I expect the patient to be discharged to SNF in ? days.    BULMARO Cortés   04/12/18   2:06 PM     Chart reviewed.   Patient examined.   Agree with assessment and plan.  Discussed at length with EMILY Andrew DO  04/12/18  5:37 PM

## 2018-04-12 NOTE — PROGRESS NOTES
This patient's wound continues to improve the erythema is resolved but she still has an open wound that will need to granulate in

## 2018-04-12 NOTE — PROGRESS NOTES
"Palliative Care Daily Progress Note     support for patient/family    S: Medical record reviewed. Events noted. Awaiting discharge to skilled nursing facility. MOST form initiated but not completed/scanned to patient chart.    ROS:  History obtained from chart review    O: Code Status: Conditional Code   Advanced Directives: Advance Directive Status: Patient has advance directive, copy in chart   Goals of Care: Ongoing.     /69 (BP Location: Left arm, Patient Position: Lying)   Pulse 69   Temp 97.8 °F (36.6 °C) (Oral)   Resp 18   Ht 152.4 cm (60\")   Wt 90 kg (198 lb 6.4 oz)   SpO2 99%   BMI 38.75 kg/m²     Intake/Output Summary (Last 24 hours) at 04/12/18 0804  Last data filed at 04/12/18 0338   Gross per 24 hour   Intake                0 ml   Output             1500 ml   Net            -1500 ml         Labs:     Results from last 7 days  Lab Units 04/12/18  0655   WBC 10*3/mm3 14.26*   HEMOGLOBIN g/dL 10.2*   HEMATOCRIT % 30.9*   PLATELETS 10*3/mm3 445*       Results from last 7 days  Lab Units 04/12/18  0655   SODIUM mmol/L 132*   POTASSIUM mmol/L 4.1   CHLORIDE mmol/L 87*   CO2 mmol/L 40.0*   BUN mg/dL 37*   CREATININE mg/dL 0.76   CALCIUM mg/dL 8.6   BILIRUBIN mg/dL 0.4   ALK PHOS U/L 85   ALT (SGPT) U/L 32   AST (SGOT) U/L 25   GLUCOSE mg/dL 153*     Imaging Results (last 72 hours)     Procedure Component Value Units Date/Time    XR Chest 1 View [[id number]] Collected:  04/12/18 0717     Updated:  04/12/18 0722    Narrative:       EXAMINATION:   XR CHEST 1 VW-  4/12/2018 7:17 AM CDT     HISTORY: Follow-up pleural effusion     Frontal upright radiograph of the chest 4/12/2018 4:22 AM CDT     COMPARISON: April 9, 2018.     FINDINGS:   Infiltrate in the mid right lung is noted. This may be atelectasis or  possibly an inflammatory process. Posterior right pleural effusion is  noted. Left pleural effusions present. Atelectasis left lower lobe is  noted.. Cardiac silhouettes mildly enlarged.      The " osseous structures and surrounding soft tissues demonstrate no acute  abnormality.       Impression:       1. Persistent infiltrate in the right lung and bilateral posterior  pleural effusions this may be congestive failure inflammatory etiology  cannot be excluded.        This report was finalized on 04/12/2018 07:19 by Dr. Klever Hooker MD.          Diagnostics: Reviewed    Patient Active Problem List   Diagnosis   • Ischemic chest pain   • Pneumonia of both lungs due to infectious organism   • Fall   • Closed displaced comminuted fracture of shaft of right femur   • Pain of right thigh   • Class 2 obesity with serious comorbidity in adult   • Type 2 diabetes mellitus with neurologic complication, with long-term current use of insulin   • STUART (acute kidney injury)   • Chronic diastolic heart failure   • Pleural effusion, left   • Hypoxia   • Surgical wound infection, initial encounter       Physical Exam  No exam performed      Impression/Problem List:  Palliative Care Topics Discussed: palliative care, goals of care, care options and discharge options      1. Congestive heart failure, diastolic dysfunction, LVEF 55%, 4/9/2018; cardiomegaly, moderate, per CT 04/03/2018  2. Pleural effusions-bilateral, left greater than right  3. Mitral value regurgitation-mild  4. Coronary artery disease-extensive, LAD, circumflex, and RCA, per CT angiogram on 04/03/2018  5. Anasarca?-per CT, thickening of the skin and stranding within the subcutaneous tissues   6. peripheral vascular disease  7. diabetes mellitus  8. Hypertension  9. Hyperlipidemia   10. Osteoarthritis  11. lumbar spondylitis  12. Post-surgical infection-MRSA     Patient status: Disease state: Controlled with current treatments.  Functional status: Palliative Performance Scale Score: Performance 40% based on the following measures: Ambulation: Mainly in bed, Activity and Evidence of Disease: Unable to do any work, extensive evidence of disease, Self-Care: Mainly  assistance required,  Intake: Normal or reduced, LOC: Full, drowsy or confusion   Mental status: alert, oriented and depressed.  Nutritional status: no nutritional compromise at this time. Body mass index is 40.7 kg/m².  Psychosocial issues: none identified.  Family support: The patient receives support from her children..  POA/Healthcare surrogate-advance directive on file     Recommendations/Plan:  1. plan: Goals of care include CODE STATUS Aggressive DNR. Initiated medical orders for scope of treatment (MOST) form at bedside, again in line with the patient's current goals of care, to ensure her continuity of care. Patient and family are aware this form will need to be signed by the physician prior to her discharge. Awaiting discharge to skilled nursing facility for rehab.      Thank you for allowing us to participate in patient's plan of care. Palliative Care Team will follow as needed. Please call for symptom management needs.      Time spent: 10 minutes spent reviewing medical and medication records, assessing and examining patient, discussing with family, answering questions, providing some guidance about a plan and documentation of care, and coordinating care with other healthcare members, with > 50% time spent face to face.     Yessy Peterson, APRN  4/12/2018

## 2018-04-12 NOTE — PROGRESS NOTES
Continued Stay Note  [facility]     Patient Name: Tamy Sher  MRN: [id number]  Today's Date: 4/12/2018    Admit Date: 3/30/2018          Discharge Plan     Row Name 04/12/18 0915       Plan    Plan [facility]    Patient/Family in Agreement with Plan yes    Plan Comments Spoke with Adrienne from [facility] [phone], bed still held for admission there at SNF level care.               Discharge Codes    No documentation.           VALENTINO Corley

## 2018-04-12 NOTE — PLAN OF CARE
Problem: Fall Risk (Adult)  Goal: Absence of Fall  Outcome: Ongoing (interventions implemented as appropriate)      Problem: Infection, Risk/Actual (Adult)  Goal: Infection Prevention/Resolution  Outcome: Ongoing (interventions implemented as appropriate)      Problem: Patient Care Overview  Goal: Plan of Care Review  Outcome: Ongoing (interventions implemented as appropriate)   04/12/18 0223   Coping/Psychosocial   Plan of Care Reviewed With patient   Plan of Care Review   Progress no change   OTHER   Outcome Summary no c/o pain this shift. Dressings CDI. FC to BSD. Cath care q 4 hour per protocol.        Problem: Skin Injury Risk (Adult)  Goal: Skin Health and Integrity  Outcome: Ongoing (interventions implemented as appropriate)      Problem: Wound (Includes Pressure Injury) (Adult)  Goal: Signs and Symptoms of Listed Potential Problems Will be Absent, Minimized or Managed (Wound)  Outcome: Ongoing (interventions implemented as appropriate)

## 2018-04-12 NOTE — PLAN OF CARE
Problem: Fall Risk (Adult)  Goal: Absence of Fall  Outcome: Ongoing (interventions implemented as appropriate)      Problem: Infection, Risk/Actual (Adult)  Goal: Infection Prevention/Resolution  Outcome: Ongoing (interventions implemented as appropriate)      Problem: Patient Care Overview  Goal: Plan of Care Review  Outcome: Ongoing (interventions implemented as appropriate)   04/12/18 1419   Coping/Psychosocial   Plan of Care Reviewed With patient   Plan of Care Review   Progress no change   OTHER   Outcome Summary No c/o pain this shift. Drsgs c/d/i. Cath care q 4 hours as ordered. No neuro changes. Safety maintained. Will continue to monitor.      Goal: Individualization and Mutuality  Outcome: Ongoing (interventions implemented as appropriate)    Goal: Discharge Needs Assessment  Outcome: Ongoing (interventions implemented as appropriate)    Goal: Interprofessional Rounds/Family Conf  Outcome: Ongoing (interventions implemented as appropriate)      Problem: Skin Injury Risk (Adult)  Goal: Skin Health and Integrity  Outcome: Ongoing (interventions implemented as appropriate)

## 2018-04-13 VITALS
HEART RATE: 78 BPM | SYSTOLIC BLOOD PRESSURE: 130 MMHG | BODY MASS INDEX: 38.34 KG/M2 | RESPIRATION RATE: 18 BRPM | HEIGHT: 60 IN | TEMPERATURE: 97.6 F | OXYGEN SATURATION: 99 % | WEIGHT: 195.31 LBS | DIASTOLIC BLOOD PRESSURE: 74 MMHG

## 2018-04-13 LAB
ANION GAP SERPL CALCULATED.3IONS-SCNC: ABNORMAL MMOL/L (ref 4–13)
BUN BLD-MCNC: 39 MG/DL (ref 5–21)
BUN/CREAT SERPL: 47 (ref 7–25)
CALCIUM SPEC-SCNC: 8.6 MG/DL (ref 8.4–10.4)
CHLORIDE SERPL-SCNC: 88 MMOL/L (ref 98–110)
CO2 SERPL-SCNC: >40 MMOL/L (ref 24–31)
CREAT BLD-MCNC: 0.83 MG/DL (ref 0.5–1.4)
GFR SERPL CREATININE-BSD FRML MDRD: 65 ML/MIN/1.73
GLUCOSE BLD-MCNC: 129 MG/DL (ref 70–100)
GLUCOSE BLDC GLUCOMTR-MCNC: 130 MG/DL (ref 70–130)
GLUCOSE BLDC GLUCOMTR-MCNC: 177 MG/DL (ref 70–130)
GLUCOSE BLDC GLUCOMTR-MCNC: 200 MG/DL (ref 70–130)
POTASSIUM BLD-SCNC: 4.6 MMOL/L (ref 3.5–5.3)
SODIUM BLD-SCNC: 132 MMOL/L (ref 135–145)

## 2018-04-13 PROCEDURE — 94660 CPAP INITIATION&MGMT: CPT

## 2018-04-13 PROCEDURE — 82962 GLUCOSE BLOOD TEST: CPT

## 2018-04-13 PROCEDURE — 94799 UNLISTED PULMONARY SVC/PX: CPT

## 2018-04-13 PROCEDURE — 80048 BASIC METABOLIC PNL TOTAL CA: CPT | Performed by: NURSE PRACTITIONER

## 2018-04-13 PROCEDURE — 97110 THERAPEUTIC EXERCISES: CPT

## 2018-04-13 PROCEDURE — 25010000002 VANCOMYCIN PER 500 MG: Performed by: FAMILY MEDICINE

## 2018-04-13 PROCEDURE — 63710000001 INSULIN LISPRO (HUMAN) PER 5 UNITS: Performed by: FAMILY MEDICINE

## 2018-04-13 RX ORDER — VANCOMYCIN HYDROCHLORIDE 1 G/200ML
1000 INJECTION, SOLUTION INTRAVENOUS EVERY OTHER DAY
Qty: 200 ML | Refills: 0 | Status: SHIPPED | OUTPATIENT
Start: 2018-01-01 | End: 2018-01-01

## 2018-04-13 RX ORDER — BUMETANIDE 1 MG/1
1 TABLET ORAL DAILY
Status: ON HOLD
Start: 2018-04-14 | End: 2018-01-01 | Stop reason: DRUGHIGH

## 2018-04-13 RX ORDER — LIDOCAINE 50 MG/G
2 PATCH TOPICAL
Status: ON HOLD
Start: 2018-04-14 | End: 2018-01-01

## 2018-04-13 RX ORDER — LISINOPRIL 5 MG/1
5 TABLET ORAL DAILY
Start: 2018-04-14 | End: 2019-01-01 | Stop reason: HOSPADM

## 2018-04-13 RX ORDER — ESCITALOPRAM OXALATE 10 MG/1
10 TABLET ORAL DAILY
Status: ON HOLD
Start: 2018-04-14 | End: 2018-01-01 | Stop reason: DRUGHIGH

## 2018-04-13 RX ORDER — DILTIAZEM HYDROCHLORIDE 240 MG/1
240 CAPSULE, COATED, EXTENDED RELEASE ORAL
Start: 2018-04-14

## 2018-04-13 RX ORDER — DOXYCYCLINE 50 MG/1
100 CAPSULE ORAL EVERY 12 HOURS SCHEDULED
Status: ON HOLD
Start: 2018-01-01 | End: 2018-01-01

## 2018-04-13 RX ORDER — MEGESTROL ACETATE 40 MG/ML
400 SUSPENSION ORAL DAILY
Status: ON HOLD
Start: 2018-04-14 | End: 2018-01-01

## 2018-04-13 RX ORDER — SIMETHICONE 80 MG
80 TABLET,CHEWABLE ORAL 4 TIMES DAILY PRN
Start: 2018-04-13

## 2018-04-13 RX ORDER — VANCOMYCIN HYDROCHLORIDE 1 G/200ML
1000 INJECTION, SOLUTION INTRAVENOUS ONCE
Qty: 200 ML | Refills: 0 | Status: SHIPPED | OUTPATIENT
Start: 2018-01-01 | End: 2018-04-13

## 2018-04-13 RX ORDER — TOLVAPTAN 15 MG/1
15 TABLET ORAL ONCE
Status: COMPLETED | OUTPATIENT
Start: 2018-04-13 | End: 2018-04-13

## 2018-04-13 RX ORDER — POTASSIUM CHLORIDE 750 MG/1
20 CAPSULE, EXTENDED RELEASE ORAL DAILY
Start: 2018-04-14 | End: 2019-01-01 | Stop reason: HOSPADM

## 2018-04-13 RX ADMIN — ESCITSLOPRAM 10 MG: 10 TABLET ORAL at 10:52

## 2018-04-13 RX ADMIN — PANTOPRAZOLE SODIUM 40 MG: 40 TABLET, DELAYED RELEASE ORAL at 05:41

## 2018-04-13 RX ADMIN — METOPROLOL SUCCINATE 50 MG: 50 TABLET, FILM COATED, EXTENDED RELEASE ORAL at 10:52

## 2018-04-13 RX ADMIN — ASPIRIN 81 MG 81 MG: 81 TABLET ORAL at 10:51

## 2018-04-13 RX ADMIN — IPRATROPIUM BROMIDE 0.5 MG: 0.5 SOLUTION RESPIRATORY (INHALATION) at 07:21

## 2018-04-13 RX ADMIN — LISINOPRIL 5 MG: 5 TABLET ORAL at 10:51

## 2018-04-13 RX ADMIN — BUMETANIDE 1 MG: 1 TABLET ORAL at 10:51

## 2018-04-13 RX ADMIN — GUAIFENESIN 600 MG: 600 TABLET, EXTENDED RELEASE ORAL at 10:49

## 2018-04-13 RX ADMIN — INSULIN LISPRO 2 UNITS: 100 INJECTION, SOLUTION INTRAVENOUS; SUBCUTANEOUS at 11:43

## 2018-04-13 RX ADMIN — DOCUSATE SODIUM 100 MG: 100 CAPSULE, LIQUID FILLED ORAL at 10:49

## 2018-04-13 RX ADMIN — ALBUTEROL SULFATE 1.25 MG: 2.5 SOLUTION RESPIRATORY (INHALATION) at 11:05

## 2018-04-13 RX ADMIN — INSULIN LISPRO 3 UNITS: 100 INJECTION, SOLUTION INTRAVENOUS; SUBCUTANEOUS at 17:26

## 2018-04-13 RX ADMIN — POTASSIUM CHLORIDE 20 MEQ: 750 CAPSULE, EXTENDED RELEASE ORAL at 10:49

## 2018-04-13 RX ADMIN — IPRATROPIUM BROMIDE 0.5 MG: 0.5 SOLUTION RESPIRATORY (INHALATION) at 11:05

## 2018-04-13 RX ADMIN — MEGESTROL ACETATE 400 MG: 40 SUSPENSION ORAL at 10:50

## 2018-04-13 RX ADMIN — DILTIAZEM HYDROCHLORIDE 240 MG: 240 CAPSULE, EXTENDED RELEASE ORAL at 10:51

## 2018-04-13 RX ADMIN — HYDROXYUREA 500 MG: 500 CAPSULE ORAL at 11:15

## 2018-04-13 RX ADMIN — NYSTATIN: 100000 POWDER TOPICAL at 11:18

## 2018-04-13 RX ADMIN — BUDESONIDE AND FORMOTEROL FUMARATE DIHYDRATE 2 PUFF: 160; 4.5 AEROSOL RESPIRATORY (INHALATION) at 07:26

## 2018-04-13 RX ADMIN — GABAPENTIN 300 MG: 300 CAPSULE ORAL at 11:12

## 2018-04-13 RX ADMIN — VANCOMYCIN HYDROCHLORIDE 1000 MG: 1 INJECTION, SOLUTION INTRAVENOUS at 11:13

## 2018-04-13 RX ADMIN — LIDOCAINE 2 PATCH: 50 PATCH CUTANEOUS at 10:52

## 2018-04-13 RX ADMIN — ALBUTEROL SULFATE 1.25 MG: 2.5 SOLUTION RESPIRATORY (INHALATION) at 07:21

## 2018-04-13 RX ADMIN — TOLVAPTAN 15 MG: 15 TABLET ORAL at 11:12

## 2018-04-13 RX ADMIN — LEVOTHYROXINE SODIUM 50 MCG: 50 TABLET ORAL at 05:42

## 2018-04-13 RX ADMIN — COLLAGENASE SANTYL: 250 OINTMENT TOPICAL at 10:56

## 2018-04-13 RX ADMIN — APIXABAN 5 MG: 5 TABLET, FILM COATED ORAL at 10:50

## 2018-04-13 NOTE — PROGRESS NOTES
Patient is being discharged to [facility] apparently to follow up further in the office in 4 weeks for x-rays of.  She is to continue nonweightbearing on the right lower extremity and should have every week every shift dressing changes about the wound on her right knee with cleaning of the opening with a sterile Q-tip

## 2018-04-13 NOTE — PLAN OF CARE
Problem: Fall Risk (Adult)  Goal: Absence of Fall  Outcome: Ongoing (interventions implemented as appropriate)      Problem: Infection, Risk/Actual (Adult)  Goal: Infection Prevention/Resolution  Outcome: Ongoing (interventions implemented as appropriate)      Problem: Patient Care Overview  Goal: Plan of Care Review  Outcome: Ongoing (interventions implemented as appropriate)   04/13/18 0319   Coping/Psychosocial   Plan of Care Reviewed With patient   Coping/Psychosocial   Patient Agreement with Plan of Care agrees with comment (describe)   Plan of Care Review   Progress no change   OTHER   Outcome Summary PT CO nausea and any liquid coming right back up, Zofran given, able ot take a few pills later. Rested well, congested, and coughing up phlegm. Drsgs CDI. Turn Q2, no new breakdown. Free from falls.        Problem: Skin Injury Risk (Adult)  Goal: Skin Health and Integrity  Outcome: Ongoing (interventions implemented as appropriate)      Problem: Wound (Includes Pressure Injury) (Adult)  Goal: Signs and Symptoms of Listed Potential Problems Will be Absent, Minimized or Managed (Wound)  Outcome: Ongoing (interventions implemented as appropriate)

## 2018-04-13 NOTE — PROGRESS NOTES
Infectious Diseases Progress Note    Patient:  Tamy Sher  YOB: 1930  MRN: [id number]   Admit date: 3/30/2018   Admitting Physician: Manda Andrew DO  Primary Care Physician: Barry Foley MD    Chief Complaint/Interval History: Received phone call from nurse.  Patient was not released to nursing home last week.  She is being discharged today.  She was due to complete vancomycin on Saturday.  If she had been discharged previously as anticipated she would have had follow-up with me the beginning of this week next week to assess whether to continue antibiotic treatment.  She seems to be doing better from a right knee wound standpoint.  Her granddaughter is at bedside.  She has remained somewhat weak.  She will get additional physical therapy.  No pain at her PICC line site.    Intake/Output Summary (Last 24 hours) at 04/13/18 1242  Last data filed at 04/13/18 0335   Gross per 24 hour   Intake                0 ml   Output              900 ml   Net             -900 ml     Allergies: No Known Allergies  Current Scheduled Medications:     albuterol 1.25 mg Nebulization 4x Daily - RT   And      ipratropium 0.5 mg Nebulization 4x Daily - RT   apixaban 5 mg Oral Q12H   aspirin 81 mg Oral Daily   atorvastatin 20 mg Oral Nightly   budesonide-formoterol 2 puff Inhalation BID - RT   bumetanide 1 mg Oral Daily   collagenase  Topical Q24H   diltiaZEM  mg Oral Q24H   docusate sodium 100 mg Oral BID   escitalopram 10 mg Oral Daily   gabapentin 300 mg Oral Daily With Breakfast   gabapentin 600 mg Oral Nightly   guaiFENesin 600 mg Oral Q12H   hydroxyurea 500 mg Oral Daily   insulin detemir 10 Units Subcutaneous Nightly   insulin lispro 2-7 Units Subcutaneous 4x Daily With Meals & Nightly   levothyroxine 50 mcg Oral Q AM   lidocaine 2 patch Transdermal Q24H   lisinopril 5 mg Oral Daily   megestrol 400 mg Oral Daily   metoprolol succinate XL 50 mg Oral Daily   nystatin  Topical Q12H   pantoprazole 40  "mg Oral Q AM   potassium chloride 20 mEq Oral Daily     Current PRN Medications:  •  acetaminophen  •  dextrose  •  dextrose  •  glucagon (human recombinant)  •  hydrOXYzine  •  magnesium citrate  •  ondansetron  •  polyethylene glycol  •  simethicone  •  sodium chloride    Review of Systems no rash or skin itching.  No diarrhea.    Vital Signs:  /74 (BP Location: Left arm, Patient Position: Lying)   Pulse 78   Temp 97.6 °F (36.4 °C) (Oral)   Resp 18   Ht 152.4 cm (60\")   Wt 88.6 kg (195 lb 5 oz)   SpO2 99%   BMI 38.14 kg/m²     Physical Exam  Vital signs reviewed.  Distal aspect of right knee operative incision looks better than last week.  There is no purulent drainage.  There is small amount of serous slightly cloudy fluid in the wound base.  Previously there had been some surrounding erythema which has improved.  There is no necrosis.  The wound is not yet granulated and closed.  There does not appear to be a joint effusion.  Line/IV site: No erythema, warmth, induration, or tenderness.    Lab Results:  CBC:   Results from last 7 days  Lab Units 04/12/18  0655 04/11/18  0552 04/10/18  0610 04/09/18  0603 04/08/18  0622 04/07/18  0455   WBC 10*3/mm3 14.26* 13.91* 13.76* 14.89* 14.30* 15.27*   HEMOGLOBIN g/dL 10.2* 10.4* 10.1* 10.2* 10.0* 10.4*   HEMATOCRIT % 30.9* 31.6* 31.2* 30.5* 30.5* 31.3*   PLATELETS 10*3/mm3 445* 475* 482* 477* 520* 507*     BMP:  Results from last 7 days  Lab Units 04/13/18  0620 04/12/18  0655 04/11/18  0552 04/10/18  0610 04/09/18  0603 04/08/18  0622 04/07/18  0612   SODIUM mmol/L 132* 132* 133* 135 134* 134* 134*   POTASSIUM mmol/L 4.6 4.1 3.3* 3.7 3.9 3.3* 3.9   CHLORIDE mmol/L 88* 87* 87* 90* 91* 92* 93*   CO2 mmol/L >40.0* 40.0* >40.0* 40.0* 37.0* 35.0* 34.0*   BUN mg/dL 39* 37* 37* 37* 36* 35* 34*   CREATININE mg/dL 0.83 0.76 0.74 0.71 0.76 0.77 0.71   GLUCOSE mg/dL 129* 153* 150* 65* 71 83 89   CALCIUM mg/dL 8.6 8.6 8.6 8.6 9.2 9.1 9.2   ALT (SGPT) U/L  --  32  --   " "--   --   --   --      Culture Results:    Wound culture 3/30/18:  Susceptibility      Staphylococcus aureus, MRSA     FERN     Clindamycin >=8 ug/ml Resistant     Erythromycin >=8 ug/ml Resistant     Gentamicin <=0.5 ug/ml\"><=0.5 ug/ml Susceptible     Inducible Clindamycin Resistance NEG  Negative     Levofloxacin >=8 ug/ml Resistant     Oxacillin >=4 ug/ml Resistant     Penicillin G >=0.5 ug/ml Resistant     Tetracycline <=1 ug/ml\"><=1 ug/ml Susceptible     Trimethoprim + Sulfamethoxazole <=10 ug/ml\"><=10 ug/ml Susceptible     Vancomycin <=0.5 ug/ml\"><=0.5 ug/ml Susceptible      Radiology: None  Additional Studies Reviewed: None    Impression:   Distal aspect of right knee operative incision showing improvement.  Most of the findings with regard to that wound seem to be superficial.  Again remains hard to exclude deeper infection.  Would like to continue IV antibiotics a little bit longer to hopefully give her the best chance of healing.  We will then transition to some oral suppressive treatment.  Feel an additional week of vancomycin followed by suppressive doxycycline would have the best risk-benefit ratio in her situation.    Recommendations:   Continue vancomycin for 1 more week  Pull PICC line at the end of her vancomycin treatment  Begin doxycycline 100 mg orally every 12 hours following completion of vancomycin  Follow-up in 2-3 weeks  Discussed with patient and granddaughter-they seemed comfortable with that plan  See nursing home recommendations below    Nursing home antibiotic recommendations:  1.  The nursing home attending should agree with these accommodations and cosign these recommendations prior to instituting.  2.  Diagnosis-probable superficial wound infection distal aspect of right knee incision.  Hard to exclude deeper infection, but findings seem most consistent with more superficial infection.  3.  Microbiology-MRSA  4.  Access-PICC line  5.  Antibiotic recommendation:  Vancomycin 1000 mg IV " every other day through Wednesday, 04/18/2018  Stop vancomycin after the 04/18/2018 dose  Begin doxycycline 100 mg orally every 12 hours on 04/19/2018  6.  Pull PICC line on 04/18/2018 following her last dose of vancomycin  7.  Follow-up with me in 2-3 weeks    Ari Jaquez MD

## 2018-04-13 NOTE — PROGRESS NOTES
Continued Stay Note  [facility]     Patient Name: Tamy Sher  MRN: [id number]  Today's Date: 4/13/2018    Admit Date: 3/30/2018          Discharge Plan     Row Name 04/13/18 1231       Plan    Plan [facility]    Patient/Family in Agreement with Plan yes    Final Discharge Disposition Code 03 - skilled nursing facility (SNF)    Final Note Plan discharge today to [facility]. Discharge summary, discharge med list, and a copy of scripts to be faxed to facility at [phone]. Patient's nurse will call report to [phone]. Plan EMS Transport.          Expected Discharge Date and Time     Expected Discharge Date Expected Discharge Time    Apr 13, 2018             CORTES Mcnamara

## 2018-04-13 NOTE — PLAN OF CARE
Problem: Fall Risk (Adult)  Goal: Absence of Fall  Outcome: Ongoing (interventions implemented as appropriate)      Problem: Infection, Risk/Actual (Adult)  Goal: Infection Prevention/Resolution  Outcome: Ongoing (interventions implemented as appropriate)      Problem: Patient Care Overview  Goal: Plan of Care Review  Outcome: Ongoing (interventions implemented as appropriate)   04/13/18 3949   Coping/Psychosocial   Plan of Care Reviewed With patient   Plan of Care Review   Progress no change   OTHER   Outcome Summary Pt is alert and oriented x4. Pt denies any pain, numbness or tingling. Pt states she does not have much of an appetite. Pt is recieving IV abx and plans to be D/C today to [facility]. Pt is going with PICC to recieve more IV abx. Pt safety maintained.      Goal: Individualization and Mutuality  Outcome: Ongoing (interventions implemented as appropriate)    Goal: Discharge Needs Assessment  Outcome: Ongoing (interventions implemented as appropriate)    Goal: Interprofessional Rounds/Family Conf  Outcome: Ongoing (interventions implemented as appropriate)      Problem: Skin Injury Risk (Adult)  Goal: Skin Health and Integrity  Outcome: Ongoing (interventions implemented as appropriate)      Problem: Wound (Includes Pressure Injury) (Adult)  Goal: Signs and Symptoms of Listed Potential Problems Will be Absent, Minimized or Managed (Wound)  Outcome: Ongoing (interventions implemented as appropriate)

## 2018-04-13 NOTE — PLAN OF CARE
Problem: Patient Care Overview  Goal: Plan of Care Review  Outcome: Ongoing (interventions implemented as appropriate)   04/13/18 1153   Coping/Psychosocial   Plan of Care Reviewed With patient   Plan of Care Review   Progress no change   OTHER   Outcome Summary Pts PO intake is poor at this time, 18% avg of the last 2 documneted meals (4/10/18). Pt reported she is still nauseous although it is improved some. Pt reported she ate good for breakfast this AM. Stated she did not drink the glucerna but was drinking one during time of visit. Pt to be d/c'd to [facility] today, no needs noted at this time. Will continue to follow.

## 2018-04-13 NOTE — PLAN OF CARE
Problem: Patient Care Overview  Goal: Plan of Care Review  Outcome: Ongoing (interventions implemented as appropriate)   04/11/18 1303   Coping/Psychosocial   Plan of Care Reviewed With patient   OTHER   Outcome Summary Pt. progressing as expected! No issues with tx!

## 2018-04-14 NOTE — THERAPY DISCHARGE NOTE
Acute Care - Occupational Therapy Discharge Summary  [facility]     Patient Name: Tamy Sher  : 1930  MRN: [id number]    Today's Date: 2018  Onset of Illness/Injury or Date of Surgery: 18    Date of Referral to OT: 18  Referring Physician: Dr. Andrew      Admit Date: 3/30/2018        OT Recommendation and Plan    Visit Dx:    ICD-10-CM ICD-9-CM   1. Surgical wound infection, initial encounter T81.4XXA 998.59   2. Abnormality of gait and mobility R26.9 781.2   3. Other closed fracture of distal end of right femur, initial encounter S72.491A 821.29   4. Impaired mobility and ADLs Z74.09 799.89   5. Impaired mobility Z74.09 799.89                     OT Rehab Goals     Row Name 18 0730             Bed Mobility Goal 1 (OT)    Activity/Assistive Device (Bed Mobility Goal 1, OT) rolling to left;rolling to right;sit to supine/supine to sit;bed rails  -TS      [place] Level/Cues Needed (Bed Mobility Goal 1, OT) minimum assist (75% or more patient effort);verbal cues required  -TS      Time Frame (Bed Mobility Goal 1, OT) 2 weeks  -TS      Progress/Outcomes (Bed Mobility Goal 1, OT) goal not met  -TS         Bathing Goal 1 (OT)    Activity/Assistive Device (Bathing Goal 1, OT) bathing skills, all   sitting EOB  -TS      [place] Level/Cues Needed (Bathing Goal 1, OT) moderate assist (50-74% patient effort)  -TS      Time Frame (Bathing Goal 1, OT) 2 weeks  -TS      Progress/Outcomes (Bathing Goal 1, OT) goal not met  -TS         Dressing Goal 1 (OT)    Activity/Assistive Device (Dressing Goal 1, OT) upper body dressing   sitting EOB  -TS      [place]/Cues Needed (Dressing Goal 1, OT) set-up required;supervision required  -TS      Time Frame (Dressing Goal 1, OT) 2 weeks  -TS      Progress/Outcome (Dressing Goal 1, OT) goal not met  -TS         Strength Goal 1 (OT)    Strength Goal 1 (OT) Pt will increase BUE strength to 4/5 for increased I with ADLs and functional mobility  -TS       Time Frame (Strength Goal 1, OT) 2 weeks  -TS      Progress/Outcome (Strength Goal 1, OT) goal not met  -TS        User Key  (r) = Recorded By, (t) = Taken By, (c) = Cosigned By    Initials Name Provider Type Discipline     Jasmyn Goode, YASIR/L Occupational Therapy Assistant OT                Outcome Measures     Row Name 04/11/18 1500 04/11/18 0473          How much help from another person do you currently need...    Turning from your back to your side while in flat bed without using bedrails? 2  -AH  --     Moving from lying on back to sitting on the side of a flat bed without bedrails? 2  -AH  --     Moving to and from a bed to a chair (including a wheelchair)? 1  -AH  --     Standing up from a chair using your arms (e.g., wheelchair, bedside chair)? 1  -AH  --     Climbing 3-5 steps with a railing? 1  -AH  --     To walk in hospital room? 1  -  --     AM-PAC 6 Clicks Score 8  -AH  --        How much help from another is currently needed...    Putting on and taking off regular lower body clothing?  -- 2  -CJ     Bathing (including washing, rinsing, and drying)  -- 2  -CJ     Toileting (which includes using toilet bed pan or urinal)  -- 2  -CJ     Putting on and taking off regular upper body clothing  -- 3  -CJ     Taking care of personal grooming (such as brushing teeth)  -- 3  -CJ     Eating meals  -- 4  -CJ     Score  -- 16  -CJ        Functional Assessment    Outcome Measure [facility] AM-PAC 6 Clicks Basic Mobility (PT)  - AM-PAC 6 Clicks Daily Activity (OT)  -       User Key  (r) = Recorded By, (t) = Taken By, (c) = Cosigned By    Initials Name Provider Type     Yolie Francis, VERNELL Physical Therapy Assistant    MELVINA GreenA/L Occupational Therapy Assistant              OT Discharge Summary  Reason for Discharge: Discharge from facility  Outcomes Achieved: Refer to plan of care for updates on goals achieved  Discharge Destination: [facility]      Jasmyn Goode,  COOLEY/L  4/14/2018

## 2018-04-14 NOTE — THERAPY DISCHARGE NOTE
Acute Care - Physical Therapy Discharge Summary  [facility]       Patient Name: Tamy Sher  : 1930  MRN: [id number]    Today's Date: 2018  Onset of Illness/Injury or Date of Surgery: 18    Date of Referral to PT: 18  Referring Physician: Dr. Andrew      Admit Date: 3/30/2018      PT Recommendation and Plan    Visit Dx:    ICD-10-CM ICD-9-CM   1. Surgical wound infection, initial encounter T81.4XXA 998.59   2. Abnormality of gait and mobility R26.9 781.2   3. Other closed fracture of distal end of right femur, initial encounter S72.491A 821.29   4. Impaired mobility and ADLs Z74.09 799.89   5. Impaired mobility Z74.09 799.89             Outcome Measures     Row Name 18 1500 18 1303          How much help from another person do you currently need...    Turning from your back to your side while in flat bed without using bedrails? 2  -AH  --     Moving from lying on back to sitting on the side of a flat bed without bedrails? 2  -AH  --     Moving to and from a bed to a chair (including a wheelchair)? 1  -AH  --     Standing up from a chair using your arms (e.g., wheelchair, bedside chair)? 1  -AH  --     Climbing 3-5 steps with a railing? 1  -AH  --     To walk in hospital room? 1  -AH  --     AM-PAC 6 Clicks Score 8  -AH  --        How much help from another is currently needed...    Putting on and taking off regular lower body clothing?  -- 2  -CJ     Bathing (including washing, rinsing, and drying)  -- 2  -CJ     Toileting (which includes using toilet bed pan or urinal)  -- 2  -CJ     Putting on and taking off regular upper body clothing  -- 3  -CJ     Taking care of personal grooming (such as brushing teeth)  -- 3  -CJ     Eating meals  -- 4  -CJ     Score  -- 16  -CJ        Functional Assessment    Outcome Measure [facility] AM-PAC 6 Clicks Basic Mobility (PT)  -AH AM-PAC 6 Clicks Daily Activity (OT)  -       User Key  (r) = Recorded By, (t) = Taken By, (c) = Cosigned By     Initials Name Provider Type     Yolie Francis PTA Physical Therapy Assistant    YASIR Green/BALAJI Occupational Therapy Assistant                      PT Rehab Goals     Row Name 04/14/18 0700             Bed Mobility Goal 1 (PT)    Progress/Outcomes (Bed Mobility Goal 1, PT) goal not met  -         Transfer Goal 1 (PT)    Progress/Outcome (Transfer Goal 1, PT) goal not met  -         Patient Education Goal (PT)    Progress/Outcome (Patient Education Goal, PT) goal not met  -        User Key  (r) = Recorded By, (t) = Taken By, (c) = Cosigned By    Initials Name Provider Type Discipline     Yolie Francis PTA Physical Therapy Assistant PT              PT Discharge Summary  Reason for Discharge: Discharge from facility  Outcomes Achieved: Refer to plan of care for updates on goals achieved  Discharge Destination: [facility]      Yolie Francis PTA   4/14/2018

## 2018-04-16 ENCOUNTER — TELEPHONE (OUTPATIENT)
Dept: INTERNAL MEDICINE | Age: 83
End: 2018-04-16

## 2018-05-30 ENCOUNTER — TELEPHONE (OUTPATIENT)
Dept: INTERNAL MEDICINE | Age: 83
End: 2018-05-30

## 2018-11-27 PROBLEM — I50.9 PLEURAL EFFUSION DUE TO CHF (CONGESTIVE HEART FAILURE) (HCC): Status: ACTIVE | Noted: 2018-01-01
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
## 2018-12-01 NOTE — PLAN OF CARE
Problem: Fall Risk (Adult)  Goal: Absence of Fall  Outcome: Ongoing (interventions implemented as appropriate)   12/01/18 0508   Fall Risk (Adult)   Absence of Fall achieves outcome       Problem: Patient Care Overview  Goal: Plan of Care Review  Outcome: Ongoing (interventions implemented as appropriate)   12/01/18 0508   Coping/Psychosocial   Plan of Care Reviewed With patient   Plan of Care Review   Progress improving   OTHER   Outcome Summary VSS, O2 sats WNL on 2L NC, pt unable to void post estrada cath, straight cathed with 400 out, fall precautions maintained, will continue to monitor for changes.       Problem: Cardiac: Heart Failure (Adult)  Goal: Signs and Symptoms of Listed Potential Problems Will be Absent, Minimized or Managed (Cardiac: Heart Failure)  Outcome: Ongoing (interventions implemented as appropriate)   12/01/18 0508   Goal/Outcome Evaluation   Problems Assessed (Heart Failure) all   Problems Present (Heart Failure) none       Problem: Skin Injury Risk (Adult)  Goal: Skin Health and Integrity  Outcome: Ongoing (interventions implemented as appropriate)   12/01/18 0508   Skin Injury Risk (Adult)   Skin Health and Integrity making progress toward outcome

## 2018-12-01 NOTE — DISCHARGE SUMMARY
[facility] Medicine Services  DISCHARGE SUMMARY       Date of Admission: 11/27/2018  Date of Discharge:  12/1/2018  Primary Care Physician: Barry Foley MD    Presenting Problem/History of Present Illness:  Pleural effusion due to CHF (congestive heart failure) (CMS/[facility]) [I50.9]  Pleural effusion due to CHF (congestive heart failure) (CMS/HCC) [I50.9]     Final Discharge Diagnoses:  Active Hospital Problems    Diagnosis   • Pleural effusion due to CHF (congestive heart failure) (CMS/HCC)       Consults:   Cardiology  Oncology    Procedures Performed:   CT Chest:  IMPRESSION:  1. Prominent left heart enlargement. Interlobular septal thickening  suggests interstitial edema. Bilateral (left greater than right) pleural  effusions. There is complete left lower lobe collapse and partial left  upper lobe collapse as result of a large left effusion.  2. No pulmonary embolus.    CXR:  IMPRESSION:  1. Status post left thoracentesis with pleural effusion near completely  resolved. No pneumothorax.    Pertinent Test Results:   Lab Results (last 48 hours)     Procedure Component Value Units Date/Time    CBC & Differential [[id number]] Collected:  12/01/18 0552    Specimen:  Blood Updated:  12/01/18 0739    Narrative:       The following orders were created for panel order CBC & Differential.  Procedure                               Abnormality         Status                     ---------                               -----------         ------                     Manual Differential[[id number]]          Abnormal            Final result               CBC Auto Differential[[id number]]        Abnormal            Final result                 Please view results for these tests on the individual orders.    CBC Auto Differential [[id number]]  (Abnormal) Collected:  12/01/18 0552    Specimen:  Blood Updated:  12/01/18 0739     WBC 25.10 10*3/mm3      RBC 2.96 10*6/mm3      Hemoglobin 10.4 g/dL       Hematocrit 30.9 %      .4 fL      MCH 35.1 pg      MCHC 33.7 g/dL      RDW 15.1 %      RDW-SD 58.0 fl      MPV 12.4 fL      Platelets 168 10*3/mm3     Manual Differential [[id number]]  (Abnormal) Collected:  12/01/18 0552    Specimen:  Blood Updated:  12/01/18 0739     Neutrophil % 75.3 %      Lymphocyte % 5.2 %      Monocyte % 4.1 %      Eosinophil % 10.3 %      Bands %  5.2 %      Neutrophils Absolute 20.18 10*3/mm3      Lymphocytes Absolute 1.31 10*3/mm3      Monocytes Absolute 1.03 10*3/mm3      Eosinophils Absolute 2.59 10*3/mm3      Anisocytosis Mod/2+     Macrocytes Mod/2+     WBC Morphology Normal     Platelet Morphology Normal    Basic Metabolic Panel [[id number]]  (Abnormal) Collected:  12/01/18 0552    Specimen:  Blood Updated:  12/01/18 0637     Glucose 218 mg/dL      BUN 59 mg/dL      Creatinine 1.06 mg/dL      Sodium 136 mmol/L      Potassium 4.3 mmol/L      Chloride 96 mmol/L      CO2 31.0 mmol/L      Calcium 7.2 mg/dL      eGFR Non African Amer 49 mL/min/1.73      BUN/Creatinine Ratio 55.7     Anion Gap 9.0 mmol/L     Narrative:       The MDRD GFR formula is only valid for adults with stable renal function between ages 18 and 70.    Blood Culture With JAZMINE - Blood, Hand, Right [[id number]] Collected:  11/27/18 1313    Specimen:  Blood from Hand, Right Updated:  11/30/18 1400     Blood Culture No growth at 3 days    Blood Culture With JAZMINE - Blood, Hand, Right [[id number]] Collected:  11/27/18 1313    Specimen:  Blood from Hand, Right Updated:  11/30/18 1400     Blood Culture No growth at 3 days    Non-gynecologic Cytology [[id number]] Collected:  11/29/18 1327    Specimen:  Body Fluid from Lung Updated:  11/30/18 1054     Case Report --     Non-gynecologic Cytology                          Case: [id number]                                  Authorizing Provider:  Gely Hastings DO     Collected:           11/29/2018 01:27 PM          Ordering Location:     [address]  Received:             2018 02:56 PM          Pathologist:           Robina North MD                                                          Specimen:    Lung, pleural fluid                                                                         Final Diagnosis --     Pleural effusion, left, thoracentesis:  Reactive mesothelial cells  Mixed inflammation       Gross Description --     Received fresh in the laboratory in a container labeled with patient name and  designated as pleural fluid.  400 mls of jie fluid.  1 thin prep and 1 cell block prepared.         Microscopic Description --     Cytologic examination reveals mixed inflammation including neutrophils and lymphocytes.  Reactive mesothelial cells are present.  Epithelioid malignant cells are not identified.      All of this report is an electronic transcription/translation of spoken language to printed text. The electronic translation of spoken language may permit erroneous, or at times, nonsensical words or phrases to be inadvertently transcribed; although I have reviewed the report for such errors, some may still exist.      Lactate Dehydrogenase, Body Fluid - Body Fluid, Pleural Cavity [[id number]] Collected:  18 1327    Specimen:  Body Fluid from Pleural Cavity Updated:  18 0816     LD, Fluid 100 IU/L      Comment:  __________________________________________________________  : Peritoneal :       Pleural        : Synovial :    CSF    :  :____________:______________________:__________:___________:  :            : Transudate: Exudate  :          :           :  :____________:___________:__________:__________:___________:  : Not Estab. : <200 U/L  : >200 U/L : <240 U/L : Not Estab.:  :____________:___________:__________:______________________:   The method performance specifications have not been   established for this test in body fluid. The test result   should be integrated into the clinical context for   interpretation.  The reference  intervals and other method performance specifications  have not been established for this test. The test result should be  integrated into the clinical context for interpretation.       Narrative:       Performed at:   - [address]  [id number]  : Zaid Roche PhD, Phone:  [phone]    Basic Metabolic Panel [[id number]]  (Abnormal) Collected:  11/30/18 0626    Specimen:  Blood Updated:  11/30/18 0737     Glucose 108 mg/dL      BUN 60 mg/dL      Creatinine 1.05 mg/dL      Sodium 137 mmol/L      Potassium 4.3 mmol/L      Chloride 96 mmol/L      CO2 34.0 mmol/L      Calcium 7.4 mg/dL      eGFR Non African Amer 49 mL/min/1.73      BUN/Creatinine Ratio 57.1     Anion Gap 7.0 mmol/L     Narrative:       The MDRD GFR formula is only valid for adults with stable renal function between ages 18 and 70.    CBC & Differential [[id number]] Collected:  11/30/18 0626    Specimen:  Blood Updated:  11/30/18 0731    Narrative:       The following orders were created for panel order CBC & Differential.  Procedure                               Abnormality         Status                     ---------                               -----------         ------                     Manual Differential[[id number]]          Abnormal            Final result               CBC Auto Differential[[id number]]        Abnormal            Final result                 Please view results for these tests on the individual orders.    CBC Auto Differential [[id number]]  (Abnormal) Collected:  11/30/18 0626    Specimen:  Blood Updated:  11/30/18 0731     WBC 28.53 10*3/mm3      RBC 3.02 10*6/mm3      Hemoglobin 10.5 g/dL      Hematocrit 31.5 %      .3 fL      MCH 34.8 pg      MCHC 33.3 g/dL      RDW 15.4 %      RDW-SD 58.6 fl      MPV 12.1 fL      Platelets 183 10*3/mm3     Manual Differential [[id number]]  (Abnormal) Collected:  11/30/18 0626    Specimen:  Blood Updated:  11/30/18 0731     Neutrophil %  79.4 %      Lymphocyte % 6.2 %      Monocyte % 3.1 %      Eosinophil % 8.2 %      Atypical Lymphocyte % 3.1 %      Neutrophils Absolute 22.65 10*3/mm3      Lymphocytes Absolute 1.77 10*3/mm3      Monocytes Absolute 0.88 10*3/mm3      Eosinophils Absolute 2.34 10*3/mm3      nRBC 1.0 /100 WBC      Anisocytosis Slight/1+     Macrocytes Slight/1+     Poikilocytes Slight/1+     WBC Morphology Normal     Platelet Morphology Normal    Body Fluid Cell Count With Differential - Body Fluid, Pleural Cavity [[id number]] Collected:  11/29/18 1327    Specimen:  Body Fluid from Pleural Cavity Updated:  11/29/18 1456    Narrative:       The following orders were created for panel order Body Fluid Cell Count With Differential - Body Fluid, Pleural Cavity.  Procedure                               Abnormality         Status                     ---------                               -----------         ------                     Body fluid cell count - ...[[id number]]  Abnormal            Final result               Body fluid differential ...[[id number]]                      Final result                 Please view results for these tests on the individual orders.    Body fluid differential - Body Fluid, Pleural Cavity [[id number]] Collected:  11/29/18 1327    Specimen:  Body Fluid from Pleural Cavity Updated:  11/29/18 1456     Neutrophils, Fluid 21 %      Lymphocytes, Fluid 31 %      Monocytes, Fluid 20 %      Unclassified Cells, Fluid 28 %     Body fluid cell count - Body Fluid, Pleural Cavity [[id number]]  (Abnormal) Collected:  11/29/18 1327    Specimen:  Body Fluid from Pleural Cavity Updated:  11/29/18 1433     Color, Fluid Yellow     Appearance, Fluid Slightly Cloudy     WBC, Fluid 453 /mm3      RBC, Fluid 3,000 /mm3     pH, Body Fluid - Body Fluid, Pleural Cavity [[id number]] Collected:  11/29/18 1404    Specimen:  Body Fluid from Pleural Cavity Updated:  11/29/18 1405    Flow Cytometry [[id number]] Collected:  11/29/18 1327     "Specimen:  Body Fluid from Pleural Cavity Updated:  11/29/18 1348            Chief Complaint on Day of Discharge: None. No family present, but patient tells me she feels improved and is ready to go back to SNF.     History of Present Illness on Day of Discharge: Stable    Hospital Course:  The patient is a 88 y.o. female who presented to [facility] with not been feeling well for the last 2 weeks and has been treated for PNA on antibiotics. Patient was also given fluids. Has had left sided chest pain and she traces the boundary of her left diaphragm. No cough or fever. Has severe dependent edema. Does not follow with OP cardiologist. Possibly mention of heart failure per family last winter.     Patient was started on prophylactic antibiotics. Oncology was consulted. ABX DCd and WBC continued to improved. CXR SP thoracentesis did not show consolidation or note PNA. Patient was place on Bumex GTT and transitioned to PO. Potassium and CR followed closely and will need close OP monitoring.  Patient improved.     Prior to discharge patient found to have large volume of urine in bladder. Would recommend checking at SNF and possible need for in out cath.     Condition on Discharge:  Stable     Physical Exam on Discharge:  /54 (BP Location: Left arm, Patient Position: Lying)   Pulse 58   Temp 97 °F (36.1 °C) (Temporal)   Resp 16   Ht 152.4 cm (60\")   Wt 81.4 kg (179 lb 8 oz)   SpO2 98%   BMI 35.06 kg/m²   Physical Exam  HENT:   Head: Normocephalic and atraumatic.   Nose: Nose normal.   Mouth/Throat: Oropharynx is clear and moist.   [place]   Eyes: Conjunctivae and EOM are normal.   Neck: Normal range of motion. Neck supple.   Cardiovascular: Normal rate, regular rhythm and normal heart sounds.   Pulmonary/Chest: Effort normal and breath sounds normal.   Abdominal: Soft. Bowel sounds are normal.   Central obesity   Musculoskeletal: She exhibits edema. She exhibits no tenderness.   Neurological: She is " alert. No cranial nerve deficit.   Skin: Skin is warm and dry.   Psychiatric: She has a normal mood and affect.   Vitals reviewed.        Discharge Disposition:  Skilled Nursing Facility (DC - External)    Discharge Medications:     Discharge Medications      New Medications      Instructions Start Date   nystatin 684581 UNIT/GM powder  Commonly known as:  MYCOSTATIN   Topical, Every 12 Hours Scheduled         Changes to Medications      Instructions Start Date   insulin lispro 100 UNIT/ML injection  Commonly known as:  humaLOG  What changed:  additional instructions   2-7 Units, Subcutaneous, 4 Times Daily With Meals & Nightly         Continue These Medications      Instructions Start Date   acetaminophen 325 MG tablet  Commonly known as:  TYLENOL   650 mg, Oral, Every 6 Hours PRN      apixaban 5 MG tablet tablet  Commonly known as:  ELIQUIS   5 mg, Oral, Every 12 Hours Scheduled      aspirin 81 MG chewable tablet   81 mg, Oral, Daily      budesonide-formoterol 160-4.5 MCG/ACT inhaler  Commonly known as:  SYMBICORT   2 puffs, Inhalation, 2 Times Daily - RT      bumetanide 2 MG tablet  Commonly known as:  BUMEX   2 mg, Oral, 3 Times Weekly, Monday, Wednesday, and Friday.       bumetanide 1 MG tablet  Commonly known as:  BUMEX   1 mg, Oral, 4 Times Weekly, Take on Tuesday, Thursday, Saturday and Sunday.      collagenase 250 UNIT/GM ointment   1 application, Topical, Daily      diltiaZEM  MG 24 hr capsule  Commonly known as:  CARDIZEM CD   240 mg, Oral, Every 24 Hours Scheduled      docusate sodium 100 MG capsule  Commonly known as:  COLACE   100 mg, Oral, Daily      escitalopram 20 MG tablet  Commonly known as:  LEXAPRO   20 mg, Oral, Daily      folic acid 1 MG tablet  Commonly known as:  FOLVITE   1 mg, Oral, Daily      gabapentin 300 MG capsule  Commonly known as:  NEURONTIN   300 mg, Oral, Daily With Breakfast      gabapentin 300 MG capsule  Commonly known as:  NEURONTIN   600 mg, Oral, Every Evening       guaiFENesin 600 MG 12 hr tablet  Commonly known as:  MUCINEX   1,200 mg, Oral, 2 Times Daily      insulin detemir 100 UNIT/ML injection  Commonly known as:  LEVEMIR   10 Units, Subcutaneous, Nightly      levothyroxine 50 MCG tablet  Commonly known as:  SYNTHROID, LEVOTHROID   50 mcg, Oral, Daily      lisinopril 5 MG tablet  Commonly known as:  PRINIVIL,ZESTRIL   5 mg, Oral, Daily      metoprolol succinate XL 50 MG 24 hr tablet  Commonly known as:  TOPROL-XL   50 mg, Oral, Daily      pantoprazole 40 MG EC tablet  Commonly known as:  PROTONIX   40 mg, Oral, Daily      potassium chloride 10 MEQ CR capsule  Commonly known as:  MICRO-K   20 mEq, Oral, Daily      simethicone 80 MG chewable tablet  Commonly known as:  MYLICON   80 mg, Oral, 4 Times Daily PRN      simvastatin 20 MG tablet  Commonly known as:  ZOCOR   20 mg, Oral, Nightly      tamsulosin 0.4 MG capsule 24 hr capsule  Commonly known as:  FLOMAX   0.4 mg, Oral, Daily      traMADol 50 MG tablet  Commonly known as:  ULTRAM   50 mg, Oral, 3 Times Daily PRN         Stop These Medications    furosemide 20 MG tablet  Commonly known as:  LASIX     hydroxyurea 500 MG capsule  Commonly known as:  HYDREA     SOLU-MEDROL 40 MG injection  Generic drug:  methylPREDNISolone sodium succinate            Discharge Diet:   Diet Instructions     Advance Diet As Tolerated            Activity at Discharge:   Activity Instructions     Activity as Tolerated      Fall, aspiration, and decubitus precautions          Discharge Care Plan/Instructions:   Fall, aspiration, and decubitus precautions  FU with NHP 1-2 days following DC  CBC/BMP 1-2 days following DC  CXR 2 weeks following DC  BP checks daily  Daily weights, if weight changes +/- 5 lbs notify MD on call  Follow bladder volume, patient may need IO straight cath. q 6 hours.    Follow-up Appointments:   No future appointments.    Test Results Pending at Discharge: None     Gely Hastings DO  12/01/18  12:27 PM    Time: 35  minutes

## 2018-12-03 NOTE — THERAPY DISCHARGE NOTE
Acute Care - Physical Therapy Discharge Summary  [facility]       Patient Name: Tamy Sher  : 1930  MRN: [id number]    Today's Date: 12/3/2018  Onset of Illness/Injury or Date of Surgery: 18    Date of Referral to PT: 18  Referring Physician: Dr. Hastings      Admit Date: 2018      PT Recommendation and Plan    Visit Dx:    ICD-10-CM ICD-9-CM   1. Pleural effusion due to CHF (congestive heart failure) (CMS/[facility]) I50.9 428.0   2. Hyperglycemia R73.9 790.29   3. Urinary retention R33.9 788.20   4. Impaired functional mobility and endurance Z74.09 V49.89   5. Pneumonia of both lungs due to infectious organism, unspecified part of lung J18.9 483.8   6. STUART (acute kidney injury) (CMS/[facility]) N17.9 584.9   7. Pleural effusion, left J90 511.9   8. Hypoxia R09.02 799.02             Therapy Suggested Charges     Code   Minutes Charges    None             PT Rehab Goals     Row Name 18 1616             Bed Mobility Goal 1 (PT)    Activity/Assistive Device (Bed Mobility Goal 1, PT)  bed mobility activities, all  -NW      [place] Level/Cues Needed (Bed Mobility Goal 1, PT)  contact guard assist;verbal cues required  -NW      Time Frame (Bed Mobility Goal 1, PT)  by discharge  -NW      Progress/Outcomes (Bed Mobility Goal 1, PT)  goal not met  -NW         Transfer Goal 1 (PT)    Activity/Assistive Device (Transfer Goal 1, PT)  sit-to-stand/stand-to-sit;bed-to-chair/chair-to-bed;walker, rolling  -NW      [place] Level/Cues Needed (Transfer Goal 1, PT)  contact guard assist;verbal cues required  -NW      Time Frame (Transfer Goal 1, PT)  by discharge  -NW      Progress/Outcome (Transfer Goal 1, PT)  goal not met  -NW         Gait Training Goal 1 (PT)    Activity/Assistive Device (Gait Training Goal 1, PT)  gait (walking locomotion);assistive device use;decrease fall risk;improve balance and speed;increase endurance/gait distance;increase energy conservation;forward stepping;backward  stepping;sidestepping;walker, rolling  -NW      [place] Level (Gait Training Goal 1, PT)  minimum assist (75% or more patient effort);verbal cues required  -NW      Distance (Gait Goal 1, PT)  15'  -NW      Time Frame (Gait Training Goal 1, PT)  by discharge  -NW      Progress/Outcome (Gait Training Goal 1, PT)  goal not met  -NW        User Key  (r) = Recorded By, (t) = Taken By, (c) = Cosigned By    Initials Name Provider Type Discipline    NW Nuzhat Justice PTA Physical Therapy Assistant PT              PT Discharge Summary  Anticipated Discharge Disposition (PT): skilled nursing facility  Reason for Discharge: Discharge from facility  Outcomes Achieved: Refer to plan of care for updates on goals achieved  Discharge Destination: SNF      Nuzhat Justice PTA   12/3/2018

## 2018-12-04 ENCOUNTER — TELEPHONE (OUTPATIENT)
Dept: INTERNAL MEDICINE | Age: 83
End: 2018-12-04

## 2019-01-01 ENCOUNTER — APPOINTMENT (OUTPATIENT)
Dept: GENERAL RADIOLOGY | Facility: HOSPITAL | Age: 84
End: 2019-01-01

## 2019-01-01 ENCOUNTER — HOSPITAL ENCOUNTER (INPATIENT)
Facility: HOSPITAL | Age: 84
LOS: 6 days | End: 2019-04-15
Attending: EMERGENCY MEDICINE | Admitting: FAMILY MEDICINE

## 2019-01-01 ENCOUNTER — APPOINTMENT (OUTPATIENT)
Dept: ULTRASOUND IMAGING | Facility: HOSPITAL | Age: 84
End: 2019-01-01

## 2019-01-01 ENCOUNTER — HOSPITAL ENCOUNTER (INPATIENT)
Facility: HOSPITAL | Age: 84
LOS: 13 days | Discharge: NURSING, REHAB OR LONG-TERM CARE | End: 2019-04-02
Attending: EMERGENCY MEDICINE | Admitting: FAMILY MEDICINE

## 2019-01-01 ENCOUNTER — LAB REQUISITION (OUTPATIENT)
Dept: LAB | Facility: HOSPITAL | Age: 84
End: 2019-01-01

## 2019-01-01 ENCOUNTER — APPOINTMENT (OUTPATIENT)
Dept: CARDIOLOGY | Facility: HOSPITAL | Age: 84
End: 2019-01-01

## 2019-01-01 ENCOUNTER — APPOINTMENT (OUTPATIENT)
Dept: CT IMAGING | Facility: HOSPITAL | Age: 84
End: 2019-01-01

## 2019-01-01 ENCOUNTER — HOSPITAL ENCOUNTER (INPATIENT)
Facility: HOSPITAL | Age: 84
LOS: 5 days | Discharge: NURSING, REHAB OR LONG-TERM CARE | End: 2019-03-06
Attending: FAMILY MEDICINE | Admitting: INTERNAL MEDICINE

## 2019-01-01 VITALS
TEMPERATURE: 97.8 F | DIASTOLIC BLOOD PRESSURE: 69 MMHG | HEIGHT: 61 IN | OXYGEN SATURATION: 91 % | WEIGHT: 163.5 LBS | HEART RATE: 98 BPM | BODY MASS INDEX: 30.87 KG/M2 | SYSTOLIC BLOOD PRESSURE: 136 MMHG | RESPIRATION RATE: 12 BRPM

## 2019-01-01 VITALS
HEIGHT: 72 IN | OXYGEN SATURATION: 92 % | HEART RATE: 108 BPM | WEIGHT: 182.98 LBS | SYSTOLIC BLOOD PRESSURE: 140 MMHG | RESPIRATION RATE: 22 BRPM | BODY MASS INDEX: 24.78 KG/M2 | DIASTOLIC BLOOD PRESSURE: 84 MMHG | TEMPERATURE: 98.8 F

## 2019-01-01 VITALS
RESPIRATION RATE: 16 BRPM | WEIGHT: 197.7 LBS | HEART RATE: 75 BPM | OXYGEN SATURATION: 97 % | TEMPERATURE: 98 F | BODY MASS INDEX: 26.78 KG/M2 | SYSTOLIC BLOOD PRESSURE: 134 MMHG | HEIGHT: 72 IN | DIASTOLIC BLOOD PRESSURE: 57 MMHG

## 2019-01-01 DIAGNOSIS — Z74.09 IMPAIRED MOBILITY AND ADLS: ICD-10-CM

## 2019-01-01 DIAGNOSIS — Z00.00 ENCOUNTER FOR GENERAL ADULT MEDICAL EXAMINATION WITHOUT ABNORMAL FINDINGS: ICD-10-CM
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
## 2019-01-01 DIAGNOSIS — Z78.9 DECREASED ACTIVITIES OF DAILY LIVING (ADL): ICD-10-CM

## 2019-01-01 DIAGNOSIS — Z78.9 IMPAIRED MOBILITY AND ADLS: ICD-10-CM

## 2019-01-01 DIAGNOSIS — Z74.09 IMPAIRED MOBILITY: ICD-10-CM

## 2019-01-01 DIAGNOSIS — E87.5 HYPERKALEMIA: Primary | ICD-10-CM

## 2019-01-01 DIAGNOSIS — I50.9 ACUTE ON CHRONIC CONGESTIVE HEART FAILURE, UNSPECIFIED HEART FAILURE TYPE (HCC): Primary | ICD-10-CM

## 2019-01-01 DIAGNOSIS — I50.9 CONGESTIVE HEART FAILURE, UNSPECIFIED HF CHRONICITY, UNSPECIFIED HEART FAILURE TYPE (HCC): ICD-10-CM

## 2019-01-01 DIAGNOSIS — N39.0 URINARY TRACT INFECTION IN ELDERLY PATIENT: ICD-10-CM

## 2019-01-01 DIAGNOSIS — R13.10 DYSPHAGIA, UNSPECIFIED TYPE: ICD-10-CM

## 2019-01-01 DIAGNOSIS — J18.9 HAP (HOSPITAL-ACQUIRED PNEUMONIA): Primary | ICD-10-CM

## 2019-01-01 DIAGNOSIS — Z74.09 IMPAIRED FUNCTIONAL MOBILITY, BALANCE, AND ENDURANCE: ICD-10-CM

## 2019-01-01 DIAGNOSIS — R13.12 OROPHARYNGEAL DYSPHAGIA: ICD-10-CM

## 2019-01-01 DIAGNOSIS — N28.9 ACUTE RENAL INSUFFICIENCY: ICD-10-CM

## 2019-01-01 DIAGNOSIS — D72.829 LEUKOCYTOSIS, UNSPECIFIED TYPE: ICD-10-CM

## 2019-01-01 DIAGNOSIS — Z74.09 IMPAIRED MOBILITY AND ENDURANCE: ICD-10-CM

## 2019-01-01 DIAGNOSIS — I48.19 PERSISTENT ATRIAL FIBRILLATION (HCC): ICD-10-CM

## 2019-01-01 LAB
25(OH)D3 SERPL-MCNC: 22.4 NG/ML (ref 30–100)
25(OH)D3 SERPL-MCNC: 26.9 NG/ML (ref 30–100)
ALBUMIN SERPL-MCNC: 2.4 G/DL (ref 3.5–5)
ALBUMIN SERPL-MCNC: 2.4 G/DL (ref 3.5–5)
ALBUMIN SERPL-MCNC: 2.5 G/DL (ref 3.5–5)
ALBUMIN SERPL-MCNC: 2.6 G/DL (ref 3.5–5)
ALBUMIN SERPL-MCNC: 2.7 G/DL (ref 3.5–5)
ALBUMIN SERPL-MCNC: 2.8 G/DL (ref 3.5–5)
ALBUMIN SERPL-MCNC: 2.9 G/DL (ref 2.9–4.4)
ALBUMIN SERPL-MCNC: 2.9 G/DL (ref 3.5–5)
ALBUMIN SERPL-MCNC: 2.9 G/DL (ref 3.5–5)
ALBUMIN SERPL-MCNC: 3 G/DL (ref 3.5–5)
ALBUMIN SERPL-MCNC: 3.1 G/DL (ref 3.5–5)
ALBUMIN SERPL-MCNC: 3.1 G/DL (ref 3.5–5)
ALBUMIN SERPL-MCNC: 3.3 G/DL (ref 3.5–5)
ALBUMIN SERPL-MCNC: 3.4 G/DL (ref 3.5–5)
ALBUMIN SERPL-MCNC: 3.5 G/DL (ref 3.5–5)
ALBUMIN/GLOB SERPL: 1.1 G/DL (ref 1.1–2.5)
ALBUMIN/GLOB SERPL: 1.2 G/DL (ref 1.1–2.5)
ALBUMIN/GLOB SERPL: 1.3 G/DL (ref 1.1–2.5)
ALBUMIN/GLOB SERPL: 1.3 {RATIO} (ref 0.7–1.7)
ALBUMIN/GLOB SERPL: 1.4 G/DL (ref 1.1–2.5)
ALBUMIN/GLOB SERPL: 1.5 G/DL (ref 1.1–2.5)
ALBUMIN/GLOB SERPL: 1.5 G/DL (ref 1.1–2.5)
ALP SERPL-CCNC: 100 U/L (ref 24–120)
ALP SERPL-CCNC: 101 U/L (ref 24–120)
ALP SERPL-CCNC: 104 U/L (ref 24–120)
ALP SERPL-CCNC: 105 U/L (ref 24–120)
ALP SERPL-CCNC: 113 U/L (ref 24–120)
ALP SERPL-CCNC: 114 U/L (ref 24–120)
ALP SERPL-CCNC: 117 U/L (ref 24–120)
ALP SERPL-CCNC: 117 U/L (ref 24–120)
ALP SERPL-CCNC: 122 U/L (ref 24–120)
ALP SERPL-CCNC: 128 U/L (ref 24–120)
ALP SERPL-CCNC: 131 U/L (ref 24–120)
ALP SERPL-CCNC: 70 U/L (ref 24–120)
ALP SERPL-CCNC: 77 U/L (ref 24–120)
ALP SERPL-CCNC: 81 U/L (ref 24–120)
ALP SERPL-CCNC: 84 U/L (ref 24–120)
ALP SERPL-CCNC: 84 U/L (ref 24–120)
ALP SERPL-CCNC: 85 U/L (ref 24–120)
ALP SERPL-CCNC: 89 U/L (ref 24–120)
ALP SERPL-CCNC: 91 U/L (ref 24–120)
ALP SERPL-CCNC: 92 U/L (ref 24–120)
ALP SERPL-CCNC: 93 U/L (ref 24–120)
ALP SERPL-CCNC: 94 U/L (ref 24–120)
ALP SERPL-CCNC: 94 U/L (ref 24–120)
ALP SERPL-CCNC: 95 U/L (ref 24–120)
ALP SERPL-CCNC: 95 U/L (ref 24–120)
ALP SERPL-CCNC: 96 U/L (ref 24–120)
ALP SERPL-CCNC: 98 U/L (ref 24–120)
ALP SERPL-CCNC: 99 U/L (ref 24–120)
ALPHA1 GLOB FLD ELPH-MCNC: 0.2 G/DL (ref 0–0.4)
ALPHA2 GLOB SERPL ELPH-MCNC: 0.7 G/DL (ref 0.4–1)
ALT SERPL W P-5'-P-CCNC: 15 U/L (ref 0–54)
ALT SERPL W P-5'-P-CCNC: 16 U/L (ref 0–54)
ALT SERPL W P-5'-P-CCNC: 17 U/L (ref 0–54)
ALT SERPL W P-5'-P-CCNC: 17 U/L (ref 0–54)
ALT SERPL W P-5'-P-CCNC: 18 U/L (ref 0–54)
ALT SERPL W P-5'-P-CCNC: 19 U/L (ref 0–54)
ALT SERPL W P-5'-P-CCNC: 19 U/L (ref 0–54)
ALT SERPL W P-5'-P-CCNC: 21 U/L (ref 0–54)
ALT SERPL W P-5'-P-CCNC: 21 U/L (ref 0–54)
ALT SERPL W P-5'-P-CCNC: 22 U/L (ref 0–54)
ALT SERPL W P-5'-P-CCNC: 25 U/L (ref 0–54)
ALT SERPL W P-5'-P-CCNC: 26 U/L (ref 0–54)
ALT SERPL W P-5'-P-CCNC: 28 U/L (ref 0–54)
ALT SERPL W P-5'-P-CCNC: <15 U/L (ref 0–54)
AMYLASE SERPL-CCNC: 32 U/L (ref 30–110)
AMYLASE SERPL-CCNC: <30 U/L (ref 30–110)
ANION GAP SERPL CALCULATED.3IONS-SCNC: 10 MMOL/L (ref 4–13)
ANION GAP SERPL CALCULATED.3IONS-SCNC: 11 MMOL/L (ref 4–13)
ANION GAP SERPL CALCULATED.3IONS-SCNC: 11 MMOL/L (ref 4–13)
ANION GAP SERPL CALCULATED.3IONS-SCNC: 12 MMOL/L (ref 4–13)
ANION GAP SERPL CALCULATED.3IONS-SCNC: 12 MMOL/L (ref 4–13)
ANION GAP SERPL CALCULATED.3IONS-SCNC: 14 MMOL/L (ref 4–13)
ANION GAP SERPL CALCULATED.3IONS-SCNC: 14 MMOL/L (ref 4–13)
ANION GAP SERPL CALCULATED.3IONS-SCNC: 15 MMOL/L (ref 4–13)
ANION GAP SERPL CALCULATED.3IONS-SCNC: 3 MMOL/L (ref 4–13)
ANION GAP SERPL CALCULATED.3IONS-SCNC: 5 MMOL/L (ref 4–13)
ANION GAP SERPL CALCULATED.3IONS-SCNC: 6 MMOL/L (ref 4–13)
ANION GAP SERPL CALCULATED.3IONS-SCNC: 7 MMOL/L (ref 4–13)
ANION GAP SERPL CALCULATED.3IONS-SCNC: 8 MMOL/L (ref 4–13)
ANION GAP SERPL CALCULATED.3IONS-SCNC: 9 MMOL/L (ref 4–13)
ANISOCYTOSIS BLD QL: ABNORMAL
APTT PPP: 33.7 SECONDS (ref 24.1–35)
ARTICHOKE IGE QN: <30 MG/DL (ref 0–99)
AST SERPL-CCNC: 10 U/L (ref 7–45)
AST SERPL-CCNC: 11 U/L (ref 7–45)
AST SERPL-CCNC: 12 U/L (ref 7–45)
AST SERPL-CCNC: 13 U/L (ref 7–45)
AST SERPL-CCNC: 13 U/L (ref 7–45)
AST SERPL-CCNC: 14 U/L (ref 7–45)
AST SERPL-CCNC: 15 U/L (ref 7–45)
AST SERPL-CCNC: 17 U/L (ref 7–45)
AST SERPL-CCNC: 17 U/L (ref 7–45)
AST SERPL-CCNC: 18 U/L (ref 7–45)
AST SERPL-CCNC: 19 U/L (ref 7–45)
AST SERPL-CCNC: 20 U/L (ref 7–45)
AST SERPL-CCNC: 21 U/L (ref 7–45)
AST SERPL-CCNC: 25 U/L (ref 7–45)
AST SERPL-CCNC: 25 U/L (ref 7–45)
AST SERPL-CCNC: 27 U/L (ref 7–45)
AST SERPL-CCNC: 28 U/L (ref 7–45)
AST SERPL-CCNC: 29 U/L (ref 7–45)
AST SERPL-CCNC: 55 U/L (ref 7–45)
B-GLOBULIN SERPL ELPH-MCNC: 0.6 G/DL (ref 0.7–1.3)
BACTERIA SPEC AEROBE CULT: ABNORMAL
BACTERIA SPEC AEROBE CULT: NORMAL
BACTERIA UR QL AUTO: ABNORMAL /HPF
BASO STIPL COARSE BLD QL SMEAR: ABNORMAL
BASOPHILS # BLD MANUAL: 0.18 10*3/MM3 (ref 0–0.2)
BASOPHILS # BLD MANUAL: 0.18 10*3/MM3 (ref 0–0.2)
BASOPHILS # BLD MANUAL: 0.19 10*3/MM3 (ref 0–0.2)
BASOPHILS # BLD MANUAL: 0.21 10*3/MM3 (ref 0–0.2)
BASOPHILS # BLD MANUAL: 0.21 10*3/MM3 (ref 0–0.2)
BASOPHILS # BLD MANUAL: 0.22 10*3/MM3 (ref 0–0.2)
BASOPHILS # BLD MANUAL: 0.23 10*3/MM3 (ref 0–0.2)
BASOPHILS # BLD MANUAL: 0.31 10*3/MM3 (ref 0–0.2)
BASOPHILS # BLD MANUAL: 0.56 10*3/MM3 (ref 0–0.2)
BASOPHILS # BLD MANUAL: 0.71 10*3/MM3 (ref 0–0.2)
BASOPHILS NFR BLD AUTO: 1 % (ref 0–2)
BASOPHILS NFR BLD AUTO: 2 % (ref 0–2)
BASOPHILS NFR BLD AUTO: 3 % (ref 0–2)
BH CV ECHO MEAS - AO MAX PG (FULL): 7.5 MMHG
BH CV ECHO MEAS - AO MAX PG: 10.4 MMHG
BH CV ECHO MEAS - AO MEAN PG (FULL): 4 MMHG
BH CV ECHO MEAS - AO MEAN PG: 6 MMHG
BH CV ECHO MEAS - AO ROOT AREA (BSA CORRECTED): 1.5
BH CV ECHO MEAS - AO ROOT AREA: 6.6 CM^2
BH CV ECHO MEAS - AO ROOT DIAM: 2.9 CM
BH CV ECHO MEAS - AO V2 MAX: 161 CM/SEC
BH CV ECHO MEAS - AO V2 MEAN: 110 CM/SEC
BH CV ECHO MEAS - AO V2 VTI: 42.9 CM
BH CV ECHO MEAS - AVA(I,A): 1.6 CM^2
BH CV ECHO MEAS - AVA(I,D): 1.6 CM^2
BH CV ECHO MEAS - AVA(V,A): 1.7 CM^2
BH CV ECHO MEAS - AVA(V,D): 1.7 CM^2
BH CV ECHO MEAS - BSA(HAYCOCK): 2 M^2
BH CV ECHO MEAS - BSA: 1.9 M^2
BH CV ECHO MEAS - BZI_BMI: 39.5 KILOGRAMS/M^2
BH CV ECHO MEAS - BZI_METRIC_HEIGHT: 152.4 CM
BH CV ECHO MEAS - BZI_METRIC_WEIGHT: 91.6 KG
BH CV ECHO MEAS - EDV(CUBED): 103.8 ML
BH CV ECHO MEAS - EDV(MOD-SP4): 75.5 ML
BH CV ECHO MEAS - EDV(TEICH): 102.4 ML
BH CV ECHO MEAS - EF(CUBED): 62.1 %
BH CV ECHO MEAS - EF(MOD-SP4): 63.7 %
BH CV ECHO MEAS - EF(TEICH): 53.7 %
BH CV ECHO MEAS - ESV(CUBED): 39.3 ML
BH CV ECHO MEAS - ESV(MOD-SP4): 27.4 ML
BH CV ECHO MEAS - ESV(TEICH): 47.4 ML
BH CV ECHO MEAS - FS: 27.7 %
BH CV ECHO MEAS - IVS/LVPW: 1.5
BH CV ECHO MEAS - IVSD: 1.5 CM
BH CV ECHO MEAS - LA/AO: 1.4
BH CV ECHO MEAS - LAT PEAK E' VEL: 5.1 CM/SEC
BH CV ECHO MEAS - LV DIASTOLIC VOL/BSA (35-75): 40.3 ML/M^2
BH CV ECHO MEAS - LV MASS(C)D: 224.8 GRAMS
BH CV ECHO MEAS - LV MASS(C)DI: 119.9 GRAMS/M^2
BH CV ECHO MEAS - LV MAX PG: 2.9 MMHG
BH CV ECHO MEAS - LV MEAN PG: 2 MMHG
BH CV ECHO MEAS - LV SYSTOLIC VOL/BSA (12-30): 14.6 ML/M^2
BH CV ECHO MEAS - LV V1 MAX: 85 CM/SEC
BH CV ECHO MEAS - LV V1 MEAN: 58.9 CM/SEC
BH CV ECHO MEAS - LV V1 VTI: 21.3 CM
BH CV ECHO MEAS - LVIDD: 4.7 CM
BH CV ECHO MEAS - LVIDS: 3.4 CM
BH CV ECHO MEAS - LVLD AP4: 7.7 CM
BH CV ECHO MEAS - LVLS AP4: 6.7 CM
BH CV ECHO MEAS - LVOT AREA (M): 3.1 CM^2
BH CV ECHO MEAS - LVOT AREA: 3.1 CM^2
BH CV ECHO MEAS - LVOT DIAM: 2 CM
BH CV ECHO MEAS - LVPWD: 1 CM
BH CV ECHO MEAS - MED PEAK E' VEL: 8.05 CM/SEC
BH CV ECHO MEAS - MR MAX PG: 53.7 MMHG
BH CV ECHO MEAS - MR MAX VEL: 365.7 CM/SEC
BH CV ECHO MEAS - MV DEC TIME: 0.18 SEC
BH CV ECHO MEAS - MV E MAX VEL: 135 CM/SEC
BH CV ECHO MEAS - RAP SYSTOLE: 5 MMHG
BH CV ECHO MEAS - RVSP: 29 MMHG
BH CV ECHO MEAS - SI(AO): 151.2 ML/M^2
BH CV ECHO MEAS - SI(CUBED): 34.4 ML/M^2
BH CV ECHO MEAS - SI(LVOT): 35.7 ML/M^2
BH CV ECHO MEAS - SI(MOD-SP4): 25.7 ML/M^2
BH CV ECHO MEAS - SI(TEICH): 29.3 ML/M^2
BH CV ECHO MEAS - SV(AO): 283.4 ML
BH CV ECHO MEAS - SV(CUBED): 64.5 ML
BH CV ECHO MEAS - SV(LVOT): 66.9 ML
BH CV ECHO MEAS - SV(MOD-SP4): 48.1 ML
BH CV ECHO MEAS - SV(TEICH): 54.9 ML
BH CV ECHO MEAS - TR MAX VEL: 245 CM/SEC
BH CV ECHO MEASUREMENTS AVERAGE E/E' RATIO: 20.53
BILIRUB SERPL-MCNC: 0.1 MG/DL (ref 0.1–1)
BILIRUB SERPL-MCNC: 0.2 MG/DL (ref 0.1–1)
BILIRUB SERPL-MCNC: 0.3 MG/DL (ref 0.1–1)
BILIRUB SERPL-MCNC: 0.4 MG/DL (ref 0.1–1)
BILIRUB SERPL-MCNC: 0.5 MG/DL (ref 0.1–1)
BILIRUB SERPL-MCNC: 0.5 MG/DL (ref 0.1–1)
BILIRUB SERPL-MCNC: 0.6 MG/DL (ref 0.1–1)
BILIRUB SERPL-MCNC: 0.7 MG/DL (ref 0.1–1)
BILIRUB SERPL-MCNC: 0.8 MG/DL (ref 0.1–1)
BILIRUB UR QL STRIP: NEGATIVE
BUN BLD-MCNC: 100 MG/DL (ref 5–21)
BUN BLD-MCNC: 111 MG/DL (ref 5–21)
BUN BLD-MCNC: 115 MG/DL (ref 5–21)
BUN BLD-MCNC: 121 MG/DL (ref 5–21)
BUN BLD-MCNC: 131 MG/DL (ref 5–21)
BUN BLD-MCNC: 139 MG/DL (ref 5–21)
BUN BLD-MCNC: 139 MG/DL (ref 5–21)
BUN BLD-MCNC: 24 MG/DL (ref 5–21)
BUN BLD-MCNC: 24 MG/DL (ref 5–21)
BUN BLD-MCNC: 25 MG/DL (ref 5–21)
BUN BLD-MCNC: 25 MG/DL (ref 5–21)
BUN BLD-MCNC: 27 MG/DL (ref 5–21)
BUN BLD-MCNC: 28 MG/DL (ref 5–21)
BUN BLD-MCNC: 31 MG/DL (ref 5–21)
BUN BLD-MCNC: 31 MG/DL (ref 5–21)
BUN BLD-MCNC: 32 MG/DL (ref 5–21)
BUN BLD-MCNC: 33 MG/DL (ref 5–21)
BUN BLD-MCNC: 34 MG/DL (ref 5–21)
BUN BLD-MCNC: 35 MG/DL (ref 5–21)
BUN BLD-MCNC: 35 MG/DL (ref 5–21)
BUN BLD-MCNC: 37 MG/DL (ref 5–21)
BUN BLD-MCNC: 38 MG/DL (ref 5–21)
BUN BLD-MCNC: 38 MG/DL (ref 5–21)
BUN BLD-MCNC: 40 MG/DL (ref 5–21)
BUN BLD-MCNC: 42 MG/DL (ref 5–21)
BUN BLD-MCNC: 42 MG/DL (ref 5–21)
BUN BLD-MCNC: 51 MG/DL (ref 5–21)
BUN BLD-MCNC: 57 MG/DL (ref 5–21)
BUN BLD-MCNC: 59 MG/DL (ref 5–21)
BUN BLD-MCNC: 75 MG/DL (ref 5–21)
BUN BLD-MCNC: 76 MG/DL (ref 5–21)
BUN BLD-MCNC: 87 MG/DL (ref 5–21)
BUN BLD-MCNC: 88 MG/DL (ref 5–21)
BUN BLD-MCNC: 91 MG/DL (ref 5–21)
BUN/CREAT SERPL: 27.3 (ref 7–25)
BUN/CREAT SERPL: 28.7 (ref 7–25)
BUN/CREAT SERPL: 29.3 (ref 7–25)
BUN/CREAT SERPL: 30 (ref 7–25)
BUN/CREAT SERPL: 32.1 (ref 7–25)
BUN/CREAT SERPL: 33.7 (ref 7–25)
BUN/CREAT SERPL: 35 (ref 7–25)
BUN/CREAT SERPL: 35.6 (ref 7–25)
BUN/CREAT SERPL: 36 (ref 7–25)
BUN/CREAT SERPL: 36.6 (ref 7–25)
BUN/CREAT SERPL: 36.6 (ref 7–25)
BUN/CREAT SERPL: 38.8 (ref 7–25)
BUN/CREAT SERPL: 38.8 (ref 7–25)
BUN/CREAT SERPL: 39.2 (ref 7–25)
BUN/CREAT SERPL: 39.5 (ref 7–25)
BUN/CREAT SERPL: 40.8 (ref 7–25)
BUN/CREAT SERPL: 40.9 (ref 7–25)
BUN/CREAT SERPL: 41 (ref 7–25)
BUN/CREAT SERPL: 41.5 (ref 7–25)
BUN/CREAT SERPL: 43.8 (ref 7–25)
BUN/CREAT SERPL: 43.8 (ref 7–25)
BUN/CREAT SERPL: 45.2 (ref 7–25)
BUN/CREAT SERPL: 45.3 (ref 7–25)
BUN/CREAT SERPL: 46.2 (ref 7–25)
BUN/CREAT SERPL: 49 (ref 7–25)
BUN/CREAT SERPL: 49.4 (ref 7–25)
BUN/CREAT SERPL: 49.8 (ref 7–25)
BUN/CREAT SERPL: 50.2 (ref 7–25)
BUN/CREAT SERPL: 50.4 (ref 7–25)
BUN/CREAT SERPL: 50.9 (ref 7–25)
BUN/CREAT SERPL: 51.5 (ref 7–25)
BUN/CREAT SERPL: 51.9 (ref 7–25)
BUN/CREAT SERPL: 52 (ref 7–25)
BUN/CREAT SERPL: 52.8 (ref 7–25)
BUN/CREAT SERPL: 55.7 (ref 7–25)
BUN/CREAT SERPL: 55.9 (ref 7–25)
BUN/CREAT SERPL: 56.8 (ref 7–25)
BUN/CREAT SERPL: 58 (ref 7–25)
BUN/CREAT SERPL: 64 (ref 7–25)
BUN/CREAT SERPL: 67.9 (ref 7–25)
BURR CELLS BLD QL SMEAR: ABNORMAL
CALCIUM SPEC-SCNC: 8 MG/DL (ref 8.4–10.4)
CALCIUM SPEC-SCNC: 8 MG/DL (ref 8.4–10.4)
CALCIUM SPEC-SCNC: 8.1 MG/DL (ref 8.4–10.4)
CALCIUM SPEC-SCNC: 8.2 MG/DL (ref 8.4–10.4)
CALCIUM SPEC-SCNC: 8.3 MG/DL (ref 8.4–10.4)
CALCIUM SPEC-SCNC: 8.5 MG/DL (ref 8.4–10.4)
CALCIUM SPEC-SCNC: 8.5 MG/DL (ref 8.4–10.4)
CALCIUM SPEC-SCNC: 8.6 MG/DL (ref 8.4–10.4)
CALCIUM SPEC-SCNC: 8.7 MG/DL (ref 8.4–10.4)
CALCIUM SPEC-SCNC: 8.7 MG/DL (ref 8.4–10.4)
CALCIUM SPEC-SCNC: 8.8 MG/DL (ref 8.4–10.4)
CALCIUM SPEC-SCNC: 8.9 MG/DL (ref 8.4–10.4)
CALCIUM SPEC-SCNC: 9 MG/DL (ref 8.4–10.4)
CALCIUM SPEC-SCNC: 9.1 MG/DL (ref 8.4–10.4)
CALCIUM SPEC-SCNC: 9.2 MG/DL (ref 8.4–10.4)
CALCIUM SPEC-SCNC: 9.3 MG/DL (ref 8.4–10.4)
CALCIUM SPEC-SCNC: 9.4 MG/DL (ref 8.4–10.4)
CALCIUM SPEC-SCNC: 9.4 MG/DL (ref 8.4–10.4)
CALCIUM SPEC-SCNC: 9.5 MG/DL (ref 8.4–10.4)
CALCIUM SPEC-SCNC: 9.6 MG/DL (ref 8.4–10.4)
CHLORIDE SERPL-SCNC: 100 MMOL/L (ref 98–110)
CHLORIDE SERPL-SCNC: 101 MMOL/L (ref 98–110)
CHLORIDE SERPL-SCNC: 102 MMOL/L (ref 98–110)
CHLORIDE SERPL-SCNC: 103 MMOL/L (ref 98–110)
CHLORIDE SERPL-SCNC: 104 MMOL/L (ref 98–110)
CHLORIDE SERPL-SCNC: 104 MMOL/L (ref 98–110)
CHLORIDE SERPL-SCNC: 105 MMOL/L (ref 98–110)
CHLORIDE SERPL-SCNC: 106 MMOL/L (ref 98–110)
CHLORIDE SERPL-SCNC: 107 MMOL/L (ref 98–110)
CHLORIDE SERPL-SCNC: 108 MMOL/L (ref 98–110)
CHLORIDE SERPL-SCNC: 109 MMOL/L (ref 98–110)
CHLORIDE SERPL-SCNC: 110 MMOL/L (ref 98–110)
CHLORIDE SERPL-SCNC: 110 MMOL/L (ref 98–110)
CHLORIDE SERPL-SCNC: 111 MMOL/L (ref 98–110)
CHLORIDE SERPL-SCNC: 113 MMOL/L (ref 98–110)
CHLORIDE SERPL-SCNC: 95 MMOL/L (ref 98–110)
CHLORIDE SERPL-SCNC: 98 MMOL/L (ref 98–110)
CHLORIDE SERPL-SCNC: 99 MMOL/L (ref 98–110)
CHOLEST SERPL-MCNC: 56 MG/DL (ref 130–200)
CHOLEST SERPL-MCNC: 68 MG/DL (ref 130–200)
CHOLEST SERPL-MCNC: 70 MG/DL (ref 130–200)
CHOLEST SERPL-MCNC: 76 MG/DL (ref 130–200)
CLARITY UR: ABNORMAL
CLARITY UR: CLEAR
CLUMPED PLATELETS: PRESENT
CO2 SERPL-SCNC: 19 MMOL/L (ref 24–31)
CO2 SERPL-SCNC: 20 MMOL/L (ref 24–31)
CO2 SERPL-SCNC: 21 MMOL/L (ref 24–31)
CO2 SERPL-SCNC: 22 MMOL/L (ref 24–31)
CO2 SERPL-SCNC: 23 MMOL/L (ref 24–31)
CO2 SERPL-SCNC: 24 MMOL/L (ref 24–31)
CO2 SERPL-SCNC: 24 MMOL/L (ref 24–31)
CO2 SERPL-SCNC: 25 MMOL/L (ref 24–31)
CO2 SERPL-SCNC: 25 MMOL/L (ref 24–31)
CO2 SERPL-SCNC: 26 MMOL/L (ref 24–31)
CO2 SERPL-SCNC: 27 MMOL/L (ref 24–31)
CO2 SERPL-SCNC: 27 MMOL/L (ref 24–31)
CO2 SERPL-SCNC: 28 MMOL/L (ref 24–31)
CO2 SERPL-SCNC: 29 MMOL/L (ref 24–31)
CO2 SERPL-SCNC: 29 MMOL/L (ref 24–31)
CO2 SERPL-SCNC: 31 MMOL/L (ref 24–31)
CO2 SERPL-SCNC: 32 MMOL/L (ref 24–31)
CO2 SERPL-SCNC: 33 MMOL/L (ref 24–31)
CO2 SERPL-SCNC: 33 MMOL/L (ref 24–31)
CO2 SERPL-SCNC: 34 MMOL/L (ref 24–31)
CO2 SERPL-SCNC: 34 MMOL/L (ref 24–31)
CO2 SERPL-SCNC: 36 MMOL/L (ref 24–31)
CO2 SERPL-SCNC: 36 MMOL/L (ref 24–31)
CO2 SERPL-SCNC: 37 MMOL/L (ref 24–31)
CO2 SERPL-SCNC: 38 MMOL/L (ref 24–31)
CO2 SERPL-SCNC: 39 MMOL/L (ref 24–31)
COD CRY URNS QL: ABNORMAL /HPF
COLOR UR: YELLOW
CREAT BLD-MCNC: 0.68 MG/DL (ref 0.5–1.4)
CREAT BLD-MCNC: 0.68 MG/DL (ref 0.5–1.4)
CREAT BLD-MCNC: 0.73 MG/DL (ref 0.5–1.4)
CREAT BLD-MCNC: 0.78 MG/DL (ref 0.5–1.4)
CREAT BLD-MCNC: 0.79 MG/DL (ref 0.5–1.4)
CREAT BLD-MCNC: 0.8 MG/DL (ref 0.5–1.4)
CREAT BLD-MCNC: 0.81 MG/DL (ref 0.5–1.4)
CREAT BLD-MCNC: 0.81 MG/DL (ref 0.5–1.4)
CREAT BLD-MCNC: 0.82 MG/DL (ref 0.5–1.4)
CREAT BLD-MCNC: 0.83 MG/DL (ref 0.5–1.4)
CREAT BLD-MCNC: 0.85 MG/DL (ref 0.5–1.4)
CREAT BLD-MCNC: 0.87 MG/DL (ref 0.5–1.4)
CREAT BLD-MCNC: 0.88 MG/DL (ref 0.5–1.4)
CREAT BLD-MCNC: 0.89 MG/DL (ref 0.5–1.4)
CREAT BLD-MCNC: 0.92 MG/DL (ref 0.5–1.4)
CREAT BLD-MCNC: 0.93 MG/DL (ref 0.5–1.4)
CREAT BLD-MCNC: 0.93 MG/DL (ref 0.5–1.4)
CREAT BLD-MCNC: 0.98 MG/DL (ref 0.5–1.4)
CREAT BLD-MCNC: 0.98 MG/DL (ref 0.5–1.4)
CREAT BLD-MCNC: 1.01 MG/DL (ref 0.5–1.4)
CREAT BLD-MCNC: 1.04 MG/DL (ref 0.5–1.4)
CREAT BLD-MCNC: 1.12 MG/DL (ref 0.5–1.4)
CREAT BLD-MCNC: 1.12 MG/DL (ref 0.5–1.4)
CREAT BLD-MCNC: 1.17 MG/DL (ref 0.5–1.4)
CREAT BLD-MCNC: 1.32 MG/DL (ref 0.5–1.4)
CREAT BLD-MCNC: 1.36 MG/DL (ref 0.5–1.4)
CREAT BLD-MCNC: 1.57 MG/DL (ref 0.5–1.4)
CREAT BLD-MCNC: 1.58 MG/DL (ref 0.5–1.4)
CREAT BLD-MCNC: 2.01 MG/DL (ref 0.5–1.4)
CREAT BLD-MCNC: 2.18 MG/DL (ref 0.5–1.4)
CREAT BLD-MCNC: 2.21 MG/DL (ref 0.5–1.4)
CREAT BLD-MCNC: 2.47 MG/DL (ref 0.5–1.4)
CREAT BLD-MCNC: 2.65 MG/DL (ref 0.5–1.4)
CREAT BLD-MCNC: 3.01 MG/DL (ref 0.5–1.4)
CREAT BLD-MCNC: 3.07 MG/DL (ref 0.5–1.4)
CREAT UR-MCNC: 64.6 MG/DL
CRP SERPL-MCNC: 0.91 MG/DL (ref 0–0.99)
CYTOLOGIST CVX/VAG CYTO: NORMAL
D-LACTATE SERPL-SCNC: 1.1 MMOL/L (ref 0.5–2)
D-LACTATE SERPL-SCNC: 1.2 MMOL/L (ref 0.5–2)
DACRYOCYTES BLD QL SMEAR: ABNORMAL
DEPRECATED RDW RBC AUTO: 52.4 FL (ref 40–54)
DEPRECATED RDW RBC AUTO: 53.4 FL (ref 40–54)
DEPRECATED RDW RBC AUTO: 53.6 FL (ref 40–54)
DEPRECATED RDW RBC AUTO: 54.1 FL (ref 40–54)
DEPRECATED RDW RBC AUTO: 54.6 FL (ref 40–54)
DEPRECATED RDW RBC AUTO: 56.1 FL (ref 40–54)
DEPRECATED RDW RBC AUTO: 57.1 FL (ref 40–54)
DEPRECATED RDW RBC AUTO: 57.3 FL (ref 40–54)
DEPRECATED RDW RBC AUTO: 57.4 FL (ref 40–54)
DEPRECATED RDW RBC AUTO: 57.5 FL (ref 40–54)
DEPRECATED RDW RBC AUTO: 57.6 FL (ref 40–54)
DEPRECATED RDW RBC AUTO: 57.8 FL (ref 40–54)
DEPRECATED RDW RBC AUTO: 58.1 FL (ref 40–54)
DEPRECATED RDW RBC AUTO: 58.2 FL (ref 40–54)
DEPRECATED RDW RBC AUTO: 58.4 FL (ref 40–54)
DEPRECATED RDW RBC AUTO: 58.6 FL (ref 40–54)
DEPRECATED RDW RBC AUTO: 58.7 FL (ref 40–54)
DEPRECATED RDW RBC AUTO: 58.8 FL (ref 40–54)
DEPRECATED RDW RBC AUTO: 58.8 FL (ref 40–54)
DEPRECATED RDW RBC AUTO: 59.4 FL (ref 40–54)
DEPRECATED RDW RBC AUTO: 59.7 FL (ref 40–54)
DEPRECATED RDW RBC AUTO: 59.9 FL (ref 40–54)
DEPRECATED RDW RBC AUTO: 60.1 FL (ref 40–54)
DEPRECATED RDW RBC AUTO: 60.4 FL (ref 40–54)
DEPRECATED RDW RBC AUTO: 60.8 FL (ref 40–54)
DEPRECATED RDW RBC AUTO: 60.9 FL (ref 40–54)
DEPRECATED RDW RBC AUTO: 60.9 FL (ref 40–54)
DEPRECATED RDW RBC AUTO: 61.4 FL (ref 40–54)
DEPRECATED RDW RBC AUTO: 61.7 FL (ref 40–54)
DEPRECATED RDW RBC AUTO: 62 FL (ref 40–54)
DOHLE BODIES: PRESENT
ELLIPTOCYTES BLD QL SMEAR: ABNORMAL
EOSINOPHIL # BLD MANUAL: 0.89 10*3/MM3 (ref 0–0.7)
EOSINOPHIL # BLD MANUAL: 1.34 10*3/MM3 (ref 0–0.7)
EOSINOPHIL # BLD MANUAL: 1.43 10*3/MM3 (ref 0–0.7)
EOSINOPHIL # BLD MANUAL: 1.91 10*3/MM3 (ref 0–0.7)
EOSINOPHIL # BLD MANUAL: 1.97 10*3/MM3 (ref 0–0.7)
EOSINOPHIL # BLD MANUAL: 2.07 10*3/MM3 (ref 0–0.7)
EOSINOPHIL # BLD MANUAL: 2.19 10*3/MM3 (ref 0–0.7)
EOSINOPHIL # BLD MANUAL: 2.2 10*3/MM3 (ref 0–0.7)
EOSINOPHIL # BLD MANUAL: 2.51 10*3/MM3 (ref 0–0.7)
EOSINOPHIL # BLD MANUAL: 2.57 10*3/MM3 (ref 0–0.7)
EOSINOPHIL # BLD MANUAL: 2.57 10*3/MM3 (ref 0–0.7)
EOSINOPHIL # BLD MANUAL: 2.79 10*3/MM3 (ref 0–0.7)
EOSINOPHIL # BLD MANUAL: 3.22 10*3/MM3 (ref 0–0.7)
EOSINOPHIL # BLD MANUAL: 3.34 10*3/MM3 (ref 0–0.7)
EOSINOPHIL # BLD MANUAL: 3.38 10*3/MM3 (ref 0–0.7)
EOSINOPHIL # BLD MANUAL: 3.41 10*3/MM3 (ref 0–0.7)
EOSINOPHIL # BLD MANUAL: 3.54 10*3/MM3 (ref 0–0.7)
EOSINOPHIL # BLD MANUAL: 3.56 10*3/MM3 (ref 0–0.7)
EOSINOPHIL # BLD MANUAL: 3.61 10*3/MM3 (ref 0–0.7)
EOSINOPHIL # BLD MANUAL: 3.75 10*3/MM3 (ref 0–0.7)
EOSINOPHIL # BLD MANUAL: 4.01 10*3/MM3 (ref 0–0.7)
EOSINOPHIL # BLD MANUAL: 4.21 10*3/MM3 (ref 0–0.7)
EOSINOPHIL # BLD MANUAL: 4.46 10*3/MM3 (ref 0–0.7)
EOSINOPHIL # BLD MANUAL: 4.57 10*3/MM3 (ref 0–0.7)
EOSINOPHIL # BLD MANUAL: 4.67 10*3/MM3 (ref 0–0.7)
EOSINOPHIL # BLD MANUAL: 4.87 10*3/MM3 (ref 0–0.7)
EOSINOPHIL # BLD MANUAL: 5.08 10*3/MM3 (ref 0–0.7)
EOSINOPHIL # BLD MANUAL: 5.45 10*3/MM3 (ref 0–0.7)
EOSINOPHIL NFR BLD MANUAL: 10.1 % (ref 0–4)
EOSINOPHIL NFR BLD MANUAL: 10.3 % (ref 0–4)
EOSINOPHIL NFR BLD MANUAL: 10.3 % (ref 0–4)
EOSINOPHIL NFR BLD MANUAL: 11 % (ref 0–4)
EOSINOPHIL NFR BLD MANUAL: 11 % (ref 0–4)
EOSINOPHIL NFR BLD MANUAL: 11.2 % (ref 0–4)
EOSINOPHIL NFR BLD MANUAL: 11.9 % (ref 0–4)
EOSINOPHIL NFR BLD MANUAL: 12.2 % (ref 0–4)
EOSINOPHIL NFR BLD MANUAL: 13 % (ref 0–4)
EOSINOPHIL NFR BLD MANUAL: 13 % (ref 0–4)
EOSINOPHIL NFR BLD MANUAL: 14 % (ref 0–4)
EOSINOPHIL NFR BLD MANUAL: 14.1 % (ref 0–4)
EOSINOPHIL NFR BLD MANUAL: 14.3 % (ref 0–4)
EOSINOPHIL NFR BLD MANUAL: 15 % (ref 0–4)
EOSINOPHIL NFR BLD MANUAL: 16 % (ref 0–4)
EOSINOPHIL NFR BLD MANUAL: 17.2 % (ref 0–4)
EOSINOPHIL NFR BLD MANUAL: 18 % (ref 0–4)
EOSINOPHIL NFR BLD MANUAL: 18 % (ref 0–4)
EOSINOPHIL NFR BLD MANUAL: 18.4 % (ref 0–4)
EOSINOPHIL NFR BLD MANUAL: 20 % (ref 0–4)
EOSINOPHIL NFR BLD MANUAL: 20.4 % (ref 0–4)
EOSINOPHIL NFR BLD MANUAL: 21.1 % (ref 0–4)
EOSINOPHIL NFR BLD MANUAL: 21.4 % (ref 0–4)
EOSINOPHIL NFR BLD MANUAL: 22.4 % (ref 0–4)
EOSINOPHIL NFR BLD MANUAL: 5.2 % (ref 0–4)
EOSINOPHIL NFR BLD MANUAL: 7.1 % (ref 0–4)
EOSINOPHIL NFR BLD MANUAL: 7.1 % (ref 0–4)
EOSINOPHIL NFR BLD MANUAL: 8 % (ref 0–4)
ERYTHROCYTE [DISTWIDTH] IN BLOOD BY AUTOMATED COUNT: 14.6 % (ref 12–15)
ERYTHROCYTE [DISTWIDTH] IN BLOOD BY AUTOMATED COUNT: 15.3 % (ref 12–15)
ERYTHROCYTE [DISTWIDTH] IN BLOOD BY AUTOMATED COUNT: 15.6 % (ref 12–15)
ERYTHROCYTE [DISTWIDTH] IN BLOOD BY AUTOMATED COUNT: 15.7 % (ref 12–15)
ERYTHROCYTE [DISTWIDTH] IN BLOOD BY AUTOMATED COUNT: 15.7 % (ref 12–15)
ERYTHROCYTE [DISTWIDTH] IN BLOOD BY AUTOMATED COUNT: 15.9 % (ref 12–15)
ERYTHROCYTE [DISTWIDTH] IN BLOOD BY AUTOMATED COUNT: 15.9 % (ref 12–15)
ERYTHROCYTE [DISTWIDTH] IN BLOOD BY AUTOMATED COUNT: 16.4 % (ref 12–15)
ERYTHROCYTE [DISTWIDTH] IN BLOOD BY AUTOMATED COUNT: 16.8 % (ref 12–15)
ERYTHROCYTE [DISTWIDTH] IN BLOOD BY AUTOMATED COUNT: 16.9 % (ref 12–15)
ERYTHROCYTE [DISTWIDTH] IN BLOOD BY AUTOMATED COUNT: 16.9 % (ref 12–15)
ERYTHROCYTE [DISTWIDTH] IN BLOOD BY AUTOMATED COUNT: 17 % (ref 12–15)
ERYTHROCYTE [DISTWIDTH] IN BLOOD BY AUTOMATED COUNT: 17.1 % (ref 12–15)
ERYTHROCYTE [DISTWIDTH] IN BLOOD BY AUTOMATED COUNT: 17.2 % (ref 12–15)
ERYTHROCYTE [DISTWIDTH] IN BLOOD BY AUTOMATED COUNT: 17.2 % (ref 12–15)
ERYTHROCYTE [DISTWIDTH] IN BLOOD BY AUTOMATED COUNT: 17.3 % (ref 12–15)
ERYTHROCYTE [DISTWIDTH] IN BLOOD BY AUTOMATED COUNT: 17.3 % (ref 12–15)
ERYTHROCYTE [DISTWIDTH] IN BLOOD BY AUTOMATED COUNT: 17.4 % (ref 12–15)
ETHNIC BACKGROUND STATED: 15.3 MIU/ML (ref 2.6–18.5)
FERRITIN SERPL-MCNC: 236 NG/ML (ref 11.1–264)
FLUAV AG NPH QL: NEGATIVE
FLUBV AG NPH QL IA: NEGATIVE
FOLATE SERPL-MCNC: >20 NG/ML (ref 4.78–24.2)
GAMMA GLOB SERPL ELPH-MCNC: 0.7 G/DL (ref 0.4–1.8)
GFR SERPL CREATININE-BSD FRML MDRD: 14 ML/MIN/1.73
GFR SERPL CREATININE-BSD FRML MDRD: 15 ML/MIN/1.73
GFR SERPL CREATININE-BSD FRML MDRD: 17 ML/MIN/1.73
GFR SERPL CREATININE-BSD FRML MDRD: 18 ML/MIN/1.73
GFR SERPL CREATININE-BSD FRML MDRD: 21 ML/MIN/1.73
GFR SERPL CREATININE-BSD FRML MDRD: 21 ML/MIN/1.73
GFR SERPL CREATININE-BSD FRML MDRD: 23 ML/MIN/1.73
GFR SERPL CREATININE-BSD FRML MDRD: 31 ML/MIN/1.73
GFR SERPL CREATININE-BSD FRML MDRD: 31 ML/MIN/1.73
GFR SERPL CREATININE-BSD FRML MDRD: 37 ML/MIN/1.73
GFR SERPL CREATININE-BSD FRML MDRD: 38 ML/MIN/1.73
GFR SERPL CREATININE-BSD FRML MDRD: 44 ML/MIN/1.73
GFR SERPL CREATININE-BSD FRML MDRD: 46 ML/MIN/1.73
GFR SERPL CREATININE-BSD FRML MDRD: 46 ML/MIN/1.73
GFR SERPL CREATININE-BSD FRML MDRD: 50 ML/MIN/1.73
GFR SERPL CREATININE-BSD FRML MDRD: 52 ML/MIN/1.73
GFR SERPL CREATININE-BSD FRML MDRD: 54 ML/MIN/1.73
GFR SERPL CREATININE-BSD FRML MDRD: 54 ML/MIN/1.73
GFR SERPL CREATININE-BSD FRML MDRD: 57 ML/MIN/1.73
GFR SERPL CREATININE-BSD FRML MDRD: 57 ML/MIN/1.73
GFR SERPL CREATININE-BSD FRML MDRD: 58 ML/MIN/1.73
GFR SERPL CREATININE-BSD FRML MDRD: 60 ML/MIN/1.73
GFR SERPL CREATININE-BSD FRML MDRD: 61 ML/MIN/1.73
GFR SERPL CREATININE-BSD FRML MDRD: 61 ML/MIN/1.73
GFR SERPL CREATININE-BSD FRML MDRD: 63 ML/MIN/1.73
GFR SERPL CREATININE-BSD FRML MDRD: 65 ML/MIN/1.73
GFR SERPL CREATININE-BSD FRML MDRD: 66 ML/MIN/1.73
GFR SERPL CREATININE-BSD FRML MDRD: 67 ML/MIN/1.73
GFR SERPL CREATININE-BSD FRML MDRD: 67 ML/MIN/1.73
GFR SERPL CREATININE-BSD FRML MDRD: 68 ML/MIN/1.73
GFR SERPL CREATININE-BSD FRML MDRD: 69 ML/MIN/1.73
GFR SERPL CREATININE-BSD FRML MDRD: 70 ML/MIN/1.73
GFR SERPL CREATININE-BSD FRML MDRD: 75 ML/MIN/1.73
GFR SERPL CREATININE-BSD FRML MDRD: 82 ML/MIN/1.73
GFR SERPL CREATININE-BSD FRML MDRD: 82 ML/MIN/1.73
GFR SERPL CREATININE-BSD FRML MDRD: ABNORMAL ML/MIN/1.73
GIANT PLATELETS: ABNORMAL
GLOBULIN SER CALC-MCNC: 2.2 G/DL (ref 2.2–3.9)
GLOBULIN UR ELPH-MCNC: 2 GM/DL
GLOBULIN UR ELPH-MCNC: 2.1 GM/DL
GLOBULIN UR ELPH-MCNC: 2.2 GM/DL
GLOBULIN UR ELPH-MCNC: 2.3 GM/DL
GLOBULIN UR ELPH-MCNC: 2.3 GM/DL
GLOBULIN UR ELPH-MCNC: 2.4 GM/DL
GLOBULIN UR ELPH-MCNC: 2.5 GM/DL
GLOBULIN UR ELPH-MCNC: 2.6 GM/DL
GLUCOSE BLD-MCNC: 104 MG/DL (ref 70–100)
GLUCOSE BLD-MCNC: 110 MG/DL (ref 70–100)
GLUCOSE BLD-MCNC: 118 MG/DL (ref 70–100)
GLUCOSE BLD-MCNC: 123 MG/DL (ref 70–100)
GLUCOSE BLD-MCNC: 130 MG/DL (ref 70–100)
GLUCOSE BLD-MCNC: 131 MG/DL (ref 70–100)
GLUCOSE BLD-MCNC: 132 MG/DL (ref 70–100)
GLUCOSE BLD-MCNC: 132 MG/DL (ref 70–100)
GLUCOSE BLD-MCNC: 138 MG/DL (ref 70–100)
GLUCOSE BLD-MCNC: 142 MG/DL (ref 70–100)
GLUCOSE BLD-MCNC: 142 MG/DL (ref 70–100)
GLUCOSE BLD-MCNC: 149 MG/DL (ref 70–100)
GLUCOSE BLD-MCNC: 154 MG/DL (ref 70–100)
GLUCOSE BLD-MCNC: 154 MG/DL (ref 70–100)
GLUCOSE BLD-MCNC: 156 MG/DL (ref 70–100)
GLUCOSE BLD-MCNC: 159 MG/DL (ref 70–100)
GLUCOSE BLD-MCNC: 161 MG/DL (ref 70–100)
GLUCOSE BLD-MCNC: 164 MG/DL (ref 70–100)
GLUCOSE BLD-MCNC: 164 MG/DL (ref 70–100)
GLUCOSE BLD-MCNC: 173 MG/DL (ref 70–100)
GLUCOSE BLD-MCNC: 184 MG/DL (ref 70–100)
GLUCOSE BLD-MCNC: 184 MG/DL (ref 70–100)
GLUCOSE BLD-MCNC: 185 MG/DL (ref 70–100)
GLUCOSE BLD-MCNC: 207 MG/DL (ref 70–100)
GLUCOSE BLD-MCNC: 210 MG/DL (ref 70–100)
GLUCOSE BLD-MCNC: 217 MG/DL (ref 70–100)
GLUCOSE BLD-MCNC: 248 MG/DL (ref 70–100)
GLUCOSE BLD-MCNC: 303 MG/DL (ref 70–100)
GLUCOSE BLD-MCNC: 331 MG/DL (ref 70–100)
GLUCOSE BLD-MCNC: 48 MG/DL (ref 70–100)
GLUCOSE BLD-MCNC: 49 MG/DL (ref 70–100)
GLUCOSE BLD-MCNC: 66 MG/DL (ref 70–100)
GLUCOSE BLD-MCNC: 82 MG/DL (ref 70–100)
GLUCOSE BLD-MCNC: 85 MG/DL (ref 70–100)
GLUCOSE BLD-MCNC: 87 MG/DL (ref 70–100)
GLUCOSE BLD-MCNC: 89 MG/DL (ref 70–100)
GLUCOSE BLD-MCNC: 90 MG/DL (ref 70–100)
GLUCOSE BLD-MCNC: 93 MG/DL (ref 70–100)
GLUCOSE BLD-MCNC: 94 MG/DL (ref 70–100)
GLUCOSE BLD-MCNC: 98 MG/DL (ref 70–100)
GLUCOSE BLDC GLUCOMTR-MCNC: 100 MG/DL (ref 70–130)
GLUCOSE BLDC GLUCOMTR-MCNC: 103 MG/DL (ref 70–130)
GLUCOSE BLDC GLUCOMTR-MCNC: 107 MG/DL (ref 70–130)
GLUCOSE BLDC GLUCOMTR-MCNC: 109 MG/DL (ref 70–130)
GLUCOSE BLDC GLUCOMTR-MCNC: 110 MG/DL (ref 70–130)
GLUCOSE BLDC GLUCOMTR-MCNC: 113 MG/DL (ref 70–130)
GLUCOSE BLDC GLUCOMTR-MCNC: 113 MG/DL (ref 70–130)
GLUCOSE BLDC GLUCOMTR-MCNC: 114 MG/DL (ref 70–130)
GLUCOSE BLDC GLUCOMTR-MCNC: 115 MG/DL (ref 70–130)
GLUCOSE BLDC GLUCOMTR-MCNC: 117 MG/DL (ref 70–130)
GLUCOSE BLDC GLUCOMTR-MCNC: 119 MG/DL (ref 70–130)
GLUCOSE BLDC GLUCOMTR-MCNC: 122 MG/DL (ref 70–130)
GLUCOSE BLDC GLUCOMTR-MCNC: 124 MG/DL (ref 70–130)
GLUCOSE BLDC GLUCOMTR-MCNC: 127 MG/DL (ref 70–130)
GLUCOSE BLDC GLUCOMTR-MCNC: 129 MG/DL (ref 70–130)
GLUCOSE BLDC GLUCOMTR-MCNC: 138 MG/DL (ref 70–130)
GLUCOSE BLDC GLUCOMTR-MCNC: 138 MG/DL (ref 70–130)
GLUCOSE BLDC GLUCOMTR-MCNC: 140 MG/DL (ref 70–130)
GLUCOSE BLDC GLUCOMTR-MCNC: 141 MG/DL (ref 70–130)
GLUCOSE BLDC GLUCOMTR-MCNC: 144 MG/DL (ref 70–130)
GLUCOSE BLDC GLUCOMTR-MCNC: 145 MG/DL (ref 70–130)
GLUCOSE BLDC GLUCOMTR-MCNC: 147 MG/DL (ref 70–130)
GLUCOSE BLDC GLUCOMTR-MCNC: 148 MG/DL (ref 70–130)
GLUCOSE BLDC GLUCOMTR-MCNC: 149 MG/DL (ref 70–130)
GLUCOSE BLDC GLUCOMTR-MCNC: 151 MG/DL (ref 70–130)
GLUCOSE BLDC GLUCOMTR-MCNC: 153 MG/DL (ref 70–130)
GLUCOSE BLDC GLUCOMTR-MCNC: 156 MG/DL (ref 70–130)
GLUCOSE BLDC GLUCOMTR-MCNC: 156 MG/DL (ref 70–130)
GLUCOSE BLDC GLUCOMTR-MCNC: 158 MG/DL (ref 70–130)
GLUCOSE BLDC GLUCOMTR-MCNC: 158 MG/DL (ref 70–130)
GLUCOSE BLDC GLUCOMTR-MCNC: 159 MG/DL (ref 70–130)
GLUCOSE BLDC GLUCOMTR-MCNC: 159 MG/DL (ref 70–130)
GLUCOSE BLDC GLUCOMTR-MCNC: 163 MG/DL (ref 70–130)
GLUCOSE BLDC GLUCOMTR-MCNC: 164 MG/DL (ref 70–130)
GLUCOSE BLDC GLUCOMTR-MCNC: 165 MG/DL (ref 70–130)
GLUCOSE BLDC GLUCOMTR-MCNC: 171 MG/DL (ref 70–130)
GLUCOSE BLDC GLUCOMTR-MCNC: 172 MG/DL (ref 70–130)
GLUCOSE BLDC GLUCOMTR-MCNC: 174 MG/DL (ref 70–130)
GLUCOSE BLDC GLUCOMTR-MCNC: 174 MG/DL (ref 70–130)
GLUCOSE BLDC GLUCOMTR-MCNC: 176 MG/DL (ref 70–130)
GLUCOSE BLDC GLUCOMTR-MCNC: 176 MG/DL (ref 70–130)
GLUCOSE BLDC GLUCOMTR-MCNC: 177 MG/DL (ref 70–130)
GLUCOSE BLDC GLUCOMTR-MCNC: 178 MG/DL (ref 70–130)
GLUCOSE BLDC GLUCOMTR-MCNC: 179 MG/DL (ref 70–130)
GLUCOSE BLDC GLUCOMTR-MCNC: 180 MG/DL (ref 70–130)
GLUCOSE BLDC GLUCOMTR-MCNC: 182 MG/DL (ref 70–130)
GLUCOSE BLDC GLUCOMTR-MCNC: 183 MG/DL (ref 70–130)
GLUCOSE BLDC GLUCOMTR-MCNC: 183 MG/DL (ref 70–130)
GLUCOSE BLDC GLUCOMTR-MCNC: 185 MG/DL (ref 70–130)
GLUCOSE BLDC GLUCOMTR-MCNC: 187 MG/DL (ref 70–130)
GLUCOSE BLDC GLUCOMTR-MCNC: 188 MG/DL (ref 70–130)
GLUCOSE BLDC GLUCOMTR-MCNC: 189 MG/DL (ref 70–130)
GLUCOSE BLDC GLUCOMTR-MCNC: 191 MG/DL (ref 70–130)
GLUCOSE BLDC GLUCOMTR-MCNC: 193 MG/DL (ref 70–130)
GLUCOSE BLDC GLUCOMTR-MCNC: 194 MG/DL (ref 70–130)
GLUCOSE BLDC GLUCOMTR-MCNC: 194 MG/DL (ref 70–130)
GLUCOSE BLDC GLUCOMTR-MCNC: 199 MG/DL (ref 70–130)
GLUCOSE BLDC GLUCOMTR-MCNC: 200 MG/DL (ref 70–130)
GLUCOSE BLDC GLUCOMTR-MCNC: 200 MG/DL (ref 70–130)
GLUCOSE BLDC GLUCOMTR-MCNC: 201 MG/DL (ref 70–130)
GLUCOSE BLDC GLUCOMTR-MCNC: 203 MG/DL (ref 70–130)
GLUCOSE BLDC GLUCOMTR-MCNC: 203 MG/DL (ref 70–130)
GLUCOSE BLDC GLUCOMTR-MCNC: 204 MG/DL (ref 70–130)
GLUCOSE BLDC GLUCOMTR-MCNC: 215 MG/DL (ref 70–130)
GLUCOSE BLDC GLUCOMTR-MCNC: 216 MG/DL (ref 70–130)
GLUCOSE BLDC GLUCOMTR-MCNC: 216 MG/DL (ref 70–130)
GLUCOSE BLDC GLUCOMTR-MCNC: 217 MG/DL (ref 70–130)
GLUCOSE BLDC GLUCOMTR-MCNC: 225 MG/DL (ref 70–130)
GLUCOSE BLDC GLUCOMTR-MCNC: 228 MG/DL (ref 70–130)
GLUCOSE BLDC GLUCOMTR-MCNC: 229 MG/DL (ref 70–130)
GLUCOSE BLDC GLUCOMTR-MCNC: 230 MG/DL (ref 70–130)
GLUCOSE BLDC GLUCOMTR-MCNC: 232 MG/DL (ref 70–130)
GLUCOSE BLDC GLUCOMTR-MCNC: 235 MG/DL (ref 70–130)
GLUCOSE BLDC GLUCOMTR-MCNC: 242 MG/DL (ref 70–130)
GLUCOSE BLDC GLUCOMTR-MCNC: 248 MG/DL (ref 70–130)
GLUCOSE BLDC GLUCOMTR-MCNC: 249 MG/DL (ref 70–130)
GLUCOSE BLDC GLUCOMTR-MCNC: 257 MG/DL (ref 70–130)
GLUCOSE BLDC GLUCOMTR-MCNC: 264 MG/DL (ref 70–130)
GLUCOSE BLDC GLUCOMTR-MCNC: 276 MG/DL (ref 70–130)
GLUCOSE BLDC GLUCOMTR-MCNC: 299 MG/DL (ref 70–130)
GLUCOSE BLDC GLUCOMTR-MCNC: 345 MG/DL (ref 70–130)
GLUCOSE BLDC GLUCOMTR-MCNC: 56 MG/DL (ref 70–130)
GLUCOSE BLDC GLUCOMTR-MCNC: 62 MG/DL (ref 70–130)
GLUCOSE BLDC GLUCOMTR-MCNC: 67 MG/DL (ref 70–130)
GLUCOSE BLDC GLUCOMTR-MCNC: 68 MG/DL (ref 70–130)
GLUCOSE BLDC GLUCOMTR-MCNC: 70 MG/DL (ref 70–130)
GLUCOSE BLDC GLUCOMTR-MCNC: 71 MG/DL (ref 70–130)
GLUCOSE BLDC GLUCOMTR-MCNC: 72 MG/DL (ref 70–130)
GLUCOSE BLDC GLUCOMTR-MCNC: 74 MG/DL (ref 70–130)
GLUCOSE BLDC GLUCOMTR-MCNC: 78 MG/DL (ref 70–130)
GLUCOSE BLDC GLUCOMTR-MCNC: 84 MG/DL (ref 70–130)
GLUCOSE BLDC GLUCOMTR-MCNC: 86 MG/DL (ref 70–130)
GLUCOSE BLDC GLUCOMTR-MCNC: 88 MG/DL (ref 70–130)
GLUCOSE BLDC GLUCOMTR-MCNC: 89 MG/DL (ref 70–130)
GLUCOSE BLDC GLUCOMTR-MCNC: 90 MG/DL (ref 70–130)
GLUCOSE BLDC GLUCOMTR-MCNC: 93 MG/DL (ref 70–130)
GLUCOSE BLDC GLUCOMTR-MCNC: 99 MG/DL (ref 70–130)
GLUCOSE UR STRIP-MCNC: NEGATIVE MG/DL
HBA1C MFR BLD: 7.3 %
HBA1C MFR BLD: 7.7 %
HBA1C MFR BLD: 8.6 %
HCT VFR BLD AUTO: 29.6 % (ref 37–47)
HCT VFR BLD AUTO: 29.8 % (ref 37–47)
HCT VFR BLD AUTO: 30 % (ref 37–47)
HCT VFR BLD AUTO: 30.1 % (ref 37–47)
HCT VFR BLD AUTO: 30.2 % (ref 37–47)
HCT VFR BLD AUTO: 30.4 % (ref 37–47)
HCT VFR BLD AUTO: 30.4 % (ref 37–47)
HCT VFR BLD AUTO: 30.5 % (ref 37–47)
HCT VFR BLD AUTO: 30.8 % (ref 37–47)
HCT VFR BLD AUTO: 30.8 % (ref 37–47)
HCT VFR BLD AUTO: 30.9 % (ref 37–47)
HCT VFR BLD AUTO: 31 % (ref 37–47)
HCT VFR BLD AUTO: 31.1 % (ref 37–47)
HCT VFR BLD AUTO: 31.4 % (ref 37–47)
HCT VFR BLD AUTO: 31.4 % (ref 37–47)
HCT VFR BLD AUTO: 31.7 % (ref 37–47)
HCT VFR BLD AUTO: 31.8 % (ref 37–47)
HCT VFR BLD AUTO: 31.8 % (ref 37–47)
HCT VFR BLD AUTO: 32.2 % (ref 37–47)
HCT VFR BLD AUTO: 32.5 % (ref 37–47)
HCT VFR BLD AUTO: 32.7 % (ref 37–47)
HCT VFR BLD AUTO: 32.8 % (ref 37–47)
HCT VFR BLD AUTO: 33.5 % (ref 37–47)
HCT VFR BLD AUTO: 33.6 % (ref 37–47)
HCT VFR BLD AUTO: 35.1 % (ref 37–47)
HCT VFR BLD AUTO: 35.3 % (ref 37–47)
HCT VFR BLD AUTO: 36.3 % (ref 37–47)
HCT VFR BLD AUTO: 38 % (ref 37–47)
HDLC SERPL-MCNC: 30 MG/DL
HDLC SERPL-MCNC: 33 MG/DL
HDLC SERPL-MCNC: 33 MG/DL
HDLC SERPL-MCNC: 36 MG/DL
HELMET CELLS: ABNORMAL
HEMOCCULT STL QL: NEGATIVE
HGB BLD-MCNC: 10 G/DL (ref 12–16)
HGB BLD-MCNC: 10.2 G/DL (ref 12–16)
HGB BLD-MCNC: 10.2 G/DL (ref 12–16)
HGB BLD-MCNC: 10.4 G/DL (ref 12–16)
HGB BLD-MCNC: 10.5 G/DL (ref 12–16)
HGB BLD-MCNC: 10.8 G/DL (ref 12–16)
HGB BLD-MCNC: 11 G/DL (ref 12–16)
HGB BLD-MCNC: 11.4 G/DL (ref 12–16)
HGB BLD-MCNC: 11.4 G/DL (ref 12–16)
HGB BLD-MCNC: 9.1 G/DL (ref 12–16)
HGB BLD-MCNC: 9.2 G/DL (ref 12–16)
HGB BLD-MCNC: 9.3 G/DL (ref 12–16)
HGB BLD-MCNC: 9.4 G/DL (ref 12–16)
HGB BLD-MCNC: 9.5 G/DL (ref 12–16)
HGB BLD-MCNC: 9.6 G/DL (ref 12–16)
HGB BLD-MCNC: 9.7 G/DL (ref 12–16)
HGB BLD-MCNC: 9.8 G/DL (ref 12–16)
HGB BLD-MCNC: 9.9 G/DL (ref 12–16)
HGB UR QL STRIP.AUTO: ABNORMAL
HOLD SPECIMEN: NORMAL
HYALINE CASTS UR QL AUTO: ABNORMAL /LPF
HYPOCHROMIA BLD QL: ABNORMAL
IGA SERPL-MCNC: 130 MG/DL (ref 64–422)
IGG SERPL-MCNC: 713 MG/DL (ref 700–1600)
IGM SERPL-MCNC: 96 MG/DL (ref 26–217)
INR PPP: 1.35 (ref 0.91–1.09)
IRON 24H UR-MRATE: 35 MCG/DL (ref 42–180)
IRON 24H UR-MRATE: 49 MCG/DL (ref 42–180)
IRON SATN MFR SERPL: 15 % (ref 20–45)
IRON SATN MFR SERPL: 23 % (ref 20–45)
KAPPA LC SERPL-MCNC: 39.2 MG/L (ref 3.3–19.4)
KAPPA LC/LAMBDA SER: 1.35 {RATIO} (ref 0.26–1.65)
KETONES UR QL STRIP: ABNORMAL
KETONES UR QL STRIP: NEGATIVE
LAMBDA LC FREE SERPL-MCNC: 29 MG/L (ref 5.7–26.3)
LDLC/HDLC SERPL: ABNORMAL {RATIO}
LEFT ATRIUM VOLUME INDEX: 37.2 ML/M2
LEFT ATRIUM VOLUME: 69.6 CM3
LEUKOCYTE ESTERASE UR QL STRIP.AUTO: ABNORMAL
LIPASE SERPL-CCNC: 21 U/L (ref 23–203)
LIPASE SERPL-CCNC: 32 U/L (ref 23–203)
LV EF 2D ECHO EST: 60 %
LYMPHOCYTES # BLD MANUAL: 0 10*3/MM3 (ref 0.72–4.86)
LYMPHOCYTES # BLD MANUAL: 0.62 10*3/MM3 (ref 0.72–4.86)
LYMPHOCYTES # BLD MANUAL: 1.13 10*3/MM3 (ref 0.72–4.86)
LYMPHOCYTES # BLD MANUAL: 1.16 10*3/MM3 (ref 0.72–4.86)
LYMPHOCYTES # BLD MANUAL: 1.19 10*3/MM3 (ref 0.72–4.86)
LYMPHOCYTES # BLD MANUAL: 1.28 10*3/MM3 (ref 0.72–4.86)
LYMPHOCYTES # BLD MANUAL: 1.3 10*3/MM3 (ref 0.72–4.86)
LYMPHOCYTES # BLD MANUAL: 1.34 10*3/MM3 (ref 0.72–4.86)
LYMPHOCYTES # BLD MANUAL: 1.6 10*3/MM3 (ref 0.72–4.86)
LYMPHOCYTES # BLD MANUAL: 1.64 10*3/MM3 (ref 0.72–4.86)
LYMPHOCYTES # BLD MANUAL: 1.8 10*3/MM3 (ref 0.72–4.86)
LYMPHOCYTES # BLD MANUAL: 1.83 10*3/MM3 (ref 0.72–4.86)
LYMPHOCYTES # BLD MANUAL: 2 10*3/MM3 (ref 0.72–4.86)
LYMPHOCYTES # BLD MANUAL: 2.07 10*3/MM3 (ref 0.72–4.86)
LYMPHOCYTES # BLD MANUAL: 2.08 10*3/MM3 (ref 0.72–4.86)
LYMPHOCYTES # BLD MANUAL: 2.24 10*3/MM3 (ref 0.72–4.86)
LYMPHOCYTES # BLD MANUAL: 2.24 10*3/MM3 (ref 0.72–4.86)
LYMPHOCYTES # BLD MANUAL: 2.3 10*3/MM3 (ref 0.72–4.86)
LYMPHOCYTES # BLD MANUAL: 2.51 10*3/MM3 (ref 0.72–4.86)
LYMPHOCYTES # BLD MANUAL: 2.79 10*3/MM3 (ref 0.72–4.86)
LYMPHOCYTES # BLD MANUAL: 2.8 10*3/MM3 (ref 0.72–4.86)
LYMPHOCYTES # BLD MANUAL: 2.88 10*3/MM3 (ref 0.72–4.86)
LYMPHOCYTES # BLD MANUAL: 2.95 10*3/MM3 (ref 0.72–4.86)
LYMPHOCYTES # BLD MANUAL: 3.08 10*3/MM3 (ref 0.72–4.86)
LYMPHOCYTES # BLD MANUAL: 3.16 10*3/MM3 (ref 0.72–4.86)
LYMPHOCYTES # BLD MANUAL: 3.21 10*3/MM3 (ref 0.72–4.86)
LYMPHOCYTES # BLD MANUAL: 3.48 10*3/MM3 (ref 0.72–4.86)
LYMPHOCYTES # BLD MANUAL: 4.67 10*3/MM3 (ref 0.72–4.86)
LYMPHOCYTES NFR BLD MANUAL: 0 % (ref 15–45)
LYMPHOCYTES NFR BLD MANUAL: 1 % (ref 4–12)
LYMPHOCYTES NFR BLD MANUAL: 10 % (ref 15–45)
LYMPHOCYTES NFR BLD MANUAL: 10 % (ref 15–45)
LYMPHOCYTES NFR BLD MANUAL: 10 % (ref 4–12)
LYMPHOCYTES NFR BLD MANUAL: 10.1 % (ref 15–45)
LYMPHOCYTES NFR BLD MANUAL: 10.3 % (ref 15–45)
LYMPHOCYTES NFR BLD MANUAL: 11 % (ref 15–45)
LYMPHOCYTES NFR BLD MANUAL: 11.1 % (ref 15–45)
LYMPHOCYTES NFR BLD MANUAL: 11.1 % (ref 15–45)
LYMPHOCYTES NFR BLD MANUAL: 12.2 % (ref 15–45)
LYMPHOCYTES NFR BLD MANUAL: 13 % (ref 15–45)
LYMPHOCYTES NFR BLD MANUAL: 13.3 % (ref 15–45)
LYMPHOCYTES NFR BLD MANUAL: 14 % (ref 15–45)
LYMPHOCYTES NFR BLD MANUAL: 14.3 % (ref 15–45)
LYMPHOCYTES NFR BLD MANUAL: 14.3 % (ref 15–45)
LYMPHOCYTES NFR BLD MANUAL: 15.3 % (ref 15–45)
LYMPHOCYTES NFR BLD MANUAL: 2 % (ref 15–45)
LYMPHOCYTES NFR BLD MANUAL: 2 % (ref 4–12)
LYMPHOCYTES NFR BLD MANUAL: 2.1 % (ref 4–12)
LYMPHOCYTES NFR BLD MANUAL: 28 % (ref 15–45)
LYMPHOCYTES NFR BLD MANUAL: 3 % (ref 4–12)
LYMPHOCYTES NFR BLD MANUAL: 3.1 % (ref 4–12)
LYMPHOCYTES NFR BLD MANUAL: 4 % (ref 15–45)
LYMPHOCYTES NFR BLD MANUAL: 4 % (ref 4–12)
LYMPHOCYTES NFR BLD MANUAL: 4.1 % (ref 4–12)
LYMPHOCYTES NFR BLD MANUAL: 5 % (ref 15–45)
LYMPHOCYTES NFR BLD MANUAL: 5 % (ref 15–45)
LYMPHOCYTES NFR BLD MANUAL: 5 % (ref 4–12)
LYMPHOCYTES NFR BLD MANUAL: 5.1 % (ref 4–12)
LYMPHOCYTES NFR BLD MANUAL: 6 % (ref 15–45)
LYMPHOCYTES NFR BLD MANUAL: 6.1 % (ref 15–45)
LYMPHOCYTES NFR BLD MANUAL: 7.1 % (ref 15–45)
LYMPHOCYTES NFR BLD MANUAL: 7.2 % (ref 15–45)
LYMPHOCYTES NFR BLD MANUAL: 7.2 % (ref 4–12)
LYMPHOCYTES NFR BLD MANUAL: 7.4 % (ref 15–45)
LYMPHOCYTES NFR BLD MANUAL: 9.2 % (ref 15–45)
LYMPHOCYTES NFR BLD MANUAL: 9.4 % (ref 15–45)
LYMPHOCYTES NFR BLD MANUAL: 9.9 % (ref 15–45)
Lab: ABNORMAL
M-SPIKE: ABNORMAL G/DL
MACROCYTES BLD QL SMEAR: ABNORMAL
MAGNESIUM SERPL-MCNC: 1.5 MG/DL (ref 1.4–2.2)
MAGNESIUM SERPL-MCNC: 1.7 MG/DL (ref 1.4–2.2)
MAGNESIUM SERPL-MCNC: 2.4 MG/DL (ref 1.4–2.2)
MAXIMAL PREDICTED HEART RATE: 132 BPM
MCH RBC QN AUTO: 28.7 PG (ref 28–32)
MCH RBC QN AUTO: 29.1 PG (ref 28–32)
MCH RBC QN AUTO: 29.2 PG (ref 28–32)
MCH RBC QN AUTO: 29.3 PG (ref 28–32)
MCH RBC QN AUTO: 29.4 PG (ref 28–32)
MCH RBC QN AUTO: 29.4 PG (ref 28–32)
MCH RBC QN AUTO: 29.5 PG (ref 28–32)
MCH RBC QN AUTO: 29.5 PG (ref 28–32)
MCH RBC QN AUTO: 29.6 PG (ref 28–32)
MCH RBC QN AUTO: 29.7 PG (ref 28–32)
MCH RBC QN AUTO: 29.8 PG (ref 28–32)
MCH RBC QN AUTO: 29.8 PG (ref 28–32)
MCH RBC QN AUTO: 29.9 PG (ref 28–32)
MCH RBC QN AUTO: 30 PG (ref 28–32)
MCH RBC QN AUTO: 30.2 PG (ref 28–32)
MCH RBC QN AUTO: 30.4 PG (ref 28–32)
MCH RBC QN AUTO: 30.5 PG (ref 28–32)
MCH RBC QN AUTO: 30.7 PG (ref 28–32)
MCH RBC QN AUTO: 31.1 PG (ref 28–32)
MCH RBC QN AUTO: 32.1 PG (ref 28–32)
MCHC RBC AUTO-ENTMCNC: 30 G/DL (ref 33–36)
MCHC RBC AUTO-ENTMCNC: 30 G/DL (ref 33–36)
MCHC RBC AUTO-ENTMCNC: 30.1 G/DL (ref 33–36)
MCHC RBC AUTO-ENTMCNC: 30.2 G/DL (ref 33–36)
MCHC RBC AUTO-ENTMCNC: 30.4 G/DL (ref 33–36)
MCHC RBC AUTO-ENTMCNC: 30.5 G/DL (ref 33–36)
MCHC RBC AUTO-ENTMCNC: 30.6 G/DL (ref 33–36)
MCHC RBC AUTO-ENTMCNC: 30.7 G/DL (ref 33–36)
MCHC RBC AUTO-ENTMCNC: 30.8 G/DL (ref 33–36)
MCHC RBC AUTO-ENTMCNC: 30.9 G/DL (ref 33–36)
MCHC RBC AUTO-ENTMCNC: 30.9 G/DL (ref 33–36)
MCHC RBC AUTO-ENTMCNC: 31 G/DL (ref 33–36)
MCHC RBC AUTO-ENTMCNC: 31.1 G/DL (ref 33–36)
MCHC RBC AUTO-ENTMCNC: 31.1 G/DL (ref 33–36)
MCHC RBC AUTO-ENTMCNC: 31.2 G/DL (ref 33–36)
MCHC RBC AUTO-ENTMCNC: 31.3 G/DL (ref 33–36)
MCHC RBC AUTO-ENTMCNC: 31.3 G/DL (ref 33–36)
MCHC RBC AUTO-ENTMCNC: 31.4 G/DL (ref 33–36)
MCHC RBC AUTO-ENTMCNC: 31.4 G/DL (ref 33–36)
MCHC RBC AUTO-ENTMCNC: 31.5 G/DL (ref 33–36)
MCV RBC AUTO: 103.2 FL (ref 82–98)
MCV RBC AUTO: 93.6 FL (ref 82–98)
MCV RBC AUTO: 94.5 FL (ref 82–98)
MCV RBC AUTO: 94.6 FL (ref 82–98)
MCV RBC AUTO: 94.6 FL (ref 82–98)
MCV RBC AUTO: 94.8 FL (ref 82–98)
MCV RBC AUTO: 94.9 FL (ref 82–98)
MCV RBC AUTO: 95 FL (ref 82–98)
MCV RBC AUTO: 95.3 FL (ref 82–98)
MCV RBC AUTO: 95.4 FL (ref 82–98)
MCV RBC AUTO: 95.7 FL (ref 82–98)
MCV RBC AUTO: 95.8 FL (ref 82–98)
MCV RBC AUTO: 96.2 FL (ref 82–98)
MCV RBC AUTO: 96.4 FL (ref 82–98)
MCV RBC AUTO: 96.5 FL (ref 82–98)
MCV RBC AUTO: 96.8 FL (ref 82–98)
MCV RBC AUTO: 96.9 FL (ref 82–98)
MCV RBC AUTO: 97.1 FL (ref 82–98)
MCV RBC AUTO: 97.2 FL (ref 82–98)
MCV RBC AUTO: 97.2 FL (ref 82–98)
MCV RBC AUTO: 97.5 FL (ref 82–98)
MCV RBC AUTO: 97.7 FL (ref 82–98)
MCV RBC AUTO: 97.8 FL (ref 82–98)
MCV RBC AUTO: 98.1 FL (ref 82–98)
MCV RBC AUTO: 98.2 FL (ref 82–98)
MCV RBC AUTO: 99 FL (ref 82–98)
MCV RBC AUTO: 99 FL (ref 82–98)
MCV RBC AUTO: 99.1 FL (ref 82–98)
METAMYELOCYTES NFR BLD MANUAL: 1 % (ref 0–0)
METAMYELOCYTES NFR BLD MANUAL: 1.1 % (ref 0–0)
METAMYELOCYTES NFR BLD MANUAL: 2 % (ref 0–0)
METAMYELOCYTES NFR BLD MANUAL: 2.1 % (ref 0–0)
METAMYELOCYTES NFR BLD MANUAL: 3 % (ref 0–0)
METAMYELOCYTES NFR BLD MANUAL: 3 % (ref 0–0)
MICROCYTES BLD QL: ABNORMAL
MONOCYTES # BLD AUTO: 0.17 10*3/MM3 (ref 0.19–1.3)
MONOCYTES # BLD AUTO: 0.18 10*3/MM3 (ref 0.19–1.3)
MONOCYTES # BLD AUTO: 0.25 10*3/MM3 (ref 0.19–1.3)
MONOCYTES # BLD AUTO: 0.26 10*3/MM3 (ref 0.19–1.3)
MONOCYTES # BLD AUTO: 0.38 10*3/MM3 (ref 0.19–1.3)
MONOCYTES # BLD AUTO: 0.46 10*3/MM3 (ref 0.19–1.3)
MONOCYTES # BLD AUTO: 0.46 10*3/MM3 (ref 0.19–1.3)
MONOCYTES # BLD AUTO: 0.49 10*3/MM3 (ref 0.19–1.3)
MONOCYTES # BLD AUTO: 0.56 10*3/MM3 (ref 0.19–1.3)
MONOCYTES # BLD AUTO: 0.59 10*3/MM3 (ref 0.19–1.3)
MONOCYTES # BLD AUTO: 0.61 10*3/MM3 (ref 0.19–1.3)
MONOCYTES # BLD AUTO: 0.62 10*3/MM3 (ref 0.19–1.3)
MONOCYTES # BLD AUTO: 0.7 10*3/MM3 (ref 0.19–1.3)
MONOCYTES # BLD AUTO: 0.71 10*3/MM3 (ref 0.19–1.3)
MONOCYTES # BLD AUTO: 0.78 10*3/MM3 (ref 0.19–1.3)
MONOCYTES # BLD AUTO: 0.83 10*3/MM3 (ref 0.19–1.3)
MONOCYTES # BLD AUTO: 0.86 10*3/MM3 (ref 0.19–1.3)
MONOCYTES # BLD AUTO: 0.89 10*3/MM3 (ref 0.19–1.3)
MONOCYTES # BLD AUTO: 0.92 10*3/MM3 (ref 0.19–1.3)
MONOCYTES # BLD AUTO: 0.94 10*3/MM3 (ref 0.19–1.3)
MONOCYTES # BLD AUTO: 1.02 10*3/MM3 (ref 0.19–1.3)
MONOCYTES # BLD AUTO: 1.05 10*3/MM3 (ref 0.19–1.3)
MONOCYTES # BLD AUTO: 1.11 10*3/MM3 (ref 0.19–1.3)
MONOCYTES # BLD AUTO: 1.13 10*3/MM3 (ref 0.19–1.3)
MONOCYTES # BLD AUTO: 1.45 10*3/MM3 (ref 0.19–1.3)
MONOCYTES # BLD AUTO: 1.45 10*3/MM3 (ref 0.19–1.3)
MONOCYTES # BLD AUTO: 2.3 10*3/MM3 (ref 0.19–1.3)
MYELOCYTES NFR BLD MANUAL: 1 % (ref 0–0)
MYELOCYTES NFR BLD MANUAL: 1.1 % (ref 0–0)
MYELOCYTES NFR BLD MANUAL: 2 % (ref 0–0)
NEUTROPHILS # BLD AUTO: 10.18 10*3/MM3 (ref 1.87–8.4)
NEUTROPHILS # BLD AUTO: 11.8 10*3/MM3 (ref 1.87–8.4)
NEUTROPHILS # BLD AUTO: 12.06 10*3/MM3 (ref 1.87–8.4)
NEUTROPHILS # BLD AUTO: 12.42 10*3/MM3 (ref 1.87–8.4)
NEUTROPHILS # BLD AUTO: 12.46 10*3/MM3 (ref 1.87–8.4)
NEUTROPHILS # BLD AUTO: 12.56 10*3/MM3 (ref 1.87–8.4)
NEUTROPHILS # BLD AUTO: 13.12 10*3/MM3 (ref 1.4–7)
NEUTROPHILS # BLD AUTO: 13.12 10*3/MM3 (ref 1.87–8.4)
NEUTROPHILS # BLD AUTO: 13.2 10*3/MM3 (ref 1.87–8.4)
NEUTROPHILS # BLD AUTO: 13.39 10*3/MM3 (ref 1.4–7)
NEUTROPHILS # BLD AUTO: 13.47 10*3/MM3 (ref 1.4–7)
NEUTROPHILS # BLD AUTO: 13.97 10*3/MM3 (ref 1.87–8.4)
NEUTROPHILS # BLD AUTO: 14.11 10*3/MM3 (ref 1.87–8.4)
NEUTROPHILS # BLD AUTO: 14.39 10*3/MM3 (ref 1.87–8.4)
NEUTROPHILS # BLD AUTO: 14.47 10*3/MM3 (ref 1.87–8.4)
NEUTROPHILS # BLD AUTO: 14.49 10*3/MM3 (ref 1.87–8.4)
NEUTROPHILS # BLD AUTO: 14.64 10*3/MM3 (ref 1.4–7)
NEUTROPHILS # BLD AUTO: 14.92 10*3/MM3 (ref 1.87–8.4)
NEUTROPHILS # BLD AUTO: 15.16 10*3/MM3 (ref 1.87–8.4)
NEUTROPHILS # BLD AUTO: 15.96 10*3/MM3 (ref 1.87–8.4)
NEUTROPHILS # BLD AUTO: 16.14 10*3/MM3 (ref 1.87–8.4)
NEUTROPHILS # BLD AUTO: 18.4 10*3/MM3 (ref 1.4–7)
NEUTROPHILS # BLD AUTO: 19.04 10*3/MM3 (ref 1.4–7)
NEUTROPHILS # BLD AUTO: 19.25 10*3/MM3 (ref 1.4–7)
NEUTROPHILS # BLD AUTO: 19.74 10*3/MM3 (ref 1.87–8.4)
NEUTROPHILS # BLD AUTO: 20.54 10*3/MM3 (ref 1.87–8.4)
NEUTROPHILS # BLD AUTO: 26.13 10*3/MM3 (ref 1.87–8.4)
NEUTROPHILS # BLD AUTO: 28.82 10*3/MM3 (ref 1.87–8.4)
NEUTROPHILS NFR BLD MANUAL: 49 % (ref 39–78)
NEUTROPHILS NFR BLD MANUAL: 50 % (ref 39–78)
NEUTROPHILS NFR BLD MANUAL: 54.1 % (ref 39–78)
NEUTROPHILS NFR BLD MANUAL: 55 % (ref 39–78)
NEUTROPHILS NFR BLD MANUAL: 56.1 % (ref 39–78)
NEUTROPHILS NFR BLD MANUAL: 57.1 % (ref 39–78)
NEUTROPHILS NFR BLD MANUAL: 57.6 % (ref 39–78)
NEUTROPHILS NFR BLD MANUAL: 57.6 % (ref 39–78)
NEUTROPHILS NFR BLD MANUAL: 58 % (ref 39–78)
NEUTROPHILS NFR BLD MANUAL: 58.2 % (ref 39–78)
NEUTROPHILS NFR BLD MANUAL: 58.9 % (ref 39–78)
NEUTROPHILS NFR BLD MANUAL: 59 % (ref 39–78)
NEUTROPHILS NFR BLD MANUAL: 59.2 % (ref 39–78)
NEUTROPHILS NFR BLD MANUAL: 60 % (ref 39–78)
NEUTROPHILS NFR BLD MANUAL: 60.2 % (ref 39–78)
NEUTROPHILS NFR BLD MANUAL: 60.4 % (ref 39–78)
NEUTROPHILS NFR BLD MANUAL: 62 % (ref 39–78)
NEUTROPHILS NFR BLD MANUAL: 64.6 % (ref 39–78)
NEUTROPHILS NFR BLD MANUAL: 64.6 % (ref 39–78)
NEUTROPHILS NFR BLD MANUAL: 66 % (ref 39–78)
NEUTROPHILS NFR BLD MANUAL: 66 % (ref 39–78)
NEUTROPHILS NFR BLD MANUAL: 67.7 % (ref 39–78)
NEUTROPHILS NFR BLD MANUAL: 69.1 % (ref 39–78)
NEUTROPHILS NFR BLD MANUAL: 71 % (ref 39–78)
NEUTROPHILS NFR BLD MANUAL: 75.3 % (ref 39–78)
NEUTROPHILS NFR BLD MANUAL: 77 % (ref 39–78)
NEUTROPHILS NFR BLD MANUAL: 79 % (ref 39–78)
NEUTROPHILS NFR BLD MANUAL: 80.6 % (ref 39–78)
NEUTS BAND NFR BLD MANUAL: 1 % (ref 0–10)
NEUTS BAND NFR BLD MANUAL: 1 % (ref 0–10)
NEUTS BAND NFR BLD MANUAL: 10 % (ref 0–10)
NEUTS BAND NFR BLD MANUAL: 10.2 % (ref 0–10)
NEUTS BAND NFR BLD MANUAL: 11 % (ref 0–10)
NEUTS BAND NFR BLD MANUAL: 2 % (ref 0–10)
NEUTS BAND NFR BLD MANUAL: 2 % (ref 0–10)
NEUTS BAND NFR BLD MANUAL: 3 % (ref 0–10)
NEUTS BAND NFR BLD MANUAL: 3 % (ref 0–10)
NEUTS BAND NFR BLD MANUAL: 4 % (ref 0–10)
NEUTS BAND NFR BLD MANUAL: 4.1 % (ref 0–10)
NEUTS BAND NFR BLD MANUAL: 5 % (ref 0–10)
NEUTS BAND NFR BLD MANUAL: 5 % (ref 0–10)
NEUTS BAND NFR BLD MANUAL: 5.1 % (ref 0–10)
NEUTS BAND NFR BLD MANUAL: 6.1 % (ref 0–10)
NEUTS BAND NFR BLD MANUAL: 6.3 % (ref 0–10)
NEUTS BAND NFR BLD MANUAL: 6.9 % (ref 0–10)
NEUTS BAND NFR BLD MANUAL: 7.1 % (ref 0–10)
NEUTS BAND NFR BLD MANUAL: 8 % (ref 0–10)
NEUTS BAND NFR BLD MANUAL: 8 % (ref 0–10)
NEUTS BAND NFR BLD MANUAL: 8.4 % (ref 0–10)
NEUTS BAND NFR BLD MANUAL: 9.1 % (ref 0–10)
NEUTS VAC BLD QL SMEAR: ABNORMAL
NEUTS VAC BLD QL SMEAR: ABNORMAL
NITRITE UR QL STRIP: NEGATIVE
NRBC BLD AUTO-RTO: 0 /100 WBC (ref 0–0)
NT-PROBNP SERPL-MCNC: 3300 PG/ML (ref 0–1800)
NT-PROBNP SERPL-MCNC: 4010 PG/ML (ref 0–1800)
NT-PROBNP SERPL-MCNC: 5780 PG/ML (ref 0–1800)
NT-PROBNP SERPL-MCNC: 7000 PG/ML (ref 0–1800)
NT-PROBNP SERPL-MCNC: 7210 PG/ML (ref 0–1800)
NT-PROBNP SERPL-MCNC: ABNORMAL PG/ML (ref 0–1800)
PATH INTERP BLD-IMP: NORMAL
PH UR STRIP.AUTO: 6 [PH] (ref 5–8)
PH UR STRIP.AUTO: <=5 [PH] (ref 5–8)
PHOSPHATE SERPL-MCNC: 3.9 MG/DL (ref 2.5–4.5)
PLAT MORPH BLD: NORMAL
PLAT MORPH BLD: NORMAL
PLATELET # BLD AUTO: 457 10*3/MM3 (ref 130–400)
PLATELET # BLD AUTO: 460 10*3/MM3 (ref 130–400)
PLATELET # BLD AUTO: 462 10*3/MM3 (ref 130–400)
PLATELET # BLD AUTO: 468 10*3/MM3 (ref 130–400)
PLATELET # BLD AUTO: 470 10*3/MM3 (ref 130–400)
PLATELET # BLD AUTO: 476 10*3/MM3 (ref 130–400)
PLATELET # BLD AUTO: 480 10*3/MM3 (ref 130–400)
PLATELET # BLD AUTO: 489 10*3/MM3 (ref 130–400)
PLATELET # BLD AUTO: 500 10*3/MM3 (ref 130–400)
PLATELET # BLD AUTO: 508 10*3/MM3 (ref 130–400)
PLATELET # BLD AUTO: 511 10*3/MM3 (ref 130–400)
PLATELET # BLD AUTO: 512 10*3/MM3 (ref 130–400)
PLATELET # BLD AUTO: 524 10*3/MM3 (ref 130–400)
PLATELET # BLD AUTO: 525 10*3/MM3 (ref 130–400)
PLATELET # BLD AUTO: 532 10*3/MM3 (ref 130–400)
PLATELET # BLD AUTO: 560 10*3/MM3 (ref 130–400)
PLATELET # BLD AUTO: 566 10*3/MM3 (ref 130–400)
PLATELET # BLD AUTO: 568 10*3/MM3 (ref 130–400)
PLATELET # BLD AUTO: 585 10*3/MM3 (ref 130–400)
PLATELET # BLD AUTO: 594 10*3/MM3 (ref 130–400)
PLATELET # BLD AUTO: 600 10*3/MM3 (ref 130–400)
PLATELET # BLD AUTO: 610 10*3/MM3 (ref 130–400)
PLATELET # BLD AUTO: 635 10*3/MM3 (ref 130–400)
PLATELET # BLD AUTO: 646 10*3/MM3 (ref 130–400)
PLATELET # BLD AUTO: 656 10*3/MM3 (ref 130–400)
PLATELET # BLD AUTO: 664 10*3/MM3 (ref 130–400)
PLATELET # BLD AUTO: 669 10*3/MM3 (ref 130–400)
PLATELET # BLD AUTO: 672 10*3/MM3 (ref 130–400)
PLATELET # BLD AUTO: 679 10*3/MM3 (ref 130–400)
PLATELET # BLD AUTO: 886 10*3/MM3 (ref 130–400)
PMV BLD AUTO: 10.5 FL (ref 6–12)
PMV BLD AUTO: 10.6 FL (ref 6–12)
PMV BLD AUTO: 10.7 FL (ref 6–12)
PMV BLD AUTO: 10.8 FL (ref 6–12)
PMV BLD AUTO: 10.8 FL (ref 6–12)
PMV BLD AUTO: 10.9 FL (ref 6–12)
PMV BLD AUTO: 11 FL (ref 6–12)
PMV BLD AUTO: 11 FL (ref 6–12)
PMV BLD AUTO: 11.1 FL (ref 6–12)
PMV BLD AUTO: 11.1 FL (ref 6–12)
PMV BLD AUTO: 11.2 FL (ref 6–12)
PMV BLD AUTO: 11.4 FL (ref 6–12)
PMV BLD AUTO: 11.5 FL (ref 6–12)
PMV BLD AUTO: 11.6 FL (ref 6–12)
PMV BLD AUTO: 11.7 FL (ref 6–12)
POIKILOCYTOSIS BLD QL SMEAR: ABNORMAL
POLYCHROMASIA BLD QL SMEAR: ABNORMAL
POTASSIUM BLD-SCNC: 3.5 MMOL/L (ref 3.5–5.3)
POTASSIUM BLD-SCNC: 3.7 MMOL/L (ref 3.5–5.3)
POTASSIUM BLD-SCNC: 3.8 MMOL/L (ref 3.5–5.3)
POTASSIUM BLD-SCNC: 3.9 MMOL/L (ref 3.5–5.3)
POTASSIUM BLD-SCNC: 3.9 MMOL/L (ref 3.5–5.3)
POTASSIUM BLD-SCNC: 4 MMOL/L (ref 3.5–5.3)
POTASSIUM BLD-SCNC: 4.1 MMOL/L (ref 3.5–5.3)
POTASSIUM BLD-SCNC: 4.2 MMOL/L (ref 3.5–5.3)
POTASSIUM BLD-SCNC: 4.3 MMOL/L (ref 3.5–5.3)
POTASSIUM BLD-SCNC: 4.4 MMOL/L (ref 3.5–5.3)
POTASSIUM BLD-SCNC: 4.6 MMOL/L (ref 3.5–5.3)
POTASSIUM BLD-SCNC: 4.7 MMOL/L (ref 3.5–5.3)
POTASSIUM BLD-SCNC: 4.8 MMOL/L (ref 3.5–5.3)
POTASSIUM BLD-SCNC: 4.8 MMOL/L (ref 3.5–5.3)
POTASSIUM BLD-SCNC: 5 MMOL/L (ref 3.5–5.3)
POTASSIUM BLD-SCNC: 5.1 MMOL/L (ref 3.5–5.3)
POTASSIUM BLD-SCNC: 5.2 MMOL/L (ref 3.5–5.3)
POTASSIUM BLD-SCNC: 5.2 MMOL/L (ref 3.5–5.3)
POTASSIUM BLD-SCNC: 5.3 MMOL/L (ref 3.5–5.3)
POTASSIUM BLD-SCNC: 5.5 MMOL/L (ref 3.5–5.3)
POTASSIUM BLD-SCNC: 5.6 MMOL/L (ref 3.5–5.3)
POTASSIUM BLD-SCNC: 5.7 MMOL/L (ref 3.5–5.3)
POTASSIUM BLD-SCNC: 6.2 MMOL/L (ref 3.5–5.3)
POTASSIUM BLD-SCNC: 6.6 MMOL/L (ref 3.5–5.3)
PREALB SERPL-MCNC: 7.6 MG/DL (ref 18–36)
PREALB SERPL-MCNC: 9.8 MG/DL (ref 18–36)
PROCALCITONIN SERPL-MCNC: <0.25 NG/ML
PROCALCITONIN SERPL-MCNC: <0.25 NG/ML
PROMYELOCYTES NFR BLD MANUAL: 1 % (ref 0–0)
PROMYELOCYTES NFR BLD MANUAL: 1 % (ref 0–0)
PROT PATTERN SERPL ELPH-IMP: ABNORMAL
PROT SERPL-MCNC: 4.5 G/DL (ref 6.3–8.7)
PROT SERPL-MCNC: 4.5 G/DL (ref 6.3–8.7)
PROT SERPL-MCNC: 4.6 G/DL (ref 6.3–8.7)
PROT SERPL-MCNC: 4.7 G/DL (ref 6.3–8.7)
PROT SERPL-MCNC: 4.8 G/DL (ref 6.3–8.7)
PROT SERPL-MCNC: 4.9 G/DL (ref 6.3–8.7)
PROT SERPL-MCNC: 5 G/DL (ref 6.3–8.7)
PROT SERPL-MCNC: 5.1 G/DL (ref 6.3–8.7)
PROT SERPL-MCNC: 5.1 G/DL (ref 6.3–8.7)
PROT SERPL-MCNC: 5.1 G/DL (ref 6–8.5)
PROT SERPL-MCNC: 5.2 G/DL (ref 6.3–8.7)
PROT SERPL-MCNC: 5.3 G/DL (ref 6.3–8.7)
PROT SERPL-MCNC: 5.4 G/DL (ref 6.3–8.7)
PROT SERPL-MCNC: 5.4 G/DL (ref 6.3–8.7)
PROT SERPL-MCNC: 5.5 G/DL (ref 6.3–8.7)
PROT SERPL-MCNC: 5.6 G/DL (ref 6.3–8.7)
PROT SERPL-MCNC: 5.7 G/DL (ref 6.3–8.7)
PROT SERPL-MCNC: 6.1 G/DL (ref 6.3–8.7)
PROT UR QL STRIP: ABNORMAL
PROT UR QL STRIP: NEGATIVE
PROT UR-MCNC: 23 MG/DL (ref 0–13.5)
PROTHROMBIN TIME: 17 SECONDS (ref 11.9–14.6)
PTH-INTACT SERPL-MCNC: 90.4 PG/ML (ref 7.5–53.5)
RBC # BLD AUTO: 3.03 10*6/MM3 (ref 4.2–5.4)
RBC # BLD AUTO: 3.08 10*6/MM3 (ref 4.2–5.4)
RBC # BLD AUTO: 3.09 10*6/MM3 (ref 4.2–5.4)
RBC # BLD AUTO: 3.11 10*6/MM3 (ref 4.2–5.4)
RBC # BLD AUTO: 3.11 10*6/MM3 (ref 4.2–5.4)
RBC # BLD AUTO: 3.14 10*6/MM3 (ref 4.2–5.4)
RBC # BLD AUTO: 3.14 10*6/MM3 (ref 4.2–5.4)
RBC # BLD AUTO: 3.15 10*6/MM3 (ref 4.2–5.4)
RBC # BLD AUTO: 3.15 10*6/MM3 (ref 4.2–5.4)
RBC # BLD AUTO: 3.16 10*6/MM3 (ref 4.2–5.4)
RBC # BLD AUTO: 3.17 10*6/MM3 (ref 4.2–5.4)
RBC # BLD AUTO: 3.18 10*6/MM3 (ref 4.2–5.4)
RBC # BLD AUTO: 3.18 10*6/MM3 (ref 4.2–5.4)
RBC # BLD AUTO: 3.2 10*6/MM3 (ref 4.2–5.4)
RBC # BLD AUTO: 3.22 10*6/MM3 (ref 4.2–5.4)
RBC # BLD AUTO: 3.23 10*6/MM3 (ref 4.2–5.4)
RBC # BLD AUTO: 3.24 10*6/MM3 (ref 4.2–5.4)
RBC # BLD AUTO: 3.28 10*6/MM3 (ref 4.2–5.4)
RBC # BLD AUTO: 3.29 10*6/MM3 (ref 4.2–5.4)
RBC # BLD AUTO: 3.35 10*6/MM3 (ref 4.2–5.4)
RBC # BLD AUTO: 3.44 10*6/MM3 (ref 4.2–5.4)
RBC # BLD AUTO: 3.45 10*6/MM3 (ref 4.2–5.4)
RBC # BLD AUTO: 3.51 10*6/MM3 (ref 4.2–5.4)
RBC # BLD AUTO: 3.54 10*6/MM3 (ref 4.2–5.4)
RBC # BLD AUTO: 3.64 10*6/MM3 (ref 4.2–5.4)
RBC # BLD AUTO: 3.74 10*6/MM3 (ref 4.2–5.4)
RBC # BLD AUTO: 3.77 10*6/MM3 (ref 4.2–5.4)
RBC # BLD AUTO: 3.97 10*6/MM3 (ref 4.2–5.4)
RBC # UR: ABNORMAL /HPF
RBC MORPH BLD: NORMAL
REF LAB TEST METHOD: ABNORMAL
SCHISTOCYTES BLD QL SMEAR: ABNORMAL
SMALL PLATELETS BLD QL SMEAR: ABNORMAL
SODIUM BLD-SCNC: 130 MMOL/L (ref 135–145)
SODIUM BLD-SCNC: 133 MMOL/L (ref 135–145)
SODIUM BLD-SCNC: 135 MMOL/L (ref 135–145)
SODIUM BLD-SCNC: 137 MMOL/L (ref 135–145)
SODIUM BLD-SCNC: 138 MMOL/L (ref 135–145)
SODIUM BLD-SCNC: 139 MMOL/L (ref 135–145)
SODIUM BLD-SCNC: 140 MMOL/L (ref 135–145)
SODIUM BLD-SCNC: 141 MMOL/L (ref 135–145)
SODIUM BLD-SCNC: 142 MMOL/L (ref 135–145)
SODIUM BLD-SCNC: 142 MMOL/L (ref 135–145)
SODIUM BLD-SCNC: 143 MMOL/L (ref 135–145)
SODIUM BLD-SCNC: 143 MMOL/L (ref 135–145)
SODIUM BLD-SCNC: 144 MMOL/L (ref 135–145)
SODIUM BLD-SCNC: 145 MMOL/L (ref 135–145)
SODIUM BLD-SCNC: 146 MMOL/L (ref 135–145)
SODIUM UR-SCNC: 26 MMOL/L (ref 30–90)
SP GR UR STRIP: 1.01 (ref 1–1.03)
SP GR UR STRIP: 1.02 (ref 1–1.03)
SPHEROCYTES BLD QL SMEAR: ABNORMAL
SQUAMOUS #/AREA URNS HPF: ABNORMAL /HPF
STOMATOCYTES BLD QL SMEAR: ABNORMAL
STOMATOCYTES BLD QL SMEAR: ABNORMAL
STRESS TARGET HR: 112 BPM
T4 FREE SERPL-MCNC: 1.37 NG/DL (ref 0.78–2.19)
T4 FREE SERPL-MCNC: 1.39 NG/DL (ref 0.78–2.19)
T4 FREE SERPL-MCNC: 1.43 NG/DL (ref 0.78–2.19)
TARGETS BLD QL SMEAR: ABNORMAL
TIBC SERPL-MCNC: 211 MCG/DL (ref 225–420)
TIBC SERPL-MCNC: 239 MCG/DL (ref 225–420)
TOXIC GRANULATION: ABNORMAL
TRIGL SERPL-MCNC: 122 MG/DL (ref 0–149)
TRIGL SERPL-MCNC: 59 MG/DL (ref 0–149)
TRIGL SERPL-MCNC: 67 MG/DL (ref 0–149)
TRIGL SERPL-MCNC: 73 MG/DL (ref 0–149)
TROPONIN I SERPL-MCNC: 0.02 NG/ML (ref 0–0.03)
TROPONIN I SERPL-MCNC: 0.02 NG/ML (ref 0–0.03)
TROPONIN I SERPL-MCNC: <0.012 NG/ML (ref 0–0.03)
TROPONIN I SERPL-MCNC: <0.012 NG/ML (ref 0–0.03)
TSH SERPL DL<=0.05 MIU/L-ACNC: 0.9 MIU/ML (ref 0.47–4.68)
TSH SERPL DL<=0.05 MIU/L-ACNC: 2.01 MIU/ML (ref 0.47–4.68)
TSH SERPL DL<=0.05 MIU/L-ACNC: 2.46 MIU/ML (ref 0.47–4.68)
TSH SERPL DL<=0.05 MIU/L-ACNC: 5.16 MIU/ML (ref 0.47–4.68)
URATE SERPL-MCNC: 10.2 MG/DL (ref 2.7–7.5)
UROBILINOGEN UR QL STRIP: ABNORMAL
UUN 24H UR-MCNC: 654 MG/DL
VANCOMYCIN TROUGH SERPL-MCNC: 20.41 MCG/ML (ref 10–20)
VARIANT LYMPHS NFR BLD MANUAL: 1 % (ref 0–5)
VARIANT LYMPHS NFR BLD MANUAL: 11.1 % (ref 0–5)
VARIANT LYMPHS NFR BLD MANUAL: 11.5 % (ref 0–5)
VARIANT LYMPHS NFR BLD MANUAL: 2 % (ref 0–5)
VARIANT LYMPHS NFR BLD MANUAL: 2.1 % (ref 0–5)
VARIANT LYMPHS NFR BLD MANUAL: 3 % (ref 0–5)
VARIANT LYMPHS NFR BLD MANUAL: 4 % (ref 0–5)
VARIANT LYMPHS NFR BLD MANUAL: 4 % (ref 0–5)
VARIANT LYMPHS NFR BLD MANUAL: 4.1 % (ref 0–5)
VARIANT LYMPHS NFR BLD MANUAL: 4.1 % (ref 0–5)
VARIANT LYMPHS NFR BLD MANUAL: 5.1 % (ref 0–5)
VARIANT LYMPHS NFR BLD MANUAL: 5.1 % (ref 0–5)
VARIANT LYMPHS NFR BLD MANUAL: 7.1 % (ref 0–5)
VARIANT LYMPHS NFR BLD MANUAL: 7.1 % (ref 0–5)
VIT B12 BLD-MCNC: >1000 PG/ML (ref 239–931)
WBC MORPH BLD: NORMAL
WBC NRBC COR # BLD: 16.69 10*3/MM3 (ref 4.8–10.8)
WBC NRBC COR # BLD: 17.02 10*3/MM3 (ref 4.8–10.8)
WBC NRBC COR # BLD: 17.94 10*3/MM3 (ref 4.8–10.8)
WBC NRBC COR # BLD: 18.35 10*3/MM3 (ref 4.8–10.8)
WBC NRBC COR # BLD: 18.5 10*3/MM3 (ref 4.8–10.8)
WBC NRBC COR # BLD: 18.94 10*3/MM3 (ref 4.8–10.8)
WBC NRBC COR # BLD: 19.8 10*3/MM3 (ref 4.8–10.8)
WBC NRBC COR # BLD: 20.13 10*3/MM3 (ref 4.8–10.8)
WBC NRBC COR # BLD: 20.17 10*3/MM3 (ref 4.8–10.8)
WBC NRBC COR # BLD: 20.63 10*3/MM3 (ref 4.8–10.8)
WBC NRBC COR # BLD: 20.82 10*3/MM3 (ref 4.8–10.8)
WBC NRBC COR # BLD: 21.07 10*3/MM3 (ref 4.8–10.8)
WBC NRBC COR # BLD: 22.14 10*3/MM3 (ref 4.8–10.8)
WBC NRBC COR # BLD: 22.14 10*3/MM3 (ref 4.8–10.8)
WBC NRBC COR # BLD: 22.29 10*3/MM3 (ref 4.8–10.8)
WBC NRBC COR # BLD: 22.44 10*3/MM3 (ref 4.8–10.8)
WBC NRBC COR # BLD: 22.54 10*3/MM3 (ref 4.8–10.8)
WBC NRBC COR # BLD: 22.76 10*3/MM3 (ref 4.8–10.8)
WBC NRBC COR # BLD: 23 10*3/MM3 (ref 4.8–10.8)
WBC NRBC COR # BLD: 23.73 10*3/MM3 (ref 4.8–10.8)
WBC NRBC COR # BLD: 24.31 10*3/MM3 (ref 4.8–10.8)
WBC NRBC COR # BLD: 24.96 10*3/MM3 (ref 4.8–10.8)
WBC NRBC COR # BLD: 25.23 10*3/MM3 (ref 4.8–10.8)
WBC NRBC COR # BLD: 25.33 10*3/MM3 (ref 4.8–10.8)
WBC NRBC COR # BLD: 25.68 10*3/MM3 (ref 4.8–10.8)
WBC NRBC COR # BLD: 28.2 10*3/MM3 (ref 4.8–10.8)
WBC NRBC COR # BLD: 28.42 10*3/MM3 (ref 4.8–10.8)
WBC NRBC COR # BLD: 28.47 10*3/MM3 (ref 4.8–10.8)
WBC NRBC COR # BLD: 30.85 10*3/MM3 (ref 4.8–10.8)
WBC NRBC COR # BLD: 35.15 10*3/MM3 (ref 4.8–10.8)
WBC UR QL AUTO: ABNORMAL /HPF
WHOLE BLOOD HOLD SPECIMEN: NORMAL
YEAST URNS QL MICRO: ABNORMAL /HPF
YEAST URNS QL MICRO: ABNORMAL /HPF

## 2019-01-01 PROCEDURE — 36415 COLL VENOUS BLD VENIPUNCTURE: CPT | Performed by: INTERNAL MEDICINE

## 2019-01-01 PROCEDURE — 63710000001 INSULIN LISPRO (HUMAN) PER 5 UNITS: Performed by: FAMILY MEDICINE

## 2019-01-01 PROCEDURE — 94799 UNLISTED PULMONARY SVC/PX: CPT

## 2019-01-01 PROCEDURE — 80053 COMPREHEN METABOLIC PANEL: CPT | Performed by: FAMILY MEDICINE

## 2019-01-01 PROCEDURE — 83880 ASSAY OF NATRIURETIC PEPTIDE: CPT | Performed by: INTERNAL MEDICINE

## 2019-01-01 PROCEDURE — 74018 RADEX ABDOMEN 1 VIEW: CPT

## 2019-01-01 PROCEDURE — 83540 ASSAY OF IRON: CPT | Performed by: FAMILY MEDICINE

## 2019-01-01 PROCEDURE — 97110 THERAPEUTIC EXERCISES: CPT

## 2019-01-01 PROCEDURE — 63710000001 INSULIN DETEMIR PER 5 UNITS: Performed by: FAMILY MEDICINE

## 2019-01-01 PROCEDURE — 92610 EVALUATE SWALLOWING FUNCTION: CPT

## 2019-01-01 PROCEDURE — 93005 ELECTROCARDIOGRAM TRACING: CPT | Performed by: PHYSICIAN ASSISTANT

## 2019-01-01 PROCEDURE — 63710000001 INSULIN LISPRO (HUMAN) PER 5 UNITS: Performed by: INTERNAL MEDICINE

## 2019-01-01 PROCEDURE — P9612 CATHETERIZE FOR URINE SPEC: HCPCS

## 2019-01-01 PROCEDURE — 85060 BLOOD SMEAR INTERPRETATION: CPT | Performed by: FAMILY MEDICINE

## 2019-01-01 PROCEDURE — 82607 VITAMIN B-12: CPT | Performed by: INTERNAL MEDICINE

## 2019-01-01 PROCEDURE — 25010000002 PIPERACILLIN SOD-TAZOBACTAM PER 1 G: Performed by: PHYSICIAN ASSISTANT

## 2019-01-01 PROCEDURE — 94760 N-INVAS EAR/PLS OXIMETRY 1: CPT

## 2019-01-01 PROCEDURE — 25010000002 ONDANSETRON PER 1 MG: Performed by: FAMILY MEDICINE

## 2019-01-01 PROCEDURE — 76775 US EXAM ABDO BACK WALL LIM: CPT

## 2019-01-01 PROCEDURE — 25010000002 IRON SUCROSE PER 1 MG: Performed by: NURSE PRACTITIONER

## 2019-01-01 PROCEDURE — 82962 GLUCOSE BLOOD TEST: CPT

## 2019-01-01 PROCEDURE — 25010000002 FUROSEMIDE PER 20 MG: Performed by: FAMILY MEDICINE

## 2019-01-01 PROCEDURE — 82150 ASSAY OF AMYLASE: CPT | Performed by: FAMILY MEDICINE

## 2019-01-01 PROCEDURE — 36415 COLL VENOUS BLD VENIPUNCTURE: CPT | Performed by: NURSE PRACTITIONER

## 2019-01-01 PROCEDURE — G0378 HOSPITAL OBSERVATION PER HR: HCPCS

## 2019-01-01 PROCEDURE — 25010000002 FLUCONAZOLE PER 200 MG: Performed by: FAMILY MEDICINE

## 2019-01-01 PROCEDURE — 97530 THERAPEUTIC ACTIVITIES: CPT

## 2019-01-01 PROCEDURE — 97535 SELF CARE MNGMENT TRAINING: CPT

## 2019-01-01 PROCEDURE — 85007 BL SMEAR W/DIFF WBC COUNT: CPT | Performed by: FAMILY MEDICINE

## 2019-01-01 PROCEDURE — 25010000002 LORAZEPAM PER 2 MG: Performed by: FAMILY MEDICINE

## 2019-01-01 PROCEDURE — 84134 ASSAY OF PREALBUMIN: CPT | Performed by: INTERNAL MEDICINE

## 2019-01-01 PROCEDURE — 85610 PROTHROMBIN TIME: CPT | Performed by: EMERGENCY MEDICINE

## 2019-01-01 PROCEDURE — 87086 URINE CULTURE/COLONY COUNT: CPT | Performed by: PHYSICIAN ASSISTANT

## 2019-01-01 PROCEDURE — 83690 ASSAY OF LIPASE: CPT | Performed by: FAMILY MEDICINE

## 2019-01-01 PROCEDURE — 84550 ASSAY OF BLOOD/URIC ACID: CPT | Performed by: NURSE PRACTITIONER

## 2019-01-01 PROCEDURE — 71045 X-RAY EXAM CHEST 1 VIEW: CPT

## 2019-01-01 PROCEDURE — 25010000002 METHYLPREDNISOLONE PER 125 MG: Performed by: FAMILY MEDICINE

## 2019-01-01 PROCEDURE — 84156 ASSAY OF PROTEIN URINE: CPT | Performed by: NURSE PRACTITIONER

## 2019-01-01 PROCEDURE — 99284 EMERGENCY DEPT VISIT MOD MDM: CPT

## 2019-01-01 PROCEDURE — 99285 EMERGENCY DEPT VISIT HI MDM: CPT

## 2019-01-01 PROCEDURE — 87086 URINE CULTURE/COLONY COUNT: CPT | Performed by: NURSE PRACTITIONER

## 2019-01-01 PROCEDURE — 85025 COMPLETE CBC W/AUTO DIFF WBC: CPT | Performed by: FAMILY MEDICINE

## 2019-01-01 PROCEDURE — 87804 INFLUENZA ASSAY W/OPTIC: CPT | Performed by: PHYSICIAN ASSISTANT

## 2019-01-01 PROCEDURE — 25010000002 MEROPENEM PER 100 MG: Performed by: FAMILY MEDICINE

## 2019-01-01 PROCEDURE — 25010000002 CEFEPIME PER 500 MG: Performed by: FAMILY MEDICINE

## 2019-01-01 PROCEDURE — 97535 SELF CARE MNGMENT TRAINING: CPT | Performed by: OCCUPATIONAL THERAPIST

## 2019-01-01 PROCEDURE — 92526 ORAL FUNCTION THERAPY: CPT

## 2019-01-01 PROCEDURE — 84300 ASSAY OF URINE SODIUM: CPT | Performed by: NURSE PRACTITIONER

## 2019-01-01 PROCEDURE — 85025 COMPLETE CBC W/AUTO DIFF WBC: CPT | Performed by: INTERNAL MEDICINE

## 2019-01-01 PROCEDURE — 84439 ASSAY OF FREE THYROXINE: CPT | Performed by: INTERNAL MEDICINE

## 2019-01-01 PROCEDURE — 85730 THROMBOPLASTIN TIME PARTIAL: CPT | Performed by: EMERGENCY MEDICINE

## 2019-01-01 PROCEDURE — 83036 HEMOGLOBIN GLYCOSYLATED A1C: CPT | Performed by: INTERNAL MEDICINE

## 2019-01-01 PROCEDURE — 84145 PROCALCITONIN (PCT): CPT | Performed by: INTERNAL MEDICINE

## 2019-01-01 PROCEDURE — 97166 OT EVAL MOD COMPLEX 45 MIN: CPT | Performed by: OCCUPATIONAL THERAPIST

## 2019-01-01 PROCEDURE — 25010000002 MEROPENEM: Performed by: FAMILY MEDICINE

## 2019-01-01 PROCEDURE — 93306 TTE W/DOPPLER COMPLETE: CPT

## 2019-01-01 PROCEDURE — 87040 BLOOD CULTURE FOR BACTERIA: CPT | Performed by: EMERGENCY MEDICINE

## 2019-01-01 PROCEDURE — 81001 URINALYSIS AUTO W/SCOPE: CPT | Performed by: PHYSICIAN ASSISTANT

## 2019-01-01 PROCEDURE — 84443 ASSAY THYROID STIM HORMONE: CPT | Performed by: INTERNAL MEDICINE

## 2019-01-01 PROCEDURE — 85007 BL SMEAR W/DIFF WBC COUNT: CPT | Performed by: INTERNAL MEDICINE

## 2019-01-01 PROCEDURE — 81001 URINALYSIS AUTO W/SCOPE: CPT | Performed by: EMERGENCY MEDICINE

## 2019-01-01 PROCEDURE — 85025 COMPLETE CBC W/AUTO DIFF WBC: CPT | Performed by: EMERGENCY MEDICINE

## 2019-01-01 PROCEDURE — 25010000002 CEFTRIAXONE PER 250 MG: Performed by: FAMILY MEDICINE

## 2019-01-01 PROCEDURE — 93010 ELECTROCARDIOGRAM REPORT: CPT | Performed by: INTERNAL MEDICINE

## 2019-01-01 PROCEDURE — 25010000002 VANCOMYCIN 10 G RECONSTITUTED SOLUTION: Performed by: FAMILY MEDICINE

## 2019-01-01 PROCEDURE — 80202 ASSAY OF VANCOMYCIN: CPT | Performed by: FAMILY MEDICINE

## 2019-01-01 PROCEDURE — 85025 COMPLETE CBC W/AUTO DIFF WBC: CPT | Performed by: NURSE PRACTITIONER

## 2019-01-01 PROCEDURE — 85027 COMPLETE CBC AUTOMATED: CPT | Performed by: NURSE PRACTITIONER

## 2019-01-01 PROCEDURE — 25010000002 FLUCONAZOLE 100-0.9 MG/50ML-% SOLUTION: Performed by: FAMILY MEDICINE

## 2019-01-01 PROCEDURE — 80048 BASIC METABOLIC PNL TOTAL CA: CPT | Performed by: INTERNAL MEDICINE

## 2019-01-01 PROCEDURE — 87088 URINE BACTERIA CULTURE: CPT | Performed by: NURSE PRACTITIONER

## 2019-01-01 PROCEDURE — 81001 URINALYSIS AUTO W/SCOPE: CPT | Performed by: FAMILY MEDICINE

## 2019-01-01 PROCEDURE — 80061 LIPID PANEL: CPT | Performed by: FAMILY MEDICINE

## 2019-01-01 PROCEDURE — 83880 ASSAY OF NATRIURETIC PEPTIDE: CPT | Performed by: PHYSICIAN ASSISTANT

## 2019-01-01 PROCEDURE — 80048 BASIC METABOLIC PNL TOTAL CA: CPT | Performed by: NURSE PRACTITIONER

## 2019-01-01 PROCEDURE — 80053 COMPREHEN METABOLIC PANEL: CPT | Performed by: INTERNAL MEDICINE

## 2019-01-01 PROCEDURE — 97162 PT EVAL MOD COMPLEX 30 MIN: CPT

## 2019-01-01 PROCEDURE — 84443 ASSAY THYROID STIM HORMONE: CPT | Performed by: FAMILY MEDICINE

## 2019-01-01 PROCEDURE — 25010000002 VANCOMYCIN 1 G RECONSTITUTED SOLUTION: Performed by: PHYSICIAN ASSISTANT

## 2019-01-01 PROCEDURE — 93971 EXTREMITY STUDY: CPT

## 2019-01-01 PROCEDURE — 71046 X-RAY EXAM CHEST 2 VIEWS: CPT

## 2019-01-01 PROCEDURE — 93971 EXTREMITY STUDY: CPT | Performed by: SURGERY

## 2019-01-01 PROCEDURE — 93005 ELECTROCARDIOGRAM TRACING: CPT | Performed by: EMERGENCY MEDICINE

## 2019-01-01 PROCEDURE — 83880 ASSAY OF NATRIURETIC PEPTIDE: CPT | Performed by: EMERGENCY MEDICINE

## 2019-01-01 PROCEDURE — 81001 URINALYSIS AUTO W/SCOPE: CPT | Performed by: NURSE PRACTITIONER

## 2019-01-01 PROCEDURE — 85007 BL SMEAR W/DIFF WBC COUNT: CPT | Performed by: EMERGENCY MEDICINE

## 2019-01-01 PROCEDURE — 36410 VNPNXR 3YR/> PHY/QHP DX/THER: CPT

## 2019-01-01 PROCEDURE — 82306 VITAMIN D 25 HYDROXY: CPT | Performed by: INTERNAL MEDICINE

## 2019-01-01 PROCEDURE — 85025 COMPLETE CBC W/AUTO DIFF WBC: CPT | Performed by: PHYSICIAN ASSISTANT

## 2019-01-01 PROCEDURE — 93306 TTE W/DOPPLER COMPLETE: CPT | Performed by: INTERNAL MEDICINE

## 2019-01-01 PROCEDURE — 80053 COMPREHEN METABOLIC PANEL: CPT | Performed by: PHYSICIAN ASSISTANT

## 2019-01-01 PROCEDURE — 97165 OT EVAL LOW COMPLEX 30 MIN: CPT | Performed by: OCCUPATIONAL THERAPIST

## 2019-01-01 PROCEDURE — 82607 VITAMIN B-12: CPT | Performed by: NURSE PRACTITIONER

## 2019-01-01 PROCEDURE — 84484 ASSAY OF TROPONIN QUANT: CPT | Performed by: EMERGENCY MEDICINE

## 2019-01-01 PROCEDURE — 63710000001 INSULIN REGULAR HUMAN PER 5 UNITS: Performed by: PHYSICIAN ASSISTANT

## 2019-01-01 PROCEDURE — 25010000002 PROMETHAZINE PER 50 MG: Performed by: INTERNAL MEDICINE

## 2019-01-01 PROCEDURE — 82272 OCCULT BLD FECES 1-3 TESTS: CPT | Performed by: NURSE PRACTITIONER

## 2019-01-01 PROCEDURE — 84484 ASSAY OF TROPONIN QUANT: CPT | Performed by: PHYSICIAN ASSISTANT

## 2019-01-01 PROCEDURE — 84439 ASSAY OF FREE THYROXINE: CPT | Performed by: FAMILY MEDICINE

## 2019-01-01 PROCEDURE — 83605 ASSAY OF LACTIC ACID: CPT | Performed by: EMERGENCY MEDICINE

## 2019-01-01 PROCEDURE — 25010000002 CALCIUM GLUCONATE PER 10 ML: Performed by: PHYSICIAN ASSISTANT

## 2019-01-01 PROCEDURE — 83550 IRON BINDING TEST: CPT | Performed by: FAMILY MEDICINE

## 2019-01-01 PROCEDURE — 25010000002 CEFTRIAXONE PER 250 MG: Performed by: EMERGENCY MEDICINE

## 2019-01-01 PROCEDURE — 83735 ASSAY OF MAGNESIUM: CPT | Performed by: EMERGENCY MEDICINE

## 2019-01-01 PROCEDURE — 83735 ASSAY OF MAGNESIUM: CPT | Performed by: FAMILY MEDICINE

## 2019-01-01 PROCEDURE — 84100 ASSAY OF PHOSPHORUS: CPT | Performed by: EMERGENCY MEDICINE

## 2019-01-01 PROCEDURE — 94640 AIRWAY INHALATION TREATMENT: CPT

## 2019-01-01 PROCEDURE — 84540 ASSAY OF URINE/UREA-N: CPT | Performed by: NURSE PRACTITIONER

## 2019-01-01 PROCEDURE — 71250 CT THORAX DX C-: CPT

## 2019-01-01 PROCEDURE — 92611 MOTION FLUOROSCOPY/SWALLOW: CPT

## 2019-01-01 PROCEDURE — 80061 LIPID PANEL: CPT | Performed by: INTERNAL MEDICINE

## 2019-01-01 PROCEDURE — 36415 COLL VENOUS BLD VENIPUNCTURE: CPT | Performed by: PHYSICIAN ASSISTANT

## 2019-01-01 PROCEDURE — 86140 C-REACTIVE PROTEIN: CPT | Performed by: PHYSICIAN ASSISTANT

## 2019-01-01 PROCEDURE — 82570 ASSAY OF URINE CREATININE: CPT | Performed by: NURSE PRACTITIONER

## 2019-01-01 PROCEDURE — 85007 BL SMEAR W/DIFF WBC COUNT: CPT | Performed by: PHYSICIAN ASSISTANT

## 2019-01-01 PROCEDURE — 87086 URINE CULTURE/COLONY COUNT: CPT | Performed by: EMERGENCY MEDICINE

## 2019-01-01 PROCEDURE — 05HY33Z INSERTION OF INFUSION DEVICE INTO UPPER VEIN, PERCUTANEOUS APPROACH: ICD-10-PCS | Performed by: FAMILY MEDICINE

## 2019-01-01 PROCEDURE — 85027 COMPLETE CBC AUTOMATED: CPT | Performed by: INTERNAL MEDICINE

## 2019-01-01 PROCEDURE — 87040 BLOOD CULTURE FOR BACTERIA: CPT | Performed by: FAMILY MEDICINE

## 2019-01-01 PROCEDURE — 82746 ASSAY OF FOLIC ACID SERUM: CPT | Performed by: NURSE PRACTITIONER

## 2019-01-01 PROCEDURE — 84165 PROTEIN E-PHORESIS SERUM: CPT | Performed by: NURSE PRACTITIONER

## 2019-01-01 PROCEDURE — 83735 ASSAY OF MAGNESIUM: CPT | Performed by: INTERNAL MEDICINE

## 2019-01-01 PROCEDURE — 25010000002 CEFTRIAXONE PER 250 MG: Performed by: INTERNAL MEDICINE

## 2019-01-01 PROCEDURE — 87186 SC STD MICRODIL/AGAR DIL: CPT | Performed by: NURSE PRACTITIONER

## 2019-01-01 PROCEDURE — 84145 PROCALCITONIN (PCT): CPT | Performed by: PHYSICIAN ASSISTANT

## 2019-01-01 PROCEDURE — 82784 ASSAY IGA/IGD/IGG/IGM EACH: CPT | Performed by: NURSE PRACTITIONER

## 2019-01-01 PROCEDURE — 25010000003 MORPHINE 50 MG/ML SOLUTION: Performed by: FAMILY MEDICINE

## 2019-01-01 PROCEDURE — 82728 ASSAY OF FERRITIN: CPT | Performed by: FAMILY MEDICINE

## 2019-01-01 PROCEDURE — 83036 HEMOGLOBIN GLYCOSYLATED A1C: CPT | Performed by: FAMILY MEDICINE

## 2019-01-01 PROCEDURE — 83605 ASSAY OF LACTIC ACID: CPT | Performed by: PHYSICIAN ASSISTANT

## 2019-01-01 PROCEDURE — 25010000002 PIPERACILLIN SOD-TAZOBACTAM PER 1 G: Performed by: EMERGENCY MEDICINE

## 2019-01-01 PROCEDURE — 25010000002 VANCOMYCIN 1 G RECONSTITUTED SOLUTION 1 EACH VIAL: Performed by: EMERGENCY MEDICINE

## 2019-01-01 PROCEDURE — 97530 THERAPEUTIC ACTIVITIES: CPT | Performed by: OCCUPATIONAL THERAPIST

## 2019-01-01 PROCEDURE — 83880 ASSAY OF NATRIURETIC PEPTIDE: CPT | Performed by: NURSE PRACTITIONER

## 2019-01-01 PROCEDURE — 87040 BLOOD CULTURE FOR BACTERIA: CPT | Performed by: PHYSICIAN ASSISTANT

## 2019-01-01 PROCEDURE — 80053 COMPREHEN METABOLIC PANEL: CPT | Performed by: EMERGENCY MEDICINE

## 2019-01-01 PROCEDURE — 87086 URINE CULTURE/COLONY COUNT: CPT | Performed by: FAMILY MEDICINE

## 2019-01-01 PROCEDURE — 82668 ASSAY OF ERYTHROPOIETIN: CPT | Performed by: NURSE PRACTITIONER

## 2019-01-01 PROCEDURE — 85007 BL SMEAR W/DIFF WBC COUNT: CPT | Performed by: NURSE PRACTITIONER

## 2019-01-01 PROCEDURE — 97535 SELF CARE MNGMENT TRAINING: CPT | Performed by: SPEECH-LANGUAGE PATHOLOGIST

## 2019-01-01 PROCEDURE — 83883 ASSAY NEPHELOMETRY NOT SPEC: CPT | Performed by: NURSE PRACTITIONER

## 2019-01-01 PROCEDURE — C1751 CATH, INF, PER/CENT/MIDLINE: HCPCS

## 2019-01-01 PROCEDURE — 80053 COMPREHEN METABOLIC PANEL: CPT | Performed by: NURSE PRACTITIONER

## 2019-01-01 PROCEDURE — 83970 ASSAY OF PARATHORMONE: CPT | Performed by: INTERNAL MEDICINE

## 2019-01-01 PROCEDURE — 74230 X-RAY XM SWLNG FUNCJ C+: CPT

## 2019-01-01 RX ORDER — GABAPENTIN 300 MG/1
300 CAPSULE ORAL DAILY
Status: DISCONTINUED | OUTPATIENT
Start: 2019-01-01 | End: 2019-01-01 | Stop reason: HOSPADM

## 2019-01-01 RX ORDER — NYSTATIN 100000 [USP'U]/G
1 POWDER TOPICAL 4 TIMES DAILY
Status: DISCONTINUED | OUTPATIENT
Start: 2019-01-01 | End: 2019-01-01

## 2019-01-01 RX ORDER — DEXTROSE MONOHYDRATE 25 G/50ML
25 INJECTION, SOLUTION INTRAVENOUS
Status: DISCONTINUED | OUTPATIENT
Start: 2019-01-01 | End: 2019-01-01 | Stop reason: HOSPADM

## 2019-01-01 RX ORDER — ONDANSETRON 2 MG/ML
4 INJECTION INTRAMUSCULAR; INTRAVENOUS EVERY 6 HOURS PRN
Status: DISCONTINUED | OUTPATIENT
Start: 2019-01-01 | End: 2019-01-01 | Stop reason: HOSPADM

## 2019-01-01 RX ORDER — LEVOTHYROXINE SODIUM 0.05 MG/1
50 TABLET ORAL
Status: DISCONTINUED | OUTPATIENT
Start: 2019-01-01 | End: 2019-01-01 | Stop reason: HOSPADM

## 2019-01-01 RX ORDER — SODIUM CHLORIDE 0.9 % (FLUSH) 0.9 %
10 SYRINGE (ML) INJECTION AS NEEDED
Status: DISCONTINUED | OUTPATIENT
Start: 2019-01-01 | End: 2019-01-01

## 2019-01-01 RX ORDER — BUMETANIDE 0.25 MG/ML
2 INJECTION INTRAMUSCULAR; INTRAVENOUS EVERY 12 HOURS
Status: DISCONTINUED | OUTPATIENT
Start: 2019-01-01 | End: 2019-01-01

## 2019-01-01 RX ORDER — FLUTICASONE PROPIONATE 50 MCG
1 SPRAY, SUSPENSION (ML) NASAL 2 TIMES DAILY
Status: DISCONTINUED | OUTPATIENT
Start: 2019-01-01 | End: 2019-01-01 | Stop reason: HOSPADM

## 2019-01-01 RX ORDER — POLYETHYLENE GLYCOL 3350 17 G/17G
17 POWDER, FOR SOLUTION ORAL DAILY
Status: DISCONTINUED | OUTPATIENT
Start: 2019-01-01 | End: 2019-01-01 | Stop reason: HOSPADM

## 2019-01-01 RX ORDER — ALLOPURINOL 100 MG/1
100 TABLET ORAL DAILY
Status: DISCONTINUED | OUTPATIENT
Start: 2019-01-01 | End: 2019-01-01

## 2019-01-01 RX ORDER — NYSTATIN 100000 [USP'U]/G
POWDER TOPICAL EVERY 12 HOURS SCHEDULED
Status: DISCONTINUED | OUTPATIENT
Start: 2019-01-01 | End: 2019-01-01 | Stop reason: HOSPADM

## 2019-01-01 RX ORDER — BUMETANIDE 0.25 MG/ML
1 INJECTION INTRAMUSCULAR; INTRAVENOUS
Status: DISCONTINUED | OUTPATIENT
Start: 2019-01-01 | End: 2019-01-01

## 2019-01-01 RX ORDER — METHYLPREDNISOLONE SODIUM SUCCINATE 125 MG/2ML
80 INJECTION, POWDER, LYOPHILIZED, FOR SOLUTION INTRAMUSCULAR; INTRAVENOUS EVERY 8 HOURS
Status: DISCONTINUED | OUTPATIENT
Start: 2019-01-01 | End: 2019-01-01

## 2019-01-01 RX ORDER — TAMSULOSIN HYDROCHLORIDE 0.4 MG/1
0.4 CAPSULE ORAL 2 TIMES DAILY
Status: DISCONTINUED | OUTPATIENT
Start: 2019-01-01 | End: 2019-01-01 | Stop reason: HOSPADM

## 2019-01-01 RX ORDER — TAMSULOSIN HYDROCHLORIDE 0.4 MG/1
1 CAPSULE ORAL 2 TIMES DAILY
COMMUNITY

## 2019-01-01 RX ORDER — TRAMADOL HYDROCHLORIDE 50 MG/1
50 TABLET ORAL 3 TIMES DAILY PRN
Qty: 24 TABLET | Refills: 0 | Status: SHIPPED | OUTPATIENT
Start: 2019-01-01

## 2019-01-01 RX ORDER — POLYETHYLENE GLYCOL 3350 17 G/17G
17 POWDER, FOR SOLUTION ORAL DAILY
COMMUNITY

## 2019-01-01 RX ORDER — CALCITRIOL 0.25 UG/1
0.25 CAPSULE, LIQUID FILLED ORAL DAILY
Status: DISCONTINUED | OUTPATIENT
Start: 2019-01-01 | End: 2019-01-01 | Stop reason: HOSPADM

## 2019-01-01 RX ORDER — BUMETANIDE 0.5 MG/1
0.5 TABLET ORAL 2 TIMES DAILY
Qty: 10 TABLET | Refills: 0 | Status: SHIPPED | OUTPATIENT
Start: 2019-01-01 | End: 2019-01-01

## 2019-01-01 RX ORDER — GABAPENTIN 300 MG/1
600 CAPSULE ORAL NIGHTLY
Status: DISCONTINUED | OUTPATIENT
Start: 2019-01-01 | End: 2019-01-01 | Stop reason: HOSPADM

## 2019-01-01 RX ORDER — GUAIFENESIN 600 MG/1
1200 TABLET, EXTENDED RELEASE ORAL EVERY 12 HOURS SCHEDULED
Status: DISCONTINUED | OUTPATIENT
Start: 2019-01-01 | End: 2019-01-01

## 2019-01-01 RX ORDER — TRAMADOL HYDROCHLORIDE 50 MG/1
50 TABLET ORAL 3 TIMES DAILY PRN
Status: DISCONTINUED | OUTPATIENT
Start: 2019-01-01 | End: 2019-01-01 | Stop reason: HOSPADM

## 2019-01-01 RX ORDER — SODIUM CHLORIDE 9 MG/ML
75 INJECTION, SOLUTION INTRAVENOUS CONTINUOUS
Status: DISCONTINUED | OUTPATIENT
Start: 2019-01-01 | End: 2019-01-01

## 2019-01-01 RX ORDER — GABAPENTIN 300 MG/1
600 CAPSULE ORAL NIGHTLY
Qty: 4 CAPSULE | Refills: 0 | Status: ON HOLD | OUTPATIENT
Start: 2019-01-01 | End: 2019-01-01 | Stop reason: SDUPTHER

## 2019-01-01 RX ORDER — ASPIRIN 81 MG/1
81 TABLET, CHEWABLE ORAL DAILY
Status: DISCONTINUED | OUTPATIENT
Start: 2019-01-01 | End: 2019-01-01

## 2019-01-01 RX ORDER — SODIUM POLYSTYRENE SULFONATE 4.1 MEQ/G
15 POWDER, FOR SUSPENSION ORAL; RECTAL ONCE
Status: COMPLETED | OUTPATIENT
Start: 2019-01-01 | End: 2019-01-01

## 2019-01-01 RX ORDER — GABAPENTIN 300 MG/1
300 CAPSULE ORAL EVERY MORNING
Status: ON HOLD | COMMUNITY
End: 2019-01-01 | Stop reason: SDUPTHER

## 2019-01-01 RX ORDER — DOCUSATE SODIUM 100 MG/1
100 CAPSULE, LIQUID FILLED ORAL DAILY
Status: DISCONTINUED | OUTPATIENT
Start: 2019-01-01 | End: 2019-01-01 | Stop reason: HOSPADM

## 2019-01-01 RX ORDER — FAMOTIDINE 10 MG/ML
20 INJECTION, SOLUTION INTRAVENOUS 2 TIMES DAILY
Status: DISCONTINUED | OUTPATIENT
Start: 2019-01-01 | End: 2019-01-01

## 2019-01-01 RX ORDER — GABAPENTIN 300 MG/1
300 CAPSULE ORAL
Status: DISCONTINUED | OUTPATIENT
Start: 2019-01-01 | End: 2019-01-01 | Stop reason: HOSPADM

## 2019-01-01 RX ORDER — CALCIUM GLUCONATE 94 MG/ML
1 INJECTION, SOLUTION INTRAVENOUS ONCE
Status: COMPLETED | OUTPATIENT
Start: 2019-01-01 | End: 2019-01-01

## 2019-01-01 RX ORDER — TRAMADOL HYDROCHLORIDE 50 MG/1
50 TABLET ORAL 3 TIMES DAILY PRN
Qty: 10 TABLET | Refills: 0 | Status: SHIPPED | OUTPATIENT
Start: 2019-01-01 | End: 2019-01-01 | Stop reason: HOSPADM

## 2019-01-01 RX ORDER — MAGNESIUM CARB/ALUMINUM HYDROX 105-160MG
296 TABLET,CHEWABLE ORAL ONCE
Status: COMPLETED | OUTPATIENT
Start: 2019-01-01 | End: 2019-01-01

## 2019-01-01 RX ORDER — GABAPENTIN 300 MG/1
600 CAPSULE ORAL NIGHTLY
Status: ON HOLD | COMMUNITY
End: 2019-01-01 | Stop reason: SDUPTHER

## 2019-01-01 RX ORDER — IPRATROPIUM BROMIDE AND ALBUTEROL SULFATE 2.5; .5 MG/3ML; MG/3ML
3 SOLUTION RESPIRATORY (INHALATION)
Status: DISCONTINUED | OUTPATIENT
Start: 2019-01-01 | End: 2019-01-01 | Stop reason: HOSPADM

## 2019-01-01 RX ORDER — DILTIAZEM HYDROCHLORIDE 240 MG/1
240 CAPSULE, COATED, EXTENDED RELEASE ORAL
Status: DISCONTINUED | OUTPATIENT
Start: 2019-01-01 | End: 2019-01-01

## 2019-01-01 RX ORDER — NYSTATIN 100000 U/G
OINTMENT TOPICAL EVERY 12 HOURS SCHEDULED
Status: DISCONTINUED | OUTPATIENT
Start: 2019-01-01 | End: 2019-01-01

## 2019-01-01 RX ORDER — PANTOPRAZOLE SODIUM 40 MG/1
40 TABLET, DELAYED RELEASE ORAL DAILY
Status: DISCONTINUED | OUTPATIENT
Start: 2019-01-01 | End: 2019-01-01 | Stop reason: HOSPADM

## 2019-01-01 RX ORDER — METOPROLOL SUCCINATE 25 MG/1
25 TABLET, EXTENDED RELEASE ORAL DAILY
Status: DISCONTINUED | OUTPATIENT
Start: 2019-01-01 | End: 2019-01-01 | Stop reason: HOSPADM

## 2019-01-01 RX ORDER — CALCITRIOL 0.25 UG/1
0.25 CAPSULE, LIQUID FILLED ORAL DAILY
Status: DISCONTINUED | OUTPATIENT
Start: 2019-01-01 | End: 2019-01-01

## 2019-01-01 RX ORDER — ALLOPURINOL 100 MG/1
100 TABLET ORAL DAILY
Start: 2019-01-01

## 2019-01-01 RX ORDER — SODIUM CHLORIDE 9 MG/ML
125 INJECTION, SOLUTION INTRAVENOUS CONTINUOUS
Status: DISCONTINUED | OUTPATIENT
Start: 2019-01-01 | End: 2019-01-01

## 2019-01-01 RX ORDER — ALLOPURINOL 100 MG/1
100 TABLET ORAL DAILY
Status: DISCONTINUED | OUTPATIENT
Start: 2019-01-01 | End: 2019-01-01 | Stop reason: HOSPADM

## 2019-01-01 RX ORDER — PROMETHAZINE HYDROCHLORIDE 25 MG/ML
6.25 INJECTION, SOLUTION INTRAMUSCULAR; INTRAVENOUS ONCE
Status: COMPLETED | OUTPATIENT
Start: 2019-01-01 | End: 2019-01-01

## 2019-01-01 RX ORDER — BUDESONIDE AND FORMOTEROL FUMARATE DIHYDRATE 160; 4.5 UG/1; UG/1
2 AEROSOL RESPIRATORY (INHALATION)
Status: DISCONTINUED | OUTPATIENT
Start: 2019-01-01 | End: 2019-01-01 | Stop reason: HOSPADM

## 2019-01-01 RX ORDER — ACETAMINOPHEN 325 MG/1
650 TABLET ORAL EVERY 6 HOURS PRN
Status: DISCONTINUED | OUTPATIENT
Start: 2019-01-01 | End: 2019-01-01

## 2019-01-01 RX ORDER — POLYETHYLENE GLYCOL 3350 17 G/17G
17 POWDER, FOR SOLUTION ORAL DAILY
Status: DISCONTINUED | OUTPATIENT
Start: 2019-01-01 | End: 2019-01-01

## 2019-01-01 RX ORDER — ASPIRIN 81 MG/1
81 TABLET, CHEWABLE ORAL DAILY
Status: DISCONTINUED | OUTPATIENT
Start: 2019-01-01 | End: 2019-01-01 | Stop reason: HOSPADM

## 2019-01-01 RX ORDER — DEXTROSE MONOHYDRATE 25 G/50ML
50 INJECTION, SOLUTION INTRAVENOUS ONCE
Status: COMPLETED | OUTPATIENT
Start: 2019-01-01 | End: 2019-01-01

## 2019-01-01 RX ORDER — FUROSEMIDE 10 MG/ML
20 INJECTION INTRAMUSCULAR; INTRAVENOUS ONCE
Status: COMPLETED | OUTPATIENT
Start: 2019-01-01 | End: 2019-01-01

## 2019-01-01 RX ORDER — IPRATROPIUM BROMIDE AND ALBUTEROL SULFATE 2.5; .5 MG/3ML; MG/3ML
3 SOLUTION RESPIRATORY (INHALATION) EVERY 6 HOURS PRN
Status: DISCONTINUED | OUTPATIENT
Start: 2019-01-01 | End: 2019-01-01

## 2019-01-01 RX ORDER — SIMETHICONE 80 MG
80 TABLET,CHEWABLE ORAL 4 TIMES DAILY PRN
Status: DISCONTINUED | OUTPATIENT
Start: 2019-01-01 | End: 2019-01-01

## 2019-01-01 RX ORDER — DEXTROSE MONOHYDRATE 25 G/50ML
25 INJECTION, SOLUTION INTRAVENOUS
Status: DISCONTINUED | OUTPATIENT
Start: 2019-01-01 | End: 2019-01-01

## 2019-01-01 RX ORDER — METOPROLOL SUCCINATE 25 MG/1
25 TABLET, EXTENDED RELEASE ORAL DAILY
Status: DISCONTINUED | OUTPATIENT
Start: 2019-01-01 | End: 2019-01-01

## 2019-01-01 RX ORDER — ATORVASTATIN CALCIUM 10 MG/1
10 TABLET, FILM COATED ORAL DAILY
Status: DISCONTINUED | OUTPATIENT
Start: 2019-01-01 | End: 2019-01-01

## 2019-01-01 RX ORDER — LEVOTHYROXINE SODIUM 0.05 MG/1
50 TABLET ORAL DAILY
Status: DISCONTINUED | OUTPATIENT
Start: 2019-01-01 | End: 2019-01-01

## 2019-01-01 RX ORDER — ONDANSETRON 2 MG/ML
4 INJECTION INTRAMUSCULAR; INTRAVENOUS EVERY 6 HOURS PRN
Status: DISCONTINUED | OUTPATIENT
Start: 2019-01-01 | End: 2019-01-01

## 2019-01-01 RX ORDER — MIRTAZAPINE 15 MG/1
7.5 TABLET, FILM COATED ORAL NIGHTLY
COMMUNITY
End: 2019-01-01 | Stop reason: HOSPADM

## 2019-01-01 RX ORDER — ACETAMINOPHEN 325 MG/1
650 TABLET ORAL EVERY 6 HOURS PRN
Status: DISCONTINUED | OUTPATIENT
Start: 2019-01-01 | End: 2019-01-01 | Stop reason: HOSPADM

## 2019-01-01 RX ORDER — DILTIAZEM HYDROCHLORIDE 240 MG/1
240 CAPSULE, COATED, EXTENDED RELEASE ORAL
Status: DISCONTINUED | OUTPATIENT
Start: 2019-01-01 | End: 2019-01-01 | Stop reason: HOSPADM

## 2019-01-01 RX ORDER — NYSTATIN 100000 [USP'U]/G
1 POWDER TOPICAL 4 TIMES DAILY
COMMUNITY

## 2019-01-01 RX ORDER — DILTIAZEM HYDROCHLORIDE 300 MG/1
300 CAPSULE, COATED, EXTENDED RELEASE ORAL
Status: DISCONTINUED | OUTPATIENT
Start: 2019-01-01 | End: 2019-01-01

## 2019-01-01 RX ORDER — ATORVASTATIN CALCIUM 10 MG/1
10 TABLET, FILM COATED ORAL NIGHTLY
Status: DISCONTINUED | OUTPATIENT
Start: 2019-01-01 | End: 2019-01-01 | Stop reason: HOSPADM

## 2019-01-01 RX ORDER — FAMOTIDINE 20 MG/1
20 TABLET, FILM COATED ORAL 2 TIMES DAILY
Status: DISCONTINUED | OUTPATIENT
Start: 2019-01-01 | End: 2019-01-01

## 2019-01-01 RX ORDER — CALCITRIOL 0.25 UG/1
0.25 CAPSULE, LIQUID FILLED ORAL DAILY
Start: 2019-01-01

## 2019-01-01 RX ORDER — IPRATROPIUM BROMIDE AND ALBUTEROL SULFATE 2.5; .5 MG/3ML; MG/3ML
3 SOLUTION RESPIRATORY (INHALATION) EVERY 6 HOURS PRN
COMMUNITY

## 2019-01-01 RX ORDER — GABAPENTIN 300 MG/1
600 CAPSULE ORAL NIGHTLY
Qty: 12 CAPSULE | Refills: 0 | Status: SHIPPED | OUTPATIENT
Start: 2019-01-01

## 2019-01-01 RX ORDER — SIMETHICONE 80 MG
80 TABLET,CHEWABLE ORAL 4 TIMES DAILY PRN
Status: DISCONTINUED | OUTPATIENT
Start: 2019-01-01 | End: 2019-01-01 | Stop reason: HOSPADM

## 2019-01-01 RX ORDER — SCOLOPAMINE TRANSDERMAL SYSTEM 1 MG/1
1 PATCH, EXTENDED RELEASE TRANSDERMAL
Status: DISCONTINUED | OUTPATIENT
Start: 2019-01-01 | End: 2019-01-01 | Stop reason: HOSPADM

## 2019-01-01 RX ORDER — BUDESONIDE AND FORMOTEROL FUMARATE DIHYDRATE 160; 4.5 UG/1; UG/1
2 AEROSOL RESPIRATORY (INHALATION)
Status: DISCONTINUED | OUTPATIENT
Start: 2019-01-01 | End: 2019-01-01

## 2019-01-01 RX ORDER — BUMETANIDE 0.25 MG/ML
2 INJECTION INTRAMUSCULAR; INTRAVENOUS ONCE
Status: COMPLETED | OUTPATIENT
Start: 2019-01-01 | End: 2019-01-01

## 2019-01-01 RX ORDER — SODIUM BICARBONATE 650 MG/1
650 TABLET ORAL 2 TIMES DAILY
Status: DISCONTINUED | OUTPATIENT
Start: 2019-01-01 | End: 2019-01-01

## 2019-01-01 RX ORDER — SODIUM CHLORIDE 0.9 % (FLUSH) 0.9 %
3 SYRINGE (ML) INJECTION EVERY 12 HOURS SCHEDULED
Status: DISCONTINUED | OUTPATIENT
Start: 2019-01-01 | End: 2019-01-01 | Stop reason: HOSPADM

## 2019-01-01 RX ORDER — NICOTINE POLACRILEX 4 MG
15 LOZENGE BUCCAL
Status: DISCONTINUED | OUTPATIENT
Start: 2019-01-01 | End: 2019-01-01

## 2019-01-01 RX ORDER — BUMETANIDE 0.25 MG/ML
1 INJECTION INTRAMUSCULAR; INTRAVENOUS EVERY 12 HOURS
Status: DISCONTINUED | OUTPATIENT
Start: 2019-01-01 | End: 2019-01-01

## 2019-01-01 RX ORDER — SODIUM CHLORIDE 0.9 % (FLUSH) 0.9 %
3-10 SYRINGE (ML) INJECTION AS NEEDED
Status: DISCONTINUED | OUTPATIENT
Start: 2019-01-01 | End: 2019-01-01

## 2019-01-01 RX ORDER — FUROSEMIDE 10 MG/ML
40 INJECTION INTRAMUSCULAR; INTRAVENOUS
Status: DISCONTINUED | OUTPATIENT
Start: 2019-01-01 | End: 2019-01-01

## 2019-01-01 RX ORDER — SODIUM CHLORIDE 0.9 % (FLUSH) 0.9 %
3 SYRINGE (ML) INJECTION EVERY 12 HOURS SCHEDULED
Status: DISCONTINUED | OUTPATIENT
Start: 2019-01-01 | End: 2019-01-01

## 2019-01-01 RX ORDER — SODIUM CHLORIDE 0.9 % (FLUSH) 0.9 %
3-10 SYRINGE (ML) INJECTION AS NEEDED
Status: DISCONTINUED | OUTPATIENT
Start: 2019-01-01 | End: 2019-01-01 | Stop reason: HOSPADM

## 2019-01-01 RX ORDER — METOPROLOL SUCCINATE 25 MG/1
25 TABLET, EXTENDED RELEASE ORAL DAILY
COMMUNITY

## 2019-01-01 RX ORDER — DOCUSATE SODIUM 100 MG/1
100 CAPSULE, LIQUID FILLED ORAL DAILY
Status: DISCONTINUED | OUTPATIENT
Start: 2019-01-01 | End: 2019-01-01

## 2019-01-01 RX ORDER — DESLORATADINE 5 MG/1
5 TABLET ORAL DAILY
COMMUNITY
End: 2019-01-01 | Stop reason: HOSPADM

## 2019-01-01 RX ORDER — FLUTICASONE PROPIONATE 50 MCG
1 SPRAY, SUSPENSION (ML) NASAL 2 TIMES DAILY
COMMUNITY

## 2019-01-01 RX ORDER — ESCITALOPRAM OXALATE 10 MG/1
20 TABLET ORAL NIGHTLY
Status: DISCONTINUED | OUTPATIENT
Start: 2019-01-01 | End: 2019-01-01 | Stop reason: HOSPADM

## 2019-01-01 RX ORDER — TAMSULOSIN HYDROCHLORIDE 0.4 MG/1
0.4 CAPSULE ORAL 2 TIMES DAILY
Status: DISCONTINUED | OUTPATIENT
Start: 2019-01-01 | End: 2019-01-01

## 2019-01-01 RX ORDER — TAMSULOSIN HYDROCHLORIDE 0.4 MG/1
0.4 CAPSULE ORAL DAILY
Status: DISCONTINUED | OUTPATIENT
Start: 2019-01-01 | End: 2019-01-01 | Stop reason: SDUPTHER

## 2019-01-01 RX ORDER — FAMOTIDINE 20 MG/1
20 TABLET, FILM COATED ORAL DAILY
Status: DISCONTINUED | OUTPATIENT
Start: 2019-01-01 | End: 2019-01-01 | Stop reason: HOSPADM

## 2019-01-01 RX ORDER — MORPHINE SULFATE 1 MG/ML
1 INJECTION, SOLUTION INTRAVENOUS CONTINUOUS
Status: DISCONTINUED | OUTPATIENT
Start: 2019-01-01 | End: 2019-01-01 | Stop reason: HOSPADM

## 2019-01-01 RX ORDER — NICOTINE POLACRILEX 4 MG
15 LOZENGE BUCCAL
Status: DISCONTINUED | OUTPATIENT
Start: 2019-01-01 | End: 2019-01-01 | Stop reason: HOSPADM

## 2019-01-01 RX ORDER — FAMOTIDINE 10 MG/ML
20 INJECTION, SOLUTION INTRAVENOUS DAILY
Status: DISCONTINUED | OUTPATIENT
Start: 2019-01-01 | End: 2019-01-01

## 2019-01-01 RX ORDER — GABAPENTIN 300 MG/1
300 CAPSULE ORAL
Qty: 3 CAPSULE | Refills: 0 | Status: SHIPPED | OUTPATIENT
Start: 2019-01-01 | End: 2019-01-01

## 2019-01-01 RX ORDER — LORAZEPAM 2 MG/ML
1 INJECTION INTRAMUSCULAR ONCE
Status: COMPLETED | OUTPATIENT
Start: 2019-01-01 | End: 2019-01-01

## 2019-01-01 RX ORDER — METOPROLOL SUCCINATE 50 MG/1
50 TABLET, EXTENDED RELEASE ORAL DAILY
Status: DISCONTINUED | OUTPATIENT
Start: 2019-01-01 | End: 2019-01-01

## 2019-01-01 RX ORDER — TAMSULOSIN HYDROCHLORIDE 0.4 MG/1
0.4 CAPSULE ORAL DAILY
Status: DISCONTINUED | OUTPATIENT
Start: 2019-01-01 | End: 2019-01-01 | Stop reason: HOSPADM

## 2019-01-01 RX ORDER — LORAZEPAM 2 MG/ML
1 INJECTION INTRAMUSCULAR EVERY 4 HOURS PRN
Status: DISCONTINUED | OUTPATIENT
Start: 2019-01-01 | End: 2019-01-01 | Stop reason: HOSPADM

## 2019-01-01 RX ORDER — GABAPENTIN 300 MG/1
300 CAPSULE ORAL EVERY MORNING
Qty: 12 CAPSULE | Refills: 0 | Status: SHIPPED | OUTPATIENT
Start: 2019-01-01

## 2019-01-01 RX ORDER — FLUCONAZOLE, SODIUM CHLORIDE 2 MG/ML
100 INJECTION INTRAVENOUS DAILY
Status: DISCONTINUED | OUTPATIENT
Start: 2019-01-01 | End: 2019-01-01

## 2019-01-01 RX ORDER — FOLIC ACID 1 MG/1
1 TABLET ORAL DAILY
Status: DISCONTINUED | OUTPATIENT
Start: 2019-01-01 | End: 2019-01-01 | Stop reason: HOSPADM

## 2019-01-01 RX ADMIN — APIXABAN 2.5 MG: 2.5 TABLET, FILM COATED ORAL at 10:43

## 2019-01-01 RX ADMIN — FLUTICASONE PROPIONATE 1 SPRAY: 50 SPRAY, METERED NASAL at 08:35

## 2019-01-01 RX ADMIN — MEROPENEM 1 G: 1 INJECTION, POWDER, FOR SOLUTION INTRAVENOUS at 13:17

## 2019-01-01 RX ADMIN — IPRATROPIUM BROMIDE AND ALBUTEROL SULFATE 3 ML: 2.5; .5 SOLUTION RESPIRATORY (INHALATION) at 21:44

## 2019-01-01 RX ADMIN — INSULIN LISPRO 2 UNITS: 100 INJECTION, SOLUTION INTRAVENOUS; SUBCUTANEOUS at 08:30

## 2019-01-01 RX ADMIN — INSULIN LISPRO 4 UNITS: 100 INJECTION, SOLUTION INTRAVENOUS; SUBCUTANEOUS at 11:23

## 2019-01-01 RX ADMIN — INSULIN LISPRO 2 UNITS: 100 INJECTION, SOLUTION INTRAVENOUS; SUBCUTANEOUS at 18:52

## 2019-01-01 RX ADMIN — APIXABAN 2.5 MG: 2.5 TABLET, FILM COATED ORAL at 20:10

## 2019-01-01 RX ADMIN — ESCITSLOPRAM 20 MG: 10 TABLET ORAL at 21:05

## 2019-01-01 RX ADMIN — FLUTICASONE PROPIONATE 1 SPRAY: 50 SPRAY, METERED NASAL at 09:48

## 2019-01-01 RX ADMIN — IPRATROPIUM BROMIDE AND ALBUTEROL SULFATE 3 ML: 2.5; .5 SOLUTION RESPIRATORY (INHALATION) at 11:01

## 2019-01-01 RX ADMIN — NYSTATIN 1 APPLICATION: 100000 POWDER TOPICAL at 08:26

## 2019-01-01 RX ADMIN — METOPROLOL SUCCINATE 25 MG: 25 TABLET, FILM COATED, EXTENDED RELEASE ORAL at 09:52

## 2019-01-01 RX ADMIN — ESCITSLOPRAM 20 MG: 10 TABLET ORAL at 22:04

## 2019-01-01 RX ADMIN — BUDESONIDE AND FORMOTEROL FUMARATE DIHYDRATE 2 PUFF: 160; 4.5 AEROSOL RESPIRATORY (INHALATION) at 07:20

## 2019-01-01 RX ADMIN — FOLIC ACID 1 MG: 1 TABLET ORAL at 10:03

## 2019-01-01 RX ADMIN — FLUTICASONE PROPIONATE 1 SPRAY: 50 SPRAY, METERED NASAL at 09:25

## 2019-01-01 RX ADMIN — CALCITRIOL 0.25 MCG: 0.25 CAPSULE ORAL at 08:32

## 2019-01-01 RX ADMIN — IPRATROPIUM BROMIDE AND ALBUTEROL SULFATE 3 ML: 2.5; .5 SOLUTION RESPIRATORY (INHALATION) at 11:43

## 2019-01-01 RX ADMIN — SODIUM CHLORIDE, PRESERVATIVE FREE 3 ML: 5 INJECTION INTRAVENOUS at 20:53

## 2019-01-01 RX ADMIN — INSULIN DETEMIR 10 UNITS: 100 INJECTION, SOLUTION SUBCUTANEOUS at 20:45

## 2019-01-01 RX ADMIN — FAMOTIDINE 20 MG: 20 TABLET, FILM COATED ORAL at 21:27

## 2019-01-01 RX ADMIN — APIXABAN 5 MG: 5 TABLET, FILM COATED ORAL at 21:09

## 2019-01-01 RX ADMIN — Medication 296 ML: at 12:32

## 2019-01-01 RX ADMIN — INSULIN HUMAN 10 UNITS: 100 INJECTION, SOLUTION PARENTERAL at 13:38

## 2019-01-01 RX ADMIN — SODIUM CHLORIDE, PRESERVATIVE FREE 3 ML: 5 INJECTION INTRAVENOUS at 23:42

## 2019-01-01 RX ADMIN — CALCITRIOL 0.25 MCG: 0.25 CAPSULE ORAL at 09:10

## 2019-01-01 RX ADMIN — LEVOTHYROXINE SODIUM 50 MCG: 50 TABLET ORAL at 05:35

## 2019-01-01 RX ADMIN — Medication 15 G: at 06:58

## 2019-01-01 RX ADMIN — METOPROLOL SUCCINATE 25 MG: 25 TABLET, FILM COATED, EXTENDED RELEASE ORAL at 08:35

## 2019-01-01 RX ADMIN — APIXABAN 5 MG: 5 TABLET, FILM COATED ORAL at 21:04

## 2019-01-01 RX ADMIN — LEVOTHYROXINE SODIUM 50 MCG: 50 TABLET ORAL at 06:33

## 2019-01-01 RX ADMIN — FAMOTIDINE 20 MG: 10 INJECTION, SOLUTION INTRAVENOUS at 20:55

## 2019-01-01 RX ADMIN — TAMSULOSIN HYDROCHLORIDE 0.4 MG: 0.4 CAPSULE ORAL at 08:27

## 2019-01-01 RX ADMIN — SODIUM CHLORIDE 100 ML/HR: 9 INJECTION, SOLUTION INTRAVENOUS at 23:50

## 2019-01-01 RX ADMIN — ASPIRIN 81 MG: 81 TABLET, CHEWABLE ORAL at 10:44

## 2019-01-01 RX ADMIN — GABAPENTIN 300 MG: 300 CAPSULE ORAL at 08:35

## 2019-01-01 RX ADMIN — BARIUM SULFATE 60 ML: 400 SUSPENSION ORAL at 14:45

## 2019-01-01 RX ADMIN — VANCOMYCIN HYDROCHLORIDE 1000 MG: 1 INJECTION, POWDER, LYOPHILIZED, FOR SOLUTION INTRAVENOUS at 15:07

## 2019-01-01 RX ADMIN — IRON SUCROSE 300 MG: 20 INJECTION, SOLUTION INTRAVENOUS at 13:34

## 2019-01-01 RX ADMIN — INSULIN DETEMIR 10 UNITS: 100 INJECTION, SOLUTION SUBCUTANEOUS at 22:57

## 2019-01-01 RX ADMIN — NYSTATIN 1 APPLICATION: 100000 POWDER TOPICAL at 20:31

## 2019-01-01 RX ADMIN — GABAPENTIN 300 MG: 300 CAPSULE ORAL at 08:53

## 2019-01-01 RX ADMIN — BUDESONIDE AND FORMOTEROL FUMARATE DIHYDRATE 2 PUFF: 160; 4.5 AEROSOL RESPIRATORY (INHALATION) at 06:35

## 2019-01-01 RX ADMIN — MEROPENEM 1 G: 1 INJECTION, POWDER, FOR SOLUTION INTRAVENOUS at 23:07

## 2019-01-01 RX ADMIN — MEROPENEM 1 G: 1 INJECTION, POWDER, FOR SOLUTION INTRAVENOUS at 11:11

## 2019-01-01 RX ADMIN — TAMSULOSIN HYDROCHLORIDE 0.4 MG: 0.4 CAPSULE ORAL at 21:49

## 2019-01-01 RX ADMIN — NYSTATIN 1 APPLICATION: 100000 OINTMENT TOPICAL at 21:10

## 2019-01-01 RX ADMIN — BUDESONIDE AND FORMOTEROL FUMARATE DIHYDRATE 2 PUFF: 160; 4.5 AEROSOL RESPIRATORY (INHALATION) at 21:34

## 2019-01-01 RX ADMIN — ALLOPURINOL 100 MG: 100 TABLET ORAL at 16:15

## 2019-01-01 RX ADMIN — INSULIN LISPRO 3 UNITS: 100 INJECTION, SOLUTION INTRAVENOUS; SUBCUTANEOUS at 12:35

## 2019-01-01 RX ADMIN — MEROPENEM 1 G: 1 INJECTION, POWDER, FOR SOLUTION INTRAVENOUS at 12:39

## 2019-01-01 RX ADMIN — SODIUM CHLORIDE, PRESERVATIVE FREE 10 ML: 5 INJECTION INTRAVENOUS at 08:50

## 2019-01-01 RX ADMIN — CALCITRIOL 0.25 MCG: 0.25 CAPSULE ORAL at 08:11

## 2019-01-01 RX ADMIN — ONDANSETRON HYDROCHLORIDE 4 MG: 2 INJECTION INTRAMUSCULAR; INTRAVENOUS at 05:24

## 2019-01-01 RX ADMIN — NYSTATIN 1 APPLICATION: 100000 POWDER TOPICAL at 10:08

## 2019-01-01 RX ADMIN — ESCITSLOPRAM 20 MG: 10 TABLET ORAL at 22:35

## 2019-01-01 RX ADMIN — FLUTICASONE PROPIONATE 1 SPRAY: 50 SPRAY, METERED NASAL at 21:49

## 2019-01-01 RX ADMIN — BARIUM SULFATE 55 ML: 0.81 POWDER, FOR SUSPENSION ORAL at 14:45

## 2019-01-01 RX ADMIN — BUDESONIDE AND FORMOTEROL FUMARATE DIHYDRATE 2 PUFF: 160; 4.5 AEROSOL RESPIRATORY (INHALATION) at 07:37

## 2019-01-01 RX ADMIN — IPRATROPIUM BROMIDE AND ALBUTEROL SULFATE 3 ML: 2.5; .5 SOLUTION RESPIRATORY (INHALATION) at 15:34

## 2019-01-01 RX ADMIN — ATORVASTATIN CALCIUM 10 MG: 10 TABLET, FILM COATED ORAL at 20:10

## 2019-01-01 RX ADMIN — NYSTATIN 1 APPLICATION: 100000 POWDER TOPICAL at 21:05

## 2019-01-01 RX ADMIN — SODIUM CHLORIDE, PRESERVATIVE FREE 3 ML: 5 INJECTION INTRAVENOUS at 20:31

## 2019-01-01 RX ADMIN — ATORVASTATIN CALCIUM 10 MG: 10 TABLET, FILM COATED ORAL at 19:42

## 2019-01-01 RX ADMIN — BUDESONIDE AND FORMOTEROL FUMARATE DIHYDRATE 2 PUFF: 160; 4.5 AEROSOL RESPIRATORY (INHALATION) at 18:45

## 2019-01-01 RX ADMIN — DILTIAZEM HYDROCHLORIDE 240 MG: 240 CAPSULE, EXTENDED RELEASE ORAL at 10:43

## 2019-01-01 RX ADMIN — SODIUM CHLORIDE, PRESERVATIVE FREE 3 ML: 5 INJECTION INTRAVENOUS at 21:37

## 2019-01-01 RX ADMIN — ESCITSLOPRAM 20 MG: 10 TABLET ORAL at 21:36

## 2019-01-01 RX ADMIN — IPRATROPIUM BROMIDE AND ALBUTEROL SULFATE 3 ML: 2.5; .5 SOLUTION RESPIRATORY (INHALATION) at 10:46

## 2019-01-01 RX ADMIN — METOPROLOL SUCCINATE 25 MG: 25 TABLET, FILM COATED, EXTENDED RELEASE ORAL at 08:31

## 2019-01-01 RX ADMIN — SODIUM CHLORIDE, PRESERVATIVE FREE 10 ML: 5 INJECTION INTRAVENOUS at 18:19

## 2019-01-01 RX ADMIN — NYSTATIN 1 APPLICATION: 100000 POWDER TOPICAL at 07:48

## 2019-01-01 RX ADMIN — LEVOTHYROXINE SODIUM 50 MCG: 50 TABLET ORAL at 08:10

## 2019-01-01 RX ADMIN — INSULIN LISPRO 4 UNITS: 100 INJECTION, SOLUTION INTRAVENOUS; SUBCUTANEOUS at 17:15

## 2019-01-01 RX ADMIN — POLYETHYLENE GLYCOL 3350 17 G: 17 POWDER, FOR SOLUTION ORAL at 08:30

## 2019-01-01 RX ADMIN — APIXABAN 5 MG: 5 TABLET, FILM COATED ORAL at 08:32

## 2019-01-01 RX ADMIN — PROMETHAZINE HYDROCHLORIDE 6.25 MG: 25 INJECTION INTRAMUSCULAR; INTRAVENOUS at 15:18

## 2019-01-01 RX ADMIN — NYSTATIN 1 APPLICATION: 100000 POWDER TOPICAL at 08:48

## 2019-01-01 RX ADMIN — GABAPENTIN 600 MG: 300 CAPSULE ORAL at 22:36

## 2019-01-01 RX ADMIN — NYSTATIN: 100000 POWDER TOPICAL at 23:37

## 2019-01-01 RX ADMIN — GABAPENTIN 600 MG: 300 CAPSULE ORAL at 21:32

## 2019-01-01 RX ADMIN — FAMOTIDINE 20 MG: 20 TABLET, FILM COATED ORAL at 08:23

## 2019-01-01 RX ADMIN — APIXABAN 5 MG: 5 TABLET, FILM COATED ORAL at 08:15

## 2019-01-01 RX ADMIN — ALLOPURINOL 100 MG: 100 TABLET ORAL at 08:32

## 2019-01-01 RX ADMIN — APIXABAN 5 MG: 5 TABLET, FILM COATED ORAL at 08:35

## 2019-01-01 RX ADMIN — APIXABAN 5 MG: 5 TABLET, FILM COATED ORAL at 08:54

## 2019-01-01 RX ADMIN — BUDESONIDE AND FORMOTEROL FUMARATE DIHYDRATE 2 PUFF: 160; 4.5 AEROSOL RESPIRATORY (INHALATION) at 19:00

## 2019-01-01 RX ADMIN — BUMETANIDE 1 MG: 0.25 INJECTION INTRAMUSCULAR; INTRAVENOUS at 20:31

## 2019-01-01 RX ADMIN — CEFTRIAXONE SODIUM 1 G: 1 INJECTION, POWDER, FOR SOLUTION INTRAMUSCULAR; INTRAVENOUS at 17:57

## 2019-01-01 RX ADMIN — APIXABAN 5 MG: 5 TABLET, FILM COATED ORAL at 08:31

## 2019-01-01 RX ADMIN — BUMETANIDE 1 MG: 0.25 INJECTION INTRAMUSCULAR; INTRAVENOUS at 06:27

## 2019-01-01 RX ADMIN — APIXABAN 5 MG: 5 TABLET, FILM COATED ORAL at 08:49

## 2019-01-01 RX ADMIN — FUROSEMIDE 10 MG/HR: 10 INJECTION, SOLUTION INTRAVENOUS at 18:00

## 2019-01-01 RX ADMIN — TRAMADOL HYDROCHLORIDE 50 MG: 50 TABLET, COATED ORAL at 16:10

## 2019-01-01 RX ADMIN — BUDESONIDE AND FORMOTEROL FUMARATE DIHYDRATE 2 PUFF: 160; 4.5 AEROSOL RESPIRATORY (INHALATION) at 06:32

## 2019-01-01 RX ADMIN — NYSTATIN 1 APPLICATION: 100000 POWDER TOPICAL at 12:02

## 2019-01-01 RX ADMIN — IPRATROPIUM BROMIDE AND ALBUTEROL SULFATE 3 ML: 2.5; .5 SOLUTION RESPIRATORY (INHALATION) at 21:06

## 2019-01-01 RX ADMIN — ALLOPURINOL 100 MG: 100 TABLET ORAL at 10:55

## 2019-01-01 RX ADMIN — TAMSULOSIN HYDROCHLORIDE 0.4 MG: 0.4 CAPSULE ORAL at 08:23

## 2019-01-01 RX ADMIN — NYSTATIN: 100000 POWDER TOPICAL at 20:02

## 2019-01-01 RX ADMIN — ASPIRIN 81 MG 81 MG: 81 TABLET ORAL at 08:32

## 2019-01-01 RX ADMIN — DILTIAZEM HYDROCHLORIDE 240 MG: 240 CAPSULE, EXTENDED RELEASE ORAL at 15:18

## 2019-01-01 RX ADMIN — FUROSEMIDE 20 MG: 10 INJECTION, SOLUTION INTRAVENOUS at 11:53

## 2019-01-01 RX ADMIN — TAMSULOSIN HYDROCHLORIDE 0.4 MG: 0.4 CAPSULE ORAL at 22:35

## 2019-01-01 RX ADMIN — NYSTATIN 1 APPLICATION: 100000 POWDER TOPICAL at 12:14

## 2019-01-01 RX ADMIN — BUDESONIDE AND FORMOTEROL FUMARATE DIHYDRATE 2 PUFF: 160; 4.5 AEROSOL RESPIRATORY (INHALATION) at 07:38

## 2019-01-01 RX ADMIN — POLYETHYLENE GLYCOL 3350 17 G: 17 POWDER, FOR SOLUTION ORAL at 08:14

## 2019-01-01 RX ADMIN — SODIUM CHLORIDE 75 ML/HR: 9 INJECTION, SOLUTION INTRAVENOUS at 07:09

## 2019-01-01 RX ADMIN — METOPROLOL SUCCINATE 25 MG: 25 TABLET, FILM COATED, EXTENDED RELEASE ORAL at 08:00

## 2019-01-01 RX ADMIN — SODIUM CHLORIDE 125 ML/HR: 9 INJECTION, SOLUTION INTRAVENOUS at 15:08

## 2019-01-01 RX ADMIN — SODIUM CHLORIDE 100 ML/HR: 9 INJECTION, SOLUTION INTRAVENOUS at 07:51

## 2019-01-01 RX ADMIN — APIXABAN 5 MG: 5 TABLET, FILM COATED ORAL at 21:27

## 2019-01-01 RX ADMIN — SODIUM BICARBONATE 650 MG: 650 TABLET ORAL at 12:37

## 2019-01-01 RX ADMIN — METOPROLOL SUCCINATE 25 MG: 25 TABLET, EXTENDED RELEASE ORAL at 08:10

## 2019-01-01 RX ADMIN — NYSTATIN: 100000 OINTMENT TOPICAL at 21:14

## 2019-01-01 RX ADMIN — BUDESONIDE AND FORMOTEROL FUMARATE DIHYDRATE 2 PUFF: 160; 4.5 AEROSOL RESPIRATORY (INHALATION) at 06:44

## 2019-01-01 RX ADMIN — CALCIUM GLUCONATE 1 G: 98 INJECTION, SOLUTION INTRAVENOUS at 13:41

## 2019-01-01 RX ADMIN — APIXABAN 2.5 MG: 2.5 TABLET, FILM COATED ORAL at 10:02

## 2019-01-01 RX ADMIN — FAMOTIDINE 20 MG: 20 TABLET, FILM COATED ORAL at 08:49

## 2019-01-01 RX ADMIN — METOPROLOL SUCCINATE 25 MG: 25 TABLET, FILM COATED, EXTENDED RELEASE ORAL at 08:36

## 2019-01-01 RX ADMIN — INSULIN LISPRO 3 UNITS: 100 INJECTION, SOLUTION INTRAVENOUS; SUBCUTANEOUS at 17:00

## 2019-01-01 RX ADMIN — ATORVASTATIN CALCIUM 10 MG: 10 TABLET, FILM COATED ORAL at 20:03

## 2019-01-01 RX ADMIN — CALCITRIOL 0.25 MCG: 0.25 CAPSULE ORAL at 08:31

## 2019-01-01 RX ADMIN — IPRATROPIUM BROMIDE AND ALBUTEROL SULFATE 3 ML: 2.5; .5 SOLUTION RESPIRATORY (INHALATION) at 06:30

## 2019-01-01 RX ADMIN — IPRATROPIUM BROMIDE AND ALBUTEROL SULFATE 3 ML: 2.5; .5 SOLUTION RESPIRATORY (INHALATION) at 14:48

## 2019-01-01 RX ADMIN — INSULIN LISPRO 2 UNITS: 100 INJECTION, SOLUTION INTRAVENOUS; SUBCUTANEOUS at 06:25

## 2019-01-01 RX ADMIN — NYSTATIN: 100000 OINTMENT TOPICAL at 20:31

## 2019-01-01 RX ADMIN — CEFEPIME HYDROCHLORIDE 1 G: 1 INJECTION, POWDER, FOR SOLUTION INTRAMUSCULAR; INTRAVENOUS at 04:04

## 2019-01-01 RX ADMIN — INSULIN LISPRO 2 UNITS: 100 INJECTION, SOLUTION INTRAVENOUS; SUBCUTANEOUS at 21:14

## 2019-01-01 RX ADMIN — ATORVASTATIN CALCIUM 10 MG: 10 TABLET, FILM COATED ORAL at 09:17

## 2019-01-01 RX ADMIN — NYSTATIN: 100000 POWDER TOPICAL at 12:13

## 2019-01-01 RX ADMIN — ASPIRIN 81 MG 81 MG: 81 TABLET ORAL at 08:31

## 2019-01-01 RX ADMIN — ATORVASTATIN CALCIUM 10 MG: 10 TABLET, FILM COATED ORAL at 21:36

## 2019-01-01 RX ADMIN — CALCITRIOL 0.25 MCG: 0.25 CAPSULE ORAL at 10:55

## 2019-01-01 RX ADMIN — POLYETHYLENE GLYCOL 3350 17 G: 17 POWDER, FOR SOLUTION ORAL at 09:52

## 2019-01-01 RX ADMIN — DOCUSATE SODIUM 100 MG: 100 CAPSULE ORAL at 08:14

## 2019-01-01 RX ADMIN — METOPROLOL SUCCINATE 25 MG: 25 TABLET, FILM COATED, EXTENDED RELEASE ORAL at 08:32

## 2019-01-01 RX ADMIN — FAMOTIDINE 20 MG: 20 TABLET, FILM COATED ORAL at 12:15

## 2019-01-01 RX ADMIN — CALCITRIOL 0.25 MCG: 0.25 CAPSULE ORAL at 08:35

## 2019-01-01 RX ADMIN — MEROPENEM 1 G: 1 INJECTION, POWDER, FOR SOLUTION INTRAVENOUS at 12:37

## 2019-01-01 RX ADMIN — ALLOPURINOL 100 MG: 100 TABLET ORAL at 09:52

## 2019-01-01 RX ADMIN — TAMSULOSIN HYDROCHLORIDE 0.4 MG: 0.4 CAPSULE ORAL at 22:06

## 2019-01-01 RX ADMIN — LEVOTHYROXINE SODIUM 50 MCG: 50 TABLET ORAL at 06:04

## 2019-01-01 RX ADMIN — SODIUM CHLORIDE, PRESERVATIVE FREE 3 ML: 5 INJECTION INTRAVENOUS at 09:26

## 2019-01-01 RX ADMIN — FAMOTIDINE 20 MG: 20 TABLET, FILM COATED ORAL at 21:04

## 2019-01-01 RX ADMIN — INSULIN LISPRO 2 UNITS: 100 INJECTION, SOLUTION INTRAVENOUS; SUBCUTANEOUS at 17:20

## 2019-01-01 RX ADMIN — ESCITSLOPRAM 20 MG: 10 TABLET ORAL at 21:09

## 2019-01-01 RX ADMIN — SODIUM CHLORIDE, PRESERVATIVE FREE 3 ML: 5 INJECTION INTRAVENOUS at 08:14

## 2019-01-01 RX ADMIN — CALCITRIOL 0.25 MCG: 0.25 CAPSULE ORAL at 08:00

## 2019-01-01 RX ADMIN — INSULIN LISPRO 3 UNITS: 100 INJECTION, SOLUTION INTRAVENOUS; SUBCUTANEOUS at 21:04

## 2019-01-01 RX ADMIN — IPRATROPIUM BROMIDE AND ALBUTEROL SULFATE 3 ML: 2.5; .5 SOLUTION RESPIRATORY (INHALATION) at 15:10

## 2019-01-01 RX ADMIN — FAMOTIDINE 20 MG: 20 TABLET, FILM COATED ORAL at 08:54

## 2019-01-01 RX ADMIN — CALCITRIOL 0.25 MCG: 0.25 CAPSULE ORAL at 08:14

## 2019-01-01 RX ADMIN — IPRATROPIUM BROMIDE AND ALBUTEROL SULFATE 3 ML: 2.5; .5 SOLUTION RESPIRATORY (INHALATION) at 18:50

## 2019-01-01 RX ADMIN — LEVOTHYROXINE SODIUM 50 MCG: 50 TABLET ORAL at 05:33

## 2019-01-01 RX ADMIN — BUMETANIDE 1 MG: 0.25 INJECTION INTRAMUSCULAR; INTRAVENOUS at 21:12

## 2019-01-01 RX ADMIN — TAMSULOSIN HYDROCHLORIDE 0.4 MG: 0.4 CAPSULE ORAL at 11:11

## 2019-01-01 RX ADMIN — SODIUM CHLORIDE, PRESERVATIVE FREE 3 ML: 5 INJECTION INTRAVENOUS at 08:55

## 2019-01-01 RX ADMIN — FLUTICASONE PROPIONATE 1 SPRAY: 50 SPRAY, METERED NASAL at 21:05

## 2019-01-01 RX ADMIN — IPRATROPIUM BROMIDE AND ALBUTEROL SULFATE 3 ML: 2.5; .5 SOLUTION RESPIRATORY (INHALATION) at 06:52

## 2019-01-01 RX ADMIN — INSULIN LISPRO 3 UNITS: 100 INJECTION, SOLUTION INTRAVENOUS; SUBCUTANEOUS at 21:47

## 2019-01-01 RX ADMIN — GABAPENTIN 300 MG: 300 CAPSULE ORAL at 10:06

## 2019-01-01 RX ADMIN — IPRATROPIUM BROMIDE AND ALBUTEROL SULFATE 3 ML: 2.5; .5 SOLUTION RESPIRATORY (INHALATION) at 21:20

## 2019-01-01 RX ADMIN — SODIUM CHLORIDE 500 ML: 9 INJECTION, SOLUTION INTRAVENOUS at 13:24

## 2019-01-01 RX ADMIN — BUDESONIDE AND FORMOTEROL FUMARATE DIHYDRATE 2 PUFF: 160; 4.5 AEROSOL RESPIRATORY (INHALATION) at 20:45

## 2019-01-01 RX ADMIN — INSULIN LISPRO 2 UNITS: 100 INJECTION, SOLUTION INTRAVENOUS; SUBCUTANEOUS at 11:40

## 2019-01-01 RX ADMIN — ASPIRIN 81 MG 81 MG: 81 TABLET ORAL at 08:50

## 2019-01-01 RX ADMIN — ASPIRIN 81 MG 81 MG: 81 TABLET ORAL at 09:56

## 2019-01-01 RX ADMIN — BUDESONIDE AND FORMOTEROL FUMARATE DIHYDRATE 2 PUFF: 160; 4.5 AEROSOL RESPIRATORY (INHALATION) at 08:14

## 2019-01-01 RX ADMIN — LEVOTHYROXINE SODIUM 50 MCG: 50 TABLET ORAL at 06:28

## 2019-01-01 RX ADMIN — ESCITALOPRAM 20 MG: 10 TABLET, FILM COATED ORAL at 20:10

## 2019-01-01 RX ADMIN — ASPIRIN 81 MG 81 MG: 81 TABLET ORAL at 08:09

## 2019-01-01 RX ADMIN — LEVOTHYROXINE SODIUM 50 MCG: 50 TABLET ORAL at 05:34

## 2019-01-01 RX ADMIN — GABAPENTIN 600 MG: 300 CAPSULE ORAL at 21:36

## 2019-01-01 RX ADMIN — BUDESONIDE AND FORMOTEROL FUMARATE DIHYDRATE 2 PUFF: 160; 4.5 AEROSOL RESPIRATORY (INHALATION) at 07:53

## 2019-01-01 RX ADMIN — INSULIN LISPRO 2 UNITS: 100 INJECTION, SOLUTION INTRAVENOUS; SUBCUTANEOUS at 12:32

## 2019-01-01 RX ADMIN — NYSTATIN: 100000 POWDER TOPICAL at 08:51

## 2019-01-01 RX ADMIN — BUDESONIDE AND FORMOTEROL FUMARATE DIHYDRATE 2 PUFF: 160; 4.5 AEROSOL RESPIRATORY (INHALATION) at 20:16

## 2019-01-01 RX ADMIN — NYSTATIN: 100000 OINTMENT TOPICAL at 20:55

## 2019-01-01 RX ADMIN — MORPHINE SULFATE 1 MG/HR: 50 INJECTION, SOLUTION, CONCENTRATE INTRAVENOUS at 13:23

## 2019-01-01 RX ADMIN — BUDESONIDE AND FORMOTEROL FUMARATE DIHYDRATE 2 PUFF: 160; 4.5 AEROSOL RESPIRATORY (INHALATION) at 19:55

## 2019-01-01 RX ADMIN — PANTOPRAZOLE SODIUM 40 MG: 40 TABLET, DELAYED RELEASE ORAL at 10:55

## 2019-01-01 RX ADMIN — ALLOPURINOL 100 MG: 100 TABLET ORAL at 08:48

## 2019-01-01 RX ADMIN — CALCITRIOL 0.25 MCG: 0.25 CAPSULE ORAL at 08:55

## 2019-01-01 RX ADMIN — ESCITSLOPRAM 20 MG: 10 TABLET ORAL at 20:03

## 2019-01-01 RX ADMIN — TAMSULOSIN HYDROCHLORIDE 0.4 MG: 0.4 CAPSULE ORAL at 21:36

## 2019-01-01 RX ADMIN — IPRATROPIUM BROMIDE AND ALBUTEROL SULFATE 3 ML: 2.5; .5 SOLUTION RESPIRATORY (INHALATION) at 10:26

## 2019-01-01 RX ADMIN — MEROPENEM 1 G: 1 INJECTION, POWDER, FOR SOLUTION INTRAVENOUS at 23:48

## 2019-01-01 RX ADMIN — TAMSULOSIN HYDROCHLORIDE 0.4 MG: 0.4 CAPSULE ORAL at 20:29

## 2019-01-01 RX ADMIN — APIXABAN 5 MG: 5 TABLET, FILM COATED ORAL at 09:48

## 2019-01-01 RX ADMIN — ASPIRIN 81 MG 81 MG: 81 TABLET ORAL at 08:23

## 2019-01-01 RX ADMIN — INSULIN DETEMIR 10 UNITS: 100 INJECTION, SOLUTION SUBCUTANEOUS at 22:33

## 2019-01-01 RX ADMIN — LEVOTHYROXINE SODIUM 50 MCG: 50 TABLET ORAL at 09:17

## 2019-01-01 RX ADMIN — IPRATROPIUM BROMIDE AND ALBUTEROL SULFATE 3 ML: 2.5; .5 SOLUTION RESPIRATORY (INHALATION) at 18:45

## 2019-01-01 RX ADMIN — ATORVASTATIN CALCIUM 10 MG: 10 TABLET, FILM COATED ORAL at 22:35

## 2019-01-01 RX ADMIN — FUROSEMIDE 40 MG: 10 INJECTION, SOLUTION INTRAMUSCULAR; INTRAVENOUS at 21:15

## 2019-01-01 RX ADMIN — TAMSULOSIN HYDROCHLORIDE 0.4 MG: 0.4 CAPSULE ORAL at 08:15

## 2019-01-01 RX ADMIN — FUROSEMIDE 40 MG: 10 INJECTION, SOLUTION INTRAMUSCULAR; INTRAVENOUS at 08:32

## 2019-01-01 RX ADMIN — GABAPENTIN 300 MG: 300 CAPSULE ORAL at 08:32

## 2019-01-01 RX ADMIN — FLUCONAZOLE 100 MG: 2 INJECTION INTRAVENOUS at 08:09

## 2019-01-01 RX ADMIN — ASPIRIN 81 MG 81 MG: 81 TABLET ORAL at 09:25

## 2019-01-01 RX ADMIN — BUMETANIDE 1 MG: 0.25 INJECTION INTRAMUSCULAR; INTRAVENOUS at 08:30

## 2019-01-01 RX ADMIN — FLUTICASONE PROPIONATE 1 SPRAY: 50 SPRAY, METERED NASAL at 08:23

## 2019-01-01 RX ADMIN — INSULIN LISPRO 2 UNITS: 100 INJECTION, SOLUTION INTRAVENOUS; SUBCUTANEOUS at 17:00

## 2019-01-01 RX ADMIN — BUMETANIDE 1 MG: 0.25 INJECTION INTRAMUSCULAR; INTRAVENOUS at 08:54

## 2019-01-01 RX ADMIN — MEROPENEM 1 G: 1 INJECTION, POWDER, FOR SOLUTION INTRAVENOUS at 22:36

## 2019-01-01 RX ADMIN — ALLOPURINOL 100 MG: 100 TABLET ORAL at 08:10

## 2019-01-01 RX ADMIN — LEVOTHYROXINE SODIUM 50 MCG: 50 TABLET ORAL at 05:58

## 2019-01-01 RX ADMIN — MEROPENEM 1 G: 1 INJECTION, POWDER, FOR SOLUTION INTRAVENOUS at 01:31

## 2019-01-01 RX ADMIN — SODIUM CHLORIDE, PRESERVATIVE FREE 3 ML: 5 INJECTION INTRAVENOUS at 09:53

## 2019-01-01 RX ADMIN — FUROSEMIDE 40 MG: 10 INJECTION, SOLUTION INTRAMUSCULAR; INTRAVENOUS at 17:47

## 2019-01-01 RX ADMIN — TAMSULOSIN HYDROCHLORIDE 0.4 MG: 0.4 CAPSULE ORAL at 20:55

## 2019-01-01 RX ADMIN — FAMOTIDINE 20 MG: 10 INJECTION INTRAVENOUS at 08:32

## 2019-01-01 RX ADMIN — GABAPENTIN 300 MG: 300 CAPSULE ORAL at 11:12

## 2019-01-01 RX ADMIN — MEROPENEM 1 G: 1 INJECTION, POWDER, FOR SOLUTION INTRAVENOUS at 11:45

## 2019-01-01 RX ADMIN — IPRATROPIUM BROMIDE AND ALBUTEROL SULFATE 3 ML: 2.5; .5 SOLUTION RESPIRATORY (INHALATION) at 19:54

## 2019-01-01 RX ADMIN — APIXABAN 5 MG: 5 TABLET, FILM COATED ORAL at 21:32

## 2019-01-01 RX ADMIN — APIXABAN 5 MG: 5 TABLET, FILM COATED ORAL at 21:36

## 2019-01-01 RX ADMIN — FAMOTIDINE 20 MG: 20 TABLET, FILM COATED ORAL at 08:33

## 2019-01-01 RX ADMIN — CALCITRIOL 0.25 MCG: 0.25 CAPSULE ORAL at 09:52

## 2019-01-01 RX ADMIN — ATORVASTATIN CALCIUM 10 MG: 10 TABLET, FILM COATED ORAL at 20:54

## 2019-01-01 RX ADMIN — ASPIRIN 81 MG 81 MG: 81 TABLET ORAL at 08:00

## 2019-01-01 RX ADMIN — INSULIN DETEMIR 10 UNITS: 100 INJECTION, SOLUTION SUBCUTANEOUS at 20:14

## 2019-01-01 RX ADMIN — IPRATROPIUM BROMIDE AND ALBUTEROL SULFATE 3 ML: 2.5; .5 SOLUTION RESPIRATORY (INHALATION) at 11:31

## 2019-01-01 RX ADMIN — INSULIN DETEMIR 10 UNITS: 100 INJECTION, SOLUTION SUBCUTANEOUS at 20:54

## 2019-01-01 RX ADMIN — SODIUM CHLORIDE, PRESERVATIVE FREE 3 ML: 5 INJECTION INTRAVENOUS at 09:10

## 2019-01-01 RX ADMIN — IPRATROPIUM BROMIDE AND ALBUTEROL SULFATE 3 ML: 2.5; .5 SOLUTION RESPIRATORY (INHALATION) at 20:48

## 2019-01-01 RX ADMIN — APIXABAN 2.5 MG: 2.5 TABLET, FILM COATED ORAL at 21:56

## 2019-01-01 RX ADMIN — INSULIN DETEMIR 10 UNITS: 100 INJECTION, SOLUTION SUBCUTANEOUS at 21:14

## 2019-01-01 RX ADMIN — APIXABAN 2.5 MG: 2.5 TABLET, FILM COATED ORAL at 10:54

## 2019-01-01 RX ADMIN — TAMSULOSIN HYDROCHLORIDE 0.4 MG: 0.4 CAPSULE ORAL at 10:55

## 2019-01-01 RX ADMIN — BUDESONIDE AND FORMOTEROL FUMARATE DIHYDRATE 2 PUFF: 160; 4.5 AEROSOL RESPIRATORY (INHALATION) at 18:50

## 2019-01-01 RX ADMIN — NYSTATIN: 100000 POWDER TOPICAL at 09:53

## 2019-01-01 RX ADMIN — POLYETHYLENE GLYCOL 3350 17 G: 17 POWDER, FOR SOLUTION ORAL at 10:13

## 2019-01-01 RX ADMIN — NYSTATIN: 100000 OINTMENT TOPICAL at 21:34

## 2019-01-01 RX ADMIN — ASPIRIN 81 MG: 81 TABLET, CHEWABLE ORAL at 10:55

## 2019-01-01 RX ADMIN — GABAPENTIN 300 MG: 300 CAPSULE ORAL at 08:29

## 2019-01-01 RX ADMIN — ALLOPURINOL 100 MG: 100 TABLET ORAL at 08:54

## 2019-01-01 RX ADMIN — ATORVASTATIN CALCIUM 10 MG: 10 TABLET, FILM COATED ORAL at 20:34

## 2019-01-01 RX ADMIN — IPRATROPIUM BROMIDE AND ALBUTEROL SULFATE 3 ML: 2.5; .5 SOLUTION RESPIRATORY (INHALATION) at 08:12

## 2019-01-01 RX ADMIN — IPRATROPIUM BROMIDE AND ALBUTEROL SULFATE 3 ML: 2.5; .5 SOLUTION RESPIRATORY (INHALATION) at 20:45

## 2019-01-01 RX ADMIN — BUMETANIDE 1 MG: 0.25 INJECTION INTRAMUSCULAR; INTRAVENOUS at 14:33

## 2019-01-01 RX ADMIN — ATORVASTATIN CALCIUM 10 MG: 10 TABLET, FILM COATED ORAL at 21:05

## 2019-01-01 RX ADMIN — DOCUSATE SODIUM 100 MG: 100 CAPSULE ORAL at 10:44

## 2019-01-01 RX ADMIN — DILTIAZEM HYDROCHLORIDE 240 MG: 240 CAPSULE, EXTENDED RELEASE ORAL at 14:01

## 2019-01-01 RX ADMIN — GABAPENTIN 600 MG: 300 CAPSULE ORAL at 21:49

## 2019-01-01 RX ADMIN — TAMSULOSIN HYDROCHLORIDE 0.4 MG: 0.4 CAPSULE ORAL at 21:35

## 2019-01-01 RX ADMIN — SODIUM CHLORIDE 75 ML/HR: 9 INJECTION, SOLUTION INTRAVENOUS at 10:02

## 2019-01-01 RX ADMIN — MEROPENEM 1 G: 1 INJECTION, POWDER, FOR SOLUTION INTRAVENOUS at 22:04

## 2019-01-01 RX ADMIN — FOLIC ACID 1 MG: 1 TABLET ORAL at 10:55

## 2019-01-01 RX ADMIN — IPRATROPIUM BROMIDE AND ALBUTEROL SULFATE 3 ML: 2.5; .5 SOLUTION RESPIRATORY (INHALATION) at 20:27

## 2019-01-01 RX ADMIN — DOCUSATE SODIUM 100 MG: 100 CAPSULE ORAL at 10:55

## 2019-01-01 RX ADMIN — DILTIAZEM HYDROCHLORIDE 240 MG: 240 CAPSULE, EXTENDED RELEASE ORAL at 08:23

## 2019-01-01 RX ADMIN — PIPERACILLIN SODIUM,TAZOBACTAM SODIUM 3.38 G: 3; .375 INJECTION, POWDER, FOR SOLUTION INTRAVENOUS at 15:37

## 2019-01-01 RX ADMIN — METOPROLOL SUCCINATE 25 MG: 25 TABLET, FILM COATED, EXTENDED RELEASE ORAL at 11:11

## 2019-01-01 RX ADMIN — LORAZEPAM 1 MG: 2 INJECTION INTRAMUSCULAR; INTRAVENOUS at 07:48

## 2019-01-01 RX ADMIN — ASPIRIN 81 MG 81 MG: 81 TABLET ORAL at 08:11

## 2019-01-01 RX ADMIN — ALLOPURINOL 100 MG: 100 TABLET ORAL at 09:49

## 2019-01-01 RX ADMIN — GABAPENTIN 300 MG: 300 CAPSULE ORAL at 09:48

## 2019-01-01 RX ADMIN — FAMOTIDINE 20 MG: 10 INJECTION, SOLUTION INTRAVENOUS at 21:21

## 2019-01-01 RX ADMIN — CEFTRIAXONE SODIUM 1 G: 1 INJECTION, POWDER, FOR SOLUTION INTRAMUSCULAR; INTRAVENOUS at 17:25

## 2019-01-01 RX ADMIN — INSULIN LISPRO 4 UNITS: 100 INJECTION, SOLUTION INTRAVENOUS; SUBCUTANEOUS at 20:45

## 2019-01-01 RX ADMIN — BUDESONIDE AND FORMOTEROL FUMARATE DIHYDRATE 2 PUFF: 160; 4.5 AEROSOL RESPIRATORY (INHALATION) at 20:17

## 2019-01-01 RX ADMIN — ATORVASTATIN CALCIUM 10 MG: 10 TABLET, FILM COATED ORAL at 21:56

## 2019-01-01 RX ADMIN — FAMOTIDINE 20 MG: 10 INJECTION INTRAVENOUS at 22:56

## 2019-01-01 RX ADMIN — INSULIN LISPRO 3 UNITS: 100 INJECTION, SOLUTION INTRAVENOUS; SUBCUTANEOUS at 21:05

## 2019-01-01 RX ADMIN — FLUTICASONE PROPIONATE 1 SPRAY: 50 SPRAY, METERED NASAL at 20:53

## 2019-01-01 RX ADMIN — APIXABAN 5 MG: 5 TABLET, FILM COATED ORAL at 20:03

## 2019-01-01 RX ADMIN — ASPIRIN 81 MG 81 MG: 81 TABLET ORAL at 09:10

## 2019-01-01 RX ADMIN — GABAPENTIN 600 MG: 300 CAPSULE ORAL at 21:14

## 2019-01-01 RX ADMIN — SODIUM CHLORIDE, PRESERVATIVE FREE 3 ML: 5 INJECTION INTRAVENOUS at 21:04

## 2019-01-01 RX ADMIN — POLYETHYLENE GLYCOL 3350 17 G: 17 POWDER, FOR SOLUTION ORAL at 08:13

## 2019-01-01 RX ADMIN — FAMOTIDINE 20 MG: 20 TABLET, FILM COATED ORAL at 20:29

## 2019-01-01 RX ADMIN — NYSTATIN 1 APPLICATION: 100000 POWDER TOPICAL at 21:26

## 2019-01-01 RX ADMIN — ATORVASTATIN CALCIUM 10 MG: 10 TABLET, FILM COATED ORAL at 08:49

## 2019-01-01 RX ADMIN — ASPIRIN 81 MG 81 MG: 81 TABLET ORAL at 08:15

## 2019-01-01 RX ADMIN — NYSTATIN: 100000 POWDER TOPICAL at 10:44

## 2019-01-01 RX ADMIN — IPRATROPIUM BROMIDE AND ALBUTEROL SULFATE 3 ML: 2.5; .5 SOLUTION RESPIRATORY (INHALATION) at 06:35

## 2019-01-01 RX ADMIN — IPRATROPIUM BROMIDE AND ALBUTEROL SULFATE 3 ML: 2.5; .5 SOLUTION RESPIRATORY (INHALATION) at 12:07

## 2019-01-01 RX ADMIN — FUROSEMIDE 10 MG/HR: 10 INJECTION, SOLUTION INTRAVENOUS at 16:32

## 2019-01-01 RX ADMIN — ALLOPURINOL 100 MG: 100 TABLET ORAL at 08:34

## 2019-01-01 RX ADMIN — LEVOTHYROXINE SODIUM 50 MCG: 50 TABLET ORAL at 08:14

## 2019-01-01 RX ADMIN — APIXABAN 5 MG: 5 TABLET, FILM COATED ORAL at 11:11

## 2019-01-01 RX ADMIN — DEXTROSE MONOHYDRATE 25 G: 500 INJECTION PARENTERAL at 07:34

## 2019-01-01 RX ADMIN — IPRATROPIUM BROMIDE AND ALBUTEROL SULFATE 3 ML: 2.5; .5 SOLUTION RESPIRATORY (INHALATION) at 20:15

## 2019-01-01 RX ADMIN — LEVOTHYROXINE SODIUM 50 MCG: 50 TABLET ORAL at 05:47

## 2019-01-01 RX ADMIN — FUROSEMIDE 10 MG/HR: 10 INJECTION, SOLUTION INTRAVENOUS at 05:25

## 2019-01-01 RX ADMIN — LORAZEPAM 1 MG: 2 INJECTION INTRAMUSCULAR; INTRAVENOUS at 05:54

## 2019-01-01 RX ADMIN — CALCITRIOL 0.25 MCG: 0.25 CAPSULE ORAL at 10:03

## 2019-01-01 RX ADMIN — GABAPENTIN 600 MG: 300 CAPSULE ORAL at 20:34

## 2019-01-01 RX ADMIN — PANTOPRAZOLE SODIUM 40 MG: 40 TABLET, DELAYED RELEASE ORAL at 10:06

## 2019-01-01 RX ADMIN — GABAPENTIN 600 MG: 300 CAPSULE ORAL at 21:09

## 2019-01-01 RX ADMIN — LEVOTHYROXINE SODIUM 50 MCG: 50 TABLET ORAL at 08:49

## 2019-01-01 RX ADMIN — FLUTICASONE PROPIONATE 1 SPRAY: 50 SPRAY, METERED NASAL at 11:11

## 2019-01-01 RX ADMIN — SODIUM CHLORIDE, PRESERVATIVE FREE 3 ML: 5 INJECTION INTRAVENOUS at 21:14

## 2019-01-01 RX ADMIN — MEROPENEM 1 G: 1 INJECTION, POWDER, FOR SOLUTION INTRAVENOUS at 00:07

## 2019-01-01 RX ADMIN — INSULIN LISPRO 3 UNITS: 100 INJECTION, SOLUTION INTRAVENOUS; SUBCUTANEOUS at 20:11

## 2019-01-01 RX ADMIN — ASPIRIN 81 MG 81 MG: 81 TABLET ORAL at 08:35

## 2019-01-01 RX ADMIN — SODIUM CHLORIDE, PRESERVATIVE FREE 3 ML: 5 INJECTION INTRAVENOUS at 08:11

## 2019-01-01 RX ADMIN — ALLOPURINOL 100 MG: 100 TABLET ORAL at 10:06

## 2019-01-01 RX ADMIN — LEVOTHYROXINE SODIUM 50 MCG: 50 TABLET ORAL at 06:45

## 2019-01-01 RX ADMIN — SODIUM CHLORIDE, PRESERVATIVE FREE 3 ML: 5 INJECTION INTRAVENOUS at 22:00

## 2019-01-01 RX ADMIN — ESCITALOPRAM 20 MG: 10 TABLET, FILM COATED ORAL at 21:32

## 2019-01-01 RX ADMIN — NYSTATIN: 100000 OINTMENT TOPICAL at 08:55

## 2019-01-01 RX ADMIN — BUDESONIDE AND FORMOTEROL FUMARATE DIHYDRATE 2 PUFF: 160; 4.5 AEROSOL RESPIRATORY (INHALATION) at 20:54

## 2019-01-01 RX ADMIN — TAMSULOSIN HYDROCHLORIDE 0.4 MG: 0.4 CAPSULE ORAL at 08:35

## 2019-01-01 RX ADMIN — METOPROLOL SUCCINATE 25 MG: 25 TABLET, FILM COATED, EXTENDED RELEASE ORAL at 09:48

## 2019-01-01 RX ADMIN — FUROSEMIDE 10 MG/HR: 10 INJECTION, SOLUTION INTRAVENOUS at 03:28

## 2019-01-01 RX ADMIN — MEROPENEM 1 G: 1 INJECTION, POWDER, FOR SOLUTION INTRAVENOUS at 11:03

## 2019-01-01 RX ADMIN — SODIUM POLYSTYRENE SULFONATE 15 G: 1 POWDER ORAL; RECTAL at 20:53

## 2019-01-01 RX ADMIN — LEVOTHYROXINE SODIUM 50 MCG: 50 TABLET ORAL at 05:10

## 2019-01-01 RX ADMIN — APIXABAN 5 MG: 5 TABLET, FILM COATED ORAL at 09:17

## 2019-01-01 RX ADMIN — FUROSEMIDE 10 MG/HR: 10 INJECTION, SOLUTION INTRAVENOUS at 21:05

## 2019-01-01 RX ADMIN — METHYLPREDNISOLONE SODIUM SUCCINATE 80 MG: 125 INJECTION, POWDER, FOR SOLUTION INTRAMUSCULAR; INTRAVENOUS at 21:33

## 2019-01-01 RX ADMIN — MEROPENEM 1 G: 1 INJECTION, POWDER, FOR SOLUTION INTRAVENOUS at 12:15

## 2019-01-01 RX ADMIN — FUROSEMIDE 40 MG: 10 INJECTION, SOLUTION INTRAMUSCULAR; INTRAVENOUS at 18:18

## 2019-01-01 RX ADMIN — ATORVASTATIN CALCIUM 10 MG: 10 TABLET, FILM COATED ORAL at 21:37

## 2019-01-01 RX ADMIN — FUROSEMIDE 10 MG/HR: 10 INJECTION, SOLUTION INTRAVENOUS at 02:57

## 2019-01-01 RX ADMIN — ESCITSLOPRAM 20 MG: 10 TABLET ORAL at 20:54

## 2019-01-01 RX ADMIN — INSULIN DETEMIR 10 UNITS: 100 INJECTION, SOLUTION SUBCUTANEOUS at 23:36

## 2019-01-01 RX ADMIN — DILTIAZEM HYDROCHLORIDE 240 MG: 240 CAPSULE, EXTENDED RELEASE ORAL at 09:27

## 2019-01-01 RX ADMIN — METHYLPREDNISOLONE SODIUM SUCCINATE 80 MG: 125 INJECTION, POWDER, FOR SOLUTION INTRAMUSCULAR; INTRAVENOUS at 12:20

## 2019-01-01 RX ADMIN — INSULIN LISPRO 2 UNITS: 100 INJECTION, SOLUTION INTRAVENOUS; SUBCUTANEOUS at 21:35

## 2019-01-01 RX ADMIN — INSULIN LISPRO 2 UNITS: 100 INJECTION, SOLUTION INTRAVENOUS; SUBCUTANEOUS at 06:06

## 2019-01-01 RX ADMIN — TAMSULOSIN HYDROCHLORIDE 0.4 MG: 0.4 CAPSULE ORAL at 08:14

## 2019-01-01 RX ADMIN — APIXABAN 5 MG: 5 TABLET, FILM COATED ORAL at 21:37

## 2019-01-01 RX ADMIN — INSULIN LISPRO 2 UNITS: 100 INJECTION, SOLUTION INTRAVENOUS; SUBCUTANEOUS at 10:04

## 2019-01-01 RX ADMIN — FLUTICASONE PROPIONATE 1 SPRAY: 50 SPRAY, METERED NASAL at 21:03

## 2019-01-01 RX ADMIN — BUDESONIDE AND FORMOTEROL FUMARATE DIHYDRATE 2 PUFF: 160; 4.5 AEROSOL RESPIRATORY (INHALATION) at 08:13

## 2019-01-01 RX ADMIN — CALCITRIOL 0.25 MCG: 0.25 CAPSULE ORAL at 09:17

## 2019-01-01 RX ADMIN — TAMSULOSIN HYDROCHLORIDE 0.4 MG: 0.4 CAPSULE ORAL at 21:27

## 2019-01-01 RX ADMIN — GABAPENTIN 300 MG: 300 CAPSULE ORAL at 20:53

## 2019-01-01 RX ADMIN — DOCUSATE SODIUM 100 MG: 100 CAPSULE ORAL at 09:09

## 2019-01-01 RX ADMIN — APIXABAN 2.5 MG: 2.5 TABLET, FILM COATED ORAL at 23:36

## 2019-01-01 RX ADMIN — IPRATROPIUM BROMIDE AND ALBUTEROL SULFATE 3 ML: 2.5; .5 SOLUTION RESPIRATORY (INHALATION) at 07:20

## 2019-01-01 RX ADMIN — NYSTATIN: 100000 POWDER TOPICAL at 09:50

## 2019-01-01 RX ADMIN — APIXABAN 5 MG: 5 TABLET, FILM COATED ORAL at 20:29

## 2019-01-01 RX ADMIN — FUROSEMIDE 40 MG: 10 INJECTION, SOLUTION INTRAMUSCULAR; INTRAVENOUS at 08:36

## 2019-01-01 RX ADMIN — ATORVASTATIN CALCIUM 10 MG: 10 TABLET, FILM COATED ORAL at 21:49

## 2019-01-01 RX ADMIN — FLUTICASONE PROPIONATE 1 SPRAY: 50 SPRAY, METERED NASAL at 20:34

## 2019-01-01 RX ADMIN — BUDESONIDE AND FORMOTEROL FUMARATE DIHYDRATE 2 PUFF: 160; 4.5 AEROSOL RESPIRATORY (INHALATION) at 20:41

## 2019-01-01 RX ADMIN — SODIUM CHLORIDE, PRESERVATIVE FREE 3 ML: 5 INJECTION INTRAVENOUS at 21:28

## 2019-01-01 RX ADMIN — METOPROLOL SUCCINATE 25 MG: 25 TABLET, FILM COATED, EXTENDED RELEASE ORAL at 08:27

## 2019-01-01 RX ADMIN — BUDESONIDE AND FORMOTEROL FUMARATE DIHYDRATE 2 PUFF: 160; 4.5 AEROSOL RESPIRATORY (INHALATION) at 06:41

## 2019-01-01 RX ADMIN — BUDESONIDE AND FORMOTEROL FUMARATE DIHYDRATE 2 PUFF: 160; 4.5 AEROSOL RESPIRATORY (INHALATION) at 06:53

## 2019-01-01 RX ADMIN — DILTIAZEM HYDROCHLORIDE 240 MG: 240 CAPSULE, EXTENDED RELEASE ORAL at 08:26

## 2019-01-01 RX ADMIN — VANCOMYCIN HYDROCHLORIDE 1250 MG: 10 INJECTION, POWDER, LYOPHILIZED, FOR SOLUTION INTRAVENOUS at 16:04

## 2019-01-01 RX ADMIN — METHYLPREDNISOLONE SODIUM SUCCINATE 80 MG: 125 INJECTION, POWDER, FOR SOLUTION INTRAMUSCULAR; INTRAVENOUS at 06:21

## 2019-01-01 RX ADMIN — TAMSULOSIN HYDROCHLORIDE 0.4 MG: 0.4 CAPSULE ORAL at 20:53

## 2019-01-01 RX ADMIN — LEVOTHYROXINE SODIUM 50 MCG: 50 TABLET ORAL at 08:32

## 2019-01-01 RX ADMIN — DILTIAZEM HYDROCHLORIDE 240 MG: 240 CAPSULE, EXTENDED RELEASE ORAL at 08:31

## 2019-01-01 RX ADMIN — FAMOTIDINE 20 MG: 20 TABLET, FILM COATED ORAL at 08:32

## 2019-01-01 RX ADMIN — NYSTATIN: 100000 POWDER TOPICAL at 20:11

## 2019-01-01 RX ADMIN — TAMSULOSIN HYDROCHLORIDE 0.4 MG: 0.4 CAPSULE ORAL at 21:03

## 2019-01-01 RX ADMIN — TAMSULOSIN HYDROCHLORIDE 0.4 MG: 0.4 CAPSULE ORAL at 08:34

## 2019-01-01 RX ADMIN — NYSTATIN: 100000 OINTMENT TOPICAL at 09:02

## 2019-01-01 RX ADMIN — FLUTICASONE PROPIONATE 1 SPRAY: 50 SPRAY, METERED NASAL at 21:36

## 2019-01-01 RX ADMIN — BUDESONIDE AND FORMOTEROL FUMARATE DIHYDRATE 2 PUFF: 160; 4.5 AEROSOL RESPIRATORY (INHALATION) at 20:33

## 2019-01-01 RX ADMIN — TAMSULOSIN HYDROCHLORIDE 0.4 MG: 0.4 CAPSULE ORAL at 10:05

## 2019-01-01 RX ADMIN — BUDESONIDE AND FORMOTEROL FUMARATE DIHYDRATE 2 PUFF: 160; 4.5 AEROSOL RESPIRATORY (INHALATION) at 08:08

## 2019-01-01 RX ADMIN — ALLOPURINOL 100 MG: 100 TABLET ORAL at 08:23

## 2019-01-01 RX ADMIN — BUDESONIDE AND FORMOTEROL FUMARATE DIHYDRATE 2 PUFF: 160; 4.5 AEROSOL RESPIRATORY (INHALATION) at 07:44

## 2019-01-01 RX ADMIN — NYSTATIN: 100000 POWDER TOPICAL at 21:42

## 2019-01-01 RX ADMIN — INSULIN LISPRO 3 UNITS: 100 INJECTION, SOLUTION INTRAVENOUS; SUBCUTANEOUS at 17:58

## 2019-01-01 RX ADMIN — BUMETANIDE 2 MG: 0.25 INJECTION INTRAMUSCULAR; INTRAVENOUS at 21:09

## 2019-01-01 RX ADMIN — IPRATROPIUM BROMIDE AND ALBUTEROL SULFATE 3 ML: 2.5; .5 SOLUTION RESPIRATORY (INHALATION) at 06:32

## 2019-01-01 RX ADMIN — IPRATROPIUM BROMIDE AND ALBUTEROL SULFATE 3 ML: 2.5; .5 SOLUTION RESPIRATORY (INHALATION) at 10:34

## 2019-01-01 RX ADMIN — FUROSEMIDE 40 MG: 10 INJECTION, SOLUTION INTRAMUSCULAR; INTRAVENOUS at 08:33

## 2019-01-01 RX ADMIN — NYSTATIN: 100000 OINTMENT TOPICAL at 20:30

## 2019-01-01 RX ADMIN — TAMSULOSIN HYDROCHLORIDE 0.4 MG: 0.4 CAPSULE ORAL at 08:32

## 2019-01-01 RX ADMIN — FAMOTIDINE 20 MG: 10 INJECTION, SOLUTION INTRAVENOUS at 08:09

## 2019-01-01 RX ADMIN — BUDESONIDE AND FORMOTEROL FUMARATE DIHYDRATE 2 PUFF: 160; 4.5 AEROSOL RESPIRATORY (INHALATION) at 23:11

## 2019-01-01 RX ADMIN — INSULIN LISPRO 3 UNITS: 100 INJECTION, SOLUTION INTRAVENOUS; SUBCUTANEOUS at 21:10

## 2019-01-01 RX ADMIN — FAMOTIDINE 20 MG: 20 TABLET, FILM COATED ORAL at 11:11

## 2019-01-01 RX ADMIN — BUMETANIDE 2 MG: 0.25 INJECTION INTRAMUSCULAR; INTRAVENOUS at 21:05

## 2019-01-01 RX ADMIN — CALCITRIOL 0.25 MCG: 0.25 CAPSULE ORAL at 08:23

## 2019-01-01 RX ADMIN — NYSTATIN 1 APPLICATION: 100000 POWDER TOPICAL at 17:57

## 2019-01-01 RX ADMIN — ASPIRIN 81 MG 81 MG: 81 TABLET ORAL at 08:55

## 2019-01-01 RX ADMIN — ASPIRIN 81 MG 81 MG: 81 TABLET ORAL at 11:10

## 2019-01-01 RX ADMIN — BUDESONIDE AND FORMOTEROL FUMARATE DIHYDRATE 2 PUFF: 160; 4.5 AEROSOL RESPIRATORY (INHALATION) at 20:48

## 2019-01-01 RX ADMIN — POLYETHYLENE GLYCOL 3350 17 G: 17 POWDER, FOR SOLUTION ORAL at 08:35

## 2019-01-01 RX ADMIN — CEFTRIAXONE SODIUM 1 G: 1 INJECTION, POWDER, FOR SOLUTION INTRAMUSCULAR; INTRAVENOUS at 17:22

## 2019-01-01 RX ADMIN — INSULIN LISPRO 2 UNITS: 100 INJECTION, SOLUTION INTRAVENOUS; SUBCUTANEOUS at 18:47

## 2019-01-01 RX ADMIN — INSULIN LISPRO 2 UNITS: 100 INJECTION, SOLUTION INTRAVENOUS; SUBCUTANEOUS at 17:22

## 2019-01-01 RX ADMIN — IPRATROPIUM BROMIDE AND ALBUTEROL SULFATE 3 ML: 2.5; .5 SOLUTION RESPIRATORY (INHALATION) at 14:39

## 2019-01-01 RX ADMIN — INSULIN LISPRO 2 UNITS: 100 INJECTION, SOLUTION INTRAVENOUS; SUBCUTANEOUS at 23:48

## 2019-01-01 RX ADMIN — INSULIN LISPRO 3 UNITS: 100 INJECTION, SOLUTION INTRAVENOUS; SUBCUTANEOUS at 10:03

## 2019-01-01 RX ADMIN — ASPIRIN 81 MG: 81 TABLET, CHEWABLE ORAL at 08:27

## 2019-01-01 RX ADMIN — NYSTATIN 1 APPLICATION: 100000 POWDER TOPICAL at 09:10

## 2019-01-01 RX ADMIN — TAMSULOSIN HYDROCHLORIDE 0.4 MG: 0.4 CAPSULE ORAL at 21:14

## 2019-01-01 RX ADMIN — NYSTATIN: 100000 POWDER TOPICAL at 08:27

## 2019-01-01 RX ADMIN — BUMETANIDE 1 MG: 0.25 INJECTION INTRAMUSCULAR; INTRAVENOUS at 20:29

## 2019-01-01 RX ADMIN — ALLOPURINOL 100 MG: 100 TABLET ORAL at 11:11

## 2019-01-01 RX ADMIN — GABAPENTIN 300 MG: 300 CAPSULE ORAL at 09:53

## 2019-01-01 RX ADMIN — IPRATROPIUM BROMIDE AND ALBUTEROL SULFATE 3 ML: 2.5; .5 SOLUTION RESPIRATORY (INHALATION) at 15:15

## 2019-01-01 RX ADMIN — ALLOPURINOL 100 MG: 100 TABLET ORAL at 08:50

## 2019-01-01 RX ADMIN — FUROSEMIDE 10 MG/HR: 10 INJECTION, SOLUTION INTRAVENOUS at 05:48

## 2019-01-01 RX ADMIN — INSULIN DETEMIR 10 UNITS: 100 INJECTION, SOLUTION SUBCUTANEOUS at 20:13

## 2019-01-01 RX ADMIN — DEXTROSE MONOHYDRATE 50 ML: 500 INJECTION PARENTERAL at 13:43

## 2019-01-01 RX ADMIN — FAMOTIDINE 20 MG: 20 TABLET, FILM COATED ORAL at 09:09

## 2019-01-01 RX ADMIN — SODIUM CHLORIDE, PRESERVATIVE FREE 3 ML: 5 INJECTION INTRAVENOUS at 10:56

## 2019-01-01 RX ADMIN — FOLIC ACID 1 MG: 1 TABLET ORAL at 08:14

## 2019-01-01 RX ADMIN — SODIUM CHLORIDE, PRESERVATIVE FREE 3 ML: 5 INJECTION INTRAVENOUS at 09:49

## 2019-01-01 RX ADMIN — INSULIN LISPRO 3 UNITS: 100 INJECTION, SOLUTION INTRAVENOUS; SUBCUTANEOUS at 08:13

## 2019-01-01 RX ADMIN — ESCITALOPRAM 20 MG: 10 TABLET, FILM COATED ORAL at 21:56

## 2019-01-01 RX ADMIN — DILTIAZEM HYDROCHLORIDE 240 MG: 240 CAPSULE, EXTENDED RELEASE ORAL at 08:14

## 2019-01-01 RX ADMIN — LEVOTHYROXINE SODIUM 50 MCG: 50 TABLET ORAL at 08:54

## 2019-01-01 RX ADMIN — POLYETHYLENE GLYCOL 3350 17 G: 17 POWDER, FOR SOLUTION ORAL at 08:00

## 2019-01-01 RX ADMIN — GABAPENTIN 300 MG: 300 CAPSULE ORAL at 10:55

## 2019-01-01 RX ADMIN — APIXABAN 5 MG: 5 TABLET, FILM COATED ORAL at 20:55

## 2019-01-01 RX ADMIN — NYSTATIN 1 APPLICATION: 100000 POWDER TOPICAL at 17:34

## 2019-01-01 RX ADMIN — BUMETANIDE 1 MG: 0.25 INJECTION INTRAMUSCULAR; INTRAVENOUS at 08:49

## 2019-01-01 RX ADMIN — POLYETHYLENE GLYCOL 3350 17 G: 17 POWDER, FOR SOLUTION ORAL at 08:50

## 2019-01-01 RX ADMIN — POLYETHYLENE GLYCOL 3350 17 G: 17 POWDER, FOR SOLUTION ORAL at 08:32

## 2019-01-01 RX ADMIN — NYSTATIN 1 APPLICATION: 100000 POWDER TOPICAL at 12:28

## 2019-01-01 RX ADMIN — NYSTATIN 1 APPLICATION: 100000 POWDER TOPICAL at 09:08

## 2019-01-01 RX ADMIN — BUDESONIDE AND FORMOTEROL FUMARATE DIHYDRATE 2 PUFF: 160; 4.5 AEROSOL RESPIRATORY (INHALATION) at 21:06

## 2019-01-01 RX ADMIN — GABAPENTIN 300 MG: 300 CAPSULE ORAL at 08:34

## 2019-01-01 RX ADMIN — DILTIAZEM HYDROCHLORIDE 240 MG: 240 CAPSULE, EXTENDED RELEASE ORAL at 08:36

## 2019-01-01 RX ADMIN — MEROPENEM 1 G: 1 INJECTION, POWDER, FOR SOLUTION INTRAVENOUS at 10:48

## 2019-01-01 RX ADMIN — INSULIN LISPRO 2 UNITS: 100 INJECTION, SOLUTION INTRAVENOUS; SUBCUTANEOUS at 12:59

## 2019-01-01 RX ADMIN — INSULIN LISPRO 2 UNITS: 100 INJECTION, SOLUTION INTRAVENOUS; SUBCUTANEOUS at 12:14

## 2019-01-01 RX ADMIN — ATORVASTATIN CALCIUM 10 MG: 10 TABLET, FILM COATED ORAL at 22:07

## 2019-01-01 RX ADMIN — INSULIN LISPRO 3 UNITS: 100 INJECTION, SOLUTION INTRAVENOUS; SUBCUTANEOUS at 17:33

## 2019-01-01 RX ADMIN — ATORVASTATIN CALCIUM 10 MG: 10 TABLET, FILM COATED ORAL at 22:03

## 2019-01-01 RX ADMIN — ALLOPURINOL 100 MG: 100 TABLET ORAL at 08:00

## 2019-01-01 RX ADMIN — INSULIN LISPRO 2 UNITS: 100 INJECTION, SOLUTION INTRAVENOUS; SUBCUTANEOUS at 12:34

## 2019-01-01 RX ADMIN — PANTOPRAZOLE SODIUM 40 MG: 40 TABLET, DELAYED RELEASE ORAL at 10:02

## 2019-01-01 RX ADMIN — SODIUM CHLORIDE, PRESERVATIVE FREE 3 ML: 5 INJECTION INTRAVENOUS at 10:44

## 2019-01-01 RX ADMIN — ATORVASTATIN CALCIUM 10 MG: 10 TABLET, FILM COATED ORAL at 21:32

## 2019-01-01 RX ADMIN — IPRATROPIUM BROMIDE AND ALBUTEROL SULFATE 3 ML: 2.5; .5 SOLUTION RESPIRATORY (INHALATION) at 07:35

## 2019-01-01 RX ADMIN — APIXABAN 5 MG: 5 TABLET, FILM COATED ORAL at 09:10

## 2019-01-01 RX ADMIN — FUROSEMIDE 10 MG/HR: 10 INJECTION, SOLUTION INTRAVENOUS at 12:10

## 2019-01-01 RX ADMIN — MEROPENEM 1 G: 1 INJECTION, POWDER, FOR SOLUTION INTRAVENOUS at 10:56

## 2019-01-01 RX ADMIN — BUDESONIDE AND FORMOTEROL FUMARATE DIHYDRATE 2 PUFF: 160; 4.5 AEROSOL RESPIRATORY (INHALATION) at 19:39

## 2019-01-01 RX ADMIN — CALCITRIOL 0.25 MCG: 0.25 CAPSULE ORAL at 16:15

## 2019-01-01 RX ADMIN — METOPROLOL SUCCINATE 25 MG: 25 TABLET, FILM COATED, EXTENDED RELEASE ORAL at 08:23

## 2019-01-01 RX ADMIN — ALLOPURINOL 100 MG: 100 TABLET ORAL at 08:11

## 2019-01-01 RX ADMIN — MEROPENEM 1 G: 1 INJECTION, POWDER, FOR SOLUTION INTRAVENOUS at 23:19

## 2019-01-01 RX ADMIN — CEFTRIAXONE SODIUM 1 G: 1 INJECTION, POWDER, FOR SOLUTION INTRAMUSCULAR; INTRAVENOUS at 16:49

## 2019-01-01 RX ADMIN — INSULIN LISPRO 3 UNITS: 100 INJECTION, SOLUTION INTRAVENOUS; SUBCUTANEOUS at 22:30

## 2019-01-01 RX ADMIN — INSULIN LISPRO 2 UNITS: 100 INJECTION, SOLUTION INTRAVENOUS; SUBCUTANEOUS at 12:02

## 2019-01-01 RX ADMIN — BUDESONIDE AND FORMOTEROL FUMARATE DIHYDRATE 2 PUFF: 160; 4.5 AEROSOL RESPIRATORY (INHALATION) at 07:34

## 2019-01-01 RX ADMIN — SODIUM CHLORIDE, PRESERVATIVE FREE 3 ML: 5 INJECTION INTRAVENOUS at 11:31

## 2019-01-01 RX ADMIN — POLYETHYLENE GLYCOL 3350 17 G: 17 POWDER, FOR SOLUTION ORAL at 08:39

## 2019-01-01 RX ADMIN — ATORVASTATIN CALCIUM 10 MG: 10 TABLET, FILM COATED ORAL at 21:09

## 2019-01-01 RX ADMIN — DOCUSATE SODIUM 100 MG: 100 CAPSULE ORAL at 08:27

## 2019-01-01 RX ADMIN — FLUCONAZOLE 100 MG: 2 INJECTION INTRAVENOUS at 09:10

## 2019-01-01 RX ADMIN — NYSTATIN 1 APPLICATION: 100000 POWDER TOPICAL at 11:40

## 2019-01-01 RX ADMIN — SODIUM CHLORIDE, PRESERVATIVE FREE 3 ML: 5 INJECTION INTRAVENOUS at 08:32

## 2019-01-01 RX ADMIN — APIXABAN 2.5 MG: 2.5 TABLET, FILM COATED ORAL at 22:06

## 2019-01-01 RX ADMIN — CALCITRIOL 0.25 MCG: 0.25 CAPSULE ORAL at 08:36

## 2019-01-01 RX ADMIN — NYSTATIN: 100000 OINTMENT TOPICAL at 08:31

## 2019-01-01 RX ADMIN — IPRATROPIUM BROMIDE AND ALBUTEROL SULFATE 3 ML: 2.5; .5 SOLUTION RESPIRATORY (INHALATION) at 14:07

## 2019-01-01 RX ADMIN — APIXABAN 2.5 MG: 2.5 TABLET, FILM COATED ORAL at 21:32

## 2019-01-01 RX ADMIN — LEVOTHYROXINE SODIUM 50 MCG: 50 TABLET ORAL at 06:56

## 2019-01-01 RX ADMIN — GABAPENTIN 600 MG: 300 CAPSULE ORAL at 20:53

## 2019-01-01 RX ADMIN — IPRATROPIUM BROMIDE AND ALBUTEROL SULFATE 3 ML: 2.5; .5 SOLUTION RESPIRATORY (INHALATION) at 11:37

## 2019-01-01 RX ADMIN — ALLOPURINOL 100 MG: 100 TABLET ORAL at 08:35

## 2019-01-01 RX ADMIN — SODIUM CHLORIDE, PRESERVATIVE FREE 3 ML: 5 INJECTION INTRAVENOUS at 20:40

## 2019-01-01 RX ADMIN — FLUTICASONE PROPIONATE 1 SPRAY: 50 SPRAY, METERED NASAL at 08:33

## 2019-01-01 RX ADMIN — SODIUM CHLORIDE, PRESERVATIVE FREE 3 ML: 5 INJECTION INTRAVENOUS at 08:00

## 2019-01-01 RX ADMIN — TAMSULOSIN HYDROCHLORIDE 0.4 MG: 0.4 CAPSULE ORAL at 20:02

## 2019-01-01 RX ADMIN — SODIUM CHLORIDE, PRESERVATIVE FREE 3 ML: 5 INJECTION INTRAVENOUS at 21:50

## 2019-01-01 RX ADMIN — TAMSULOSIN HYDROCHLORIDE 0.4 MG: 0.4 CAPSULE ORAL at 21:09

## 2019-01-01 RX ADMIN — FLUTICASONE PROPIONATE 1 SPRAY: 50 SPRAY, METERED NASAL at 08:50

## 2019-01-01 RX ADMIN — MEROPENEM 1 G: 1 INJECTION, POWDER, FOR SOLUTION INTRAVENOUS at 22:56

## 2019-01-01 RX ADMIN — TAMSULOSIN HYDROCHLORIDE 0.4 MG: 0.4 CAPSULE ORAL at 20:31

## 2019-01-01 RX ADMIN — INSULIN LISPRO 2 UNITS: 100 INJECTION, SOLUTION INTRAVENOUS; SUBCUTANEOUS at 20:14

## 2019-01-01 RX ADMIN — PANTOPRAZOLE SODIUM 40 MG: 40 TABLET, DELAYED RELEASE ORAL at 08:12

## 2019-01-01 RX ADMIN — IPRATROPIUM BROMIDE AND ALBUTEROL SULFATE 3 ML: 2.5; .5 SOLUTION RESPIRATORY (INHALATION) at 11:44

## 2019-01-01 RX ADMIN — DILTIAZEM HYDROCHLORIDE 240 MG: 240 CAPSULE, EXTENDED RELEASE ORAL at 09:23

## 2019-01-01 RX ADMIN — BUDESONIDE AND FORMOTEROL FUMARATE DIHYDRATE 2 PUFF: 160; 4.5 AEROSOL RESPIRATORY (INHALATION) at 21:22

## 2019-01-01 RX ADMIN — INSULIN LISPRO 2 UNITS: 100 INJECTION, SOLUTION INTRAVENOUS; SUBCUTANEOUS at 13:37

## 2019-01-01 RX ADMIN — LORAZEPAM 1 MG: 2 INJECTION INTRAMUSCULAR; INTRAVENOUS at 13:33

## 2019-01-01 RX ADMIN — APIXABAN 5 MG: 5 TABLET, FILM COATED ORAL at 20:54

## 2019-01-01 RX ADMIN — NYSTATIN: 100000 POWDER TOPICAL at 10:02

## 2019-01-01 RX ADMIN — SODIUM CHLORIDE, PRESERVATIVE FREE 3 ML: 5 INJECTION INTRAVENOUS at 08:35

## 2019-01-01 RX ADMIN — DILTIAZEM HYDROCHLORIDE 240 MG: 240 CAPSULE, EXTENDED RELEASE ORAL at 08:32

## 2019-01-01 RX ADMIN — APIXABAN 5 MG: 5 TABLET, FILM COATED ORAL at 08:09

## 2019-01-01 RX ADMIN — NYSTATIN 1 APPLICATION: 100000 POWDER TOPICAL at 08:10

## 2019-01-01 RX ADMIN — INSULIN DETEMIR 10 UNITS: 100 INJECTION, SOLUTION SUBCUTANEOUS at 22:04

## 2019-01-01 RX ADMIN — SODIUM CHLORIDE, PRESERVATIVE FREE 3 ML: 5 INJECTION INTRAVENOUS at 20:35

## 2019-01-01 RX ADMIN — ATORVASTATIN CALCIUM 10 MG: 10 TABLET, FILM COATED ORAL at 08:15

## 2019-01-01 RX ADMIN — ALLOPURINOL 100 MG: 100 TABLET ORAL at 08:31

## 2019-01-01 RX ADMIN — PANTOPRAZOLE SODIUM 40 MG: 40 TABLET, DELAYED RELEASE ORAL at 10:42

## 2019-01-01 RX ADMIN — TAMSULOSIN HYDROCHLORIDE 0.4 MG: 0.4 CAPSULE ORAL at 10:03

## 2019-01-01 RX ADMIN — BUDESONIDE AND FORMOTEROL FUMARATE DIHYDRATE 2 PUFF: 160; 4.5 AEROSOL RESPIRATORY (INHALATION) at 20:21

## 2019-01-01 RX ADMIN — APIXABAN 5 MG: 5 TABLET, FILM COATED ORAL at 09:52

## 2019-01-01 RX ADMIN — MEROPENEM 1 G: 1 INJECTION, POWDER, FOR SOLUTION INTRAVENOUS at 23:29

## 2019-01-01 RX ADMIN — FOLIC ACID 1 MG: 1 TABLET ORAL at 08:27

## 2019-01-01 RX ADMIN — ESCITSLOPRAM 20 MG: 10 TABLET ORAL at 20:52

## 2019-01-01 RX ADMIN — BUDESONIDE AND FORMOTEROL FUMARATE DIHYDRATE 2 PUFF: 160; 4.5 AEROSOL RESPIRATORY (INHALATION) at 06:54

## 2019-01-01 RX ADMIN — APIXABAN 5 MG: 5 TABLET, FILM COATED ORAL at 21:14

## 2019-01-01 RX ADMIN — INSULIN LISPRO 3 UNITS: 100 INJECTION, SOLUTION INTRAVENOUS; SUBCUTANEOUS at 00:50

## 2019-01-01 RX ADMIN — POLYETHYLENE GLYCOL 3350 17 G: 17 POWDER, FOR SOLUTION ORAL at 12:35

## 2019-01-01 RX ADMIN — IPRATROPIUM BROMIDE AND ALBUTEROL SULFATE 3 ML: 2.5; .5 SOLUTION RESPIRATORY (INHALATION) at 10:43

## 2019-01-01 RX ADMIN — ALLOPURINOL 100 MG: 100 TABLET ORAL at 08:14

## 2019-01-01 RX ADMIN — VANCOMYCIN HYDROCHLORIDE 1250 MG: 10 INJECTION, POWDER, LYOPHILIZED, FOR SOLUTION INTRAVENOUS at 16:06

## 2019-01-01 RX ADMIN — LEVOTHYROXINE SODIUM 50 MCG: 50 TABLET ORAL at 05:17

## 2019-01-01 RX ADMIN — IPRATROPIUM BROMIDE AND ALBUTEROL SULFATE 3 ML: 2.5; .5 SOLUTION RESPIRATORY (INHALATION) at 20:34

## 2019-01-01 RX ADMIN — MORPHINE SULFATE 1 MG/HR: 50 INJECTION, SOLUTION, CONCENTRATE INTRAVENOUS at 03:24

## 2019-01-01 RX ADMIN — INSULIN LISPRO 3 UNITS: 100 INJECTION, SOLUTION INTRAVENOUS; SUBCUTANEOUS at 22:56

## 2019-01-01 RX ADMIN — ATORVASTATIN CALCIUM 10 MG: 10 TABLET, FILM COATED ORAL at 08:55

## 2019-01-01 RX ADMIN — BUDESONIDE AND FORMOTEROL FUMARATE DIHYDRATE 2 PUFF: 160; 4.5 AEROSOL RESPIRATORY (INHALATION) at 07:26

## 2019-01-01 RX ADMIN — NYSTATIN 1 APPLICATION: 100000 POWDER TOPICAL at 21:09

## 2019-01-01 RX ADMIN — APIXABAN 5 MG: 5 TABLET, FILM COATED ORAL at 20:53

## 2019-01-01 RX ADMIN — BUDESONIDE AND FORMOTEROL FUMARATE DIHYDRATE 2 PUFF: 160; 4.5 AEROSOL RESPIRATORY (INHALATION) at 07:43

## 2019-01-01 RX ADMIN — ESCITSLOPRAM 20 MG: 10 TABLET ORAL at 21:49

## 2019-01-01 RX ADMIN — APIXABAN 5 MG: 5 TABLET, FILM COATED ORAL at 21:03

## 2019-01-01 RX ADMIN — FAMOTIDINE 20 MG: 10 INJECTION, SOLUTION INTRAVENOUS at 09:10

## 2019-01-01 RX ADMIN — MEROPENEM 1 G: 1 INJECTION, POWDER, FOR SOLUTION INTRAVENOUS at 10:15

## 2019-01-01 RX ADMIN — NYSTATIN 1 APPLICATION: 100000 POWDER TOPICAL at 17:23

## 2019-01-01 RX ADMIN — IPRATROPIUM BROMIDE AND ALBUTEROL SULFATE 3 ML: 2.5; .5 SOLUTION RESPIRATORY (INHALATION) at 08:04

## 2019-01-01 RX ADMIN — BUMETANIDE 1 MG: 0.25 INJECTION INTRAMUSCULAR; INTRAVENOUS at 15:32

## 2019-01-01 RX ADMIN — FAMOTIDINE 20 MG: 20 TABLET, FILM COATED ORAL at 08:36

## 2019-01-01 RX ADMIN — BUDESONIDE AND FORMOTEROL FUMARATE DIHYDRATE 2 PUFF: 160; 4.5 AEROSOL RESPIRATORY (INHALATION) at 19:53

## 2019-01-01 RX ADMIN — ALLOPURINOL 100 MG: 100 TABLET ORAL at 09:25

## 2019-01-01 RX ADMIN — DILTIAZEM HYDROCHLORIDE 240 MG: 240 CAPSULE, EXTENDED RELEASE ORAL at 08:10

## 2019-01-01 RX ADMIN — SCOPALAMINE 1 PATCH: 1 PATCH, EXTENDED RELEASE TRANSDERMAL at 13:39

## 2019-01-01 RX ADMIN — INSULIN DETEMIR 10 UNITS: 100 INJECTION, SOLUTION SUBCUTANEOUS at 22:30

## 2019-01-01 RX ADMIN — DILTIAZEM HYDROCHLORIDE 240 MG: 240 CAPSULE, EXTENDED RELEASE ORAL at 11:12

## 2019-01-01 RX ADMIN — APIXABAN 2.5 MG: 2.5 TABLET, FILM COATED ORAL at 08:27

## 2019-01-01 RX ADMIN — ATORVASTATIN CALCIUM 10 MG: 10 TABLET, FILM COATED ORAL at 08:32

## 2019-01-01 RX ADMIN — DILTIAZEM HYDROCHLORIDE 240 MG: 240 CAPSULE, EXTENDED RELEASE ORAL at 08:34

## 2019-01-01 RX ADMIN — NYSTATIN 1 APPLICATION: 100000 POWDER TOPICAL at 21:34

## 2019-01-01 RX ADMIN — GABAPENTIN 300 MG: 300 CAPSULE ORAL at 08:11

## 2019-01-01 RX ADMIN — INSULIN DETEMIR 10 UNITS: 100 INJECTION, SOLUTION SUBCUTANEOUS at 21:58

## 2019-01-01 RX ADMIN — CALCITRIOL 0.25 MCG: 0.25 CAPSULE ORAL at 08:34

## 2019-01-01 RX ADMIN — TAMSULOSIN HYDROCHLORIDE 0.4 MG: 0.4 CAPSULE ORAL at 08:00

## 2019-01-01 RX ADMIN — FUROSEMIDE 10 MG/HR: 10 INJECTION, SOLUTION INTRAVENOUS at 16:14

## 2019-01-01 RX ADMIN — BUDESONIDE AND FORMOTEROL FUMARATE DIHYDRATE 2 PUFF: 160; 4.5 AEROSOL RESPIRATORY (INHALATION) at 20:29

## 2019-01-01 RX ADMIN — BUMETANIDE 2 MG: 0.25 INJECTION INTRAMUSCULAR; INTRAVENOUS at 09:09

## 2019-01-01 RX ADMIN — SODIUM CHLORIDE 75 ML/HR: 9 INJECTION, SOLUTION INTRAVENOUS at 01:39

## 2019-01-01 RX ADMIN — ALLOPURINOL 100 MG: 100 TABLET ORAL at 09:17

## 2019-01-01 RX ADMIN — IPRATROPIUM BROMIDE AND ALBUTEROL SULFATE 3 ML: 2.5; .5 SOLUTION RESPIRATORY (INHALATION) at 06:38

## 2019-01-01 RX ADMIN — INSULIN LISPRO 2 UNITS: 100 INJECTION, SOLUTION INTRAVENOUS; SUBCUTANEOUS at 21:29

## 2019-01-01 RX ADMIN — INSULIN DETEMIR 10 UNITS: 100 INJECTION, SOLUTION SUBCUTANEOUS at 21:29

## 2019-01-01 RX ADMIN — NYSTATIN: 100000 OINTMENT TOPICAL at 09:10

## 2019-01-01 RX ADMIN — IPRATROPIUM BROMIDE AND ALBUTEROL SULFATE 3 ML: 2.5; .5 SOLUTION RESPIRATORY (INHALATION) at 14:52

## 2019-01-01 RX ADMIN — ESCITALOPRAM 20 MG: 10 TABLET, FILM COATED ORAL at 22:06

## 2019-01-01 RX ADMIN — POLYETHYLENE GLYCOL 3350 17 G: 17 POWDER, FOR SOLUTION ORAL at 09:08

## 2019-01-01 RX ADMIN — GABAPENTIN 300 MG: 300 CAPSULE ORAL at 10:42

## 2019-01-01 RX ADMIN — INSULIN DETEMIR 10 UNITS: 100 INJECTION, SOLUTION SUBCUTANEOUS at 21:47

## 2019-01-01 RX ADMIN — INSULIN DETEMIR 10 UNITS: 100 INJECTION, SOLUTION SUBCUTANEOUS at 22:09

## 2019-01-01 RX ADMIN — GABAPENTIN 600 MG: 300 CAPSULE ORAL at 22:05

## 2019-01-01 RX ADMIN — NYSTATIN: 100000 OINTMENT TOPICAL at 21:04

## 2019-01-01 RX ADMIN — FAMOTIDINE 20 MG: 20 TABLET, FILM COATED ORAL at 08:35

## 2019-01-01 RX ADMIN — NYSTATIN: 100000 POWDER TOPICAL at 22:08

## 2019-01-01 RX ADMIN — BUDESONIDE AND FORMOTEROL FUMARATE DIHYDRATE 2 PUFF: 160; 4.5 AEROSOL RESPIRATORY (INHALATION) at 08:04

## 2019-01-01 RX ADMIN — SODIUM CHLORIDE, PRESERVATIVE FREE 3 ML: 5 INJECTION INTRAVENOUS at 21:03

## 2019-01-01 RX ADMIN — IPRATROPIUM BROMIDE AND ALBUTEROL SULFATE 3 ML: 2.5; .5 SOLUTION RESPIRATORY (INHALATION) at 06:45

## 2019-01-01 RX ADMIN — SODIUM CHLORIDE 100 ML/HR: 9 INJECTION, SOLUTION INTRAVENOUS at 05:32

## 2019-01-01 RX ADMIN — IPRATROPIUM BROMIDE AND ALBUTEROL SULFATE 3 ML: 2.5; .5 SOLUTION RESPIRATORY (INHALATION) at 06:37

## 2019-01-01 RX ADMIN — IRON SUCROSE 300 MG: 20 INJECTION, SOLUTION INTRAVENOUS at 10:37

## 2019-01-01 RX ADMIN — INSULIN LISPRO 2 UNITS: 100 INJECTION, SOLUTION INTRAVENOUS; SUBCUTANEOUS at 17:57

## 2019-01-01 RX ADMIN — INSULIN LISPRO 2 UNITS: 100 INJECTION, SOLUTION INTRAVENOUS; SUBCUTANEOUS at 17:30

## 2019-01-01 RX ADMIN — ALLOPURINOL 100 MG: 100 TABLET ORAL at 09:10

## 2019-01-01 RX ADMIN — IPRATROPIUM BROMIDE AND ALBUTEROL SULFATE 3 ML: 2.5; .5 SOLUTION RESPIRATORY (INHALATION) at 10:44

## 2019-01-01 RX ADMIN — BARIUM SULFATE 20 ML: 400 PASTE ORAL at 14:45

## 2019-01-01 RX ADMIN — CALCITRIOL 0.25 MCG: 0.25 CAPSULE ORAL at 11:12

## 2019-01-01 RX ADMIN — FLUCONAZOLE 100 MG: 2 INJECTION INTRAVENOUS at 08:48

## 2019-01-01 RX ADMIN — SODIUM CHLORIDE, PRESERVATIVE FREE 3 ML: 5 INJECTION INTRAVENOUS at 08:10

## 2019-01-01 RX ADMIN — SODIUM CHLORIDE 75 ML/HR: 9 INJECTION, SOLUTION INTRAVENOUS at 19:55

## 2019-01-01 RX ADMIN — APIXABAN 5 MG: 5 TABLET, FILM COATED ORAL at 20:31

## 2019-01-01 RX ADMIN — TAMSULOSIN HYDROCHLORIDE 0.4 MG: 0.4 CAPSULE ORAL at 08:09

## 2019-01-01 RX ADMIN — DILTIAZEM HYDROCHLORIDE 240 MG: 240 CAPSULE, EXTENDED RELEASE ORAL at 08:12

## 2019-01-01 RX ADMIN — SODIUM CHLORIDE, PRESERVATIVE FREE 3 ML: 5 INJECTION INTRAVENOUS at 20:55

## 2019-01-01 RX ADMIN — BUMETANIDE 1 MG: 0.25 INJECTION INTRAMUSCULAR; INTRAVENOUS at 17:22

## 2019-01-01 RX ADMIN — NYSTATIN: 100000 OINTMENT TOPICAL at 21:26

## 2019-01-01 RX ADMIN — BUDESONIDE AND FORMOTEROL FUMARATE DIHYDRATE 2 PUFF: 160; 4.5 AEROSOL RESPIRATORY (INHALATION) at 07:24

## 2019-01-01 RX ADMIN — APIXABAN 2.5 MG: 2.5 TABLET, FILM COATED ORAL at 08:13

## 2019-01-01 RX ADMIN — MEROPENEM 1 G: 1 INJECTION, POWDER, FOR SOLUTION INTRAVENOUS at 23:01

## 2019-01-01 RX ADMIN — NYSTATIN 1 APPLICATION: 100000 POWDER TOPICAL at 20:55

## 2019-01-01 RX ADMIN — APIXABAN 2.5 MG: 2.5 TABLET, FILM COATED ORAL at 20:40

## 2019-01-01 RX ADMIN — SODIUM CHLORIDE, PRESERVATIVE FREE 3 ML: 5 INJECTION INTRAVENOUS at 21:34

## 2019-01-01 RX ADMIN — SODIUM CHLORIDE, PRESERVATIVE FREE 3 ML: 5 INJECTION INTRAVENOUS at 21:00

## 2019-01-01 RX ADMIN — DOCUSATE SODIUM 100 MG: 100 CAPSULE ORAL at 10:03

## 2019-01-01 RX ADMIN — FLUTICASONE PROPIONATE 1 SPRAY: 50 SPRAY, METERED NASAL at 21:09

## 2019-01-01 RX ADMIN — BUMETANIDE 2 MG: 0.25 INJECTION INTRAMUSCULAR; INTRAVENOUS at 21:32

## 2019-01-01 RX ADMIN — GABAPENTIN 300 MG: 300 CAPSULE ORAL at 09:25

## 2019-01-01 RX ADMIN — DILTIAZEM HYDROCHLORIDE 240 MG: 240 CAPSULE, EXTENDED RELEASE ORAL at 08:50

## 2019-01-01 RX ADMIN — METOPROLOL SUCCINATE 25 MG: 25 TABLET, FILM COATED, EXTENDED RELEASE ORAL at 08:11

## 2019-01-01 RX ADMIN — TAMSULOSIN HYDROCHLORIDE 0.4 MG: 0.4 CAPSULE ORAL at 08:54

## 2019-01-01 RX ADMIN — CALCITRIOL 0.25 MCG: 0.25 CAPSULE ORAL at 09:48

## 2019-01-01 RX ADMIN — FAMOTIDINE 20 MG: 20 TABLET, FILM COATED ORAL at 20:31

## 2019-01-01 RX ADMIN — ATORVASTATIN CALCIUM 10 MG: 10 TABLET, FILM COATED ORAL at 22:06

## 2019-01-01 RX ADMIN — CALCITRIOL 0.25 MCG: 0.25 CAPSULE ORAL at 08:50

## 2019-01-01 RX ADMIN — DILTIAZEM HYDROCHLORIDE 240 MG: 240 CAPSULE, EXTENDED RELEASE ORAL at 08:11

## 2019-01-01 RX ADMIN — PANTOPRAZOLE SODIUM 40 MG: 40 TABLET, DELAYED RELEASE ORAL at 08:29

## 2019-01-01 RX ADMIN — TAMSULOSIN HYDROCHLORIDE 0.4 MG: 0.4 CAPSULE ORAL at 08:50

## 2019-01-01 RX ADMIN — METOPROLOL SUCCINATE 25 MG: 25 TABLET, FILM COATED, EXTENDED RELEASE ORAL at 08:50

## 2019-01-01 RX ADMIN — IPRATROPIUM BROMIDE AND ALBUTEROL SULFATE 3 ML: 2.5; .5 SOLUTION RESPIRATORY (INHALATION) at 08:14

## 2019-01-01 RX ADMIN — APIXABAN 5 MG: 5 TABLET, FILM COATED ORAL at 08:11

## 2019-01-01 RX ADMIN — INSULIN LISPRO 3 UNITS: 100 INJECTION, SOLUTION INTRAVENOUS; SUBCUTANEOUS at 18:31

## 2019-01-01 RX ADMIN — LORAZEPAM 1 MG: 2 INJECTION INTRAMUSCULAR; INTRAVENOUS at 19:28

## 2019-01-01 RX ADMIN — FAMOTIDINE 20 MG: 20 TABLET, FILM COATED ORAL at 21:09

## 2019-01-01 RX ADMIN — TAMSULOSIN HYDROCHLORIDE 0.4 MG: 0.4 CAPSULE ORAL at 21:05

## 2019-01-01 RX ADMIN — FOLIC ACID 1 MG: 1 TABLET ORAL at 10:43

## 2019-01-01 RX ADMIN — LEVOTHYROXINE SODIUM 50 MCG: 50 TABLET ORAL at 06:16

## 2019-01-01 RX ADMIN — POLYETHYLENE GLYCOL 3350 17 G: 17 POWDER, FOR SOLUTION ORAL at 09:25

## 2019-01-01 RX ADMIN — DOCUSATE SODIUM 100 MG: 100 CAPSULE ORAL at 08:15

## 2019-01-01 RX ADMIN — BUDESONIDE AND FORMOTEROL FUMARATE DIHYDRATE 2 PUFF: 160; 4.5 AEROSOL RESPIRATORY (INHALATION) at 21:44

## 2019-01-01 RX ADMIN — Medication 296 ML: at 11:07

## 2019-01-01 RX ADMIN — LEVOTHYROXINE SODIUM 50 MCG: 50 TABLET ORAL at 06:36

## 2019-01-01 RX ADMIN — APIXABAN 5 MG: 5 TABLET, FILM COATED ORAL at 08:34

## 2019-01-01 RX ADMIN — MEROPENEM 1 G: 1 INJECTION, POWDER, FOR SOLUTION INTRAVENOUS at 00:42

## 2019-01-01 RX ADMIN — SODIUM CHLORIDE, PRESERVATIVE FREE 3 ML: 5 INJECTION INTRAVENOUS at 08:33

## 2019-01-01 RX ADMIN — TAMSULOSIN HYDROCHLORIDE 0.4 MG: 0.4 CAPSULE ORAL at 09:48

## 2019-01-01 RX ADMIN — FAMOTIDINE 20 MG: 20 TABLET, FILM COATED ORAL at 08:12

## 2019-01-01 RX ADMIN — APIXABAN 2.5 MG: 2.5 TABLET, FILM COATED ORAL at 10:07

## 2019-01-01 RX ADMIN — GABAPENTIN 300 MG: 300 CAPSULE ORAL at 10:03

## 2019-01-01 RX ADMIN — SODIUM CHLORIDE, PRESERVATIVE FREE 3 ML: 5 INJECTION INTRAVENOUS at 08:36

## 2019-01-01 RX ADMIN — NYSTATIN: 100000 POWDER TOPICAL at 21:32

## 2019-01-01 RX ADMIN — FAMOTIDINE 20 MG: 20 TABLET, FILM COATED ORAL at 09:48

## 2019-01-01 RX ADMIN — CALCITRIOL 0.25 MCG: 0.25 CAPSULE ORAL at 08:10

## 2019-01-01 RX ADMIN — INSULIN LISPRO 2 UNITS: 100 INJECTION, SOLUTION INTRAVENOUS; SUBCUTANEOUS at 12:39

## 2019-01-01 RX ADMIN — APIXABAN 5 MG: 5 TABLET, FILM COATED ORAL at 22:06

## 2019-01-01 RX ADMIN — NYSTATIN: 100000 POWDER TOPICAL at 08:12

## 2019-01-01 RX ADMIN — MEROPENEM 1 G: 1 INJECTION, POWDER, FOR SOLUTION INTRAVENOUS at 11:46

## 2019-01-01 RX ADMIN — SODIUM CHLORIDE, PRESERVATIVE FREE 3 ML: 5 INJECTION INTRAVENOUS at 07:38

## 2019-01-01 RX ADMIN — BUMETANIDE 1 MG: 0.25 INJECTION INTRAMUSCULAR; INTRAVENOUS at 17:00

## 2019-01-01 RX ADMIN — TAMSULOSIN HYDROCHLORIDE 0.4 MG: 0.4 CAPSULE ORAL at 08:31

## 2019-01-01 RX ADMIN — NYSTATIN 1 APPLICATION: 100000 POWDER TOPICAL at 21:56

## 2019-01-01 RX ADMIN — BUDESONIDE AND FORMOTEROL FUMARATE DIHYDRATE 2 PUFF: 160; 4.5 AEROSOL RESPIRATORY (INHALATION) at 21:20

## 2019-01-01 RX ADMIN — INSULIN LISPRO 3 UNITS: 100 INJECTION, SOLUTION INTRAVENOUS; SUBCUTANEOUS at 17:40

## 2019-01-01 RX ADMIN — ALLOPURINOL 100 MG: 100 TABLET ORAL at 08:49

## 2019-01-01 RX ADMIN — GABAPENTIN 600 MG: 300 CAPSULE ORAL at 20:11

## 2019-01-01 RX ADMIN — FLUTICASONE PROPIONATE 1 SPRAY: 50 SPRAY, METERED NASAL at 09:53

## 2019-01-01 RX ADMIN — MEROPENEM 1 G: 1 INJECTION, POWDER, FOR SOLUTION INTRAVENOUS at 13:03

## 2019-01-01 RX ADMIN — GABAPENTIN 600 MG: 300 CAPSULE ORAL at 21:56

## 2019-01-01 RX ADMIN — INSULIN LISPRO 2 UNITS: 100 INJECTION, SOLUTION INTRAVENOUS; SUBCUTANEOUS at 12:27

## 2019-01-01 RX ADMIN — ESCITSLOPRAM 20 MG: 10 TABLET ORAL at 21:03

## 2019-01-01 RX ADMIN — METOPROLOL SUCCINATE 25 MG: 25 TABLET, EXTENDED RELEASE ORAL at 09:17

## 2019-01-01 RX ADMIN — CEFTRIAXONE SODIUM 1 G: 1 INJECTION, POWDER, FOR SOLUTION INTRAMUSCULAR; INTRAVENOUS at 17:11

## 2019-01-01 RX ADMIN — SODIUM CHLORIDE, PRESERVATIVE FREE 3 ML: 5 INJECTION INTRAVENOUS at 20:30

## 2019-01-01 RX ADMIN — BUDESONIDE AND FORMOTEROL FUMARATE DIHYDRATE 2 PUFF: 160; 4.5 AEROSOL RESPIRATORY (INHALATION) at 06:48

## 2019-01-01 RX ADMIN — FLUTICASONE PROPIONATE 1 SPRAY: 50 SPRAY, METERED NASAL at 08:11

## 2019-01-01 RX ADMIN — MEROPENEM 1 G: 1 INJECTION, POWDER, FOR SOLUTION INTRAVENOUS at 22:44

## 2019-01-01 RX ADMIN — FAMOTIDINE 20 MG: 20 TABLET, FILM COATED ORAL at 21:32

## 2019-01-01 RX ADMIN — BUDESONIDE AND FORMOTEROL FUMARATE DIHYDRATE 2 PUFF: 160; 4.5 AEROSOL RESPIRATORY (INHALATION) at 06:38

## 2019-01-01 RX ADMIN — FAMOTIDINE 20 MG: 20 TABLET, FILM COATED ORAL at 08:14

## 2019-01-01 RX ADMIN — INSULIN LISPRO 2 UNITS: 100 INJECTION, SOLUTION INTRAVENOUS; SUBCUTANEOUS at 22:35

## 2019-01-01 RX ADMIN — INSULIN LISPRO 4 UNITS: 100 INJECTION, SOLUTION INTRAVENOUS; SUBCUTANEOUS at 22:07

## 2019-01-01 RX ADMIN — BUDESONIDE AND FORMOTEROL FUMARATE DIHYDRATE 2 PUFF: 160; 4.5 AEROSOL RESPIRATORY (INHALATION) at 06:39

## 2019-01-01 RX ADMIN — FLUTICASONE PROPIONATE 1 SPRAY: 50 SPRAY, METERED NASAL at 22:06

## 2019-01-01 RX ADMIN — SODIUM CHLORIDE, PRESERVATIVE FREE 3 ML: 5 INJECTION INTRAVENOUS at 08:51

## 2019-01-01 RX ADMIN — ASPIRIN 81 MG 81 MG: 81 TABLET ORAL at 09:49

## 2019-01-01 RX ADMIN — INSULIN DETEMIR 10 UNITS: 100 INJECTION, SOLUTION SUBCUTANEOUS at 22:18

## 2019-01-01 RX ADMIN — APIXABAN 5 MG: 5 TABLET, FILM COATED ORAL at 22:36

## 2019-01-01 RX ADMIN — TAMSULOSIN HYDROCHLORIDE 0.4 MG: 0.4 CAPSULE ORAL at 08:11

## 2019-01-01 RX ADMIN — BUDESONIDE AND FORMOTEROL FUMARATE DIHYDRATE 2 PUFF: 160; 4.5 AEROSOL RESPIRATORY (INHALATION) at 22:33

## 2019-01-01 RX ADMIN — INSULIN LISPRO 2 UNITS: 100 INJECTION, SOLUTION INTRAVENOUS; SUBCUTANEOUS at 11:45

## 2019-01-01 RX ADMIN — TRAMADOL HYDROCHLORIDE 50 MG: 50 TABLET, COATED ORAL at 15:12

## 2019-01-01 RX ADMIN — GABAPENTIN 300 MG: 300 CAPSULE ORAL at 08:23

## 2019-01-01 RX ADMIN — ASPIRIN 81 MG 81 MG: 81 TABLET ORAL at 09:02

## 2019-01-01 RX ADMIN — INSULIN LISPRO 4 UNITS: 100 INJECTION, SOLUTION INTRAVENOUS; SUBCUTANEOUS at 12:41

## 2019-01-01 RX ADMIN — APIXABAN 5 MG: 5 TABLET, FILM COATED ORAL at 08:50

## 2019-01-01 RX ADMIN — TAMSULOSIN HYDROCHLORIDE 0.4 MG: 0.4 CAPSULE ORAL at 10:43

## 2019-01-01 RX ADMIN — GABAPENTIN 600 MG: 300 CAPSULE ORAL at 20:39

## 2019-01-01 RX ADMIN — NYSTATIN 1 APPLICATION: 100000 POWDER TOPICAL at 12:42

## 2019-01-01 RX ADMIN — IPRATROPIUM BROMIDE AND ALBUTEROL SULFATE 3 ML: 2.5; .5 SOLUTION RESPIRATORY (INHALATION) at 10:54

## 2019-01-01 RX ADMIN — TAMSULOSIN HYDROCHLORIDE 0.4 MG: 0.4 CAPSULE ORAL at 09:52

## 2019-01-01 RX ADMIN — ASPIRIN 81 MG: 81 TABLET, CHEWABLE ORAL at 10:03

## 2019-01-01 RX ADMIN — NYSTATIN: 100000 POWDER TOPICAL at 20:40

## 2019-01-01 RX ADMIN — INSULIN LISPRO 2 UNITS: 100 INJECTION, SOLUTION INTRAVENOUS; SUBCUTANEOUS at 22:05

## 2019-01-01 RX ADMIN — NYSTATIN 1 APPLICATION: 100000 POWDER TOPICAL at 17:00

## 2019-01-01 RX ADMIN — SODIUM CHLORIDE, PRESERVATIVE FREE 3 ML: 5 INJECTION INTRAVENOUS at 21:58

## 2019-01-01 RX ADMIN — INSULIN DETEMIR 15 UNITS: 100 INJECTION, SOLUTION SUBCUTANEOUS at 21:14

## 2019-01-01 RX ADMIN — IRON SUCROSE 300 MG: 20 INJECTION, SOLUTION INTRAVENOUS at 08:11

## 2019-01-01 RX ADMIN — NYSTATIN 1 APPLICATION: 100000 POWDER TOPICAL at 11:10

## 2019-01-01 RX ADMIN — BUDESONIDE AND FORMOTEROL FUMARATE DIHYDRATE 2 PUFF: 160; 4.5 AEROSOL RESPIRATORY (INHALATION) at 07:04

## 2019-01-01 RX ADMIN — LORAZEPAM 1 MG: 2 INJECTION INTRAMUSCULAR; INTRAVENOUS at 23:20

## 2019-01-01 RX ADMIN — GABAPENTIN 300 MG: 300 CAPSULE ORAL at 08:12

## 2019-01-01 RX ADMIN — CALCITRIOL 0.25 MCG: 0.25 CAPSULE ORAL at 09:25

## 2019-01-01 RX ADMIN — APIXABAN 5 MG: 5 TABLET, FILM COATED ORAL at 21:05

## 2019-01-01 RX ADMIN — GABAPENTIN 600 MG: 300 CAPSULE ORAL at 20:02

## 2019-01-01 RX ADMIN — ESCITSLOPRAM 20 MG: 10 TABLET ORAL at 20:34

## 2019-01-01 RX ADMIN — IPRATROPIUM BROMIDE AND ALBUTEROL SULFATE 3 ML: 2.5; .5 SOLUTION RESPIRATORY (INHALATION) at 06:48

## 2019-01-01 RX ADMIN — NYSTATIN 1 APPLICATION: 100000 POWDER TOPICAL at 20:30

## 2019-01-01 RX ADMIN — PIPERACILLIN SODIUM,TAZOBACTAM SODIUM 3.38 G: 3; .375 INJECTION, POWDER, FOR SOLUTION INTRAVENOUS at 13:26

## 2019-01-01 RX ADMIN — ESCITSLOPRAM 20 MG: 10 TABLET ORAL at 22:06

## 2019-01-01 RX ADMIN — ATORVASTATIN CALCIUM 10 MG: 10 TABLET, FILM COATED ORAL at 09:10

## 2019-01-01 RX ADMIN — ATORVASTATIN CALCIUM 10 MG: 10 TABLET, FILM COATED ORAL at 23:36

## 2019-01-01 RX ADMIN — BUDESONIDE AND FORMOTEROL FUMARATE DIHYDRATE 2 PUFF: 160; 4.5 AEROSOL RESPIRATORY (INHALATION) at 20:35

## 2019-01-01 RX ADMIN — ASPIRIN 81 MG 81 MG: 81 TABLET ORAL at 08:33

## 2019-01-01 RX ADMIN — FOLIC ACID 1 MG: 1 TABLET ORAL at 10:05

## 2019-01-01 RX ADMIN — SODIUM CHLORIDE, PRESERVATIVE FREE 3 ML: 5 INJECTION INTRAVENOUS at 09:50

## 2019-01-01 RX ADMIN — BUDESONIDE AND FORMOTEROL FUMARATE DIHYDRATE 2 PUFF: 160; 4.5 AEROSOL RESPIRATORY (INHALATION) at 08:21

## 2019-01-01 RX ADMIN — GABAPENTIN 300 MG: 300 CAPSULE ORAL at 08:52

## 2019-01-01 RX ADMIN — NYSTATIN 1 APPLICATION: 100000 POWDER TOPICAL at 17:13

## 2019-01-01 RX ADMIN — TAMSULOSIN HYDROCHLORIDE 0.4 MG: 0.4 CAPSULE ORAL at 22:03

## 2019-01-01 RX ADMIN — ATORVASTATIN CALCIUM 10 MG: 10 TABLET, FILM COATED ORAL at 08:10

## 2019-01-01 RX ADMIN — APIXABAN 5 MG: 5 TABLET, FILM COATED ORAL at 08:23

## 2019-01-01 RX ADMIN — SODIUM CHLORIDE, PRESERVATIVE FREE 3 ML: 5 INJECTION INTRAVENOUS at 21:11

## 2019-01-01 RX ADMIN — FAMOTIDINE 20 MG: 10 INJECTION INTRAVENOUS at 08:00

## 2019-01-01 RX ADMIN — ATORVASTATIN CALCIUM 10 MG: 10 TABLET, FILM COATED ORAL at 20:53

## 2019-01-01 RX ADMIN — APIXABAN 5 MG: 5 TABLET, FILM COATED ORAL at 21:49

## 2019-01-01 RX ADMIN — ALLOPURINOL 100 MG: 100 TABLET ORAL at 08:15

## 2019-01-01 RX ADMIN — INSULIN LISPRO 2 UNITS: 100 INJECTION, SOLUTION INTRAVENOUS; SUBCUTANEOUS at 22:18

## 2019-01-01 RX ADMIN — LORAZEPAM 1 MG: 2 INJECTION INTRAMUSCULAR; INTRAVENOUS at 14:12

## 2019-01-01 RX ADMIN — DOCUSATE SODIUM 100 MG: 100 CAPSULE ORAL at 10:06

## 2019-01-01 RX ADMIN — INSULIN LISPRO 5 UNITS: 100 INJECTION, SOLUTION INTRAVENOUS; SUBCUTANEOUS at 07:48

## 2019-01-01 RX ADMIN — TAMSULOSIN HYDROCHLORIDE 0.4 MG: 0.4 CAPSULE ORAL at 09:23

## 2019-01-01 RX ADMIN — TAMSULOSIN HYDROCHLORIDE 0.4 MG: 0.4 CAPSULE ORAL at 09:09

## 2019-01-01 RX ADMIN — DILTIAZEM HYDROCHLORIDE 240 MG: 240 CAPSULE, EXTENDED RELEASE ORAL at 09:48

## 2019-01-01 RX ADMIN — NYSTATIN: 100000 POWDER TOPICAL at 10:54

## 2019-01-01 RX ADMIN — INSULIN LISPRO 2 UNITS: 100 INJECTION, SOLUTION INTRAVENOUS; SUBCUTANEOUS at 09:09

## 2019-01-01 RX ADMIN — TAMSULOSIN HYDROCHLORIDE 0.4 MG: 0.4 CAPSULE ORAL at 20:34

## 2019-01-01 RX ADMIN — FLUTICASONE PROPIONATE 1 SPRAY: 50 SPRAY, METERED NASAL at 22:04

## 2019-01-01 RX ADMIN — NYSTATIN: 100000 OINTMENT TOPICAL at 08:26

## 2019-01-01 RX ADMIN — VANCOMYCIN HYDROCHLORIDE 1750 MG: 1 INJECTION, POWDER, LYOPHILIZED, FOR SOLUTION INTRAVENOUS at 16:35

## 2019-01-01 RX ADMIN — TAMSULOSIN HYDROCHLORIDE 0.4 MG: 0.4 CAPSULE ORAL at 09:26

## 2019-01-01 RX ADMIN — BUMETANIDE 2 MG: 0.25 INJECTION INTRAMUSCULAR; INTRAVENOUS at 08:13

## 2019-01-01 RX ADMIN — APIXABAN 5 MG: 5 TABLET, FILM COATED ORAL at 09:25

## 2019-01-01 RX ADMIN — BUMETANIDE 1 MG: 0.25 INJECTION INTRAMUSCULAR; INTRAVENOUS at 21:27

## 2019-01-01 RX ADMIN — APIXABAN 5 MG: 5 TABLET, FILM COATED ORAL at 20:34

## 2019-01-01 RX ADMIN — FAMOTIDINE 20 MG: 20 TABLET, FILM COATED ORAL at 08:50

## 2019-01-01 RX ADMIN — FLUTICASONE PROPIONATE 1 SPRAY: 50 SPRAY, METERED NASAL at 20:02

## 2019-01-01 RX ADMIN — FAMOTIDINE 20 MG: 20 TABLET, FILM COATED ORAL at 09:56

## 2019-01-01 RX ADMIN — SODIUM CHLORIDE, PRESERVATIVE FREE 3 ML: 5 INJECTION INTRAVENOUS at 22:35

## 2019-01-01 RX ADMIN — ONDANSETRON HYDROCHLORIDE 4 MG: 2 INJECTION INTRAMUSCULAR; INTRAVENOUS at 11:25

## 2019-01-01 RX ADMIN — IPRATROPIUM BROMIDE AND ALBUTEROL SULFATE 3 ML: 2.5; .5 SOLUTION RESPIRATORY (INHALATION) at 14:09

## 2019-01-01 RX ADMIN — INSULIN DETEMIR 10 UNITS: 100 INJECTION, SOLUTION SUBCUTANEOUS at 21:05

## 2019-01-01 RX ADMIN — INSULIN DETEMIR 10 UNITS: 100 INJECTION, SOLUTION SUBCUTANEOUS at 21:48

## 2019-01-01 RX ADMIN — NYSTATIN 1 APPLICATION: 100000 POWDER TOPICAL at 21:04

## 2019-01-01 RX ADMIN — INSULIN LISPRO 3 UNITS: 100 INJECTION, SOLUTION INTRAVENOUS; SUBCUTANEOUS at 21:59

## 2019-01-01 RX ADMIN — CEFEPIME 2 G: 2 INJECTION, POWDER, FOR SOLUTION INTRAVENOUS at 22:56

## 2019-01-01 RX ADMIN — ONDANSETRON HYDROCHLORIDE 4 MG: 2 INJECTION INTRAMUSCULAR; INTRAVENOUS at 09:07

## 2019-01-01 RX ADMIN — POLYETHYLENE GLYCOL 3350 17 G: 17 POWDER, FOR SOLUTION ORAL at 09:48

## 2019-01-01 RX ADMIN — ALLOPURINOL 100 MG: 100 TABLET ORAL at 10:03

## 2019-01-01 RX ADMIN — TRAMADOL HYDROCHLORIDE 50 MG: 50 TABLET, COATED ORAL at 19:19

## 2019-01-01 RX ADMIN — IPRATROPIUM BROMIDE AND ALBUTEROL SULFATE 3 ML: 2.5; .5 SOLUTION RESPIRATORY (INHALATION) at 10:41

## 2019-01-01 RX ADMIN — TAMSULOSIN HYDROCHLORIDE 0.4 MG: 0.4 CAPSULE ORAL at 21:32

## 2019-01-01 RX ADMIN — APIXABAN 5 MG: 5 TABLET, FILM COATED ORAL at 08:00

## 2019-01-01 RX ADMIN — ATORVASTATIN CALCIUM 10 MG: 10 TABLET, FILM COATED ORAL at 20:39

## 2019-01-01 RX ADMIN — LEVOTHYROXINE SODIUM 50 MCG: 50 TABLET ORAL at 06:09

## 2019-01-01 RX ADMIN — DOCUSATE SODIUM 100 MG: 100 CAPSULE ORAL at 08:32

## 2019-01-01 RX ADMIN — FLUCONAZOLE 100 MG: 2 INJECTION INTRAVENOUS at 19:42

## 2019-01-01 RX ADMIN — SODIUM CHLORIDE, PRESERVATIVE FREE 3 ML: 5 INJECTION INTRAVENOUS at 10:04

## 2019-01-01 RX ADMIN — NYSTATIN: 100000 OINTMENT TOPICAL at 08:09

## 2019-01-01 RX ADMIN — INSULIN LISPRO 3 UNITS: 100 INJECTION, SOLUTION INTRAVENOUS; SUBCUTANEOUS at 21:33

## 2019-01-01 RX ADMIN — DILTIAZEM HYDROCHLORIDE 240 MG: 240 CAPSULE, EXTENDED RELEASE ORAL at 10:03

## 2019-01-01 RX ADMIN — DILTIAZEM HYDROCHLORIDE 240 MG: 240 CAPSULE, EXTENDED RELEASE ORAL at 08:00

## 2019-01-01 RX ADMIN — POLYETHYLENE GLYCOL 3350 17 G: 17 POWDER, FOR SOLUTION ORAL at 08:11

## 2019-01-01 RX ADMIN — BUDESONIDE AND FORMOTEROL FUMARATE DIHYDRATE 2 PUFF: 160; 4.5 AEROSOL RESPIRATORY (INHALATION) at 22:15

## 2019-01-01 RX ADMIN — INSULIN LISPRO 2 UNITS: 100 INJECTION, SOLUTION INTRAVENOUS; SUBCUTANEOUS at 21:50

## 2019-01-01 RX ADMIN — GABAPENTIN 600 MG: 300 CAPSULE ORAL at 22:04

## 2019-01-01 RX ADMIN — ESCITALOPRAM 20 MG: 10 TABLET, FILM COATED ORAL at 23:36

## 2019-01-01 RX ADMIN — FUROSEMIDE 20 MG: 10 INJECTION, SOLUTION INTRAMUSCULAR; INTRAVENOUS at 17:35

## 2019-01-01 RX ADMIN — INSULIN DETEMIR 10 UNITS: 100 INJECTION, SOLUTION SUBCUTANEOUS at 21:36

## 2019-01-01 RX ADMIN — CALCITRIOL 0.25 MCG: 0.25 CAPSULE ORAL at 10:07

## 2019-01-01 RX ADMIN — NYSTATIN: 100000 OINTMENT TOPICAL at 09:08

## 2019-01-01 RX ADMIN — FLUTICASONE PROPIONATE 1 SPRAY: 50 SPRAY, METERED NASAL at 22:36

## 2019-01-01 RX ADMIN — FLUTICASONE PROPIONATE 1 SPRAY: 50 SPRAY, METERED NASAL at 08:31

## 2019-01-01 RX ADMIN — ESCITALOPRAM 20 MG: 10 TABLET, FILM COATED ORAL at 20:39

## 2019-01-01 RX ADMIN — TAMSULOSIN HYDROCHLORIDE 0.4 MG: 0.4 CAPSULE ORAL at 21:04

## 2019-01-01 RX ADMIN — BUDESONIDE AND FORMOTEROL FUMARATE DIHYDRATE 2 PUFF: 160; 4.5 AEROSOL RESPIRATORY (INHALATION) at 06:30

## 2019-01-01 RX ADMIN — DILTIAZEM HYDROCHLORIDE 240 MG: 240 CAPSULE, EXTENDED RELEASE ORAL at 10:55

## 2019-01-01 RX ADMIN — LEVOTHYROXINE SODIUM 50 MCG: 50 TABLET ORAL at 09:10

## 2019-01-01 RX ADMIN — IPRATROPIUM BROMIDE AND ALBUTEROL SULFATE 3 ML: 2.5; .5 SOLUTION RESPIRATORY (INHALATION) at 14:13

## 2019-01-01 RX ADMIN — DILTIAZEM HYDROCHLORIDE 240 MG: 240 CAPSULE, EXTENDED RELEASE ORAL at 09:10

## 2019-01-01 RX ADMIN — NYSTATIN 1 APPLICATION: 100000 POWDER TOPICAL at 08:32

## 2019-01-01 RX ADMIN — DILTIAZEM HYDROCHLORIDE 240 MG: 240 CAPSULE, EXTENDED RELEASE ORAL at 09:52

## 2019-01-01 RX ADMIN — ONDANSETRON HYDROCHLORIDE 4 MG: 2 INJECTION INTRAMUSCULAR; INTRAVENOUS at 16:55

## 2019-01-01 RX ADMIN — NYSTATIN 1 APPLICATION: 100000 POWDER TOPICAL at 08:55

## 2019-01-01 RX ADMIN — ATORVASTATIN CALCIUM 10 MG: 10 TABLET, FILM COATED ORAL at 21:03

## 2019-01-01 RX ADMIN — NYSTATIN 1 APPLICATION: 100000 POWDER TOPICAL at 21:14

## 2019-01-01 RX ADMIN — SODIUM CHLORIDE, PRESERVATIVE FREE 3 ML: 5 INJECTION INTRAVENOUS at 13:35

## 2019-01-01 RX ADMIN — ASPIRIN 81 MG 81 MG: 81 TABLET ORAL at 09:17

## 2019-01-01 RX ADMIN — SODIUM CHLORIDE, PRESERVATIVE FREE 3 ML: 5 INJECTION INTRAVENOUS at 09:11

## 2019-01-01 RX ADMIN — NYSTATIN 1 APPLICATION: 100000 POWDER TOPICAL at 12:23

## 2019-01-01 RX ADMIN — GABAPENTIN 600 MG: 300 CAPSULE ORAL at 21:05

## 2019-01-01 RX ADMIN — Medication 50 MEQ: at 13:39

## 2019-01-01 RX ADMIN — NYSTATIN 1 APPLICATION: 100000 POWDER TOPICAL at 17:15

## 2019-01-01 RX ADMIN — APIXABAN 5 MG: 5 TABLET, FILM COATED ORAL at 22:03

## 2019-01-01 RX ADMIN — SODIUM CHLORIDE 75 ML/HR: 9 INJECTION, SOLUTION INTRAVENOUS at 18:14

## 2019-01-01 RX ADMIN — TAMSULOSIN HYDROCHLORIDE 0.4 MG: 0.4 CAPSULE ORAL at 08:49

## 2019-02-28 PROBLEM — E87.5 HYPERKALEMIA: Status: ACTIVE | Noted: 2019-01-01

## 2019-03-01 NOTE — CONSULTS
Nephrology ([facility], [facility]) Consult Note      Patient:  Tamy Sher  YOB: 1930  Date of Service: 3/1/2019  MRN: [id number]   Acct: [id number]   Primary Care Physician: Barry Foley MD  Advance Directive:   Code Status and Medical Interventions:   Ordered at: 02/28/19 1515     Limited Support to NOT Include:    Cardioversion/Defibrillation    Intubation     Level Of Support Discussed With:    Health Care Surrogate     Code Status:    No CPR     Medical Interventions (Level of Support Prior to Arrest):    Limited     Admit Date: 2/28/2019       Hospital Day: 0  Referring Provider: Manda Andrew DO      Patient personally seen and examined.  Complete chart including Consults, Notes, Operative Reports, Labs, Cardiology, and Radiology studies reviewed as able.        Subjective:  Tamy Sher is a 88 y.o. female  whom we were consulted for acute kidney injury.  Baseline of chronic kidney disease stage 3; baseline creatinine 1.1 mg. Does not follow with nephrology.  History of type 2 diabetes, benign hypertension, dementia.  Patient had routine outpatient labs done at her nursing home which showed hyperkalemia and acute kidney injury. Was sent to emergency department for further evaluation.  Patient does take daily potassium supplement.  Patient is able to offer limited history, not sure why she is in hospital. Urine output has been decreased since arrival to hospital.    Allergies:  Patient has no known allergies.    Home Meds:  Medications Prior to Admission   Medication Sig Dispense Refill Last Dose   • acetaminophen (TYLENOL) 325 MG tablet Take 650 mg by mouth Every 6 (Six) Hours As Needed for Mild Pain  or Fever.   Past Week at Unknown time   • apixaban (ELIQUIS) 5 MG tablet tablet Take 1 tablet by mouth Every 12 (Twelve) Hours. 60 tablet  2/27/2019 at Unknown time   • aspirin 81 MG chewable tablet Chew 1 tablet Daily.   2/27/2019 at Unknown time   •  budesonide-formoterol (SYMBICORT) 160-4.5 MCG/ACT inhaler Inhale 2 puffs 2 (Two) Times a Day.  12 2/27/2019 at Unknown time   • bumetanide (BUMEX) 1 MG tablet Take 1 mg by mouth 2 (Two) Times a Day.   2/27/2019 at Unknown time   • Cholecalciferol (VITAMIN D3) 5000 units capsule capsule Take 5,000 Units by mouth Daily.   2/27/2019 at Unknown time   • diltiaZEM CD (CARDIZEM CD) 240 MG 24 hr capsule Take 1 capsule by mouth Daily.   2/27/2019 at Unknown time   • docusate sodium (COLACE) 100 MG capsule Take 100 mg by mouth Daily.   2/27/2019 at Unknown time   • escitalopram (LEXAPRO) 20 MG tablet Take 20 mg by mouth Every Night.   2/27/2019 at Unknown time   • folic acid (FOLVITE) 1 MG tablet Take 1 mg by mouth Daily.   2/27/2019 at Unknown time   • gabapentin (NEURONTIN) 300 MG capsule Take 1 capsule by mouth Daily With Breakfast. 3 capsule 0 2/27/2019 at Unknown time   • gabapentin (NEURONTIN) 300 MG capsule Take 600 mg by mouth Every Night.   2/27/2019 at Unknown time   • insulin detemir (LEVEMIR) 100 UNIT/ML injection Inject 10 Units under the skin Every Night.   2/27/2019 at Unknown time   • Insulin Lispro (HUMALOG KWIKPEN) 100 UNIT/ML solution pen-injector Inject  under the skin into the appropriate area as directed 4 (Four) Times a Day Before Meals & at Bedtime. 0-149 = 0 units.  150-400 = formula: (BG-100) / 20 = units to be given.  401+ = call MD.   Past Week at Unknown time   • levothyroxine (SYNTHROID, LEVOTHROID) 50 MCG tablet Take 50 mcg by mouth Daily.   2/27/2019 at Unknown time   • lisinopril (PRINIVIL,ZESTRIL) 5 MG tablet Take 1 tablet by mouth Daily.   2/27/2019 at Unknown time   • metoprolol succinate XL (TOPROL-XL) 25 MG 24 hr tablet Take 25 mg by mouth Daily.   2/27/2019 at Unknown time   • mirtazapine (REMERON) 7.5 MG half tablet Take 7.5 mg by mouth Every Night.   2/27/2019 at Unknown time   • nystatin (nystatin) 098264 UNIT/GM powder Apply 1 application topically to the appropriate area as  directed 4 (Four) Times a Day. Apply to breast folds.   2/27/2019 at Unknown time   • pantoprazole (PROTONIX) 40 MG EC tablet Take 40 mg by mouth Daily.   2/27/2019 at Unknown time   • polyethylene glycol (MIRALAX) packet Take 17 g by mouth Daily.   2/27/2019 at Unknown time   • potassium chloride (MICRO-K) 10 MEQ CR capsule Take 2 capsules by mouth Daily.   2/27/2019 at Unknown time   • simethicone (MYLICON) 80 MG chewable tablet Chew 1 tablet 4 (Four) Times a Day As Needed for Flatulence.   Past Week at Unknown time   • simvastatin (ZOCOR) 20 MG tablet Take 20 mg by mouth Every Night.   2/27/2019 at Unknown time   • tamsulosin (FLOMAX) 0.4 MG capsule 24 hr capsule Take 1 capsule by mouth Daily. 30 capsule  2/27/2019 at Unknown time   • traMADol (ULTRAM) 50 MG tablet Take 50 mg by mouth 3 (Three) Times a Day As Needed for Moderate Pain .   Past Week at Unknown time       Medicines:  Current Facility-Administered Medications   Medication Dose Route Frequency Provider Last Rate Last Dose   • acetaminophen (TYLENOL) tablet 650 mg  650 mg Oral Q6H PRN Manda Andrew DO       • apixaban (ELIQUIS) tablet 2.5 mg  2.5 mg Oral Q12H Manda Andrew DO   2.5 mg at 03/01/19 0827   • aspirin chewable tablet 81 mg  81 mg Oral Daily Manda Andrew DO   81 mg at 03/01/19 0827   • atorvastatin (LIPITOR) tablet 10 mg  10 mg Oral Nightly Manda Andrew DO   10 mg at 02/28/19 2336   • budesonide-formoterol (SYMBICORT) 160-4.5 MCG/ACT inhaler 2 puff  2 puff Inhalation BID - RT Manda Andrew DO   2 puff at 03/01/19 0734   • dextrose (D50W) 25 g/ 50mL Intravenous Solution 25 g  25 g Intravenous Q15 Min PRN Manda Adnrew DO       • dextrose (GLUTOSE) oral gel 15 g  15 g Oral Q15 Min PRN Manda Andrew DO       • diltiaZEM CD (CARDIZEM CD) 24 hr capsule 240 mg  240 mg Oral Q24H Manda Andrew DO   240 mg at 03/01/19 0826   • docusate sodium (COLACE) capsule 100 mg  100 mg Oral Daily Kamran  Manda Olivia DO   100 mg at 03/01/19 0827   • escitalopram (LEXAPRO) tablet 20 mg  20 mg Oral Nightly Manda Andrew DO   20 mg at 02/28/19 2336   • folic acid (FOLVITE) tablet 1 mg  1 mg Oral Daily Manda Andrew DO   1 mg at 03/01/19 0827   • gabapentin (NEURONTIN) capsule 300 mg  300 mg Oral Daily With Breakfast Manda Andrew DO   300 mg at 03/01/19 0829   • gabapentin (NEURONTIN) capsule 600 mg  600 mg Oral Nightly Manda Andrew DO       • glucagon (human recombinant) (GLUCAGEN DIAGNOSTIC) injection 1 mg  1 mg Subcutaneous PRN Manda Andrew DO       • insulin detemir (LEVEMIR) injection 10 Units  10 Units Subcutaneous Nightly Manda Andrew DO   10 Units at 02/28/19 2336   • insulin lispro (humaLOG) injection 2-7 Units  2-7 Units Subcutaneous 4x Daily With Meals & Nightly Manda Andrew DO       • levothyroxine (SYNTHROID, LEVOTHROID) tablet 50 mcg  50 mcg Oral Q AM Manda Andrew DO   50 mcg at 03/01/19 0535   • metoprolol succinate XL (TOPROL-XL) 24 hr tablet 25 mg  25 mg Oral Daily Manda Andrew DO   25 mg at 03/01/19 0827   • nystatin (MYCOSTATIN) powder   Topical Q12H Manda Andrew DO       • ondansetron (ZOFRAN) injection 4 mg  4 mg Intravenous Q6H PRN Manda Andrew DO       • pantoprazole (PROTONIX) EC tablet 40 mg  40 mg Oral Daily Manda Andrew DO   40 mg at 03/01/19 0829   • simethicone (MYLICON) chewable tablet 80 mg  80 mg Oral 4x Daily PRN Manda Andrew DO       • sodium chloride 0.9 % flush 3 mL  3 mL Intravenous Q12H Manda Andrew DO   3 mL at 03/01/19 0950   • sodium chloride 0.9 % flush 3-10 mL  3-10 mL Intravenous PRN Manda Andrew DO       • sodium chloride 0.9 % infusion  100 mL/hr Intravenous Continuous Manda Andrew  mL/hr at 03/01/19 0751 100 mL/hr at 03/01/19 0751   • tamsulosin (FLOMAX) 24 hr capsule 0.4 mg  0.4 mg Oral Daily Manda Andrew DO   0.4 mg at 03/01/19 0811    • traMADol (ULTRAM) tablet 50 mg  50 mg Oral TID PRN Manda Andrew DO           Past Medical History:  Past Medical History:   Diagnosis Date   • Constipation    • Diabetes mellitus (CMS/HCC)    • Diarrhea    • Diastolic dysfunction, left ventricle    • Esophagitis    • Exertional shortness of breath    • Hyperlipidemia    • Hypertension    • Lumbar spondylitis (CMS/HCC)    • Osteoarthritis    • Peripheral vascular disease (CMS/HCC)    • Sinusitis    • Stroke (CMS/HCC)        Past Surgical History:  Past Surgical History:   Procedure Laterality Date   • FEMUR SUPRACONDYLAR FRACTURE REPAIR Right 3/10/2018    Procedure: FEMUR SUPRACONDYLAR NAIL;  Surgeon: Nima Bundy MD;  Location: [facility];  Service: Orthopedics   • GALLBLADDER SURGERY     • HYSTERECTOMY     • JOINT REPLACEMENT     • REPLACEMENT TOTAL KNEE Left        Family History  Family History   Problem Relation Age of Onset   • No Known Problems Mother    • No Known Problems Father        Social History  Social History     Socioeconomic History   • Marital status:      Spouse name: Not on file   • Number of children: Not on file   • Years of education: Not on file   • Highest education level: Not on file   Social Needs   • Financial resource strain: Not on file   • Food insecurity - worry: Not on file   • Food insecurity - inability: Not on file   • Transportation needs - medical: Not on file   • Transportation needs - non-medical: Not on file   Occupational History   • Not on file   Tobacco Use   • Smoking status: Never Smoker   • Smokeless tobacco: Never Used   Substance and Sexual Activity   • Alcohol use: No   • Drug use: No   • Sexual activity: No   Other Topics Concern   • Not on file   Social History Narrative   • Not on file         Review of Systems:  History obtained from chart review and the patient  General ROS: No fever or chills  Respiratory ROS: No cough, shortness of breath, wheezing  Cardiovascular ROS: No chest pain or  "palpitations  Gastrointestinal ROS: No abdominal pain or melena  Genito-Urinary ROS: No dysuria or hematuria  Neurological ROS: no headache or dizziness  14 point ROS reviewed with the patient and negative except as noted above and in the HPI unless unable to obtain.    Objective:  BP (!) 78/37   Pulse 88   Temp 96.9 °F (36.1 °C) (Axillary)   Resp 11   Ht 153.7 cm (60.5\")   Wt 74.3 kg (163 lb 14.4 oz)   SpO2 98%   BMI 31.48 kg/m²     Intake/Output Summary (Last 24 hours) at 3/1/2019 0956  Last data filed at 3/1/2019 0900  Gross per 24 hour   Intake 2909 ml   Output 300 ml   Net 2609 ml     General: awake/alert    Neck: supple, no JVD  Chest:  clear to auscultation bilaterally without respiratory distress  CVS: regular rate and rhythm  Abdominal: soft, nontender, normal bowel sounds  Extremities: no cyanosis or edema  Skin: warm and dry without rash  Neuro: no focal motor deficits    Labs:  Results from last 7 days   Lab Units 03/01/19  0244 02/28/19  1222 02/28/19  0700   WBC 10*3/mm3 25.33* 24.96* 25.23*   HEMOGLOBIN g/dL 10.8* 11.4* 10.4*   HEMATOCRIT % 35.1* 36.3* 33.6*   PLATELETS 10*3/mm3 672* 646* 656*         Results from last 7 days   Lab Units 03/01/19  0244 02/28/19  2149 02/28/19  1222 02/28/19  0700   SODIUM mmol/L 140 141 135 137   POTASSIUM mmol/L 5.6* 5.7*  5.7* 6.2* 6.6*   CHLORIDE mmol/L 107 108 104 105   CO2 mmol/L 22.0* 23.0* 22.0* 22.0*   BUN mg/dL 121* 131* 139* 139*   CREATININE mg/dL 2.47* 2.65* 3.01* 3.07*   CALCIUM mg/dL 8.8 8.7 8.8 8.8   BILIRUBIN mg/dL 0.3  --  0.3 0.2   ALK PHOS U/L 81  --  84 85   ALT (SGPT) U/L 15  --  <15 17   AST (SGOT) U/L 10  --  25 11   GLUCOSE mg/dL 154* 173* 248* 87       Radiology:   Imaging Results (last 72 hours)     Procedure Component Value Units Date/Time    XR Chest 1 View [[id number]] Collected:  02/28/19 1357     Updated:  02/28/19 1402    Narrative:       XR CHEST 1 VW- 2/28/2019 1:49 PM CST     HISTORY: wbc 25; E87.5-Hyperkalemia; N28.9-Disorder " of kidney and  ureter, unspecified; D72.829-Elevated white blood cell count,  unspecified       COMPARISON: 11/29/2018.     FINDINGS:   No consolidation. No pleural effusion or pneumothorax. The heart is  enlarged although the pulmonary vasculature are unremarkable.  Atheromatous calcification in the thoracic aorta.     The osseous structures and surrounding soft tissues demonstrate no acute  abnormality.       Impression:       1. Stable chest exam without acute process. No visualized infiltrate.        This report was finalized on 02/28/2019 13:59 by Dr Franck Pretty, .          Culture:  Blood Culture   Date Value Ref Range Status   02/28/2019 No growth at less than 24 hours  Preliminary   02/28/2019 No growth at less than 24 hours  Preliminary         Assessment   1.  Acute kidney injury--? Prerenal vs ATN  2.  Baseline of chronic kidney disease stage 3  3.  Type 2 diabetes  4.  Hypertension  5.  Hyperkalemia    Plan:  1.  Workup ongoing; renal US and urine studies pending  2.  Continue gentle IV fluids  3.  Start PO bicarbonate BID      Thank you for the consult, we appreciate the opportunity to provide care to your patients.  Feel free to contact me if I can be of any further assistance.      Isael Redding, APRN  3/1/2019  9:56 AM

## 2019-03-01 NOTE — PROGRESS NOTES
Discharge Planning Assessment  [facility]     Patient Name: Tamy Sher  MRN: [id number]  Today's Date: 3/1/2019    Admit Date: 2/28/2019    Discharge Needs Assessment     Row Name 03/01/19 1345       Living Environment    Lives With  facility resident    Current Living Arrangements  residential facility       Transition Planning    Patient/Family Anticipates Transition to  long term care facility       Discharge Needs Assessment    Discharge Coordination/Progress  Patient was admitted from [facility] [phone].   contacted Adrienne Mcdowell with [facility] admission to inquire regarding patient's bed status.  Was advised by Adrienne that patient does have a bed hold.  Plan for patient to return to [facility] upon discharge.        Discharge Plan    No documentation.       Destination      No service coordination in this encounter.      Durable Medical Equipment      No service coordination in this encounter.      Dialysis/Infusion      No service coordination in this encounter.      Home Medical Care      No service coordination in this encounter.      Community Resources      No service coordination in this encounter.          Demographic Summary    No documentation.       Functional Status    No documentation.       Psychosocial    No documentation.       Abuse/Neglect    No documentation.       Legal    No documentation.       Substance Abuse    No documentation.       Patient Forms    No documentation.           IZAIAH SmithW

## 2019-03-01 NOTE — PLAN OF CARE
Problem: Skin Injury Risk (Adult)  Goal: Identify Related Risk Factors and Signs and Symptoms  Outcome: Ongoing (interventions implemented as appropriate)    Goal: Skin Health and Integrity  Outcome: Ongoing (interventions implemented as appropriate)      Problem: Fall Risk (Adult)  Goal: Identify Related Risk Factors and Signs and Symptoms  Outcome: Ongoing (interventions implemented as appropriate)    Goal: Absence of Fall  Outcome: Ongoing (interventions implemented as appropriate)      Problem: Patient Care Overview  Goal: Plan of Care Review  Outcome: Ongoing (interventions implemented as appropriate)   03/01/19 0514   Coping/Psychosocial   Plan of Care Reviewed With patient   Plan of Care Review   Progress no change   OTHER   Outcome Summary MAP maintainted abov 60 per physician order. HR sinus with frequent PVC's. no complaints of pain. appears to have rested well. large incontinent episode pure wick in place to prevent further skin breakdown/irritation. will continue to monitor.     Goal: Individualization and Mutuality  Outcome: Ongoing (interventions implemented as appropriate)    Goal: Discharge Needs Assessment  Outcome: Ongoing (interventions implemented as appropriate)    Goal: Interprofessional Rounds/Family Conf  Outcome: Ongoing (interventions implemented as appropriate)

## 2019-03-01 NOTE — PROGRESS NOTES
[facility]  INPATIENT PROGRESS NOTE    Patient Name: aTmy Sher  Date of Admission: 2/28/2019  Today's Date: 03/01/19  Length of Stay: 0  Primary Care Physician: Barry Foley MD    Subjective   Chief Complaint: Denies pain this AM    HPI   Slept well  No nausea  No chest pain or shortness of breath.  Wants to get up to bathroom.    Nursing inquiring if stable enough for floor transfer.       Review of Systems     All pertinent negatives and positives are as above. All other systems have been reviewed and are negative unless otherwise stated.     Objective    Temp:  [96.9 °F (36.1 °C)-98.2 °F (36.8 °C)] 96.9 °F (36.1 °C)  Heart Rate:  [73-95] 85  Resp:  [11-24] 11  BP: ()/(42-86) 95/58  Physical Exam   Constitutional: She is oriented to person, place, and time. She appears well-developed and well-nourished.   Eyes: Conjunctivae and EOM are normal. Pupils are equal, round, and reactive to light.   Neck: Neck supple.   Cardiovascular: Normal rate and regular rhythm. Exam reveals no gallop and no friction rub.   No murmur heard.  Pulmonary/Chest: Effort normal and breath sounds normal.   Abdominal: Soft. Bowel sounds are normal. There is no hepatosplenomegaly. There is no tenderness.   Musculoskeletal: Normal range of motion. She exhibits no edema.   Neurological: She is alert and oriented to person, place, and time. No cranial nerve deficit.   Skin: Skin is warm and dry.   Psychiatric: She has a normal mood and affect. Her behavior is normal.   Nursing note and vitals reviewed.          Results Review:  I have reviewed the labs, radiology results, and diagnostic studies.    Laboratory Data:   Results from last 7 days   Lab Units 03/01/19  0244 02/28/19  1222 02/28/19  0700   WBC 10*3/mm3 25.33* 24.96* 25.23*   HEMOGLOBIN g/dL 10.8* 11.4* 10.4*   HEMATOCRIT % 35.1* 36.3* 33.6*   PLATELETS 10*3/mm3 672* 646* 656*        Results from last 7 days   Lab  Units 03/01/19  0244 02/28/19  2149 02/28/19  1222 02/28/19  0700   SODIUM mmol/L 140 141 135 137   POTASSIUM mmol/L 5.6* 5.7*  5.7* 6.2* 6.6*   CHLORIDE mmol/L 107 108 104 105   CO2 mmol/L 22.0* 23.0* 22.0* 22.0*   BUN mg/dL 121* 131* 139* 139*   CREATININE mg/dL 2.47* 2.65* 3.01* 3.07*   CALCIUM mg/dL 8.8 8.7 8.8 8.8   BILIRUBIN mg/dL 0.3  --  0.3 0.2   ALK PHOS U/L 81  --  84 85   ALT (SGPT) U/L 15  --  <15 17   AST (SGOT) U/L 10  --  25 11   GLUCOSE mg/dL 154* 173* 248* 87       Culture Data:   Blood Culture   Date Value Ref Range Status   02/28/2019 No growth at less than 24 hours  Preliminary   02/28/2019 No growth at less than 24 hours  Preliminary       Radiology Data:   Imaging Results (last 24 hours)     Procedure Component Value Units Date/Time    XR Chest 1 View [[id number]] Collected:  02/28/19 1357     Updated:  02/28/19 1402    Narrative:       XR CHEST 1 VW- 2/28/2019 1:49 PM CST     HISTORY: wbc 25; E87.5-Hyperkalemia; N28.9-Disorder of kidney and  ureter, unspecified; D72.829-Elevated white blood cell count,  unspecified       COMPARISON: 11/29/2018.     FINDINGS:   No consolidation. No pleural effusion or pneumothorax. The heart is  enlarged although the pulmonary vasculature are unremarkable.  Atheromatous calcification in the thoracic aorta.     The osseous structures and surrounding soft tissues demonstrate no acute  abnormality.       Impression:       1. Stable chest exam without acute process. No visualized infiltrate.        This report was finalized on 02/28/2019 13:59 by Dr Franck Pretty, .          I have reviewed the patient's current medications.     Assessment/Plan     Active Hospital Problems    Diagnosis   • Hyperkalemia       Assessment:      Hyperkalemia  Acute on chronic renal failure  DM II   Leukocytosis, persistent unknown if there has been any work up for this.  HTN  Chronic tremor    Plan    Monitor for fluid overload  CMP CBC in AM  Nephrology consult pending.   Chair for  meals.   Continue gentle hydration  Continue to hold potassium and lasix.   PT OT mobility    Discharge Planning: I expect the patient to be discharged to NH in 1-3 days.    Manda Andrew DO   03/01/19   8:18 AM

## 2019-03-02 NOTE — PROGRESS NOTES
" PROGRESS NOTE.      Patient:  Tamy Sher  YOB: 1930  Date of Service: 3/2/2019  MRN: [id number]   Acct: [id number]   Primary Care Physician: Barry Foley MD  Advance Directive:   Code Status and Medical Interventions:   Ordered at: 02/28/19 1515     Limited Support to NOT Include:    Cardioversion/Defibrillation    Intubation     Level Of Support Discussed With:    Health Care Surrogate     Code Status:    No CPR     Medical Interventions (Level of Support Prior to Arrest):    Limited     Admit Date: 2/28/2019       Hospital Day: 1  Referring Provider: Manda Andrew DO      Patient Seen, Chart, Consults, Notes, Labs, Radiology studies reviewed.        Subjective:  Tamy Sher is a 88 y.o. female  whom we were consulted for acute kidney injury.  Baseline of chronic kidney disease stage 3; baseline creatinine 1.1 mg. Does not follow with nephrology.  History of type 2 diabetes, benign hypertension, dementia.  Patient had routine outpatient labs done at her nursing home which showed hyperkalemia and acute kidney injury. Was sent to emergency department for further evaluation.  Patient does take daily potassium supplement.  Patient is able to offer limited history, not sure why she is in hospital.   Today, she has been more awake.  She offers no new complaints      Review of Systems:  History obtained from chart review and the patient  General ROS: No fever or chills  Respiratory ROS: No cough, shortness of breath, wheezing  Cardiovascular ROS: no chest pain or dyspnea on exertion  Gastrointestinal ROS: No abdominal pain or melena  Genito-Urinary ROS: No dysuria or hematuria  Musculoskeletal: negative  Skin: negative    Objective:  /72 (BP Location: Left arm, Patient Position: Lying)   Pulse 62   Temp 98.1 °F (36.7 °C) (Oral)   Resp 16   Ht 153.7 cm (60.5\")   Wt 74.3 kg (163 lb 14.4 oz)   SpO2 97%   BMI 31.48 kg/m²     Intake/Output Summary (Last 24 hours) at 3/2/2019 " 1521  Last data filed at 3/2/2019 1400  Gross per 24 hour   Intake 130 ml   Output 1100 ml   Net -970 ml       Physical examination:    General: awake/alert    Neck: supple, no JVD  Chest:  clear to auscultation bilaterally without respiratory distress  CVS: regular rate and rhythm  Abdominal: soft, nontender, normal bowel sounds  Extremities: no cyanosis or edema  Skin: warm and dry without rash  Neuro: no focal motor deficits        Labs:  Lab Results (last 24 hours)     Procedure Component Value Units Date/Time    Blood Culture - Blood, Arm, Left [[id number]] Collected:  02/28/19 1323    Specimen:  Blood from Arm, Left Updated:  03/02/19 1415     Blood Culture No growth at 2 days    Blood Culture - Blood, Hand, Left [[id number]] Collected:  02/28/19 1315    Specimen:  Blood from Hand, Left Updated:  03/02/19 1345     Blood Culture No growth at 2 days    POC Glucose Once [[id number]]  (Abnormal) Collected:  03/02/19 1116    Specimen:  Blood Updated:  03/02/19 1127     Glucose 174 mg/dL      Comment: : [id number] RampedMediaraMeter ID: [id number]       POC Glucose Once [[id number]]  (Normal) Collected:  03/02/19 0729    Specimen:  Blood Updated:  03/02/19 0740     Glucose 127 mg/dL      Comment: : [id number] RampedMediaraMeter ID: [id number]       Urine Culture - Urine, Urine, Catheter In/Out [[id number]]  (Normal) Collected:  02/28/19 1307    Specimen:  Urine, Catheter In/Out Updated:  03/02/19 0711     Urine Culture No growth at 24 hours    Comprehensive Metabolic Panel [[id number]]  (Abnormal) Collected:  03/02/19 0545    Specimen:  Blood Updated:  03/02/19 0711     Glucose 104 mg/dL       mg/dL      Creatinine 2.21 mg/dL      Sodium 137 mmol/L      Potassium 4.2 mmol/L      Chloride 108 mmol/L      CO2 22.0 mmol/L      Calcium 8.1 mg/dL      Total Protein 4.8 g/dL      Albumin 2.70 g/dL      ALT (SGPT) 15 U/L      AST (SGOT) 13 U/L      Alkaline Phosphatase 70 U/L      Total Bilirubin 0.2  mg/dL      eGFR Non African Amer 21 mL/min/1.73      Globulin 2.1 gm/dL      A/G Ratio 1.3 g/dL      BUN/Creatinine Ratio 50.2     Anion Gap 7.0 mmol/L     Narrative:       The MDRD GFR formula is only valid for adults with stable renal function between ages 18 and 70.    Uric Acid [[id number]]  (Abnormal) Collected:  03/02/19 0545    Specimen:  Blood Updated:  03/02/19 0711     Uric Acid 10.2 mg/dL     POC Glucose Once [[id number]]  (Abnormal) Collected:  03/01/19 2111    Specimen:  Blood Updated:  03/01/19 2134     Glucose 264 mg/dL      Comment: : [id number] Masoud HeatherMeter ID: [id number]       Protein, Urine, Random - Urine, Clean Catch [[id number]]  (Abnormal) Collected:  03/01/19 1858    Specimen:  Urine, Clean Catch Updated:  03/01/19 1920     Total Protein, Urine 23.0 mg/dL     Creatinine, Urine, Random - Urine, Clean Catch [[id number]] Collected:  03/01/19 1858    Specimen:  Urine, Clean Catch Updated:  03/01/19 1920     Creatinine, Urine 64.6 mg/dL     Sodium, Urine, Random - Urine, Clean Catch [[id number]]  (Abnormal) Collected:  03/01/19 1858    Specimen:  Urine, Clean Catch Updated:  03/01/19 1920     Sodium, Urine 26 mmol/L     Urea Nitrogen, Urine - Urine, Clean Catch [[id number]] Collected:  03/01/19 1858    Specimen:  Urine, Clean Catch Updated:  03/01/19 1920     Urea Nitrogen, Urine 654 mg/dL     POC Glucose Once [[id number]]  (Abnormal) Collected:  03/01/19 1700    Specimen:  Blood Updated:  03/01/19 1711     Glucose 176 mg/dL      Comment: : [id number] Yousif WilsonieMeter ID: [id number]       PTH, Intact [[id number]]  (Abnormal) Collected:  03/01/19 1350    Specimen:  Blood Updated:  03/01/19 1542     PTH, Intact 90.4 pg/mL           Radiology:   Imaging Results (last 24 hours)     Procedure Component Value Units Date/Time    US Renal Bilateral [[id number]] Collected:  03/01/19 1808     Updated:  03/01/19 1815    Narrative:       EXAMINATION: US RENAL BILATERAL- 3/1/2019 6:08 PM CST      HISTORY: acute kidney injury; E87.5-Hyperkalemia; N28.9-Disorder of  kidney and ureter, unspecified; D72.829-Elevated white blood cell count,  unspecified; I48.1-Persistent atrial fibrillation; I50.9-Heart failure,  unspecified.     REPORT: Sonographic images of the kidneys were obtained, comparison is  made with the abdominal ultrasound on 3/12/2018.     The right kidney measures 10.6 x 5.5 x 5.9 cm, there is mild cortical  thinning. There are 2 small benign-appearing cyst in the right kidney,  one at the inferior pole that measures 1.5 x 1.3 cm and 1 at the  interpolar level measures 1.5 x 1.1 cm. No solid masses seen. There is  no hydronephrosis. Renal cortical echogenicity on the right is within  normal limits.     The left kidney measures 10.2 x 5.9 x 4.6 cm, with mild cortical  thinning, greater at the upper pole. Within the hilum of the kidney,  there is a simple cyst that measures 2.3 x 2.5 x 2.6 cm. On the previous  ultrasound, this measured 2.0 x 3.3 cm. This is likely benign.     Images of the bladder demonstrate a linear echogenic material  dependently, this may represent blood products. Clinical correlation is  recommended. The aorta and is visualized proximally is normal caliber.       Impression:       1. Small bilateral benign-appearing renal cysts are noted. No mass or  hydronephrosis is identified. There is mild cortical thinning of both  kidneys.  2. Layering dependent echogenic material within the bladder which may  represent blood products. Clinical correlation is recommended.  This report was finalized on 03/01/2019 18:12 by Dr. Addison Rivero MD.              Assessment     1.  Acute kidney injury-- ATN  2.  Baseline of chronic kidney disease stage 3  3.  Type 2 diabetes  4.  Hypertension  5.  Hyperkalemia-better  6.  Hyperuricemia  7.  Secondary hyperparathyroidism    Plan:  Continue gentle hydration for now.  Follow up the labs.  We will add allopurinol by mouth and calcitriol.    Sj  Salvador Fournier MD  3/2/2019  3:21 PM

## 2019-03-02 NOTE — PROGRESS NOTES
[facility] Medicine Services  INPATIENT PROGRESS NOTE    Patient Name: Tamy Sher  Date of Admission: 2/28/2019  Today's Date: 03/02/19  Length of Stay: 1  Primary Care Physician: Barry Foley MD    Subjective   Chief Complaint: Feels ok    HPI   Has not been out of bed  No nausea  No pain  No shortness of breath.  Nursing at bedside.    Review of Systems     All pertinent negatives and positives are as above. All other systems have been reviewed and are negative unless otherwise stated.     Objective    Temp:  [97.8 °F (36.6 °C)-98.4 °F (36.9 °C)] 98.3 °F (36.8 °C)  Heart Rate:  [60-86] 60  Resp:  [16-20] 16  BP: ()/(46-72) 110/68  Physical Exam   Constitutional: She appears well-developed and well-nourished.   HENT:   Head: Normocephalic and atraumatic.   Eyes: Conjunctivae and EOM are normal. Pupils are equal, round, and reactive to light.   Neck: Neck supple.   Cardiovascular: Normal rate, regular rhythm, normal heart sounds and intact distal pulses. Exam reveals no gallop and no friction rub.   No murmur heard.  Pulmonary/Chest: Effort normal and breath sounds normal.   Abdominal: Soft. Bowel sounds are normal. There is no hepatosplenomegaly. There is no tenderness.   Musculoskeletal: Normal range of motion. She exhibits no edema.   Neurological: She is alert. No cranial nerve deficit.   Oriented to self     Skin: Skin is warm and dry.   Psychiatric: She has a normal mood and affect. Her behavior is normal.   Nursing note and vitals reviewed.          Results Review:  I have reviewed the labs, radiology results, and diagnostic studies.    Laboratory Data:   Results from last 7 days   Lab Units 03/01/19  0244 02/28/19  1222 02/28/19  0700   WBC 10*3/mm3 25.33* 24.96* 25.23*   HEMOGLOBIN g/dL 10.8* 11.4* 10.4*   HEMATOCRIT % 35.1* 36.3* 33.6*   PLATELETS 10*3/mm3 672* 646* 656*        Results from last 7 days   Lab Units 03/02/19  0545 03/01/19  1350  03/01/19  0244  02/28/19  1222   SODIUM mmol/L 137 138 140   < > 135   POTASSIUM mmol/L 4.2 4.7 5.6*   < > 6.2*   CHLORIDE mmol/L 108 108 107   < > 104   CO2 mmol/L 22.0* 24.0 22.0*   < > 22.0*   BUN mg/dL 111* 115* 121*   < > 139*   CREATININE mg/dL 2.21* 2.18* 2.47*   < > 3.01*   CALCIUM mg/dL 8.1* 8.2* 8.8   < > 8.8   BILIRUBIN mg/dL 0.2  --  0.3  --  0.3   ALK PHOS U/L 70  --  81  --  84   ALT (SGPT) U/L 15  --  15  --  <15   AST (SGOT) U/L 13  --  10  --  25   GLUCOSE mg/dL 104* 161* 154*   < > 248*    < > = values in this interval not displayed.       Culture Data:   Blood Culture   Date Value Ref Range Status   02/28/2019 No growth at 2 days  Preliminary   02/28/2019 No growth at 2 days  Preliminary     Urine Culture   Date Value Ref Range Status   02/28/2019 No growth at 24 hours  Preliminary       Radiology Data:   Imaging Results (last 24 hours)     Procedure Component Value Units Date/Time    US Renal Bilateral [[id number]] Collected:  03/01/19 1808     Updated:  03/01/19 1815    Narrative:       EXAMINATION: US RENAL BILATERAL- 3/1/2019 6:08 PM CST     HISTORY: acute kidney injury; E87.5-Hyperkalemia; N28.9-Disorder of  kidney and ureter, unspecified; D72.829-Elevated white blood cell count,  unspecified; I48.1-Persistent atrial fibrillation; I50.9-Heart failure,  unspecified.     REPORT: Sonographic images of the kidneys were obtained, comparison is  made with the abdominal ultrasound on 3/12/2018.     The right kidney measures 10.6 x 5.5 x 5.9 cm, there is mild cortical  thinning. There are 2 small benign-appearing cyst in the right kidney,  one at the inferior pole that measures 1.5 x 1.3 cm and 1 at the  interpolar level measures 1.5 x 1.1 cm. No solid masses seen. There is  no hydronephrosis. Renal cortical echogenicity on the right is within  normal limits.     The left kidney measures 10.2 x 5.9 x 4.6 cm, with mild cortical  thinning, greater at the upper pole. Within the hilum of the  kidney,  there is a simple cyst that measures 2.3 x 2.5 x 2.6 cm. On the previous  ultrasound, this measured 2.0 x 3.3 cm. This is likely benign.     Images of the bladder demonstrate a linear echogenic material  dependently, this may represent blood products. Clinical correlation is  recommended. The aorta and is visualized proximally is normal caliber.       Impression:       1. Small bilateral benign-appearing renal cysts are noted. No mass or  hydronephrosis is identified. There is mild cortical thinning of both  kidneys.  2. Layering dependent echogenic material within the bladder which may  represent blood products. Clinical correlation is recommended.  This report was finalized on 03/01/2019 18:12 by Dr. Addison Rivero MD.          I have reviewed the patient's current medications.     Assessment/Plan       Assessment:      Hyperkalemia  Acute on chronic renal failure  DM II   Resume home medications as ordered.  Consult nephrology  Leukocytosis, persistent unknown if there has been any work up for this.  HTN    Plan    Hematology consult for persistent leukocytosis  Appreciate nephrology  Lab in AM cmp cbc  Chair for meals.     Discharge Planning: I expect the patient to be discharged to NH in 2-3 days.    Manda Andrew DO   03/02/19   4:39 PM

## 2019-03-02 NOTE — PLAN OF CARE
Problem: Skin Injury Risk (Adult)  Goal: Identify Related Risk Factors and Signs and Symptoms  Outcome: Ongoing (interventions implemented as appropriate)    Goal: Skin Health and Integrity  Outcome: Ongoing (interventions implemented as appropriate)   03/02/19 0527   Skin Injury Risk (Adult)   Skin Health and Integrity making progress toward outcome       Problem: Fall Risk (Adult)  Goal: Absence of Fall  Outcome: Ongoing (interventions implemented as appropriate)   03/02/19 0527   Fall Risk (Adult)   Absence of Fall making progress toward outcome       Problem: Patient Care Overview  Goal: Individualization and Mutuality  Outcome: Ongoing (interventions implemented as appropriate)    Goal: Interprofessional Rounds/Family Conf  Outcome: Ongoing (interventions implemented as appropriate)   03/02/19 0527   Interdisciplinary Rounds/Family Conf   Participants nursing;patient

## 2019-03-02 NOTE — PLAN OF CARE
Problem: Skin Injury Risk (Adult)  Goal: Skin Health and Integrity  Outcome: Ongoing (interventions implemented as appropriate)      Problem: Fall Risk (Adult)  Goal: Absence of Fall  Outcome: Ongoing (interventions implemented as appropriate)      Problem: Patient Care Overview  Goal: Plan of Care Review   03/02/19 2266   Coping/Psychosocial   Plan of Care Reviewed With patient   Plan of Care Review   Progress no change   OTHER   Outcome Summary Pt is alert and oriented X 4. Pt potassium is 4.2. Pt is bedfast. Pt has tremors. Pt blood sugar has been running in the high 100s. Pt has been in and out of sleep for most of the shift. Pt takes pills by mouth without difficulty. VSS. Safety maintained. Will continue to monitor.

## 2019-03-02 NOTE — PLAN OF CARE
Problem: Patient Care Overview  Goal: Plan of Care Review  Outcome: Ongoing (interventions implemented as appropriate)   03/02/19 5468   Plan of Care Review   Progress no change   OTHER   Outcome Summary RD nutrition assessment completed. Pt's po intake 50% at this time. Pt most likely will benefit from protein supplementation after renal function improves

## 2019-03-03 NOTE — PROGRESS NOTES
[facility] Medicine Services  INPATIENT PROGRESS NOTE    Patient Name: Tamy Sher  Date of Admission: 2/28/2019  Today's Date: 03/03/19  Length of Stay: 2  Primary Care Physician: Barry Foley MD    Subjective   Chief Complaint: Feels a little better today    HPI   No nausea.  Drinking a chocolate shake.   No chest pain.  No increased shortness of breath.   Alves placed yesterday due to retention.        Review of Systems     All pertinent negatives and positives are as above. All other systems have been reviewed and are negative unless otherwise stated.     Objective    Temp:  [98 °F (36.7 °C)-98.3 °F (36.8 °C)] 98.2 °F (36.8 °C)  Heart Rate:  [60-90] 90  Resp:  [16-18] 18  BP: (101-110)/(42-68) 108/53  Physical Exam   Constitutional: She appears well-developed and well-nourished.   HENT:   Head: Normocephalic and atraumatic.   Eyes: EOM are normal. Pupils are equal, round, and reactive to light.   Conjunctival pallor   Neck: Neck supple.   Cardiovascular: Normal rate and regular rhythm. Exam reveals no gallop and no friction rub.   No murmur heard.  Pulmonary/Chest: Effort normal and breath sounds normal.   Abdominal: Soft. Bowel sounds are normal. There is no hepatosplenomegaly. There is no tenderness.   Musculoskeletal: Normal range of motion. She exhibits no edema.   Neurological: She is alert. No cranial nerve deficit.   Oriented to person and place.  Children at bedside.    Skin: Skin is warm and dry.   Psychiatric: She has a normal mood and affect. Her behavior is normal.   Nursing note and vitals reviewed.          Results Review:  I have reviewed the labs, radiology results, and diagnostic studies.    Laboratory Data:   Results from last 7 days   Lab Units 03/01/19  0244 02/28/19  1222 02/28/19  0700   WBC 10*3/mm3 25.33* 24.96* 25.23*   HEMOGLOBIN g/dL 10.8* 11.4* 10.4*   HEMATOCRIT % 35.1* 36.3* 33.6*   PLATELETS 10*3/mm3 672* 646* 656*        Results from  last 7 days   Lab Units 03/03/19  0528 03/02/19  0545 03/01/19  1350 03/01/19  0244  02/28/19  1222   SODIUM mmol/L 138 137 138 140   < > 135   POTASSIUM mmol/L 4.6 4.2 4.7 5.6*   < > 6.2*   CHLORIDE mmol/L 109 108 108 107   < > 104   CO2 mmol/L 20.0* 22.0* 24.0 22.0*   < > 22.0*   BUN mg/dL 100* 111* 115* 121*   < > 139*   CREATININE mg/dL 2.01* 2.21* 2.18* 2.47*   < > 3.01*   CALCIUM mg/dL 8.0* 8.1* 8.2* 8.8   < > 8.8   BILIRUBIN mg/dL  --  0.2  --  0.3  --  0.3   ALK PHOS U/L  --  70  --  81  --  84   ALT (SGPT) U/L  --  15  --  15  --  <15   AST (SGOT) U/L  --  13  --  10  --  25   GLUCOSE mg/dL 142* 104* 161* 154*   < > 248*    < > = values in this interval not displayed.       Culture Data:   Blood Culture   Date Value Ref Range Status   02/28/2019 No growth at 3 days  Preliminary   02/28/2019 No growth at 3 days  Preliminary     Urine Culture   Date Value Ref Range Status   03/02/2019 No growth at 24 hours  Preliminary   02/28/2019 No growth at 2 days  Final       Radiology Data:   Imaging Results (last 24 hours)     ** No results found for the last 24 hours. **          I have reviewed the patient's current medications.     Assessment/Plan     Active Hospital Problems    Diagnosis   • Hyperkalemia             Assessment:      Hyperkalemia  Acute on chronic renal failure  DM II   Resume home medications as ordered.  Consult nephrology  Leukocytosis, persistent unknown if there has been any work up for this.  HTN    Plan    Consult Heme   Am lab cmp cbc peripheral smear.  Iron studies.  Mobilize patient   Chair for meals as ordered yesterday.          Discharge Planning: I expect the patient to be discharged to NH in 1-3 days.    Manda Andrew DO   03/03/19   3:34 PM

## 2019-03-03 NOTE — PROGRESS NOTES
" PROGRESS NOTE.      Patient:  Tamy Sher  YOB: 1930  Date of Service: 3/3/2019  MRN: [id number]   Acct: [id number]   Primary Care Physician: Barry Foley MD  Advance Directive:   Code Status and Medical Interventions:   Ordered at: 02/28/19 1515     Limited Support to NOT Include:    Cardioversion/Defibrillation    Intubation     Level Of Support Discussed With:    Health Care Surrogate     Code Status:    No CPR     Medical Interventions (Level of Support Prior to Arrest):    Limited     Admit Date: 2/28/2019       Hospital Day: 2  Referring Provider: Manda Andrew DO      Patient Seen, Chart, Consults, Notes, Labs, Radiology studies reviewed.        Subjective:  Tamy Sher is a 88 y.o. female  whom we were consulted for acute kidney injury.  Baseline of chronic kidney disease stage 3; baseline creatinine 1.1 mg. Does not follow with nephrology.  History of type 2 diabetes, benign hypertension, dementia.  Patient had routine outpatient labs done at her nursing home which showed hyperkalemia and acute kidney injury. Was sent to emergency department for further evaluation.  Patient does take daily potassium supplement.  Patient is able to offer limited history, not sure why she is in hospital.   Today, she has been more awake but still has low appetite.       Review of Systems:  History obtained from chart review and the patient  General ROS: No fever or chills  Respiratory ROS: No cough, shortness of breath, wheezing  Cardiovascular ROS: no chest pain or dyspnea on exertion  Gastrointestinal ROS: No abdominal pain or melena  Genito-Urinary ROS: No dysuria or hematuria  Musculoskeletal: negative  Skin: negative    Objective:  /53 (BP Location: Left arm, Patient Position: Sitting)   Pulse 81   Temp 98.1 °F (36.7 °C) (Oral)   Resp 18   Ht 153.7 cm (60.5\")   Wt 74.2 kg (163 lb 9 oz)   SpO2 99%   BMI 31.42 kg/m²     Intake/Output Summary (Last 24 hours) at 3/3/2019 " 1650  Last data filed at 3/3/2019 0707  Gross per 24 hour   Intake 2113 ml   Output 300 ml   Net 1813 ml       Physical examination:    General: awake/alert    Neck: supple, no JVD  Chest:  clear to auscultation bilaterally without respiratory distress  CVS: regular rate and rhythm  Abdominal: soft, nontender, normal bowel sounds  Extremities: no cyanosis or edema  Skin: warm and dry without rash  Neuro: no focal motor deficits        Labs:  Lab Results (last 24 hours)     Procedure Component Value Units Date/Time    POC Glucose Once [[id number]]  (Abnormal) Collected:  03/03/19 1616    Specimen:  Blood Updated:  03/03/19 1627     Glucose 215 mg/dL      Comment: : [id number] NovaledraMeter ID: [id number]       Peripheral Blood Smear [[id number]] Collected:  03/03/19 1547    Specimen:  Blood Updated:  03/03/19 1552    Blood Culture - Blood, Arm, Left [[id number]] Collected:  02/28/19 1323    Specimen:  Blood from Arm, Left Updated:  03/03/19 1415     Blood Culture No growth at 3 days    Blood Culture - Blood, Hand, Left [[id number]] Collected:  02/28/19 1315    Specimen:  Blood from Hand, Left Updated:  03/03/19 1345     Blood Culture No growth at 3 days    POC Glucose Once [[id number]]  (Abnormal) Collected:  03/03/19 1101    Specimen:  Blood Updated:  03/03/19 1112     Glucose 185 mg/dL      Comment: : [id number] NovaledraMeter ID: [id number]       POC Glucose Once [[id number]]  (Abnormal) Collected:  03/03/19 0739    Specimen:  Blood Updated:  03/03/19 0751     Glucose 138 mg/dL      Comment: : [id number] NovaledraMeter ID: [id number]       Urine Culture - Urine, Urine, Catheter In/Out [[id number]]  (Normal) Collected:  02/28/19 1307    Specimen:  Urine, Catheter In/Out Updated:  03/03/19 0644     Urine Culture No growth at 2 days    Basic Metabolic Panel [[id number]]  (Abnormal) Collected:  03/03/19 0528    Specimen:  Blood Updated:  03/03/19 0636     Glucose 142 mg/dL        mg/dL      Creatinine 2.01 mg/dL      Sodium 138 mmol/L      Potassium 4.6 mmol/L      Chloride 109 mmol/L      CO2 20.0 mmol/L      Calcium 8.0 mg/dL      eGFR Non African Amer 23 mL/min/1.73      BUN/Creatinine Ratio 49.8     Anion Gap 9.0 mmol/L     Narrative:       The MDRD GFR formula is only valid for adults with stable renal function between ages 18 and 70.    Urine Culture - Urine, Urine, Catheter [[id number]]  (Normal) Collected:  03/02/19 2123    Specimen:  Urine, Catheter Updated:  03/03/19 0614     Urine Culture No growth at 24 hours    POC Glucose Once [[id number]]  (Abnormal) Collected:  03/02/19 2136    Specimen:  Blood Updated:  03/02/19 2201     Glucose 242 mg/dL      Comment: : [id number] Masoud HeatherMeter ID: [id number]       Urinalysis With Culture If Indicated - Urine, Catheter [[id number]]  (Abnormal) Collected:  03/02/19 2123    Specimen:  Urine, Catheter Updated:  03/02/19 2148     Color, UA Yellow     Appearance, UA Turbid     pH, UA <=5.0     Specific Gravity, UA 1.015     Glucose, UA Negative     Ketones, UA Negative     Bilirubin, UA Negative     Blood, UA Small (1+)     Protein,  mg/dL (2+)     Leuk Esterase, UA Large (3+)     Nitrite, UA Negative     Urobilinogen, UA 0.2 E.U./dL    Urinalysis, Microscopic Only - Urine, Catheter [[id number]]  (Abnormal) Collected:  03/02/19 2123    Specimen:  Urine, Catheter Updated:  03/02/19 2148     RBC, UA 3-5 /HPF      WBC, UA Too Numerous to Count /HPF      Bacteria, UA 1+ /HPF      Squamous Epithelial Cells, UA 3-6 /HPF      Hyaline Casts, UA 3-6 /LPF      Calcium Oxalate Crystals, UA Small/1+ /HPF      Methodology Manual Light Microscopy          Radiology:   Imaging Results (last 24 hours)     ** No results found for the last 24 hours. **              Assessment     1.  Acute kidney injury-- ATN  2.  Baseline of chronic kidney disease stage 3  3.  Type 2 diabetes  4.  Hypertension  5.  Hyperkalemia-better  6.  Hyperuricemia  7.   Secondary hyperparathyroidism    Plan:  Continue gentle hydration for now.  Follow up the labs.  Continue allopurinol by mouth and calcitriol. No dialysis for now.     Sj Fournier MD  3/3/2019  4:50 PM

## 2019-03-03 NOTE — PLAN OF CARE
Problem: Skin Injury Risk (Adult)  Goal: Skin Health and Integrity  Outcome: Ongoing (interventions implemented as appropriate)   03/03/19 0359   Skin Injury Risk (Adult)   Skin Health and Integrity making progress toward outcome       Problem: Fall Risk (Adult)  Goal: Identify Related Risk Factors and Signs and Symptoms  Outcome: Ongoing (interventions implemented as appropriate)   03/03/19 0359   Fall Risk (Adult)   Related Risk Factors (Fall Risk) age-related changes;confusion/agitation;inadequate lighting;slippery/uneven surfaces;environment unfamiliar   Signs and Symptoms (Fall Risk) presence of risk factors     Goal: Absence of Fall  Outcome: Ongoing (interventions implemented as appropriate)   03/03/19 0359   Fall Risk (Adult)   Absence of Fall making progress toward outcome       Problem: Patient Care Overview  Goal: Plan of Care Review  Outcome: Ongoing (interventions implemented as appropriate)   03/03/19 0359   Coping/Psychosocial   Plan of Care Reviewed With patient   Plan of Care Review   Progress no change     Goal: Individualization and Mutuality  Outcome: Ongoing (interventions implemented as appropriate)    Goal: Interprofessional Rounds/Family Conf  Outcome: Ongoing (interventions implemented as appropriate)   03/03/19 0359   Interdisciplinary Rounds/Family Conf   Participants nursing;patient

## 2019-03-04 NOTE — PLAN OF CARE
Problem: Skin Injury Risk (Adult)  Goal: Identify Related Risk Factors and Signs and Symptoms  Outcome: Outcome(s) achieved Date Met: 03/04/19    Goal: Skin Health and Integrity  Outcome: Ongoing (interventions implemented as appropriate)      Problem: Fall Risk (Adult)  Goal: Identify Related Risk Factors and Signs and Symptoms  Outcome: Outcome(s) achieved Date Met: 03/04/19    Goal: Absence of Fall  Outcome: Ongoing (interventions implemented as appropriate)      Problem: Patient Care Overview  Goal: Plan of Care Review  Outcome: Ongoing (interventions implemented as appropriate)   03/04/19 0352   Coping/Psychosocial   Plan of Care Reviewed With patient   Plan of Care Review   Progress no change   OTHER   Outcome Summary Patient has no c/o pain. Continues to receive IVF. Alves cath in place and draining. VSS. Safety maintained. Will continue to monitor.

## 2019-03-04 NOTE — PROGRESS NOTES
Continued Stay Note  [facility]     Patient Name: Tamy Sher  MRN: [id number]  Today's Date: 3/4/2019    Admit Date: 2/28/2019    Discharge Plan     Row Name 03/04/19 0909       Plan    Plan  [facility]    Patient/Family in Agreement with Plan  yes    Plan Comments  Pt will return to [facility] upon discharge from hospital.  [phone]        Discharge Codes    No documentation.             VALENTINO Corley

## 2019-03-04 NOTE — PLAN OF CARE
Problem: Patient Care Overview  Goal: Plan of Care Review  Outcome: Ongoing (interventions implemented as appropriate)   03/04/19 1202   Coping/Psychosocial   Plan of Care Reviewed With patient   Plan of Care Review   Progress improving   OTHER   Outcome Summary PT eval completed. Pt confused and oriented to self only. Pt reported c/os discomfort at her bottom. Noted dressing at wound site once in sitting. Pt demonstrates weakness throughout. Rolls w/ mod assist of 2, transfers supine to/from sitting w/ max assist of 2. Attempted standing 3 times w/ max assist of 2. Pt unable to achieve upright standing w/ difficulty clearing hips from bed. Pt w/ decrease sitting balance as well requiring min to mod assist of 1 to maintain. Staff assisted pt to the chair using mechanical lift. Placed exit alarm and activated, notifed RN of this as well as pt c/os pain. Pt with decrease strength and balance w/ decrease mobility. Pt will benefit from continued skilled PT services to improve strength, bed mobility, balance, and ability to perform transfers safely progressing activity only when safely appropriate. Recommend return to SNF w/ continued care upon discharge from acute care.

## 2019-03-04 NOTE — PLAN OF CARE
Problem: Skin Injury Risk (Adult)  Goal: Skin Health and Integrity  Outcome: Ongoing (interventions implemented as appropriate)      Problem: Fall Risk (Adult)  Goal: Absence of Fall  Outcome: Ongoing (interventions implemented as appropriate)      Problem: Patient Care Overview  Goal: Plan of Care Review   03/04/19 2167   Coping/Psychosocial   Plan of Care Reviewed With patient   Plan of Care Review   Progress improving   OTHER   Outcome Summary up to chair with mechanical lift, Hem/Onc has seen patient today, estrada catheter discontinue, mag citrate and miralax given to patient, family at bedside, VSS, will continue to monitor       Problem: Wound (Includes Pressure Injury) (Adult)  Goal: Signs and Symptoms of Listed Potential Problems Will be Absent, Minimized or Managed (Wound)  Outcome: Ongoing (interventions implemented as appropriate)

## 2019-03-04 NOTE — PLAN OF CARE
Problem: Patient Care Overview  Goal: Plan of Care Review  Outcome: Ongoing (interventions implemented as appropriate)   03/04/19 1052   Coping/Psychosocial   Plan of Care Reviewed With patient   Plan of Care Review   Progress improving   OTHER   Outcome Summary OT eval completed. Pt oriented to self only, follows commands approx 90% of the time but requires repetition of directions, verbal cues and increased processing time. Pt required Mod Ax2 for rolling left/right, Max A x2 for supine <> sit. Pt Max A for donning socks. Pt attempted sit <> stand x2 reps with Max A x2 and rwx. Pt minimally able to clear bottom off bed. Mechanical lift used for bed to chair t/f. Pt would benefit from skilled OT services to address decreased strength, balance, cognition, activity tolerance/endurance, which impact her ability to complete adls, t/fs, and mobility. Recommend d/c back to SNF from facility.

## 2019-03-04 NOTE — THERAPY EVALUATION
Acute Care - Occupational Therapy Initial Evaluation  [facility]     Patient Name: Tamy Sher  : 1930  MRN: [id number]  Today's Date: 3/4/2019  Onset of Illness/Injury or Date of Surgery: 19  Date of Referral to OT: 19  Referring Physician: Dr. Andrew     Admit Date: 2019       ICD-10-CM ICD-9-CM   1. Hyperkalemia E87.5 276.7   2. Acute renal insufficiency N28.9 593.9   3. Leukocytosis, unspecified type D72.829 288.60   4. Persistent atrial fibrillation (CMS/HCC) I48.1 427.31   5. Congestive heart failure, unspecified HF chronicity, unspecified heart failure type (CMS/HCC) I50.9 428.0   6. Impaired functional mobility, balance, and endurance Z74.09 V49.89   7. Decreased activities of daily living (ADL) R68.89 780.99     Patient Active Problem List   Diagnosis   • Ischemic chest pain   • Pneumonia of both lungs due to infectious organism   • Fall   • Closed displaced comminuted fracture of shaft of right femur (CMS/HCC)   • Pain of right thigh   • Class 2 obesity with serious comorbidity in adult   • Type 2 diabetes mellitus with neurologic complication, with long-term current use of insulin (CMS/HCC)   • STUART (acute kidney injury) (CMS/HCC)   • Chronic diastolic heart failure (CMS/HCC)   • Pleural effusion, left   • Hypoxia   • Surgical wound infection, initial encounter   • Pleural effusion due to CHF (congestive heart failure) (CMS/HCC)   • Hyperkalemia     Past Medical History:   Diagnosis Date   • Constipation    • Diabetes mellitus (CMS/HCC)    • Diarrhea    • Diastolic dysfunction, left ventricle    • Esophagitis    • Exertional shortness of breath    • Hyperlipidemia    • Hypertension    • Lumbar spondylitis (CMS/HCC)    • Osteoarthritis    • Peripheral vascular disease (CMS/HCC)    • Sinusitis    • Stroke (CMS/HCC)      Past Surgical History:   Procedure Laterality Date   • FEMUR SUPRACONDYLAR FRACTURE REPAIR Right 3/10/2018    Procedure: FEMUR SUPRACONDYLAR NAIL;  Surgeon: Nima MCDERMOTT  MD Gregor;  Location: [facility] OR;  Service: Orthopedics   • GALLBLADDER SURGERY     • HYSTERECTOMY     • JOINT REPLACEMENT     • REPLACEMENT TOTAL KNEE Left           OT ASSESSMENT FLOWSHEET (last 72 hours)      Occupational Therapy Evaluation     Row Name 03/04/19 0845                   OT Evaluation Time/Intention    Subjective Information  complains of;weakness  -JJ        Document Type  evaluation see MAR  -JJ        Mode of Treatment  occupational therapy;concurrent therapy  -JJ        Comment  Difficult to determine PLOF d/t cognitive status.   -JJ           General Information    Patient Profile Reviewed?  yes  -JJ        Onset of Illness/Injury or Date of Surgery  02/28/19  -JJ        Referring Physician  Dr. Andrew   -JJ        Patient Observations  alert;cooperative;agree to therapy  -JJ        Patient/Family Observations  no family present in room   -JJ        General Observations of Patient  sitting up in bed, alert and in  no apparent distress  -JJ        Equipment Currently Used at Home  walker, rolling;commode;bath bench  -JJ        Pertinent History of Current Functional Problem  Pt w/ elevated potassium on routine lab work.  Admitted w/ hyperkalemia, acute renal insufficiency, leukocyosis, persistent afib, and CHF.  Pt also w/ hx of dementia, DM, diastolic dysfunction, exertional shortness of breath, HTN, lumbar spondylitis, OA, PVD, stroke, femur fx w/ repair on R, and L knee replacement.  -JJ        Existing Precautions/Restrictions  fall  -JJ        Equipment Issued to Patient  gait belt;walker, front wheeled  -JJ        Risks Reviewed  patient:;LOB;nausea/vomiting;dizziness;increased discomfort;change in vital signs;increased drainage;lines disloged  -JJ        Benefits Reviewed  patient:;improve function;increase independence;increase strength;increase balance;decrease pain;decrease risk of DVT;improve skin integrity;increase knowledge  -JJ           Relationship/Environment    Primary Source of  Support/Comfort  child(anival)  -JJ        Lives With  facility resident  -J        Name(s) of Who Lives With Patient  [facility]  -           Resource/Environmental Concerns    Current Living Arrangements  residential facility  -        Resource/Environmental Concerns  none  -        Transportation Concerns  car, none  -           Cognitive Assessment/Interventions    Additional Documentation  Cognitive Assessment/Intervention (Group)  -           Cognitive Assessment/Intervention- PT/OT    Affect/Mental Status (Cognitive)  confused  -        Orientation Status (Cognition)  oriented to;person;disoriented to;place;situation;time  -JJ        Follows Commands (Cognition)  follows one step commands;over 90% accuracy;repetition of directions required;verbal cues/prompting required;increased processing time needed  -        Cognitive Function (Cognitive)  memory deficit  -        Safety Deficit (Cognitive)  insight into deficits/self awareness;awareness of need for assistance;ability to follow commands;safety precautions awareness;safety precautions follow-through/compliance  -        Personal Safety Interventions  fall prevention program maintained;gait belt;muscle strengthening facilitated;nonskid shoes/slippers when out of bed  -           Safety Issues, Functional Mobility    Safety Issues Affecting Function (Mobility)  ability to follow commands;awareness of need for assistance;friction/shear risk;insight into deficits/self awareness;safety precaution awareness;safety precautions follow-through/compliance  -        Impairments Affecting Function (Mobility)  balance;cognition;endurance/activity tolerance;pain;strength  -           Bed Mobility Assessment/Treatment    Bed Mobility Assessment/Treatment  rolling left;rolling right;supine-sit;sit-supine  -        Rolling Left [place] (Bed Mobility)  moderate assist (50% patient effort);2 person assist  -        Rolling Right [place] (Bed  Mobility)  moderate assist (50% patient effort);2 person assist  -J        Supine-Sit [place] (Bed Mobility)  maximum assist (25% patient effort);2 person assist  -JJ        Sit-Supine [place] (Bed Mobility)  maximum assist (25% patient effort);2 person assist  -JJ        Bed Mobility, Safety Issues  cognitive deficits limit understanding;decreased use of arms for pushing/pulling;decreased use of legs for bridging/pushing  -        Assistive Device (Bed Mobility)  bed rails;draw sheet;mechanical lift/aid  -           Functional Mobility    Functional Mobility- Ind. Level  unable to perform  -        Functional Mobility- Comment  Pt attempted to complete sit <> stand x2 reps with Max A x2, unable to achieve upright, minimally cleared bottom from bed.   -           Transfer Assessment/Treatment    Transfer Assessment/Treatment  sit-stand transfer;stand-sit transfer;bed-chair transfer  -           Bed-Chair Transfer    Bed-Chair [place] (Transfers)  dependent (less than 25% patient effort)  -        Assistive Device (Bed-Chair Transfers)  mechanical lift/aid  -           Sit-Stand Transfer    Sit-Stand [place] (Transfers)  maximum assist (25% patient effort);2 person assist  -        Assistive Device (Sit-Stand Transfers)  walker, front-wheeled  -JJ           Stand-Sit Transfer    Stand-Sit [place] (Transfers)  maximum assist (25% patient effort);2 person assist  -        Assistive Device (Stand-Sit Transfers)  walker, front-wheeled  -JJ           ADL Assessment/Intervention    BADL Assessment/Intervention  lower body dressing  -           Lower Body Dressing Assessment/Training    Lower Body Dressing [place] Level  don;socks;maximum assist (25% patient effort)  -        Lower Body Dressing Position  edge of bed sitting  -           BADL Safety/Performance    Impairments, BADL Safety/Performance  balance;cognition;endurance/activity tolerance;pain;range of  motion;strength;trunk/postural control  -JJ        Cognitive Impairments, BADL Safety/Performance  awareness, need for assistance;insight into deficits/self awareness;safety precaution awareness;safety precaution follow-through  -JJ        Skilled BADL Treatment/Intervention  BADL process/adaptation training  -JJ           General ROM    GENERAL ROM COMMENTS  BUE AROM WFL   -JJ           MMT (Manual Muscle Testing)    General MMT Comments  BUE strength functionally 4-/5  -JJ           Motor Assessment/Interventions    Additional Documentation  Balance (Group);Fine Motor Testing & Training (Group)  -JJ           Balance    Balance  static sitting balance;static standing balance  -JJ           Static Sitting Balance    Level of [place] (Unsupported Sitting, Static Balance)  minimal assist, 75% patient effort;moderate assist, 50 to 74% patient effort  -JJ        Sitting Position (Unsupported Sitting, Static Balance)  sitting on edge of bed  -        Level of [place] (Supported Sitting, Static Balance)  contact guard assist;minimal assist, 75% patient effort  -JJ        Sitting Position (Supported Sitting, Static Balance)  sitting on edge of bed  -JJ           Static Standing Balance    Level of [place] (Supported Standing, Static Balance)  maximal assist, 25 to 49% patient effort;2 person assist  -JJ        Assistive Device Utilized (Supported Standing, Static Balance)  walker, rolling  -           Fine Motor Testing & Training    Comment, Fine Motor Coordination  Pt demo's resting hand and head tremors.   -JJ           Sensory Assessment/Intervention    Sensory General Assessment  no sensation deficits identified per pt report  -JJ           Positioning and Restraints    Pre-Treatment Position  in bed  -JJ        Post Treatment Position  chair  -JJ        In Chair  reclined;call light within reach;encouraged to call for assist;exit alarm on;notified nsg;legs elevated;RLE elevated;LLE elevated;RUE  elevated  -JJ           Pain Assessment    Additional Documentation  Pain Scale: FACES Pre/Post-Treatment (Group)  -JJ           Pain Scale: Numbers Pre/Post-Treatment    Pain Intervention(s)  Repositioned;Ambulation/increased activity  -JJ           Pain Scale: FACES Pre/Post-Treatment    Pain: FACES Scale, Pretreatment  2-->hurts little bit  -JJ        Pain: FACES Scale, Post-Treatment  4-->hurts little more  -JJ           [REMOVED] Wound 03/15/18 2100 Right heel pressure injury    Wound - Properties Group Date first assessed: 03/15/18  -KH Time first assessed: 2100  -KH Side: Right  -KH Location: heel  -KH Type: pressure injury  -KH Stage, Pressure Injury: deep tissue injury;Stage 1  -KH Additional Comments: --  -KH, picture taken  Resolution Date: 03/04/19  -TW Resolution Time: 1043  -TW       [REMOVED] Wound 03/19/18 0915 midline perirectal skin tear    Wound - Properties Group Date first assessed: 03/19/18  -AW Time first assessed: 0915  -AW Present On Admission : no  -AW Orientation: midline  -AW Location: perirectal  -AW Type: skin tear  -AW Stage, Pressure Injury: other (see comments)  -AW, skin tear  Resolution Date: 03/03/19  -HA Resolution Time: 1725  -HA       [REMOVED] Wound 03/31/18 0043 Right knee incision    Wound - Properties Group Date first assessed: 03/31/18  -MB Time first assessed: 0043  -MB Present On Admission : yes;picture taken  -MB Side: Right  -MB Location: knee  -MB Type: incision  -MB Additional Comments: wound dehiscence  -MB Resolution Date: 03/01/19  -MELODY Resolution Time: 1242  -MELODY       [REMOVED] Wound 03/31/18 0012 Right upper thigh surgical    Wound - Properties Group Date first assessed: 03/31/18  -MB Time first assessed: 0012  -MB Present On Admission : yes  -MB Side: Right  -MB Orientation: upper  -MB Location: thigh  -MB Type: surgical  -MB, open incision  Resolution Date: 03/01/19  -MELODY Resolution Time: 1242  -MELODY       Wound 03/01/19 1030 Bilateral coccyx pressure injury     Wound - Properties Group Date first assessed: 03/01/19  -RL Time first assessed: 1030  -RL Present On Admission : yes;picture taken  -RL Side: Bilateral  -RL Location: coccyx  -RL Type: pressure injury  -RL Stage, Pressure Injury: Stage 2  -RL       [REMOVED] Wound 03/31/18 0240 perirectal    Wound - Properties Group Date first assessed: 03/31/18  -MB Time first assessed: 0240  -MB Present On Admission : yes;picture taken  -MB Location: perirectal  -MB Stage, Pressure Injury: deep tissue injury  -MB Resolution Date: 03/04/19  -TW Resolution Time: 1043  -TW       [REMOVED] Wound 03/31/18 1600 Right heel    Wound - Properties Group Date first assessed: 03/31/18  -DB Time first assessed: 1600  -DB Side: Right  -DB Location: heel  -DB Stage, Pressure Injury: deep tissue injury  -DB Resolution Date: 03/04/19  -TW Resolution Time: 1043  -TW       Coping    Verbalized Emotional State  acceptance  -J           Plan of Care Review    Plan of Care Reviewed With  patient  -JJ           Clinical Impression (OT)    Date of Referral to OT  03/01/19  -JJ        OT Diagnosis  decreased adls  -JJ        Prognosis (OT Eval)  fair  -JJ        Patient/Family Goals Statement (OT Eval)  return to SNF  -J        Criteria for Skilled Therapeutic Interventions Met (OT Eval)  yes;treatment indicated  -JJ        Rehab Potential (OT Eval)  fair, will monitor progress closely  -JJ        Therapy Frequency (OT Eval)  3 times/wk  -JJ        Predicted Duration of Therapy Intervention (Therapy Eval)  until d/c from facility  -J        Care Plan Review (OT)  evaluation/treatment results reviewed;care plan/treatment goals reviewed;risks/benefits reviewed;current/potential barriers reviewed;patient/other agree to care plan  -JJ        Anticipated Discharge Disposition (OT)  skilled nursing facility  -           Planned OT Interventions    Planned Therapy Interventions (OT Eval)  activity tolerance training;BADL retraining;functional balance  retraining;adaptive equipment training;occupation/activity based interventions;patient/caregiver education/training;strengthening exercise;transfer/mobility retraining  -JJ           OT Goals    Bed Mobility Goal Selection (OT)  bed mobility, OT goal 1  -JJ        Transfer Goal Selection (OT)  transfer, OT goal 1  -JJ        Dressing Goal Selection (OT)  dressing, OT goal 1  -JJ        Toileting Goal Selection (OT)  toileting, OT goal 1  -JJ           Bed Mobility Goal 1 (OT)    Activity/Assistive Device (Bed Mobility Goal 1, OT)  bed mobility activities, all  -JJ        [place] Level/Cues Needed (Bed Mobility Goal 1, OT)  moderate assist (50-74% patient effort)  -JJ        Time Frame (Bed Mobility Goal 1, OT)  long term goal (LTG);by discharge  -JJ        Progress/Outcomes (Bed Mobility Goal 1, OT)  goal ongoing  -JJ           Transfer Goal 1 (OT)    Activity/Assistive Device (Transfer Goal 1, OT)  sit-to-stand/stand-to-sit;bed-to-chair/chair-to-bed;commode, bedside without drop arms;walker, rolling  -JJ        [place] Level/Cues Needed (Transfer Goal 1, OT)  moderate assist (50-74% patient effort);2 person assist  -JJ        Time Frame (Transfer Goal 1, OT)  long term goal (LTG);by discharge  -JJ        Progress/Outcome (Transfer Goal 1, OT)  goal ongoing  -JJ           Dressing Goal 1 (OT)    Activity/Assistive Device (Dressing Goal 1, OT)  dressing skills, all  -JJ        [place]/Cues Needed (Dressing Goal 1, OT)  moderate assist (50-74% patient effort)  -JJ        Time Frame (Dressing Goal 1, OT)  long term goal (LTG);by discharge  -JJ        Progress/Outcome (Dressing Goal 1, OT)  goal ongoing  -JJ           Toileting Goal 1 (OT)    Activity/Device (Toileting Goal 1, OT)  commode, bedside without drop arms;adjust/manage clothing;perform perineal hygiene  -JJ        [place] Level/Cues Needed (Toileting Goal 1, OT)  moderate assist (50-74% patient effort)  -JJ        Time Frame (Toileting  Goal 1, OT)  long term goal (LTG);by discharge  -        Progress/Outcome (Toileting Goal 1, OT)  goal ongoing  -           Living Environment    Home Accessibility  wheelchair accessible  -          User Key  (r) = Recorded By, (t) = Taken By, (c) = Cosigned By    Initials Name Effective Dates    KH Marilyn Francis RN 08/02/16 -     TW Hamida Lowery RN 08/02/16 -     DB Thuy Dobbins, RNA 06/02/17 -     Aryan Alonso RN 08/02/16 -     Vika Quiroga RN 08/02/16 -     Joanna Orosco, CLIFFORD 08/02/16 -     Edward Khalil, RNA 05/26/17 -     Jenny Winter RN 01/12/18 - 01/14/19    Jody Cottrell OTR/L 10/12/18 -          Occupational Therapy Education     Title: PT OT SLP Therapies (In Progress)     Topic: Occupational Therapy (In Progress)     Point: ADL training (Done)     Description: Instruct learner(s) on proper safety adaptation and remediation techniques during self care or transfers.   Instruct in proper use of assistive devices.    Learning Progress Summary           Patient Acceptance, E, VU by ASUNCION at 3/4/2019 10:56 AM    Comment:  Pt educated on the benefits and roles of OT, POC, adls, t/fs, bed mobility, assistive device use.                               User Key     Initials Effective Dates Name Provider Type Discipline     10/12/18 -  Jody Jamison OTR/L Occupational Therapist OT                  OT Recommendation and Plan  Outcome Summary/Treatment Plan (OT)  Anticipated Discharge Disposition (OT): skilled nursing facility  Planned Therapy Interventions (OT Eval): activity tolerance training, BADL retraining, functional balance retraining, adaptive equipment training, occupation/activity based interventions, patient/caregiver education/training, strengthening exercise, transfer/mobility retraining  Therapy Frequency (OT Eval): 3 times/wk  Plan of Care Review  Plan of Care Reviewed With: patient  Plan of Care Reviewed With:  patient  Outcome Summary: OT eval completed. Pt oriented to self only, follows commands approx 90% of the time but requires repetition of directions, verbal cues and increased processing time. Pt required Mod Ax2 for rolling left/right, Max A x2 for supine <> sit. Pt Max A for donning socks. Pt attempted sit <> stand x2 reps with Max A x2 and rwx. Pt minimally able to clear bottom off bed. Mechanical lift used for bed to chair t/f. Pt would benefit from skilled OT services to address decreased strength, balance, cognition, activity tolerance/endurance, which impact her ability to complete adls, t/fs, and mobility. Recommend d/c back to SNF from facility.     Outcome Measures     Row Name 03/04/19 1000             How much help from another is currently needed...    Putting on and taking off regular lower body clothing?  1  -JJ      Bathing (including washing, rinsing, and drying)  2  -JJ      Toileting (which includes using toilet bed pan or urinal)  1  -JJ      Putting on and taking off regular upper body clothing  2  -JJ      Taking care of personal grooming (such as brushing teeth)  3  -JJ      Eating meals  3  -J      Score  12  -         Functional Assessment    Outcome Measure Options  AM-PAC 6 Clicks Daily Activity (OT)  -        User Key  (r) = Recorded By, (t) = Taken By, (c) = Cosigned By    Initials Name Provider Type    Jody Cottrell OTR/L Occupational Therapist          Time Calculation:   Time Calculation- OT     Row Name 03/04/19 1055             Time Calculation- OT    OT Start Time  0846 add 10 minutes for chart review   -      OT Stop Time  0932  -      OT Time Calculation (min)  46 min  -      OT Received On  03/04/19  -      OT Goal Re-Cert Due Date  03/14/19  -        User Key  (r) = Recorded By, (t) = Taken By, (c) = Cosigned By    Initials Name Provider Type    Jody Cottrell OTR/L Occupational Therapist        Therapy Suggested Charges     Code   Minutes  Charges    None           Therapy Charges for Today     Code Description Service Date Service Provider Modifiers Qty    [id number] HC OT EVAL MOD COMPLEXITY 4 3/4/2019 Jody Jamison OTR/L GO 1               JUDY Castro/BALAJI  3/4/2019
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
## 2019-03-04 NOTE — THERAPY EVALUATION
Acute Care - Physical Therapy Initial Evaluation  [facility]     Patient Name: Tamy Sher  : 1930  MRN: [id number]  Today's Date: 3/4/2019   Onset of Illness/Injury or Date of Surgery: 19  Date of Referral to PT: 19  Referring Physician: Dr. Andrew       Admit Date: 2019    Visit Dx:     ICD-10-CM ICD-9-CM   1. Hyperkalemia E87.5 276.7   2. Acute renal insufficiency N28.9 593.9   3. Leukocytosis, unspecified type D72.829 288.60   4. Persistent atrial fibrillation (CMS/HCC) I48.1 427.31   5. Congestive heart failure, unspecified HF chronicity, unspecified heart failure type (CMS/HCC) I50.9 428.0   6. Impaired functional mobility, balance, and endurance Z74.09 V49.89   7. Decreased activities of daily living (ADL) R68.89 780.99     Patient Active Problem List   Diagnosis   • Ischemic chest pain   • Pneumonia of both lungs due to infectious organism   • Fall   • Closed displaced comminuted fracture of shaft of right femur (CMS/HCC)   • Pain of right thigh   • Class 2 obesity with serious comorbidity in adult   • Type 2 diabetes mellitus with neurologic complication, with long-term current use of insulin (CMS/HCC)   • STUART (acute kidney injury) (CMS/HCC)   • Chronic diastolic heart failure (CMS/HCC)   • Pleural effusion, left   • Hypoxia   • Surgical wound infection, initial encounter   • Pleural effusion due to CHF (congestive heart failure) (CMS/HCC)   • Hyperkalemia     Past Medical History:   Diagnosis Date   • Constipation    • Diabetes mellitus (CMS/HCC)    • Diarrhea    • Diastolic dysfunction, left ventricle    • Esophagitis    • Exertional shortness of breath    • Hyperlipidemia    • Hypertension    • Lumbar spondylitis (CMS/HCC)    • Osteoarthritis    • Peripheral vascular disease (CMS/HCC)    • Sinusitis    • Stroke (CMS/HCC)      Past Surgical History:   Procedure Laterality Date   • FEMUR SUPRACONDYLAR FRACTURE REPAIR Right 3/10/2018    Procedure: FEMUR SUPRACONDYLAR NAIL;  Surgeon:  Nima Bundy MD;  Location: [facility] OR;  Service: Orthopedics   • GALLBLADDER SURGERY     • HYSTERECTOMY     • JOINT REPLACEMENT     • REPLACEMENT TOTAL KNEE Left         PT ASSESSMENT (last 12 hours)      Physical Therapy Evaluation     Row Name 03/04/19 0812          PT Evaluation Time/Intention    Subjective Information  complains of;weakness  -JE     Document Type  evaluation;other (see comments) see MAR  -JE     Mode of Treatment  physical therapy  -JE     Patient Effort  fair  -JE     Symptoms Noted During/After Treatment  increased pain  -JE     Comment  RN came in room at end of visit reporting pt's son did say they have been using a mechanical lift at the SNF to get pt up due to a problem w/ her leg; in further review of the pt's chart, noted injury to R LE in March of 2018  -JE     Row Name 03/04/19 0812          General Information    Patient Profile Reviewed?  yes  -JE     Onset of Illness/Injury or Date of Surgery  02/28/19  -JE     Referring Physician  Dr. Andrew  -JE     Patient Observations  alert;cooperative;agree to therapy  -JE     Patient/Family Observations  no family present  -JE     General Observations of Patient  sitting up in bed, telemetry, IV infusing, estrada catheter  -JE     Prior Level of Function  -- unknown due to cognitive status  -JE     Pertinent History of Current Functional Problem  Pt w/ elevated potassium on routine lab work.  Admitted w/ hyperkalemia, acute renal insufficiency, leukocyosis, persistent afib, and CHF.  Pt also w/ hx of dementia, DM, diastolic dysfunction, exertional shortness of breath, HTN, lumbar spondylitis, OA, PVD, stroke, femur fx w/ repair on R, and L knee replacement.  -JE     Existing Precautions/Restrictions  fall  -JE     Limitations/Impairments  safety/cognitive;other (see comments) pain: reported pain at bottom prior to activity  -JE     Equipment Issued to Patient  gait belt;walker, four wheeled  -JE     Risks Reviewed   patient:;LOB;dizziness;increased discomfort;lines disloged;other (comment) fall risk  -     Benefits Reviewed  patient:;improve function;increase independence;increase strength;increase balance;decrease pain;decrease risk of DVT;improve skin integrity;increase knowledge;other (comment) safety/falls prevention  -     Barriers to Rehab  cognitive status  -     Row Name 03/04/19 0812          Relationship/Environment    Primary Source of Support/Comfort  child(anival)  -     Lives With  facility resident  -     Name(s) of Who Lives With Patient  resides at [facility]  -     Family Caregiver if Needed  child(anival), adult  -     Row Name 03/04/19 0812          Resource/Environmental Concerns    Current Living Arrangements  residential facility  -     Row Name 03/04/19 0812          Cognitive Assessment/Intervention- PT/OT    Orientation Status (Cognition)  disoriented to;place;situation;time;person;other (see comments) oriented to self only  -     Follows Commands (Cognition)  follows one step commands;over 90% accuracy;repetition of directions required;verbal cues/prompting required;increased processing time needed  -     Cognitive Function (Cognitive)  memory deficit  -     Memory Deficit (Cognitive)  short term memory;long term memory  -     Safety Deficit (Cognitive)  insight into deficits/self awareness;awareness of need for assistance;safety precautions awareness;safety precautions follow-through/compliance  -     Personal Safety Interventions  fall prevention program maintained;gait belt;muscle strengthening facilitated;nonskid shoes/slippers when out of bed;supervised activity  -     Row Name 03/04/19 0812          Safety Issues, Functional Mobility    Safety Issues Affecting Function (Mobility)  awareness of need for assistance;friction/shear risk;insight into deficits/self awareness;safety precaution awareness;safety precautions follow-through/compliance  -     Impairments Affecting  Function (Mobility)  balance;cognition;endurance/activity tolerance;pain;strength  -     Row Name 03/04/19 0812          Bed Mobility Assessment/Treatment    Bed Mobility Assessment/Treatment  rolling left;rolling right;supine-sit;sit-supine  -     Rolling Left Bureau (Bed Mobility)  moderate assist (50% patient effort);2 person assist;verbal cues  -     Rolling Right Bureau (Bed Mobility)  moderate assist (50% patient effort);verbal cues;2 person assist  -     Supine-Sit Bureau (Bed Mobility)  maximum assist (25% patient effort);2 person assist;verbal cues  -     Sit-Supine Bureau (Bed Mobility)  maximum assist (25% patient effort);2 person assist;verbal cues  -     Bed Mobility, Safety Issues  cognitive deficits limit understanding;decreased use of legs for bridging/pushing;impaired trunk control for bed mobility  -     Assistive Device (Bed Mobility)  bed rails;draw sheet  -     Row Name 03/04/19 0812          Transfer Assessment/Treatment    Transfer Assessment/Treatment  sit-stand transfer;stand-sit transfer;bed-chair transfer  -     Comment (Transfers)  stood 3 times requiring considerable assist to include verbal and tactile cues; pt unable to achieve upright and barely cleared hips from bed surface  -     Bed-Chair Bureau (Transfers)  dependent (less than 25% patient effort)  -     Assistive Device (Bed-Chair Transfers)  mechanical lift/aid  -     Sit-Stand Bureau (Transfers)  maximum assist (25% patient effort);2 person assist;verbal cues  -     Stand-Sit Bureau (Transfers)  maximum assist (25% patient effort);2 person assist;verbal cues  -     Row Name 03/04/19 0812          Sit-Stand Transfer    Assistive Device (Sit-Stand Transfers)  walker, front-wheeled  -     Row Name 03/04/19 0812          Stand-Sit Transfer    Assistive Device (Stand-Sit Transfers)  walker, front-wheeled  -     Row Name 03/04/19 0812          Gait/Stairs  Assessment/Training    Comment (Gait/Stairs)  nonambulatory at this time  -[facility] Name 03/04/19 0812          General ROM    GENERAL ROM COMMENTS  BUE/LEs AROM WFLs  -[facility] Name 03/04/19 0812          MMT (Manual Muscle Testing)    General MMT Comments  defer UE strength to OT, however, pt moves B UEs against gravity slowly; B hip flexors 2/5, B knee flex/extensors 4-/5, B ankle DF/PF 4/5; all ms groups assessed in seated position  -[facility] Name 03/04/19 0812          Motor Assessment/Intervention    Additional Documentation  Balance (Group)  -JE     Row Name 03/04/19 0812          Balance    Balance  static sitting balance;static standing balance  -JE     Row Name 03/04/19 0812          Static Sitting Balance    Level of [place] (Unsupported Sitting, Static Balance)  minimal assist, 75% patient effort;moderate assist, 50 to 74% patient effort  -     Sitting Position (Unsupported Sitting, Static Balance)  sitting on edge of bed  -     Level of [place] (Supported Sitting, Static Balance)  independent  -     Sitting Position (Supported Sitting, Static Balance)  sitting in chair  -JE     Row Name 03/04/19 0812          Static Standing Balance    Level of [place] (Supported Standing, Static Balance)  maximal assist, 25 to 49% patient effort;2 person assist  -     Assistive Device Utilized (Supported Standing, Static Balance)  walker, rolling  -     Comment (Supported Standing, Static Balance)  attempted times 3, unable to achieve upright and barely cleared hips from bed surface; returned to sitting after only standing briefly  -JE     Row Name 03/04/19 0812          Sensory Assessment/Intervention    Sensory General Assessment  other (see comments) no reported c/os N/T; noted tremors B UEs/head  -JE     Row Name 03/04/19 0812          Pain Assessment    Additional Documentation  Pain Scale: FACES Pre/Post-Treatment (Group)  -JE     Row Name 03/04/19 0812          Pain Scale: Numbers  "Pre/Post-Treatment    Pain Location  other (see comments) \"bottom\" - pt w/ wound covered w/ dressing  -JE     Pain Intervention(s)  Medication (See MAR);Repositioned;Rest;Other (Comment) notified RN  -LAVONNE     Row Name 03/04/19 0812          Pain Scale: FACES Pre/Post-Treatment    Pain: FACES Scale, Pretreatment  2-->hurts little bit  -LAVONNE     Pain: FACES Scale, Post-Treatment  4-->hurts little more  -LAVONNE     Row Name             [REMOVED] Wound 03/15/18 2100 Right heel pressure injury    Wound - Properties Group Date first assessed: 03/15/18  -KH Time first assessed: 2100  -KH Side: Right  -KH Location: heel  -KH Type: pressure injury  -KH Stage, Pressure Injury: deep tissue injury;Stage 1  -KH Additional Comments: --  -KH, picture taken  Resolution Date: 03/04/19 -TW Resolution Time: 1043  -TW    Row Name             Wound 03/01/19 1030 Bilateral coccyx pressure injury    Wound - Properties Group Date first assessed: 03/01/19  -RL Time first assessed: 1030  -RL Present On Admission : yes;picture taken  -RL Side: Bilateral  -RL Location: coccyx  -RL Type: pressure injury  -RL Stage, Pressure Injury: Stage 2  -RL    Row Name             [REMOVED] Wound 03/31/18 0240 perirectal    Wound - Properties Group Date first assessed: 03/31/18  -MB Time first assessed: 0240  -MB Present On Admission : yes;picture taken  -MB Location: perirectal  -MB Stage, Pressure Injury: deep tissue injury  -MB Resolution Date: 03/04/19  -TW Resolution Time: 1043  -TW    Row Name             [REMOVED] Wound 03/31/18 1600 Right heel    Wound - Properties Group Date first assessed: 03/31/18  -DB Time first assessed: 1600  -DB Side: Right  -DB Location: heel  -DB Stage, Pressure Injury: deep tissue injury  -DB Resolution Date: 03/04/19 -TW Resolution Time: 1043  -TW    Row Name 03/04/19 0812          Coping    Observed Emotional State  accepting;calm;cooperative  -LAVONNE     Verbalized Emotional State  acceptance  -LAVONNE     Row Name 03/04/19 0812    "       Plan of Care Review    Plan of Care Reviewed With  patient  -     Row Name 03/04/19 [facility]          Physical Therapy Clinical Impression    Date of Referral to PT  03/01/19  -     Patient/Family Goals Statement (PT Clinical Impression)  improve strength, decrease pain  -     Criteria for Skilled Interventions Met (PT Clinical Impression)  yes;treatment indicated;other (see comments)  -     Impairments Found (describe specific impairments)  aerobic capacity/endurance;gait, locomotion, and balance;muscle performance;posture  -     Rehab Potential (PT Clinical Summary)  fair, will monitor progress closely  -     Predicted Duration of Therapy (PT)  until discharge or goals achieved  -     Care Plan Review (PT)  evaluation/treatment results reviewed;care plan/treatment goals reviewed;risks/benefits reviewed;current/potential barriers reviewed;patient/other agree to care plan  -     Row Name 03/04/19 12          Physical Therapy Goals    Bed Mobility Goal Selection (PT)  bed mobility, PT goal 1;bed mobility, PT goal 2;bed mobility, PT goal (free text)  -     Transfer Goal Selection (PT)  transfer, PT goal 1  -     Strength Goal Selection (PT)  strength, PT goal 1  -     Additional Documentation  Strength Goal Selection (PT) (Row)  -     Row Name 03/04/19 0812          Bed Mobility Goal 1 (PT)    Activity/Assistive Device (Bed Mobility Goal 1, PT)  rolling to left;rolling to right;bridging  -     [place] Level/Cues Needed (Bed Mobility Goal 1, PT)  minimum assist (75% or more patient effort);verbal cues required  -     Time Frame (Bed Mobility Goal 1, PT)  long term goal (LTG);10 days  -     Progress/Outcomes (Bed Mobility Goal 1, PT)  goal ongoing  -     Row Name 03/04/19 0812          Bed Mobility Goal 2 (PT)    Activity/Assistive Device (Bed Mobility Goal 2, PT)  scooting  -     [place] Level/Cues Needed (Bed Mobility Goal 2, PT)  moderate assist (50-74% patient effort)   -JE     Time Frame (Bed Mobility Goal 2, PT)  long term goal (LTG);10 days  -JE     Progress/Outcomes (Bed Mobility Goal 2, PT)  goal ongoing  -     Row Name 03/04/19 0812          Bed Mobility Goal (PT)    Bed Mobility Goal (PT)  supine to/from sit w/ mod assist and VCs  -JE     Time Frame (Bed Mobility Goal, PT)  long term goal (LTG);10 days  -JE     Progress/Outcomes (Bed Mobility Goal, PT)  goal ongoing  -     Row Name 03/04/19 0812          Transfer Goal 1 (PT)    Activity/Assistive Device (Transfer Goal 1, PT)  sit-to-stand/stand-to-sit;walker, rolling  -     [place] Level/Cues Needed (Transfer Goal 1, PT)  moderate assist (50-74% patient effort)  -     Time Frame (Transfer Goal 1, PT)  long term goal (LTG);10 days  -JE     Progress/Outcome (Transfer Goal 1, PT)  goal ongoing  -     Row Name 03/04/19 0812          Strength Goal 1 (PT)    Strength Goal 1 (PT)  B hip flexors at least 3+/5, and complete partial bridge w/ assist to block feet demonstrating improved hip extension  -JE     Time Frame (Strength Goal 1, PT)  long term goal (LTG);10 days  -JE     Progress/Outcome (Strength Goal 1, PT)  goal ongoing  -     Row Name 03/04/19 0812          Positioning and Restraints    Post Treatment Position  chair  -     In Chair  reclined;call light within reach;encouraged to call for assist;exit alarm on;legs elevated;RUE elevated heels floating; lift pad under pt  -     Row Name 03/04/19 0812          Living Environment    Home Accessibility  wheelchair accessible  -       User Key  (r) = Recorded By, (t) = Taken By, (c) = Cosigned By    Initials Name Provider Type    Marilyn Medrano, RN Registered Nurse    Hamida Plasencia, RN Registered Nurse    Thuy Mukherjee, RNA Registered Nurse    Aryan Alonso, RN Registered Nurse    Edward Khalil, RNA Registered Nurse    Mariia Stevens M, PT Physical Therapist        Physical Therapy Education     Title: PT OT SLP Therapies  (In Progress)     Topic: Physical Therapy (In Progress)     Point: Mobility training (In Progress)     Learning Progress Summary           Patient Acceptance, E,TB,D, NR by LAVONNE at 3/4/2019 12:08 PM    Comment:  Educ re: purpose of PT & importance of activity; education for improved tech for   rolling and transfers and proper use of rwx for standing and during tfers; educ re:  purpose of mechanical lift and increase fall risk.                   Point: Precautions (In Progress)     Learning Progress Summary           Patient Acceptance, E,TB,D, NR by LAVONNE at 3/4/2019 12:08 PM    Comment:  Educ re: purpose of PT & importance of activity; education for improved tech for   rolling and transfers and proper use of rwx for standing and during tfers; educ re:  purpose of mechanical lift and increase fall risk.                               User Key     Initials Effective Dates Name Provider Type Discipline    LAVONNE 08/02/18 -  Mariia Shepard, PT Physical Therapist PT              PT Recommendation and Plan  Anticipated Discharge Disposition (PT): skilled nursing facility  Planned Therapy Interventions (PT Eval): balance training, bed mobility training, patient/family education, ROM (range of motion), strengthening, transfer training, other (see comments)(safety/falls prevention; edema/pain mgmt)  Therapy Frequency (PT Clinical Impression): 2 times/day  Outcome Summary/Treatment Plan (PT)  Anticipated Discharge Disposition (PT): skilled nursing facility  Plan of Care Reviewed With: patient  Progress: improving  Outcome Summary: PT eval completed.  Pt confused and oriented to self only.  Pt reported c/os discomfort at her bottom.  Noted dressing at wound site once in sitting.  Pt demonstrates weakness throughout.  Rolls w/ mod assist of 2, transfers supine to/from sitting w/ max assist of 2.  Attempted standing 3 times w/ max assist of 2.  Pt unable to achieve upright standing w/ difficulty clearing hips from bed.  Pt w/ decrease  sitting balance as well requiring min to mod assist of 1 to maintain.  Staff assisted pt to the chair using mechanical lift.  Placed exit alarm and activated, notifed RN.  Pt with decrease strength and balance w/ decrease mobility.  Pt will benefit from continued skilled PT services to improve strength, bed mobility, balance, and ability to perform transfers safely progressing activity only when safely appropriate.  Recommend return to SNF w/ continued care upon discharge from acute care.  Outcome Measures     Row Name 03/04/19 1000             How much help from another is currently needed...    Putting on and taking off regular lower body clothing?  1  -JJ      Bathing (including washing, rinsing, and drying)  2  -JJ      Toileting (which includes using toilet bed pan or urinal)  1  -JJ      Putting on and taking off regular upper body clothing  2  -JJ      Taking care of personal grooming (such as brushing teeth)  3  -JJ      Eating meals  3  -JJ      Score  12  -JJ         Functional Assessment    Outcome Measure Options  AM-PAC 6 Clicks Daily Activity (OT)  -        User Key  (r) = Recorded By, (t) = Taken By, (c) = Cosigned By    Initials Name Provider Type    Jody Cottrell OTR/L Occupational Therapist         Time Calculation:   PT Charges     Row Name 03/04/19 1039             Time Calculation    Start Time  0834  -      Stop Time  0932  -      Time Calculation (min)  58 min  -      PT Received On  03/04/19  -LAVONNE      PT Goal Re-Cert Due Date  03/14/19  -        User Key  (r) = Recorded By, (t) = Taken By, (c) = Cosigned By    Initials Name Provider Type    Mariia Stevens, PT Physical Therapist        Therapy Suggested Charges     Code   Minutes Charges    None           Therapy Charges for Today     Code Description Service Date Service Provider Modifiers Qty    [id number] HC PT EVAL MOD COMPLEXITY 4 3/4/2019 Mariia Shepard, PT GP 1          PT G-Codes  Outcome Measure Options:  AM-PAC 6 Clicks Daily Activity (OT)  AM-PAC 6 Clicks Score: 7  Score: 12      Mariia Shepard, PT  3/4/2019

## 2019-03-04 NOTE — PROGRESS NOTES
1           [facility] Medicine Services  INPATIENT PROGRESS NOTE    Patient Name: Tamy Sher  Date of Admission: 2/28/2019  Today's Date: 03/04/19  Length of Stay: 3  Primary Care Physician: Barry Foley MD    Subjective   Chief Complaint: Follow-up  HPI     Patient has no insight of why he was sent to the hospital.  Record indicates that she had an elevated potassium for which she received calcium gluconate, D50 water, insulin.  Also treated with vancomycin and Zosyn in the emergency room for leukocytosis.  Chest x-ray showed no acute process nor visualize infiltrate.  Her cultures had shown no growth to date.     White count on admit was 25,000 with predominance of neutrophils at 75%.  Presence of eosinophils noted.  White count has been elevated at least since January 2019.  He also has had thrombocytosis.  I have not seen any record of hematology consult referral in chart review.  Consultation with Dr. Mills  in place.  Awaiting recommendation     Renal service had seen patient in consult for acute kidney injury.  Service felt to have acute biliary necrosis with a baseline chronic kidney disease stage III.  Service recommend continued gentle hydration.  Continue allopurinol and calcitriol.  No apparent indication for dialysis    Longing to get back to SNF    Review of Systems       All pertinent negatives and positives are as above. All other systems have been reviewed and are negative unless otherwise stated.     Objective    Temp:  [98.1 °F (36.7 °C)-98.5 °F (36.9 °C)] 98.2 °F (36.8 °C)  Heart Rate:  [53-90] 62  Resp:  [16-18] 16  BP: ()/(53-64) 122/64  Physical Exam  Constitutional: She appears well-developed and well-nourished.   HENT:   Head: Normocephalic and atraumatic.   Eyes: EOM are normal. Pupils are equal, round,   Conjunctival pallor   Neck: Neck supple.   Cardiovascular: Normal rate and regular rhythm. Exam reveals no gallop and no friction  rub.   No murmur heard.  Pulmonary/Chest: Effort normal and breath sounds normal. Transient wheezing that readily cleared   Abdominal: Soft. Bowel sounds are normal. There is no hepatosplenomegaly. There is no tenderness.   Musculoskeletal: Normal range of motion. She exhibits no edema.   Neurological: She is alert an oriented to person and place. No cranial nerve deficit. + head tremor  + fc; noted  Yellow urine and some debris. Urine culture NGTD  Skin: Skin is warm and dry.   Psychiatric: She has a normal mood and affect. Her behavior is normal.   Nursing note and vitals reviewed.           Results Review:  I have reviewed the labs, radiology results, and diagnostic studies.    Laboratory Data:   Results from last 7 days   Lab Units 03/04/19  0557 03/01/19  0244 02/28/19  1222   WBC 10*3/mm3 21.07* 25.33* 24.96*   HEMOGLOBIN g/dL 9.2* 10.8* 11.4*   HEMATOCRIT % 30.0* 35.1* 36.3*   PLATELETS 10*3/mm3 512* 672* 646*        Results from last 7 days   Lab Units 03/04/19  0557 03/03/19  0528 03/02/19  0545  03/01/19  0244   SODIUM mmol/L 138 138 137   < > 140   POTASSIUM mmol/L 4.4 4.6 4.2   < > 5.6*   CHLORIDE mmol/L 110 109 108   < > 107   CO2 mmol/L 19.0* 20.0* 22.0*   < > 22.0*   BUN mg/dL 88* 100* 111*   < > 121*   CREATININE mg/dL 1.58* 2.01* 2.21*   < > 2.47*   CALCIUM mg/dL 8.0* 8.0* 8.1*   < > 8.8   BILIRUBIN mg/dL 0.1  --  0.2  --  0.3   ALK PHOS U/L 77  --  70  --  81   ALT (SGPT) U/L 21  --  15  --  15   AST (SGOT) U/L 18  --  13  --  10   GLUCOSE mg/dL 210* 142* 104*   < > 154*    < > = values in this interval not displayed.       Culture Data:   Blood Culture   Date Value Ref Range Status   02/28/2019 No growth at 3 days  Preliminary   02/28/2019 No growth at 3 days  Preliminary     Urine Culture   Date Value Ref Range Status   03/02/2019 No growth at 2 days  Final   02/28/2019 No growth at 2 days  Final       Radiology Data:   Imaging Results (last 24 hours)     ** No results found for the last 24  hours. **          I have reviewed the patient's current medications.     Assessment/Plan     Active Hospital Problems    Diagnosis   • Hyperkalemia     Hyperkalemia (6.6) on admit February 28, 2019  Kael; acute on chronic renal failure  Diabetes mellitus type 2 cpt; Accu-Chek 138, 185, 215, 216  Persistent leukocytosis with thrombocytosis -history of myeloproliferative neoplasm previously on hydroxyurea which was reportedly been stopped in November 2018 when she was admitted and treated for pneumonia.  Iron deficiency anemia -occult blood test, Venofer; aspirin been on hold  Chronic anticoagulation -unclear exactly of what reason but record indicates history of stroke; EKG reviewed and noted atrial fibrillation   HTN - controlled; CPT  Hypothyroidism -on replacement  Constipation -add MiraLAX and one-time magnesium citrate    Other plan  Renal function is improving.  Patient has been initiated on Venofer and aspirin been on hold per Hematology.  Will await for further recommendation by Dr. Traylor pertaining to initiating starting hydroxyurea for myeloproliferative neoplasm.    Patient has had urinary retention and been on Flomax for quite some time even prior to admission.  We will discontinue Alves catheter and allow spontaneous trial.  Will do urinary retention protocol.  If she continues to have urinary retention, will place back Alves catheter and be sent back to the skilled nursing facility with it      allopurinol 100 mg Oral Daily   apixaban 2.5 mg Oral Q12H   aspirin 81 mg Oral Daily   atorvastatin 10 mg Oral Nightly   budesonide-formoterol 2 puff Inhalation BID - RT   calcitriol 0.25 mcg Oral Daily   diltiaZEM  mg Oral Q24H   docusate sodium 100 mg Oral Daily   escitalopram 20 mg Oral Nightly   folic acid 1 mg Oral Daily   gabapentin 300 mg Oral Daily With Breakfast   gabapentin 600 mg Oral Nightly   insulin detemir 10 Units Subcutaneous Nightly   insulin lispro 2-7 Units Subcutaneous 4x Daily With  Meals & Nightly   iron sucrose (VENOFER) IVPB 300 mg Intravenous Q24H   levothyroxine 50 mcg Oral Q AM   nystatin  Topical Q12H   pantoprazole 40 mg Oral Daily   polyethylene glycol 17 g Oral Daily   sodium chloride 3 mL Intravenous Q12H   tamsulosin 0.4 mg Oral Daily       Discussed with nurse Marshall  Discharge Planning: Skilled nursing facility 1-2 days    Fidencio Rodriguez MD   03/04/19   10:36 AM

## 2019-03-04 NOTE — PLAN OF CARE
Problem: Skin Injury Risk (Adult)  Goal: Skin Health and Integrity  Outcome: Ongoing (interventions implemented as appropriate)      Problem: Fall Risk (Adult)  Goal: Identify Related Risk Factors and Signs and Symptoms  Outcome: Ongoing (interventions implemented as appropriate)    Goal: Absence of Fall  Outcome: Ongoing (interventions implemented as appropriate)      Problem: Patient Care Overview  Goal: Plan of Care Review   03/03/19 1294   Coping/Psychosocial   Plan of Care Reviewed With patient   Plan of Care Review   Progress improving   OTHER   Outcome Summary renal function improving, gentle IV hydration continues, labs in AM, estrada in place, up in chair, transfers with the mechanical lift, PT consult pending, VSS, will continue to monitor

## 2019-03-05 NOTE — DISCHARGE SUMMARY
[facility] Medicine Services  DISCHARGE SUMMARY       Date of Admission: 2/28/2019  Date of Discharge:  3/6/2019  Primary Care Physician: Barry Foley MD    Discharge Diagnoses:  Active Hospital Problems    Diagnosis   • **Hyperkalemia   • Acute renal failure superimposed on stage 3 chronic kidney disease (CMS/HCC)   • Iron deficiency anemia   • Myeloproliferative neoplasm (CMS/HCC)   • Type 2 diabetes mellitus (CMS/HCC)   • Thrombocytosis (CMS/HCC) probably related to iron deficiency anemia   • Atrial fibrillation (CMS/HCC)   • Chronic anticoagulation   • Essential hypertension   • Constipation   • Hypothyroidism (acquired)   • Acute urinary retention -resolved   Hyperuricemia  Hypocalcemia    Presenting Problem/History of Present Illness:  Hyperkalemia [E87.5]  Hyperkalemia [E87.5]         Hospital Course  Patient has no insight of why he was sent to the hospital.  Record indicates that she had an elevated potassium for which she received calcium gluconate, D50 water, insulin.  She also treated with vancomycin and Zosyn in the emergency room for leukocytosis.  Chest x-ray showed no acute process nor visualized infiltrate.  Her cultures had shown no growth to date.      White count on admit was 25,000 with predominance of neutrophils at 75%.  Presence of eosinophils noted.  White count has been elevated at least since January 2019.  He also has had thrombocytosis. She was seen in consult by Dr. 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  She was previously on hydroxyurea prior to November 2019 when she had pneumonia.    During this hospitalization she received intravenous iron for iron deficiency anemia.  Hematology service will be following her  in 1 month.     Renal service had seen patient in consult for acute kidney injury.  Service felt patient has acute kidney injury on top of baseline chronic kidney disease stage III.  She was gently hydrated  with improvement in her renal function.   She was started on allopurinol and calcitriol.   She  did not require hemodialysis during this hospitalization    She was planned for discharge yesterday however had some issue on urinary retention as well as low-grade fever.  She has not had any recurrence of fever.  She is clinically doing better.  She is spontaneously urinating.    I did not continue lisinopril as her potassium is currently 5.1.  She is not on any supplemental potassium.  She may need to be placed back on this in the future for diabetes and chronic kidney disease.  Otherwise, her blood pressure is adequately controlled off of this medication.  I also resumed her on normal dose of Eliquis as discussed with our pharmacist since she has improvement in her renal function and based on other qualifiers for its dosing.    I will defer to skilled nursing facility decision to  resume [facility] due to recent renal failure.      Procedures Performed:   None      Consults:   Dr. Layton Fournier    Pertinent Test Results:  Lab Results (last 48 hours)     Procedure Component Value Units Date/Time    POC Glucose Once [[id number]]  (Abnormal) Collected:  03/06/19 0810    Specimen:  Blood Updated:  03/06/19 0822     Glucose 158 mg/dL      Comment: : [id number] Zachary Alexandra ID: [id number]       Basic Metabolic Panel [[id number]]  (Abnormal) Collected:  03/06/19 0524    Specimen:  Blood Updated:  03/06/19 0624     Glucose 132 mg/dL      BUN 59 mg/dL      Creatinine 1.17 mg/dL      Sodium 141 mmol/L      Potassium 5.1 mmol/L      Chloride 113 mmol/L      CO2 21.0 mmol/L      Calcium 8.8 mg/dL      eGFR Non African Amer 44 mL/min/1.73      BUN/Creatinine Ratio 50.4     Anion Gap 7.0 mmol/L     Narrative:       The MDRD GFR formula is only valid for adults with stable renal function between ages 18 and 70.    POC Glucose Once [[id number]]  (Normal) Collected:  03/05/19 2203    Specimen:  Blood Updated:  03/05/19 2223      Glucose 122 mg/dL      Comment: : [id number] Issa WagnerMeter ID: [id number]       POC Glucose Once [[id number]]  (Normal) Collected:  03/05/19 2202    Specimen:  Blood Updated:  03/05/19 2223     Glucose 127 mg/dL      Comment: : [id number] Issa WagnerMeter ID: [id number]       POC Glucose Once [[id number]]  (Normal) Collected:  03/05/19 1737    Specimen:  Blood Updated:  03/05/19 1759     Glucose 100 mg/dL      Comment: : [id number] Zachary BairdMeter ID: [id number]       Immunoglobulin Free LT Chains Blood [[id number]]  (Abnormal) Collected:  03/04/19 1125    Specimen:  Blood Updated:  03/05/19 1610     Free Light Chain, Kappa 39.2 mg/L      Free Lambda Light Chains 29.0 mg/L      Kappa/Lambda Ratio 1.35    Narrative:       Performed at:  [address] [place] [address]  [id number]  : Zaid Roche PhD, Phone:  [phone]    Protein Elec + Interp, Serum [[id number]]  (Abnormal) Collected:  03/04/19 1125    Specimen:  Blood Updated:  03/05/19 1518     Total Protein 5.1 g/dL      Albumin 2.9 g/dL      Alpha-1-Globulin 0.2 g/dL      Alpha-2-Globulin 0.7 g/dL      Beta Globulin 0.6 g/dL      Gamma Globulin 0.7 g/dL      M-Naveen Comment: g/dL      Comment: ASYMMETRICAL GAMMA        Globulin 2.2 g/dL      A/G Ratio 1.3     Please note Comment     Comment: Protein electrophoresis scan will follow via computer, mail, or   delivery.        SPE Interpretation Comment     Comment: Serum Protein Electrophoresis shows an asymmetrical gamma region  which may represent the presence of a paraprotein. Recommend serum  immunofixation electrophoresis to rule out a plasma cell dyscrasia,  if clinically indicated.       Narrative:       Performed at:  [address] [place] [address]  [id number]  : Zaid Roche PhD, Phone:  [phone]    Erythropoietin [[id number]] Collected:  03/04/19 1125    Specimen:  Blood Updated:  03/05/19 1518      Erythropoietin 15.3 mIU/mL      Comment: Melissa Gabino UniCel DxI 800 Immunoassay System  Values obtained with different assay methods or kits cannot be used  interchangeably. Results cannot be interpreted as absolute evidence  of the presence or absence of malignant disease.       Narrative:       Performed at:  [address] [place] [address]  [id number]  : Zaid Roche PhD, Phone:  [phone]    Blood Culture - Blood, Arm, Left [[id number]] Collected:  02/28/19 1323    Specimen:  Blood from Arm, Left Updated:  03/05/19 1415     Blood Culture No growth at 5 days    Blood Culture - Blood, Hand, Left [[id number]] Collected:  02/28/19 1315    Specimen:  Blood from Hand, Left Updated:  03/05/19 1345     Blood Culture No growth at 5 days    POC Glucose Once [[id number]]  (Abnormal) Collected:  03/05/19 1200    Specimen:  Blood Updated:  03/05/19 1246     Glucose 193 mg/dL      Comment: : [id number] [facility] ID: [id number]       Basic Metabolic Panel [[id number]]  (Abnormal) Collected:  03/05/19 0505    Specimen:  Blood Updated:  03/05/19 1119     Glucose 154 mg/dL      BUN 75 mg/dL      Creatinine 1.32 mg/dL      Sodium 138 mmol/L      Potassium 5.2 mmol/L      Chloride 111 mmol/L      CO2 24.0 mmol/L      Calcium 8.5 mg/dL      eGFR Non African Amer 38 mL/min/1.73      BUN/Creatinine Ratio 56.8     Anion Gap 3.0 mmol/L     Narrative:       The MDRD GFR formula is only valid for adults with stable renal function between ages 18 and 70.    POC Glucose Once [[id number]]  (Abnormal) Collected:  03/05/19 0751    Specimen:  Blood Updated:  03/05/19 0844     Glucose 140 mg/dL      Comment: : [id number] [facility] ID: [id number]       Occult Blood X 1, Stool - Stool, Per Rectum [[id number]]  (Normal) Collected:  03/05/19 0641    Specimen:  Stool from Per Rectum Updated:  03/05/19 0651     Fecal Occult Blood Negative    IgG, IgA, IgM [[id number]] Collected:  03/04/19 1125     Specimen:  Blood Updated:  03/05/19 0615     IgG 713 mg/dL      IgA 130 mg/dL      IgM 96 mg/dL     Narrative:       Performed at:  [address] [place] [address]  [id number]  : Zaid Roche PhD, Phone:  [phone]    CBC (No Diff) [[id number]]  (Abnormal) Collected:  03/05/19 0504    Specimen:  Blood Updated:  03/05/19 0513     WBC 22.54 10*3/mm3      RBC 3.20 10*6/mm3      Hemoglobin 9.4 g/dL      Hematocrit 30.4 %      MCV 95.0 fL      MCH 29.4 pg      MCHC 30.9 g/dL      RDW 15.9 %      RDW-SD 53.6 fl      MPV 10.5 fL      Platelets 566 10*3/mm3     POC Glucose Once [[id number]]  (Abnormal) Collected:  03/04/19 2045    Specimen:  Blood Updated:  03/04/19 2056     Glucose 194 mg/dL      Comment: : [id number] Sher Lorenz ID: [id number]       Peripheral Blood Smear [[id number]] Collected:  03/03/19 1547    Specimen:  Blood Updated:  03/04/19 1716     Performed by: Wendie Collazo MD     Pathologist Interpretation Leukocytosis and thrombocytosis in patient with known history of essential thrombocytosis.  Normochormic normocytic anemia.  Myeloid left shift.  Mild anisopoikilocytosis.  Negative for increase in blasts or schistocytes.      POC Glucose Once [[id number]]  (Abnormal) Collected:  03/04/19 1630    Specimen:  Blood Updated:  03/04/19 1642     Glucose 165 mg/dL      Comment: : [id number] Diane Dueñas ID: [id number]       Folate [[id number]]  (Normal) Collected:  03/04/19 1125    Specimen:  Blood Updated:  03/04/19 1306     Folate >20.00 ng/mL     Vitamin B12 [[id number]]  (Abnormal) Collected:  03/04/19 0557    Specimen:  Blood Updated:  03/04/19 1245     Vitamin B-12 >1,000 pg/mL     POC Glucose Once [[id number]]  (Abnormal) Collected:  03/04/19 1150    Specimen:  Blood Updated:  03/04/19 1201     Glucose 248 mg/dL      Comment: : [id number] Jayashree Hua ID: [id number]           Imaging Results (last 7 days)     Procedure Component Value  Units Date/Time    US Renal Bilateral [[id number]] Collected:  03/01/19 1808     Updated:  03/01/19 1815    Narrative:       EXAMINATION: US RENAL BILATERAL- 3/1/2019 6:08 PM CST     HISTORY: acute kidney injury; E87.5-Hyperkalemia; N28.9-Disorder of  kidney and ureter, unspecified; D72.829-Elevated white blood cell count,  unspecified; I48.1-Persistent atrial fibrillation; I50.9-Heart failure,  unspecified.     REPORT: Sonographic images of the kidneys were obtained, comparison is  made with the abdominal ultrasound on 3/12/2018.     The right kidney measures 10.6 x 5.5 x 5.9 cm, there is mild cortical  thinning. There are 2 small benign-appearing cyst in the right kidney,  one at the inferior pole that measures 1.5 x 1.3 cm and 1 at the  interpolar level measures 1.5 x 1.1 cm. No solid masses seen. There is  no hydronephrosis. Renal cortical echogenicity on the right is within  normal limits.     The left kidney measures 10.2 x 5.9 x 4.6 cm, with mild cortical  thinning, greater at the upper pole. Within the hilum of the kidney,  there is a simple cyst that measures 2.3 x 2.5 x 2.6 cm. On the previous  ultrasound, this measured 2.0 x 3.3 cm. This is likely benign.     Images of the bladder demonstrate a linear echogenic material  dependently, this may represent blood products. Clinical correlation is  recommended. The aorta and is visualized proximally is normal caliber.       Impression:       1. Small bilateral benign-appearing renal cysts are noted. No mass or  hydronephrosis is identified. There is mild cortical thinning of both  kidneys.  2. Layering dependent echogenic material within the bladder which may  represent blood products. Clinical correlation is recommended.  This report was finalized on 03/01/2019 18:12 by Dr. Addison Rivero MD.    XR Chest 1 View [[id number]] Collected:  02/28/19 1357     Updated:  02/28/19 1402    Narrative:       XR CHEST 1 VW- 2/28/2019 1:49 PM CST     HISTORY: wbc 25;  "E87.5-Hyperkalemia; N28.9-Disorder of kidney and  ureter, unspecified; D72.829-Elevated white blood cell count,  unspecified       COMPARISON: 11/29/2018.     FINDINGS:   No consolidation. No pleural effusion or pneumothorax. The heart is  enlarged although the pulmonary vasculature are unremarkable.  Atheromatous calcification in the thoracic aorta.     The osseous structures and surrounding soft tissues demonstrate no acute  abnormality.       Impression:       1. Stable chest exam without acute process. No visualized infiltrate.        This report was finalized on 02/28/2019 13:59 by Dr Franck Pretty, .          Condition on Discharge:   Stable    Physical Exam on Discharge:  BP (P) 136/69 (BP Location: Right arm, Patient Position: Lying)   Pulse (P) 98   Temp (P) 97.8 °F (36.6 °C) (Oral)   Resp (P) 12   Ht 153.7 cm (60.5\")   Wt 74.2 kg (163 lb 8 oz)   SpO2 (P) 91%   BMI 31.41 kg/m²   Physical Exam  No distress  She is calm  She responded appropriately today which at times she comes in and out of confusion  She has poor hearing.  Constitutional: She appears well-developed and well-nourished.   HENT:   Head: Normocephalic and atraumatic.   Eyes: EOM are normal. Pupils are equal, round,   Conjunctival pallor   Neck: Neck supple.   Cardiovascular: Normal rate and regular rhythm. Exam reveals no gallop and no friction rub.   No murmur heard.  Pulmonary/Chest: Effort normal and breath sounds normal.few velcro sound posteriorly   Abdominal: Soft. Bowel sounds are normal. There is no hepatosplenomegaly. There is no tenderness.   Musculoskeletal: Normal range of motion. She exhibits no edema.   Neurological: She is alert an oriented to person and place. No cranial nerve deficit. + head tremor - non observed during visit  Skin: Skin is warm and dry. She has a stage 2 decub ulcer size of dime POA (per nurses report) which I directly visualized on her sacrum w/o any s/s of infection   Psychiatric: She has a normal " mood and affect. Her behavior is normal.   Nursing note and vitals reviewed.           Discharge Disposition: SNF ([facility] [place])      Discharge Medications:     Discharge Medications      New Medications      Instructions Start Date   allopurinol 100 MG tablet  Commonly known as:  ZYLOPRIM   100 mg, Oral, Daily      calcitriol 0.25 MCG capsule  Commonly known as:  ROCALTROL   0.25 mcg, Oral, Daily         Continue These Medications      Instructions Start Date   acetaminophen 325 MG tablet  Commonly known as:  TYLENOL   650 mg, Oral, Every 6 Hours PRN      apixaban 5 MG tablet tablet  Commonly known as:  ELIQUIS   5 mg, Oral, Every 12 Hours Scheduled      aspirin 81 MG chewable tablet   81 mg, Oral, Daily      budesonide-formoterol 160-4.5 MCG/ACT inhaler  Commonly known as:  SYMBICORT   2 puffs, Inhalation, 2 Times Daily - RT      diltiaZEM  MG 24 hr capsule  Commonly known as:  CARDIZEM CD   240 mg, Oral, Every 24 Hours Scheduled      docusate sodium 100 MG capsule  Commonly known as:  COLACE   100 mg, Oral, Daily      escitalopram 20 MG tablet  Commonly known as:  LEXAPRO   20 mg, Oral, Nightly      folic acid 1 MG tablet  Commonly known as:  FOLVITE   1 mg, Oral, Daily      gabapentin 300 MG capsule  Commonly known as:  NEURONTIN   600 mg, Oral, Nightly      gabapentin 300 MG capsule  Commonly known as:  NEURONTIN   300 mg, Oral, Daily With Breakfast      HUMALOG KWIKPEN 100 UNIT/ML solution pen-injector  Generic drug:  Insulin Lispro   Subcutaneous, 4 Times Daily Before Meals & Nightly, 0-149 = 0 units. 150-400 = formula: (BG-100) / 20 = units to be given. 401+ = call MD.       insulin detemir 100 UNIT/ML injection  Commonly known as:  LEVEMIR   10 Units, Subcutaneous, Nightly      levothyroxine 50 MCG tablet  Commonly known as:  SYNTHROID, LEVOTHROID   50 mcg, Oral, Daily      metoprolol succinate XL 25 MG 24 hr tablet  Commonly known as:  TOPROL-XL   25 mg, Oral, Daily      nystatin 087645 UNIT/GM  powder  Generic drug:  nystatin   1 application, Topical, 4 Times Daily, Apply to breast folds.       pantoprazole 40 MG EC tablet  Commonly known as:  PROTONIX   40 mg, Oral, Daily      polyethylene glycol packet  Commonly known as:  MIRALAX   17 g, Oral, Daily      simethicone 80 MG chewable tablet  Commonly known as:  MYLICON   80 mg, Oral, 4 Times Daily PRN      simvastatin 20 MG tablet  Commonly known as:  ZOCOR   20 mg, Oral, Nightly      tamsulosin 0.4 MG capsule 24 hr capsule  Commonly known as:  FLOMAX   0.4 mg, Oral, Daily      traMADol 50 MG tablet  Commonly known as:  ULTRAM   50 mg, Oral, 3 Times Daily PRN      vitamin D3 5000 units capsule capsule   5,000 Units, Oral, Daily         Stop These Medications    bumetanide 1 MG tablet  Commonly known as:  BUMEX     lisinopril 5 MG tablet  Commonly known as:  PRINIVIL,ZESTRIL     mirtazapine 7.5 MG half tablet  Commonly known as:  REMERON     potassium chloride 10 MEQ CR capsule  Commonly known as:  MICRO-K            Discharge Diet:   Diet Instructions     Diet: Renal, Consistent Carbohydrate; Thin      Discharge Diet:   Renal  Consistent Carbohydrate       Fluid Consistency:  Thin    Dietary Nutrition Supplements Boost Glucose Control (Glucerna Shake)              Activity at Discharge:   Activity Instructions     Activity as Tolerated            Follow-up Appointments: PCP/skilled nursing facility with recommendation to repeat a basic metabolic panel in couple of days; follow-up blood pressure  Follow-up with renal service per their recommendation  Follow-up with Dr. Traylor in 1 month  Test Results Pending at Discharge:        Fidencio Rodriguez MD  03/06/19  12:30 PM    Time: Greater than 30 minutes  Please note that portions of this note may have been completed with a voice recognition program. Efforts were made to edit the dictations, but occasionally words are mistranscribed.

## 2019-03-05 NOTE — PLAN OF CARE
Problem: Patient Care Overview  Goal: Plan of Care Review  Outcome: Ongoing (interventions implemented as appropriate)   03/05/19 5282   Coping/Psychosocial   Plan of Care Reviewed With patient   Plan of Care Review   Progress improving   OTHER   Outcome Summary PT tx completed.Pt supine in bed. C/O wants to rest. Eyes closed entire tx. Followed no commands for tx. Dependent bed mobility, PROM all 4 ext. Sat edge of bed x 5 minutes CG/Kalie.
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
## 2019-03-05 NOTE — PROGRESS NOTES
Nephrology ([facility]) Progress Note      Patient:  Tamy Sher  YOB: 1930  Date of Service: 3/4/2019  MRN: [id number]   Acct: [id number]   Primary Care Physician: Barry Foley MD  Advance Directive:   Code Status and Medical Interventions:   Ordered at: 02/28/19 1515     Limited Support to NOT Include:    Cardioversion/Defibrillation    Intubation     Level Of Support Discussed With:    Health Care Surrogate     Code Status:    No CPR     Medical Interventions (Level of Support Prior to Arrest):    Limited     Admit Date: 2/28/2019       Hospital Day: 3  Referring Provider: Manda Andrew DO      Patient personally seen and examined.  Complete chart including Consults, Notes, Operative Reports, Labs, Cardiology, and Radiology studies reviewed as able.        Subjective:  Tamy Sher is a 88 y.o. female  whom we were consulted for consulted for acute kidney injury.  Baseline of chronic kidney disease stage 3; baseline creatinine 1.1 mg. Does not follow with nephrology.  History of type 2 diabetes, benign hypertension, dementia.  Patient had routine outpatient labs done at her nursing home which showed hyperkalemia and acute kidney injury. Was sent to emergency department for further evaluation.  Patient does take daily potassium supplement.  Patient wasable to offer limited history, not sure why she is in hospital.     Today, no new events.  Unable to fully participate in the H&P.             Allergies:  Patient has no known allergies.    Home Meds:  Medications Prior to Admission   Medication Sig Dispense Refill Last Dose   • acetaminophen (TYLENOL) 325 MG tablet Take 650 mg by mouth Every 6 (Six) Hours As Needed for Mild Pain  or Fever.   Past Week at Unknown time   • apixaban (ELIQUIS) 5 MG tablet tablet Take 1 tablet by mouth Every 12 (Twelve) Hours. 60 tablet  2/27/2019 at Unknown time   • aspirin 81 MG chewable tablet Chew 1 tablet Daily.   2/27/2019 at  Unknown time   • budesonide-formoterol (SYMBICORT) 160-4.5 MCG/ACT inhaler Inhale 2 puffs 2 (Two) Times a Day.  12 2/27/2019 at Unknown time   • bumetanide (BUMEX) 1 MG tablet Take 1 mg by mouth 2 (Two) Times a Day.   2/27/2019 at Unknown time   • Cholecalciferol (VITAMIN D3) 5000 units capsule capsule Take 5,000 Units by mouth Daily.   2/27/2019 at Unknown time   • diltiaZEM CD (CARDIZEM CD) 240 MG 24 hr capsule Take 1 capsule by mouth Daily.   2/27/2019 at Unknown time   • docusate sodium (COLACE) 100 MG capsule Take 100 mg by mouth Daily.   2/27/2019 at Unknown time   • escitalopram (LEXAPRO) 20 MG tablet Take 20 mg by mouth Every Night.   2/27/2019 at Unknown time   • folic acid (FOLVITE) 1 MG tablet Take 1 mg by mouth Daily.   2/27/2019 at Unknown time   • gabapentin (NEURONTIN) 300 MG capsule Take 1 capsule by mouth Daily With Breakfast. 3 capsule 0 2/27/2019 at Unknown time   • gabapentin (NEURONTIN) 300 MG capsule Take 600 mg by mouth Every Night.   2/27/2019 at Unknown time   • insulin detemir (LEVEMIR) 100 UNIT/ML injection Inject 10 Units under the skin Every Night.   2/27/2019 at Unknown time   • Insulin Lispro (HUMALOG KWIKPEN) 100 UNIT/ML solution pen-injector Inject  under the skin into the appropriate area as directed 4 (Four) Times a Day Before Meals & at Bedtime. 0-149 = 0 units.  150-400 = formula: (BG-100) / 20 = units to be given.  401+ = call MD.   Past Week at Unknown time   • levothyroxine (SYNTHROID, LEVOTHROID) 50 MCG tablet Take 50 mcg by mouth Daily.   2/27/2019 at Unknown time   • lisinopril (PRINIVIL,ZESTRIL) 5 MG tablet Take 1 tablet by mouth Daily.   2/27/2019 at Unknown time   • metoprolol succinate XL (TOPROL-XL) 25 MG 24 hr tablet Take 25 mg by mouth Daily.   2/27/2019 at Unknown time   • mirtazapine (REMERON) 7.5 MG half tablet Take 7.5 mg by mouth Every Night.   2/27/2019 at Unknown time   • nystatin (nystatin) 855102 UNIT/GM powder Apply 1 application topically to the  appropriate area as directed 4 (Four) Times a Day. Apply to breast folds.   2/27/2019 at Unknown time   • pantoprazole (PROTONIX) 40 MG EC tablet Take 40 mg by mouth Daily.   2/27/2019 at Unknown time   • polyethylene glycol (MIRALAX) packet Take 17 g by mouth Daily.   2/27/2019 at Unknown time   • potassium chloride (MICRO-K) 10 MEQ CR capsule Take 2 capsules by mouth Daily.   2/27/2019 at Unknown time   • simethicone (MYLICON) 80 MG chewable tablet Chew 1 tablet 4 (Four) Times a Day As Needed for Flatulence.   Past Week at Unknown time   • simvastatin (ZOCOR) 20 MG tablet Take 20 mg by mouth Every Night.   2/27/2019 at Unknown time   • tamsulosin (FLOMAX) 0.4 MG capsule 24 hr capsule Take 1 capsule by mouth Daily. 30 capsule  2/27/2019 at Unknown time   • traMADol (ULTRAM) 50 MG tablet Take 50 mg by mouth 3 (Three) Times a Day As Needed for Moderate Pain .   Past Week at Unknown time       Medicines:  Current Facility-Administered Medications   Medication Dose Route Frequency Provider Last Rate Last Dose   • acetaminophen (TYLENOL) tablet 650 mg  650 mg Oral Q6H PRN Manda Andrew DO       • allopurinol (ZYLOPRIM) tablet 100 mg  100 mg Oral Daily Sj Fournier MD   100 mg at 03/04/19 1003   • apixaban (ELIQUIS) tablet 2.5 mg  2.5 mg Oral Q12H Manda Andrew DO   2.5 mg at 03/04/19 2040   • atorvastatin (LIPITOR) tablet 10 mg  10 mg Oral Nightly Manda Andrew DO   10 mg at 03/04/19 2039   • budesonide-formoterol (SYMBICORT) 160-4.5 MCG/ACT inhaler 2 puff  2 puff Inhalation BID - RT Manda Andrew DO   2 puff at 03/04/19 0704   • calcitriol (ROCALTROL) capsule 0.25 mcg  0.25 mcg Oral Daily Sj Fournier MD   0.25 mcg at 03/04/19 1003   • dextrose (D50W) 25 g/ 50mL Intravenous Solution 25 g  25 g Intravenous Q15 Min PRN Manda Andrew DO       • dextrose (GLUTOSE) oral gel 15 g  15 g Oral Q15 Min PRN Manda Andrew DO       • diltiaZEM CD (CARDIZEM CD) 24 hr  capsule 240 mg  240 mg Oral Q24H Manda Andrew DO   240 mg at 03/04/19 1003   • docusate sodium (COLACE) capsule 100 mg  100 mg Oral Daily Manda Andrew DO   100 mg at 03/04/19 1003   • escitalopram (LEXAPRO) tablet 20 mg  20 mg Oral Nightly Manda Andrew DO   20 mg at 03/04/19 2039   • folic acid (FOLVITE) tablet 1 mg  1 mg Oral Daily Manda Andrew DO   1 mg at 03/04/19 1003   • gabapentin (NEURONTIN) capsule 300 mg  300 mg Oral Daily With Breakfast Manda Andrew DO   300 mg at 03/04/19 1003   • gabapentin (NEURONTIN) capsule 600 mg  600 mg Oral Nightly Manda Andrew DO   600 mg at 03/04/19 2039   • glucagon (human recombinant) (GLUCAGEN DIAGNOSTIC) injection 1 mg  1 mg Subcutaneous PRN Manda Andrew DO       • insulin detemir (LEVEMIR) injection 10 Units  10 Units Subcutaneous Nightly Manda Andrew DO   10 Units at 03/04/19 2218   • insulin lispro (humaLOG) injection 2-7 Units  2-7 Units Subcutaneous 4x Daily With Meals & Nightly Manda Andrew DO   2 Units at 03/04/19 2218   • iron sucrose (VENOFER) 300 mg in sodium chloride 0.9 % 100 mL IVPB  300 mg Intravenous Q24H Vika Michel APRN   300 mg at 03/04/19 1037   • levothyroxine (SYNTHROID, LEVOTHROID) tablet 50 mcg  50 mcg Oral Q AM Manda Andrew DO   50 mcg at 03/04/19 0633   • nystatin (MYCOSTATIN) powder   Topical Q12H Manda Andrew DO       • ondansetron (ZOFRAN) injection 4 mg  4 mg Intravenous Q6H PRN Manda Andrew DO       • pantoprazole (PROTONIX) EC tablet 40 mg  40 mg Oral Daily Manda Andrew DO   40 mg at 03/04/19 1002   • polyethylene glycol 3350 powder (packet)  17 g Oral Daily Fidencio Rodriguez MD   17 g at 03/04/19 1235   • simethicone (MYLICON) chewable tablet 80 mg  80 mg Oral 4x Daily PRN Manda Andrew DO       • sodium chloride 0.9 % flush 3 mL  3 mL Intravenous Q12H Manda Andrew DO   3 mL at 03/04/19 2040   • sodium chloride  0.9 % flush 3-10 mL  3-10 mL Intravenous PRN Manda Andrew, DO       • sodium chloride 0.9 % infusion  75 mL/hr Intravenous Continuous Sj Fournier MD 75 mL/hr at 03/04/19 1002 75 mL/hr at 03/04/19 1002   • tamsulosin (FLOMAX) 24 hr capsule 0.4 mg  0.4 mg Oral Daily Manda Andrew DO   0.4 mg at 03/04/19 1003   • traMADol (ULTRAM) tablet 50 mg  50 mg Oral TID PRN Manda Andrew DO   50 mg at 03/02/19 1919       Past Medical History:  Past Medical History:   Diagnosis Date   • Constipation    • Diabetes mellitus (CMS/HCC)    • Diarrhea    • Diastolic dysfunction, left ventricle    • Esophagitis    • Exertional shortness of breath    • Hyperlipidemia    • Hypertension    • Lumbar spondylitis (CMS/HCC)    • Osteoarthritis    • Peripheral vascular disease (CMS/HCC)    • Sinusitis    • Stroke (CMS/HCC)        Past Surgical History:  Past Surgical History:   Procedure Laterality Date   • FEMUR SUPRACONDYLAR FRACTURE REPAIR Right 3/10/2018    Procedure: FEMUR SUPRACONDYLAR NAIL;  Surgeon: Nima Bundy MD;  Location: [facility];  Service: Orthopedics   • GALLBLADDER SURGERY     • HYSTERECTOMY     • JOINT REPLACEMENT     • REPLACEMENT TOTAL KNEE Left        Family History  Family History   Problem Relation Age of Onset   • No Known Problems Mother    • No Known Problems Father        Social History  Social History     Socioeconomic History   • Marital status:      Spouse name: Not on file   • Number of children: Not on file   • Years of education: Not on file   • Highest education level: Not on file   Social Needs   • Financial resource strain: Not on file   • Food insecurity - worry: Not on file   • Food insecurity - inability: Not on file   • Transportation needs - medical: Not on file   • Transportation needs - non-medical: Not on file   Occupational History   • Not on file   Tobacco Use   • Smoking status: Never Smoker   • Smokeless tobacco: Never Used   Substance and Sexual  "Activity   • Alcohol use: No   • Drug use: No   • Sexual activity: No   Other Topics Concern   • Not on file   Social History Narrative   • Not on file       Review of Systems:  History obtained from unobtainable from patient due to mental status      Objective:  /70 (BP Location: Left arm, Patient Position: Lying)   Pulse 97   Temp 98.3 °F (36.8 °C) (Oral)   Resp 16   Ht 153.7 cm (60.5\")   Wt 74.1 kg (163 lb 7 oz)   SpO2 94%   BMI 31.39 kg/m²     Intake/Output Summary (Last 24 hours) at 3/4/2019 2305  Last data filed at 3/4/2019 1853  Gross per 24 hour   Intake 600 ml   Output 950 ml   Net -350 ml     General: awake/alert   Chest:  clear to auscultation bilaterally without respiratory distress  CVS: regular rate and rhythm  Abdominal: soft, nontender, normal bowel sounds  Extremities: no cyanosis or edema  Skin: warm and dry without rash      Labs:  Results from last 7 days   Lab Units 03/04/19  0557 03/01/19  0244 02/28/19  1222   WBC 10*3/mm3 21.07* 25.33* 24.96*   HEMOGLOBIN g/dL 9.2* 10.8* 11.4*   HEMATOCRIT % 30.0* 35.1* 36.3*   PLATELETS 10*3/mm3 512* 672* 646*         Results from last 7 days   Lab Units 03/04/19  0557 03/03/19  0528 03/02/19  0545  03/01/19  0244   SODIUM mmol/L 138 138 137   < > 140   POTASSIUM mmol/L 4.4 4.6 4.2   < > 5.6*   CHLORIDE mmol/L 110 109 108   < > 107   CO2 mmol/L 19.0* 20.0* 22.0*   < > 22.0*   BUN mg/dL 88* 100* 111*   < > 121*   CREATININE mg/dL 1.58* 2.01* 2.21*   < > 2.47*   CALCIUM mg/dL 8.0* 8.0* 8.1*   < > 8.8   BILIRUBIN mg/dL 0.1  --  0.2  --  0.3   ALK PHOS U/L 77  --  70  --  81   ALT (SGPT) U/L 21  --  15  --  15   AST (SGOT) U/L 18  --  13  --  10   GLUCOSE mg/dL 210* 142* 104*   < > 154*    < > = values in this interval not displayed.       Radiology:   Imaging Results (last 72 hours)     ** No results found for the last 72 hours. **          Culture:  Blood Culture   Date Value Ref Range Status   02/28/2019 No growth at 4 days  Preliminary "   02/28/2019 No growth at 4 days  Preliminary     Urine Culture   Date Value Ref Range Status   03/02/2019 No growth at 2 days  Final   02/28/2019 No growth at 2 days  Final         Assessment   STUART / ATN  CKD 3   DM2  HTN  Hyperuricemia  Secondary Hyperparathyroidism      Plan:  Allopurinol  Calcitriol  Monitor labs  Wean IVF tomorrow      Javed Silveira MD  3/4/2019  11:05 PM

## 2019-03-05 NOTE — PROGRESS NOTES
Nephrology ([facility]) Progress Note      Patient:  Tamy Sher  YOB: 1930  Date of Service: 3/5/2019  MRN: [id number]   Acct: [id number]   Primary Care Physician: Barry Foley MD  Advance Directive:   Code Status and Medical Interventions:   Ordered at: 02/28/19 1515     Limited Support to NOT Include:    Cardioversion/Defibrillation    Intubation     Level Of Support Discussed With:    Health Care Surrogate     Code Status:    No CPR     Medical Interventions (Level of Support Prior to Arrest):    Limited     Admit Date: 2/28/2019       Hospital Day: 4  Referring Provider: Manda Andrew DO      Patient personally seen and examined.  Complete chart including Consults, Notes, Operative Reports, Labs, Cardiology, and Radiology studies reviewed as able.        Subjective:  Tamy Sher is a 88 y.o. female  whom we were consulted for consulted for acute kidney injury.  Baseline of chronic kidney disease stage 3; baseline creatinine 1.1 mg. Does not follow with nephrology.  History of type 2 diabetes, benign hypertension, dementia.  Patient had routine outpatient labs done at her nursing home which showed hyperkalemia and acute kidney injury. Was sent to emergency department for further evaluation.  Patient does take daily potassium supplement.  Patient wasable to offer limited history, not sure why she is in hospital.      Today, no new events.  Unable to participate in the H&P.             Allergies:  Patient has no known allergies.    Home Meds:  Medications Prior to Admission   Medication Sig Dispense Refill Last Dose   • acetaminophen (TYLENOL) 325 MG tablet Take 650 mg by mouth Every 6 (Six) Hours As Needed for Mild Pain  or Fever.   Past Week at Unknown time   • apixaban (ELIQUIS) 5 MG tablet tablet Take 1 tablet by mouth Every 12 (Twelve) Hours. 60 tablet  2/27/2019 at Unknown time   • aspirin 81 MG chewable tablet Chew 1 tablet Daily.   2/27/2019 at  Unknown time   • budesonide-formoterol (SYMBICORT) 160-4.5 MCG/ACT inhaler Inhale 2 puffs 2 (Two) Times a Day.  12 2/27/2019 at Unknown time   • bumetanide (BUMEX) 1 MG tablet Take 1 mg by mouth 2 (Two) Times a Day.   2/27/2019 at Unknown time   • Cholecalciferol (VITAMIN D3) 5000 units capsule capsule Take 5,000 Units by mouth Daily.   2/27/2019 at Unknown time   • diltiaZEM CD (CARDIZEM CD) 240 MG 24 hr capsule Take 1 capsule by mouth Daily.   2/27/2019 at Unknown time   • docusate sodium (COLACE) 100 MG capsule Take 100 mg by mouth Daily.   2/27/2019 at Unknown time   • escitalopram (LEXAPRO) 20 MG tablet Take 20 mg by mouth Every Night.   2/27/2019 at Unknown time   • folic acid (FOLVITE) 1 MG tablet Take 1 mg by mouth Daily.   2/27/2019 at Unknown time   • gabapentin (NEURONTIN) 300 MG capsule Take 1 capsule by mouth Daily With Breakfast. 3 capsule 0 2/27/2019 at Unknown time   • gabapentin (NEURONTIN) 300 MG capsule Take 600 mg by mouth Every Night.   2/27/2019 at Unknown time   • insulin detemir (LEVEMIR) 100 UNIT/ML injection Inject 10 Units under the skin Every Night.   2/27/2019 at Unknown time   • Insulin Lispro (HUMALOG KWIKPEN) 100 UNIT/ML solution pen-injector Inject  under the skin into the appropriate area as directed 4 (Four) Times a Day Before Meals & at Bedtime. 0-149 = 0 units.  150-400 = formula: (BG-100) / 20 = units to be given.  401+ = call MD.   Past Week at Unknown time   • levothyroxine (SYNTHROID, LEVOTHROID) 50 MCG tablet Take 50 mcg by mouth Daily.   2/27/2019 at Unknown time   • lisinopril (PRINIVIL,ZESTRIL) 5 MG tablet Take 1 tablet by mouth Daily.   2/27/2019 at Unknown time   • metoprolol succinate XL (TOPROL-XL) 25 MG 24 hr tablet Take 25 mg by mouth Daily.   2/27/2019 at Unknown time   • mirtazapine (REMERON) 7.5 MG half tablet Take 7.5 mg by mouth Every Night.   2/27/2019 at Unknown time   • nystatin (nystatin) 260837 UNIT/GM powder Apply 1 application topically to the  appropriate area as directed 4 (Four) Times a Day. Apply to breast folds.   2/27/2019 at Unknown time   • pantoprazole (PROTONIX) 40 MG EC tablet Take 40 mg by mouth Daily.   2/27/2019 at Unknown time   • polyethylene glycol (MIRALAX) packet Take 17 g by mouth Daily.   2/27/2019 at Unknown time   • potassium chloride (MICRO-K) 10 MEQ CR capsule Take 2 capsules by mouth Daily.   2/27/2019 at Unknown time   • simethicone (MYLICON) 80 MG chewable tablet Chew 1 tablet 4 (Four) Times a Day As Needed for Flatulence.   Past Week at Unknown time   • simvastatin (ZOCOR) 20 MG tablet Take 20 mg by mouth Every Night.   2/27/2019 at Unknown time   • tamsulosin (FLOMAX) 0.4 MG capsule 24 hr capsule Take 1 capsule by mouth Daily. 30 capsule  2/27/2019 at Unknown time   • traMADol (ULTRAM) 50 MG tablet Take 50 mg by mouth 3 (Three) Times a Day As Needed for Moderate Pain .   Past Week at Unknown time       Medicines:  Current Facility-Administered Medications   Medication Dose Route Frequency Provider Last Rate Last Dose   • acetaminophen (TYLENOL) tablet 650 mg  650 mg Oral Q6H PRN Manda Andrew DO       • allopurinol (ZYLOPRIM) tablet 100 mg  100 mg Oral Daily Sj Fournier MD   100 mg at 03/05/19 0815   • apixaban (ELIQUIS) tablet 2.5 mg  2.5 mg Oral Q12H Manda Andrew DO   2.5 mg at 03/05/19 0813   • atorvastatin (LIPITOR) tablet 10 mg  10 mg Oral Nightly Manda Andrew DO   10 mg at 03/04/19 2039   • budesonide-formoterol (SYMBICORT) 160-4.5 MCG/ACT inhaler 2 puff  2 puff Inhalation BID - RT Manda Andrew DO   2 puff at 03/05/19 0753   • calcitriol (ROCALTROL) capsule 0.25 mcg  0.25 mcg Oral Daily Sj Fournier MD   0.25 mcg at 03/05/19 0814   • dextrose (D50W) 25 g/ 50mL Intravenous Solution 25 g  25 g Intravenous Q15 Min PRN Manda Andrew DO       • dextrose (GLUTOSE) oral gel 15 g  15 g Oral Q15 Min PRN Manda Andrew DO       • diltiaZEM CD (CARDIZEM CD) 24 hr  capsule 240 mg  240 mg Oral Q24H Manda Andrew DO   240 mg at 03/05/19 0812   • docusate sodium (COLACE) capsule 100 mg  100 mg Oral Daily Manda Andrew DO   100 mg at 03/05/19 0815   • escitalopram (LEXAPRO) tablet 20 mg  20 mg Oral Nightly Manda Andrew DO   20 mg at 03/04/19 2039   • folic acid (FOLVITE) tablet 1 mg  1 mg Oral Daily Manda Andrew DO   1 mg at 03/05/19 0814   • gabapentin (NEURONTIN) capsule 300 mg  300 mg Oral Daily With Breakfast Manda Andrew DO   300 mg at 03/05/19 0812   • gabapentin (NEURONTIN) capsule 600 mg  600 mg Oral Nightly Manda Andrew DO   600 mg at 03/04/19 2039   • glucagon (human recombinant) (GLUCAGEN DIAGNOSTIC) injection 1 mg  1 mg Subcutaneous PRN Manda Andrew DO       • insulin detemir (LEVEMIR) injection 10 Units  10 Units Subcutaneous Nightly Manda Andrew DO   10 Units at 03/04/19 2218   • insulin lispro (humaLOG) injection 2-7 Units  2-7 Units Subcutaneous 4x Daily With Meals & Nightly Manda Andrew DO   2 Units at 03/05/19 1337   • iron sucrose (VENOFER) 300 mg in sodium chloride 0.9 % 100 mL IVPB  300 mg Intravenous Q24H Vika Michel APRN   300 mg at 03/05/19 1334   • levothyroxine (SYNTHROID, LEVOTHROID) tablet 50 mcg  50 mcg Oral Q AM Manda Andrew DO   50 mcg at 03/05/19 0558   • nystatin (MYCOSTATIN) powder   Topical Q12H Manda Andrew DO       • ondansetron (ZOFRAN) injection 4 mg  4 mg Intravenous Q6H PRN Manda Andrew DO       • pantoprazole (PROTONIX) EC tablet 40 mg  40 mg Oral Daily Manda Andrew DO   40 mg at 03/05/19 0812   • polyethylene glycol 3350 powder (packet)  17 g Oral Daily Fidencio Rodriguez MD   17 g at 03/05/19 0814   • simethicone (MYLICON) chewable tablet 80 mg  80 mg Oral 4x Daily PRN Manda Andrew DO       • sodium chloride 0.9 % flush 3 mL  3 mL Intravenous Q12H Manda Andrew DO   3 mL at 03/05/19 1335   • sodium chloride  0.9 % flush 3-10 mL  3-10 mL Intravenous PRN Manda Andrew DO       • tamsulosin (FLOMAX) 24 hr capsule 0.4 mg  0.4 mg Oral Daily Manda Andrew DO   0.4 mg at 03/05/19 0815   • traMADol (ULTRAM) tablet 50 mg  50 mg Oral TID PRN Manda Andrew DO   50 mg at 03/02/19 1919       Past Medical History:  Past Medical History:   Diagnosis Date   • Constipation    • Diabetes mellitus (CMS/HCC)    • Diarrhea    • Diastolic dysfunction, left ventricle    • Esophagitis    • Exertional shortness of breath    • Hyperlipidemia    • Hypertension    • Lumbar spondylitis (CMS/HCC)    • Osteoarthritis    • Peripheral vascular disease (CMS/HCC)    • Sinusitis    • Stroke (CMS/HCC)        Past Surgical History:  Past Surgical History:   Procedure Laterality Date   • FEMUR SUPRACONDYLAR FRACTURE REPAIR Right 3/10/2018    Procedure: FEMUR SUPRACONDYLAR NAIL;  Surgeon: Nima Bundy MD;  Location: [facility];  Service: Orthopedics   • GALLBLADDER SURGERY     • HYSTERECTOMY     • JOINT REPLACEMENT     • REPLACEMENT TOTAL KNEE Left        Family History  Family History   Problem Relation Age of Onset   • No Known Problems Mother    • No Known Problems Father        Social History  Social History     Socioeconomic History   • Marital status:      Spouse name: Not on file   • Number of children: Not on file   • Years of education: Not on file   • Highest education level: Not on file   Social Needs   • Financial resource strain: Not on file   • Food insecurity - worry: Not on file   • Food insecurity - inability: Not on file   • Transportation needs - medical: Not on file   • Transportation needs - non-medical: Not on file   Occupational History   • Not on file   Tobacco Use   • Smoking status: Never Smoker   • Smokeless tobacco: Never Used   Substance and Sexual Activity   • Alcohol use: No   • Drug use: No   • Sexual activity: No   Other Topics Concern   • Not on file   Social History Narrative   • Not on file  "      Review of Systems:  History obtained from unobtainable from patient due to mental status      Objective:  /65 (BP Location: Right arm, Patient Position: Lying)   Pulse 89   Temp 98.7 °F (37.1 °C)   Resp 16   Ht 153.7 cm (60.5\")   Wt 74.1 kg (163 lb 7 oz)   SpO2 92%   BMI 31.39 kg/m²     Intake/Output Summary (Last 24 hours) at 3/5/2019 1756  Last data filed at 3/5/2019 0448  Gross per 24 hour   Intake 1190 ml   Output 700 ml   Net 490 ml     General: awake/NAD  Chest:  clear to auscultation bilaterally without respiratory distress  CVS: regular rate and rhythm  Abdominal: soft, nontender, normal bowel sounds  Extremities: no cyanosis or edema  Skin: warm and dry without rash      Labs:  Results from last 7 days   Lab Units 03/05/19  0504 03/04/19  0557 03/01/19  0244   WBC 10*3/mm3 22.54* 21.07* 25.33*   HEMOGLOBIN g/dL 9.4* 9.2* 10.8*   HEMATOCRIT % 30.4* 30.0* 35.1*   PLATELETS 10*3/mm3 566* 512* 672*         Results from last 7 days   Lab Units 03/05/19  0505 03/04/19  0557 03/03/19  0528 03/02/19  0545  03/01/19  0244   SODIUM mmol/L 138 138 138 137   < > 140   POTASSIUM mmol/L 5.2 4.4 4.6 4.2   < > 5.6*   CHLORIDE mmol/L 111* 110 109 108   < > 107   CO2 mmol/L 24.0 19.0* 20.0* 22.0*   < > 22.0*   BUN mg/dL 75* 88* 100* 111*   < > 121*   CREATININE mg/dL 1.32 1.58* 2.01* 2.21*   < > 2.47*   CALCIUM mg/dL 8.5 8.0* 8.0* 8.1*   < > 8.8   BILIRUBIN mg/dL  --  0.1  --  0.2  --  0.3   ALK PHOS U/L  --  77  --  70  --  81   ALT (SGPT) U/L  --  21  --  15  --  15   AST (SGOT) U/L  --  18  --  13  --  10   GLUCOSE mg/dL 154* 210* 142* 104*   < > 154*    < > = values in this interval not displayed.       Radiology:   Imaging Results (last 72 hours)     ** No results found for the last 72 hours. **          Culture:  Blood Culture   Date Value Ref Range Status   02/28/2019 No growth at 4 days  Preliminary   02/28/2019 No growth at 4 days  Preliminary     Urine Culture   Date Value Ref Range Status "   03/02/2019 No growth at 2 days  Final   02/28/2019 No growth at 2 days  Final         Assessment   STUART / ATN  CKD 3   DM2  HTN  Hyperuricemia  Secondary Hyperparathyroidism      Plan:  Allopurinol  Calcitriol  Monitor labs  Wean IVF       Javed Silveira MD  3/5/2019  5:56 PM

## 2019-03-05 NOTE — PLAN OF CARE
Problem: Patient Care Overview  Goal: Plan of Care Review  Outcome: Ongoing (interventions implemented as appropriate)   03/05/19 1152   Plan of Care Review   Progress no change   OTHER   Outcome Summary Pt sleeping soundly at time of RD visit; didn't wake to door knock, name, or gentle touch. She is on Cardiac CCHO Renal diet with PO intake avg 53%/9meals/3 days. PO intake < estimated needs. Added Boost Glucose Control x1/day. Will continue to follow.

## 2019-03-05 NOTE — PLAN OF CARE
Problem: Skin Injury Risk (Adult)  Goal: Skin Health and Integrity  Outcome: Ongoing (interventions implemented as appropriate)      Problem: Fall Risk (Adult)  Goal: Absence of Fall  Outcome: Ongoing (interventions implemented as appropriate)      Problem: Patient Care Overview  Goal: Plan of Care Review  Outcome: Ongoing (interventions implemented as appropriate)   03/05/19 0302   Coping/Psychosocial   Plan of Care Reviewed With patient   Plan of Care Review   Progress no change   OTHER   Outcome Summary Patient has no c/o pain. Bladder scanned at 0000, showed 500ml. In and out cath'd patient and 400ml was emptied. Continues to receive IVF. VSS. Safety maintained. Will continue to monitor.        Problem: Wound (Includes Pressure Injury) (Adult)  Goal: Signs and Symptoms of Listed Potential Problems Will be Absent, Minimized or Managed (Wound)  Outcome: Ongoing (interventions implemented as appropriate)

## 2019-03-05 NOTE — PROGRESS NOTES
"    [facility] Medicine Services  INPATIENT PROGRESS NOTE    Patient Name: Tamy Sher  Date of Admission: 2/28/2019  Today's Date: 03/05/19  Length of Stay: 4  Primary Care Physician: Barry Foley MD    Subjective   Chief Complaint: follow up  HPI   Patient had urinary retention requiring in and out cath.  She had estrada cath removed yesterday.  She spontaneously urinated just now.   Wbc remains elevated.  She has MPN and previously wa on hydroxyurea per hematology.  Apparently, she had temp of 100.1 although has significant layer of blanket as well and room is warmer than other rooms.    I spoke to Nabor Guillory.  He cleared her to return to SNF.    Review of Systems   She is withdrawn. She just stared at me.  Only word I got was \"fine\"      Objective    Temp:  [98.1 °F (36.7 °C)-100.1 °F (37.8 °C)] 100.1 °F (37.8 °C)  Heart Rate:  [77-97] 89  Resp:  [16-18] 16  BP: (117-135)/(58-70) 129/65  Physical Exam  She is withdrawn. She just stared at me.  Only word I got was \"fine\"  Constitutional: She appears well-developed and well-nourished.   HENT:   Head: Normocephalic and atraumatic.   Eyes: EOM are normal. Pupils are equal, round,   Conjunctival pallor   Neck: Neck supple.   Cardiovascular: Normal rate and regular rhythm. Exam reveals no gallop and no friction rub.   No murmur heard.  Pulmonary/Chest: Effort normal and breath sounds normal.clear to auscultation Abdominal: Soft. Bowel sounds are normal. There is no hepatosplenomegaly. There is no tenderness.   Musculoskeletal: Normal range of motion. She exhibits no edema.   Neurological: She is alert an oriented to person and place. No cranial nerve deficit. + head tremor - non observed during visit  Skin: Skin is warm and dry.   Psychiatric: She has a normal mood and affect. Her behavior is normal.   Nursing note and vitals reviewed.       Results Review:  I have reviewed the labs, radiology results, and diagnostic " studies.    Laboratory Data:   Results from last 7 days   Lab Units 03/05/19  0504 03/04/19  0557 03/01/19  0244   WBC 10*3/mm3 22.54* 21.07* 25.33*   HEMOGLOBIN g/dL 9.4* 9.2* 10.8*   HEMATOCRIT % 30.4* 30.0* 35.1*   PLATELETS 10*3/mm3 566* 512* 672*        Results from last 7 days   Lab Units 03/04/19  0557 03/03/19  0528 03/02/19  0545  03/01/19  0244   SODIUM mmol/L 138 138 137   < > 140   POTASSIUM mmol/L 4.4 4.6 4.2   < > 5.6*   CHLORIDE mmol/L 110 109 108   < > 107   CO2 mmol/L 19.0* 20.0* 22.0*   < > 22.0*   BUN mg/dL 88* 100* 111*   < > 121*   CREATININE mg/dL 1.58* 2.01* 2.21*   < > 2.47*   CALCIUM mg/dL 8.0* 8.0* 8.1*   < > 8.8   BILIRUBIN mg/dL 0.1  --  0.2  --  0.3   ALK PHOS U/L 77  --  70  --  81   ALT (SGPT) U/L 21  --  15  --  15   AST (SGOT) U/L 18  --  13  --  10   GLUCOSE mg/dL 210* 142* 104*   < > 154*    < > = values in this interval not displayed.       Culture Data:   Blood Culture   Date Value Ref Range Status   02/28/2019 No growth at 4 days  Preliminary   02/28/2019 No growth at 4 days  Preliminary     Urine Culture   Date Value Ref Range Status   03/02/2019 No growth at 2 days  Final   02/28/2019 No growth at 2 days  Final       Radiology Data:   Imaging Results (last 24 hours)     ** No results found for the last 24 hours. **        Negative fobt  I have reviewed the patient's current medications.     Assessment/Plan     Active Hospital Problems    Diagnosis   • Hyperkalemia       · Hyperkalemia (6.6) on admit February 28, 2019  · Kael; acute on chronic renal failure - improving   · Diabetes mellitus type 2 cpt; [id number], 248, 165, 194  ·  Persistent leukocytosis with thrombocytosis -history of myeloproliferative neoplasm previously on hydroxyurea which was reportedly been stopped in November 2018 when she was admitted and treated for pneumonia.  · Iron deficiency anemia -occult blood test, Venofer; aspirin been on hold  · Chronic anticoagulation -unclear exactly of what reason but  record indicates history of stroke; EKG reviewed and noted atrial fibrillation   · HTN - controlled; CPT  · Hypothyroidism -on replacement  · Constipation - she had BM today   · Low grade fever - could be from ambient room temperature, overlay of blankets; will monitor; bc and urine culture negative to date  · Urinary retention - fc removed on 3/4/19; spontaneously just now voided; moitor         Observe overnight off any abx.  cont venofer  If no febrile episode overnight, anticipate dc to snf in AM  Discussed with caryn West      allopurinol 100 mg Oral Daily   apixaban 2.5 mg Oral Q12H   atorvastatin 10 mg Oral Nightly   budesonide-formoterol 2 puff Inhalation BID - RT   calcitriol 0.25 mcg Oral Daily   diltiaZEM  mg Oral Q24H   docusate sodium 100 mg Oral Daily   escitalopram 20 mg Oral Nightly   folic acid 1 mg Oral Daily   gabapentin 300 mg Oral Daily With Breakfast   gabapentin 600 mg Oral Nightly   insulin detemir 10 Units Subcutaneous Nightly   insulin lispro 2-7 Units Subcutaneous 4x Daily With Meals & Nightly   iron sucrose (VENOFER) IVPB 300 mg Intravenous Q24H   levothyroxine 50 mcg Oral Q AM   nystatin  Topical Q12H   pantoprazole 40 mg Oral Daily   polyethylene glycol 17 g Oral Daily   sodium chloride 3 mL Intravenous Q12H   tamsulosin 0.4 mg Oral Daily           Fidencio Rodriguez MD   03/05/19   10:13 AM

## 2019-03-05 NOTE — THERAPY TREATMENT NOTE
Acute Care - Physical Therapy Treatment Note  [facility]     Patient Name: Tamy Sher  : 1930  MRN: [id number]  Today's Date: 3/5/2019  Onset of Illness/Injury or Date of Surgery: 19  Date of Referral to PT: 19  Referring Physician: Dr. Andrew     Admit Date: 2019    Visit Dx:    ICD-10-CM ICD-9-CM   1. Hyperkalemia E87.5 276.7   2. Acute renal insufficiency N28.9 593.9   3. Leukocytosis, unspecified type D72.829 288.60   4. Persistent atrial fibrillation (CMS/[facility]) I48.1 427.31   5. Congestive heart failure, unspecified HF chronicity, unspecified heart failure type (CMS/[facility]) I50.9 428.0   6. Impaired functional mobility, balance, and endurance Z74.09 V49.89   7. Decreased activities of daily living (ADL) R68.89 780.99     Patient Active Problem List   Diagnosis   • Ischemic chest pain   • Pneumonia of both lungs due to infectious organism   • Fall   • Closed displaced comminuted fracture of shaft of right femur (CMS/[facility])   • Pain of right thigh   • Class 2 obesity with serious comorbidity in adult   • Type 2 diabetes mellitus with neurologic complication, with long-term current use of insulin (CMS/[facility])   • STUART (acute kidney injury) (CMS/[facility])   • Chronic diastolic heart failure (CMS/[facility])   • Pleural effusion, left   • Hypoxia   • Surgical wound infection, initial encounter   • Pleural effusion due to CHF (congestive heart failure) (CMS/[facility])   • Hyperkalemia       Therapy Treatment    Rehabilitation Treatment Summary     Row Name 19 1331             Treatment Time/Intention    Discipline  physical therapy assistant  -KJ      Document Type  therapy note (daily note)  -KJ2      Subjective Information  complains of;fatigue  -KJ2      Mode of Treatment  physical therapy  -KJ2      Patient Effort  poor  -KJ2      Comment  did not open eyes during tx  -KJ2      Existing Precautions/Restrictions  fall  -KJ2      Recorded by [KJ] Vika Pederson PTA 19 [id number]  [KJ2] Vika Pederson, PTA  03/05/19 [facility]      Row Name 03/05/19 1331             Bed Mobility Assessment/Treatment    Rolling Left Felda (Bed Mobility)  maximum assist (25% patient effort)  -KJ      Rolling Right Felda (Bed Mobility)  maximum assist (25% patient effort)  -KJ      Supine-Sit Felda (Bed Mobility)  dependent (less than 25% patient effort)  -KJ      Sit-Supine Felda (Bed Mobility)  dependent (less than 25% patient effort)  -KJ      Bed Mobility, Safety Issues  cognitive deficits limit understanding;decreased use of arms for pushing/pulling;decreased use of legs for bridging/pushing  -KJ      Recorded by [KJ] Vika Pederson PTA 03/05/19 1352      Row Name 03/05/19 1331             Gait/Stairs Assessment/Training    Comment (Gait/Stairs)  non ambulatory  -KJ      Recorded by [KJ] Vika Pederson PTA 03/05/19 1352      Row Name 03/05/19 1331             Motor Skills Assessment/Interventions    Additional Documentation  Therapeutic Exercise (Group)  -KJ      Recorded by [KJ] Vika Pederson PTA 03/05/19 1352      Row Name 03/05/19 1331             Therapeutic Exercise    Exercise Type (Therapeutic Exercise)  PROM (passive range of motion)  -KJ      Position (Therapeutic Exercise)  seated  -KJ      Comment (Therapeutic Exercise)  followed no commands for exercise  -KJ      Recorded by [KJ] Vika Pederson PTA 03/05/19 1352      Row Name 03/05/19 1331             Static Sitting Balance    Level of Felda (Unsupported Sitting, Static Balance)  minimal assist, 75% patient effort;contact guard assist  -KJ      Sitting Position (Unsupported Sitting, Static Balance)  sitting on edge of bed  -KJ      Recorded by [KJ] Vika Pederson PTA 03/05/19 1352      Row Name 03/05/19 1331             Positioning and Restraints    Pre-Treatment Position  in bed  -KJ      Post Treatment Position  bed  -KJ      Recorded by [KJ] Vika Pederson PTA 03/05/19 1352      Row Name 03/05/19 1331             Pain Scale:  Numbers Pre/Post-Treatment    Pain Location  -- everywhere; does not rate pn due to confusion  -KJ      Recorded by [KJ] Vika Pederson, PTA 03/05/19 1352      Row Name                Wound 03/01/19 1030 Bilateral coccyx pressure injury    Wound - Properties Group Date first assessed: 03/01/19 [RL] Time first assessed: 1030 [RL] Present On Admission : yes;picture taken [RL] Side: Bilateral [RL] Location: coccyx [RL] Type: pressure injury [RL] Stage, Pressure Injury: Stage 2 [RL] Recorded by:  [RL] Aryan Adrian RN 03/01/19 6158      User Key  (r) = Recorded By, (t) = Taken By, (c) = Cosigned By    Initials Name Effective Dates Discipline    Vika Tijerina PTA 08/02/16 -  PT    RL Aryan Adrian RN 08/02/16 -  Nurse          Wound 03/01/19 1030 Bilateral coccyx pressure injury (Active)   Dressing Appearance dry;intact 3/5/2019  7:53 AM   Closure DAV 3/5/2019  7:53 AM   Base red/granulating 3/5/2019  7:53 AM   Periwound blanchable 3/5/2019  7:53 AM           Physical Therapy Education     Title: PT OT SLP Therapies (In Progress)     Topic: Physical Therapy (In Progress)     Point: Mobility training (In Progress)     Learning Progress Summary           Patient Acceptance, E,TB,D, NR by  at 3/4/2019 12:08 PM    Comment:  Educ re: purpose of PT & importance of activity; education for improved tech for   rolling and transfers and proper use of rwx for standing and during tfers; educ re:  purpose of mechanical lift and increase fall risk.                   Point: Precautions (In Progress)     Learning Progress Summary           Patient Acceptance, E,TB,D, NR by JE at 3/4/2019 12:08 PM    Comment:  Educ re: purpose of PT & importance of activity; education for improved tech for   rolling and transfers and proper use of rwx for standing and during tfers; educ re:  purpose of mechanical lift and increase fall risk.                               User Key     Initials Effective Dates Name Provider Type Discipline     LAVONNE 08/02/18 -  Mariia Shepard, PT Physical Therapist PT                PT Recommendation and Plan     Plan of Care Reviewed With: patient  Progress: improving  Outcome Summary: PT tx completed.Pt supine in bed. C/O wants to rest. Eyes closed entire tx. Followed no commands for tx. Dependent bed mobility, PROM all 4 ext. Sat edge of bed x 5 minutes CG/Kalie.  Outcome Measures     Row Name 03/04/19 1000             How much help from another is currently needed...    Putting on and taking off regular lower body clothing?  1  -JJ      Bathing (including washing, rinsing, and drying)  2  -JJ      Toileting (which includes using toilet bed pan or urinal)  1  -JJ      Putting on and taking off regular upper body clothing  2  -JJ      Taking care of personal grooming (such as brushing teeth)  3  -JJ      Eating meals  3  -JJ      Score  12  -JJ         Functional Assessment    Outcome Measure Options  AM-PAC 6 Clicks Daily Activity (OT)  -JJ        User Key  (r) = Recorded By, (t) = Taken By, (c) = Cosigned By    Initials Name Provider Type    Jody Cottrell, OTR/L Occupational Therapist         Time Calculation:   PT Charges     Row Name 03/05/19 1352             Time Calculation    Start Time  1331  -KJ      Stop Time  1347  -KJ      Time Calculation (min)  16 min  -KJ      PT Received On  03/05/19  -KJ      PT Goal Re-Cert Due Date  03/14/19  -KJ         Time Calculation- PT    Total Timed Code Minutes- PT  16 minute(s)  -KJ        User Key  (r) = Recorded By, (t) = Taken By, (c) = Cosigned By    Initials Name Provider Type    Vika Tijerina PTA Physical Therapy Assistant        Therapy Suggested Charges     Code   Minutes Charges    None           Therapy Charges for Today     Code Description Service Date Service Provider Modifiers Qty    64764004063 HC PT THERAPEUTIC ACT EA 15 MIN 3/5/2019 Vika Pederson PTA GP 1          PT G-Codes  Outcome Measure Options: AM-PAC 6 Clicks Daily Activity  (OT)  AM-PAC 6 Clicks Score: 7  Score: 12    Vika Pederson, PTA  3/5/2019

## 2019-03-06 PROBLEM — Z79.01 CHRONIC ANTICOAGULATION: Status: ACTIVE | Noted: 2019-01-01

## 2019-03-06 PROBLEM — D47.1 MYELOPROLIFERATIVE NEOPLASM (HCC): Status: ACTIVE | Noted: 2019-01-01

## 2019-03-06 PROBLEM — D50.9 IRON DEFICIENCY ANEMIA: Status: ACTIVE | Noted: 2019-01-01

## 2019-03-06 PROBLEM — R33.8 ACUTE URINARY RETENTION: Status: ACTIVE | Noted: 2019-01-01

## 2019-03-06 PROBLEM — I10 ESSENTIAL HYPERTENSION: Status: ACTIVE | Noted: 2019-01-01

## 2019-03-06 PROBLEM — N18.30 ACUTE RENAL FAILURE SUPERIMPOSED ON STAGE 3 CHRONIC KIDNEY DISEASE (HCC): Status: ACTIVE | Noted: 2019-01-01

## 2019-03-06 PROBLEM — D75.839 THROMBOCYTOSIS: Status: ACTIVE | Noted: 2019-01-01

## 2019-03-06 PROBLEM — K59.00 CONSTIPATION: Status: ACTIVE | Noted: 2019-01-01

## 2019-03-06 PROBLEM — E11.9 TYPE 2 DIABETES MELLITUS (HCC): Status: ACTIVE | Noted: 2019-01-01

## 2019-03-06 PROBLEM — I48.91 ATRIAL FIBRILLATION (HCC): Status: ACTIVE | Noted: 2019-01-01

## 2019-03-06 PROBLEM — E03.9 HYPOTHYROIDISM (ACQUIRED): Status: ACTIVE | Noted: 2019-01-01

## 2019-03-06 PROBLEM — N17.9 ACUTE RENAL FAILURE SUPERIMPOSED ON STAGE 3 CHRONIC KIDNEY DISEASE (HCC): Status: ACTIVE | Noted: 2019-01-01

## 2019-03-06 NOTE — PLAN OF CARE
Problem: Fall Risk (Adult)  Goal: Absence of Fall  Outcome: Ongoing (interventions implemented as appropriate)      Problem: Patient Care Overview  Goal: Plan of Care Review  Outcome: Ongoing (interventions implemented as appropriate)   03/06/19 0404   Coping/Psychosocial   Plan of Care Reviewed With patient   Plan of Care Review   Progress improving   OTHER   Outcome Summary VSS. No complaints. Patient only awakens to voice and then she goes right back to sleep. She is only oriented to person. She doesn't talk, just mumbles. Turned Q2. Mepilex on coccyx. Safety maintained. Cont to monitor.        Problem: Wound (Includes Pressure Injury) (Adult)  Goal: Signs and Symptoms of Listed Potential Problems Will be Absent, Minimized or Managed (Wound)  Outcome: Ongoing (interventions implemented as appropriate)
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
## 2019-03-06 NOTE — PROGRESS NOTES
Continued Stay Note  [facility]     Patient Name: Tamy Sher  MRN: [id number]  Today's Date: 3/6/2019    Admit Date: 2/28/2019    Discharge Plan     Row Name 03/06/19 1348       Plan    Plan  [facility]    Patient/Family in Agreement with Plan  yes    Final Discharge Disposition Code  03 - skilled nursing facility (SNF)    Final Note  Patient is being discharged back to [facility] today. Adrienne from there aware of discharge status. Pt will be admitted at SNF level care. [facility] [phone], aware of discharge today and pt will travel back via [facility] ambulance [phone].    [facility] [phone]        Discharge Codes    No documentation.       Expected Discharge Date and Time     Expected Discharge Date Expected Discharge Time    Mar 6, 2019             VALENTINO Corley

## 2019-03-06 NOTE — DISCHARGE PLACEMENT REQUEST
"Mishel Bazan [phone]  Price Ly (88 y.o. Female)     Date of Birth Social Security Number Address Home Phone MRN    06/07/1930  [address] [facility] [phone] [id number]    Scientologist Marital Status          Other        Admission Date Admission Type Admitting Provider Attending Provider Department, Room/Bed    2/28/19 Emergency Fidencio Rodriguez MD [place], Glenn Riego, MD [facility] 4C, 476/1    Discharge Date Discharge Disposition Discharge Destination         Skilled Nursing Facility (DC - External)              Attending Provider:  Fidencio Rodriguez MD    Allergies:  No Known Allergies    Isolation:  None   Infection:  MRSA (03/31/18)   Code Status:  No CPR    Ht:  153.7 cm (60.5\")   Wt:  74.2 kg (163 lb 8 oz)    Admission Cmt:  None   Principal Problem:  Hyperkalemia [E87.5]                 Active Insurance as of 2/28/2019     Primary Coverage     Payor Plan Insurance Group Employer/Plan Group    MEDICARE MEDICARE A & B      Payor Plan Address Payor Plan Phone Number Payor Plan Fax Number Effective Dates    [address] [phone]  6/1/1995 - None Entered    [facility] [id number]       Subscriber Name Subscriber Birth Date Member ID       PRICE LY 6/7/1930 [id number]           Secondary Coverage     Payor Plan Insurance Group Employer/Plan Group    [place]      Payor Plan Address Payor Plan Phone Number Payor Plan Fax Number Effective Dates    [address] [phone]  11/27/2018 - None Entered    [facility] [id number]       Subscriber Name Subscriber Birth Date Member ID       PRICE LY 6/7/1930 [id number]                 Emergency Contacts      (Rel.) Home Phone Work Phone Mobile Phone    Vicente Ly (Son) -- -- [phone]    Jeremi Ly (Son) -- -- [phone]    Ashvin Ly (Son) -- -- [phone]    Rhett Ly (Son) -- -- [phone]              "

## 2019-03-07 NOTE — PROGRESS NOTES
.jwp  Nephrology ([facility]) Progress Note      Patient:  Tamy Sher  YOB: 1930  Date of Service: 3/6/2019  MRN: [id number]   Acct: [id number]   Primary Care Physician: Barry Foley MD  Advance Directive:   Code Status and Medical Interventions:   Ordered at: 02/28/19 1515     Limited Support to NOT Include:    Cardioversion/Defibrillation    Intubation     Level Of Support Discussed With:    Health Care Surrogate     Code Status:    No CPR     Medical Interventions (Level of Support Prior to Arrest):    Limited     Admit Date: 2/28/2019       Hospital Day: 5  Referring Provider: Manda Andrew DO      Patient personally seen and examined.  Complete chart including Consults, Notes, Operative Reports, Labs, Cardiology, and Radiology studies reviewed as able.        Subjective:  Tamy Sher is a 88 y.o. female  whom we were consulted for consulted for acute kidney injury.  Baseline of chronic kidney disease stage 3; baseline creatinine 1.1 mg. Does not follow with nephrology.  History of type 2 diabetes, benign hypertension, dementia.  Patient had routine outpatient labs done at her nursing home which showed hyperkalemia and acute kidney injury. Was sent to emergency department for further evaluation.  Patient does take daily potassium supplement.  Patient wasable to offer limited history, not sure why she is in hospital.      Today, no new events.  Unable to participate in the H&P.  A bit more awake.  Family present.           Allergies:  Patient has no known allergies.    Home Meds:  No medications prior to admission.       Medicines:  No current facility-administered medications for this encounter.      Current Outpatient Medications   Medication Sig Dispense Refill   • acetaminophen (TYLENOL) 325 MG tablet Take 650 mg by mouth Every 6 (Six) Hours As Needed for Mild Pain  or Fever.     • apixaban (ELIQUIS) 5 MG tablet tablet Take 1 tablet by mouth Every 12  (Twelve) Hours. 60 tablet    • aspirin 81 MG chewable tablet Chew 1 tablet Daily.     • budesonide-formoterol (SYMBICORT) 160-4.5 MCG/ACT inhaler Inhale 2 puffs 2 (Two) Times a Day.  12   • Cholecalciferol (VITAMIN D3) 5000 units capsule capsule Take 5,000 Units by mouth Daily.     • diltiaZEM CD (CARDIZEM CD) 240 MG 24 hr capsule Take 1 capsule by mouth Daily.     • docusate sodium (COLACE) 100 MG capsule Take 100 mg by mouth Daily.     • escitalopram (LEXAPRO) 20 MG tablet Take 20 mg by mouth Every Night.     • folic acid (FOLVITE) 1 MG tablet Take 1 mg by mouth Daily.     • insulin detemir (LEVEMIR) 100 UNIT/ML injection Inject 10 Units under the skin Every Night.     • Insulin Lispro (HUMALOG KWIKPEN) 100 UNIT/ML solution pen-injector Inject  under the skin into the appropriate area as directed 4 (Four) Times a Day Before Meals & at Bedtime. 0-149 = 0 units.  150-400 = formula: (BG-100) / 20 = units to be given.  401+ = call MD.     • levothyroxine (SYNTHROID, LEVOTHROID) 50 MCG tablet Take 50 mcg by mouth Daily.     • metoprolol succinate XL (TOPROL-XL) 25 MG 24 hr tablet Take 25 mg by mouth Daily.     • nystatin (nystatin) 869815 UNIT/GM powder Apply 1 application topically to the appropriate area as directed 4 (Four) Times a Day. Apply to breast folds.     • pantoprazole (PROTONIX) 40 MG EC tablet Take 40 mg by mouth Daily.     • polyethylene glycol (MIRALAX) packet Take 17 g by mouth Daily.     • simethicone (MYLICON) 80 MG chewable tablet Chew 1 tablet 4 (Four) Times a Day As Needed for Flatulence.     • simvastatin (ZOCOR) 20 MG tablet Take 20 mg by mouth Every Night.     • tamsulosin (FLOMAX) 0.4 MG capsule 24 hr capsule Take 1 capsule by mouth Daily. 30 capsule    • [START ON 3/7/2019] allopurinol (ZYLOPRIM) 100 MG tablet Take 1 tablet by mouth Daily.     • [START ON 3/7/2019] calcitriol (ROCALTROL) 0.25 MCG capsule Take 1 capsule by mouth Daily.     • gabapentin (NEURONTIN) 300 MG capsule Take 1 capsule  by mouth Daily With Breakfast for 3 days. 3 capsule 0   • gabapentin (NEURONTIN) 300 MG capsule Take 2 capsules by mouth Every Night. 4 capsule 0   • traMADol (ULTRAM) 50 MG tablet Take 1 tablet by mouth 3 (Three) Times a Day As Needed for Moderate Pain . 10 tablet 0       Past Medical History:  Past Medical History:   Diagnosis Date   • Constipation    • Diabetes mellitus (CMS/HCC)    • Diarrhea    • Diastolic dysfunction, left ventricle    • Esophagitis    • Exertional shortness of breath    • Hyperlipidemia    • Hypertension    • Lumbar spondylitis (CMS/HCC)    • Osteoarthritis    • Peripheral vascular disease (CMS/HCC)    • Sinusitis    • Stroke (CMS/HCC)        Past Surgical History:  Past Surgical History:   Procedure Laterality Date   • FEMUR SUPRACONDYLAR FRACTURE REPAIR Right 3/10/2018    Procedure: FEMUR SUPRACONDYLAR NAIL;  Surgeon: Nima Bundy MD;  Location: [facility];  Service: Orthopedics   • GALLBLADDER SURGERY     • HYSTERECTOMY     • JOINT REPLACEMENT     • REPLACEMENT TOTAL KNEE Left        Family History  Family History   Problem Relation Age of Onset   • No Known Problems Mother    • No Known Problems Father        Social History  Social History     Socioeconomic History   • Marital status:      Spouse name: Not on file   • Number of children: Not on file   • Years of education: Not on file   • Highest education level: Not on file   Social Needs   • Financial resource strain: Not on file   • Food insecurity - worry: Not on file   • Food insecurity - inability: Not on file   • Transportation needs - medical: Not on file   • Transportation needs - non-medical: Not on file   Occupational History   • Not on file   Tobacco Use   • Smoking status: Never Smoker   • Smokeless tobacco: Never Used   Substance and Sexual Activity   • Alcohol use: No   • Drug use: No   • Sexual activity: No   Other Topics Concern   • Not on file   Social History Narrative   • Not on file       Review of  "Systems:  History obtained from unobtainable from patient due to mental status      Objective:  BP (P) 138/92 (BP Location: Right arm, Patient Position: Lying)   Pulse (P) 100   Temp (P) 99.3 °F (37.4 °C) (Oral)   Resp (P) 16   Ht 153.7 cm (60.5\")   Wt 74.2 kg (163 lb 8 oz)   SpO2 (P) 90%   BMI 31.41 kg/m²   No intake or output data in the 24 hours ending 03/06/19 2344  General: awake/NAD  Chest:  clear to auscultation bilaterally without respiratory distress  CVS: regular rate and rhythm  Abdominal: soft, nontender, normal bowel sounds  Extremities: no cyanosis or edema  Skin: warm and dry without rash      Labs:  Results from last 7 days   Lab Units 03/05/19  0504 03/04/19  0557 03/01/19  0244   WBC 10*3/mm3 22.54* 21.07* 25.33*   HEMOGLOBIN g/dL 9.4* 9.2* 10.8*   HEMATOCRIT % 30.4* 30.0* 35.1*   PLATELETS 10*3/mm3 566* 512* 672*         Results from last 7 days   Lab Units 03/06/19  0524 03/05/19  0505 03/04/19  0557  03/02/19  0545  03/01/19  0244   SODIUM mmol/L 141 138 138   < > 137   < > 140   POTASSIUM mmol/L 5.1 5.2 4.4   < > 4.2   < > 5.6*   CHLORIDE mmol/L 113* 111* 110   < > 108   < > 107   CO2 mmol/L 21.0* 24.0 19.0*   < > 22.0*   < > 22.0*   BUN mg/dL 59* 75* 88*   < > 111*   < > 121*   CREATININE mg/dL 1.17 1.32 1.58*   < > 2.21*   < > 2.47*   CALCIUM mg/dL 8.8 8.5 8.0*   < > 8.1*   < > 8.8   BILIRUBIN mg/dL  --   --  0.1  --  0.2  --  0.3   ALK PHOS U/L  --   --  77  --  70  --  81   ALT (SGPT) U/L  --   --  21  --  15  --  15   AST (SGOT) U/L  --   --  18  --  13  --  10   GLUCOSE mg/dL 132* 154* 210*   < > 104*   < > 154*    < > = values in this interval not displayed.       Radiology:   Imaging Results (last 72 hours)     ** No results found for the last 72 hours. **          Culture:  Blood Culture   Date Value Ref Range Status   02/28/2019 No growth at 4 days  Preliminary   02/28/2019 No growth at 4 days  Preliminary     Urine Culture   Date Value Ref Range Status   03/02/2019 No growth " at 2 days  Final   02/28/2019 No growth at 2 days  Final         Assessment   STUART / ATN  CKD 3   DM2  HTN  Hyperuricemia  Secondary Hyperparathyroidism      Plan:  Allopurinol  Calcitriol  Monitor labs  D/c IVF   F/u office 1w      Javed Silveira MD  3/6/2019  11:44 PM

## 2019-03-07 NOTE — THERAPY DISCHARGE NOTE
Acute Care - Occupational Therapy Discharge Summary  [facility]     Patient Name: Tamy Sher  : 1930  MRN: [id number]    Today's Date: 3/7/2019  Onset of Illness/Injury or Date of Surgery: 19    Date of Referral to OT: 19  Referring Physician: Dr. Andrew       Admit Date: 2019        OT Recommendation and Plan    Visit Dx:    ICD-10-CM ICD-9-CM   1. Hyperkalemia E87.5 276.7   2. Acute renal insufficiency N28.9 593.9   3. Leukocytosis, unspecified type D72.829 288.60   4. Persistent atrial fibrillation (CMS/HCC) I48.1 427.31   5. Congestive heart failure, unspecified HF chronicity, unspecified heart failure type (CMS/HCC) I50.9 428.0   6. Impaired functional mobility, balance, and endurance Z74.09 V49.89   7. Decreased activities of daily living (ADL) R68.89 780.99               Rehab Goal Summary     Row Name 19 0800             Bed Mobility Goal 1 (PT)    [place] Level/Cues Needed (Bed Mobility Goal 1, PT)  maximum assist (25-49% patient effort) goal: Min  -YUN      Progress/Outcomes (Bed Mobility Goal 1, PT)  goal not met  -YUN         Bed Mobility Goal 2 (PT)    [place] Level/Cues Needed (Bed Mobility Goal 2, PT)  maximum assist (25-49% patient effort) goal: Mod  -YUN      Progress/Outcomes (Bed Mobility Goal 2, PT)  goal not met  -YUN         Bed Mobility Goal (PT)    Bed Mobility Goal (PT)  goal: Mod. Pt is dependent  -YUN      Progress/Outcomes (Bed Mobility Goal, PT)  goal not met  -YUN         Transfer Goal 1 (PT)    [place] Level/Cues Needed (Transfer Goal 1, PT)  -- goal: MOD.  Not attempted  -YUN      Progress/Outcome (Transfer Goal 1, PT)  goal not met  -YUN         Strength Goal 1 (PT)    Strength Goal 1 (PT)  goal: Hip flexores 3=/5.  Not tested pt not participating  -YUN      Progress/Outcome (Strength Goal 1, PT)  goal not met  -YUN         Bed Mobility Goal 1 (OT)    Activity/Assistive Device (Bed Mobility Goal 1, OT)  bed mobility activities, all  -TS       [place] Level/Cues Needed (Bed Mobility Goal 1, OT)  moderate assist (50-74% patient effort)  -TS      Time Frame (Bed Mobility Goal 1, OT)  long term goal (LTG);by discharge  -TS      Progress/Outcomes (Bed Mobility Goal 1, OT)  goal not met  -TS         Transfer Goal 1 (OT)    Activity/Assistive Device (Transfer Goal 1, OT)  sit-to-stand/stand-to-sit;bed-to-chair/chair-to-bed;commode, bedside without drop arms;walker, rolling  -TS      [place] Level/Cues Needed (Transfer Goal 1, OT)  moderate assist (50-74% patient effort);2 person assist  -TS      Time Frame (Transfer Goal 1, OT)  long term goal (LTG);by discharge  -TS      Progress/Outcome (Transfer Goal 1, OT)  goal not met  -TS         Dressing Goal 1 (OT)    Activity/Assistive Device (Dressing Goal 1, OT)  dressing skills, all  -TS      [place]/Cues Needed (Dressing Goal 1, OT)  moderate assist (50-74% patient effort)  -TS      Time Frame (Dressing Goal 1, OT)  long term goal (LTG);by discharge  -TS      Progress/Outcome (Dressing Goal 1, OT)  goal not met  -TS         Toileting Goal 1 (OT)    Activity/Device (Toileting Goal 1, OT)  commode, bedside without drop arms;adjust/manage clothing;perform perineal hygiene  -TS      [place] Level/Cues Needed (Toileting Goal 1, OT)  moderate assist (50-74% patient effort)  -TS      Time Frame (Toileting Goal 1, OT)  long term goal (LTG);by discharge  -TS      Progress/Outcome (Toileting Goal 1, OT)  goal not met  -TS        User Key  (r) = Recorded By, (t) = Taken By, (c) = Cosigned By    Initials Name Provider Type Discipline    TS Jasmyn Goode, COOLEY/L Occupational Therapy Assistant OT    Nalini Degroot, PTA Physical Therapy Assistant PT          Outcome Measures     Row Name 03/04/19 1000             How much help from another is currently needed...    Putting on and taking off regular lower body clothing?  1  -JJ      Bathing (including washing, rinsing, and drying)  2  -JJ       Toileting (which includes using toilet bed pan or urinal)  1  -JJ      Putting on and taking off regular upper body clothing  2  -JJ      Taking care of personal grooming (such as brushing teeth)  3  -JJ      Eating meals  3  -JJ      Score  12  -JJ         Functional Assessment    Outcome Measure Options  AM-PAC 6 Clicks Daily Activity (OT)  -JJ        User Key  (r) = Recorded By, (t) = Taken By, (c) = Cosigned By    Initials Name Provider Type    Jody Cottrell OTR/L Occupational Therapist          Therapy Suggested Charges     Code   Minutes Charges    None                 OT Discharge Summary  Reason for Discharge: Discharge from facility  Outcomes Achieved: Refer to plan of care for updates on goals achieved  Discharge Destination: [facility]      CYNDEE Mcclelland  3/7/2019

## 2019-03-07 NOTE — THERAPY DISCHARGE NOTE
Inpatient Rehabilitation - Physical Therapy Progress Note/Discharge  [facility]     Patient Name: Tamy Sher  : 1930  MRN: [id number]  Today's Date: 3/7/2019  Onset of Illness/Injury or Date of Surgery: 19  Date of Referral to PT: 19  Referring Physician: Dr. Andrew     Admit Date: 2019    Visit Dx:    ICD-10-CM ICD-9-CM   1. Hyperkalemia E87.5 276.7   2. Acute renal insufficiency N28.9 593.9   3. Leukocytosis, unspecified type D72.829 288.60   4. Persistent atrial fibrillation (CMS/HCC) I48.1 427.31   5. Congestive heart failure, unspecified HF chronicity, unspecified heart failure type (CMS/HCC) I50.9 428.0   6. Impaired functional mobility, balance, and endurance Z74.09 V49.89   7. Decreased activities of daily living (ADL) R68.89 780.99     Patient Active Problem List   Diagnosis   • Ischemic chest pain   • Pneumonia of both lungs due to infectious organism   • Fall   • Closed displaced comminuted fracture of shaft of right femur (CMS/HCC)   • Pain of right thigh   • Class 2 obesity with serious comorbidity in adult   • Type 2 diabetes mellitus with neurologic complication, with long-term current use of insulin (CMS/HCC)   • STUART (acute kidney injury) (CMS/HCC)   • Chronic diastolic heart failure (CMS/HCC)   • Pleural effusion, left   • Hypoxia   • Surgical wound infection, initial encounter   • Pleural effusion due to CHF (congestive heart failure) (CMS/HCC)   • Hyperkalemia   • Acute renal failure superimposed on stage 3 chronic kidney disease (CMS/HCC)   • Type 2 diabetes mellitus (CMS/HCC)   • Iron deficiency anemia   • Thrombocytosis (CMS/HCC) probably related to iron deficiency anemia   • Myeloproliferative neoplasm (CMS/HCC)   • Atrial fibrillation (CMS/HCC)   • Chronic anticoagulation   • Essential hypertension   • Constipation   • Hypothyroidism (acquired)   • Acute urinary retention -resolved       Physical Therapy Education     Title: PT OT SLP Therapies (In Progress)      Topic: Physical Therapy (Resolved)     Point: Mobility training (Resolved)     Learning Progress Summary           Patient Acceptance, E,TB,D, NR by LAVONNE at 3/4/2019 12:08 PM    Comment:  Educ re: purpose of PT & importance of activity; education for improved tech for   rolling and transfers and proper use of rwx for standing and during tfers; educ re:  purpose of mechanical lift and increase fall risk.                   Point: Precautions (Resolved)     Learning Progress Summary           Patient Acceptance, E,TB,D, NR by LAVONNE at 3/4/2019 12:08 PM    Comment:  Educ re: purpose of PT & importance of activity; education for improved tech for   rolling and transfers and proper use of rwx for standing and during tfers; educ re:  purpose of mechanical lift and increase fall risk.                               User Key     Initials Effective Dates Name Provider Type Discipline     08/02/18 -  Mariia Shepard, PT Physical Therapist PT              Rehab Goal Summary     Row Name 03/07/19 0800             Bed Mobility Goal 1 (PT)    [place] Level/Cues Needed (Bed Mobility Goal 1, PT)  maximum assist (25-49% patient effort) goal: Min  -YUN      Progress/Outcomes (Bed Mobility Goal 1, PT)  goal not met  -YUN         Bed Mobility Goal 2 (PT)    [place] Level/Cues Needed (Bed Mobility Goal 2, PT)  maximum assist (25-49% patient effort) goal: Mod  -YUN      Progress/Outcomes (Bed Mobility Goal 2, PT)  goal not met  -YUN         Bed Mobility Goal (PT)    Bed Mobility Goal (PT)  goal: Mod. Pt is dependent  -YUN      Progress/Outcomes (Bed Mobility Goal, PT)  goal not met  -YUN         Transfer Goal 1 (PT)    [place] Level/Cues Needed (Transfer Goal 1, PT)  -- goal: MOD.  Not attempted  -YUN      Progress/Outcome (Transfer Goal 1, PT)  goal not met  -YUN         Strength Goal 1 (PT)    Strength Goal 1 (PT)  goal: Hip flexores 3=/5.  Not tested pt not participating  -YUN      Progress/Outcome (Strength Goal 1, PT)  goal not  met  -YUN        User Key  (r) = Recorded By, (t) = Taken By, (c) = Cosigned By    Initials Name Provider Type Discipline    YUN Nalini Tinoco, PTA Physical Therapy Assistant PT        Therapy Treatment  Rehabilitation Treatment Summary     Row Name                Wound 03/01/19 1030 Bilateral coccyx pressure injury    Wound - Properties Group Date first assessed: 03/01/19 [RL] Time first assessed: 1030 [RL] Present On Admission : yes;picture taken [RL] Side: Bilateral [RL] Location: coccyx [RL] Type: pressure injury [RL] Stage, Pressure Injury: Stage 2 [RL] Recorded by:  [RL] Aryan Adrian, RN 03/01/19 4657      User Key  (r) = Recorded By, (t) = Taken By, (c) = Cosigned By    Initials Name Effective Dates Discipline    RL Aryan Adrian, RN 08/02/16 -  Nurse             PT Recommendation and Plan  Anticipated Discharge Disposition (PT): skilled nursing facility  Outcome Summary/Treatment Plan (PT)  Anticipated Discharge Disposition (PT): skilled nursing facility    Outcome Measures     Row Name 03/04/19 1000             How much help from another is currently needed...    Putting on and taking off regular lower body clothing?  1  -JJ      Bathing (including washing, rinsing, and drying)  2  -JJ      Toileting (which includes using toilet bed pan or urinal)  1  -JJ      Putting on and taking off regular upper body clothing  2  -JJ      Taking care of personal grooming (such as brushing teeth)  3  -JJ      Eating meals  3  -JJ      Score  12  -JJ         Functional Assessment    Outcome Measure Options  AM-PAC 6 Clicks Daily Activity (OT)  -JJ        User Key  (r) = Recorded By, (t) = Taken By, (c) = Cosigned By    Initials Name Provider Type    Jody Cottrell OTR/L Occupational Therapist           Time Calculation:     Therapy Suggested Charges     Code   Minutes Charges    None             Therapy Charges for Today     Code Description Service Date Service Provider Modifiers Qty    [id number]  PT  THERAPEUTIC ACT EA 15 MIN 3/6/2019 Nalini Tinoco, PTA GP 1          PT G-Codes  Outcome Measure Options: AM-PAC 6 Clicks Daily Activity (OT)  AM-PAC 6 Clicks Score: 7  Score: 12    PT Discharge Summary  Anticipated Discharge Disposition (PT): skilled nursing facility  Reason for Discharge: Discharge from facility  Outcomes Achieved: Unable to make functional progress toward goals at this time  Discharge Destination: SNF    Nalini Tinoco PTA  3/7/2019

## 2019-03-08 ENCOUNTER — TELEPHONE (OUTPATIENT)
Dept: INTERNAL MEDICINE | Age: 84
End: 2019-03-08
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
## 2019-03-20 PROBLEM — J18.9 HAP (HOSPITAL-ACQUIRED PNEUMONIA): Status: ACTIVE | Noted: 2019-01-01

## 2019-03-21 PROBLEM — A49.9 UTI (URINARY TRACT INFECTION), BACTERIAL: Status: ACTIVE | Noted: 2019-01-01

## 2019-03-21 PROBLEM — N39.0 UTI (URINARY TRACT INFECTION), BACTERIAL: Status: ACTIVE | Noted: 2019-01-01

## 2019-03-21 PROBLEM — D47.1 MYELOPROLIFERATIVE NEOPLASM (HCC): Status: ACTIVE | Noted: 2019-01-01
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
## 2019-04-01 NOTE — THERAPY PROGRESS REPORT/RE-CERT
Acute Care - Physical Therapy Treatment Note  [facility] Progress Note     Patient Name: Tamy Sher  : 1930  MRN: [id number]  Today's Date: 2019  Onset of Illness/Injury or Date of Surgery: 19  Date of Referral to PT: 19  Referring Physician: Dr. Adames    Admit Date: 3/20/2019    Visit Dx:    ICD-10-CM ICD-9-CM   1. HAP (hospital-acquired pneumonia) J18.9 486   2. Decreased activities of daily living (ADL) R68.89 780.99   3. Impaired mobility and endurance Z74.09 V49.89   4. Oropharyngeal dysphagia R13.12 787.22     Patient Active Problem List   Diagnosis   • Ischemic chest pain   • Pneumonia of both lungs due to infectious organism   • Fall   • Closed displaced comminuted fracture of shaft of right femur (CMS/HCC)   • Pain of right thigh   • Class 2 obesity with serious comorbidity in adult   • Type 2 diabetes mellitus with neurologic complication, with long-term current use of insulin (CMS/HCC)   • STUART (acute kidney injury) (CMS/HCC)   • Chronic diastolic heart failure (CMS/HCC)   • Pleural effusion, left   • Hypoxia   • Surgical wound infection, initial encounter   • Pleural effusion due to CHF (congestive heart failure) (CMS/HCC)   • Hyperkalemia   • Acute renal failure superimposed on stage 3 chronic kidney disease (CMS/HCC)   • DM (diabetes mellitus) (CMS/HCC)   • Iron deficiency anemia   • Thrombocytosis (CMS/HCC) probably related to iron deficiency anemia   • Myeloproliferative neoplasm (CMS/HCC)   • A-fib (CMS/HCC)   • Chronic anticoagulation   • Essential hypertension   • Constipation   • Hypothyroidism (acquired)   • Acute urinary retention -resolved   • HAP (hospital-acquired pneumonia)   • UTI (urinary tract infection), bacterial   • Myeloproliferative neoplasm (CMS/HCC)       Therapy Treatment    Rehabilitation Treatment Summary     Row Name 19 1032             Treatment Time/Intention    Discipline  physical therapy assistant  -      Document Type  progress  note/recertification  -CW2      Subjective Information  complains of;weakness;fatigue  -CW2      Mode of Treatment  physical therapy  -CW2      Patient/Family Observations  no family present  -CW2      Comment  Just BTB by lift with nsg.  Sat in chair overnight by pt request.  -CW3      Existing Precautions/Restrictions  fall  -CW2      Treatment Considerations/Comments  [place]  -CW3      Recorded by [CW] Dianna Calloway, PTA 04/01/19 1055  [CW2] Dianna Calloway, PTA 04/01/19 1122  [CW3] Dianna Calloway, PTA 04/01/19 1125      Row Name 04/01/19 1032             Therapeutic Exercise    Lower Extremity (Therapeutic Exercise)  heel slides, bilateral;quad sets, bilateral;SAQ (short arc quad), bilateral  -CW      Lower Extremity Range of Motion (Therapeutic Exercise)  hip abduction/adduction, bilateral;ankle dorsiflexion/plantar flexion, bilateral  -CW      Exercise Type (Therapeutic Exercise)  AAROM (active assistive range of motion)  -CW      Position (Therapeutic Exercise)  supine  -CW      Sets/Reps (Therapeutic Exercise)  10 x 2  -CW      Recorded by [CW] Dianna Calloway, PTA 04/01/19 1125      Row Name 04/01/19 1032             Positioning and Restraints    Pre-Treatment Position  in bed  -CW      Post Treatment Position  bed  -CW      In Bed  fowlers;call light within reach;encouraged to call for assist;legs elevated  -CW      Recorded by [CW] Dianna Calloway, PTA 04/01/19 1125      Row Name 04/01/19 1032             Pain Scale: Numbers Pre/Post-Treatment    Pain Scale: Numbers, Pretreatment  0/10 - no pain  -CW      Pain Scale: Numbers, Post-Treatment  0/10 - no pain  -CW      Recorded by [CW] Dianna Calloway, PTA 04/01/19 1125      Row Name                Wound 03/26/19 1533 midline throat abrasion    Wound - Properties Group Date first assessed: 03/26/19 [MW] Time first assessed: 1533 [MW] Present On Admission : no [MW] Orientation: midline [MW] Location: throat [MW] Type: abrasion [MW]  Stage, Pressure Injury: other (see comments) [MW] Additional Comments: shearing [MW] Recorded by:  [MARGARITO] Brie Sharma, RN 03/26/19 1534      User Key  (r) = Recorded By, (t) = Taken By, (c) = Cosigned By    Initials Name Effective Dates Discipline    CW Brodie Dianna L, VERNELL 06/22/15 -  PT    Brie Mcintosh, RN 08/02/16 -  Nurse                   Physical Therapy Education     Title: PT OT SLP Therapies (Not Started)     Topic: Physical Therapy (In Progress)     Point: Mobility training (In Progress)     Learning Progress Summary           Patient Acceptance, E,D, NR by  at 3/30/2019  9:55 AM    Comment:  exercises, rolling in bed, and benefits of activity    Acceptance, E, NR by  at 3/28/2019 10:30 AM    Comment:  exercises in bed    Acceptance, E, VU by ANDRAE at 3/27/2019  8:18 AM    Comment:  elevating BUE and BLE    Acceptance, E, NR by ANDRAE at 3/26/2019  8:56 AM    Comment:  benefits of activity    Acceptance, E, NR by ANDRAE at 3/23/2019 10:28 AM    Comment:  benefits of exercises    Acceptance, E, VU,NR by  at 3/21/2019  3:10 PM    Comment:  Patient educated on POC and benefits of increased mobility and activity. Patient educated on the positive effect of upright posture with sitting on ability to breath and clear lungs.                   Point: Home exercise program (In Progress)     Learning Progress Summary           Patient Acceptance, E,D, NR by  at 3/30/2019  9:55 AM    Comment:  exercises, rolling in bed, and benefits of activity                   Point: Body mechanics (Done)     Learning Progress Summary           Patient Acceptance, E, VU,NR by  at 3/21/2019  3:10 PM    Comment:  Patient educated on POC and benefits of increased mobility and activity. Patient educated on the positive effect of upright posture with sitting on ability to breath and clear lungs.                               User Key     Initials Effective Dates Name Provider Type Discipline    ANDRAE 12/08/16 -  Tara  Dani CARPIO PTA Physical Therapy Assistant PT     08/02/16 -  Brunilda Sanches PTA Physical Therapy Assistant PT     03/13/19 -  Margie Santillan PT Student PT Student PT                PT Recommendation and Plan     Plan of Care Reviewed With: patient  Progress: no change  Outcome Summary: Pt completed AAROM BLE exercises in supine. Complains of weakness and fatigue.  Will benefit from continued therapy to improve functional mobility.  Outcome Measures     Row Name 03/30/19 0928             How much help from another person do you currently need...    Turning from your back to your side while in flat bed without using bedrails?  2  -MF      Moving from lying on back to sitting on the side of a flat bed without bedrails?  1  -MF      Moving to and from a bed to a chair (including a wheelchair)?  1  -MF      Standing up from a chair using your arms (e.g., wheelchair, bedside chair)?  1  -MF      Climbing 3-5 steps with a railing?  1  -MF      To walk in hospital room?  1  -MF      AM-PAC 6 Clicks Score  7  -MF         Functional Assessment    Outcome Measure Options  AM-PAC 6 Clicks Basic Mobility (PT)  -MF        User Key  (r) = Recorded By, (t) = Taken By, (c) = Cosigned By    Initials Name Provider Type     Brunilda Sanches PTA Physical Therapy Assistant         Time Calculation:   PT Charges     Row Name 04/01/19 1140             Time Calculation    Start Time  1032  -CW      Stop Time  1055  -CW      Time Calculation (min)  23 min  -CW      PT Received On  04/01/19  -CW      PT Goal Re-Cert Due Date  03/31/19  -CW         Time Calculation- PT    Total Timed Code Minutes- PT  23 minute(s)  -CW        User Key  (r) = Recorded By, (t) = Taken By, (c) = Cosigned By    Initials Name Provider Type    Dianna Saleh PTA Physical Therapy Assistant        Therapy Charges for Today     Code Description Service Date Service Provider Modifiers Qty    [id number] HC PT THER PROC EA 15 MIN 4/1/2019  Dianna Calloway, PTA GP 2          PT G-Codes  Outcome Measure Options: AM-PAC 6 Clicks Basic Mobility (PT)  AM-PAC 6 Clicks Score: 7  Score: 10    Dianna Calloway, VERNELL  4/1/2019

## 2019-04-01 NOTE — PLAN OF CARE
Problem: Patient Care Overview  Goal: Plan of Care Review  Outcome: Ongoing (interventions implemented as appropriate)   04/01/19 1132   Coping/Psychosocial   Plan of Care Reviewed With patient   Plan of Care Review   Progress no change   OTHER   Outcome Summary Pt completed AAROM BLE exercises in supine. Complains of weakness and fatigue. Will benefit from continued therapy to improve functional mobility.

## 2019-04-01 NOTE — DISCHARGE SUMMARY
[facility]  DISCHARGE SUMMARY       Date of Admission: 3/20/2019  Date of Discharge:  4/2/2019  Primary Care Physician: Barry Foley MD    Presenting Problem/History of Present Illness:  HAP (hospital-acquired pneumonia) [J18.9]     Final Discharge Diagnoses:  Active Hospital Problems    Diagnosis   • UTI (urinary tract infection), bacterial   • Myeloproliferative neoplasm (CMS/Formerly [facility])   • HAP (hospital-acquired pneumonia)   • A-fib (CMS/Formerly [facility])   • DM (diabetes mellitus) (CMS/Formerly [facility])       Consults: Oncology    Pertinent Test Results:   IMPRESSION: Chest x-ray  1. Interstitial pulmonary edema.  2. Small/moderate pleural effusions bilaterally with bibasilar  atelectasis.  3. Anasarca.    Interpretation Summary echocardiogram    · Estimated EF = 60%.  · Left ventricular systolic function is normal.  · Left ventricular wall thickness is consistent with hypertrophy. Sigmoid-shaped ventricular septum is present.  · Left ventricular diastolic dysfunction.  · No evidence of pulmonary hypertension is present.     Chief Complaint on Day of Discharge: none    History of Present Illness on Day of Discharge:   Patient is 88-year-old  female presented ER with altered mental status edema.  Patient came from a skilled nursing home facility with a main complaint of edema in upper arm and lower extremities.  Symptoms started on Monday where patient had episode of wheezing.  Patient was started on nebulizer treatment at the nursing home and was not doing any better.  PICC line was ordered and IV antibiotic and IV fluids was started at the nursing home on yesterday.  Patient became edematous due to low albumin level from labs.  Possible mostly secondary to third spacing.      Patient had episode of confusion.  Patient currently on oxygen.  Patient does not seem to be in acute distress.  Patient denies any chest pain.    Hospital Course:  The patient is a 88 y.o. female  who presented to [facility] with pneumonia/diastolic CHF-fluid overload.      Hospital acquired pneumonia.    Patient has history of ESBL/MRSA.  Patient has finished meropenem.  DC vancomycin 3/23/19 due to negative blood culture times 3 days.  Patient continue duo nebs and Symbicort Symbicort. Blood cultures no growth for 5 days.    Atrial fibrillation/Hypertension/CHF-edema/shortness of breath.  BNP 7000. Troponins negative x2.  Patient continue Eliquis and aspirin.  Patient continue with Cardizem, and Lipitor.  Echocardiogram ejection fraction 60%, wall thickening-hypertrophy of left ventricle, sigmoid shape ventricular septum, diastolic dysfunction, no evidence of pulmonary hypertension.  Fluid overload.  Gained 40 pounds in the last 15 days.  Status post Lasix drip.  Patient go home with Bumex 0.5 twice a day for 5 more days.    UTI/acute urinary retention.  Alves cath in place.   Antibiotics as stated above. Patient continue with Flomax.  Culture shows gram-negative bacilli.  History of ESBL.  Patient has finished a course of meropenem.  Urine cultures  gram-negative bacilli/mixed gram-positive tyshawn-probably contaminants.     Hyperkalemia.  Resolved.     Myeloproliferative neoplasm.  Consult Dr. Traylor.  Chronic leukocytosis.  Patient white blood cells usually run about about 20,000.       Thrombocytosis. Stable.     Anemia.  Hemoglobin stable.  No sign of acute bleed.     History chronic renal failure.  Patient continue calcitriol.     Left arm pain/edema.  Doppler ultrasound left arm-no thrombus.     Diabetes.  Sliding scale.  Cut back Levemir. HbA1c 3/8/19 8.6.     Depression.  Patient continue Lexapro.     Hypothyroidism.  Patient to continue Synthroid. Elevated TSH.  Free T4-normal.     Chronic pain.  Patient to continue tramadol.     Gout.  Patient to continue allopurinol.     Plan to discharge patient  back to nursing home.  Follow-up with nursing home physician as soon as able.  Patient  "had a bowel movement today.    Condition on Discharge:  stable    Physical Exam on Discharge:  /57 (BP Location: Left arm, Patient Position: Lying)   Pulse 72   Temp 98 °F (36.7 °C) (Oral)   Resp 18   Ht 192.8 cm (75.91\")   Wt 89.7 kg (197 lb 11.2 oz)   SpO2 99%   BMI 24.12 kg/m²   Physical Exam   Constitutional: She appears well-developed.   HENT:   Head: Normocephalic and atraumatic.   Eyes: Conjunctivae are normal. Pupils are equal, round, and reactive to light.   Neck: Neck supple. No JVD present. No thyromegaly present.   Cardiovascular: Normal heart sounds and intact distal pulses. Exam reveals no gallop and no friction rub.   No murmur heard.  Irregular irregular, rate controlled   Pulmonary/Chest: Effort normal and breath sounds normal. No respiratory distress. She has no wheezes. She has no rales. She exhibits no tenderness.   Abdominal: Soft. Bowel sounds are normal. She exhibits no distension. There is no tenderness. There is no rebound and no guarding.   Musculoskeletal: Normal range of motion. She exhibits edema. She exhibits no tenderness or deformity.   1-2 pitting edema lower extremities   Lymphadenopathy:     She has no cervical adenopathy.   Neurological: She is alert. She displays normal reflexes. No cranial nerve deficit. She exhibits normal muscle tone.   Skin: Skin is warm and dry. Capillary refill takes 2 to 3 seconds. No rash noted.   Psychiatric: She has a normal mood and affect. Her behavior is normal.   Nursing note and vitals reviewed.        Discharge Disposition:  Skilled Nursing Facility (DC - External)    Discharge Medications:     Discharge Medications      New Medications      Instructions Start Date   bumetanide 0.5 MG tablet  Commonly known as:  BUMEX   0.5 mg, Oral, 2 Times Daily         Changes to Medications      Instructions Start Date   insulin detemir 100 UNIT/ML injection  Commonly known as:  LEVEMIR  What changed:  Another medication with the same name was " removed. Continue taking this medication, and follow the directions you see here.   10 Units, Subcutaneous, Nightly         Continue These Medications      Instructions Start Date   acetaminophen 325 MG tablet  Commonly known as:  TYLENOL   650 mg, Oral, Every 6 Hours PRN      allopurinol 100 MG tablet  Commonly known as:  ZYLOPRIM   100 mg, Oral, Daily      apixaban 5 MG tablet tablet  Commonly known as:  ELIQUIS   5 mg, Oral, Every 12 Hours Scheduled      aspirin 81 MG chewable tablet   81 mg, Oral, Daily      budesonide-formoterol 160-4.5 MCG/ACT inhaler  Commonly known as:  SYMBICORT   2 puffs, Inhalation, 2 Times Daily - RT      calcitriol 0.25 MCG capsule  Commonly known as:  ROCALTROL   0.25 mcg, Oral, Daily      diltiaZEM  MG 24 hr capsule  Commonly known as:  CARDIZEM CD   240 mg, Oral, Every 24 Hours Scheduled      docusate sodium 100 MG capsule  Commonly known as:  COLACE   100 mg, Oral, Daily      escitalopram 20 MG tablet  Commonly known as:  LEXAPRO   20 mg, Oral, Nightly      fluticasone 50 MCG/ACT nasal spray  Commonly known as:  FLONASE   1 spray, Nasal, 2 Times Daily      folic acid 1 MG tablet  Commonly known as:  FOLVITE   1 mg, Oral, Daily      gabapentin 300 MG capsule  Commonly known as:  NEURONTIN   600 mg, Oral, Nightly      gabapentin 300 MG capsule  Commonly known as:  NEURONTIN   300 mg, Oral, Every Morning      HUMALOG KWIKPEN 100 UNIT/ML solution pen-injector  Generic drug:  Insulin Lispro   0-19 Units, Subcutaneous, 4 Times Daily Before Meals & Nightly, 0-149 = 0 units. 150-400 = formula: (BG-100) / 20 = units to be given. 401+ = call MD.      ipratropium-albuterol 0.5-2.5 mg/3 ml nebulizer  Commonly known as:  DUO-NEB   3 mL, Nebulization, Every 6 Hours PRN      levothyroxine 50 MCG tablet  Commonly known as:  SYNTHROID, LEVOTHROID   50 mcg, Oral, Daily      metoprolol succinate XL 25 MG 24 hr tablet  Commonly known as:  TOPROL-XL   25 mg, Oral, Daily      nystatin [id number]  UNIT/GM powder  Generic drug:  nystatin   1 application, Topical, 4 Times Daily, Apply to breast folds.       pantoprazole 40 MG EC tablet  Commonly known as:  PROTONIX   40 mg, Oral, Daily      polyethylene glycol packet  Commonly known as:  MIRALAX   17 g, Oral, Daily      simethicone 80 MG chewable tablet  Commonly known as:  MYLICON   80 mg, Oral, 4 Times Daily PRN      simvastatin 20 MG tablet  Commonly known as:  ZOCOR   20 mg, Oral, Nightly      tamsulosin 0.4 MG capsule 24 hr capsule  Commonly known as:  FLOMAX   1 capsule, Oral, 2 Times Daily      traMADol 50 MG tablet  Commonly known as:  ULTRAM   50 mg, Oral, 3 Times Daily PRN      vitamin D3 5000 units capsule capsule   5,000 Units, Oral, Daily         Stop These Medications    desloratadine 5 MG tablet  Commonly known as:  CLARINEX            Discharge Diet:   Diet Instructions     Advance Diet As Tolerated      Advance Diet As Tolerated            Activity at Discharge:   Activity Instructions     Activity as Tolerated      Activity as Tolerated            Discharge Care Plan/Instructions: Discharge back to nursing home via ambulance.    Follow-up Appointments:   Follow-up with nursing home physician as soon as able.    Issa Adames MD  04/02/19  8:11 AM    Time: Greater than 30 minutes.

## 2019-04-01 NOTE — THERAPY TREATMENT NOTE
Acute Care - Speech Language Pathology   Swallow Treatment Note [facility]     Patient Name: Tamy Sher  : 1930  MRN: [id number]  Today's Date: 2019  Onset of Illness/Injury or Date of Surgery: 19     Referring Physician: Dr. Adames      Admit Date: 3/20/2019    Dysphagia treatment completed 19. Pt. Was given PO trials of honey thick liquids to assess diet toleration. Pt. Is currently on honey thick liquids and mechanical soft diet. Pt. Was expected to finish 2 cups of medicinal drink per doctor order. Student SLP assisted Pt. With swallowing difficulties during intake. Pt. Was presented with cold/sour honey-thick bolus. Pt. Did not show s/s of aspiration for 10 minutes during treatment. However, Pt. Began to show gagging behaviors, felt to be associated with pt c/o dislike in taste, followed by cough and clear throat later into PO trials. Pt. Exhibited with severe cough throughout rest of session. Post-intake Cough did not resolve following Student SLP prompts to use multiple swallow and effortful swallow strategies. Pt. Attempted effortful swallow, sour/cold bolus, mendelssohn maneuver, and falsetto vocal exercises to increase laryngeal elevation. Pt. Was able to complete swallow exercises with 40% accuracy. Pt. Was hard of hearing and has a cognitive deficit. Pt. required multiple verbal and visual cues to follow directions during treatment. Pt. tentatively scheduled to discharge from hospital later today. RECOMMENDATIONS: Continue honey thick liquids and mechanical soft diet. Pt. Continue dysphagia compensatory and rehabilitative strategies upon returning to previous facility.  Christen Schneider, Speech Therapy Student 2019 1:47 PM    Visit Dx:      ICD-10-CM ICD-9-CM   1. HAP (hospital-acquired pneumonia) J18.9 486   2. Decreased activities of daily living (ADL) R68.89 780.99   3. Impaired mobility and endurance Z74.09 V49.89   4. Oropharyngeal dysphagia R13.12 787.22     Patient  Active Problem List   Diagnosis   • Ischemic chest pain   • Pneumonia of both lungs due to infectious organism   • Fall   • Closed displaced comminuted fracture of shaft of right femur (CMS/HCC)   • Pain of right thigh   • Class 2 obesity with serious comorbidity in adult   • Type 2 diabetes mellitus with neurologic complication, with long-term current use of insulin (CMS/HCC)   • STUART (acute kidney injury) (CMS/HCC)   • Chronic diastolic heart failure (CMS/HCC)   • Pleural effusion, left   • Hypoxia   • Surgical wound infection, initial encounter   • Pleural effusion due to CHF (congestive heart failure) (CMS/HCC)   • Hyperkalemia   • Acute renal failure superimposed on stage 3 chronic kidney disease (CMS/HCC)   • DM (diabetes mellitus) (CMS/HCC)   • Iron deficiency anemia   • Thrombocytosis (CMS/HCC) probably related to iron deficiency anemia   • Myeloproliferative neoplasm (CMS/HCC)   • A-fib (CMS/HCC)   • Chronic anticoagulation   • Essential hypertension   • Constipation   • Hypothyroidism (acquired)   • Acute urinary retention -resolved   • HAP (hospital-acquired pneumonia)   • UTI (urinary tract infection), bacterial   • Myeloproliferative neoplasm (CMS/HCC)       Therapy Treatment  Rehabilitation Treatment Summary     Row Name 04/01/19 1246 04/01/19 1032          Treatment Time/Intention    Discipline  speech language pathologist  (Pended)   -AW  physical therapy assistant  -CW     Document Type  therapy note (daily note)  (Pended)   -AW  progress note/recertification  -CW2     Subjective Information  complains of;weakness  (Pended)   -AW  complains of;weakness;fatigue  -CW2     Mode of Treatment  individual therapy;speech-language pathology  (Pended)   -AW  physical therapy  -CW2     Patient/Family Observations  son present  (Pended)   -AW  no family present  -CW2     Care Plan Review  evaluation/treatment results reviewed;care plan/treatment goals reviewed;risks/benefits reviewed  (Pended)   -AW  --      Care Plan Review, Other Participant(s)  son  (Pended)   -AW  --     Comment  --  Just BTB by lift with nsg.  Sat in chair overnight by pt request.  -CW3     Existing Precautions/Restrictions  --  fall  -CW2     Treatment Considerations/Comments  --  [place]  -CW3     Recorded by [AW] Christen Schneider, Speech Therapy Student 04/01/19 1326 [CW] BrodieDianna, PTA 04/01/19 1055  [CW2] Dianna Calloway, PTA 04/01/19 1122  [CW3] CallowayDianna, PTA 04/01/19 1125     Row Name 04/01/19 1032             Therapeutic Exercise    Lower Extremity (Therapeutic Exercise)  heel slides, bilateral;quad sets, bilateral;SAQ (short arc quad), bilateral  -CW      Lower Extremity Range of Motion (Therapeutic Exercise)  hip abduction/adduction, bilateral;ankle dorsiflexion/plantar flexion, bilateral  -CW      Exercise Type (Therapeutic Exercise)  AAROM (active assistive range of motion)  -CW      Position (Therapeutic Exercise)  supine  -CW      Sets/Reps (Therapeutic Exercise)  10 x 2  -CW      Recorded by [CW] CallowayDianna, PTA 04/01/19 1125      Row Name 04/01/19 1032             Positioning and Restraints    Pre-Treatment Position  in bed  -CW      Post Treatment Position  bed  -CW      In Bed  fowlers;call light within reach;encouraged to call for assist;legs elevated  -CW      Recorded by [CW] CallowayDianna, PTA 04/01/19 1125      Row Name 04/01/19 1032             Pain Scale: Numbers Pre/Post-Treatment    Pain Scale: Numbers, Pretreatment  0/10 - no pain  -CW      Pain Scale: Numbers, Post-Treatment  0/10 - no pain  -CW      Recorded by [CW] Dianna Calloway, PTA 04/01/19 1125      Row Name 04/01/19 1246             Pain Scale: FACES Pre/Post-Treatment    Pain: FACES Scale, Pretreatment  2-->hurts little bit  (Pended)   -AW      Pain: FACES Scale, Post-Treatment  2-->hurts little bit  (Pended)   -AW      Pre/Post Treatment Pain Comment  Pt. affect showed discomfort, Pt. did not complain of pain. Pt. had  cough during tx  (Pended)   -AW      Recorded by [AW] Christen Schneider Speech Therapy Student 04/01/19 1326      Row Name                Wound 03/26/19 1533 midline throat abrasion    Wound - Properties Group Date first assessed: 03/26/19 [MW] Time first assessed: 1533 [MW] Present On Admission : no [MW] Orientation: midline [MW] Location: throat [MW] Type: abrasion [MW] Stage, Pressure Injury: other (see comments) [MW] Additional Comments: shearing [MW] Recorded by:  [MW] Brie Sharma, RN 03/26/19 1534    Row Name 04/01/19 1246             Outcome Summary/Treatment Plan (SLP)    Daily Summary of Progress (SLP)  progress toward functional goals as expected  (Pended)   -AW      Barriers to Overall Progress (SLP)  hearing and cognition  (Pended)   -AW      Plan for Continued Treatment (SLP)  Plans to discharge from hospital today. continue honeythick liquids and mech soft diet.  (Pended)   -AW      Anticipated Dischage Disposition  skilled nursing facility  (Pended)   -AW      Recorded by [AW] Christen Schneider Speech Therapy Student 04/01/19 1326        User Key  (r) = Recorded By, (t) = Taken By, (c) = Cosigned By    Initials Name Effective Dates Discipline    CW Dianna Calloway, VERNELL 06/22/15 -  PT    Brie Mcintosh, RN 08/02/16 -  Nurse    AW Christen Schneider Speech Therapy Student 02/27/19 -  SLP          Outcome Summary  Outcome Summary/Treatment Plan (SLP)  Daily Summary of Progress (SLP): (P) progress toward functional goals as expected (04/01/19 1246 : Christen Schneider, Speech Therapy Student)  Barriers to Overall Progress (SLP): (P) hearing and cognition (04/01/19 1246 : Christen Schneider, Speech Therapy Student)  Plan for Continued Treatment (SLP): (P) Plans to discharge from hospital today. continue honeythick liquids and mech soft diet. (04/01/19 1246 : Christen Schneider, Speech Therapy Student)  Anticipated Dischage Disposition: (P) skilled nursing facility (04/01/19 1246 : [facility]  Christen, Speech Therapy Student)      SLP GOALS     Row Name 04/01/19 1246             Oral Nutrition/Hydration Goal 1 (SLP)    Oral Nutrition/Hydration Goal 1, SLP  Pt will tolerate LRD without overt s/s of aspiration.  (Pended)   -AW      Time Frame (Oral Nutrition/Hydration Goal 1, SLP)  by discharge  (Pended)   -AW      Barriers (Oral Nutrition/Hydration Goal 1, SLP)  n/a  (Pended)   -AW      Progress/Outcomes (Oral Nutrition/Hydration Goal 1, SLP)  continuing progress toward goal  (Pended)   -AW         Lingual Strengthening Goal 1 (SLP)    Activity (Lingual Strengthening Goal 1, SLP)  increase tongue back strength  (Pended)   -AW      Increase Tongue Back Strength  lingual resistance exercises  (Pended)   -AW      [place]/Accuracy (Lingual Strengthening Goal 1, SLP)  independently (over 90% accuracy)  (Pended)   -AW      Time Frame (Lingual Strengthening Goal 1, SLP)  by discharge  (Pended)   -AW      Barriers (Lingual Strengthening Goal 1, SLP)  n/a  (Pended)   -AW      Progress/Outcomes (Lingual Strengthening Goal 1, SLP)  continuing progress toward goal  (Pended)   -AW         Pharyngeal Strengthening Exercise Goal 1 (SLP)    Activity (Pharyngeal Strengthening Goal 1, SLP)  increase timing;increase superior movement of the hyolaryngeal complex;increase anterior movement of the hyolaryngeal complex  (Pended)   -AW      Increase Timing  gustatory stimulation (sour/cold)  (Pended)   -AW      Increase Superior Movement of the Hyolaryngeal Complex  Mendelsohn;falsetto  (Pended)   -AW      Increase Anterior Movement of the Hyolaryngeal Complex  shaker  (Pended)   -AW      [place]/Accuracy (Pharyngeal Strengthening Goal 1, SLP)  with minimal cues (75-90% accuracy)  (Pended)   -AW      Time Frame (Pharyngeal Strengthening Goal 1, SLP)  by discharge  (Pended)   -AW      Barriers (Pharyngeal Strengthening Goal 1, SLP)  n/a  (Pended)   -AW      Progress/Outcomes (Pharyngeal Strengthening Goal 1, SLP)   progress slower than expected  (Pended)   -AW        User Key  (r) = Recorded By, (t) = Taken By, (c) = Cosigned By    Initials Name Provider Type    Christen Dyson Speech Therapy Student Speech Therapy Student          EDUCATION  The patient has been educated in the following areas:   Dysphagia (Swallowing Impairment).    SLP Recommendation and Plan  Daily Summary of Progress (SLP): (P) progress toward functional goals as expected  Barriers to Overall Progress (SLP): (P) hearing and cognition  Plan for Continued Treatment (SLP): (P) Plans to discharge from hospital today. continue honeythick liquids and [facility] soft diet.  Anticipated Dischage Disposition: (P) skilled nursing facility                    Time Calculation:   Time Calculation- SLP     Row Name 04/01/19 1330             Time Calculation- SLP    SLP Start Time  1246  (Pended)   -AW      SLP Stop Time  1315  (Pended)   -AW      SLP Time Calculation (min)  29 min  (Pended)   -AW      SLP Received On  04/01/19  (Pended)   -AW        User Key  (r) = Recorded By, (t) = Taken By, (c) = Cosigned By    Initials Name Provider Type    Christen Dyson Speech Therapy Student Speech Therapy Student          Therapy Charges for Today     Code Description Service Date Service Provider Modifiers Qty    [id number] HC ST TREATMENT SWALLOW 2 4/1/2019 Christen Schneider Speech Therapy Student GN 1                 Christen Schneider Speech Therapy Lane  4/1/2019

## 2019-04-01 NOTE — PROGRESS NOTES
Continued Stay Note  [facility]     Patient Name: Tamy Sher  MRN: [id number]  Today's Date: 4/1/2019    Admit Date: 3/20/2019    Discharge Plan     Row Name 04/01/19 1611       Plan    Plan  [facility]    Patient/Family in Agreement with Plan  yes    Final Discharge Disposition Code  04 - intermediate care facility    Final Note  Pt is returning to [facility] [phone] today at the  level, per Pamela. Family is aware. She will transport by [facility] [phone].        Discharge Codes    No documentation.       Expected Discharge Date and Time     Expected Discharge Date Expected Discharge Time    Apr 1, 2019             VALENTINO Lugo

## 2019-04-01 NOTE — PLAN OF CARE
Problem: Patient Care Overview  Goal: Plan of Care Review   04/01/19 0632   Coping/Psychosocial   Plan of Care Reviewed With patient   Plan of Care Review   Progress no change   OTHER   Outcome Summary no c/o pain. refused to get back in bed. slept in chair last night. readjusted in chair. cont to mnitor       Problem: Fall Risk (Adult)  Goal: Absence of Fall  Outcome: Ongoing (interventions implemented as appropriate)      Problem: Pneumonia (Adult)  Goal: Signs and Symptoms of Listed Potential Problems Will be Absent, Minimized or Managed (Pneumonia)  Outcome: Ongoing (interventions implemented as appropriate)

## 2019-04-02 NOTE — PROGRESS NOTES
[facility]  INPATIENT PROGRESS NOTE    Length of Stay: 12  Date of Admission: 3/20/2019  Primary Care Physician: Barry Foley MD    Subjective   Chief Complaint: UTI/pneumonia/atrial fibrillation diabetes/myeloproliferative neoplasm    HPI   Patient denies any chest pain or shortness of breath.  No bowel movement.  Plan for mag citrate today.      Review of Systems   Constitutional: Positive for activity change, appetite change and fatigue. Negative for chills and fever.   HENT: Negative for hearing loss, nosebleeds, tinnitus and trouble swallowing.    Eyes: Negative for visual disturbance.   Respiratory: Negative for cough, chest tightness, shortness of breath and wheezing.    Cardiovascular: Negative for chest pain, palpitations and leg swelling.   Gastrointestinal: Negative for abdominal distention, abdominal pain, blood in stool, constipation, diarrhea, nausea and vomiting.   Endocrine: Negative for cold intolerance, heat intolerance, polydipsia, polyphagia and polyuria.   Genitourinary: Negative for decreased urine volume, difficulty urinating, dysuria, flank pain, frequency and hematuria.   Musculoskeletal: Positive for arthralgias, gait problem and myalgias. Negative for joint swelling.   Skin: Negative for rash.   Allergic/Immunologic: Negative for immunocompromised state.   Neurological: Positive for weakness. Negative for dizziness, syncope, light-headedness and headaches.   Hematological: Negative for adenopathy. Does not bruise/bleed easily.   Psychiatric/Behavioral: Positive for confusion. Negative for sleep disturbance. The patient is not nervous/anxious.           All pertinent negatives and positives are as above. All other systems have been reviewed and are negative unless otherwise stated.     Objective    Temp:  [97.8 °F (36.6 °C)-98.2 °F (36.8 °C)] 97.9 °F (36.6 °C)  Heart Rate:  [61-90] 71  Resp:  [16-20] 16  BP: (100-141)/(49-81)  118/50    Intake/Output Summary (Last 24 hours) at 4/1/2019 2008  Last data filed at 4/1/2019 1800  Gross per 24 hour   Intake 480 ml   Output 900 ml   Net -420 ml     Physical Exam  Constitutional: She appears well-developed.   HENT:   Head: Normocephalic and atraumatic.   Eyes: Conjunctivae are normal. Pupils are equal, round, and reactive to light.   Neck: Neck supple. No JVD present. No thyromegaly present.   Cardiovascular: Normal heart sounds and intact distal pulses. Exam reveals no gallop and no friction rub.   No murmur heard.  Irregular irregular, rate controlled   Pulmonary/Chest: Effort normal and breath sounds normal. No respiratory distress. She has no wheezes. She has no rales. She exhibits no tenderness.   Abdominal: Soft. Bowel sounds are normal. She exhibits no distension. There is no tenderness. There is no rebound and no guarding.   Musculoskeletal: Normal range of motion. She exhibits edema. She exhibits no tenderness or deformity.   1-2 pitting edema lower extremities   Lymphadenopathy:     She has no cervical adenopathy.   Neurological: She is alert. She displays normal reflexes. No cranial nerve deficit. She exhibits normal muscle tone.   Skin: Skin is warm and dry. Capillary refill takes 2 to 3 seconds. No rash noted.   Psychiatric: She has a normal mood and affect. Her behavior is normal.   Nursing note and vitals reviewed.      Results Review:  Lab Results (last 24 hours)     Procedure Component Value Units Date/Time    POC Glucose Once [[id number]]  (Abnormal) Collected:  04/01/19 1636    Specimen:  Blood Updated:  04/01/19 1659     Glucose 141 mg/dL      Comment: : [id number] Calloway i2i LogicMeter ID: [id number]       POC Glucose Once [[id number]]  (Abnormal) Collected:  04/01/19 1129    Specimen:  Blood Updated:  04/01/19 1140     Glucose 171 mg/dL      Comment: : [id number] Calloway CrowdTunesleyMeter ID: [id number]       POC Glucose Once [[id number]]  (Normal) Collected:  04/01/19 0722     Specimen:  Blood Updated:  04/01/19 0734     Glucose 129 mg/dL      Comment: : [id number] Brodie AnchorageMeter ID: [id number]       Comprehensive Metabolic Panel [[id number]]  (Abnormal) Collected:  04/01/19 0455    Specimen:  Blood Updated:  04/01/19 0538     Glucose 159 mg/dL      BUN 38 mg/dL      Creatinine 0.93 mg/dL      Sodium 140 mmol/L      Potassium 4.2 mmol/L      Chloride 102 mmol/L      CO2 32.0 mmol/L      Calcium 9.3 mg/dL      Total Protein 4.5 g/dL      Albumin 2.40 g/dL      ALT (SGPT) <15 U/L      AST (SGOT) 11 U/L      Alkaline Phosphatase 92 U/L      Total Bilirubin 0.3 mg/dL      eGFR Non African Amer 57 mL/min/1.73      Globulin 2.1 gm/dL      A/G Ratio 1.1 g/dL      BUN/Creatinine Ratio 40.9     Anion Gap 6.0 mmol/L     Narrative:       GFR Normal >60  Chronic Kidney Disease <60  Kidney Failure <15    POC Glucose Once [[id number]]  (Abnormal) Collected:  03/31/19 2040    Specimen:  Blood Updated:  03/31/19 2052     Glucose 257 mg/dL      Comment: : [id number] Rolf TeresaMeter ID: [id number]              Cultures:       Radiology Data:    Imaging Results (last 24 hours)     ** No results found for the last 24 hours. **          No Known Allergies    Scheduled meds:     allopurinol 100 mg Oral Daily   apixaban 5 mg Oral Q12H   aspirin 81 mg Oral Daily   atorvastatin 10 mg Oral Nightly   budesonide-formoterol 2 puff Inhalation BID - RT   calcitriol 0.25 mcg Oral Daily   diltiaZEM  mg Oral Q24H   escitalopram 20 mg Oral Nightly   famotidine 20 mg Oral Daily   fluticasone 1 spray Each Nare BID   gabapentin 300 mg Oral Daily   gabapentin 600 mg Oral Nightly   insulin detemir 10 Units Subcutaneous Nightly   insulin lispro 2-7 Units Subcutaneous 4x Daily With Meals & Nightly   ipratropium-albuterol 3 mL Nebulization 4x Daily - RT   levothyroxine 50 mcg Oral Q AM   meropenem 1 g Intravenous Q12H   metoprolol succinate XL 25 mg Oral Daily   nystatin  Topical Q12H   polyethylene glycol 17 g  Oral Daily   sodium chloride 3 mL Intravenous Q12H   tamsulosin 0.4 mg Oral BID       PRN meds:  dextrose  •  dextrose  •  glucagon (human recombinant)  •  simethicone  •  sodium chloride  •  traMADol    Assessment/Plan       DM (diabetes mellitus) (CMS/[facility])    A-fib (CMS/[facility])    HAP (hospital-acquired pneumonia)    UTI (urinary tract infection), bacterial    Myeloproliferative neoplasm (CMS/[facility])      Plan:  Hospital acquired pneumonia.    Patient has history of ESBL/MRSA.  Patient has finished meropenem.  DC vancomycin 3/23/19 due to negative blood culture times 3 days.  Patient continue duo nebs and Symbicort Symbicort. Blood cultures no growth for 5 days.     Atrial fibrillation/Hypertension/CHF-edema/shortness of breath.  BNP 7000. Troponins negative x2.  Patient continue Eliquis and aspirin.  Patient continue with Cardizem, and Lipitor.  Echocardiogram ejection fraction 60%, wall thickening-hypertrophy of left ventricle, sigmoid shape ventricular septum, diastolic dysfunction, no evidence of pulmonary hypertension.  Fluid overload.  Gained 40 pounds in the last 15 days.  Status post Lasix drip.  Patient go home with Bumex 0.5 twice a day for 5 more days.     UTI/acute urinary retention.  Alves cath in place.   Antibiotics as stated above. Patient continue with Flomax.  Culture shows gram-negative bacilli.  History of ESBL.  Patient has finished a course of meropenem.  Urine cultures  gram-negative bacilli/mixed gram-positive tyshawn-probably contaminants.      Hyperkalemia.  Resolved.     Myeloproliferative neoplasm.  Consult Dr. Traylor.  Chronic leukocytosis.  Patient white blood cells usually run about about 20,000.       Thrombocytosis. Stable.     Anemia.  Hemoglobin stable.  No sign of acute bleed.     History chronic renal failure.  Patient continue calcitriol.     Left arm pain/edema.  Doppler ultrasound left arm-no thrombus.     Diabetes.  Sliding scale.  Cut back Levemir. HbA1c 3/8/19  8.6.     Depression.  Patient continue Lexapro.     Hypothyroidism.  Patient to continue Synthroid. Elevated TSH.  Free T4-normal.     Chronic pain.  Patient to continue tramadol.     Gout.  Patient to continue allopurinol.     Discharge Plannin-2 days. Plan to discharge patient  back to nursing home.  Follow-up with nursing home physician as soon as able.    Issa Adames MD   19   8:08 PM

## 2019-04-02 NOTE — DISCHARGE PLACEMENT REQUEST
"Mishel Bazan [phone]  Price Ly (88 y.o. Female)     Date of Birth Social Security Number Address Home Phone MRN    06/07/1930  [address] [facility] [address] [phone] [id number]    [place] Marital Status          Presybeterian        Admission Date Admission Type Admitting Provider Attending Provider Department, Room/Bed    3/20/19 Emergency Issa Adames MD Truong, Khai C, MD [facility] 4C, 463/1    Discharge Date Discharge Disposition Discharge Destination         Skilled Nursing Facility (DC - External)              Attending Provider:  Issa Adames MD    Allergies:  No Known Allergies    Isolation:  Contact   Infection:  ESBL E coli (03/15/19), MRSA (03/31/18)   Code Status:  No CPR    Ht:  192.8 cm (75.91\")   Wt:  89.7 kg (197 lb 11.2 oz)    Admission Cmt:  None   Principal Problem:  None                Active Insurance as of 3/20/2019     Primary Coverage     Payor Plan Insurance Group Employer/Plan Group    MEDICARE MEDICARE A & B      Payor Plan Address Payor Plan Phone Number Payor Plan Fax Number Effective Dates    [address] [phone]  6/1/1995 - None Entered    [facility] [id number]       Subscriber Name Subscriber Birth Date Member ID       PRICE LY 6/7/1930 [id number]           Secondary Coverage     Payor Plan Insurance Group Employer/Plan Group    [place]      Payor Plan Address Payor Plan Phone Number Payor Plan Fax Number Effective Dates    [address] [phone]  11/27/2018 - None Entered    [facility] [address]       Subscriber Name Subscriber Birth Date Member ID       PRICE LY 6/7/1930 [id number]                 Emergency Contacts      (Rel.) Home Phone Work Phone Mobile Phone    Vicente Ly (Son) -- -- [phone]    Jeremi Ly (Son) -- -- [phone]    Ashvin Ly (Son) -- -- [phone]    Rhett Ly (Son) -- -- [phone]              "

## 2019-04-02 NOTE — PLAN OF CARE
Problem: Patient Care Overview  Goal: Plan of Care Review   04/02/19 0327   Coping/Psychosocial   Plan of Care Reviewed With patient   Plan of Care Review   Progress no change   OTHER   Outcome Summary Patient has no c/o pain. Continues to receive IV lasix gtt. Alves cath draining urine. Alves care q 4 hours. VSS. Safety maintained. Will continue to monitor.        Problem: Fall Risk (Adult)  Goal: Identify Related Risk Factors and Signs and Symptoms  Outcome: Outcome(s) achieved Date Met: 04/02/19    Goal: Absence of Fall  Outcome: Ongoing (interventions implemented as appropriate)      Problem: Pneumonia (Adult)  Goal: Signs and Symptoms of Listed Potential Problems Will be Absent, Minimized or Managed (Pneumonia)  Outcome: Ongoing (interventions implemented as appropriate)      Problem: Skin Injury Risk (Adult)  Goal: Identify Related Risk Factors and Signs and Symptoms  Outcome: Outcome(s) achieved Date Met: 04/02/19    Goal: Skin Health and Integrity  Outcome: Ongoing (interventions implemented as appropriate)
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
## 2019-04-02 NOTE — THERAPY DISCHARGE NOTE
Acute Care - Physical Therapy Discharge Summary  [facility]       Patient Name: Tamy Sher  : 1930  MRN: [id number]    Today's Date: 2019  Onset of Illness/Injury or Date of Surgery: 19    Date of Referral to PT: 19  Referring Physician: Dr. Adames      Admit Date: 3/20/2019      PT Recommendation and Plan    Visit Dx:    ICD-10-CM ICD-9-CM   1. HAP (hospital-acquired pneumonia) J18.9 486   2. Decreased activities of daily living (ADL) R68.89 780.99   3. Impaired mobility and endurance Z74.09 V49.89   4. Oropharyngeal dysphagia R13.12 787.22               Rehab Goal Summary     Row Name 19 1620 19 1619          Physical Therapy Goals    Bed Mobility Goal Selection (PT)  bed mobility, PT goal 1;bed mobility, PT goal 2  -LG  bed mobility, PT goal 1;bed mobility, PT goal 2  -KJ     Transfer Goal Selection (PT)  transfer, PT goal 1;transfer, PT goal 2  -LG  transfer, PT goal 1;transfer, PT goal 2  -KJ     Balance Goal Selection (PT)  balance, PT goal 1  -LG  balance, PT goal 1  -KJ        Bed Mobility Goal 1 (PT)    Activity/Assistive Device (Bed Mobility Goal 1, PT)  sit to supine  -LG  sit to supine  -KJ     [place] Level/Cues Needed (Bed Mobility Goal 1, PT)  moderate assist (50-74% patient effort);verbal cues required;tactile cues required  -LG  moderate assist (50-74% patient effort);verbal cues required;tactile cues required  -KJ     Time Frame (Bed Mobility Goal 1, PT)  long term goal (LTG);10 days  -LG  long term goal (LTG);10 days  -KJ     Progress/Outcomes (Bed Mobility Goal 1, PT)  goal not met  -LG  goal not met  -KJ        Bed Mobility Goal 2 (PT)    Activity/Assistive Device (Bed Mobility Goal 2, PT)  supine to sit  -LG  supine to sit  -KJ     [place] Level/Cues Needed (Bed Mobility Goal 2, PT)  moderate assist (50-74% patient effort);tactile cues required;verbal cues required  -LG  moderate assist (50-74% patient effort);tactile cues required;verbal cues  required  -KJ     Time Frame (Bed Mobility Goal 2, PT)  long term goal (LTG);10 days  -LG  long term goal (LTG);10 days  -KJ     Progress/Outcomes (Bed Mobility Goal 2, PT)  goal not met  -LG  goal not met  -KJ        Transfer Goal 1 (PT)    Activity/Assistive Device (Transfer Goal 1, PT)  sit-to-stand/stand-to-sit  -LG  sit-to-stand/stand-to-sit  -KJ     [place] Level/Cues Needed (Transfer Goal 1, PT)  moderate assist (50-74% patient effort);2 person assist  -LG  moderate assist (50-74% patient effort);2 person assist  -KJ     Time Frame (Transfer Goal 1, PT)  long term goal (LTG);10 days  -LG  long term goal (LTG);10 days  -KJ     Barriers (Transfers Goal 1, PT)  fear of falling  -LG  fear of falling  -KJ     Progress/Outcome (Transfer Goal 1, PT)  goal not met  -LG  goal not met  -KJ        Transfer Goal 2 (PT)    Activity/Assistive Device (Transfer Goal 2, PT)  wheelchair transfer  -LG  wheelchair transfer  -KJ     [place] Level/Cues Needed (Transfer Goal 2, PT)  moderate assist (50-74% patient effort);2 person assist  -LG  moderate assist (50-74% patient effort);2 person assist  -KJ     Time Frame (Transfer Goal 2, PT)  long term goal (LTG);10 days  -LG  long term goal (LTG);10 days  -KJ     Barriers (Transfers Goal 2, PT)  fear of falling  -LG  fear of falling  -KJ     Progress/Outcome (Transfer Goal 2, PT)  goal not met  -LG  goal not met  -KJ        Balance Goal 1 (PT)    Activity/Assistive Device (Balance Goal 1, PT)  sitting, static  -LG  sitting, static  -KJ     [place] Level/Cues Needed (Balance Goal 1, PT)  minimum assist (75% or more patient effort);verbal cues required;tactile cues required  -LG  minimum assist (75% or more patient effort);verbal cues required;tactile cues required  -KJ     Time Frame (Balance Goal 1, PT)  long term goal (LTG);10 days  -LG  long term goal (LTG);10 days  -KJ     Progress/Outcomes (Balance Goal 1, PT)  goal not met  -LG  goal not met  -KJ       User Key   (r) = Recorded By, (t) = Taken By, (c) = Cosigned By    Initials Name Provider Type Discipline    Giuseppe Avila, PTA Physical Therapy Assistant PT    Vika Tijerina, PTA Physical Therapy Assistant PT              PT Discharge Summary  Anticipated Discharge Disposition (PT): skilled nursing facility  Reason for Discharge: Discharge from facility  Outcomes Achieved: Refer to plan of care for updates on goals achieved  Discharge Destination: SNF      Giuseppe Spaulding PTA   4/2/2019

## 2019-04-02 NOTE — PROGRESS NOTES
Continued Stay Note  [facility]     Patient Name: Tamy Sher  MRN: [id number]  Today's Date: 4/2/2019    Admit Date: 3/20/2019    Discharge Plan     Row Name 04/02/19 0843       Plan    Plan  [facility]    Patient/Family in Agreement with Plan  yes    Final Discharge Disposition Code  04 - intermediate care facility    Final Note  Pt is being discharged to [facility] today. Spoke with Pamela there, pt to be readmitted at [facility] level care.  Pt will travel there via [facility] ambulance [phone].  Informed [facility] [phone].     [facility] [phone]  Fax [phone]        Discharge Codes    No documentation.       Expected Discharge Date and Time     Expected Discharge Date Expected Discharge Time    Apr 2, 2019             VALENTINO Corley

## 2019-04-06 PROBLEM — J81.1 PULMONARY EDEMA: Status: ACTIVE | Noted: 2019-01-01

## 2019-04-06 NOTE — ED NOTES
Son is now at bedside. States that patient is normal for herself currently. Patient reports that patient does have intermittent episode of coherence but is worse at night time. Son reports that nursing home called him and said that they were sending her ER due to increased swelling.      Reid Pinzon RN  04/06/19 1008

## 2019-04-06 NOTE — ED NOTES
300 ml of urine drained from bedside drainage estrada bag.      Reid Pinzon, CLIFFORD  04/06/19 9655       Reid Pinzon RN  04/06/19 0942

## 2019-04-06 NOTE — ED PROVIDER NOTES
[facility]  emergency department encounter      Pt Name: Tamy Sher  MRN: [id number]  Birthdate 6/7/1930  Date of evaluation: 4/6/2019      CHIEF COMPLAINT       Chief Complaint   Patient presents with   • Altered Mental Status       Nurses Notes reviewed and I agree except as noted in the HPI.      HISTORY OF PRESENT ILLNESS    Tamy Sher is a 88 y.o. female who presents to the emergency department from her nursing home with complaint of altered mental status.  Patient has an indwelling Alves catheter and also has a history of congestive heart failure.  She was recently discharged from the hospital for pulmonary edema and was started on Bumex.  Patient is extremely hard of hearing.  She complains of swelling in her right upper extremity.  She recently had swelling in her left upper extremity and a Doppler ultrasound of the left upper extremity was negative for DVT.  She claims of fatigue and generalized weakness.  She denies chest pain, shortness breath, fever, chills, numbness, tingling and weakness.     REVIEW OF SYSTEMS     All systems reviewed and otherwise negative except as listed above in HPI      PAST MEDICAL HISTORY     Past Medical History:   Diagnosis Date   • Constipation    • Dementia    • Diabetes mellitus (CMS/HCC)    • Diarrhea    • Diastolic dysfunction, left ventricle    • Esophagitis    • Exertional shortness of breath    • Hyperlipidemia    • Hypertension    • Lumbar spondylitis (CMS/HCC)    • Osteoarthritis    • Peripheral vascular disease (CMS/HCC)    • Sinusitis    • Stroke (CMS/HCC)    • Tremor     essential tremor       SURGICAL HISTORY      has a past surgical history that includes Replacement total knee (Left); Joint replacement; Hysterectomy; Gallbladder surgery; and Femur Supracondylar Fracture Repair (Right, 3/10/2018).    CURRENT MEDICATIONS        Medication List      ASK your doctor about these medications    acetaminophen 325 MG tablet  Commonly known as:   TYLENOL     allopurinol 100 MG tablet  Commonly known as:  ZYLOPRIM  Take 1 tablet by mouth Daily.     apixaban 5 MG tablet tablet  Commonly known as:  ELIQUIS  Take 1 tablet by mouth Every 12 (Twelve) Hours.     aspirin 81 MG chewable tablet  Chew 1 tablet Daily.     budesonide-formoterol 160-4.5 MCG/ACT inhaler  Commonly known as:  SYMBICORT  Inhale 2 puffs 2 (Two) Times a Day.     bumetanide 0.5 MG tablet  Commonly known as:  BUMEX  Take 1 tablet by mouth 2 (Two) Times a Day for 5 days.     calcitriol 0.25 MCG capsule  Commonly known as:  ROCALTROL  Take 1 capsule by mouth Daily.     diltiaZEM  MG 24 hr capsule  Commonly known as:  CARDIZEM CD  Take 1 capsule by mouth Daily.     docusate sodium 100 MG capsule  Commonly known as:  COLACE     escitalopram 20 MG tablet  Commonly known as:  LEXAPRO     fluticasone 50 MCG/ACT nasal spray  Commonly known as:  FLONASE     folic acid 1 MG tablet  Commonly known as:  FOLVITE     * gabapentin 300 MG capsule  Commonly known as:  NEURONTIN  Take 2 capsules by mouth Every Night.     * gabapentin 300 MG capsule  Commonly known as:  NEURONTIN  Take 1 capsule by mouth Every Morning.     insulin aspart 100 UNIT/ML injection  Commonly known as:  novoLOG     insulin detemir 100 UNIT/ML injection  Commonly known as:  LEVEMIR  Inject 10 Units under the skin into the appropriate area as directed Every   Night.     ipratropium-albuterol 0.5-2.5 mg/3 ml nebulizer  Commonly known as:  DUO-NEB     levothyroxine 50 MCG tablet  Commonly known as:  SYNTHROID, LEVOTHROID     metoprolol succinate XL 25 MG 24 hr tablet  Commonly known as:  TOPROL-XL     nystatin 298857 UNIT/GM powder  Generic drug:  nystatin     pantoprazole 40 MG EC tablet  Commonly known as:  PROTONIX     polyethylene glycol packet  Commonly known as:  MIRALAX     simethicone 80 MG chewable tablet  Commonly known as:  MYLICON  Chew 1 tablet 4 (Four) Times a Day As Needed for Flatulence.     simvastatin 20 MG  "tablet  Commonly known as:  ZOCOR     tamsulosin 0.4 MG capsule 24 hr capsule  Commonly known as:  FLOMAX     traMADol 50 MG tablet  Commonly known as:  ULTRAM  Take 1 tablet by mouth 3 (Three) Times a Day As Needed for Moderate Pain .     * vitamin D3 5000 units capsule capsule     * vitamin D3 5000 units capsule capsule         * This list has 4 medication(s) that are the same as other medications   prescribed for you. Read the directions carefully, and ask your doctor or   other care provider to review them with you.              ALLERGIES     has No Known Allergies.    FAMILY HISTORY     indicated that the status of her mother is unknown. She indicated that the status of her father is unknown.   family history includes No Known Problems in her father and mother.    SOCIAL HISTORY      reports that she has never smoked. She has never used smokeless tobacco. She reports that she does not drink alcohol or use drugs.    PHYSICAL EXAM     INITIAL VITALS:  height is 190.5 cm (75\") and weight is 85.3 kg (188 lb). Her oral temperature is 98.6 °F (37 °C). Her blood pressure is 122/56 and her pulse is 56. Her respiration is 18 and oxygen saturation is 96%.    Physical Exam    CONSTITUTIONAL: Well developed, well nourished, not diaphoretic nor distressed, ill-appearing, hard of hearing  HENT: Normocephalic, atraumatic, oropharynx clear and moist  EYES: PERRL, EOM normal, no discharge, no scleral icterus  NECK: ROM normal, supple, no tracheal deviation nor JVD, no stridor  CARDIOVASCULAR: Normal rate and rhythm, heart sounds normal, no rub no gallop, intact distal pulses, normal cap refill  PULMONARY: Normal effort and breath sounds, no distress, no wheezes, rhonchi or rales, no chest tenderness  ABDOMINAL: Soft, nontender, no guarding, no mass, no rebound, no hernia  GENITOURINARY/ANORECTAL: deferred  MUSCULOSKELETAL: ROM normal, no tenderness nor deformity, pitting edema to all extremities with asymmetric pitting edema to " right upper extremity which has much more significant swelling than left upper extremity.  Patient also has some mild pitting edema all the way up to her chest  NEUROLOGICAL: Alert, oriented x 3, CN 2-12 normal, normal tone, sensation normal  SKIN: Warm, dry, no erythema, no rash, normal color  PSYCH: Flat affect, intermittently confused     DIAGNOSTIC RESULTS     EKG: All EKG's are interpreted by the Emergency Department Physician who either signs or Co-signs this chart in the absence of a cardiologist.  EKG performed at 1948 shows atrial fibrillation with a rate of 91 bpm, left axis deviation, occasional PVCs, normal QT interval, nonspecific ST segments and T waves    RADIOLOGY: non-plain film images(s) such as CT, Ultrasound and MRI are read by the radiologist.  Plain radiographic images are visualized and preliminarily interpreted by the emergency physician unless otherwise stated below.  Xr Chest 1 View    Result Date: 4/6/2019  1. Cardiomegaly with perihilar bronchovascular interstitial prominence and lower lobe airspace opacities with probable effusions. Differential considerations include pulmonary edema.  This report was finalized on 04/06/2019 14:29 by Dr. Boom Jaimes MD.             LABS:   Lab Results (last 24 hours)     Procedure Component Value Units Date/Time    Urinalysis With Culture If Indicated - Urine, Catheter [[id number]]  (Abnormal) Collected:  04/06/19 1354    Specimen:  Urine, Catheter Updated:  04/06/19 1450     Color, UA Yellow     Appearance, UA Cloudy     pH, UA <=5.0     Specific Gravity, UA 1.009     Glucose, UA Negative     Ketones, UA Negative     Bilirubin, UA Negative     Blood, UA Small (1+)     Protein, UA Trace     Leuk Esterase, UA Large (3+)     Nitrite, UA Negative     Urobilinogen, UA 0.2 E.U./dL    Urinalysis, Microscopic Only - Urine, Catheter [[id number]]  (Abnormal) Collected:  04/06/19 1354    Specimen:  Urine, Catheter Updated:  04/06/19 1450     RBC, UA 3-5 /HPF       WBC, UA Too Numerous to Count /HPF      Bacteria, UA Trace /HPF      Squamous Epithelial Cells, UA 3-6 /HPF      Yeast, UA Large/3+ Budding Yeast /HPF      Hyaline Casts, UA None Seen /LPF      Methodology Manual Light Microscopy    Urine Culture - Urine, Urine, Catheter [[id number]] Collected:  04/06/19 1354    Specimen:  Urine, Catheter Updated:  04/06/19 1450    CBC & Differential [[id number]] Collected:  04/06/19 1405    Specimen:  Blood Updated:  04/06/19 1458    Narrative:       The following orders were created for panel order CBC & Differential.  Procedure                               Abnormality         Status                     ---------                               -----------         ------                     CBC Auto Differential[[id number]]        Abnormal            Edited Result - FINAL        Please view results for these tests on the individual orders.    Comprehensive Metabolic Panel [[id number]]  (Abnormal) Collected:  04/06/19 1405    Specimen:  Blood Updated:  04/06/19 1427     Glucose 138 mg/dL      BUN 38 mg/dL      Creatinine 0.68 mg/dL      Sodium 142 mmol/L      Potassium 4.4 mmol/L      Comment: Specimen hemolyzed.  Results may be affected.        Chloride 100 mmol/L      CO2 37.0 mmol/L      Calcium 9.3 mg/dL      Total Protein 5.4 g/dL      Albumin 3.00 g/dL      ALT (SGPT) 15 U/L      Comment: Specimen hemolyzed.  Results may be affected.        AST (SGOT) 28 U/L      Comment: Specimen hemolyzed.  Results may be affected.        Alkaline Phosphatase 101 U/L      Comment: Specimen hemolyzed. Results may be affected.        Total Bilirubin 0.5 mg/dL      eGFR Non African Amer 82 mL/min/1.73      Globulin 2.4 gm/dL      A/G Ratio 1.3 g/dL      BUN/Creatinine Ratio 55.9     Anion Gap 5.0 mmol/L     Narrative:       GFR Normal >60  Chronic Kidney Disease <60  Kidney Failure <15    BNP [[id number]]  (Abnormal) Collected:  04/06/19 1405    Specimen:  Blood Updated:  04/06/19 1435     proBNP  11,800.0 pg/mL     Troponin [[id number]]  (Normal) Collected:  04/06/19 1405    Specimen:  Blood Updated:  04/06/19 1435     Troponin I 0.021 ng/mL     CBC Auto Differential [[id number]]  (Abnormal) Collected:  04/06/19 1405    Specimen:  Blood Updated:  04/06/19 1458     WBC 23.73 10*3/mm3      RBC 3.35 10*6/mm3      Hemoglobin 9.9 g/dL      Hematocrit 31.8 %      MCV 94.9 fL      MCH 29.6 pg      MCHC 31.1 g/dL      RDW 17.0 %      RDW-SD 58.1 fl      MPV 10.9 fL      Platelets 560 10*3/mm3     Narrative:       The previously reported component NRBC is no longer being reported.    aPTT [[id number]]  (Normal) Collected:  04/06/19 1405    Specimen:  Blood Updated:  04/06/19 1422     PTT 33.7 seconds     Protime-INR [[id number]]  (Abnormal) Collected:  04/06/19 1405    Specimen:  Blood Updated:  04/06/19 1422     Protime 17.0 Seconds      INR 1.35    Manual Differential [[id number]]  (Abnormal) Collected:  04/06/19 1405    Specimen:  Blood Updated:  04/06/19 1457     Neutrophil % 66.0 %      Lymphocyte % 5.0 %      Monocyte % 3.0 %      Eosinophil % 15.0 %      Basophil % 3.0 %      Bands %  2.0 %      Metamyelocyte % 3.0 %      Myelocyte % 1.0 %      Atypical Lymphocyte % 2.0 %      Neutrophils Absolute 16.14 10*3/mm3      Lymphocytes Absolute 1.19 10*3/mm3      Monocytes Absolute 0.71 10*3/mm3      Eosinophils Absolute 3.56 10*3/mm3      Basophils Absolute 0.71 10*3/mm3      Anisocytosis Slight/1+     Basophilic Stippling Slight/1+     Hypochromia Slight/1+     Poikilocytes Slight/1+     Polychromasia Slight/1+     Toxic Granulation Mod/2+     Comment: Appended report. These results have been appended to a previously final verified report.        Platelet Estimate Increased     Clumped Platelets Present     Giant Platelets Mod/2+    Narrative:       The previously reported component WBC Morphology is no longer being reported.    Blood Culture - Blood, Arm, Left [[id number]] Collected:  04/06/19 1721    Specimen:  Blood  "from Arm, Left Updated:  04/06/19 1734    POC Glucose Once [[id number]]  (Abnormal) Collected:  04/06/19 1924    Specimen:  Blood Updated:  04/06/19 1937     Glucose 149 mg/dL      Comment: : [id number] Faith CraigMeter ID: [id number]             EMERGENCY DEPARTMENT COURSE:   Vitals:    Vitals:    04/06/19 1700 04/06/19 1704 04/06/19 1800 04/06/19 1921   BP: 112/41  126/55 122/56   BP Location:   Right arm Left arm   Patient Position:   Lying Lying   Pulse: 55  74 56   Resp:   18 18   Temp:  98.2 °F (36.8 °C) 98 °F (36.7 °C) 98.6 °F (37 °C)   TempSrc:  Oral Oral Oral   SpO2:   95% 96%   Weight:   85.3 kg (188 lb)    Height:   190.5 cm (75\")        The patient was given the following medications:  Medications   sodium chloride 0.9 % flush 10 mL (not administered)   cefTRIAXone (ROCEPHIN) 1 g/100 mL 0.9% NS (MBP) (not administered)   bumetanide (BUMEX) injection 1 mg (not administered)   acetaminophen (TYLENOL) tablet 650 mg (not administered)   allopurinol (ZYLOPRIM) tablet 100 mg (not administered)   apixaban (ELIQUIS) tablet 5 mg (not administered)   aspirin chewable tablet 81 mg (not administered)   budesonide-formoterol (SYMBICORT) 160-4.5 MCG/ACT inhaler 2 puff (not administered)   calcitriol (ROCALTROL) capsule 0.25 mcg (not administered)   diltiaZEM CD (CARDIZEM CD) 24 hr capsule 240 mg (not administered)   docusate sodium (COLACE) capsule 100 mg (not administered)   insulin detemir (LEVEMIR) injection 10 Units (not administered)   ipratropium-albuterol (DUO-NEB) nebulizer solution 3 mL (not administered)   levothyroxine (SYNTHROID, LEVOTHROID) tablet 50 mcg (not administered)   metoprolol succinate XL (TOPROL-XL) 24 hr tablet 25 mg (not administered)   polyethylene glycol (MIRALAX) powder 17 g (not administered)   nystatin (MYCOSTATIN) powder 1 application (not administered)   famotidine (PEPCID) injection 20 mg (not administered)   ondansetron (ZOFRAN) injection 4 mg (not administered)   simethicone " (MYLICON) chewable tablet 80 mg (not administered)   atorvastatin (LIPITOR) tablet 10 mg (10 mg Oral Given 4/6/19 1942)   tamsulosin (FLOMAX) 24 hr capsule 0.4 mg (not administered)   fluconazole (DIFLUCAN) in sodium chloride 0.9% IVBP 100 mg (100 mg Intravenous New Bag 4/6/19 1942)   sodium chloride 0.9 % flush 3 mL (not administered)   sodium chloride 0.9 % flush 3-10 mL (not administered)   dextrose (GLUTOSE) oral gel 15 g (not administered)   dextrose (D50W) 25 g/ 50mL Intravenous Solution 25 g (not administered)   glucagon (human recombinant) (GLUCAGEN DIAGNOSTIC) injection 1 mg (not administered)   insulin lispro (humaLOG) injection 2-7 Units (2 Units Subcutaneous Not Given 4/6/19 1824)   nystatin (MYCOSTATIN) ointment (not administered)   bumetanide (BUMEX) injection 2 mg (1 mg Intravenous Given 4/6/19 1700)   cefTRIAXone (ROCEPHIN) 1 g/100 mL 0.9% NS (MBP) (0 g Intravenous Stopped 4/6/19 1942)            CRITICAL CARE:  none    CONSULTS:  none    PROCEDURES:  Procedures        Patient presents to the emergency department with shortness of breath, fatigue, and confusion.  She has pitting edema to her right upper extremity.  Right upper extremity venous ultrasound is pending.  Chest x-ray shows pulmonary edema.  Labs show BNP 11,800 as well as white blood cell count greater than 23,000.  White blood cell count is at baseline.  Chest x-ray shows findings consistent with pulmonary edema.  Urinalysis shows what appears to be worsening of the patient's likely chronic urinary tract infection.  I ordered Bumex and Rocephin for this patient.  Patient and family are agreeable with plans for admission.  I spoke to the hospitalist who agrees to admit the patient for observation.  She is stable at time of admission.    FINAL IMPRESSION      1. Acute on chronic congestive heart failure, unspecified heart failure type (CMS/HCC)    2. Urinary tract infection in elderly patient          DISPOSITION/PLAN   Admit      PATIENT  REFERRED TO:  No follow-up provider specified.    DISCHARGE MEDICATIONS:     Medication List      ASK your doctor about these medications    acetaminophen 325 MG tablet  Commonly known as:  TYLENOL     allopurinol 100 MG tablet  Commonly known as:  ZYLOPRIM  Take 1 tablet by mouth Daily.     apixaban 5 MG tablet tablet  Commonly known as:  ELIQUIS  Take 1 tablet by mouth Every 12 (Twelve) Hours.     aspirin 81 MG chewable tablet  Chew 1 tablet Daily.     budesonide-formoterol 160-4.5 MCG/ACT inhaler  Commonly known as:  SYMBICORT  Inhale 2 puffs 2 (Two) Times a Day.     bumetanide 0.5 MG tablet  Commonly known as:  BUMEX  Take 1 tablet by mouth 2 (Two) Times a Day for 5 days.     calcitriol 0.25 MCG capsule  Commonly known as:  ROCALTROL  Take 1 capsule by mouth Daily.     diltiaZEM  MG 24 hr capsule  Commonly known as:  CARDIZEM CD  Take 1 capsule by mouth Daily.     docusate sodium 100 MG capsule  Commonly known as:  COLACE     escitalopram 20 MG tablet  Commonly known as:  LEXAPRO     fluticasone 50 MCG/ACT nasal spray  Commonly known as:  FLONASE     folic acid 1 MG tablet  Commonly known as:  FOLVITE     * gabapentin 300 MG capsule  Commonly known as:  NEURONTIN  Take 2 capsules by mouth Every Night.     * gabapentin 300 MG capsule  Commonly known as:  NEURONTIN  Take 1 capsule by mouth Every Morning.     insulin aspart 100 UNIT/ML injection  Commonly known as:  novoLOG     insulin detemir 100 UNIT/ML injection  Commonly known as:  LEVEMIR  Inject 10 Units under the skin into the appropriate area as directed Every   Night.     ipratropium-albuterol 0.5-2.5 mg/3 ml nebulizer  Commonly known as:  DUO-NEB     levothyroxine 50 MCG tablet  Commonly known as:  SYNTHROID, LEVOTHROID     metoprolol succinate XL 25 MG 24 hr tablet  Commonly known as:  TOPROL-XL     nystatin 838630 UNIT/GM powder  Generic drug:  nystatin     pantoprazole 40 MG EC tablet  Commonly known as:  PROTONIX     polyethylene glycol  packet  Commonly known as:  MIRALAX     simethicone 80 MG chewable tablet  Commonly known as:  MYLICON  Chew 1 tablet 4 (Four) Times a Day As Needed for Flatulence.     simvastatin 20 MG tablet  Commonly known as:  ZOCOR     tamsulosin 0.4 MG capsule 24 hr capsule  Commonly known as:  FLOMAX     traMADol 50 MG tablet  Commonly known as:  ULTRAM  Take 1 tablet by mouth 3 (Three) Times a Day As Needed for Moderate Pain .     * vitamin D3 5000 units capsule capsule     * vitamin D3 5000 units capsule capsule         * This list has 4 medication(s) that are the same as other medications   prescribed for you. Read the directions carefully, and ask your doctor or   other care provider to review them with you.              (Please note that portions of this note were completed with a voice recognition program.)    DO Frankie Garcia Jason Paul, DO  04/06/19 1955

## 2019-04-06 NOTE — ED NOTES
Radiology contacted for venous ultrasound or right upper extremity. Spoke with Reg. States that he will call them in      Reid Pinzon RN  04/06/19 [id number]

## 2019-04-07 NOTE — PLAN OF CARE
Problem: Fall Risk (Adult)  Goal: Identify Related Risk Factors and Signs and Symptoms  Outcome: Ongoing (interventions implemented as appropriate)    Goal: Absence of Fall  Outcome: Ongoing (interventions implemented as appropriate)      Problem: Patient Care Overview  Goal: Plan of Care Review  Outcome: Ongoing (interventions implemented as appropriate)   04/07/19 0417   Coping/Psychosocial   Plan of Care Reviewed With patient   Coping/Psychosocial   Patient Agreement with Plan of Care agrees   Plan of Care Review   Progress no change   OTHER   Outcome Summary pt aroused to voice, oriented x4, able to weakly follow commands, pt afebrile, atrail fib rates 68-81BPM, pt on 2L NC, pt has 3+ edema in most extremities, pt foely catheter changed this shift per order, adequate UOP, 1 small BM, son updated at bedsdie at begginging of shift, pt turned q2hra nd proivided frequent oral care, will continue to monitor        Problem: Cardiac: Heart Failure (Adult)  Goal: Signs and Symptoms of Listed Potential Problems Will be Absent, Minimized or Managed (Cardiac: Heart Failure)  Outcome: Ongoing (interventions implemented as appropriate)      Problem: Arrhythmia/Dysrhythmia (Symptomatic) (Adult)  Goal: Signs and Symptoms of Listed Potential Problems Will be Absent, Minimized or Managed (Arrhythmia/Dysrhythmia)  Outcome: Ongoing (interventions implemented as appropriate)      Problem: Skin Injury Risk (Adult)  Goal: Skin Health and Integrity  Outcome: Ongoing (interventions implemented as appropriate)

## 2019-04-07 NOTE — THERAPY EVALUATION
Acute Care - Physical Therapy Initial Evaluation  [facility]     Patient Name: Tamy Sher  : 1930  MRN: [id number]  Today's Date: 2019   Onset of Illness/Injury or Date of Surgery: 19  Date of Referral to PT: 19  Referring Physician: Dr Adames      Admit Date: 2019    Visit Dx:     ICD-10-CM ICD-9-CM   1. Acute on chronic congestive heart failure, unspecified heart failure type (CMS/HCC) I50.9 428.0   2. Urinary tract infection in elderly patient N39.0 599.0   3. Impaired mobility Z74.09 799.89     Patient Active Problem List   Diagnosis   • Ischemic chest pain   • Pneumonia of both lungs due to infectious organism   • Fall   • Closed displaced comminuted fracture of shaft of right femur (CMS/HCC)   • Pain of right thigh   • Class 2 obesity with serious comorbidity in adult   • Type 2 diabetes mellitus with neurologic complication, with long-term current use of insulin (CMS/HCC)   • STUART (acute kidney injury) (CMS/HCC)   • Chronic diastolic heart failure (CMS/HCC)   • Pleural effusion, left   • Hypoxia   • Surgical wound infection, initial encounter   • Pleural effusion due to CHF (congestive heart failure) (CMS/HCC)   • Hyperkalemia   • Acute renal failure superimposed on stage 3 chronic kidney disease (CMS/HCC)   • DM (diabetes mellitus) (CMS/HCC)   • Iron deficiency anemia   • Thrombocytosis (CMS/HCC) probably related to iron deficiency anemia   • Myeloproliferative neoplasm (CMS/HCC)   • A-fib (CMS/HCC)   • Chronic anticoagulation   • Essential hypertension   • Constipation   • Hypothyroidism (acquired)   • Acute urinary retention -resolved   • HAP (hospital-acquired pneumonia)   • UTI (urinary tract infection), bacterial   • Myeloproliferative neoplasm (CMS/HCC)   • Pulmonary edema     Past Medical History:   Diagnosis Date   • Constipation    • Dementia    • Diabetes mellitus (CMS/HCC)    • Diarrhea    • Diastolic dysfunction, left ventricle    • Esophagitis    • Exertional  shortness of breath    • Hyperlipidemia    • Hypertension    • Lumbar spondylitis (CMS/HCC)    • Osteoarthritis    • Peripheral vascular disease (CMS/HCC)    • Sinusitis    • Stroke (CMS/HCC)    • Tremor     essential tremor     Past Surgical History:   Procedure Laterality Date   • FEMUR SUPRACONDYLAR FRACTURE REPAIR Right 3/10/2018    Procedure: FEMUR SUPRACONDYLAR NAIL;  Surgeon: Nima Bundy MD;  Location: [facility] OR;  Service: Orthopedics   • GALLBLADDER SURGERY     • HYSTERECTOMY     • JOINT REPLACEMENT     • REPLACEMENT TOTAL KNEE Left         PT ASSESSMENT (last 12 hours)      Physical Therapy Evaluation     Row Name 04/07/19 1325          PT Evaluation Time/Intention    Subjective Information  complains of;pain;swelling  -FERNANDO     Document Type  evaluation  -FERNANDO     Mode of Treatment  physical therapy  -FERNANDO     Row Name 04/07/19 1321          General Information    Patient Profile Reviewed?  yes  -FERNANDO     Onset of Illness/Injury or Date of Surgery  04/06/19  -FERNANDO     Referring Physician  Dr Adames  -FERNANDO     Patient Observations  alert;cooperative;agree to therapy;lethargic  -FERNANDO     Patient/Family Observations  Brother in room  -FERNANDO     General Observations of Patient  Fowlers in bed, alert  -FERNANDO     Prior Level of Function  max assist:;bed mobility;dependent:;transfer  -FERNANDO     Equipment Currently Used at Home  -- NH has DME  -FERNANDO     Pertinent History of Current Functional Problem  AMS, R UE swelling, painful urination. Dx: UTI, pulmonary edema. venous scan found superficial thrombophlebitis in Right cephalic vein from proximal forearm to the wrist. History includes dementia.   -FERNANDO     Existing Precautions/Restrictions  fall;oxygen therapy device and L/min  -FERNANDO     Limitations/Impairments  hearing  -FERNANDO     Risks Reviewed  patient and family:;LOB;nausea/vomiting;dizziness;increased discomfort;change in vital signs;lines disloged  -FERNANDO     Benefits Reviewed  patient and family:;improve function;increase  independence;increase strength;increase balance;decrease pain;increase knowledge;improve skin integrity  -FERNANDO     Barriers to Rehab  medically complex;previous functional deficit;cognitive status;hearing deficit  -FERNANDO     Row Name 04/07/19 1325          Relationship/Environment    Lives With  facility resident [facility]  -FERNANDO     Row Name 04/07/19 1325          Resource/Environmental Concerns    Current Living Arrangements  extended care facility  -FERNANDO     Row Name 04/07/19 1325          Cognitive Assessment/Interventions    Additional Documentation  Cognitive Assessment/Intervention (Group)  -FERNANDO     Row Name 04/07/19 1325          Cognitive Assessment/Intervention- PT/OT    Orientation Status (Cognition)  oriented to;person;place;time  -FERNANDO     Follows Commands (Cognition)  follows two step commands;75-90% accuracy;physical/tactile prompts required;repetition of directions required;verbal cues/prompting required  -FERNANDO     Personal Safety Interventions  fall prevention program maintained;gait belt;muscle strengthening facilitated;nonskid shoes/slippers when out of bed  -FERNANDO     Row Name 04/07/19 1325          Safety Issues, Functional Mobility    Impairments Affecting Function (Mobility)  cognition;strength;range of motion (ROM);pain;endurance/activity tolerance;balance  -FERNANDO     Row Name 04/07/19 1325          Bed Mobility Assessment/Treatment    Bed Mobility Assessment/Treatment  rolling left;rolling right;scooting/bridging  -FERNANDO     Rolling Left [place] (Bed Mobility)  maximum assist (25% patient effort)  -FERNANDO     Rolling Right [place] (Bed Mobility)  maximum assist (25% patient effort)  -FERNANDO     Scooting/Bridging [place] (Bed Mobility)  maximum assist (25% patient effort);2 person assist  -FERNANDO     Bed Mobility, Safety Issues  cognitive deficits limit understanding;decreased use of arms for pushing/pulling;decreased use of legs for bridging/pushing;impaired trunk control for bed mobility  -FERNANDO     Assistive  Device (Bed Mobility)  bed rails;draw sheet  -FERNANDO     Row Name 04/07/19 1325          General ROM    GENERAL ROM COMMENTS  B LE AROM impaired ~ 85%  -FERNANDO     Row Name 04/07/19 1325          MMT (Manual Muscle Testing)    General MMT Comments  B LE 2-/5  -FERNANDO     Row Name 04/07/19 1325          Sensory Assessment/Intervention    Sensory General Assessment  no sensation deficits identified B LE to light touch, per pt report  -FERNANDO     Row Name 04/07/19 1325          Pain Assessment    Additional Documentation  Pain Scale: FACES Pre/Post-Treatment (Group)  -FERNANDO     Row Name 04/07/19 1325          Pain Scale: Numbers Pre/Post-Treatment    Pain Location  back  -FERNANDO     Pain Intervention(s)  Repositioned  -FERNANDO     Row Name 04/07/19 1325          Pain Scale: FACES Pre/Post-Treatment    Pain: FACES Scale, Pretreatment  4-->hurts little more  -FERNANDO     Pain: FACES Scale, Post-Treatment  2-->hurts little bit  -FERNANDO     Row Name 04/07/19 1325          Edema Assessment & Management    Additional Documentation  -- Pitting edema B LE  -FERNANDO     Row Name 04/07/19 1325          Health Promotion    Additional Documentation  Coping (Group);Plan of Care Review (Group)  -FERNANDO     Row Name 04/07/19 1325          Coping    Observed Emotional State  accepting;cooperative;calm  -FERNANDO     Row Name 04/07/19 1325          Plan of Care Review    Plan of Care Reviewed With  patient;son  -FERNANDO     Row Name 04/07/19 1325          Physical Therapy Clinical Impression    Date of Referral to PT  04/06/19  -FERNANDO     Patient/Family Goals Statement (PT Clinical Impression)  Increase strength  -FERNANDO     Criteria for Skilled Interventions Met (PT Clinical Impression)  yes;treatment indicated  -FERNANDO     Impairments Found (describe specific impairments)  gait, locomotion, and balance;aerobic capacity/endurance;arousal, attention, and cognition;ROM  -FERNANDO     Rehab Potential (PT Clinical Summary)  fair, will monitor progress closely  -FERNANDO     Predicted Duration of Therapy (PT)  until  d/c  -FERNANDO     Care Plan Review (PT)  evaluation/treatment results reviewed;risks/benefits reviewed;current/potential barriers reviewed;patient/other agree to care plan  -FERNANDO     Care Plan Review, Other Participant (PT Clinical Impression)  son  -FERNANDO Mahoney Name 04/07/19 1325          Physical Therapy Goals    Bed Mobility Goal Selection (PT)  bed mobility, PT goal 1;bed mobility, PT goal 2  -FERNANDO     Transfer Goal Selection (PT)  transfer, PT goal 1  -FERNANDO     Balance Goal Selection (PT)  balance, PT goal 1  -FERNANDO     Additional Documentation  Balance Goal Selection (PT) (Row)  -FERNANDO Mahoney Name 04/07/19 1325          Bed Mobility Goal 1 (PT)    Activity/Assistive Device (Bed Mobility Goal 1, PT)  rolling to left;rolling to right;bed rails  -FERNANDO     [place] Level/Cues Needed (Bed Mobility Goal 1, PT)  moderate assist (50-74% patient effort)  -FERNANDO     Time Frame (Bed Mobility Goal 1, PT)  long term goal (LTG);10 days  -FERNANDO     Progress/Outcomes (Bed Mobility Goal 1, PT)  goal ongoing  -FERNANDO Mahoney Name 04/07/19 1325          Bed Mobility Goal 2 (PT)    Activity/Assistive Device (Bed Mobility Goal 2, PT)  sit to supine/supine to sit  -FERNANDO     [place] Level/Cues Needed (Bed Mobility Goal 2, PT)  moderate assist (50-74% patient effort);2 person assist  -FERNANDO     Time Frame (Bed Mobility Goal 2, PT)  long term goal (LTG);10 days  -FERNANDO     Progress/Outcomes (Bed Mobility Goal 2, PT)  goal ongoing  -FERNANDO Mahoney Name 04/07/19 1325          Transfer Goal 1 (PT)    Activity/Assistive Device (Transfer Goal 1, PT)  sit-to-stand/stand-to-sit  -FERNANDO     [place] Level/Cues Needed (Transfer Goal 1, PT)  moderate assist (50-74% patient effort);maximum assist (25-49% patient effort);2 person assist  -FERNANDO     Time Frame (Transfer Goal 1, PT)  long term goal (LTG);10 days  -FERNANDO     Progress/Outcome (Transfer Goal 1, PT)  goal ongoing  -FERNANDO Mahoney Name 04/07/19 1325          Balance Goal 1 (PT)    Activity/Assistive Device (Balance Goal 1, PT)   sitting, static  -FERNANDO     [place] Level/Cues Needed (Balance Goal 1, PT)  moderate assist (50-74% patient effort)  -FERNANDO     Time Frame (Balance Goal 1, PT)  long term goal (LTG);10 days  -FERNANDO     Progress/Outcomes (Balance Goal 1, PT)  goal ongoing  -FERNANDO     Row Name 04/07/19 1325          Positioning and Restraints    Pre-Treatment Position  in bed  -FERNANDO     Post Treatment Position  bed  -FERNANDO     In Bed  fowlers;side lying left;call light within reach;encouraged to call for assist;with family/caregiver  -FERNANDO       User Key  (r) = Recorded By, (t) = Taken By, (c) = Cosigned By    Initials Name Provider Type    Kd Levi, PT DPT Physical Therapist        Physical Therapy Education     Title: PT OT SLP Therapies (In Progress)     Topic: Physical Therapy (In Progress)     Point: Mobility training (Done)     Learning Progress Summary           Patient Acceptance, E, VU,NR by FERNANDO at 4/7/2019  1:25 PM    Comment:  Educated pt. on progression of PT POC and benefits of activity   Family Acceptance, E, VU,NR by FERNANDO at 4/7/2019  1:25 PM    Comment:  Educated pt. on progression of PT POC and benefits of activity                               User Key     Initials Effective Dates Name Provider Type Discipline    FERNANDO 08/02/16 -  Kd Callaway, PT DPT Physical Therapist PT              PT Recommendation and Plan  Anticipated Discharge Disposition (PT): skilled nursing facility  Planned Therapy Interventions (PT Eval): bed mobility training, transfer training, balance training, home exercise program, patient/family education, postural re-education, ROM (range of motion), strengthening  Therapy Frequency (PT Clinical Impression): daily  Outcome Summary/Treatment Plan (PT)  Anticipated Discharge Disposition (PT): skilled nursing facility  Plan of Care Reviewed With: patient, son  Outcome Summary: PT eval completed. She was alert and oriented to person, place and time. She demos pitting edema in B LE and weakness in B LE,  2-/5 strength in B LE. She was max assist x 1 to roll left and right and max assist x 2 scooting in bed. PT will continue to progress her functional mobility and strength. I antiipate she will back to SNF for continued therapy and care.   Outcome Measures     Row Name 04/07/19 1325             How much help from another person do you currently need...    Turning from your back to your side while in flat bed without using bedrails?  2  -FERNANDO      Moving from lying on back to sitting on the side of a flat bed without bedrails?  2  -FERNANDO      Moving to and from a bed to a chair (including a wheelchair)?  1  -FERNANDO      Standing up from a chair using your arms (e.g., wheelchair, bedside chair)?  1  -FERNANDO      Climbing 3-5 steps with a railing?  1  -FERNANDO      To walk in hospital room?  1  -FERNANDO      AM-PAC 6 Clicks Score  8  -FERNANDO         Functional Assessment    Outcome Measure Options  AM-PAC 6 Clicks Basic Mobility (PT)  -FERNANDO        User Key  (r) = Recorded By, (t) = Taken By, (c) = Cosigned By    Initials Name Provider Type    Kd Levi, PT DPT Physical Therapist         Time Calculation:   PT Charges     Row Name 04/07/19 1444             Time Calculation    Start Time  1325  -FERNANDO      Stop Time  1411  -FERNANDO      Time Calculation (min)  46 min  -FERNANDO      PT Received On  04/07/19  -FERNANDO      PT Goal Re-Cert Due Date  04/17/19  -FERNANDO        User Key  (r) = Recorded By, (t) = Taken By, (c) = Cosigned By    Initials Name Provider Type    Kd Levi PT DPT Physical Therapist        Therapy Charges for Today     Code Description Service Date Service Provider Modifiers Qty    [id number] HC PT EVAL MOD COMPLEXITY 3 4/7/2019 Kd Callaway, PT DPT GP 1          PT G-Codes  Outcome Measure Options: AM-PAC 6 Clicks Basic Mobility (PT)  AM-PAC 6 Clicks Score: 8      Kd Callaway PT DPT  4/7/2019

## 2019-04-07 NOTE — PLAN OF CARE
Problem: Patient Care Overview  Goal: Plan of Care Review  Outcome: Ongoing (interventions implemented as appropriate)   04/07/19 4815   Coping/Psychosocial   Plan of Care Reviewed With patient;son   OTHER   Outcome Summary PT eval completed. She was alert and oriented to person, place and time. She demos pitting edema in B LE and weakness in B LE, 2-/5 strength in B LE. She was max assist x 1 to roll left and right and max assist x 2 scooting in bed. PT will continue to progress her functional mobility and strength. I antiipate she will back to SNF for continued therapy and care.

## 2019-04-07 NOTE — THERAPY EVALUATION
Acute Care - Occupational Therapy Initial Evaluation  [facility]     Patient Name: aTmy Sher  : 1930  MRN: [id number]  Today's Date: 2019  Onset of Illness/Injury or Date of Surgery: 19  Date of Referral to OT: 19  Referring Physician: Dr. Adames    Admit Date: 2019       ICD-10-CM ICD-9-CM   1. Acute on chronic congestive heart failure, unspecified heart failure type (CMS/HCC) I50.9 428.0   2. Urinary tract infection in elderly patient N39.0 599.0   3. Impaired mobility Z74.09 799.89   4. Impaired mobility and ADLs Z74.09 799.89     Patient Active Problem List   Diagnosis   • Ischemic chest pain   • Pneumonia of both lungs due to infectious organism   • Fall   • Closed displaced comminuted fracture of shaft of right femur (CMS/HCC)   • Pain of right thigh   • Class 2 obesity with serious comorbidity in adult   • Type 2 diabetes mellitus with neurologic complication, with long-term current use of insulin (CMS/HCC)   • STUART (acute kidney injury) (CMS/HCC)   • Chronic diastolic heart failure (CMS/HCC)   • Pleural effusion, left   • Hypoxia   • Surgical wound infection, initial encounter   • Pleural effusion due to CHF (congestive heart failure) (CMS/HCC)   • Hyperkalemia   • Acute renal failure superimposed on stage 3 chronic kidney disease (CMS/HCC)   • DM (diabetes mellitus) (CMS/HCC)   • Iron deficiency anemia   • Thrombocytosis (CMS/HCC) probably related to iron deficiency anemia   • Myeloproliferative neoplasm (CMS/HCC)   • A-fib (CMS/HCC)   • Chronic anticoagulation   • Essential hypertension   • Constipation   • Hypothyroidism (acquired)   • Acute urinary retention -resolved   • HAP (hospital-acquired pneumonia)   • UTI (urinary tract infection), bacterial   • Myeloproliferative neoplasm (CMS/HCC)   • Pulmonary edema     Past Medical History:   Diagnosis Date   • Constipation    • Dementia    • Diabetes mellitus (CMS/HCC)    • Diarrhea    • Diastolic dysfunction, left ventricle     • Esophagitis    • Exertional shortness of breath    • Hyperlipidemia    • Hypertension    • Lumbar spondylitis (CMS/HCC)    • Osteoarthritis    • Peripheral vascular disease (CMS/HCC)    • Sinusitis    • Stroke (CMS/HCC)    • Tremor     essential tremor     Past Surgical History:   Procedure Laterality Date   • FEMUR SUPRACONDYLAR FRACTURE REPAIR Right 3/10/2018    Procedure: FEMUR SUPRACONDYLAR NAIL;  Surgeon: Nima Bundy MD;  Location: [facility] OR;  Service: Orthopedics   • GALLBLADDER SURGERY     • HYSTERECTOMY     • JOINT REPLACEMENT     • REPLACEMENT TOTAL KNEE Left           OT ASSESSMENT FLOWSHEET (last 12 hours)      Occupational Therapy Evaluation     Row Name 04/07/19 1336                   OT Evaluation Time/Intention    Subjective Information  complains of;pain;swelling  -MM        Document Type  evaluation  -MM        Mode of Treatment  occupational therapy  -MM           General Information    Patient Profile Reviewed?  yes  -MM        Onset of Illness/Injury or Date of Surgery  04/06/19  -MM        Referring Physician  Dr. Adames  -MM        Patient Observations  alert;cooperative;agree to therapy  -MM        Patient/Family Observations  son present  -MM        General Observations of Patient  fowlers in bed, alert  -MM        Prior Level of Function  max assist:;dependent:;bed mobility;transfer;ADL's  -MM        Equipment Currently Used at Home  -- NH has DME  -MM        Pertinent History of Current Functional Problem  AMS, R UE swelling, painful urination. Dx: UTI, pulmonary edema. venous scan found superficial thrombophlebitis in Right cephalic vein from proximal forearm to the wrist. History includes dementia.   -MM        Existing Precautions/Restrictions  fall;oxygen therapy device and L/min  -MM        Limitations/Impairments  hearing  -MM        Risks Reviewed  patient and family:;LOB;nausea/vomiting;dizziness;increased discomfort;change in vital signs;lines disloged  -MM         Benefits Reviewed  patient and family:;improve function;increase independence;increase strength;increase balance;decrease pain;increase knowledge;improve skin integrity  -MM        Barriers to Rehab  medically complex;previous functional deficit;cognitive status;hearing deficit  -MM           Relationship/Environment    Lives With  facility resident [facility]  -MM           Resource/Environmental Concerns    Current Living Arrangements  extended care facility  -MM           Cognitive Assessment/Interventions    Additional Documentation  Cognitive Assessment/Intervention (Group)  -MM           Cognitive Assessment/Intervention- PT/OT    Orientation Status (Cognition)  oriented to;person;place;time  -MM        Follows Commands (Cognition)  follows two step commands;75-90% accuracy;physical/tactile prompts required;repetition of directions required;verbal cues/prompting required  -MM        Personal Safety Interventions  fall prevention program maintained;gait belt;muscle strengthening facilitated;nonskid shoes/slippers when out of bed  -MM           Safety Issues, Functional Mobility    Impairments Affecting Function (Mobility)  cognition;strength;range of motion (ROM);pain;endurance/activity tolerance;balance  -MM           Bed Mobility Assessment/Treatment    Bed Mobility Assessment/Treatment  rolling left;rolling right;scooting/bridging  -MM        Rolling Left [place] (Bed Mobility)  maximum assist (25% patient effort)  -MM        Rolling Right [place] (Bed Mobility)  maximum assist (25% patient effort)  -MM        Scooting/Bridging [place] (Bed Mobility)  maximum assist (25% patient effort);2 person assist  -MM        Bed Mobility, Safety Issues  cognitive deficits limit understanding;decreased use of arms for pushing/pulling;decreased use of legs for bridging/pushing;impaired trunk control for bed mobility  -MM        Assistive Device (Bed Mobility)  bed rails;draw sheet  -MM           ADL  Assessment/Intervention    BADL Assessment/Intervention  lower body dressing  -MM           Lower Body Dressing Assessment/Training    Comment (Lower Body Dressing)  expected level of performance dependent  -MM           General ROM    GENERAL ROM COMMENTS  BUE AROM 25- 50% impaired  -MM           MMT (Manual Muscle Testing)    General MMT Comments  RUE deferred, LUE 3+/5 within available range  -MM           Sensory Assessment/Intervention    Sensory General Assessment  no sensation deficits identified  -MM           Positioning and Restraints    Pre-Treatment Position  in bed  -MM        Post Treatment Position  bed  -MM        In Bed  fowlers;side lying left;call light within reach;encouraged to call for assist;with family/caregiver  -MM           Pain Scale: Numbers Pre/Post-Treatment    Pain Location  back  -MM        Pain Intervention(s)  Repositioned  -MM           Pain Scale: FACES Pre/Post-Treatment    Pain: FACES Scale, Pretreatment  4-->hurts little more  -MM        Pain: FACES Scale, Post-Treatment  2-->hurts little bit  -MM           Coping    Observed Emotional State  accepting;cooperative;calm  -MM        Verbalized Emotional State  acceptance  -MM           Plan of Care Review    Plan of Care Reviewed With  patient;son  -MM           Clinical Impression (OT)    Date of Referral to OT  04/06/19  -MM        OT Diagnosis  impaired mobility and adls  -MM        Prognosis (OT Eval)  fair  -MM        Functional Level at Time of Evaluation (OT Eval)  adequate  -MM        Patient/Family Goals Statement (OT Eval)  regain strength  -MM        Criteria for Skilled Therapeutic Interventions Met (OT Eval)  yes;treatment indicated  -MM        Rehab Potential (OT Eval)  fair, will monitor progress closely  -MM        Therapy Frequency (OT Eval)  3 times/wk  -MM        Predicted Duration of Therapy Intervention (Therapy Eval)  until d/c  -MM        Care Plan Review (OT)  evaluation/treatment results reviewed;care  plan/treatment goals reviewed;risks/benefits reviewed;current/potential barriers reviewed;patient/other agree to care plan  -MM        Care Plan Review, Other Participant (OT Eval)  son  -MM        Anticipated Discharge Disposition (OT)  skilled nursing facility  -MM           Planned OT Interventions    Planned Therapy Interventions (OT Eval)  activity tolerance training;adaptive equipment training;BADL retraining;cognitive/visual perception retraining;functional balance retraining;neuromuscular control/coordination retraining;occupation/activity based interventions;passive ROM/stretching;patient/caregiver education/training;ROM/therapeutic exercise;strengthening exercise;transfer/mobility retraining  -MM           OT Goals    Dressing Goal Selection (OT)  dressing, OT goal 1  -MM        Grooming Goal Selection (OT)  grooming, OT goal 1  -MM        Self-Feeding Goal Selection (OT)  self feeding, OT goal 1  -MM        Additional Documentation  Grooming Goal Selection (OT) (Row);Self-Feeding Goal Selection (OT) (Row)  -MM           Dressing Goal 1 (OT)    Activity/Assistive Device (Dressing Goal 1, OT)  upper body dressing  -MM        [place]/Cues Needed (Dressing Goal 1, OT)  set-up required;minimum assist (75% or more patient effort)  -MM        Time Frame (Dressing Goal 1, OT)  10 days  -MM        Progress/Outcome (Dressing Goal 1, OT)  goal ongoing  -MM           Grooming Goal 1 (OT)    Activity/Device (Grooming Goal 1, OT)  grooming skills, all in fowlers or EOB  -MM        [place] (Grooming Goal 1, OT)  supervision required;set-up required  -MM        Time Frame (Grooming Goal 1, OT)  10 days  -MM        Progress/Outcome (Grooming Goal 1, OT)  goal ongoing  -MM           Self-Feeding Goal 1 (OT)    Activity/Assistive Device (Self-Feeding Goal 1, OT)  self-feeding skills, all  -MM        [place] Level/Cues Needed (Self-Feeding Goal 1, OT)  conditional independence;set-up required  -MM        Time  Frame (Self-Feeding Goal 1, OT)  10 days  -MM        Progress/Outcomes (Self-Feeding Goal 1, OT)  goal ongoing  -MM          User Key  (r) = Recorded By, (t) = Taken By, (c) = Cosigned By    Initials Name Effective Dates    Rodney Morgan, OTR/L 04/03/18 -          Occupational Therapy Education     Title: PT OT SLP Therapies (In Progress)     Topic: Occupational Therapy (In Progress)     Point: ADL training (In Progress)     Description: Instruct learner(s) on proper safety adaptation and remediation techniques during self care or transfers.   Instruct in proper use of assistive devices.    Learning Progress Summary           Patient Acceptance, E, NR by SRINIVASA at 4/7/2019  3:24 PM    Comment:  OT role, benefits, POC                               User Key     Initials Effective Dates Name Provider Type Discipline     04/03/18 -  Rodney aHley, OTR/L Occupational Therapist OT                  OT Recommendation and Plan  Outcome Summary/Treatment Plan (OT)  Anticipated Discharge Disposition (OT): skilled nursing facility  Planned Therapy Interventions (OT Eval): activity tolerance training, adaptive equipment training, BADL retraining, cognitive/visual perception retraining, functional balance retraining, neuromuscular control/coordination retraining, occupation/activity based interventions, passive ROM/stretching, patient/caregiver education/training, ROM/therapeutic exercise, strengthening exercise, transfer/mobility retraining  Therapy Frequency (OT Eval): 3 times/wk  Plan of Care Review  Plan of Care Reviewed With: patient, son  Plan of Care Reviewed With: patient, son  Outcome Summary: OT eval completed. pt was alert and oriented x3. Pt has swelling in BLE and RUE. Limited ROM in BUE. Pt was max x1-2 for rolling and scooting in bed. Expected level of performance for LB ADLs is max-dep. Skilled OT recommended to address adls, bed mobility and endurance. Recommended d/c SNF for further therapy.    Outcome  Measures     Row Name 04/07/19 1500 04/07/19 1325          How much help from another person do you currently need...    Turning from your back to your side while in flat bed without using bedrails?  --  2  -FERNANDO     Moving from lying on back to sitting on the side of a flat bed without bedrails?  --  2  -FERNANDO     Moving to and from a bed to a chair (including a wheelchair)?  --  1  -FERNANDO     Standing up from a chair using your arms (e.g., wheelchair, bedside chair)?  --  1  -FERNANDO     Climbing 3-5 steps with a railing?  --  1  -FERNANDO     To walk in hospital room?  --  1  -FERNANDO     AM-PAC 6 Clicks Score  --  8  -FERNANDO        How much help from another is currently needed...    Putting on and taking off regular lower body clothing?  1  -MM  --     Bathing (including washing, rinsing, and drying)  1  -MM  --     Toileting (which includes using toilet bed pan or urinal)  1  -MM  --     Putting on and taking off regular upper body clothing  2  -MM  --     Taking care of personal grooming (such as brushing teeth)  2  -MM  --     Eating meals  3  -MM  --     Score  10  -MM  --        Functional Assessment    Outcome Measure Options  AM-PAC 6 Clicks Daily Activity (OT)  -MM  AM-PAC 6 Clicks Basic Mobility (PT)  -FERNANDO       User Key  (r) = Recorded By, (t) = Taken By, (c) = Cosigned By    Initials Name Provider Type    Kd Levi, PT DPT Physical Therapist    Rodney Morgan, OTR/L Occupational Therapist          Time Calculation:   Time Calculation- OT     Row Name 04/07/19 1336             Time Calculation- OT    OT Start Time  1336  -MM      OT Stop Time  1415  -MM      OT Time Calculation (min)  39 min  -MM      OT Received On  04/07/19  -MM      OT Goal Re-Cert Due Date  04/17/19  -MM        User Key  (r) = Recorded By, (t) = Taken By, (c) = Cosigned By    Initials Name Provider Type    Rodney Morgan, OTR/L Occupational Therapist        Therapy Charges for Today     Code Description Service Date Service Provider  Modifiers Qty    [id number] HC OT EVAL LOW COMPLEXITY 3 4/7/2019 Rodney Haley, OTR/L GO 1               JUDY Clay/L  4/7/2019

## 2019-04-07 NOTE — PLAN OF CARE
Problem: Fall Risk (Adult)  Goal: Identify Related Risk Factors and Signs and Symptoms  Outcome: Ongoing (interventions implemented as appropriate)    Goal: Absence of Fall  Outcome: Ongoing (interventions implemented as appropriate)      Problem: Patient Care Overview  Goal: Plan of Care Review  Outcome: Ongoing (interventions implemented as appropriate)   04/07/19 4118   Coping/Psychosocial   Plan of Care Reviewed With patient   Plan of Care Review   Progress no change   OTHER   Outcome Summary VSS. Denies pain this shift. A-fib 73-94 on tele with PVC's. Swelling continues from bilateral flanks to lower extremities. IV Bumex given. O2 @ 2L NC. PT/OT evaluated pt today, see notes. Jeremiah protocol in place. Skin care provided to excoriated areas, Nystatin applied. Safety maintained. Will cont to monitor and call MD with any changes.       Problem: Cardiac: Heart Failure (Adult)  Goal: Signs and Symptoms of Listed Potential Problems Will be Absent, Minimized or Managed (Cardiac: Heart Failure)  Outcome: Ongoing (interventions implemented as appropriate)      Problem: Arrhythmia/Dysrhythmia (Symptomatic) (Adult)  Goal: Signs and Symptoms of Listed Potential Problems Will be Absent, Minimized or Managed (Arrhythmia/Dysrhythmia)  Outcome: Ongoing (interventions implemented as appropriate)      Problem: Skin Injury Risk (Adult)  Goal: Identify Related Risk Factors and Signs and Symptoms  Outcome: Ongoing (interventions implemented as appropriate)    Goal: Skin Health and Integrity  Outcome: Ongoing (interventions implemented as appropriate)

## 2019-04-07 NOTE — PROGRESS NOTES
[facility] Medicine Services  INPATIENT PROGRESS NOTE    Length of Stay: 0  Date of Admission: 4/6/2019  Primary Care Physician: Barry Foley MD    Subjective   Chief Complaint: Shortness of breath/edema/UTI/yeast infection/pulmonary edema    HPI   Edema is improving.  Increase Bumex for now.  Shortness breath is also improving.  Patient is afebrile.  Patient denies any chest pain.    Review of Systems   Constitutional: Positive for activity change and appetite change. Negative for chills and fever.   HENT: Negative for hearing loss, nosebleeds, tinnitus and trouble swallowing.    Eyes: Negative for visual disturbance.   Respiratory: Positive for shortness of breath and wheezing. Negative for cough and chest tightness.    Cardiovascular: Positive for leg swelling. Negative for chest pain and palpitations.   Gastrointestinal: Negative for abdominal distention, abdominal pain, blood in stool, constipation, diarrhea, nausea and vomiting.   Endocrine: Negative for cold intolerance, heat intolerance, polydipsia, polyphagia and polyuria.   Genitourinary: Negative for decreased urine volume, difficulty urinating, dysuria, flank pain, frequency and hematuria.   Musculoskeletal: Positive for arthralgias, gait problem and myalgias. Negative for joint swelling.   Skin: Negative for rash.   Allergic/Immunologic: Negative for immunocompromised state.   Neurological: Positive for weakness. Negative for dizziness, syncope, light-headedness and headaches.   Hematological: Negative for adenopathy. Does not bruise/bleed easily.   Psychiatric/Behavioral: Positive for confusion. Negative for sleep disturbance. The patient is not nervous/anxious.           All pertinent negatives and positives are as above. All other systems have been reviewed and are negative unless otherwise stated.     Objective    Temp:  [97.9 °F (36.6 °C)-98.6 °F (37 °C)] 97.9 °F (36.6 °C)  Heart Rate:  [55-96] 93  Resp:   [18-20] 20  BP: (105-131)/(41-63) 111/45    Intake/Output Summary (Last 24 hours) at 4/7/2019 1542  Last data filed at 4/7/2019 0334  Gross per 24 hour   Intake --   Output 575 ml   Net -575 ml     Physical Exam   Constitutional: She is oriented to person, place, and time. She appears well-developed.   HENT:   Head: Normocephalic and atraumatic.   Eyes: Conjunctivae and EOM are normal. Pupils are equal, round, and reactive to light.   Neck: Neck supple. No JVD present. No thyromegaly present.   Cardiovascular: Normal heart sounds and intact distal pulses. Exam reveals no gallop and no friction rub.   No murmur heard.  Irregular irregular, rate controlled.   Pulmonary/Chest: No respiratory distress. She has no wheezes. She has rales. She exhibits no tenderness.   Minutes breath sound bilateral, Rales.   Abdominal: Soft. Bowel sounds are normal. She exhibits no distension. There is no tenderness. There is no rebound and no guarding.   Musculoskeletal: She exhibits edema. She exhibits no tenderness or deformity.   Edema is improving.   Lymphadenopathy:     She has no cervical adenopathy.   Neurological: She is alert and oriented to person, place, and time. She displays normal reflexes. No cranial nerve deficit. She exhibits abnormal muscle tone. Coordination abnormal.   Skin: Skin is warm and dry. Capillary refill takes 2 to 3 seconds. No rash noted.   Psychiatric: She has a normal mood and affect. Her behavior is normal.   Vitals reviewed.      Results Review:  Lab Results (last 24 hours)     Procedure Component Value Units Date/Time    POC Glucose Once [[id number]]  (Abnormal) Collected:  04/07/19 1137    Specimen:  Blood Updated:  04/07/19 1227     Glucose 67 mg/dL      Comment: : [id number] Addison MarchndaMeter ID: [id number]       POC Glucose Once [[id number]]  (Normal) Collected:  04/07/19 0758    Specimen:  Blood Updated:  04/07/19 0858     Glucose 71 mg/dL      Comment: : [id number] Addison ChinacarsaMeter ID:  [id number]       Urine Culture - Urine, Urine, Catheter [[id number]]  (Normal) Collected:  04/06/19 1354    Specimen:  Urine, Catheter Updated:  04/07/19 0739     Urine Culture No growth at 24 hours    POC Glucose Once [[id number]]  (Normal) Collected:  04/07/19 0605    Specimen:  Blood Updated:  04/07/19 0617     Glucose 70 mg/dL      Comment: : MARC GamboaMeter ID: [id number]       Blood Culture - Blood, Arm, Left [[id number]] Collected:  04/06/19 1721    Specimen:  Blood from Arm, Left Updated:  04/07/19 0545     Blood Culture No growth at less than 24 hours    TSH [[id number]]  (Normal) Collected:  04/07/19 0220    Specimen:  Blood Updated:  04/07/19 0358     TSH 2.460 mIU/mL     CBC Auto Differential [[id number]]  (Abnormal) Collected:  04/07/19 0220    Specimen:  Blood Updated:  04/07/19 0346     WBC 22.14 10*3/mm3      RBC 3.15 10*6/mm3      Hemoglobin 9.3 g/dL      Hematocrit 29.8 %      MCV 94.6 fL      MCH 29.5 pg      MCHC 31.2 g/dL      RDW 17.0 %      RDW-SD 57.8 fl      MPV 11.2 fL      Platelets 525 10*3/mm3     Narrative:       The previously reported component NRBC is no longer being reported.    Manual Differential [[id number]]  (Abnormal) Collected:  04/07/19 0220    Specimen:  Blood Updated:  04/07/19 0346     Neutrophil % 58.9 %      Lymphocyte % 7.4 %      Monocyte % 2.1 %      Eosinophil % 21.1 %      Bands %  8.4 %      Metamyelocyte % 1.1 %      Myelocyte % 1.1 %      Neutrophils Absolute 14.92 10*3/mm3      Lymphocytes Absolute 1.64 10*3/mm3      Monocytes Absolute 0.46 10*3/mm3      Eosinophils Absolute 4.67 10*3/mm3      RBC Morphology Normal     Toxic Granulation Large/3+     Vacuolated Neutrophils Slight/1+     Platelet Morphology Normal    Lipid Panel [[id number]]  (Abnormal) Collected:  04/07/19 0220    Specimen:  Blood Updated:  04/07/19 0339     Total Cholesterol 68 mg/dL      Triglycerides 67 mg/dL      HDL Cholesterol 33 mg/dL      LDL Cholesterol  <30 mg/dL       LDL/HDL Ratio --     Comment: Unable to calculate       Comprehensive Metabolic Panel [[id number]]  (Abnormal) Collected:  04/07/19 0220    Specimen:  Blood Updated:  04/07/19 0332     Glucose 89 mg/dL      BUN 35 mg/dL      Creatinine 0.68 mg/dL      Sodium 143 mmol/L      Potassium 4.4 mmol/L      Chloride 101 mmol/L      CO2 37.0 mmol/L      Calcium 9.2 mg/dL      Total Protein 4.5 g/dL      Albumin 2.40 g/dL      ALT (SGPT) 19 U/L      AST (SGOT) 14 U/L      Alkaline Phosphatase 104 U/L      Total Bilirubin 0.3 mg/dL      eGFR Non African Amer 82 mL/min/1.73      Globulin 2.1 gm/dL      A/G Ratio 1.1 g/dL      BUN/Creatinine Ratio 51.5     Anion Gap 5.0 mmol/L     Narrative:       GFR Normal >60  Chronic Kidney Disease <60  Kidney Failure <15    Lipase [[id number]]  (Abnormal) Collected:  04/07/19 0220    Specimen:  Blood Updated:  04/07/19 0328     Lipase 21 U/L     Amylase [[id number]]  (Abnormal) Collected:  04/07/19 0220    Specimen:  Blood Updated:  04/07/19 0328     Amylase <30 U/L     POC Glucose Once [[id number]]  (Normal) Collected:  04/07/19 0005    Specimen:  Blood Updated:  04/07/19 0017     Glucose 113 mg/dL      Comment: : [id number] Jeremi DavisicaMeter ID: [id number]       POC Glucose Once [[id number]]  (Abnormal) Collected:  04/06/19 1924    Specimen:  Blood Updated:  04/06/19 1937     Glucose 149 mg/dL      Comment: : [id number] Faith CraigMeter ID: [id number]              Cultures:  Blood Culture   Date Value Ref Range Status   04/06/2019 No growth at less than 24 hours  Preliminary     Urine Culture   Date Value Ref Range Status   04/06/2019 No growth at 24 hours  Preliminary       Radiology Data:    Imaging Results (last 24 hours)     Procedure Component Value Units Date/Time    US Venous Doppler Upper Extremity Right (duplex) [[id number]] Collected:  04/07/19 1043     Updated:  04/07/19 1046    Narrative:       History: Right arm swelling.       Impression:       Impression: There is  no evidence of deep venous thrombosis of the right  upper extremity. There is evidence of superficial thrombophlebitis in  the cephalic vein from the proximal forearm to the wrist.     Comments: Right upper extremity venous duplex exam was performed using  color Doppler flow, Doppler wave form analysis, and grayscale imaging,  with and without compression. There is no evidence of deep venous  thrombosis of the internal jugular, subclavian, axillary, and brachial  veins. There is evidence of superficial thrombophlebitis involving the  cephalic vein from the proximal forearm to the wrist.     This report was finalized on 04/07/2019 10:43 by Dr. Simon Lugo MD.          No Known Allergies    Scheduled meds:     allopurinol 100 mg Oral Daily   apixaban 5 mg Oral Q12H   aspirin 81 mg Oral Daily   atorvastatin 10 mg Oral Daily   budesonide-formoterol 2 puff Inhalation BID - RT   bumetanide 1 mg Intravenous Q12H   calcitriol 0.25 mcg Oral Daily   ceftriaxone 1 g Intravenous Q24H   diltiaZEM  mg Oral Q24H   docusate sodium 100 mg Oral Daily   famotidine 20 mg Intravenous BID   fluconazole 100 mg Intravenous Daily   insulin detemir 10 Units Subcutaneous Nightly   insulin lispro 2-7 Units Subcutaneous Q6H   levothyroxine 50 mcg Oral Daily   metoprolol succinate XL 25 mg Oral Daily   nystatin  Topical Q12H   nystatin 1 application Topical 4x Daily   polyethylene glycol 17 g Oral Daily   sodium chloride 3 mL Intravenous Q12H   tamsulosin 0.4 mg Oral BID       PRN meds:  •  acetaminophen  •  dextrose  •  dextrose  •  glucagon (human recombinant)  •  ipratropium-albuterol  •  ondansetron  •  simethicone  •  sodium chloride  •  sodium chloride    Assessment/Plan       Fall    Chronic diastolic heart failure (CMS/HCC)    DM (diabetes mellitus) (CMS/[facility])    A-fib (CMS/[facility])    Acute urinary retention -resolved    Pulmonary edema      Plan:  Urinary tract infection/yeast infection-Rocephin and Diflucan for now.  Blood culture  ordered and urine culture ordered.  History of ESBL and MRSA.  Nystatin.     Pulmonary edema/atrial fibrillation/Hypertension/CHF-edema/shortness of breath.  BNP 21018.  Cont Eliquis and aspirin.  Cont with Cardizem, and Lipitor.  Continue Toprol-XL.  Echocardiogram 3/22/19 ejection fraction 60%, wall thickening-hypertrophy of left ventricle, sigmoid shape ventricular septum, diastolic dysfunction, no evidence of pulmonary hypertension.  Increase Bumex 2 mg twice daily.     Myeloproliferative neoplasm.  Chronic leukocytosis.  Patient white blood cells usually run about about 20,000.       Right upper extremity edema. Doppler ultrasound of right upper extremity is pending by ER physician.     Thrombocytosis.  Platelets run about 400-500.     Anemia.  Stable compared to previous hemoglobin.     History chronic renal failure.  Patient continue calcitriol.     Urinary obstruction.  Replace Alves cath.  Continue Flomax.     Reflux.  Pepcid and Zofran.     Gout.  Allopurinol.     Left arm pain/edema.  Doppler ultrasound right upper extremity pending.     Diabetes.  Sliding scale. HbA1c 3/8/19 8.6. Low glucose-  DC levemir.     Chronic constipation.  Continue Colace.  MiraLAX daily.     COPD.  Continue Symbicort.  Duo nebs.     Hypothyroidism.  Patient to continue Synthroid. TSH- normal.     Gout.  Patient to continue allopurinol.     Nutrition. Speech evaluation.     Blood culture shows no growth less than 24 hours.  Urine culture shows no growth in 24 hours.     Deconditioning. PT and OT     Discharge Plannin-4 days. Nursing home pt.     Issa Adames MD   19   3:42 PM

## 2019-04-07 NOTE — PLAN OF CARE
Problem: Patient Care Overview  Goal: Plan of Care Review  Outcome: Ongoing (interventions implemented as appropriate)   04/07/19 5556   Coping/Psychosocial   Plan of Care Reviewed With patient;son   Coping/Psychosocial   Patient Agreement with Plan of Care agrees   Plan of Care Review   Progress improving   OTHER   Outcome Summary OT eval completed. pt was alert and oriented x3. Pt has swelling in BLE and RUE. Limited ROM in BUE. Pt was max x1-2 for rolling and scooting in bed. Expected level of performance for LB ADLs is max-dep. Skilled OT recommended to address adls, bed mobility and endurance. Recommended d/c SNF for further therapy.

## 2019-04-08 NOTE — PLAN OF CARE
Problem: Patient Care Overview  Goal: Plan of Care Review  Outcome: Ongoing (interventions implemented as appropriate)   04/08/19 1261   Coping/Psychosocial   Plan of Care Reviewed With patient   Plan of Care Review   Progress improving   OTHER   Outcome Summary Pt was max x 2 to sit EOB and needed max x 1 assist for sitting balance. PROM x 10 reps performed to B LE. Pt c/o nausea but was improved after PT. Pt would benefit from more rehab.

## 2019-04-08 NOTE — PROGRESS NOTES
[facility] Medicine Services  INPATIENT PROGRESS NOTE    Patient Name: Tamy Sher  Date of Admission: 4/6/2019  Today's Date: 04/08/19  Length of Stay: 0  Primary Care Physician: Barry Foley MD    Subjective   Chief Complaint: SOA  HPI   Doing ok.  She is SOA  Legs are swollen  Reportedly had runs of Torsades earlier today  She is also have cardiac pauses  Legs swollen, arms swollen        Review of Systems   Constitutional: Positive for fatigue. Negative for fever.   HENT: Negative for congestion and ear pain.    Eyes: Negative for pain and visual disturbance.   Respiratory: Positive for shortness of breath. Negative for cough and wheezing.    Cardiovascular: Positive for leg swelling. Negative for chest pain and palpitations.   Gastrointestinal: Negative for diarrhea, nausea and vomiting.   Endocrine: Negative for heat intolerance.   Genitourinary: Negative for dysuria and frequency.   Musculoskeletal: Negative for arthralgias and back pain.   Skin: Negative for rash and wound.   Neurological: Positive for weakness. Negative for dizziness and light-headedness.   Psychiatric/Behavioral: Negative for confusion. The patient is not nervous/anxious.    All other systems reviewed and are negative.         All pertinent negatives and positives are as above. All other systems have been reviewed and are negative unless otherwise stated.     Objective    Temp:  [97.7 °F (36.5 °C)-99.1 °F (37.3 °C)] 98 °F (36.7 °C)  Heart Rate:  [] 74  Resp:  [18-22] 18  BP: (124-168)/(59-83) 151/80  Physical Exam   Constitutional: She is oriented to person, place, and time. She appears well-developed and well-nourished.   HENT:   Head: Normocephalic and atraumatic.   Right Ear: External ear normal.   Left Ear: External ear normal.   Nose: Nose normal.   Mouth/Throat: Oropharynx is clear and moist.   Eyes: Conjunctivae and EOM are normal.   Neck: Normal range of motion. Neck supple.    Cardiovascular: Normal rate, regular rhythm and normal heart sounds.   BLE edema   Pulmonary/Chest: Effort normal. She has decreased breath sounds.   Abdominal: Soft. Bowel sounds are normal. She exhibits no distension. There is no tenderness.   Musculoskeletal: Normal range of motion.   Neurological: She is alert and oriented to person, place, and time.   Skin: Skin is warm and dry.   Psychiatric: She has a normal mood and affect. Her speech is normal and behavior is normal. Cognition and memory are normal.           Results Review:  I have reviewed the labs, radiology results, and diagnostic studies.    Laboratory Data:   Results from last 7 days   Lab Units 04/08/19  0037 04/07/19 0220 04/06/19  1405   WBC 10*3/mm3 25.68* 22.14* 23.73*   HEMOGLOBIN g/dL 10.2* 9.3* 9.9*   HEMATOCRIT % 32.7* 29.8* 31.8*   PLATELETS 10*3/mm3 600* 525* 560*        Results from last 7 days   Lab Units 04/08/19 0037 04/07/19 0220 04/06/19  1405   SODIUM mmol/L 143 143 142   POTASSIUM mmol/L 3.9 4.4 4.4   CHLORIDE mmol/L 98 101 100   CO2 mmol/L 34.0* 37.0* 37.0*   BUN mg/dL 32* 35* 38*   CREATININE mg/dL 0.81 0.68 0.68   CALCIUM mg/dL 9.3 9.2 9.3   BILIRUBIN mg/dL 0.6 0.3 0.5   ALK PHOS U/L 128* 104 101   ALT (SGPT) U/L 21 19 15   AST (SGOT) U/L 28 14 28   GLUCOSE mg/dL 164* 89 138*       Culture Data:   Blood Culture   Date Value Ref Range Status   04/06/2019 No growth at 24 hours  Preliminary     Urine Culture   Date Value Ref Range Status   04/06/2019 >100,000 CFU/mL Yeast isolated (A)  Final       Radiology Data:   Imaging Results (last 24 hours)     ** No results found for the last 24 hours. **          I have reviewed the patient's current medications.     Assessment/Plan     Active Hospital Problems    Diagnosis   • Pulmonary edema   • Acute urinary retention -resolved   • A-fib (CMS/HCC)   • DM (diabetes mellitus) (CMS/HCC)   • Chronic diastolic heart failure (CMS/HCC)   • Fall       1.  Acute on Chronic Diastolic CHF  -IV  Bumex  -Fluid restrictions  -Strict I's and O's    2.  Afib  -Eliquis  -back off Rate lowering meds given pauses/torsades    3.  T2DM  -SSI    4.  Essential HTN  -monitor BP    5.  HLD  -Lipitor    6.  PVD  -Lipitor  -ASA    7.  CVD  -lipitor  -ASA  -Eliquis    8.  Esophagitis  -Pepcid    9.  ?UTI  -Rocephin    10.  Urinary retention  -Flomax    11.  Hypothyroidism  -Synthroid    Discharge Planning: I expect the patient to be discharged to home in 1-2 days    Javed Sahni MD   04/08/19   4:44 PM

## 2019-04-08 NOTE — NURSING NOTE
Spoke with Rachelle Washburn in infectious disease to verify patient isolation status. She was ESBL E-coli positive in her urine in March but this admit her urine culture has not grown any ESBL E coli. She has also had MRSA March 2018. She only needs to be in stardard precautions at this time.   Ana Núñez RN  4/8/2019  8:34 AM

## 2019-04-08 NOTE — PLAN OF CARE
Problem: Patient Care Overview  Goal: Plan of Care Review  Outcome: Ongoing (interventions implemented as appropriate)   04/08/19 6216   Coping/Psychosocial   Plan of Care Reviewed With patient   Plan of Care Review   Progress no change   OTHER   Outcome Summary Initial nutrition assessment. Pt is receiving a soft texture, ground meat diet with honey thick liquids, C.CHO, cardiac, low salt diet. Only 1 meal recorded at this time at 0%. Available weights have gone up then down, possibly related to fluid balance. Recommend to send yogurt with breakfast and Boost Pudding with lunch and dinner. Will follow to establish po intake.       Problem: Nutrition, Imbalanced: Inadequate Oral Intake (Adult)  Goal: Identify Related Risk Factors and Signs and Symptoms  Outcome: Outcome(s) achieved Date Met: 04/08/19    Goal: Improved Oral Intake  Outcome: Ongoing (interventions implemented as appropriate)    Goal: Prevent Further Weight Loss  Outcome: Ongoing (interventions implemented as appropriate)

## 2019-04-08 NOTE — PLAN OF CARE
Problem: Patient Care Overview  Goal: Plan of Care Review  Outcome: Ongoing (interventions implemented as appropriate)   04/08/19 8680   Coping/Psychosocial   Plan of Care Reviewed With patient   Plan of Care Review   Progress no change   OTHER   Outcome Summary Patient has been confused at times today. VSS. Safety maintained. Turn D6wpias. Alves cath care done every 4 hours. Continue to monitor overall condition and notify Md of any changes.

## 2019-04-08 NOTE — PROGRESS NOTES
Pharmacy Dosing Service  Automatic IV to PO Conversion  Pepcid    Assessment/Action/Plan:  Patient meets criteria listed below. Pepcid 20 mg IV every 12 hours has been changed to Pepcid 20 mg PO every 12 hours.       Subjective:  Tamy Sher is a 88 y.o. female who meets the following criteria for IV to PO therapy conversion     Policy Criteria:  · Tolerating oral fluids or 40ml/hour of enteral nutrition and oral route not otherwise compromised  · Receiving other oral medications on a scheduled basis    Additional Factors Considered:  • Anti-emetic usage  • Patient disposition per documentation  • Disease states or conditions contraindicating oral conversion    Objective:  Diet Order   Procedures   • Diet Soft Texture; Ground; Honey Thick; Consistent Carbohydrate, Cardiac, Low Sodium, Renal; 2,000 mg Na       Active Medications    Current Facility-Administered Medications:   •  acetaminophen (TYLENOL) tablet 650 mg, 650 mg, Oral, Q6H PRN, Issa Adames MD  •  allopurinol (ZYLOPRIM) tablet 100 mg, 100 mg, Oral, Daily, Issa Adames MD, 100 mg at 04/08/19 0917  •  apixaban (ELIQUIS) tablet 5 mg, 5 mg, Oral, Q12H, Issa Adames MD, 5 mg at 04/08/19 0917  •  aspirin chewable tablet 81 mg, 81 mg, Oral, Daily, Issa Adames MD, 81 mg at 04/08/19 0917  •  atorvastatin (LIPITOR) tablet 10 mg, 10 mg, Oral, Daily, Issa Adames MD, 10 mg at 04/08/19 0917  •  budesonide-formoterol (SYMBICORT) 160-4.5 MCG/ACT inhaler 2 puff, 2 puff, Inhalation, BID - RT, Issa Adames MD, 2 puff at 04/08/19 0632  •  bumetanide (BUMEX) injection 1 mg, 1 mg, Intravenous, TID - RT, Issa Adames MD, Stopped at 04/08/19 0830  •  calcitriol (ROCALTROL) capsule 0.25 mcg, 0.25 mcg, Oral, Daily, Issa Adames MD, 0.25 mcg at 04/08/19 0917  •  cefTRIAXone (ROCEPHIN) 1 g/100 mL 0.9% NS (MBP), 1 g, Intravenous, Q24H, Issa Adames MD, Stopped at 04/07/19 1752  •  dextrose (D50W) 25 g/ 50mL Intravenous Solution 25 g, 25 g,  Intravenous, Q15 Min PRN, Issa Adames MD  •  dextrose (GLUTOSE) oral gel 15 g, 15 g, Oral, Q15 Min PRN, Issa Adames MD  •  diltiaZEM CD (CARDIZEM CD) 24 hr capsule 240 mg, 240 mg, Oral, Q24H, Issa Adames MD, 240 mg at 04/08/19 0923  •  docusate sodium (COLACE) capsule 100 mg, 100 mg, Oral, Daily, Issa Adames MD  •  famotidine (PEPCID) tablet 20 mg, 20 mg, Oral, BID, Javed Sahni MD  •  fluconazole (DIFLUCAN) in sodium chloride 0.9% IVBP 100 mg, 100 mg, Intravenous, Daily, Issa Adames MD, Last Rate: 0 mL/hr at 04/07/19 0839, 100 mg at 04/08/19 0910  •  glucagon (human recombinant) (GLUCAGEN DIAGNOSTIC) injection 1 mg, 1 mg, Subcutaneous, PRN, Issa Adames MD  •  insulin lispro (humaLOG) injection 2-7 Units, 2-7 Units, Subcutaneous, Q6H, Issa Adames MD, 2 Units at 04/08/19 0625  •  ipratropium-albuterol (DUO-NEB) nebulizer solution 3 mL, 3 mL, Nebulization, Q6H PRN, Issa Adames MD  •  levothyroxine (SYNTHROID, LEVOTHROID) tablet 50 mcg, 50 mcg, Oral, Daily, Issa Adames MD, 50 mcg at 04/08/19 0917  •  metoprolol succinate XL (TOPROL-XL) 24 hr tablet 25 mg, 25 mg, Oral, Daily, Issa Adames MD, 25 mg at 04/08/19 0917  •  nystatin (MYCOSTATIN) ointment, , Topical, Q12H, Issa Adames MD  •  nystatin (MYCOSTATIN) powder 1 application, 1 application, Topical, 4x Daily, Issa Adames MD, 1 application at 04/08/19 0910  •  ondansetron (ZOFRAN) injection 4 mg, 4 mg, Intravenous, Q6H PRN, Issa Adames MD  •  polyethylene glycol (MIRALAX) powder 17 g, 17 g, Oral, Daily, Issa Adames MD  •  simethicone (MYLICON) chewable tablet 80 mg, 80 mg, Oral, 4x Daily PRN, Issa Adames MD  •  sodium chloride 0.9 % flush 10 mL, 10 mL, Intravenous, PRN, Simon David DO  •  sodium chloride 0.9 % flush 3 mL, 3 mL, Intravenous, Q12H, Issa Adames MD, 3 mL at 04/08/19 0911  •  sodium chloride 0.9 % flush 3-10 mL, 3-10 mL, Intravenous, PRN, Issa Adames MD  •  tamsulosin  (FLOMAX) 24 hr capsule 0.4 mg, 0.4 mg, Oral, BID, Issa Adames MD, 0.4 mg at 04/08/19 0923    Suzanne Acevedo PharmD  04/08/1910:32 AM

## 2019-04-08 NOTE — PLAN OF CARE
Problem: Patient Care Overview  Goal: Plan of Care Review  Outcome: Ongoing (interventions implemented as appropriate)   04/08/19 0400   Coping/Psychosocial   Plan of Care Reviewed With patient   Plan of Care Review   Progress declining   OTHER   Outcome Summary Pt had a 7.75 second run of torsades, pt symptomatic with c/o nausea, afib 80-87, pvc, coup 3-6 in a row, orders to hold morning dose of Bumex, edema improving, no c/o pain, turn q 2 hours, Alves cath care done q 4 hours, folds excoriated.       Problem: Cardiac: Heart Failure (Adult)  Goal: Signs and Symptoms of Listed Potential Problems Will be Absent, Minimized or Managed (Cardiac: Heart Failure)  Outcome: Ongoing (interventions implemented as appropriate)      Problem: Arrhythmia/Dysrhythmia (Symptomatic) (Adult)  Goal: Signs and Symptoms of Listed Potential Problems Will be Absent, Minimized or Managed (Arrhythmia/Dysrhythmia)  Outcome: Ongoing (interventions implemented as appropriate)      Problem: Skin Injury Risk (Adult)  Goal: Identify Related Risk Factors and Signs and Symptoms  Outcome: Ongoing (interventions implemented as appropriate)    Goal: Skin Health and Integrity  Outcome: Ongoing (interventions implemented as appropriate)

## 2019-04-08 NOTE — THERAPY TREATMENT NOTE
Acute Care - Physical Therapy Treatment Note  [facility]     Patient Name: Tamy Sher  : 1930  MRN: [id number]  Today's Date: 2019  Onset of Illness/Injury or Date of Surgery: 19  Date of Referral to PT: 19  Referring Physician: Dr. Adames    Admit Date: 2019    Visit Dx:    ICD-10-CM ICD-9-CM   1. Acute on chronic congestive heart failure, unspecified heart failure type (CMS/HCC) I50.9 428.0   2. Urinary tract infection in elderly patient N39.0 599.0   3. Impaired mobility Z74.09 799.89   4. Impaired mobility and ADLs Z74.09 799.89     Patient Active Problem List   Diagnosis   • Ischemic chest pain   • Pneumonia of both lungs due to infectious organism   • Fall   • Closed displaced comminuted fracture of shaft of right femur (CMS/HCC)   • Pain of right thigh   • Class 2 obesity with serious comorbidity in adult   • Type 2 diabetes mellitus with neurologic complication, with long-term current use of insulin (CMS/HCC)   • STUART (acute kidney injury) (CMS/HCC)   • Chronic diastolic heart failure (CMS/HCC)   • Pleural effusion, left   • Hypoxia   • Surgical wound infection, initial encounter   • Pleural effusion due to CHF (congestive heart failure) (CMS/HCC)   • Hyperkalemia   • Acute renal failure superimposed on stage 3 chronic kidney disease (CMS/HCC)   • DM (diabetes mellitus) (CMS/HCC)   • Iron deficiency anemia   • Thrombocytosis (CMS/HCC) probably related to iron deficiency anemia   • Myeloproliferative neoplasm (CMS/HCC)   • A-fib (CMS/HCC)   • Chronic anticoagulation   • Essential hypertension   • Constipation   • Hypothyroidism (acquired)   • Acute urinary retention -resolved   • HAP (hospital-acquired pneumonia)   • UTI (urinary tract infection), bacterial   • Myeloproliferative neoplasm (CMS/HCC)   • Pulmonary edema       Therapy Treatment    Rehabilitation Treatment Summary     Row Name 19 0830             Treatment Time/Intention    Discipline  physical therapy assistant   -AE      Document Type  therapy note (daily note)  -AE      Subjective Information  complains of;nausea/vomiting  -AE      Existing Precautions/Restrictions  fall;oxygen therapy device and L/min  -AE      Recorded by [AE] Ligia Rosa, [facility] 04/08/19 0930      Row Name 04/08/19 0830             Bed Mobility Assessment/Treatment    Rolling Left [place] (Bed Mobility)  maximum assist (25% patient effort)  -AE      Rolling Right [place] (Bed Mobility)  maximum assist (25% patient effort)  -AE      Supine-Sit [place] (Bed Mobility)  maximum assist (25% patient effort);2 person assist  -AE      Sit-Supine [place] (Bed Mobility)  maximum assist (25% patient effort);2 person assist  -AE      Recorded by [AE] Ligia Rosa, [facility] 04/08/19 0930      Row Name 04/08/19 0830             Therapeutic Exercise    Lower Extremity (Therapeutic Exercise)  LAQ (long arc quad), bilateral  -AE      Lower Extremity Range of Motion (Therapeutic Exercise)  ankle dorsiflexion/plantar flexion, bilateral  -AE      Exercise Type (Therapeutic Exercise)  PROM (passive range of motion)  -AE      Position (Therapeutic Exercise)  seated  -AE      Sets/Reps (Therapeutic Exercise)  10  -AE      Recorded by [AE] Ligia Rosa, [facility] 04/08/19 0930      Row Name 04/08/19 0830             Static Sitting Balance    Level of [place] (Supported Sitting, Static Balance)  maximal assist, 25 to 49% patient effort  -AE      Recorded by [AE] Ligia Rosa, [facility] 04/08/19 0933      Row Name 04/08/19 0830             Sitting Balance Activity    Support Needed (Sitting, Balance Training)  moderate external support for balance, 50-74% patient effort  -AE      Recorded by [AE] Ligai Rosa, [facility] 04/08/19 0933      Row Name 04/08/19 0830             Positioning and Restraints    Pre-Treatment Position  in bed  -AE      Post Treatment Position  bed  -AE      In Bed  side lying right;call light within reach  -AE      Recorded by [AE] Moe  Ligia MCDERMOTT, VERNELL 04/08/19 0930      Row Name 04/08/19 0830             Pain Scale: FACES Pre/Post-Treatment    Pain: FACES Scale, Pretreatment  0-->no hurt  -AE      Pain: FACES Scale, Post-Treatment  0-->no hurt  -AE      Recorded by [AE] Ligia Rosa, VERNELL 04/08/19 0930        User Key  (r) = Recorded By, (t) = Taken By, (c) = Cosigned By    Initials Name Effective Dates Discipline    AE Ligia Rosa, VERNELL 06/22/15 -  PT                   Physical Therapy Education     Title: PT OT SLP Therapies (In Progress)     Topic: Physical Therapy (In Progress)     Point: Mobility training (Done)     Learning Progress Summary           Patient Eager, E, VU by WYATT at 4/8/2019  9:38 AM    Comment:  benefits of activity    Acceptance, E, VU,NR by FERNANDO at 4/7/2019  1:25 PM    Comment:  Educated pt. on progression of PT POC and benefits of activity   Family Acceptance, E, VU,NR by FERNANDO at 4/7/2019  1:25 PM    Comment:  Educated pt. on progression of PT POC and benefits of activity                               User Key     Initials Effective Dates Name Provider Type Discipline     06/22/15 -  Ligia Rosa PTA Physical Therapy Assistant PT    FERNANDO 08/02/16 -  Kd Callaway PT DPT Physical Therapist PT                PT Recommendation and Plan     Plan of Care Reviewed With: patient  Progress: improving  Outcome Summary: Pt was max x 2 to sit EOB and needed max x 1 assist for sitting balance. PROM x 10 reps performed to B LE  Outcome Measures     Row Name 04/07/19 1500 04/07/19 1325          How much help from another person do you currently need...    Turning from your back to your side while in flat bed without using bedrails?  --  2  -FERNANDO     Moving from lying on back to sitting on the side of a flat bed without bedrails?  --  2  -FERNANDO     Moving to and from a bed to a chair (including a wheelchair)?  --  1  -FERNANDO     Standing up from a chair using your arms (e.g., wheelchair, bedside chair)?  --  1  -FERNANDO     Climbing 3-5 steps  with a railing?  --  1  -FERNANDO     To walk in hospital room?  --  1  -FERNANDO     AM-PAC 6 Clicks Score  --  8  -FERNANDO        How much help from another is currently needed...    Putting on and taking off regular lower body clothing?  1  -MM  --     Bathing (including washing, rinsing, and drying)  1  -MM  --     Toileting (which includes using toilet bed pan or urinal)  1  -MM  --     Putting on and taking off regular upper body clothing  2  -MM  --     Taking care of personal grooming (such as brushing teeth)  2  -MM  --     Eating meals  3  -MM  --     Score  10  -MM  --        Functional Assessment    Outcome Measure Options  AM-PAC 6 Clicks Daily Activity (OT)  -MM  AM-PAC 6 Clicks Basic Mobility (PT)  -FERNANDO       User Key  (r) = Recorded By, (t) = Taken By, (c) = Cosigned By    Initials Name Provider Type    Kd Levi, PT DPT Physical Therapist    MM Rodney Haley, OTR/L Occupational Therapist         Time Calculation:   PT Charges     Row Name 04/08/19 0938             Time Calculation    Start Time  0830  -AE      Stop Time  0857  -AE      Time Calculation (min)  27 min  -AE      PT Received On  04/08/19  -AE      PT Goal Re-Cert Due Date  04/17/19  -AE         Time Calculation- PT    Total Timed Code Minutes- PT  27 minute(s)  -AE         Timed Charges    50386 - PT Therapeutic Activity Minutes  27  -AE        User Key  (r) = Recorded By, (t) = Taken By, (c) = Cosigned By    Initials Name Provider Type    AE Ligia Rosa PTA Physical Therapy Assistant        Therapy Charges for Today     Code Description Service Date Service Provider Modifiers Qty    36139309383  PT THERAPEUTIC ACT EA 15 MIN 4/8/2019 Ligia Rosa PTA GP 2          PT G-Codes  Outcome Measure Options: AM-PAC 6 Clicks Daily Activity (OT)  AM-PAC 6 Clicks Score: 8  Score: 10    Ligia Rosa PTA  4/8/2019

## 2019-04-09 PROBLEM — I50.9 ACUTE ON CHRONIC CONGESTIVE HEART FAILURE (HCC): Status: ACTIVE | Noted: 2019-01-01

## 2019-04-09 NOTE — THERAPY TREATMENT NOTE
Acute Care - Physical Therapy Treatment Note  [facility]     Patient Name: Tamy Sher  : 1930  MRN: [id number]  Today's Date: 2019  Onset of Illness/Injury or Date of Surgery: 19  Date of Referral to PT: 19  Referring Physician: Dr. Adames    Admit Date: 2019    Visit Dx:    ICD-10-CM ICD-9-CM   1. Acute on chronic congestive heart failure, unspecified heart failure type (CMS/HCC) I50.9 428.0   2. Urinary tract infection in elderly patient N39.0 599.0   3. Impaired mobility Z74.09 799.89   4. Impaired mobility and ADLs Z74.09 799.89     Patient Active Problem List   Diagnosis   • Ischemic chest pain   • Pneumonia of both lungs due to infectious organism   • Fall   • Closed displaced comminuted fracture of shaft of right femur (CMS/HCC)   • Pain of right thigh   • Class 2 obesity with serious comorbidity in adult   • Type 2 diabetes mellitus with neurologic complication, with long-term current use of insulin (CMS/HCC)   • STUART (acute kidney injury) (CMS/HCC)   • Chronic diastolic heart failure (CMS/HCC)   • Pleural effusion, left   • Hypoxia   • Surgical wound infection, initial encounter   • Pleural effusion due to CHF (congestive heart failure) (CMS/HCC)   • Hyperkalemia   • Acute renal failure superimposed on stage 3 chronic kidney disease (CMS/HCC)   • DM (diabetes mellitus) (CMS/HCC)   • Iron deficiency anemia   • Thrombocytosis (CMS/HCC) probably related to iron deficiency anemia   • Myeloproliferative neoplasm (CMS/HCC)   • A-fib (CMS/HCC)   • Chronic anticoagulation   • Essential hypertension   • Constipation   • Hypothyroidism (acquired)   • Acute urinary retention -resolved   • HAP (hospital-acquired pneumonia)   • UTI (urinary tract infection), bacterial   • Myeloproliferative neoplasm (CMS/HCC)   • Pulmonary edema   • Acute on chronic congestive heart failure (CMS/HCC)       Therapy Treatment    Rehabilitation Treatment Summary     Row Name 19 1113             Treatment  Time/Intention    Discipline  physical therapy assistant  -LG      Document Type  therapy note (daily note)  -LG      Subjective Information  complains of;weakness;fatigue  -LG2      Mode of Treatment  individual therapy;physical therapy  -LG2      Patient/Family Observations  no family in room  -LG2      Comment  Assisted pt on/off bedpan  -LG2      Existing Precautions/Restrictions  fall;oxygen therapy device and L/min  -LG2      Recorded by [LG] Giuseppe Spaulding, [facility] 04/09/19 1113  [LG2] Giuseppe Spaulding, [facility] 04/09/19 1152      Row Name 04/09/19 1113             Bed Mobility Assessment/Treatment    Rolling Left [place] (Bed Mobility)  dependent (less than 25% patient effort);2 person assist  -LG      Rolling Right [place] (Bed Mobility)  dependent (less than 25% patient effort);2 person assist  -LG      Scooting/Bridging [place] (Bed Mobility)  dependent (less than 25% patient effort);2 person assist  -LG      Supine-Sit [place] (Bed Mobility)  maximum assist (25% patient effort);dependent (less than 25% patient effort);2 person assist  -LG      Sit-Supine [place] (Bed Mobility)  dependent (less than 25% patient effort);2 person assist  -LG      Bed Mobility, Safety Issues  cognitive deficits limit understanding  -LG      Comment (Bed Mobility)  Pt sat EOB x 23 min with SBA to CGA . Did 10 reps AAROM B LE  -LG2      Recorded by [LG] Giuseppe Spaulding, PTA 04/09/19 1152  [LG2] Giuseppe Spaulding, [facility] 04/09/19 1156      Row Name 04/09/19 1113             Therapeutic Exercise    Comment (Therapeutic Exercise)  B LE AAROM x 10 reps sitting eob  -LG      Recorded by [LG] Giuseppe Spaulding, PTA 04/09/19 1312      Row Name 04/09/19 1113             Dynamic Balance Activity    Comment (Dynamic Balance Training)  Sat EOB x 23 min without assist other than vc's. Worked on reaching forward and LE strengthening exercises.   -LG      Recorded by [LG] Giuseppe Spaulding, PTA 04/09/19 1312      Row Name 04/09/19 1113              Positioning and Restraints    Pre-Treatment Position  in bed  -LG      Post Treatment Position  bed  -LG      In Bed  side lying left;call light within reach;encouraged to call for assist;exit alarm on;side rails up x2;pillow between legs;with nsg  -LG      Recorded by [LG] Giuseppe Spaulding, PTA 04/09/19 1156      Row Name 04/09/19 1113             Pain Scale: FACES Pre/Post-Treatment    Pain: FACES Scale, Pretreatment  0-->no hurt  -LG      Pain: FACES Scale, Post-Treatment  0-->no hurt  -LG      Recorded by [LG] Giuseppe Spaulding, PTA 04/09/19 1156        User Key  (r) = Recorded By, (t) = Taken By, (c) = Cosigned By    Initials Name Effective Dates Discipline    LG Giuseppe Spaulding PTA 08/02/16 -  PT                   Physical Therapy Education     Title: PT OT SLP Therapies (In Progress)     Topic: Physical Therapy (In Progress)     Point: Mobility training (Done)     Learning Progress Summary           Patient AMANDA Bhakta VU by WYATT at 4/8/2019  9:38 AM    Comment:  benefits of activity    AcceptanceAMANDA VU,NR by FERNANDO at 4/7/2019  1:25 PM    Comment:  Educated pt. on progression of PT POC and benefits of activity   Family AcceptanceAMANDA VU,NR by FERNANDO at 4/7/2019  1:25 PM    Comment:  Educated pt. on progression of PT POC and benefits of activity                               User Key     Initials Effective Dates Name Provider Type Discipline    AE 06/22/15 -  Ligia Rosa, PTA Physical Therapy Assistant PT    FERNANDO 08/02/16 -  Kd Callaway PT DPT Physical Therapist PT                PT Recommendation and Plan        Outcome Measures     Row Name 04/07/19 1500 04/07/19 1325          How much help from another person do you currently need...    Turning from your back to your side while in flat bed without using bedrails?  --  2  -FERNANDO     Moving from lying on back to sitting on the side of a flat bed without bedrails?  --  2  -FERNANDO     Moving to and from a bed to a chair (including a wheelchair)?  --  1  -FERNANDO     Standing up  from a chair using your arms (e.g., wheelchair, bedside chair)?  --  1  -FERNANDO     Climbing 3-5 steps with a railing?  --  1  -FERNANDO     To walk in hospital room?  --  1  -FERNANDO     AM-PAC 6 Clicks Score  --  8  -FERNANDO        How much help from another is currently needed...    Putting on and taking off regular lower body clothing?  1  -MM  --     Bathing (including washing, rinsing, and drying)  1  -MM  --     Toileting (which includes using toilet bed pan or urinal)  1  -MM  --     Putting on and taking off regular upper body clothing  2  -MM  --     Taking care of personal grooming (such as brushing teeth)  2  -MM  --     Eating meals  3  -MM  --     Score  10  -MM  --        Functional Assessment    Outcome Measure Options  AM-PAC 6 Clicks Daily Activity (OT)  -MM  AM-PAC 6 Clicks Basic Mobility (PT)  -FERNANDO       User Key  (r) = Recorded By, (t) = Taken By, (c) = Cosigned By    Initials Name Provider Type    FERNANDO Kd Callaway, PT DPT Physical Therapist    MM Rodney Haley, OTR/L Occupational Therapist         Time Calculation:   PT Charges     Row Name 04/09/19 1113             Time Calculation    Start Time  1113  -LG      Stop Time  1157  -LG      Time Calculation (min)  44 min  -LG      PT Received On  04/09/19  -      PT Goal Re-Cert Due Date  04/17/19  -         Time Calculation- PT    Total Timed Code Minutes- PT  44 minute(s)  -LG        User Key  (r) = Recorded By, (t) = Taken By, (c) = Cosigned By    Initials Name Provider Type     Giuseppe Spaulding PTA Physical Therapy Assistant        Therapy Charges for Today     Code Description Service Date Service Provider Modifiers Qty    18635517999 HC PT THER SUPP EA 15 MIN 4/8/2019 Giuseppe Spaulding PTA GP 2    76527385450 HC PT THERAPEUTIC ACT EA 15 MIN 4/9/2019 Giuseppe Spaulding PTA GP 2    57924867745 HC PT THER PROC EA 15 MIN 4/9/2019 Giuseppe Spaulding PTA GP 1          PT G-Codes  Outcome Measure Options: AM-PAC 6 Clicks Daily Activity (OT)  AM-PAC 6 Clicks Score:  8  Score: 10    Giuseppe Spaulding, PTA  4/9/2019

## 2019-04-09 NOTE — THERAPY EVALUATION
Acute Care - Speech Language Pathology   Swallow Initial Evaluation [facility]     Patient Name: Tamy Sher  : 1930  MRN: [id number]  Today's Date: 2019  Onset of Illness/Injury or Date of Surgery: 19     Referring Physician: Dr. Adames      Admit Date: 2019  Clinical bedside swallow evaluation complete. Pt noted to be significantly [place]. She was noted to exhibit a cough and expell mucus prior to PO trials were presented. A limited amount of pureed, honey thick, and nectar thick trials were completed due to pt refusal. 2x audible swallow noted with nectar thick liquids as well as 1x throat clear and vocal quality change with thin liquids. Belching was noted to occur following trials of nectar thick and thin liquids. Pt attempted first trial of regular solids without bottom dentures in place but eventually had to expell bolus from oral cavity. Her lower dentures were placed a she completed regular solids; however, she exhibited poor rotary chew and moderate residue throughout her oral cavity. Given multiple liquid washes the pt was able to eventually clear a majority of residue. SLP cannot fully r/o aspiration with PO trials at this time. Pt ok to conitnue a mechanical soft diet with honey thick liquids. Ok for meds to be administered whole or crushed in pudding/applesauce or with honey thick liquids. SLP will continue to follow and treat.  Manjinder Foley MS CCC-SLP 2019 2:45 PM    Visit Dx:     ICD-10-CM ICD-9-CM   1. Acute on chronic congestive heart failure, unspecified heart failure type (CMS/[facility]) I50.9 428.0   2. Urinary tract infection in elderly patient N39.0 599.0   3. Impaired mobility Z74.09 799.89   4. Impaired mobility and ADLs Z74.09 799.89   5. Dysphagia, unspecified type R13.10 787.20     Patient Active Problem List   Diagnosis   • Ischemic chest pain   • Pneumonia of both lungs due to infectious organism   • Fall   • Closed displaced comminuted fracture of shaft of right  femur (CMS/HCC)   • Pain of right thigh   • Class 2 obesity with serious comorbidity in adult   • Type 2 diabetes mellitus with neurologic complication, with long-term current use of insulin (CMS/HCC)   • STUART (acute kidney injury) (CMS/HCC)   • Chronic diastolic heart failure (CMS/HCC)   • Pleural effusion, left   • Hypoxia   • Surgical wound infection, initial encounter   • Pleural effusion due to CHF (congestive heart failure) (CMS/HCC)   • Hyperkalemia   • Acute renal failure superimposed on stage 3 chronic kidney disease (CMS/HCC)   • DM (diabetes mellitus) (CMS/HCC)   • Iron deficiency anemia   • Thrombocytosis (CMS/HCC) probably related to iron deficiency anemia   • Myeloproliferative neoplasm (CMS/HCC)   • A-fib (CMS/HCC)   • Chronic anticoagulation   • Essential hypertension   • Constipation   • Hypothyroidism (acquired)   • Acute urinary retention -resolved   • HAP (hospital-acquired pneumonia)   • UTI (urinary tract infection), bacterial   • Myeloproliferative neoplasm (CMS/HCC)   • Pulmonary edema   • Acute on chronic congestive heart failure (CMS/HCC)     Past Medical History:   Diagnosis Date   • Constipation    • Dementia    • Diabetes mellitus (CMS/HCC)    • Diarrhea    • Diastolic dysfunction, left ventricle    • Esophagitis    • Exertional shortness of breath    • Hyperlipidemia    • Hypertension    • Lumbar spondylitis (CMS/HCC)    • Osteoarthritis    • Peripheral vascular disease (CMS/HCC)    • Sinusitis    • Stroke (CMS/HCC)    • Tremor     essential tremor     Past Surgical History:   Procedure Laterality Date   • FEMUR SUPRACONDYLAR FRACTURE REPAIR Right 3/10/2018    Procedure: FEMUR SUPRACONDYLAR NAIL;  Surgeon: Nima Bundy MD;  Location: [facility];  Service: Orthopedics   • GALLBLADDER SURGERY     • HYSTERECTOMY     • JOINT REPLACEMENT     • REPLACEMENT TOTAL KNEE Left         SWALLOW EVALUATION (last 72 hours)      [facility] Adult Swallow Evaluation     Row Name 04/09/19 5889                    Rehab Evaluation    Document Type  evaluation  -CS        Subjective Information  no complaints  -CS        Patient Observations  alert;cooperative;agree to therapy  -CS        Patient/Family Observations  No family present  -CS           General Information    Patient Profile Reviewed  yes  -CS        Pertinent History Of Current Problem  CXR 4/06/19- Airspace opacities in both lower lobes greater on left.  -CS        Current Method of Nutrition  soft textures;honey-thick liquids  -CS        Precautions/Limitations, Vision  WFL;for purposes of eval  -CS        Precautions/Limitations, Hearing  hearing impairment, bilaterally  -CS        Prior Level of Function-Communication  WFL  -CS        Prior Level of Function-Swallowing  mechanical soft textures;honey thick liquids  -CS        Plans/Goals Discussed with  patient  -CS        Barriers to Rehab  hearing deficit  -CS        Patient's Goals for Discharge  patient did not state  -CS           Pain Assessment    Additional Documentation  Pain Scale: FACES Pre/Post-Treatment (Group)  -CS           Pain Scale: FACES Pre/Post-Treatment    Pain: FACES Scale, Pretreatment  0-->no hurt  -CS        Pain: FACES Scale, Post-Treatment  0-->no hurt  -CS           Oral Motor and Function    Dentition Assessment  upper dentures/partial in place;lower dentures/partial in place  -CS        Secretion Management  WNL/WFL  -CS        Mucosal Quality  moist, healthy  -CS        Volitional Swallow  unable to elicit  -CS        Volitional Cough  weak  -CS           Oral Musculature and Cranial Nerve Assessment    Oral Motor General Assessment  mandibular impairment  -CS        Mandibular Impairment Detail, Cranial Nerve V (Trigeminal)  CN5: motor impairment;reduced mandibular ROM;reduced strength bilaterally  -CS           General Eating/Swallowing Observations    Respiratory Support Currently in Use  room air  -CS        Eating/Swallowing Skills  fed by SLP  -CS        Positioning  During Eating  upright in bed  -CS        Utensils Used  spoon;straw  -CS        Consistencies Trialed  regular textures;honey-thick liquids;nectar/syrup-thick liquids;thin liquids;pureed  -CS           Clinical Swallow Eval    Oral Prep Phase  impaired  -CS        Oral Transit  impaired  -CS        Oral Residue  impaired  -CS        Pharyngeal Phase  suspected pharyngeal impairment  -CS        Esophageal Phase  suspected esophageal impairment  -CS        Clinical Swallow Evaluation Summary  Clinical bedside swallow evaluation complete. Pt noted to be significantly [place]. She was noted to exhibit a cough and expell mucus prior to PO trials were presented. A limited amount of pureed, honey thick, and nectar thick trials were completed due to pt refusal. 2x audible swallow noted with nectar thick liquids as well as 1x throat clear and vocal quality change with thin liquids. Belching was noted to occur following trials of nectar thick and thin liquids. Pt attempted first trial of regular solids without bottom dentures in place but eventually had to expell bolus from oral cavity. Her lower dentures were placed a she completed regular solids; however, she exhibited poor rotary chew and moderate residue throughout her oral cavity. Given multiple liquid washes the pt was able to eventually clear a majority of residue. SLP cannot fully r/o aspiration with PO trials at this time. Pt ok to conitnue a mechanical soft diet with honey thick liquids. Ok for meds to be administered whole or crushed in pudding/applesauce or with honey thick liquids. SLP will continue to follow and treat.  -CS           Oral Prep Concerns    Oral Prep Concerns  poor rotary chew  -CS        Poor Rotary Chew  regular consistencies  -CS           Oral Transit Concerns    Oral Transit Concerns  increased oral transit time  -CS        Increased Oral Transit Time  regular consistencies  -CS           Oral Residue Concerns    Oral Residue Concerns  diffuse  residue throughout oral cavity  -CS           Pharyngeal Phase Concerns    Pharyngeal Phase Concerns  wet vocal quality;throat clear  -CS        Wet Vocal Quality  thin  -CS        Throat Clear  nectar;thin;regular consistencies  -CS           Clinical Impression    SLP Swallowing Diagnosis  mild-moderate;oral dysfunction;suspected pharyngeal dysfunction  -CS        Functional Impact  risk of aspiration/pneumonia;risk of malnutrition  -CS        Rehab Potential/Prognosis, Swallowing  adequate, monitor progress closely  -CS        Swallow Criteria for Skilled Therapeutic Interventions Met  demonstrates skilled criteria  -CS           Recommendations    Therapy Frequency (Swallow)  3 days per week  -CS        Predicted Duration Therapy Intervention (Days)  until discharge  -CS        SLP Diet Recommendation  soft textures;honey thick liquids;water between meals after oral care, with supervision;ice chips between meals after oral care, with supervision  -CS        Recommended Diagnostics  VFSS (MBS)  -CS        Recommended Precautions and Strategies  upright posture during/after eating;small bites of food and sips of liquid  -CS        SLP Rec. for Method of Medication Administration  meds whole;meds crushed;with pudding or applesauce;with thick liquids  -CS        Monitor for Signs of Aspiration  yes;cough;gurgly voice;throat clearing;notify SLP if any concerns  -CS        Anticipated Dischage Disposition  skilled nursing facility  -CS           Swallow Goals (SLP)    Oral Nutrition/Hydration Goal Selection (SLP)  oral nutrition/hydration, SLP goal 1  -CS           Oral Nutrition/Hydration Goal 1 (SLP)    Oral Nutrition/Hydration Goal 1, SLP  Pt will tolerate LRD w/o any overt s/s of aspiration.  -CS        Time Frame (Oral Nutrition/Hydration Goal 1, SLP)  by discharge  -CS        Barriers (Oral Nutrition/Hydration Goal 1, SLP)  n/a  -CS        Progress/Outcomes (Oral Nutrition/Hydration Goal 1, SLP)  goal ongoing   -CS          User Key  (r) = Recorded By, (t) = Taken By, (c) = Cosigned By    Initials Name Effective Dates    Manjinder Aguilar, MS CCC-SLP 04/03/18 -           EDUCATION  The patient has been educated in the following areas:   Dysphagia (Swallowing Impairment).    SLP Recommendation and Plan  SLP Swallowing Diagnosis: mild-moderate, oral dysfunction, suspected pharyngeal dysfunction  SLP Diet Recommendation: soft textures, honey thick liquids, water between meals after oral care, with supervision, ice chips between meals after oral care, with supervision  Recommended Precautions and Strategies: upright posture during/after eating, small bites of food and sips of liquid     Monitor for Signs of Aspiration: yes, cough, gurgly voice, throat clearing, notify SLP if any concerns  Recommended Diagnostics: VFSS (MBS)  Swallow Criteria for Skilled Therapeutic Interventions Met: demonstrates skilled criteria  Anticipated Dischage Disposition: skilled nursing facility  Rehab Potential/Prognosis, Swallowing: adequate, monitor progress closely  Therapy Frequency (Swallow): 3 days per week  Predicted Duration Therapy Intervention (Days): until discharge       Plan of Care Reviewed With: patient  Plan of Care Review  Plan of Care Reviewed With: patient  Progress: no change  Outcome Summary: Clinical bedside swallow evaluation complete. Pt noted to be significantly [place]. She was noted to exhibit a cough and expell mucus prior to PO trials were presented. A limited amount of pureed, honey thick, and nectar thick trials were completed due to pt refusal. 2x audible swallow noted with nectar thick liquids as well as 1x throat clear and vocal quality change with thin liquids. Belching was noted to occur following trials of nectar thick and thin liquids. Pt attempted first trial of regular solids without bottom dentures in place but eventually had to expell bolus from oral cavity. Her lower dentures were placed a she completed regular  solids; however, she exhibited poor rotary chew and moderate residue throughout her oral cavity. Given multiple liquid washes the pt was able to eventually clear a majority of residue. SLP cannot fully r/o aspiration with PO trials at this time. Pt ok to conitnue a mechanical soft diet with honey thick liquids. Ok for meds to be administered whole or crushed in pudding/applesauce or with honey thick liquids. SLP will continue to follow and treat.    SLP GOALS     Row Name 04/09/19 1340             Oral Nutrition/Hydration Goal 1 (SLP)    Oral Nutrition/Hydration Goal 1, SLP  Pt will tolerate LRD w/o any overt s/s of aspiration.  -CS      Time Frame (Oral Nutrition/Hydration Goal 1, SLP)  by discharge  -CS      Barriers (Oral Nutrition/Hydration Goal 1, SLP)  n/a  -CS      Progress/Outcomes (Oral Nutrition/Hydration Goal 1, SLP)  goal ongoing  -CS        User Key  (r) = Recorded By, (t) = Taken By, (c) = Cosigned By    Initials Name Provider Type    Manjinder Aguilar MS CCC-SLP Speech and Language Pathologist             Time Calculation:   Time Calculation- SLP     Row Name 04/09/19 1444             Time Calculation- SLP    SLP Start Time  1340  -CS      SLP Stop Time  1444  -CS      SLP Time Calculation (min)  64 min  -CS      SLP Received On  04/09/19  -CS      SLP Goal Re-Cert Due Date  04/19/19  -CS        User Key  (r) = Recorded By, (t) = Taken By, (c) = Cosigned By    Initials Name Provider Type    Manjinder Aguilar MS CCC-SLP Speech and Language Pathologist          Therapy Charges for Today     Code Description Service Date Service Provider Modifiers Qty    [id number]  ST EVAL ORAL PHARYNG SWALLOW 4 4/9/2019 Manjinder Foley MS CCC-SLP GN 1               Manjinder Foley MS CCC-SLP  4/9/2019

## 2019-04-09 NOTE — PLAN OF CARE
Problem: Fall Risk (Adult)  Goal: Absence of Fall  Outcome: Ongoing (interventions implemented as appropriate)      Problem: Patient Care Overview  Goal: Plan of Care Review  Outcome: Ongoing (interventions implemented as appropriate)   04/09/19 0402   Coping/Psychosocial   Plan of Care Reviewed With patient   Plan of Care Review   Progress no change   OTHER   Outcome Summary BP elevated, c/o SOA, confused at times, turn q 2 hours, Alves cath care done q 4 hours, afib 65-84 pvc, coup, f-pvc, 3, 4, 7 in a row, edema improving,        Problem: Cardiac: Heart Failure (Adult)  Goal: Signs and Symptoms of Listed Potential Problems Will be Absent, Minimized or Managed (Cardiac: Heart Failure)  Outcome: Ongoing (interventions implemented as appropriate)      Problem: Arrhythmia/Dysrhythmia (Symptomatic) (Adult)  Goal: Signs and Symptoms of Listed Potential Problems Will be Absent, Minimized or Managed (Arrhythmia/Dysrhythmia)  Outcome: Ongoing (interventions implemented as appropriate)      Problem: Skin Injury Risk (Adult)  Goal: Identify Related Risk Factors and Signs and Symptoms  Outcome: Ongoing (interventions implemented as appropriate)    Goal: Skin Health and Integrity  Outcome: Ongoing (interventions implemented as appropriate)      Problem: Nutrition, Imbalanced: Inadequate Oral Intake (Adult)  Goal: Prevent Further Weight Loss  Outcome: Ongoing (interventions implemented as appropriate)

## 2019-04-09 NOTE — THERAPY DISCHARGE NOTE
Acute Care - Speech Language Pathology Discharge Summary  [facility]       Patient Name: Tamy Sher  : 1930  MRN: [id number]    Today's Date: 2019  Onset of Illness/Injury or Date of Surgery: 19       Referring Physician: Dr. Adames        Admit Date: 3/20/2019      SLP Recommendation and Plan  Mechanical soft solids and honey thick liquids    Visit Dx:    ICD-10-CM ICD-9-CM   1. HAP (hospital-acquired pneumonia) J18.9 486   2. Decreased activities of daily living (ADL) R68.89 780.99   3. Impaired mobility and endurance Z74.09 V49.89   4. Oropharyngeal dysphagia R13.12 787.22               SLP GOALS     Row Name 19 1500             Oral Nutrition/Hydration Goal 1 (SLP)    Oral Nutrition/Hydration Goal 1, SLP  Pt will tolerate LRD without overt s/s of aspiration.  -MB      Time Frame (Oral Nutrition/Hydration Goal 1, SLP)  by discharge  -MB      Barriers (Oral Nutrition/Hydration Goal 1, SLP)  n/a  -MB      Progress/Outcomes (Oral Nutrition/Hydration Goal 1, SLP)  goal partially met  -MB         Lingual Strengthening Goal 1 (SLP)    Activity (Lingual Strengthening Goal 1, SLP)  increase tongue back strength  -MB      Increase Tongue Back Strength  lingual resistance exercises  -MB      [place]/Accuracy (Lingual Strengthening Goal 1, SLP)  independently (over 90% accuracy)  -MB      Time Frame (Lingual Strengthening Goal 1, SLP)  by discharge  -MB      Barriers (Lingual Strengthening Goal 1, SLP)  n/a  -MB      Progress/Outcomes (Lingual Strengthening Goal 1, SLP)  goal partially met  -MB         Pharyngeal Strengthening Exercise Goal 1 (SLP)    Activity (Pharyngeal Strengthening Goal 1, SLP)  increase timing;increase superior movement of the hyolaryngeal complex;increase anterior movement of the hyolaryngeal complex  -MB      Increase Timing  gustatory stimulation (sour/cold)  -MB      Increase Superior Movement of the Hyolaryngeal Complex  Mendelsohn;falsetto  -MB      Increase  Anterior Movement of the Hyolaryngeal Complex  shaker  -MB      [place]/Accuracy (Pharyngeal Strengthening Goal 1, SLP)  with minimal cues (75-90% accuracy)  -MB      Time Frame (Pharyngeal Strengthening Goal 1, SLP)  by discharge  -MB      Barriers (Pharyngeal Strengthening Goal 1, SLP)  n/a  -MB      Progress/Outcomes (Pharyngeal Strengthening Goal 1, SLP)  goal partially met  -MB        User Key  (r) = Recorded By, (t) = Taken By, (c) = Cosigned By    Initials Name Provider Type    Lola Munoz CCC-SLP Speech and Language Pathologist                  SLP Discharge Summary  Anticipated Dischage Disposition: skilled nursing facility  Reason for Discharge: discharge from this facility  Progress Toward Achieving Short/long Term Goals: goals partially met within established timelines  Discharge Destination: SNF      Lola Zhao CCC-SLP  4/9/2019

## 2019-04-09 NOTE — PLAN OF CARE
Problem: Fall Risk (Adult)  Goal: Identify Related Risk Factors and Signs and Symptoms  Outcome: Outcome(s) achieved Date Met: 04/09/19    Goal: Absence of Fall  Outcome: Ongoing (interventions implemented as appropriate)      Problem: Patient Care Overview  Goal: Plan of Care Review  Outcome: Ongoing (interventions implemented as appropriate)    Goal: Individualization and Mutuality  Outcome: Ongoing (interventions implemented as appropriate)      Problem: Cardiac: Heart Failure (Adult)  Goal: Signs and Symptoms of Listed Potential Problems Will be Absent, Minimized or Managed (Cardiac: Heart Failure)  Outcome: Ongoing (interventions implemented as appropriate)      Problem: Arrhythmia/Dysrhythmia (Symptomatic) (Adult)  Goal: Signs and Symptoms of Listed Potential Problems Will be Absent, Minimized or Managed (Arrhythmia/Dysrhythmia)  Outcome: Outcome(s) achieved Date Met: 04/09/19      Problem: Skin Injury Risk (Adult)  Goal: Skin Health and Integrity  Outcome: Ongoing (interventions implemented as appropriate)      Problem: Nutrition, Imbalanced: Inadequate Oral Intake (Adult)  Goal: Improved Oral Intake  Outcome: Ongoing (interventions implemented as appropriate)    Goal: Prevent Further Weight Loss  Outcome: Ongoing (interventions implemented as appropriate)      Problem: Patient Care Overview  Goal: Plan of Care Review   04/09/19 1543   Coping/Psychosocial   Plan of Care Reviewed With patient;family   Plan of Care Review   Progress no change   OTHER   Outcome Summary No c/o pain.VSS. Continue diuresing with iv bumex, just enough urine output. She refuses to eat, she wanted to drink thin liquid. Speech re evaluated patient. Patient still on mech soft diet with honey thick liq. Pt turn q 2hr and as needed. Alves cleaned as scheduled. Continue current tx. PT came and work with patient also. (See PT note)

## 2019-04-09 NOTE — PROGRESS NOTES
"    [facility] Medicine Services  INPATIENT PROGRESS NOTE    Patient Name: Tamy Sher  Date of Admission: 4/6/2019  Today's Date: 04/09/19  Length of Stay: 0  Primary Care Physician: Barry Foley MD    Subjective   Chief Complaint: \"I want regular water.\"  HPI   She is doing ok.  She is frustrated that she is having to drink thickened liquids  She says her breathing feels slightly better  She still has swelling all over  She denies fevers or chills        Review of Systems   Constitutional: Positive for fatigue. Negative for fever.   HENT: Negative for congestion and ear pain.    Eyes: Negative for pain and visual disturbance.   Respiratory: Positive for shortness of breath. Negative for cough and wheezing.    Cardiovascular: Positive for leg swelling. Negative for chest pain and palpitations.   Gastrointestinal: Negative for diarrhea, nausea and vomiting.   Endocrine: Negative for heat intolerance.   Genitourinary: Negative for dysuria and frequency.   Musculoskeletal: Negative for arthralgias and back pain.   Skin: Negative for rash and wound.   Neurological: Positive for weakness. Negative for dizziness and light-headedness.   Psychiatric/Behavioral: Negative for confusion. The patient is not nervous/anxious.    All other systems reviewed and are negative.       All pertinent negatives and positives are as above. All other systems have been reviewed and are negative unless otherwise stated.     Objective    Temp:  [97.5 °F (36.4 °C)-98.6 °F (37 °C)] 97.9 °F (36.6 °C)  Heart Rate:  [64-91] 64  Resp:  [18-22] 18  BP: (132-155)/(59-86) 132/72  Physical Exam   Constitutional: She is oriented to person, place, and time. She appears well-developed and well-nourished.   HENT:   Head: Normocephalic and atraumatic.   Right Ear: External ear normal.   Left Ear: External ear normal.   Nose: Nose normal.   Mouth/Throat: Oropharynx is clear and moist.   Eyes: Conjunctivae and EOM " are normal.   Neck: Normal range of motion. Neck supple.   Cardiovascular: Normal rate, regular rhythm and normal heart sounds.   BLE pitting edema   Pulmonary/Chest: Effort normal. She has decreased breath sounds. She has rales.   Abdominal: Soft. Bowel sounds are normal. She exhibits no distension. There is no tenderness.   Musculoskeletal: Normal range of motion.   Neurological: She is alert and oriented to person, place, and time.   Skin: Skin is warm and dry.   Psychiatric: She has a normal mood and affect. Her speech is normal and behavior is normal. Cognition and memory are normal.           Results Review:  I have reviewed the labs, radiology results, and diagnostic studies.    Laboratory Data:   Results from last 7 days   Lab Units 04/08/19  0037 04/07/19 0220 04/06/19  1405   WBC 10*3/mm3 25.68* 22.14* 23.73*   HEMOGLOBIN g/dL 10.2* 9.3* 9.9*   HEMATOCRIT % 32.7* 29.8* 31.8*   PLATELETS 10*3/mm3 600* 525* 560*        Results from last 7 days   Lab Units 04/08/19  0037 04/07/19 0220 04/06/19  1405   SODIUM mmol/L 143 143 142   POTASSIUM mmol/L 3.9 4.4 4.4   CHLORIDE mmol/L 98 101 100   CO2 mmol/L 34.0* 37.0* 37.0*   BUN mg/dL 32* 35* 38*   CREATININE mg/dL 0.81 0.68 0.68   CALCIUM mg/dL 9.3 9.2 9.3   BILIRUBIN mg/dL 0.6 0.3 0.5   ALK PHOS U/L 128* 104 101   ALT (SGPT) U/L 21 19 15   AST (SGOT) U/L 28 14 28   GLUCOSE mg/dL 164* 89 138*       Culture Data:   Blood Culture   Date Value Ref Range Status   04/06/2019 No growth at 2 days  Preliminary     Urine Culture   Date Value Ref Range Status   04/06/2019 >100,000 CFU/mL Yeast isolated (A)  Final       Radiology Data:   Imaging Results (last 24 hours)     ** No results found for the last 24 hours. **          I have reviewed the patient's current medications.     Assessment/Plan     Active Hospital Problems    Diagnosis   • Acute on chronic congestive heart failure (CMS/HCC)   • Pulmonary edema   • Acute urinary retention -resolved   • A-fib (CMS/HCC)   •  DM (diabetes mellitus) (CMS/HCC)   • Chronic diastolic heart failure (CMS/HCC)   • Fall     1.  Acute on Chronic Diastolic CHF  -IV Bumex  -Fluid restrictions  -Strict I's and O's     2.  Afib  -Eliquis  -back off Rate lowering meds given pauses/torsades     3.  T2DM  -SSI     4.  Essential HTN  -monitor BP     5.  HLD  -Lipitor     6.  PVD  -Lipitor  -ASA     7.  CVD  -lipitor  -ASA  -Eliquis     8.  Esophagitis  -Pepcid     9.  ?UTI  -Rocephin     10.  Urinary retention  -Flomax     11.  Hypothyroidism  -Synthroid    12.  Yeast UTI  -Had 3 days of Diflucan                  Discharge Planning: NH in ? days    Javed Sahni MD   04/09/19   4:25 PM

## 2019-04-09 NOTE — PLAN OF CARE
Problem: Patient Care Overview  Goal: Plan of Care Review  Outcome: Ongoing (interventions implemented as appropriate)   04/09/19 3298   Coping/Psychosocial   Plan of Care Reviewed With patient   Plan of Care Review   Progress no change   OTHER   Outcome Summary Clinical bedside swallow evaluation complete. Pt noted to be significantly [place]. She was noted to exhibit a cough and expell mucus prior to PO trials were presented. A limited amount of pureed, honey thick, and nectar thick trials were completed due to pt refusal. 2x audible swallow noted with nectar thick liquids as well as 1x throat clear and vocal quality change with thin liquids. Belching was noted to occur following trials of nectar thick and thin liquids. Pt attempted first trial of regular solids without bottom dentures in place but eventually had to expell bolus from oral cavity. Her lower dentures were placed a she completed regular solids; however, she exhibited poor rotary chew and moderate residue throughout her oral cavity. Given multiple liquid washes the pt was able to eventually clear a majority of residue. SLP cannot fully r/o aspiration with PO trials at this time. Pt ok to conitnue a mechanical soft diet with honey thick liquids. Ok for meds to be administered whole or crushed in pudding/applesauce or with honey thick liquids. SLP will continue to follow and treat.

## 2019-04-10 NOTE — PLAN OF CARE
Problem: Patient Care Overview  Goal: Plan of Care Review  Outcome: Ongoing (interventions implemented as appropriate)   04/10/19 6468   Coping/Psychosocial   Plan of Care Reviewed With caregiver   Plan of Care Review   Progress declining   OTHER   Outcome Summary SLP attempted to see patient for swallow tx x2. Patient with n/v. RN reports difficulty with soft foods. SLP will change to pureed. RN agrees this is better. Did not attempt PO trials due to n/v and pending testing. Rec: 1) Down grade diet to pureed with honey thick liquids.

## 2019-04-10 NOTE — PROGRESS NOTES
[facility] Medicine Services  INPATIENT PROGRESS NOTE    Patient Name: Tamy Sher  Date of Admission: 4/6/2019  Today's Date: 04/10/19  Length of Stay: 1  Primary Care Physician: Barry Foley MD    Subjective   Chief Complaint: Nausea  HPI   Patient has been having trouble with nausea for 2 days.  She is holding an emesis basin when I arrived to room.  Denies any focal abdominal pain or cramping.  Last BM was yesterday, or the day before.  She has been dry heaving and Zofran did not help earlier today.  She also reports similar symptoms of shortness of breath    Review of Systems   All pertinent negatives and positives are as above. All other systems have been reviewed and are negative unless otherwise stated.     Objective    Temp:  [97.6 °F (36.4 °C)-98.7 °F (37.1 °C)] 98.7 °F (37.1 °C)  Heart Rate:  [] 118  Resp:  [16-20] 18  BP: (133-158)/(47-90) 156/87  Physical Exam   Constitutional: No distress.   Looks like she does not feel well; nauseated   HENT:   Head: Normocephalic.   Hard of hearing   Eyes: No scleral icterus.   Neck: No tracheal deviation present.   Pulmonary/Chest:   Diminished at bases but no significant crackles currently   Abdominal: Soft. Bowel sounds are normal. There is no tenderness. There is no guarding.   Genitourinary:   Genitourinary Comments: Alves in place (sediment noted)   Musculoskeletal: She exhibits edema.   Neurological: She is alert.   Skin: Skin is warm. She is not diaphoretic.   Psychiatric: She has a normal mood and affect.   Vitals reviewed.    Results Review:  I have reviewed the labs, radiology results, and diagnostic studies.    Laboratory Data:   Results from last 7 days   Lab Units 04/10/19  0309 04/09/19  1658 04/08/19  0037   WBC 10*3/mm3 28.20* 30.85* 25.68*   HEMOGLOBIN g/dL 10.5* 11.0* 10.2*   HEMATOCRIT % 33.5* 35.3* 32.7*   PLATELETS 10*3/mm3 664* 594* 600*        Results from last 7 days   Lab Units  04/10/19  0309 04/09/19  1645 04/08/19  0037   SODIUM mmol/L 144 142 143   POTASSIUM mmol/L 4.3 3.9 3.9   CHLORIDE mmol/L 98 98 98   CO2 mmol/L 39.0* 34.0* 34.0*   BUN mg/dL 27* 28* 32*   CREATININE mg/dL 0.92 0.80 0.81   CALCIUM mg/dL 9.3 9.1 9.3   BILIRUBIN mg/dL 0.4 0.5 0.6   ALK PHOS U/L 113 117 128*   ALT (SGPT) U/L <15 <15 21   AST (SGOT) U/L 11 20 28   GLUCOSE mg/dL 164* 184* 164*       Culture Data:   Blood Culture   Date Value Ref Range Status   04/06/2019 No growth at 3 days  Preliminary     Urine Culture   Date Value Ref Range Status   04/06/2019 >100,000 CFU/mL Yeast isolated (A)  Final       Radiology Data:   Imaging Results (last 24 hours)     ** No results found for the last 24 hours. **          I have reviewed the patient's current medications.     Assessment/Plan     Active Hospital Problems    Diagnosis   • Acute on chronic congestive heart failure (CMS/HCC)   • Pulmonary edema   • Acute urinary retention -resolved   • A-fib (CMS/HCC)   • DM (diabetes mellitus) (CMS/HCC)   • Chronic diastolic heart failure (CMS/HCC)   • Fall     1.  Acute on Chronic Diastolic CHF  -IV Bumex (decrease from 3x/ day to BID dosing)  -Fluid restriction  -Strict I's and O's  -repeat CXR today  -weight has gone from 198lbs on 4/6 to 186lbs today     2.  Afib  -Eliquis  -restart Diltiazem CD for rate control due to HR trend     3.  T2DM  -SSI     4.  Essential HTN  -monitor BP trend     5.  HLD  -Lipitor     6.  PVD  -Lipitor  -ASA     7.  CVD  -lipitor  -ASA  -Eliquis     8.  Esophagitis  -Pepcid     9.  Urinary retention  -Flomax     10.  Hypothyroidism  -Synthroid     11.  Yeast UTI - POA (chronic Alves)  -Had 3 days of Diflucan  -last day of Rocephin today (day 5)     12.  Nausea with dry heaves  -KUB  -Zofran not improving symptoms  -low dose Phenergan X 1         Cameron Martinez MD   04/10/19   1:00 PM

## 2019-04-10 NOTE — PLAN OF CARE
Problem: Fall Risk (Adult)  Goal: Absence of Fall  Outcome: Ongoing (interventions implemented as appropriate)      Problem: Cardiac: Heart Failure (Adult)  Goal: Signs and Symptoms of Listed Potential Problems Will be Absent, Minimized or Managed (Cardiac: Heart Failure)  Outcome: Ongoing (interventions implemented as appropriate)      Problem: Arrhythmia/Dysrhythmia (Symptomatic) (Adult)  Goal: Signs and Symptoms of Listed Potential Problems Will be Absent, Minimized or Managed (Arrhythmia/Dysrhythmia)  Outcome: Ongoing (interventions implemented as appropriate)      Problem: Skin Injury Risk (Adult)  Goal: Identify Related Risk Factors and Signs and Symptoms  Outcome: Outcome(s) achieved Date Met: 04/10/19    Goal: Skin Health and Integrity  Outcome: Ongoing (interventions implemented as appropriate)      Problem: Patient Care Overview  Goal: Plan of Care Review  Outcome: Ongoing (interventions implemented as appropriate)   04/10/19 1826   Coping/Psychosocial   Plan of Care Reviewed With patient   Plan of Care Review   Progress declining   OTHER   Outcome Summary VSS. C/o abdominal pain today, KUB done see results. CXR done today, worsening edema vs PNA? PO Cardizem restarted.Diet changed to pureed with honey thick liquids. Alves care done Q4H, urine yellow, cloudy, sediments. Jeremiah protocol in place. Skin improving. Pt more confused this shift, x1 dose Phenergan given for dry heaving and nausea. AF  on tele. Safety maintained. Will cont to monitor and call MD with any changes.

## 2019-04-10 NOTE — THERAPY TREATMENT NOTE
Acute Care - Speech Language Pathology   Swallow Treatment Note [facility]     Patient Name: Tamy Sher  : 1930  MRN: [id number]  Today's Date: 4/10/2019  Onset of Illness/Injury or Date of Surgery: 19     Referring Physician: Dr. Adames      Admit Date: 2019    SLP attempted to see patient for swallow tx x2.  Patient with n/v.  RN reports difficulty with soft foods.  SLP will change to pureed.  RN agrees this is better.  Did not attempt PO trials due to n/v and pending testing. Rec: 1) Down grade diet to pureed with honey thick liquids.  Dolores Queen, MS CCC-SLP 4/10/2019 2:43 PM    Visit Dx:      ICD-10-CM ICD-9-CM   1. Acute on chronic congestive heart failure, unspecified heart failure type (CMS/HCC) I50.9 428.0   2. Urinary tract infection in elderly patient N39.0 599.0   3. Impaired mobility Z74.09 799.89   4. Impaired mobility and ADLs Z74.09 799.89   5. Dysphagia, unspecified type R13.10 787.20     Patient Active Problem List   Diagnosis   • Ischemic chest pain   • Pneumonia of both lungs due to infectious organism   • Fall   • Closed displaced comminuted fracture of shaft of right femur (CMS/HCC)   • Pain of right thigh   • Class 2 obesity with serious comorbidity in adult   • Type 2 diabetes mellitus with neurologic complication, with long-term current use of insulin (CMS/HCC)   • STUART (acute kidney injury) (CMS/HCC)   • Chronic diastolic heart failure (CMS/HCC)   • Pleural effusion, left   • Hypoxia   • Surgical wound infection, initial encounter   • Pleural effusion due to CHF (congestive heart failure) (CMS/HCC)   • Hyperkalemia   • Acute renal failure superimposed on stage 3 chronic kidney disease (CMS/HCC)   • DM (diabetes mellitus) (CMS/HCC)   • Iron deficiency anemia   • Thrombocytosis (CMS/HCC) probably related to iron deficiency anemia   • Myeloproliferative neoplasm (CMS/HCC)   • A-fib (CMS/HCC)   • Chronic anticoagulation   • Essential hypertension   • Constipation   •  Hypothyroidism (acquired)   • Acute urinary retention -resolved   • HAP (hospital-acquired pneumonia)   • UTI (urinary tract infection), bacterial   • Myeloproliferative neoplasm (CMS/HCC)   • Pulmonary edema   • Acute on chronic congestive heart failure (CMS/HCC)       Therapy Treatment  Rehabilitation Treatment Summary     Row Name 04/10/19 1230 04/10/19 0845          Treatment Time/Intention    Discipline  speech language pathologist  -KW  physical therapy assistant  -AE     Document Type  therapy note (daily note)  -KW  therapy note (daily note)  -AE     Subjective Information  complains of;nausea/vomiting  -KW  complains of;nausea/vomiting  -AE     Mode of Treatment  individual therapy;speech-language pathology  -KW  --     Patient/Family Observations  no family present  -KW  --     Care Plan Review  care plan/treatment goals reviewed  -KW  --     Care Plan Review, Other Participant(s)  caregiver  -KW  --     Patient Effort  poor  -KW  --     Existing Precautions/Restrictions  --  fall;oxygen therapy device and L/min  -AE     Treatment Considerations/Comments  --  Asks for water alot thick liquids only  -AE     Recorded by [KW] Dolores Queen MS CCC-SLP 04/10/19 1432 [AE] Ligia Rosa, PTA 04/10/19 0917     Row Name 04/10/19 0845             Bed Mobility Assessment/Treatment    Rolling Left Babb (Bed Mobility)  moderate assist (50% patient effort);2 person assist;verbal cues  -AE      Rolling Right Babb (Bed Mobility)  moderate assist (50% patient effort);2 person assist;verbal cues  -AE      Recorded by [AE] Ligia Rosa, PTA 04/10/19 0917      Row Name 04/10/19 0804             Therapeutic Exercise    Upper Extremity (Therapeutic Exercise)  bicep curl, bilateral  -AE      Upper Extremity Range of Motion (Therapeutic Exercise)  shoulder abduction/adduction, bilateral;shoulder flexion/extension, bilateral  -AE      Lower Extremity (Therapeutic Exercise)  heel slides, bilateral  -AE       Lower Extremity Range of Motion (Therapeutic Exercise)  hip abduction/adduction, bilateral;ankle dorsiflexion/plantar flexion, bilateral  -AE      Exercise Type (Therapeutic Exercise)  AAROM (active assistive range of motion)  -AE      Position (Therapeutic Exercise)  supine  -AE      Sets/Reps (Therapeutic Exercise)  10-15  -AE      Recorded by [AE] Ligia Rosa, PTA 04/10/19 0917      Row Name 04/10/19 0845             Positioning and Restraints    Pre-Treatment Position  in bed  -AE      Post Treatment Position  bed  -AE      In Bed  fowlers;call light within reach  -AE      Recorded by [AE] Ligia Rosa, PTA 04/10/19 0917      Row Name 04/10/19 1230 04/10/19 0845          Pain Scale: FACES Pre/Post-Treatment    Pain: FACES Scale, Pretreatment  6-->hurts even more  -KW  0-->no hurt  -AE     Pain: FACES Scale, Post-Treatment  6-->hurts even more  -KW  0-->no hurt  -AE     Recorded by [KW] Dolores Queen MS CCC-SLP 04/10/19 1432 [AE] Ligia Rosa, PTA 04/10/19 0917     Row Name 04/10/19 1230             Outcome Summary/Treatment Plan (SLP)    Daily Summary of Progress (SLP)  progress toward functional goals is gradual  -KW      Barriers to Overall Progress (SLP)  medically complex  -KW      Plan for Continued Treatment (SLP)  continue to follow  -KW      Anticipated Dischage Disposition  skilled nursing facility  -KW      Recorded by [KW] Dolores Queen MS CCC-SLP 04/10/19 1432        User Key  (r) = Recorded By, (t) = Taken By, (c) = Cosigned By    Initials Name Effective Dates Discipline    AE Ligia Rosa, PTA 06/22/15 -  PT    KW Dolores Queen MS CCC-SLP 08/02/16 -  SLP          Outcome Summary  Outcome Summary/Treatment Plan (SLP)  Daily Summary of Progress (SLP): progress toward functional goals is gradual (04/10/19 1230 : Dolores Queen MS CCC-SLP)  Barriers to Overall Progress (SLP): medically complex (04/10/19 1230 : Dolores Queen MS CCC-SLP)  Plan for Continued Treatment (SLP):  continue to follow (04/10/19 1230 : Dolores Queen MS CCC-SLP)  Anticipated Dischage Disposition: skilled nursing facility (04/10/19 1230 : Dolores Queen MS CCC-SLP)      SLP GOALS     Row Name 04/10/19 1230 04/09/19 1340          Oral Nutrition/Hydration Goal 1 (SLP)    Oral Nutrition/Hydration Goal 1, SLP  Pt will tolerate LRD w/o any overt s/s of aspiration.  -KW  Pt will tolerate LRD w/o any overt s/s of aspiration.  -CS     Time Frame (Oral Nutrition/Hydration Goal 1, SLP)  by discharge  -KW  by discharge  -CS     Barriers (Oral Nutrition/Hydration Goal 1, SLP)  n/a  -KW  n/a  -CS     Progress/Outcomes (Oral Nutrition/Hydration Goal 1, SLP)  progress slower than expected  -KW  goal ongoing  -CS       User Key  (r) = Recorded By, (t) = Taken By, (c) = Cosigned By    Initials Name Provider Type    Dolores Pickens MS CCC-SLP Speech and Language Pathologist    Manjinder Aguilar MS CCC-SLP Speech and Language Pathologist          EDUCATION  The patient has been educated in the following areas:   Dysphagia (Swallowing Impairment).    SLP Recommendation and Plan  Daily Summary of Progress (SLP): progress toward functional goals is gradual  Barriers to Overall Progress (SLP): medically complex  Plan for Continued Treatment (SLP): continue to follow  Anticipated Dischage Disposition: skilled nursing facility                    Time Calculation:   Time Calculation- SLP     Row Name 04/10/19 1442             Time Calculation- SLP    SLP Start Time  1230  -KW      SLP Stop Time  1245  -KW      SLP Time Calculation (min)  15 min  -KW      SLP Received On  04/10/19  -KW        User Key  (r) = Recorded By, (t) = Taken By, (c) = Cosigned By    Initials Name Provider Type    Dolores Pickens MS CCC-SLP Speech and Language Pathologist          Therapy Charges for Today     Code Description Service Date Service Provider Modifiers Qty    [id number]  ST SELF CARE/MGMT/TRAIN EA 15 MIN 4/10/2019 Dolores Queen MS  CCC-SLP GN 1                 Dolores Queen, MS KEYSHA-SLP  4/10/2019

## 2019-04-10 NOTE — PLAN OF CARE
Problem: Patient Care Overview  Goal: Plan of Care Review  Outcome: Ongoing (interventions implemented as appropriate)   04/10/19 0954   Coping/Psychosocial   Plan of Care Reviewed With patient   Plan of Care Review   Progress improving   OTHER   Outcome Summary Pt c/o nausea but did agree to practice rolling and perform bed ex's. Pt was AAROM for UE ex's and AAR)M-PROM for B LE ex's x 10-15 reps. Pt was mod x 2 for rolling L and R. RN gave meds for nausea.

## 2019-04-10 NOTE — THERAPY TREATMENT NOTE
Acute Care - Physical Therapy Treatment Note  [facility]     Patient Name: Tamy Sher  : 1930  MRN: [id number]  Today's Date: 4/10/2019  Onset of Illness/Injury or Date of Surgery: 19  Date of Referral to PT: 19  Referring Physician: Dr. Adames    Admit Date: 2019    Visit Dx:    ICD-10-CM ICD-9-CM   1. Acute on chronic congestive heart failure, unspecified heart failure type (CMS/HCC) I50.9 428.0   2. Urinary tract infection in elderly patient N39.0 599.0   3. Impaired mobility Z74.09 799.89   4. Impaired mobility and ADLs Z74.09 799.89   5. Dysphagia, unspecified type R13.10 787.20     Patient Active Problem List   Diagnosis   • Ischemic chest pain   • Pneumonia of both lungs due to infectious organism   • Fall   • Closed displaced comminuted fracture of shaft of right femur (CMS/HCC)   • Pain of right thigh   • Class 2 obesity with serious comorbidity in adult   • Type 2 diabetes mellitus with neurologic complication, with long-term current use of insulin (CMS/HCC)   • STUART (acute kidney injury) (CMS/HCC)   • Chronic diastolic heart failure (CMS/HCC)   • Pleural effusion, left   • Hypoxia   • Surgical wound infection, initial encounter   • Pleural effusion due to CHF (congestive heart failure) (CMS/HCC)   • Hyperkalemia   • Acute renal failure superimposed on stage 3 chronic kidney disease (CMS/HCC)   • DM (diabetes mellitus) (CMS/HCC)   • Iron deficiency anemia   • Thrombocytosis (CMS/HCC) probably related to iron deficiency anemia   • Myeloproliferative neoplasm (CMS/HCC)   • A-fib (CMS/HCC)   • Chronic anticoagulation   • Essential hypertension   • Constipation   • Hypothyroidism (acquired)   • Acute urinary retention -resolved   • HAP (hospital-acquired pneumonia)   • UTI (urinary tract infection), bacterial   • Myeloproliferative neoplasm (CMS/HCC)   • Pulmonary edema   • Acute on chronic congestive heart failure (CMS/HCC)       Therapy Treatment    Rehabilitation Treatment Summary      Row Name 04/10/19 0845             Treatment Time/Intention    Discipline  physical therapy assistant  -AE      Document Type  therapy note (daily note)  -AE      Subjective Information  complains of;nausea/vomiting  -AE      Existing Precautions/Restrictions  fall;oxygen therapy device and L/min  -AE      Treatment Considerations/Comments  Asks for water alot thick liquids only  -AE      Recorded by [AE] Ligia Rosa, VERNELL 04/10/19 0917      Row Name 04/10/19 0845             Bed Mobility Assessment/Treatment    Rolling Left [place] (Bed Mobility)  moderate assist (50% patient effort);2 person assist;verbal cues  -AE      Rolling Right [place] (Bed Mobility)  moderate assist (50% patient effort);2 person assist;verbal cues  -AE      Recorded by [AE] Ligia Rosa, VERNELL 04/10/19 0917      Row Name 04/10/19 0845             Therapeutic Exercise    Upper Extremity (Therapeutic Exercise)  bicep curl, bilateral  -AE      Upper Extremity Range of Motion (Therapeutic Exercise)  shoulder abduction/adduction, bilateral;shoulder flexion/extension, bilateral  -AE      Lower Extremity (Therapeutic Exercise)  heel slides, bilateral  -AE      Lower Extremity Range of Motion (Therapeutic Exercise)  hip abduction/adduction, bilateral;ankle dorsiflexion/plantar flexion, bilateral  -AE      Exercise Type (Therapeutic Exercise)  AAROM (active assistive range of motion)  -AE      Position (Therapeutic Exercise)  supine  -AE      Sets/Reps (Therapeutic Exercise)  10-15  -AE      Recorded by [AE] Ligia Rosa, VERNELL 04/10/19 0917      Row Name 04/10/19 0845             Positioning and Restraints    Pre-Treatment Position  in bed  -AE      Post Treatment Position  bed  -AE      In Bed  fowlers;call light within reach  -AE      Recorded by [AE] Ligia Rosa PTA 04/10/19 0917      Row Name 04/10/19 0845             Pain Scale: FACES Pre/Post-Treatment    Pain: FACES Scale, Pretreatment  0-->no hurt  -AE      Pain:  FACES Scale, Post-Treatment  0-->no hurt  -AE      Recorded by [AE] Sofia Rosaley BALDEMAR, VERNELL 04/10/19 0917        User Key  (r) = Recorded By, (t) = Taken By, (c) = Cosigned By    Initials Name Effective Dates Discipline    AE Sofia Rosaley BALDEMAR, VERNELL 06/22/15 -  PT                   Physical Therapy Education     Title: PT OT SLP Therapies (In Progress)     Topic: Physical Therapy (In Progress)     Point: Mobility training (Done)     Learning Progress Summary           Patient Eager, E, VU by AE at 4/8/2019  9:38 AM    Comment:  benefits of activity    Acceptance, E, VU,NR by FERNANDO at 4/7/2019  1:25 PM    Comment:  Educated pt. on progression of PT POC and benefits of activity   Family Acceptance, E, VU,NR by FERNANDO at 4/7/2019  1:25 PM    Comment:  Educated pt. on progression of PT POC and benefits of activity                               User Key     Initials Effective Dates Name Provider Type Discipline    AE 06/22/15 -  Ligia Rosa PTA Physical Therapy Assistant PT    FERNANDO 08/02/16 -  Kd Callaway PT DPT Physical Therapist PT                PT Recommendation and Plan     Plan of Care Reviewed With: patient  Progress: improving  Outcome Summary: Pt c/o nausea but did agree to practice rolling and perform bed ex'  Outcome Measures     Row Name 04/07/19 1500 04/07/19 1325          How much help from another person do you currently need...    Turning from your back to your side while in flat bed without using bedrails?  --  2  -FERNANDO     Moving from lying on back to sitting on the side of a flat bed without bedrails?  --  2  -FERNANDO     Moving to and from a bed to a chair (including a wheelchair)?  --  1  -FERNANDO     Standing up from a chair using your arms (e.g., wheelchair, bedside chair)?  --  1  -FERNANDO     Climbing 3-5 steps with a railing?  --  1  -FERNANDO     To walk in hospital room?  --  1  -FERNANDO     AM-PAC 6 Clicks Score  --  8  -FERNANDO        How much help from another is currently needed...    Putting on and taking off regular lower  body clothing?  1  -MM  --     Bathing (including washing, rinsing, and drying)  1  -MM  --     Toileting (which includes using toilet bed pan or urinal)  1  -MM  --     Putting on and taking off regular upper body clothing  2  -MM  --     Taking care of personal grooming (such as brushing teeth)  2  -MM  --     Eating meals  3  -MM  --     Score  10  -MM  --        Functional Assessment    Outcome Measure Options  AM-PAC 6 Clicks Daily Activity (OT)  -MM  AM-PAC 6 Clicks Basic Mobility (PT)  -FERNANDO       User Key  (r) = Recorded By, (t) = Taken By, (c) = Cosigned By    Initials Name Provider Type    FERNANDO Kd Callaway, PT DPT Physical Therapist    MM Rodney Haley, OTR/L Occupational Therapist         Time Calculation:   PT Charges     Row Name 04/10/19 0920             Time Calculation    Start Time  0845  -AE      Stop Time  0910  -AE      Time Calculation (min)  25 min  -AE      PT Received On  04/10/19  -AE      PT Goal Re-Cert Due Date  04/17/19  -AE         Time Calculation- PT    Total Timed Code Minutes- PT  25 minute(s)  -AE         Timed Charges    83677 - PT Therapeutic Exercise Minutes  25  -AE        User Key  (r) = Recorded By, (t) = Taken By, (c) = Cosigned By    Initials Name Provider Type    AE Ligia Rosa PTA Physical Therapy Assistant        Therapy Charges for Today     Code Description Service Date Service Provider Modifiers Qty    [id number] HC PT THER PROC EA 15 MIN 4/10/2019 Ligia Rosa PTA GP 2          PT G-Codes  Outcome Measure Options: AM-PAC 6 Clicks Daily Activity (OT)  AM-PAC 6 Clicks Score: 8  Score: 10    Ligia Rosa PTA  4/10/2019

## 2019-04-10 NOTE — PLAN OF CARE
Problem: Fall Risk (Adult)  Goal: Absence of Fall  Outcome: Ongoing (interventions implemented as appropriate)      Problem: Cardiac: Heart Failure (Adult)  Goal: Signs and Symptoms of Listed Potential Problems Will be Absent, Minimized or Managed (Cardiac: Heart Failure)  Outcome: Ongoing (interventions implemented as appropriate)      Problem: Skin Injury Risk (Adult)  Goal: Identify Related Risk Factors and Signs and Symptoms  Outcome: Ongoing (interventions implemented as appropriate)    Goal: Skin Health and Integrity  Outcome: Ongoing (interventions implemented as appropriate)      Problem: Nutrition, Imbalanced: Inadequate Oral Intake (Adult)  Goal: Improved Oral Intake  Outcome: Ongoing (interventions implemented as appropriate)    Goal: Prevent Further Weight Loss  Outcome: Ongoing (interventions implemented as appropriate)      Problem: Patient Care Overview  Goal: Plan of Care Review   04/10/19 0625   Coping/Psychosocial   Plan of Care Reviewed With patient   Plan of Care Review   Progress declining   OTHER   Outcome Summary Patient has been more confused tonight than previous nights she states that she just does not feel good but cannot explain why, no c/o pain, BP elevated, increased 02 to 2.5L/nc o2 sats now 90% was 87 prior to increasing, edema increase, 250 cc out per Alves cath this shift, Alves cath care done q 4 hours, turn q 2 hours, afib  with f-pvc, trig, quad, coup, 4 in row and mf on telemetry.

## 2019-04-11 NOTE — PROGRESS NOTES
[facility]  INPATIENT PROGRESS NOTE    Patient Name: Tamy Sher  Date of Admission: 4/6/2019  Today's Date: 04/11/19  Length of Stay: 2  Primary Care Physician: Barry Foley MD    Subjective   Chief Complaint: Nausea  HPI   Patient reports that she still does not feel good, but her nausea is better than yesterday.  She does have some shortness of breath.  She reports that her cough is productive of clear sputum.  She reports no new cough or congestion.  She denies any chest pain or chest pressure.  Review of Systems   All pertinent negatives and positives are as above. All other systems have been reviewed and are negative unless otherwise stated.     Objective    Temp:  [97.7 °F (36.5 °C)-98.4 °F (36.9 °C)] 98.4 °F (36.9 °C)  Heart Rate:  [] 100  Resp:  [18-22] 18  BP: (118-158)/(58-82) 158/58  Physical Exam   Constitutional: No distress.   Nontoxic appearing   HENT:   Head: Normocephalic.   Hard of hearing   Eyes: No scleral icterus.   Neck: No tracheal deviation present.   Pulmonary/Chest:   Diminished at bases left > right; no rhonchi; some crackles noted   Abdominal: Soft. Bowel sounds are normal. There is no tenderness. There is no guarding.   Genitourinary:   Genitourinary Comments: Alves in place (sediment noted)   Musculoskeletal: She exhibits edema.   Neurological: She is alert.   Skin: Skin is warm. She is not diaphoretic.   Psychiatric: She has a normal mood and affect.   Vitals reviewed.    Results Review:  I have reviewed the labs, radiology results, and diagnostic studies.    Laboratory Data:   Results from last 7 days   Lab Units 04/11/19  0537 04/10/19  0309 04/09/19  1658   WBC 10*3/mm3 28.42* 28.20* 30.85*   HEMOGLOBIN g/dL 10.0* 10.5* 11.0*   HEMATOCRIT % 32.8* 33.5* 35.3*   PLATELETS 10*3/mm3 669* 664* 594*        Results from last 7 days   Lab Units 04/11/19  0537 04/10/19  0309 04/09/19  1645 04/08/19  0037   SODIUM mmol/L  145 144 142 143   POTASSIUM mmol/L 4.2 4.3 3.9 3.9   CHLORIDE mmol/L 100 98 98 98   CO2 mmol/L 36.0* 39.0* 34.0* 34.0*   BUN mg/dL 24* 27* 28* 32*   CREATININE mg/dL 0.88 0.92 0.80 0.81   CALCIUM mg/dL 9.2 9.3 9.1 9.3   BILIRUBIN mg/dL  --  0.4 0.5 0.6   ALK PHOS U/L  --  113 117 128*   ALT (SGPT) U/L  --  <15 <15 21   AST (SGOT) U/L  --  11 20 28   GLUCOSE mg/dL 156* 164* 184* 164*       Culture Data:   Blood Culture   Date Value Ref Range Status   04/06/2019 No growth at 3 days  Preliminary     Urine Culture   Date Value Ref Range Status   04/06/2019 >100,000 CFU/mL Yeast isolated (A)  Final       Radiology Data:   Imaging Results (last 24 hours)     Procedure Component Value Units Date/Time    XR Chest 1 View [[id number]] Collected:  04/10/19 1446     Updated:  04/10/19 1451    Narrative:       EXAMINATION:  XR CHEST 1 VW-  4/10/2019 1:26 PM CDT     HISTORY: Follow-up infiltrates.     COMPARISON: 04/06/2019.     FINDINGS:  Infiltrates in both lung bases are not significantly changed.  There is dense consolidation at the left lung base. The left  hemidiaphragm is obscured. There is worsening in the left perihilar  region. There is cardiomegaly. There is blunting of the costophrenic  angles.       Impression:       1. Bilateral infiltrates with worsening in the left perihilar region.  Pulmonary edema versus pneumonia.  2. Blunting of the costophrenic angles suggesting small effusions.  3. Cardiomegaly.        This report was finalized on 04/10/2019 14:48 by Dr. Eduard Rodriguez MD.    XR Abdomen KUB [[id number]] Collected:  04/10/19 1421     Updated:  04/10/19 1425    Narrative:       EXAMINATION:  XR ABDOMEN KUB-  4/10/2019 1:26 PM CDT     HISTORY: Abdominal pain.     COMPARISON: 04/08/2018.     TECHNIQUE: Supine abdomen.      FINDINGS:   The bowel gas pattern is normal. Surgical clips in the right  upper quadrant are likely related to prior cholecystectomy. There is no  organomegaly. Vascular calcification is noted.  There are degenerative  changes of the spine. There is a partially seen intramedullary nail in  the right femur.       Impression:       No acute findings.        This report was finalized on 04/10/2019 14:22 by Dr. Eduard Rodriguez MD.          I have reviewed the patient's current medications.     Assessment/Plan     Active Hospital Problems    Diagnosis   • Acute on chronic congestive heart failure (CMS/HCC)   • Pulmonary edema   • Acute urinary retention -resolved   • A-fib (CMS/HCC)   • DM (diabetes mellitus) (CMS/HCC)   • Chronic diastolic heart failure (CMS/HCC)   • Fall     1.  Acute on Chronic Diastolic CHF  -IV Bumex (adjust to 2mg BID)  -Fluid restriction  -Strict I's and O's  -repeat CXR with: IMPRESSION:   1. Bilateral infiltrates with worsening in the left perihilar region.   Pulmonary edema versus pneumonia.   2. Blunting of the costophrenic angles suggesting small effusions.   3. Cardiomegaly.  -weight has gone from 198lbs on 4/6 to 189lbs today (up from 186lbs yesterday - bed scale weight)  -reviewed CT Chest from 3/25  -check procalcitonin  -patient has chronically elevated WBC; diagnosis of myeloproliferative disorder noted in records     2.  Afib  -Eliquis  -restarted Diltiazem CD for rate control due to HR trend     3.  T2DM  -SSI     4.  Essential HTN  -monitor BP trend     5.  HLD  -Lipitor     6.  PVD  -Lipitor  -ASA     7.  CVD  -lipitor  -ASA  -Eliquis     8.  Esophagitis  -Pepcid     9.  Urinary retention  -Flomax     10.  Hypothyroidism  -Synthroid     11.  Yeast UTI - POA (chronic Alvse)  -Had 3 days of Diflucan  -completed 5 days of Rocephin      12.  Nausea with dry heaves - improved today  -KUB with no acute findings        Cameron Martinez MD   04/11/19   1:48 PM

## 2019-04-11 NOTE — THERAPY TREATMENT NOTE
Acute Care - Occupational Therapy Treatment Note  [facility]     Patient Name: Tamy Sher  : 1930  MRN: [id number]  Today's Date: 2019  Onset of Illness/Injury or Date of Surgery: 19  Date of Referral to OT: 19  Referring Physician: Dr. Adames    Admit Date: 2019       ICD-10-CM ICD-9-CM   1. Acute on chronic congestive heart failure, unspecified heart failure type (CMS/HCC) I50.9 428.0   2. Urinary tract infection in elderly patient N39.0 599.0   3. Impaired mobility Z74.09 799.89   4. Impaired mobility and ADLs Z74.09 799.89   5. Dysphagia, unspecified type R13.10 787.20     Patient Active Problem List   Diagnosis   • Ischemic chest pain   • Pneumonia of both lungs due to infectious organism   • Fall   • Closed displaced comminuted fracture of shaft of right femur (CMS/[facility])   • Pain of right thigh   • Class 2 obesity with serious comorbidity in adult   • Type 2 diabetes mellitus with neurologic complication, with long-term current use of insulin (CMS/HCC)   • STUART (acute kidney injury) (CMS/HCC)   • Chronic diastolic heart failure (CMS/HCC)   • Pleural effusion, left   • Hypoxia   • Surgical wound infection, initial encounter   • Pleural effusion due to CHF (congestive heart failure) (CMS/HCC)   • Hyperkalemia   • Acute renal failure superimposed on stage 3 chronic kidney disease (CMS/HCC)   • DM (diabetes mellitus) (CMS/HCC)   • Iron deficiency anemia   • Thrombocytosis (CMS/HCC) probably related to iron deficiency anemia   • Myeloproliferative neoplasm (CMS/HCC)   • A-fib (CMS/HCC)   • Chronic anticoagulation   • Essential hypertension   • Constipation   • Hypothyroidism (acquired)   • Acute urinary retention -resolved   • HAP (hospital-acquired pneumonia)   • UTI (urinary tract infection), bacterial   • Myeloproliferative neoplasm (CMS/HCC)   • Pulmonary edema   • Acute on chronic congestive heart failure (CMS/HCC)     Past Medical History:   Diagnosis Date   • Constipation    •  Dementia    • Diabetes mellitus (CMS/HCC)    • Diarrhea    • Diastolic dysfunction, left ventricle    • Esophagitis    • Exertional shortness of breath    • Hyperlipidemia    • Hypertension    • Lumbar spondylitis (CMS/HCC)    • Osteoarthritis    • Peripheral vascular disease (CMS/HCC)    • Sinusitis    • Stroke (CMS/HCC)    • Tremor     essential tremor     Past Surgical History:   Procedure Laterality Date   • FEMUR SUPRACONDYLAR FRACTURE REPAIR Right 3/10/2018    Procedure: FEMUR SUPRACONDYLAR NAIL;  Surgeon: Nima Bundy MD;  Location: [facility] OR;  Service: Orthopedics   • GALLBLADDER SURGERY     • HYSTERECTOMY     • JOINT REPLACEMENT     • REPLACEMENT TOTAL KNEE Left        Therapy Treatment    Rehabilitation Treatment Summary     Row Name 04/11/19 1434 04/11/19 1433          Treatment Time/Intention    Discipline  occupational therapy assistant  -MM  physical therapy assistant  -LG     Document Type  therapy note (daily note)  -MM  therapy note (daily note)  -LG     Subjective Information  complains of;weakness;fatigue  -MM  complains of;weakness;fatigue  -LG2     Mode of Treatment  co-treatment;occupational therapy  -MM  physical therapy  -LG2     Patient/Family Observations  no family   -MM  no family present  -LG2     Patient Effort  adequate  -MM  adequate  -LG2     Comment  Max encouragement; requests to go back to bed   -MM  Pt requires MAX encouragement to participate and asks to lie back down almost as soon as she is assisted to sittingn upright.   -LG2     Existing Precautions/Restrictions  fall;oxygen therapy device and L/min  -MM  fall;oxygen therapy device and L/min  -LG2     Treatment Considerations/Comments  thickened liquids! pt frequently requests water  (Significant)   -MM  Thickened liquiids only  -LG2     Recorded by [MM] Jeannette Iglesias COTA/BALAJI 04/11/19 1544 [LG] Giuseppe Spaulding, PTA 04/11/19 1449  [LG2] Giuseppe Spaulding, PTA 04/11/19 1517     Row Name 04/11/19 1434             Cognitive  Assessment/Intervention- PT/OT    Follows Commands (Cognition)  follows one step commands;75-90% accuracy;over 90% accuracy  -MM      Recorded by [MM] Jeannette Iglesias COTA/L 04/11/19 1544      Row Name 04/11/19 1434 04/11/19 1433          Bed Mobility Assessment/Treatment    Rolling Left [place] (Bed Mobility)  verbal cues;moderate assist (50% patient effort);2 person assist  -MM  verbal cues;moderate assist (50% patient effort);2 person assist  -LG     Rolling Right [place] (Bed Mobility)  verbal cues;moderate assist (50% patient effort);2 person assist  -MM  verbal cues;moderate assist (50% patient effort);2 person assist  -LG     Scooting/Bridging [place] (Bed Mobility)  verbal cues;dependent (less than 25% patient effort);2 person assist  -MM  verbal cues;dependent (less than 25% patient effort);2 person assist  -LG     Supine-Sit [place] (Bed Mobility)  verbal cues;maximum assist (25% patient effort);2 person assist  -MM  verbal cues;maximum assist (25% patient effort);2 person assist  -LG     Sit-Supine [place] (Bed Mobility)  verbal cues;dependent (less than 25% patient effort);2 person assist  -MM  verbal cues;dependent (less than 25% patient effort);2 person assist  -LG     Assistive Device (Bed Mobility)  --  draw sheet  -LG     Comment (Bed Mobility)  Attempted to move LEs out to EOB   -MM  Pt did attempt to move LE's and buttocks towards  eob with vc's  -LG     Recorded by [MM] Jeannette Iglesias COTA/BALAJI 04/11/19 1544 [LG] Giuseppe Spaluding, PTA 04/11/19 1517     Row Name 04/11/19 1434             Functional Mobility    Functional Mobility- Comment  not able   -MM      Recorded by [MM] Jeannette Iglesias COTA/L 04/11/19 1544      Row Name 04/11/19 1434             ADL Assessment/Intervention    BADL Assessment/Intervention  grooming;toileting  -MM      Recorded by [MM] Jeannette Iglesias COTA/L 04/11/19 1544      Row Name 04/11/19 1434             Grooming Assessment/Training     [place] Level (Grooming)  wash face, hands;set up  -MM      Grooming Position  edge of bed sitting  -MM      Comment (Grooming)  SBA sitting EOB progressed to ModA at times; applied chapstick as well no VCs   -MM      Recorded by [MM] Jeannette Iglesias COTA/L 04/11/19 1544      Row Name 04/11/19 1434             Toileting Assessment/Training    [place] Level (Toileting)  perform perineal hygiene;maximum assist (25% patient effort)  -MM      Toileting Position  supine  -MM      Recorded by [MM] Jeannette Iglesias COTA/L 04/11/19 1544      Row Name 04/11/19 1434             BADL Safety/Performance    Impairments, BADL Safety/Performance  balance;endurance/activity tolerance;strength  -MM      Skilled BADL Treatment/Intervention  BADL process/adaptation training  -MM      Recorded by [MM] Jeannette Iglesias COTA/L 04/11/19 1544      Row Name 04/11/19 1434 04/11/19 1433          Therapeutic Exercise    Comment (Therapeutic Exercise)  EOB AROM-AAROM 10 reps BUEs needed max encouragement to participate  -MM  Sat EOB to work on AROM B LE 10 reps with vc's, also completed UE's with reaching. Sat with Supervision to Min A on dynamic surface.   -LG     Recorded by [MM] Jeannette Iglesias COTA/BALAJI 04/11/19 1544 [LG] Giuseppe Spaulding, PTA 04/11/19 1517     Row Name 04/11/19 1434             Static Sitting Balance    Level of [place] (Supported Sitting, Static Balance)  standby assist;moderate assist, 50 to 74% patient effort  -MM      Comment (Supported Sitting, Static Balance)  SBA-ModA when pt fatigued needed VCs to sit up continue task  -MM      Recorded by [MM] Jeannette Iglesias COTA/L 04/11/19 1544      Row Name 04/11/19 1434 04/11/19 1433          Positioning and Restraints    Pre-Treatment Position  in bed  -MM  in bed  -LG     Post Treatment Position  bed  -MM  bed  -LG     In Bed  side lying left;call light within reach;encouraged to call for assist;exit alarm on;side rails up x2  -MM  side lying left;call  light within reach;encouraged to call for assist;exit alarm on;side rails up x2  -LG     Recorded by [MM] Jeannette Iglesias COTA/BALAJI 04/11/19 1544 [LG] Giuseppe Spaulding, PTA 04/11/19 1517     Row Name 04/11/19 1434 04/11/19 1433          Pain Scale: FACES Pre/Post-Treatment    Pain: FACES Scale, Pretreatment  4-->hurts little more hip  -MM  4-->hurts little more Generalized  -LG     Pain: FACES Scale, Post-Treatment  4-->hurts little more  -MM  4-->hurts little more  -LG     Recorded by [MM] Jeannette Iglesias COTA/BALAJI 04/11/19 1544 [LG] Giuseppe Spaulding, PTA 04/11/19 1517     Row Name 04/11/19 1434             Plan of Care Review    Plan of Care Reviewed With  patient  -MM      Recorded by [MM] Jeannette Iglesias COTA/L 04/11/19 1544      Row Name 04/11/19 1434             Outcome Summary/Treatment Plan (OT)    Daily Summary of Progress (OT)  progress toward functional goals is gradual  -MM      Recorded by [MM] Jeannette Iglesias COTA/BALAJI 04/11/19 1544        User Key  (r) = Recorded By, (t) = Taken By, (c) = Cosigned By    Initials Name Effective Dates Discipline    LG Giuseppe Spaulding, PTA 08/02/16 -  PT    MM Jeannette Iglesias COTA/L 10/15/18 -  OT             Occupational Therapy Education     Title: PT OT SLP Therapies (In Progress)     Topic: Occupational Therapy (In Progress)     Point: ADL training (Done)     Description: Instruct learner(s) on proper safety adaptation and remediation techniques during self care or transfers.   Instruct in proper use of assistive devices.    Learning Progress Summary           Patient Acceptance, E, VU by MM at 4/11/2019  3:45 PM    Comment:  need for OT, static balance and AROM ther ex    Acceptance, E, NR by MM1 at 4/7/2019  3:24 PM    Comment:  OT role, benefits, POC                               User Key     Initials Effective Dates Name Provider Type Discipline    1 04/03/18 -  Rodney Haley, OTR/L Occupational Therapist OT     10/15/18 -  Jeannette Iglesias COTA/L  Occupational Therapy Assistant OT                OT Recommendation and Plan  Outcome Summary/Treatment Plan (OT)  Daily Summary of Progress (OT): progress toward functional goals is gradual  Daily Summary of Progress (OT): progress toward functional goals is gradual  Plan of Care Review  Plan of Care Reviewed With: patient  Plan of Care Reviewed With: patient  Outcome Summary: Pt MaxA x2 to sit EOB; did attempt to move LEs. Balance SBA-ModA as pt fatigues. Performed AROM-AAROM BUEs all planes for increased endurance/strength. Pt follows commands; [place]. Applied chapstick no VCs to open and washed face with set up. Improving; limited by endurance and strength. Continue OT POC. Recommend SNF   Outcome Measures     Row Name 04/11/19 1517 04/11/19 1434          How much help from another person do you currently need...    Turning from your back to your side while in flat bed without using bedrails?  2  -LG  --     Moving from lying on back to sitting on the side of a flat bed without bedrails?  2  -LG  --     Moving to and from a bed to a chair (including a wheelchair)?  1  -LG  --     Standing up from a chair using your arms (e.g., wheelchair, bedside chair)?  1  -LG  --     Climbing 3-5 steps with a railing?  1  -LG  --     To walk in hospital room?  1  -LG  --     AM-PAC 6 Clicks Score  8  -LG  --        How much help from another is currently needed...    Putting on and taking off regular lower body clothing?  --  1  -MM     Bathing (including washing, rinsing, and drying)  --  2  -MM     Toileting (which includes using toilet bed pan or urinal)  --  1  -MM     Putting on and taking off regular upper body clothing  --  2  -MM     Taking care of personal grooming (such as brushing teeth)  --  3  -MM     Eating meals  --  3  -MM     Score  --  12  -MM        Functional Assessment    Outcome Measure Options  AM-PAC 6 Clicks Basic Mobility (PT)  -LG  --       User Key  (r) = Recorded By, (t) = Taken By, (c) = Cosigned By     Initials Name Provider Type    LG Giuseppe Spaulding, PTA Physical Therapy Assistant    MM Jeannette Iglesias COTA/L Occupational Therapy Assistant           Time Calculation:   Time Calculation- OT     Row Name 04/11/19 1434             Time Calculation- OT    OT Start Time  1434  -MM      OT Stop Time  1501  -MM      OT Time Calculation (min)  27 min  -MM      Total Timed Code Minutes- OT  27 minute(s)  -MM      OT Non-Billable Time (min)  14 min  -MM      OT Received On  04/11/19  -MM         Timed Charges    36996 - OT Self Care/Mgmt Minutes  13  -MM        User Key  (r) = Recorded By, (t) = Taken By, (c) = Cosigned By    Initials Name Provider Type    MM Jeannette Iglesias COTA/L Occupational Therapy Assistant        Therapy Charges for Today     Code Description Service Date Service Provider Modifiers Qty    [id number] HC OT SELF CARE/MGMT/TRAIN EA 15 MIN 4/11/2019 Jeannette Iglesias COTA/L GO 1               CYNDEE Fagan  4/11/2019

## 2019-04-11 NOTE — PLAN OF CARE
Problem: Patient Care Overview  Goal: Plan of Care Review  Outcome: Ongoing (interventions implemented as appropriate)   04/11/19 0639   Coping/Psychosocial   Plan of Care Reviewed With patient   Plan of Care Review   Progress no change   OTHER   Outcome Summary Patient had no complaints of pain this shift. Slept most of shift. Would easily awake when staff repositioned every 2 hours. Afib on tele 33-43. Alves cath care provided q4 hours, sediment noted in urine.

## 2019-04-11 NOTE — PLAN OF CARE
Problem: Patient Care Overview  Goal: Plan of Care Review  Outcome: Ongoing (interventions implemented as appropriate)   04/11/19 1434   Coping/Psychosocial   Plan of Care Reviewed With patient   Coping/Psychosocial   Patient Agreement with Plan of Care agrees   Plan of Care Review   Progress improving   OTHER   Outcome Summary Pt MaxA x2 to sit EOB; did attempt to move LEs. Balance SBA-ModA as pt fatigues. Performed AROM-AAROM BUEs all planes for increased endurance/strength. Pt follows commands; [place]. Applied chapstick no VCs to open and washed face with set up. Dep perineal hygiene. Improving; limited by endurance and strength. Continue OT POC. Recommend SNF

## 2019-04-12 NOTE — PLAN OF CARE
Problem: Patient Care Overview  Goal: Plan of Care Review  Outcome: Ongoing (interventions implemented as appropriate)   04/12/19 1208   Coping/Psychosocial   Plan of Care Reviewed With patient   Coping/Psychosocial   Patient Agreement with Plan of Care agrees   Plan of Care Review   Progress improving   OTHER   Outcome Summary PT treatment completed this am. Pt w/ c/os nausea and not feeling well on/off throughout visit. Pt was agreement to work w/ therapy in bed. Performed rolling L and R w/ min to mod assist w/ VCs. LE AAROM times 15 reps throughout multiple planes noting decrease B ankle DF and decrease R knee flexion. Pt w/ decrease tolerance to activity and will benefit from continued PT services to improve strength, activity tolerance, and functional mobility progress to side of bed activity/ standing/ and tfers bed/chair as tolerated and safely appropriate. Recommend continued care in SNF upon discharge from acute care.

## 2019-04-12 NOTE — THERAPY TREATMENT NOTE
Acute Care - Speech Language Pathology   Swallow Treatment Note [facility]     Patient Name: Tamy Sher  : 1930  MRN: [id number]  Today's Date: 2019  Onset of Illness/Injury or Date of Surgery: 19     Referring Physician: Dr. Adames      Admit Date: 2019     Swallowing tx completed this PM. Pt continues to have confusion, appears generally uncomfortable. Pt agreed to attempt pureed trials to ensure diet toleration. Pt consumed 1x pureed trial with reduced labial closure on spoon, also appeared to have reduced lingual manipulation of the bolus, as well as weak laryngeal elevation. No overt s/s of aspiration. Pt refused further trials, secondary to c/o coating on dentures from presented trial. Pt agreed to SLP removing dentures, which were then cleaned with toothbrush and toothpaste and place in denture cup at bedside. Sons aware. Educated sons on overt s/s of aspiration, risk for aspiration, as well as likelihood that MD may allow family to bring in specific food items that the pt may be more likely to consume to increase PO intake, ensuring it is blended to a pureed consistency. Family to speak with RN to okay with MD prior to allowing this. Please notify SLP over the weekend if changes or new concerns arise in regards to toleration of current pureed diet consistency with honey thick liquids. Will continue to monitor. Thanks! Annemarie Benites, CCC-SLP 2019 4:40 PM    Visit Dx:      ICD-10-CM ICD-9-CM   1. Acute on chronic congestive heart failure, unspecified heart failure type (CMS/[facility]) I50.9 428.0   2. Urinary tract infection in elderly patient N39.0 599.0   3. Impaired mobility Z74.09 799.89   4. Impaired mobility and ADLs Z74.09 799.89   5. Dysphagia, unspecified type R13.10 787.20     Patient Active Problem List   Diagnosis   • Ischemic chest pain   • Pneumonia of both lungs due to infectious organism   • Fall   • Closed displaced comminuted fracture of shaft of right femur  (CMS/HCC)   • Pain of right thigh   • Class 2 obesity with serious comorbidity in adult   • Type 2 diabetes mellitus with neurologic complication, with long-term current use of insulin (CMS/HCC)   • STUART (acute kidney injury) (CMS/HCC)   • Chronic diastolic heart failure (CMS/HCC)   • Pleural effusion, left   • Hypoxia   • Surgical wound infection, initial encounter   • Pleural effusion due to CHF (congestive heart failure) (CMS/HCC)   • Hyperkalemia   • Acute renal failure superimposed on stage 3 chronic kidney disease (CMS/HCC)   • DM (diabetes mellitus) (CMS/HCC)   • Iron deficiency anemia   • Thrombocytosis (CMS/HCC) probably related to iron deficiency anemia   • Myeloproliferative neoplasm (CMS/HCC)   • A-fib (CMS/HCC)   • Chronic anticoagulation   • Essential hypertension   • Constipation   • Hypothyroidism (acquired)   • Acute urinary retention -resolved   • HAP (hospital-acquired pneumonia)   • UTI (urinary tract infection), bacterial   • Myeloproliferative neoplasm (CMS/HCC)   • Pulmonary edema   • Acute on chronic congestive heart failure (CMS/HCC)       Therapy Treatment  Rehabilitation Treatment Summary     Row Name 04/12/19 1604 04/12/19 1042          Treatment Time/Intention    Discipline  speech language pathologist  -TM  physical therapist  -     Document Type  therapy note (daily note)  -  therapy note (daily note);other (see comments) see MAR  -JE     Subjective Information  weakness;fatigue;nausea/vomiting  -TM  complains of;nausea/vomiting  -JE     Mode of Treatment  individual therapy;speech-language pathology  -TM  occupational therapy  -JE     Patient/Family Observations  Two sons present at time of tx.  -TM  no family present  -     Care Plan Review  care plan/treatment goals reviewed;risks/benefits reviewed;current/potential barriers reviewed  -  care plan/treatment goals reviewed;risks/benefits reviewed;current/potential barriers reviewed;patient/other agree to care plan  -      Care Plan Review, Other Participant(s)  son  -TM  --     Total Minutes, Physical Therapy Treatment  --  45  -JE2     Therapy Frequency (PT Clinical Impression)  --  daily  -JE     Patient Effort  fair  -TM  adequate  -JE     Comment  --  -- pt agreed to therapy, however w/ freq c/os throughout visit  -JE     Existing Precautions/Restrictions  --  fall;oxygen therapy device and L/min  -JE     Treatment Considerations/Comments  --  -- continues on thickened liquids  -JE     Patient Response to Treatment  --  pt performed all tasks requested of her and demonstrated some improvement w/ less assistance required  -JE     Recorded by [TM] Annemarie Benites, CCC-SLP 04/12/19 1633 [JE] Mariia Shepard, PT 04/12/19 1125  [JE2] Mariia Shepard, PT 04/12/19 1129     Row Name 04/12/19 1042             Vital Signs    Rest Breaks   -- requires frequent rest between tasks due to fatigue  -JE      Recorded by [JE] Mariia Shepard, PT 04/12/19 1129      Row Name 04/12/19 1042             Cognitive Assessment/Intervention- PT/OT    Orientation Status (Cognition)  oriented to;person;place;time  -JE      Follows Commands (Cognition)  follows one step commands;over 90% accuracy;increased processing time needed;physical/tactile prompts required;repetition of directions required;verbal cues/prompting required;visual queue  -JE      Personal Safety Interventions  fall prevention program maintained;muscle strengthening facilitated;nonskid shoes/slippers when out of bed;supervised activity  -JE      Recorded by [JE] Mariia Shepard, PT 04/12/19 1125      Row Name 04/12/19 1042             Safety Issues, Functional Mobility    Safety Issues Affecting Function (Mobility)  friction/shear risk;insight into deficits/self awareness;safety precaution awareness  -JE      Impairments Affecting Function (Mobility)  endurance/activity tolerance;pain;range of motion (ROM);strength;shortness of breath;other (see comments) O2 dependent  -JE      Recorded  by [JE] Mariia Shepard, PT 04/12/19 1125      Row Name 04/12/19 1042             Bed Mobility Assessment/Treatment    Bed Mobility Assessment/Treatment  rolling left;rolling right;scooting/bridging  -JE      Rolling Left [place] (Bed Mobility)  verbal cues;minimum assist (75% patient effort)  -LAVONNE      Rolling Right [place] (Bed Mobility)  minimum assist (75% patient effort);moderate assist (50% patient effort);verbal cues  -JE      Scooting/Bridging [place] (Bed Mobility)  maximum assist (25% patient effort);dependent (less than 25% patient effort);2 person assist;verbal cues  -JE      Bed Mobility, Safety Issues  decreased use of arms for pushing/pulling;decreased use of legs for bridging/pushing;impaired trunk control for bed mobility  -JE      Recorded by [JE] Mariia Shepard, PT 04/12/19 1125      Row Name 04/12/19 1042             Transfer Assessment/Treatment    Comment (Transfers)  did not perform tfers to include sitting side of bed  -JE      Recorded by [JE] Mariia Shepard, PT 04/12/19 1125      Row Name 04/12/19 1042             Gait/Stairs Assessment/Training    Comment (Gait/Stairs)  currently not appropriate for pre gait or gait activities; remains bed bound w/ B LE weakness and low tolerance to activity  -JE      Recorded by [JE] Mariia Shepard, PT 04/12/19 1125      Row Name 04/12/19 1042             General ROM    GENERAL ROM COMMENTS  noted B ankles w/ decrease DF slightly less than neurtral; R knee flexion limited to > 50%  -JE      Recorded by [JE] Mariia Shepard, PT 04/12/19 1125      Row Name 04/12/19 1042             MMT (Manual Muscle Testing)    General MMT Comments  no formal assessment, however required assist for LE ROM therefore grossly no more than 2/5  -JE      Recorded by [JE] Mariia Shepard, PT 04/12/19 1125      Row Name 04/12/19 1042             Motor Skills Assessment/Interventions    Additional Documentation  Therapeutic Exercise (Group)  -JE       Recorded by [JE] Mariia Shepard, PT 04/12/19 1125      Row Name 04/12/19 1042             Therapeutic Exercise    Therapeutic Exercise  supine, lower extremities  -JE      Additional Documentation  Therapeutic Exercise (Row)  -JE      Recorded by [JE] Mariia Shepard, PT 04/12/19 1125      Row Name 04/12/19 1042             Lower Extremity Supine Therapeutic Exercise    Performed, Supine Lower Extremity (Therapeutic Exercise)  hip flexion/extension;hip abduction/adduction;quadriceps sets;ankle dorsiflexion/plantarflexion;gluteal sets;heel slides  -JE      Exercise Type, Supine Lower Extremity (Therapeutic Exercise)  AAROM (active assistive range of motion);isometric contraction, static  -JE      Restrictions, Supine Lower Extremity (Therapeutic Exercise)  decrease R knee flexion and B ankle DF  -JE      Sets/Reps Detail, Supine Lower Extremity (Therapeutic Exercise)  15 reps throughout  -JE      Recorded by [JE] Mariia Shepard, PT 04/12/19 1125      Row Name 04/12/19 1042             Positioning and Restraints    Post Treatment Position  bed  -JE      In Bed  side lying left;fowlers;call light within reach;encouraged to call for assist;side rails up x2;LUE elevated;pillow between legs;RLE elevated  -JE      Recorded by [JE] Mariia Shepard, PT 04/12/19 1125      Row Name 04/12/19 1604 04/12/19 1042          Pain Assessment    Additional Documentation  Pain Scale: FACES Pre/Post-Treatment (Group)  -  Pain Scale: Numbers Pre/Post-Treatment (Group)  -     Recorded by [TM] Annemarie Benites, CCC-SLP 04/12/19 1633 [JE] Mariia Shepard, PT 04/12/19 1125     Row Name 04/12/19 1042             Pain Scale: Numbers Pre/Post-Treatment    Pain Scale: Numbers, Pretreatment  0/10 - no pain  -      Pain Scale: Numbers, Post-Treatment  0/10 - no pain  -JE      Pre/Post Treatment Pain Comment  pt did report discomfort R knee w/ ROM noting decrease knee flexion  -JE2      Recorded by [JE] Mariia Shepard, PT 04/12/19  1125  [JE2] Mariia Shepard, PT 04/12/19 1129      Row Name 04/12/19 1604             Pain Scale: FACES Pre/Post-Treatment    Pain: FACES Scale, Pretreatment  4-->hurts little more  -TM      Pain: FACES Scale, Post-Treatment  4-->hurts little more  -TM      Recorded by [TM] Annemarie Benites, CCC-SLP 04/12/19 1633      Row Name 04/12/19 1042             Sensory Assessment/Intervention    Sensory General Assessment  other (see comments) no reported or noted c/os N/T  -JE      Recorded by [JE] Mariia Shepard, PT 04/12/19 1125      Row Name 04/12/19 1042             Edema Assessment & Management    Location (Edema)  lower extremity, left;lower extremity, right;ankle/foot, left;ankle/foot, right;upper extremity, left;upper extremity, right noted redness R lower ant leg > L  -JE      Recorded by [JE] Mariia Shepard, PT 04/12/19 1129      Row Name 04/12/19 1042             Coping    Observed Emotional State  accepting;cooperative  -JE      Verbalized Emotional State  acceptance  -JE      Recorded by [JE] Mariia Shepard, PT 04/12/19 1125      Row Name 04/12/19 1042             Plan of Care Review    Plan of Care Reviewed With  patient  -JE      Recorded by [JE] Mariia Shepard, PT 04/12/19 1125      Row Name 04/12/19 1042             Outcome Summary/Treatment Plan (PT)    Daily Summary of Progress (PT)  progress toward functional goals is gradual  -JE      Plan for Continued Treatment (PT)  continue w/ strengthening and ROM w/ bed mobility training progressing to sitting side of bed and working on balance as safely appriopriate  -JE      Anticipated Discharge Disposition (PT)  skilled nursing facility  -JE      Recorded by [JE] Mariia Shepard, PT 04/12/19 1125      Row Name 04/12/19 1604             Outcome Summary/Treatment Plan (SLP)    Daily Summary of Progress (SLP)  progress toward functional goals is gradual  -TM      Barriers to Overall Progress (SLP)  Medically complex  -TM      Plan for Continued Treatment  (SLP)  Continue to tx.  -TM      Anticipated Dischage Disposition  skilled nursing facility  -TM      Recorded by [TM] Annemarie Benites CCC-SLP 04/12/19 1633        User Key  (r) = Recorded By, (t) = Taken By, (c) = Cosigned By    Initials Name Effective Dates Discipline    TM Annemarie Benites CCC-SLP 08/02/16 -  SLP    Mariia Stevens, PT 08/02/18 -  PT          Outcome Summary  Outcome Summary/Treatment Plan (SLP)  Daily Summary of Progress (SLP): progress toward functional goals is gradual (04/12/19 1604 : Annemarie Benites, CCC-SLP)  Barriers to Overall Progress (SLP): Medically complex (04/12/19 1604 : Annemarie Benites CCC-SLP)  Plan for Continued Treatment (SLP): Continue to tx. (04/12/19 1604 : Annemarie Benites, CCC-SLP)  Anticipated Dischage Disposition: skilled nursing facility (04/12/19 1604 : Annemarie Benites CCC-SLP)      SLP GOALS     Row Name 04/12/19 1604 04/10/19 1230          Oral Nutrition/Hydration Goal 1 (SLP)    Oral Nutrition/Hydration Goal 1, SLP  Pt will tolerate LRD w/o any overt s/s of aspiration.  -TM  Pt will tolerate LRD w/o any overt s/s of aspiration.  -KW     Time Frame (Oral Nutrition/Hydration Goal 1, SLP)  by discharge  -TM  by discharge  -KW     Barriers (Oral Nutrition/Hydration Goal 1, SLP)  n/a  -TM  n/a  -KW     Progress/Outcomes (Oral Nutrition/Hydration Goal 1, SLP)  progress slower than expected  -TM  progress slower than expected  -KW       User Key  (r) = Recorded By, (t) = Taken By, (c) = Cosigned By    Initials Name Provider Type    TM Annemarie Benites CCC-SLP Speech and Language Pathologist    Dolores Pickens MS CCC-SLP Speech and Language Pathologist          EDUCATION  The patient has been educated in the following areas:   Dysphagia (Swallowing Impairment).    SLP Recommendation and Plan  Daily Summary of Progress (SLP): progress toward functional goals is gradual  Barriers to Overall Progress (SLP): Medically complex  Plan for Continued Treatment (SLP):  Continue to tx.  Anticipated Dischage Disposition: skilled nursing facility                    Time Calculation:   Time Calculation- SLP     Row Name 04/12/19 1639             Time Calculation- SLP    SLP Start Time  1604  -TM      SLP Stop Time  1630  -TM      SLP Time Calculation (min)  26 min  -TM      SLP Received On  04/12/19  -        User Key  (r) = Recorded By, (t) = Taken By, (c) = Cosigned By    Initials Name Provider Type     Annemarie Benites, CCC-SLP Speech and Language Pathologist          Therapy Charges for Today     Code Description Service Date Service Provider Modifiers Qty    [id number]  ST TREATMENT SWALLOW 2 4/12/2019 Annemarie Benites CCC-SLP GN 1                 MILAN Bender  4/12/2019

## 2019-04-12 NOTE — THERAPY TREATMENT NOTE
Acute Care - Physical Therapy Treatment Note  [facility]     Patient Name: Tamy Sher  : 1930  MRN: [id number]  Today's Date: 2019  Onset of Illness/Injury or Date of Surgery: 19  Date of Referral to PT: 19  Referring Physician: Dr. Adames    Admit Date: 2019    Visit Dx:    ICD-10-CM ICD-9-CM   1. Acute on chronic congestive heart failure, unspecified heart failure type (CMS/HCC) I50.9 428.0   2. Urinary tract infection in elderly patient N39.0 599.0   3. Impaired mobility Z74.09 799.89   4. Impaired mobility and ADLs Z74.09 799.89   5. Dysphagia, unspecified type R13.10 787.20     Patient Active Problem List   Diagnosis   • Ischemic chest pain   • Pneumonia of both lungs due to infectious organism   • Fall   • Closed displaced comminuted fracture of shaft of right femur (CMS/HCC)   • Pain of right thigh   • Class 2 obesity with serious comorbidity in adult   • Type 2 diabetes mellitus with neurologic complication, with long-term current use of insulin (CMS/HCC)   • STUART (acute kidney injury) (CMS/HCC)   • Chronic diastolic heart failure (CMS/HCC)   • Pleural effusion, left   • Hypoxia   • Surgical wound infection, initial encounter   • Pleural effusion due to CHF (congestive heart failure) (CMS/HCC)   • Hyperkalemia   • Acute renal failure superimposed on stage 3 chronic kidney disease (CMS/HCC)   • DM (diabetes mellitus) (CMS/HCC)   • Iron deficiency anemia   • Thrombocytosis (CMS/HCC) probably related to iron deficiency anemia   • Myeloproliferative neoplasm (CMS/HCC)   • A-fib (CMS/HCC)   • Chronic anticoagulation   • Essential hypertension   • Constipation   • Hypothyroidism (acquired)   • Acute urinary retention -resolved   • HAP (hospital-acquired pneumonia)   • UTI (urinary tract infection), bacterial   • Myeloproliferative neoplasm (CMS/HCC)   • Pulmonary edema   • Acute on chronic congestive heart failure (CMS/HCC)       Therapy Treatment    Rehabilitation Treatment Summary      Row Name 04/12/19 1042             Treatment Time/Intention    Discipline  physical therapist  -JE      Document Type  therapy note (daily note);other (see comments) see MAR  -JE      Subjective Information  complains of;nausea/vomiting  -JE      Mode of Treatment  occupational therapy  -JE      Patient/Family Observations  no family present  -JE      Care Plan Review  care plan/treatment goals reviewed;risks/benefits reviewed;current/potential barriers reviewed;patient/other agree to care plan  -JE      Total Minutes, Physical Therapy Treatment  45  -JE2      Therapy Frequency (PT Clinical Impression)  daily  -JE      Patient Effort  adequate  -JE      Comment  -- pt agreed to therapy, however w/ freq c/os throughout visit  -LAVONNE      Existing Precautions/Restrictions  fall;oxygen therapy device and L/min  -LAVONNE      Treatment Considerations/Comments  -- continues on thickened liquids  -LAVONNE      Patient Response to Treatment  pt performed all tasks requested of her and demonstrated some improvement w/ less assistance required  -JE      Recorded by [JE] Mariia Shepard, PT 04/12/19 1125  [JE2] Mariia Shepard, PT 04/12/19 1129      Row Name 04/12/19 1042             Vital Signs    Rest Breaks   -- requires frequent rest between tasks due to fatigue  -JE      Recorded by [JE] Mariia Shepard, PT 04/12/19 1129      Row Name 04/12/19 1042             Cognitive Assessment/Intervention- PT/OT    Orientation Status (Cognition)  oriented to;person;place;time  -JE      Follows Commands (Cognition)  follows one step commands;over 90% accuracy;increased processing time needed;physical/tactile prompts required;repetition of directions required;verbal cues/prompting required;visual queue  -LAVONNE      Personal Safety Interventions  fall prevention program maintained;muscle strengthening facilitated;nonskid shoes/slippers when out of bed;supervised activity  -JE      Recorded by [JE] Mariia Shepard, PT 04/12/19 1125      Row Name  04/12/19 1042             Safety Issues, Functional Mobility    Safety Issues Affecting Function (Mobility)  friction/shear risk;insight into deficits/self awareness;safety precaution awareness  -JE      Impairments Affecting Function (Mobility)  endurance/activity tolerance;pain;range of motion (ROM);strength;shortness of breath;other (see comments) O2 dependent  -JE      Recorded by [JE] Mariia Shepard, PT 04/12/19 1125      Row Name 04/12/19 1042             Bed Mobility Assessment/Treatment    Bed Mobility Assessment/Treatment  rolling left;rolling right;scooting/bridging  -JE      Rolling Left [place] (Bed Mobility)  verbal cues;minimum assist (75% patient effort)  -JE      Rolling Right [place] (Bed Mobility)  minimum assist (75% patient effort);moderate assist (50% patient effort);verbal cues  -JE      Scooting/Bridging [place] (Bed Mobility)  maximum assist (25% patient effort);dependent (less than 25% patient effort);2 person assist;verbal cues  -JE      Bed Mobility, Safety Issues  decreased use of arms for pushing/pulling;decreased use of legs for bridging/pushing;impaired trunk control for bed mobility  -JE      Recorded by [JE] Mariia Shepard, PT 04/12/19 1125      Row Name 04/12/19 1042             Transfer Assessment/Treatment    Comment (Transfers)  did not perform tfers to include sitting side of bed  -JE      Recorded by [JE] Mariia Shepard, PT 04/12/19 1125      Row Name 04/12/19 1042             Gait/Stairs Assessment/Training    Comment (Gait/Stairs)  currently not appropriate for pre gait or gait activities; remains bed bound w/ B LE weakness and low tolerance to activity  -JE      Recorded by [JE] Mariia Shepard, PT 04/12/19 1125      Row Name 04/12/19 1042             General ROM    GENERAL ROM COMMENTS  noted B ankles w/ decrease DF slightly less than neurtral; R knee flexion limited to > 50%  -JE      Recorded by [JE] Mariia Shepard, PT 04/12/19 1125      Row Name  04/12/19 1042             MMT (Manual Muscle Testing)    General MMT Comments  no formal assessment, however required assist for LE ROM therefore grossly no more than 2/5  -JE      Recorded by [JE] Mariia Shepard, PT 04/12/19 1125      Row Name 04/12/19 1042             Motor Skills Assessment/Interventions    Additional Documentation  Therapeutic Exercise (Group)  -JE      Recorded by [JE] Mariia Shepard, PT 04/12/19 1125      Row Name 04/12/19 1042             Therapeutic Exercise    Therapeutic Exercise  supine, lower extremities  -JE      Additional Documentation  Therapeutic Exercise (Row)  -JE      Recorded by [JE] Mariia Shepard, PT 04/12/19 1125      Row Name 04/12/19 1042             Lower Extremity Supine Therapeutic Exercise    Performed, Supine Lower Extremity (Therapeutic Exercise)  hip flexion/extension;hip abduction/adduction;quadriceps sets;ankle dorsiflexion/plantarflexion;gluteal sets;heel slides  -JE      Exercise Type, Supine Lower Extremity (Therapeutic Exercise)  AAROM (active assistive range of motion);isometric contraction, static  -JE      Restrictions, Supine Lower Extremity (Therapeutic Exercise)  decrease R knee flexion and B ankle DF  -JE      Sets/Reps Detail, Supine Lower Extremity (Therapeutic Exercise)  15 reps throughout  -JE      Recorded by [JE] Mariia Shepard, PT 04/12/19 1125      Row Name 04/12/19 1042             Positioning and Restraints    Post Treatment Position  bed  -JE      In Bed  side lying left;fowlers;call light within reach;encouraged to call for assist;side rails up x2;LUE elevated;pillow between legs;RLE elevated  -JE      Recorded by [JE] Mariia Shepard, PT 04/12/19 1125      Row Name 04/12/19 1042             Pain Assessment    Additional Documentation  Pain Scale: Numbers Pre/Post-Treatment (Group)  -JE      Recorded by [JE] Mariia Shepard, PT 04/12/19 1125      Row Name 04/12/19 1042             Pain Scale: Numbers Pre/Post-Treatment    Pain  Scale: Numbers, Pretreatment  0/10 - no pain  -JE      Pain Scale: Numbers, Post-Treatment  0/10 - no pain  -JE      Pre/Post Treatment Pain Comment  pt did report discomfort R knee w/ ROM noting decrease knee flexion  -JE2      Recorded by [JE] Mariia Shepard, PT 04/12/19 1125  [JE2] Mariia Shepard, PT 04/12/19 1129      Row Name 04/12/19 1042             Sensory Assessment/Intervention    Sensory General Assessment  other (see comments) no reported or noted c/os N/T  -JE      Recorded by [JE] Mariia Shepard, PT 04/12/19 1125      Row Name 04/12/19 1042             Edema Assessment & Management    Location (Edema)  lower extremity, left;lower extremity, right;ankle/foot, left;ankle/foot, right;upper extremity, left;upper extremity, right noted redness R lower ant leg > L  -JE      Recorded by [JE] Mariia Shepard, PT 04/12/19 1129      Row Name 04/12/19 1042             Coping    Observed Emotional State  accepting;cooperative  -JE      Verbalized Emotional State  acceptance  -JE      Recorded by [JE] Mariia Shepard, PT 04/12/19 1125      Row Name 04/12/19 1042             Plan of Care Review    Plan of Care Reviewed With  patient  -JE      Recorded by [JE] Mariia Shepard, PT 04/12/19 1125      Row Name 04/12/19 1042             Outcome Summary/Treatment Plan (PT)    Daily Summary of Progress (PT)  progress toward functional goals is gradual  -JE      Plan for Continued Treatment (PT)  continue w/ strengthening and ROM w/ bed mobility training progressing to sitting side of bed and working on balance as safely appriopriate  -JE      Anticipated Discharge Disposition (PT)  skilled nursing facility  -JE      Recorded by [JE] Mariia Shepard, PT 04/12/19 1125        User Key  (r) = Recorded By, (t) = Taken By, (c) = Cosigned By    Initials Name Effective Dates Discipline    Mariia Stevens, PT 08/02/18 -  PT               Rehab Goal Summary     Row Name 04/12/19 1100             Bed Mobility Goal 1  (PT)    [place] Level/Cues Needed (Bed Mobility Goal 1, PT)  minimum assist (75% or more patient effort);verbal cues required;moderate assist (50-74% patient effort)  -JE      Progress/Outcomes (Bed Mobility Goal 1, PT)  goal ongoing  -JE         Bed Mobility Goal 2 (PT)    [place] Level/Cues Needed (Bed Mobility Goal 2, PT)  other (see comments) unabale to safely perform at this time  -JE      Progress/Outcomes (Bed Mobility Goal 2, PT)  goal ongoing  -JE         Transfer Goal 1 (PT)    [place] Level/Cues Needed (Transfer Goal 1, PT)  other (see comments) unable to safely perform at this time  -JE      Progress/Outcome (Transfer Goal 1, PT)  goal ongoing  -JE         Balance Goal 1 (PT)    D[place] Level/Cues Needed (Balance Goal 1, PT)  other (see comments) remains bed bound; balance not assessed  -JE      Progress/Outcomes (Balance Goal 1, PT)  goal ongoing  -JE        User Key  (r) = Recorded By, (t) = Taken By, (c) = Cosigned By    Initials Name Provider Type Discipline    Mariia Stevens, PT Physical Therapist PT          Physical Therapy Education     Title: PT OT SLP Therapies (In Progress)     Topic: Physical Therapy (In Progress)     Point: Mobility training (Done)     Learning Progress Summary           Patient Eager, ROBBY PATEL by WYATT at 4/8/2019  9:38 AM    Comment:  benefits of activity    AcceptanceAMANDA VU,NR by FERNANDO at 4/7/2019  1:25 PM    Comment:  Educated pt. on progression of PT POC and benefits of activity   Family Acceptance, AMANDA VU,NR by FERNANDO at 4/7/2019  1:25 PM    Comment:  Educated pt. on progression of PT POC and benefits of activity                               User Key     Initials Effective Dates Name Provider Type Discipline    AE 06/22/15 -  Ligia Rosa, PTA Physical Therapy Assistant PT    FERNANDO 08/02/16 -  Kd Callaway PT DPT Physical Therapist PT                PT Recommendation and Plan  Anticipated Discharge Disposition (PT): skilled nursing facility  Therapy  Frequency (PT Clinical Impression): daily  Outcome Summary/Treatment Plan (PT)  Daily Summary of Progress (PT): progress toward functional goals is gradual  Plan for Continued Treatment (PT): continue w/ strengthening and ROM w/ bed mobility training progressing to sitting side of bed and working on balance as safely appriopriate  Anticipated Discharge Disposition (PT): skilled nursing facility  Plan of Care Reviewed With: patient  Progress: improving  Outcome Summary: PT treatment completed this am.  Pt w/ c/os nausea and not feeling well on/off throughout visit.  Pt was agreement to work w/ therapy in bed.  Performed rolling L and R w/ min to mod assist w/ VCs.  LE AAROM times 15 reps throughout multiple planes noting decrease B ankle DF and decrease R knee flexion.  Pt w/ decrease tolerance to activity and will benefit from continued PT services to improve strength, activity tolerance, and functional mobility progress to side of bed activity/ standing/ and tfers bed/chair as tolerated and safely appropriate.  Recommend continued care in SNF upon discharge from acute care.  Outcome Measures     Row Name 04/11/19 1517 04/11/19 1434          How much help from another person do you currently need...    Turning from your back to your side while in flat bed without using bedrails?  2  -LG  --     Moving from lying on back to sitting on the side of a flat bed without bedrails?  2  -LG  --     Moving to and from a bed to a chair (including a wheelchair)?  1  -LG  --     Standing up from a chair using your arms (e.g., wheelchair, bedside chair)?  1  -LG  --     Climbing 3-5 steps with a railing?  1  -LG  --     To walk in hospital room?  1  -LG  --     AM-PAC 6 Clicks Score  8  -LG  --        How much help from another is currently needed...    Putting on and taking off regular lower body clothing?  --  1  -MM     Bathing (including washing, rinsing, and drying)  --  2  -MM     Toileting (which includes using toilet bed  pan or urinal)  --  1  -MM     Putting on and taking off regular upper body clothing  --  2  -MM     Taking care of personal grooming (such as brushing teeth)  --  3  -MM     Eating meals  --  3  -MM     Score  --  12  -MM        Functional Assessment    Outcome Measure Options  AM-PAC 6 Clicks Basic Mobility (PT)  -  --       User Key  (r) = Recorded By, (t) = Taken By, (c) = Cosigned By    Initials Name Provider Type    LG Giuseppe Spaulding, PTA Physical Therapy Assistant    MM Jeannette Iglesias COTA/L Occupational Therapy Assistant         Time Calculation:         PT G-Codes  Outcome Measure Options: AM-PAC 6 Clicks Basic Mobility (PT)  AM-PAC 6 Clicks Score: 8  Score: 12    Mariia Shepard, PT  4/12/2019

## 2019-04-12 NOTE — PLAN OF CARE
Problem: Fall Risk (Adult)  Goal: Absence of Fall  Outcome: Ongoing (interventions implemented as appropriate)      Problem: Patient Care Overview  Goal: Discharge Needs Assessment  Outcome: Ongoing (interventions implemented as appropriate)    Goal: Interprofessional Rounds/Family Conf  Outcome: Ongoing (interventions implemented as appropriate)      Problem: Cardiac: Heart Failure (Adult)  Goal: Signs and Symptoms of Listed Potential Problems Will be Absent, Minimized or Managed (Cardiac: Heart Failure)  Outcome: Ongoing (interventions implemented as appropriate)      Problem: Arrhythmia/Dysrhythmia (Symptomatic) (Adult)  Goal: Signs and Symptoms of Listed Potential Problems Will be Absent, Minimized or Managed (Arrhythmia/Dysrhythmia)  Outcome: Ongoing (interventions implemented as appropriate)      Problem: Skin Injury Risk (Adult)  Goal: Skin Health and Integrity  Outcome: Ongoing (interventions implemented as appropriate)      Problem: Nutrition, Imbalanced: Inadequate Oral Intake (Adult)  Goal: Improved Oral Intake  Outcome: Ongoing (interventions implemented as appropriate)    Goal: Prevent Further Weight Loss  Outcome: Ongoing (interventions implemented as appropriate)      Problem: Patient Care Overview  Goal: Plan of Care Review  Outcome: Ongoing (interventions implemented as appropriate)   04/12/19 0726   Coping/Psychosocial   Plan of Care Reviewed With patient   Plan of Care Review   Progress no change   OTHER   Outcome Summary no co pain. pt turn every 2 hrs. pt does not like thicken liquids. cont estrada cath. Bumex given. cont to monitor.

## 2019-04-12 NOTE — PROGRESS NOTES
[facility] Medicine Services  INPATIENT PROGRESS NOTE    Patient Name: Tamy Sher  Date of Admission: 4/6/2019  Today's Date: 04/12/19  Length of Stay: 3  Primary Care Physician: Barry Foley MD    Subjective   Chief Complaint: SOA  HPI   Says she feels bad  Arms and legs less swollen  Afebrile  She is very weak and tired  Still feels SOA        Review of Systems   Constitutional: Positive for fatigue. Negative for fever.   HENT: Negative for congestion and ear pain.    Eyes: Negative for pain and visual disturbance.   Respiratory: Positive for shortness of breath. Negative for cough and wheezing.    Cardiovascular: Positive for leg swelling. Negative for chest pain and palpitations.   Gastrointestinal: Negative for diarrhea, nausea and vomiting.   Endocrine: Negative for heat intolerance.   Genitourinary: Negative for dysuria and frequency.   Musculoskeletal: Negative for arthralgias and back pain.   Skin: Negative for rash and wound.   Neurological: Positive for weakness. Negative for dizziness and light-headedness.   Psychiatric/Behavioral: Negative for confusion. The patient is not nervous/anxious.    All other systems reviewed and are negative.      All pertinent negatives and positives are as above. All other systems have been reviewed and are negative unless otherwise stated.     Objective    Temp:  [97 °F (36.1 °C)-98.4 °F (36.9 °C)] 98 °F (36.7 °C)  Heart Rate:  [] 86  Resp:  [18-22] 18  BP: (125-156)/(60-89) 131/89  Physical Exam   Constitutional: She is oriented to person, place, and time. She appears well-developed and well-nourished.   HENT:   Head: Normocephalic and atraumatic.   Right Ear: External ear normal.   Left Ear: External ear normal.   Nose: Nose normal.   Mouth/Throat: Oropharynx is clear and moist.   Eyes: Conjunctivae and EOM are normal.   Neck: Normal range of motion. Neck supple.   Cardiovascular: Normal heart sounds. An irregularly  irregular rhythm present. Tachycardia present.   Pulmonary/Chest: Effort normal and breath sounds normal.   Abdominal: Soft. Bowel sounds are normal. She exhibits no distension. There is no tenderness.   Musculoskeletal: Normal range of motion.   Neurological: She is alert and oriented to person, place, and time.   Skin: Skin is warm and dry.   Psychiatric: She has a normal mood and affect. Her speech is normal and behavior is normal. Cognition and memory are normal.         Results Review:  I have reviewed the labs, radiology results, and diagnostic studies.    Laboratory Data:   Results from last 7 days   Lab Units 04/12/19 0221 04/11/19 0537 04/10/19  0309   WBC 10*3/mm3 28.47* 28.42* 28.20*   HEMOGLOBIN g/dL 9.8* 10.0* 10.5*   HEMATOCRIT % 31.4* 32.8* 33.5*   PLATELETS 10*3/mm3 635* 669* 664*        Results from last 7 days   Lab Units 04/12/19 0221 04/11/19 0537 04/10/19  0309 04/09/19  1645 04/08/19  0037   SODIUM mmol/L 146* 145 144 142 143   POTASSIUM mmol/L 4.0 4.2 4.3 3.9 3.9   CHLORIDE mmol/L 99 100 98 98 98   CO2 mmol/L 37.0* 36.0* 39.0* 34.0* 34.0*   BUN mg/dL 25* 24* 27* 28* 32*   CREATININE mg/dL 0.78 0.88 0.92 0.80 0.81   CALCIUM mg/dL 9.1 9.2 9.3 9.1 9.3   BILIRUBIN mg/dL  --   --  0.4 0.5 0.6   ALK PHOS U/L  --   --  113 117 128*   ALT (SGPT) U/L  --   --  <15 <15 21   AST (SGOT) U/L  --   --  11 20 28   GLUCOSE mg/dL 184* 156* 164* 184* 164*       Culture Data:   Blood Culture   Date Value Ref Range Status   04/06/2019 No growth at 5 days  Final     Urine Culture   Date Value Ref Range Status   04/06/2019 >100,000 CFU/mL Yeast isolated (A)  Final       Radiology Data:   Imaging Results (last 24 hours)     ** No results found for the last 24 hours. **          I have reviewed the patient's current medications.     Assessment/Plan     Active Hospital Problems    Diagnosis   • Acute on chronic congestive heart failure (CMS/HCC)   • Pulmonary edema   • Acute urinary retention -resolved   • A-fib  (CMS/HCC)   • DM (diabetes mellitus) (CMS/HCC)   • Chronic diastolic heart failure (CMS/HCC)   • Fall       1.  Acute on Chronic Diastolic CHF  -IV Bumex (adjust to 2mg BID)  -Fluid restriction  -Strict I's and O's  -patient has chronically elevated WBC; diagnosis of myeloproliferative disorder noted in records     2.  Afib  -Eliquis  -restarted Diltiazem CD for rate control due to HR trend     3.  T2DM  -SSI     4.  Essential HTN  -monitor BP trend     5.  HLD  -Lipitor     6.  PVD  -Lipitor  -ASA     7.  CVD  -lipitor  -ASA  -Eliquis     8.  Esophagitis  -Pepcid     9.  Urinary retention  -Flomax     10.  Hypothyroidism  -Synthroid     11.  Yeast UTI - POA (chronic Alves)  -Had 3 days of Diflucan  -completed 5 days of Rocephin      12.  Nausea with dry heaves - improved today  -KUB with no acute findings                      Discharge Planning: I expect the patient to be discharged to NH in 2-3 days    Javed Sahni MD   04/12/19   5:25 PM

## 2019-04-12 NOTE — PLAN OF CARE
Problem: Patient Care Overview  Goal: Plan of Care Review  Outcome: Ongoing (interventions implemented as appropriate)   04/12/19 7042   Coping/Psychosocial   Plan of Care Reviewed With son   Plan of Care Review   Progress no change   OTHER   Outcome Summary Swallowing tx completed this PM. Pt continues to have confusion, appears generally uncomfortable. Pt agreed to attempt pureed trials to ensure diet toleration. Pt consumed 1x pureed trial with reduced labial closure on spoon, also appeared to have reduced lingual manipulation of the bolus, as well as weak laryngeal elevation. No overt s/s of aspiration. Pt refused further trials, secondary to c/o coating on dentures from presented trial. Pt agreed to SLP removing dentures, which were then cleaned with toothbrush and toothpaste and place in denture cup at bedside. Sons aware. Educated sons on overt s/s of aspiration, risk for aspiration, as well as likelihood that MD may allow family to bring in specific food items that the pt may be more likely to consume to increase PO intake, ensuring it is blended to a pureed consistency. Family to speak with RN to okay with MD prior to allowing this. Please notify SLP over the weekend if changes or new concerns arise in regards to toleration of current pureed diet consistency with honey thick liquids. Will continue to monitor. Thanks!

## 2019-04-12 NOTE — PLAN OF CARE
Problem: Fall Risk (Adult)  Goal: Absence of Fall  Outcome: Ongoing (interventions implemented as appropriate)   04/12/19 0406   Fall Risk (Adult)   Absence of Fall making progress toward outcome       Problem: Patient Care Overview  Goal: Plan of Care Review  Outcome: Ongoing (interventions implemented as appropriate)   04/12/19 0406   Coping/Psychosocial   Plan of Care Reviewed With patient   Coping/Psychosocial   Patient Agreement with Plan of Care agrees   Plan of Care Review   Progress no change   OTHER   Outcome Summary VSS. Remains Afib on tele . patient does not like thickened liquids. Alves care every 4 hours, turn Q 2 hours, patient able to help some. IV Bumex ongoing, with adequate output. Will continue to assess and notify MD of any changes.       Problem: Cardiac: Heart Failure (Adult)  Goal: Signs and Symptoms of Listed Potential Problems Will be Absent, Minimized or Managed (Cardiac: Heart Failure)  Outcome: Ongoing (interventions implemented as appropriate)   04/12/19 0406   Goal/Outcome Evaluation   Problems Assessed (Heart Failure) respiratory compromise;functional decline/self-care deficit;fluid/electrolyte imbalance;dysrhythmia/arrhythmia   Problems Present (Heart Failure) decreased quality of life;dysrhythmia/arrhythmia;fluid/electrolyte imbalance;functional decline/self-care deficit;respiratory compromise       Problem: Arrhythmia/Dysrhythmia (Symptomatic) (Adult)  Goal: Signs and Symptoms of Listed Potential Problems Will be Absent, Minimized or Managed (Arrhythmia/Dysrhythmia)   04/12/19 0406   Goal/Outcome Evaluation   Problems Assessed (Arrhythmia/Dysrhythmia) electrophysiologic conduction defect   Problems Present (Dysrhythmia) electrophysiologic conduction defect       Problem: Skin Injury Risk (Adult)  Goal: Skin Health and Integrity  Outcome: Ongoing (interventions implemented as appropriate)   04/12/19 0406   Skin Injury Risk (Adult)   Skin Health and Integrity making progress  toward outcome       Problem: Nutrition, Imbalanced: Inadequate Oral Intake (Adult)  Goal: Improved Oral Intake  Outcome: Ongoing (interventions implemented as appropriate)   04/12/19 0406   Nutrition, Imbalanced: Inadequate Oral Intake (Adult)   Improved Oral Intake other (see comments)  (Patient does not like thickened liquids.)     Goal: Prevent Further Weight Loss  Outcome: Ongoing (interventions implemented as appropriate)   04/12/19 0406   Nutrition, Imbalanced: Inadequate Oral Intake (Adult)   Prevent Further Weight Loss making progress toward outcome

## 2019-04-13 NOTE — PLAN OF CARE
Problem: Fall Risk (Adult)  Goal: Absence of Fall  Outcome: Ongoing (interventions implemented as appropriate)   04/13/19 1619   Fall Risk (Adult)   Absence of Fall achieves outcome       Problem: Cardiac: Heart Failure (Adult)  Goal: Signs and Symptoms of Listed Potential Problems Will be Absent, Minimized or Managed (Cardiac: Heart Failure)  Outcome: Ongoing (interventions implemented as appropriate)   04/13/19 0411   Goal/Outcome Evaluation   Problems Assessed (Heart Failure) cardiac pump dysfunction;decreased quality of life;dysrhythmia/arrhythmia;fluid/electrolyte imbalance;functional decline/self-care deficit;respiratory compromise   Problems Present (Heart Failure) cardiac pump dysfunction;decreased quality of life;dysrhythmia/arrhythmia;fluid/electrolyte imbalance;functional decline/self-care deficit;respiratory compromise       Problem: Arrhythmia/Dysrhythmia (Symptomatic) (Adult)  Goal: Signs and Symptoms of Listed Potential Problems Will be Absent, Minimized or Managed (Arrhythmia/Dysrhythmia)  Outcome: Ongoing (interventions implemented as appropriate)   04/13/19 0411   Goal/Outcome Evaluation   Problems Assessed (Arrhythmia/Dysrhythmia) electrophysiologic conduction defect   Problems Present (Dysrhythmia) electrophysiologic conduction defect       Problem: Skin Injury Risk (Adult)  Goal: Skin Health and Integrity  Outcome: Ongoing (interventions implemented as appropriate)   04/13/19 1619   Skin Injury Risk (Adult)   Skin Health and Integrity making progress toward outcome       Problem: Nutrition, Imbalanced: Inadequate Oral Intake (Adult)  Goal: Improved Oral Intake  Outcome: Ongoing (interventions implemented as appropriate)   04/13/19 1619   Nutrition, Imbalanced: Inadequate Oral Intake (Adult)   Improved Oral Intake making progress toward outcome     Goal: Prevent Further Weight Loss  Outcome: Ongoing (interventions implemented as appropriate)   04/13/19 1619   Nutrition, Imbalanced: Inadequate  Oral Intake (Adult)   Prevent Further Weight Loss making progress toward outcome       Problem: Patient Care Overview  Goal: Plan of Care Review  Outcome: Ongoing (interventions implemented as appropriate)   04/13/19 9049   Coping/Psychosocial   Plan of Care Reviewed With patient   Plan of Care Review   Progress no change   OTHER   Outcome Summary AFIB 101-126 WITH FREQUENT PVC'S ON TELE. NO C/O PAIN THIS SHIFT. PT ON 5L NC, SATS 88-90%, MD AWARE. CXR/LABS/SOLUMEDROL ORDERED. REPOSITIONED Q2H. [facility] CARE Q4H. CONTINUE TO MONITOR, NOTIFY MD OF ANY CHANGES.

## 2019-04-13 NOTE — PLAN OF CARE
Problem: Patient Care Overview  Goal: Plan of Care Review  Outcome: Ongoing (interventions implemented as appropriate)   04/13/19 0411   Coping/Psychosocial   Plan of Care Reviewed With patient   Coping/Psychosocial   Patient Agreement with Plan of Care agrees   Plan of Care Review   Progress no change   OTHER   Outcome Summary VSS. No co pain. Alves care Q4 hours,turn Q2. Patient frustrated by thickened liquids. I have explained the reason, and she will take thickened but expresses her dislike.       Problem: Cardiac: Heart Failure (Adult)  Goal: Signs and Symptoms of Listed Potential Problems Will be Absent, Minimized or Managed (Cardiac: Heart Failure)  Outcome: Ongoing (interventions implemented as appropriate)   04/13/19 0411   Goal/Outcome Evaluation   Problems Assessed (Heart Failure) cardiac pump dysfunction;decreased quality of life;dysrhythmia/arrhythmia;fluid/electrolyte imbalance;functional decline/self-care deficit;respiratory compromise   Problems Present (Heart Failure) cardiac pump dysfunction;decreased quality of life;dysrhythmia/arrhythmia;fluid/electrolyte imbalance;functional decline/self-care deficit;respiratory compromise       Problem: Arrhythmia/Dysrhythmia (Symptomatic) (Adult)  Goal: Signs and Symptoms of Listed Potential Problems Will be Absent, Minimized or Managed (Arrhythmia/Dysrhythmia)  Outcome: Ongoing (interventions implemented as appropriate)   04/13/19 0411   Goal/Outcome Evaluation   Problems Assessed (Arrhythmia/Dysrhythmia) electrophysiologic conduction defect   Problems Present (Dysrhythmia) electrophysiologic conduction defect       Problem: Skin Injury Risk (Adult)  Goal: Skin Health and Integrity  Outcome: Ongoing (interventions implemented as appropriate)   04/13/19 0411   Skin Injury Risk (Adult)   Skin Health and Integrity making progress toward outcome       Problem: Nutrition, Imbalanced: Inadequate Oral Intake (Adult)  Goal: Improved Oral Intake  Outcome: Ongoing  (interventions implemented as appropriate)   04/13/19 0411   Nutrition, Imbalanced: Inadequate Oral Intake (Adult)   Improved Oral Intake making progress toward outcome     Goal: Prevent Further Weight Loss  Outcome: Ongoing (interventions implemented as appropriate)   04/13/19 0411   Nutrition, Imbalanced: Inadequate Oral Intake (Adult)   Prevent Further Weight Loss making progress toward outcome

## 2019-04-13 NOTE — THERAPY TREATMENT NOTE
Acute Care - Physical Therapy Treatment Note  [facility]     Patient Name: Tamy Sher  : 1930  MRN: [id number]  Today's Date: 2019  Onset of Illness/Injury or Date of Surgery: 19  Date of Referral to PT: 19  Referring Physician: Dr. Adames    Admit Date: 2019    Visit Dx:    ICD-10-CM ICD-9-CM   1. Acute on chronic congestive heart failure, unspecified heart failure type (CMS/HCC) I50.9 428.0   2. Urinary tract infection in elderly patient N39.0 599.0   3. Impaired mobility Z74.09 799.89   4. Impaired mobility and ADLs Z74.09 799.89   5. Dysphagia, unspecified type R13.10 787.20     Patient Active Problem List   Diagnosis   • Ischemic chest pain   • Pneumonia of both lungs due to infectious organism   • Fall   • Closed displaced comminuted fracture of shaft of right femur (CMS/HCC)   • Pain of right thigh   • Class 2 obesity with serious comorbidity in adult   • Type 2 diabetes mellitus with neurologic complication, with long-term current use of insulin (CMS/HCC)   • STUART (acute kidney injury) (CMS/HCC)   • Chronic diastolic heart failure (CMS/HCC)   • Pleural effusion, left   • Hypoxia   • Surgical wound infection, initial encounter   • Pleural effusion due to CHF (congestive heart failure) (CMS/HCC)   • Hyperkalemia   • Acute renal failure superimposed on stage 3 chronic kidney disease (CMS/HCC)   • DM (diabetes mellitus) (CMS/HCC)   • Iron deficiency anemia   • Thrombocytosis (CMS/HCC) probably related to iron deficiency anemia   • Myeloproliferative neoplasm (CMS/HCC)   • A-fib (CMS/HCC)   • Chronic anticoagulation   • Essential hypertension   • Constipation   • Hypothyroidism (acquired)   • Acute urinary retention -resolved   • HAP (hospital-acquired pneumonia)   • UTI (urinary tract infection), bacterial   • Myeloproliferative neoplasm (CMS/HCC)   • Pulmonary edema   • Acute on chronic congestive heart failure (CMS/HCC)       Therapy Treatment    Rehabilitation Treatment Summary      Row Name 04/13/19 0929             Treatment Time/Intention    Discipline  physical therapy assistant  -LG      Document Type  therapy note (daily note)  -LG      Subjective Information  complains of;weakness;fatigue;nausea/vomiting  -LG2      Mode of Treatment  individual therapy;physical therapy  -LG2      Patient/Family Observations  no family present  -LG2      Patient Effort  fair  -LG2      Comment  Pt appears to be ok until PTA introduced as therapy/rehab, then pt demonstrates coughing/gagging saying she is sick and can't work with PT.   -LG2      Existing Precautions/Restrictions  fall;oxygen therapy device and L/min  -LG2      Recorded by [LG] Giuseppe Spaulding, PTA 04/13/19 0929  [LG2] Giuseppe Spaulding, PTA 04/13/19 1000      Row Name 04/13/19 0929             Bed Mobility Assessment/Treatment    Comment (Bed Mobility)  Pt declined to get to EOB for ther ex, so did LE exerrcises in supine  -LG      Recorded by [LG] Giuseppe Spaulding, PTA 04/13/19 1000      Row Name 04/13/19 0929             Therapeutic Exercise    Comment (Therapeutic Exercise)  Supine AAROM 2 sets of 10 reps. APs, Heel Slides, Hip Abd/Add, Quad Sets.   -LG      Recorded by [LG] Giuseppe Spaulding, PTA 04/13/19 1000      Row Name 04/13/19 0929             Positioning and Restraints    Pre-Treatment Position  in bed  -LG      Post Treatment Position  bed  -LG      In Bed  fowlers;call light within reach;encouraged to call for assist;exit alarm on;notified nsg  -LG      Recorded by [LG] Giuseppe Spaulding, PTA 04/13/19 1000      Row Name 04/13/19 0929             Pain Scale: Numbers Pre/Post-Treatment    Pain Scale: Numbers, Pretreatment  0/10 - no pain  -LG      Pain Scale: Numbers, Post-Treatment  0/10 - no pain  -LG      Recorded by [LG] Giuseppe Spaulding, PTA 04/13/19 1000        User Key  (r) = Recorded By, (t) = Taken By, (c) = Cosigned By    Initials Name Effective Dates Discipline    LG Giuseppe Spaulding, PTA 08/02/16 -  PT                   Physical Therapy  Education     Title: PT OT SLP Therapies (In Progress)     Topic: Physical Therapy (In Progress)     Point: Mobility training (Done)     Learning Progress Summary           Patient Eager, ROBBY PATEL by WYATT at 4/8/2019  9:38 AM    Comment:  benefits of activity    Acceptance, ROBBY PATEL,NR by FERNANDO at 4/7/2019  1:25 PM    Comment:  Educated pt. on progression of PT POC and benefits of activity   Family Acceptance, ROBBY PATEL,NR by FERNANDO at 4/7/2019  1:25 PM    Comment:  Educated pt. on progression of PT POC and benefits of activity                               User Key     Initials Effective Dates Name Provider Type Discipline    AE 06/22/15 -  Ligia Rosa, PTA Physical Therapy Assistant PT    FERNANDO 08/02/16 -  Kd Callaway PT DPT Physical Therapist PT                PT Recommendation and Plan        Outcome Measures     Row Name 04/11/19 1517 04/11/19 1434          How much help from another person do you currently need...    Turning from your back to your side while in flat bed without using bedrails?  2  -LG  --     Moving from lying on back to sitting on the side of a flat bed without bedrails?  2  -LG  --     Moving to and from a bed to a chair (including a wheelchair)?  1  -LG  --     Standing up from a chair using your arms (e.g., wheelchair, bedside chair)?  1  -LG  --     Climbing 3-5 steps with a railing?  1  -LG  --     To walk in hospital room?  1  -LG  --     AM-PAC 6 Clicks Score  8  -LG  --        How much help from another is currently needed...    Putting on and taking off regular lower body clothing?  --  1  -MM     Bathing (including washing, rinsing, and drying)  --  2  -MM     Toileting (which includes using toilet bed pan or urinal)  --  1  -MM     Putting on and taking off regular upper body clothing  --  2  -MM     Taking care of personal grooming (such as brushing teeth)  --  3  -MM     Eating meals  --  3  -MM     Score  --  12  -MM        Functional Assessment    Outcome Measure Options  AM-PAC 6 Clicks  Basic Mobility (PT)  -  --       User Key  (r) = Recorded By, (t) = Taken By, (c) = Cosigned By    Initials Name Provider Type    Giuseppe Avila PTA Physical Therapy Assistant     Jeannette Iglesias COTA/L Occupational Therapy Assistant         Time Calculation:   PT Charges     Row Name 04/13/19 0929             Time Calculation    Start Time  0929  -      Stop Time  1001  -LG      Time Calculation (min)  32 min  -LG      PT Received On  04/13/19  -      PT Goal Re-Cert Due Date  04/17/19  -         Time Calculation- PT    Total Timed Code Minutes- PT  32 minute(s)  -        User Key  (r) = Recorded By, (t) = Taken By, (c) = Cosigned By    Initials Name Provider Type    Giuseppe Avila PTA Physical Therapy Assistant        Therapy Charges for Today     Code Description Service Date Service Provider Modifiers Qty    [id number] HC PT THER PROC EA 15 MIN 4/13/2019 Giuseppe Spaulding PTA GP 2          PT G-Codes  Outcome Measure Options: AM-PAC 6 Clicks Basic Mobility (PT)  AM-PAC 6 Clicks Score: 8  Score: 12    Giuseppe Spaulding PTA  4/13/2019

## 2019-04-13 NOTE — PROGRESS NOTES
[facility] Medicine Services  INPATIENT PROGRESS NOTE    Patient Name: Tamy Sher  Date of Admission: 4/6/2019  Today's Date: 04/13/19  Length of Stay: 4  Primary Care Physician: Barry Foley MD    Subjective   Chief Complaint: SOA  HPI   Says she feels bad.  Swelling a little better  However, she is more SOA today  O2 requirements back up.  She dropped to 87% earlier  She is afebrile        Review of Systems   Constitutional: Positive for fatigue. Negative for fever.   HENT: Negative for congestion and ear pain.    Eyes: Negative for pain and visual disturbance.   Respiratory: Positive for shortness of breath. Negative for cough and wheezing.    Cardiovascular: Negative for chest pain and palpitations.   Gastrointestinal: Negative for diarrhea, nausea and vomiting.   Endocrine: Negative for heat intolerance.   Genitourinary: Negative for dysuria and frequency.   Musculoskeletal: Negative for arthralgias and back pain.   Skin: Negative for rash and wound.   Neurological: Positive for weakness. Negative for dizziness and light-headedness.   Psychiatric/Behavioral: Negative for confusion. The patient is not nervous/anxious.    All other systems reviewed and are negative.       All pertinent negatives and positives are as above. All other systems have been reviewed and are negative unless otherwise stated.     Objective    Temp:  [97.8 °F (36.6 °C)-98.7 °F (37.1 °C)] 97.8 °F (36.6 °C)  Heart Rate:  [] 102  Resp:  [18-22] 20  BP: (131-166)/(71-89) 166/80  Physical Exam   Constitutional: She is oriented to person, place, and time. She appears well-developed and well-nourished.   HENT:   Head: Normocephalic and atraumatic.   Right Ear: External ear normal.   Left Ear: External ear normal.   Nose: Nose normal.   Mouth/Throat: Oropharynx is clear and moist.   Eyes: Conjunctivae and EOM are normal.   Neck: Normal range of motion. Neck supple.   Cardiovascular: Normal  rate, regular rhythm and normal heart sounds.   BLE edema   Pulmonary/Chest: Effort normal. She has decreased breath sounds. She has rales.   Abdominal: Soft. Bowel sounds are normal. She exhibits no distension. There is no tenderness.   Musculoskeletal: Normal range of motion.   Neurological: She is alert and oriented to person, place, and time.   Skin: Skin is warm and dry.   Psychiatric: She has a normal mood and affect. Her speech is normal and behavior is normal. Cognition and memory are normal.         Results Review:  I have reviewed the labs, radiology results, and diagnostic studies.    Laboratory Data:   Results from last 7 days   Lab Units 04/12/19 0221 04/11/19  0537 04/10/19  0309   WBC 10*3/mm3 28.47* 28.42* 28.20*   HEMOGLOBIN g/dL 9.8* 10.0* 10.5*   HEMATOCRIT % 31.4* 32.8* 33.5*   PLATELETS 10*3/mm3 635* 669* 664*        Results from last 7 days   Lab Units 04/12/19 0221 04/11/19  0537 04/10/19  0309 04/09/19  1645 04/08/19  0037   SODIUM mmol/L 146* 145 144 142 143   POTASSIUM mmol/L 4.0 4.2 4.3 3.9 3.9   CHLORIDE mmol/L 99 100 98 98 98   CO2 mmol/L 37.0* 36.0* 39.0* 34.0* 34.0*   BUN mg/dL 25* 24* 27* 28* 32*   CREATININE mg/dL 0.78 0.88 0.92 0.80 0.81   CALCIUM mg/dL 9.1 9.2 9.3 9.1 9.3   BILIRUBIN mg/dL  --   --  0.4 0.5 0.6   ALK PHOS U/L  --   --  113 117 128*   ALT (SGPT) U/L  --   --  <15 <15 21   AST (SGOT) U/L  --   --  11 20 28   GLUCOSE mg/dL 184* 156* 164* 184* 164*       Culture Data:   Blood Culture   Date Value Ref Range Status   04/06/2019 No growth at 5 days  Final     Urine Culture   Date Value Ref Range Status   04/06/2019 >100,000 CFU/mL Yeast isolated (A)  Final       Radiology Data:   Imaging Results (last 24 hours)     ** No results found for the last 24 hours. **          I have reviewed the patient's current medications.     Assessment/Plan     Active Hospital Problems    Diagnosis   • Acute on chronic congestive heart failure (CMS/HCC)   • Pulmonary edema   • Acute  urinary retention -resolved   • A-fib (CMS/HCC)   • DM (diabetes mellitus) (CMS/HCC)   • Chronic diastolic heart failure (CMS/HCC)   • Fall     1.  Acute on Chronic Diastolic CHF  -IV Bumex (adjust to 2mg BID)  -Fluid restriction  -Strict I's and O's  -patient has chronically elevated WBC; diagnosis of myeloproliferative disorder noted in records     2.  Afib  -Eliquis  -restarted Diltiazem CD for rate control due to HR trend     3.  T2DM  -SSI     4.  Essential HTN  -monitor BP trend     5.  HLD  -Lipitor     6.  PVD  -Lipitor  -ASA     7.  CVD  -lipitor  -ASA  -Eliquis     8.  Esophagitis  -Pepcid     9.  Urinary retention  -Flomax     10.  Hypothyroidism  -Synthroid     11.  Yeast UTI - POA (chronic Alves)  -Had 3 days of Diflucan  -completed 5 days of Rocephin      12.  Nausea with dry heaves - improved today  -KUB with no acute findings     13.  Acute on Chronic Hypoxic Respiratory Failure  -IV Bumex    Will decrease fluid restrictions  Repeat CXR                      Discharge Planning: I expect the patient to be discharged to NH in 2-3 days    Javed Sahni MD   04/13/19   10:39 AM

## 2019-04-14 NOTE — PLAN OF CARE
Problem: Cardiac: Heart Failure (Adult)  Goal: Signs and Symptoms of Listed Potential Problems Will be Absent, Minimized or Managed (Cardiac: Heart Failure)  Outcome: Ongoing (interventions implemented as appropriate)   04/14/19 0309   Goal/Outcome Evaluation   Problems Assessed (Heart Failure) cardiac pump dysfunction;decreased quality of life;dysrhythmia/arrhythmia;fluid/electrolyte imbalance;functional decline/self-care deficit;respiratory compromise   Problems Present (Heart Failure) cardiac pump dysfunction;decreased quality of life;dysrhythmia/arrhythmia;fluid/electrolyte imbalance;functional decline/self-care deficit;respiratory compromise       Problem: Arrhythmia/Dysrhythmia (Symptomatic) (Adult)  Goal: Signs and Symptoms of Listed Potential Problems Will be Absent, Minimized or Managed (Arrhythmia/Dysrhythmia)  Outcome: Ongoing (interventions implemented as appropriate)   04/14/19 0309   Goal/Outcome Evaluation   Problems Assessed (Arrhythmia/Dysrhythmia) electrophysiologic conduction defect   Problems Present (Dysrhythmia) electrophysiologic conduction defect       Problem: Patient Care Overview  Goal: Plan of Care Review  Outcome: Ongoing (interventions implemented as appropriate)   04/14/19 7525   Coping/Psychosocial   Plan of Care Reviewed With patient;family;durable power of    Plan of Care Review   Progress declining   OTHER   Outcome Summary PT ANXIOUS/CONFUSED, HAVING MORE SOA, RESTLESS THIS AM. SONS CALLED TO COME HAVE DISCUSSION WITH DR SCHULER REGARDING CARE. DECISION MADE TO PLACE PATIENT ON COMFORT MEASURES. MORPHINE GTT INFUSING @ 1MG/HR. PRN ATIVAN GIVEN PER MAR. SCOPOLAMINE PATCH TO RIGHT EAR. AFIB/-142 WITH PVC'S ON TELE. CONTINUE CURRENT COMFORT MEASURES.

## 2019-04-14 NOTE — THERAPY DISCHARGE NOTE
Acute Care - Physical Therapy Discharge Summary  [facility]       Patient Name: Tamy Sher  : 1930  MRN: [id number]    Today's Date: 2019  Onset of Illness/Injury or Date of Surgery: 19    Date of Referral to PT: 19  Referring Physician: Dr. Adames      Admit Date: 2019      PT Recommendation and Plan    Visit Dx:    ICD-10-CM ICD-9-CM   1. Acute on chronic congestive heart failure, unspecified heart failure type (CMS/[facility]) I50.9 428.0   2. Urinary tract infection in elderly patient N39.0 599.0   3. Impaired mobility Z74.09 799.89   4. Impaired mobility and ADLs Z74.09 799.89   5. Dysphagia, unspecified type R13.10 787.20       Outcome Measures     Row Name 19 1517 19 1434          How much help from another person do you currently need...    Turning from your back to your side while in flat bed without using bedrails?  2  -LG  --     Moving from lying on back to sitting on the side of a flat bed without bedrails?  2  -LG  --     Moving to and from a bed to a chair (including a wheelchair)?  1  -LG  --     Standing up from a chair using your arms (e.g., wheelchair, bedside chair)?  1  -LG  --     Climbing 3-5 steps with a railing?  1  -LG  --     To walk in hospital room?  1  -LG  --     AM-PAC 6 Clicks Score  8  -LG  --        How much help from another is currently needed...    Putting on and taking off regular lower body clothing?  --  1  -MM     Bathing (including washing, rinsing, and drying)  --  2  -MM     Toileting (which includes using toilet bed pan or urinal)  --  1  -MM     Putting on and taking off regular upper body clothing  --  2  -MM     Taking care of personal grooming (such as brushing teeth)  --  3  -MM     Eating meals  --  3  -MM     Score  --  12  -MM        Functional Assessment    Outcome Measure Options  AM-PAC 6 Clicks Basic Mobility (PT)  -LG  --       User Key  (r) = Recorded By, (t) = Taken By, (c) = Cosigned By    Initials Name Provider Type     LG Giuseppe Spaulding, PTA Physical Therapy Assistant    MM Jeannette Iglesias COTA/L Occupational Therapy Assistant              Rehab Goal Summary     Row Name 04/14/19 1302 04/14/19 1301          Bed Mobility Goal 1 (PT)    Activity/Assistive Device (Bed Mobility Goal 1, PT)  --  rolling to left;rolling to right;bed rails  -MF     [place] Level/Cues Needed (Bed Mobility Goal 1, PT)  --  minimum assist (75% or more patient effort);verbal cues required;moderate assist (50-74% patient effort)  -MF     Time Frame (Bed Mobility Goal 1, PT)  --  long term goal (LTG);10 days  -MF     Progress/Outcomes (Bed Mobility Goal 1, PT)  --  goal not met  -MF        Bed Mobility Goal 2 (PT)    Activity/Assistive Device (Bed Mobility Goal 2, PT)  --  sit to supine/supine to sit  -MF     [place] Level/Cues Needed (Bed Mobility Goal 2, PT)  --  other (see comments) unabale to safely perform at this time  -MF     Time Frame (Bed Mobility Goal 2, PT)  --  long term goal (LTG);10 days  -MF     Progress/Outcomes (Bed Mobility Goal 2, PT)  --  goal not met  -MF        Transfer Goal 1 (PT)    Activity/Assistive Device (Transfer Goal 1, PT)  --  sit-to-stand/stand-to-sit  -MF     [place] Level/Cues Needed (Transfer Goal 1, PT)  --  other (see comments) unable to safely perform at this time  -MF     Time Frame (Transfer Goal 1, PT)  --  long term goal (LTG);10 days  -MF     Progress/Outcome (Transfer Goal 1, PT)  --  goal not met  -MF        Balance Goal 1 (PT)    Activity/Assistive Device (Balance Goal 1, PT)  --  sitting, static  -MF     [place] Level/Cues Needed (Balance Goal 1, PT)  -- remains bed bound; balance not assessed  -MF  other (see comments) remains bed bound; balance not assessed  -MF     Time Frame (Balance Goal 1, PT)  --  long term goal (LTG);10 days  -MF     Progress/Outcomes (Balance Goal 1, PT)  --  goal not met  -MF       User Key  (r) = Recorded By, (t) = Taken By, (c) = Cosigned By    Initials Name  Provider Type Discipline     Brunilda Sanches, VERNELL Physical Therapy Assistant PT              PT Discharge Summary  Anticipated Discharge Disposition (PT): other (see comments)(comfort measures)  Reason for Discharge: Per MD order  Outcomes Achieved: Other(comfort measures)  Discharge Destination: other (comment)(comfort measures)      Brunilda Sanches PTA   4/14/2019

## 2019-04-14 NOTE — PLAN OF CARE
Problem: Fall Risk (Adult)  Goal: Absence of Fall  Outcome: Outcome(s) achieved Date Met: 04/14/19      Problem: Patient Care Overview  Goal: Plan of Care Review  Outcome: Ongoing (interventions implemented as appropriate)   04/14/19 0309   Coping/Psychosocial   Plan of Care Reviewed With patient   Plan of Care Review   Progress no change   OTHER   Outcome Summary Remains afib on tele and 5 LNC. MD is aware. Continue with Q2 turns and Q4 estrada care. Will continue to monitor and notify MD of any changes.       Problem: Cardiac: Heart Failure (Adult)  Goal: Signs and Symptoms of Listed Potential Problems Will be Absent, Minimized or Managed (Cardiac: Heart Failure)  Outcome: Ongoing (interventions implemented as appropriate)   04/14/19 0309   Goal/Outcome Evaluation   Problems Assessed (Heart Failure) cardiac pump dysfunction;decreased quality of life;dysrhythmia/arrhythmia;fluid/electrolyte imbalance;functional decline/self-care deficit;respiratory compromise   Problems Present (Heart Failure) cardiac pump dysfunction;decreased quality of life;dysrhythmia/arrhythmia;fluid/electrolyte imbalance;functional decline/self-care deficit;respiratory compromise       Problem: Arrhythmia/Dysrhythmia (Symptomatic) (Adult)  Goal: Signs and Symptoms of Listed Potential Problems Will be Absent, Minimized or Managed (Arrhythmia/Dysrhythmia)  Outcome: Ongoing (interventions implemented as appropriate)   04/14/19 0309   Goal/Outcome Evaluation   Problems Assessed (Arrhythmia/Dysrhythmia) electrophysiologic conduction defect   Problems Present (Dysrhythmia) electrophysiologic conduction defect       Problem: Skin Injury Risk (Adult)  Goal: Skin Health and Integrity  Outcome: Ongoing (interventions implemented as appropriate)      Problem: Nutrition, Imbalanced: Inadequate Oral Intake (Adult)  Goal: Improved Oral Intake  Outcome: Ongoing (interventions implemented as appropriate)   04/14/19 0309   Nutrition, Imbalanced: Inadequate Oral  Intake (Adult)   Improved Oral Intake making progress toward outcome     Goal: Prevent Further Weight Loss  Outcome: Ongoing (interventions implemented as appropriate)   04/14/19 0309   Nutrition, Imbalanced: Inadequate Oral Intake (Adult)   Prevent Further Weight Loss making progress toward outcome

## 2019-04-14 NOTE — PROGRESS NOTES
"    [facility] Medicine Services  INPATIENT PROGRESS NOTE    Patient Name: Tamy Sher  Date of Admission: 4/6/2019  Today's Date: 04/14/19  Length of Stay: 5  Primary Care Physician: Barry Foley MD    Subjective   Chief Complaint: \"I feel bad.\"  HPI   Mrs Sher unfortunately has had a very difficult morning.  She has had increasing shortness of breath.  She is still not having good urine output.  She had short runs of V-Tach on telemetry today.  She told her nurses, she's ready to die and to tell her sons that she loves them.      I discussed this at bed-side twice.  The first time it was two of her sons.  The second conversation involved them as well as a third son and other family.      They understand her declining condition and do not want to see her suffer.          Review of Systems   Unable to perform ROS: Mental status change      All pertinent negatives and positives are as above. All other systems have been reviewed and are negative unless otherwise stated.     Objective    Temp:  [98 °F (36.7 °C)-98.8 °F (37.1 °C)] 98.8 °F (37.1 °C)  Heart Rate:  [] 108  Resp:  [20-22] 22  BP: (140-151)/(83-88) 140/84  Physical Exam   Constitutional: She is oriented to person, place, and time. She appears well-developed and well-nourished.   HENT:   Head: Normocephalic and atraumatic.   Right Ear: External ear normal.   Left Ear: External ear normal.   Nose: Nose normal.   Mouth/Throat: Oropharynx is clear and moist.   Eyes: Conjunctivae and EOM are normal.   Neck: Normal range of motion. Neck supple.   Cardiovascular: Normal rate, regular rhythm and normal heart sounds.   BLE edema   Pulmonary/Chest: Effort normal. She has decreased breath sounds. She has rales.   Abdominal: Soft. Bowel sounds are normal. She exhibits no distension. There is no tenderness.   Musculoskeletal: Normal range of motion.   Neurological: She is oriented to person, place, and time. She is " unresponsive.   Skin: Skin is warm and dry.   Psychiatric: She has a normal mood and affect. Her speech is normal and behavior is normal. Cognition and memory are impaired.          Results Review:  I have reviewed the labs, radiology results, and diagnostic studies.    Laboratory Data:   Results from last 7 days   Lab Units 04/13/19  1110 04/12/19 0221 04/11/19  0537   WBC 10*3/mm3 35.15* 28.47* 28.42*   HEMOGLOBIN g/dL 11.4* 9.8* 10.0*   HEMATOCRIT % 38.0 31.4* 32.8*   PLATELETS 10*3/mm3 886* 635* 669*        Results from last 7 days   Lab Units 04/14/19  0815 04/13/19  1110 04/12/19  0221  04/10/19  0309   SODIUM mmol/L 146* 146* 146*   < > 144   POTASSIUM mmol/L 3.7 3.5 4.0   < > 4.3   CHLORIDE mmol/L 99 101 99   < > 98   CO2 mmol/L 33.0* 38.0* 37.0*   < > 39.0*   BUN mg/dL 25* 24* 25*   < > 27*   CREATININE mg/dL 0.87 0.80 0.78   < > 0.92   CALCIUM mg/dL 9.2 9.6 9.1   < > 9.3   BILIRUBIN mg/dL 0.7 0.8  --   --  0.4   ALK PHOS U/L 105 122*  --   --  113   ALT (SGPT) U/L <15 <15  --   --  <15   AST (SGOT) U/L 17 21  --   --  11   GLUCOSE mg/dL 331* 303* 184*   < > 164*    < > = values in this interval not displayed.       Culture Data:        Radiology Data:   Imaging Results (last 24 hours)     ** No results found for the last 24 hours. **          I have reviewed the patient's current medications.     Assessment/Plan     Active Hospital Problems    Diagnosis   • Acute on chronic congestive heart failure (CMS/HCC)   • Pulmonary edema   • Acute urinary retention -resolved   • A-fib (CMS/[facility])   • DM (diabetes mellitus) (CMS/[facility])   • Chronic diastolic heart failure (CMS/[facility])   • Fall         1.  Acute on Chronic Diastolic CHF  -Comfort measures     2.  Afib  -comfort measures     3.  T2DM  -Comfort measues     4.  Essential HTN  -comfort measures     5.  HLD  -comfort measures     6.  PVD  -comfort measures     7.  CVD  -comfort measures     8.  Esophagitis  -comfort measures     9.  Urinary retention  -comfort  measures     10.  Hypothyroidism  -comfort measures     11.  Yeast UTI - POA (chronic Alves)  - comfort measures     12.  Nausea with dry heaves - improved today  -comfort measures     13.  Acute on Chronic Hypoxic Respiratory Failure  -comfort measures    14.  Goals of Care  -Spent 35 minutes at bed-side discussing with patient's family her condition and poor long-term prognosis.  They wish to proceed with comfort measures    Start Morphine drip  PRN ativan  Scopolamine      Discharge Planning: comfort measures    Javed Sahni MD   04/14/19   12:42 PM

## 2019-04-15 NOTE — THERAPY DISCHARGE NOTE
Acute Care - Occupational Therapy Discharge Summary  [facility]     Patient Name: Tamy Sher  : 1930  MRN: [id number]    Today's Date: 4/15/2019  Onset of Illness/Injury or Date of Surgery: 19    Date of Referral to OT: 19  Referring Physician: Dr. Adames      Admit Date: 2019        OT Recommendation and Plan    Visit Dx:    ICD-10-CM ICD-9-CM   1. Acute on chronic congestive heart failure, unspecified heart failure type (CMS/[facility]) I50.9 428.0   2. Urinary tract infection in elderly patient N39.0 599.0   3. Impaired mobility Z74.09 799.89   4. Impaired mobility and ADLs Z74.09 799.89   5. Dysphagia, unspecified type R13.10 787.20               Rehab Goal Summary     Row Name 04/15/19 1419             Dressing Goal 1 (OT)    Activity/Assistive Device (Dressing Goal 1, OT)  upper body dressing  -MM      [place]/Cues Needed (Dressing Goal 1, OT)  set-up required;minimum assist (75% or more patient effort)  -MM      Time Frame (Dressing Goal 1, OT)  10 days  -MM      Progress/Outcome (Dressing Goal 1, OT)  goal not met  -MM         Grooming Goal 1 (OT)    Activity/Device (Grooming Goal 1, OT)  grooming skills, all  -MM      [place] (Grooming Goal 1, OT)  supervision required;set-up required  -MM      Time Frame (Grooming Goal 1, OT)  10 days  -MM      Progress/Outcome (Grooming Goal 1, OT)  goal not met  -MM         Self-Feeding Goal 1 (OT)    Activity/Assistive Device (Self-Feeding Goal 1, OT)  self-feeding skills, all  -MM      [place] Level/Cues Needed (Self-Feeding Goal 1, OT)  conditional independence;set-up required  -MM      Time Frame (Self-Feeding Goal 1, OT)  10 days  -MM      Progress/Outcomes (Self-Feeding Goal 1, OT)  goal not met  -MM        User Key  (r) = Recorded By, (t) = Taken By, (c) = Cosigned By    Initials Name Provider Type Discipline    MM Kelsie, Jeannette, COOLEY/L Occupational Therapy Assistant OT              Therapy Suggested Charges     Code   Minutes  Charges    33735 (CPT®) Hc Ot Neuromusc Re Education Ea 15 Min      56023 (CPT®) Hc Ot Ther Proc Ea 15 Min      40764 (CPT®) Hc Ot Therapeutic Act Ea 15 Min      02478 (CPT®) Hc Ot Manual Therapy Ea 15 Min      89345 (CPT®) Hc Ot Iontophoresis Ea 15 Min      63963 (CPT®) Hc Ot Elec Stim Ea-Per 15 Min      71144 (CPT®) Hc Ot Ultrasound Ea 15 Min      70375 (CPT®) Hc Ot Self Care/Mgmt/Train Ea 15 Min 13 1    Total  13 1              OT Discharge Summary  Reason for Discharge: other (comment)(comfort )  Outcomes Achieved: Refer to plan of care for updates on goals achieved  Discharge Destination: other (comment)(comfort )      CYNDEE Fagan  4/15/2019

## 2019-04-15 NOTE — DISCHARGE SUMMARY
[facility] Medicine Services  DEATH SUMMARY       Date of Admission: 4/6/2019  Date of Death:  4/15/2019 at approximately 1414  Primary Care Physician: Barry Foley MD    Presenting Problem/History of Present Illness:  Pulmonary edema [J81.1]  Acute on chronic congestive heart failure, unspecified heart failure type (CMS/[facility]) [I50.9]     Final Death Diagnoses:  Active Hospital Problems    Diagnosis   • Acute on chronic congestive heart failure (CMS/[facility])   • Pulmonary edema   • Acute urinary retention -resolved   • A-fib (CMS/[facility])   • DM (diabetes mellitus) (CMS/[facility])   • Chronic diastolic heart failure (CMS/[facility])   • Fall       Pertinent Test Results:   IMPRESSION: Chest x-ray  CHF with moderate left pleural effusion. No significant change from 3  days ago.    IMPRESSION: KUB  No acute findings.    IMPRESSION: Doppler sound of all lower extremities  Impression: There is no evidence of deep venous thrombosis of the right  upper extremity. There is evidence of superficial thrombophlebitis in  the cephalic vein from the proximal forearm to the wrist.    Hospital Course:  The patient is a 88 y.o. female who presented to [facility] with acute on chronic diastolic heart failure, atrial fibrillation, diabetes, hypertension, UTI, urinary retention, respiratory failure.      1.  Acute on Chronic Diastolic CHF  -Comfort measures     2.  Afib  -comfort measures     3.  T2DM  -Comfort measues     4.  Essential HTN  -comfort measures     5.  HLD  -comfort measures     6.  PVD  -comfort measures     7.  CVD  -comfort measures     8.  Esophagitis  -comfort measures     9.  Urinary retention  -comfort measures     10.  Hypothyroidism  -comfort measures     11.  Yeast UTI - POA (chronic [place]ves)  - comfort measures     12.  Nausea with dry heaves - improved today  -comfort measures     13.  Acute on Chronic Hypoxic Respiratory Failure  -comfort measures     14.  Goals of Care  Poor  prognosis.    Comfort measure.  Morphine drip.  Ativan as needed.  Scopolamine patch.     Patient passed away-1414.    Issa Adames MD  04/15/19  3:18 PM    Time: Greater than 30 minutes.

## 2019-04-15 NOTE — PROGRESS NOTES
Continued Stay Note  [facility]     Patient Name: Tamy Sher  MRN: [id number]  Today's Date: 4/15/2019    Admit Date: 4/6/2019    Discharge Plan     Row Name 04/15/19 1417       Plan    Plan Comments  REVIEWED CHART; MORPHINE DRIP NOTED.  NOTIFIED SOY AT [facility] OF PT'S CURRENT STATUS.          Discharge Codes    No documentation.             VALENTINO Gardiner

## 2019-04-16 NOTE — THERAPY DISCHARGE NOTE
Acute Care - Speech Language Pathology Discharge Summary  [facility]       Patient Name: Tamy Sher  : 1930  MRN: [id number]    Today's Date: 2019  Onset of Illness/Injury or Date of Surgery: 19       Referring Physician: Dr. Adames        Admit Date: 2019      SLP Recommendation and Plan    Visit Dx:    ICD-10-CM ICD-9-CM   1. Acute on chronic congestive heart failure, unspecified heart failure type (CMS/[facility]) I50.9 428.0   2. Urinary tract infection in elderly patient N39.0 599.0   3. Impaired mobility Z74.09 799.89   4. Impaired mobility and ADLs Z74.09 799.89   5. Dysphagia, unspecified type R13.10 787.20               SLP GOALS     Row Name 19 1000             Oral Nutrition/Hydration Goal 1 (SLP)    Oral Nutrition/Hydration Goal 1, SLP  Pt will tolerate LRD w/o any overt s/s of aspiration.  -MB      Time Frame (Oral Nutrition/Hydration Goal 1, SLP)  by discharge  -MB      Barriers (Oral Nutrition/Hydration Goal 1, SLP)  n/a  -MB      Progress/Outcomes (Oral Nutrition/Hydration Goal 1, SLP)  goal not met  -MB        User Key  (r) = Recorded By, (t) = Taken By, (c) = Cosigned By    Initials Name Provider Type    Lola Munoz CCC-SLP Speech and Language Pathologist                  SLP Discharge Summary  Anticipated Dischage Disposition: skilled nursing facility  Reason for Discharge: other (see comments)(Pt )  Progress Toward Achieving Short/long Term Goals: other (see comments)(Pt )  Discharge Destination: other (see comments)(Pt )      Lola Zhao CCC-SLP  2019

## 2019-06-06 NOTE — PROGRESS NOTES
Mother states pt will need letter from pcp advising it is ok for him to return to his adult  program Patient followed by [facility] for services of SN, HA, OT, and PT. Voicemail to AMADO Sneed. Will follow. Hellen Barcenas RN, [facility]

## 2022-05-21 NOTE — PROGRESS NOTES
Discharge Planning Assessment  [facility]     Patient Name: Tamy Sher  MRN: [id number]  Today's Date: 4/8/2019    Admit Date: 4/6/2019    Discharge Needs Assessment     Row Name 04/08/19 1042       Living Environment    Lives With  facility resident    Current Living Arrangements  extended care facility    Duration at Residence  Pt resides at [facility].  SW spoke to Pamela in admissions at [facility] and she stated pt is intermediate level of care and can dc back at any time.  Phone:  [phone], fax: [phone].    Quality of Family Relationships  helpful;involved;supportive    Able to Return to Prior Arrangements  yes        Discharge Plan    No documentation.       Destination      No service coordination in this encounter.      Durable Medical Equipment      No service coordination in this encounter.      Dialysis/Infusion      No service coordination in this encounter.      Home Medical Care      No service coordination in this encounter.      Therapy      No service coordination in this encounter.      Community Resources      No service coordination in this encounter.          Demographic Summary    No documentation.       Functional Status    No documentation.       Psychosocial    No documentation.       Abuse/Neglect    No documentation.       Legal    No documentation.       Substance Abuse    No documentation.       Patient Forms    No documentation.           IZAIAH StoryW     56

## 2022-10-05 NOTE — PROGRESS NOTES
Exercise Oximetry    Patient Name:Tamy Sher   MRN: [id number]   Date: 02/04/18             ROOM AIR BASELINE   SpO2% 94   Heart Rate   Blood      EXERCISE ON ROOM AIR SpO2% EXERCISE ON O2 @  LPM SpO2%   1 MINUTE 94 1 MINUTE    2 MINUTES 94 2 MINUTES    3 MINUTES 92 3 MINUTES    4 MINUTES 92 4 MINUTES    5 MINUTES 92 5 MINUTES    6 MINUTES 92 6 MINUTES               Distance Walked   Distance Walked   Dyspnea (Jose J Scale)   Dyspnea (Jose J Scale)   Fatigue (Jose J Scale)   Fatigue (Jose J Scale)   SpO2% Post Exercise  92 SpO2% Post Exercise   HR Post Exercise   HR Post Exercise   Time to Recovery   Time to Recovery     Comments: physical therapy walking patient.    sustained

## 2023-04-24 NOTE — PLAN OF CARE
Called m# and was not able to leave a message due to mailbox being full. Ok to mail letter ?    Left a message on m# to give our office a call back    Message sent through the patient portal.    Problem: Patient Care Overview (Adult)  Goal: Plan of Care Review  Outcome: Ongoing (interventions implemented as appropriate)   02/02/18 0434   Coping/Psychosocial Response Interventions   Plan Of Care Reviewed With patient   Patient Care Overview   Progress no change   Outcome Evaluation   Outcome Summary/Follow up Plan No c/o of pain or discomfort voiced. Possible thoracentesis this am. IV abx therapy continues. VSS.      Goal: Adult Individualization and Mutuality  Outcome: Ongoing (interventions implemented as appropriate)    Goal: Discharge Needs Assessment  Outcome: Ongoing (interventions implemented as appropriate)      Problem: Fall Risk (Adult)  Goal: Absence of Falls  Outcome: Ongoing (interventions implemented as appropriate)      Problem: Pneumonia (Adult)  Goal: Signs and Symptoms of Listed Potential Problems Will be Absent or Manageable (Pneumonia)  Outcome: Ongoing (interventions implemented as appropriate)         Received e advice from pt:    Received the Pap smear results...  is this anything to be concerned with?  \"Endometrial cells at/after age 45, particularly out of phase or after menopause, may be associated with benign endometrium, hormonal alterations and less commonly, endometrial/uterine abnormalities.\"     I didn't know how to analysis this results.  Thanks, Sandra [phone]    PLAN - pls tell pt pap smear is fine, shows no evidence of malignancy/cancer     Unable to leave a message due to mailbox being full.    pls just send her an email back with my message   No

## 2023-07-13 NOTE — H&P
[facility]  HISTORY AND PHYSICAL    Date of Admission: 4/6/2019  Primary Care Physician: Barry Foley MD    Subjective     Chief Complaint: Alter mental status.  Pulmonary edema.  UTI.  Yeast infection.    History of Present Illness  Goldman is 88-year-old recent discharge of the hospital previously for pneumonia/fluid overload.  Patient is presented back here from nursing home due to altered mental status.  Symptoms of cold hands was worsening overnight.  Also patient has been increasing swelling.  Patient has been on chronic Bumex with chronic Alves cath.  Weight from 4/12/6 1 pound increase.  Patient does not look more edematous since last discharge.  She does not seem to be in acute distress.  Patient denies any shortness breath or chest pain.  Patient complaining of dysuria and painful urination.  Alves catheter still in place.  Patient denies any fever night sweats or chills.  Patient is coming by HER 2 son.    ER patient is shown to have a yeast/urinary tract infection.  In addition chest ray shows pulmonary edema.  Doppler of right upper extremity still pending.    Review of Systems   Difficult obtain due to altered mental status, otherwise as stated above.    Otherwise complete ROS reviewed and negative except as mentioned in the HPI.      Past Medical History:   Past Medical History:   Diagnosis Date   • Constipation    • Dementia    • Diabetes mellitus (CMS/HCC)    • Diarrhea    • Diastolic dysfunction, left ventricle    • Esophagitis    • Exertional shortness of breath    • Hyperlipidemia    • Hypertension    • Lumbar spondylitis (CMS/HCC)    • Osteoarthritis    • Peripheral vascular disease (CMS/HCC)    • Sinusitis    • Stroke (CMS/HCC)    • Tremor     essential tremor       Past Surgical History:  Past Surgical History:   Procedure Laterality Date   • FEMUR SUPRACONDYLAR FRACTURE REPAIR Right 3/10/2018    Procedure: FEMUR SUPRACONDYLAR NAIL;   Surgeon: Nima Bundy MD;  Location: [facility];  Service: Orthopedics   • GALLBLADDER SURGERY     • HYSTERECTOMY     • JOINT REPLACEMENT     • REPLACEMENT TOTAL KNEE Left        Family History: family history includes No Known Problems in her father and mother.    Social History:  reports that she has never smoked. She has never used smokeless tobacco. She reports that she does not drink alcohol or use drugs.    Medications:  Prior to Admission medications    Medication Sig Start Date End Date Taking? Authorizing Provider   acetaminophen (TYLENOL) 325 MG tablet Take 650 mg by mouth Every 6 (Six) Hours As Needed for Mild Pain  or Fever.   Yes Will Barrow MD   allopurinol (ZYLOPRIM) 100 MG tablet Take 1 tablet by mouth Daily. 3/7/19  Yes Fidencio Rodriguez MD   apixaban (ELIQUIS) 5 MG tablet tablet Take 1 tablet by mouth Every 12 (Twelve) Hours. 3/21/18  Yes Fidencio Rodriguez MD   aspirin 81 MG chewable tablet Chew 1 tablet Daily. 3/21/18  Yes Fidencio Rodriguez MD   budesonide-formoterol (SYMBICORT) 160-4.5 MCG/ACT inhaler Inhale 2 puffs 2 (Two) Times a Day. 2/5/18  Yes Meghan Hendrix APRN   bumetanide (BUMEX) 0.5 MG tablet Take 1 tablet by mouth 2 (Two) Times a Day for 5 days. 4/1/19 4/6/19 Yes Issa Adames MD   calcitriol (ROCALTROL) 0.25 MCG capsule Take 1 capsule by mouth Daily. 3/7/19  Yes Fidencio Rodriguez MD   Cholecalciferol (VITAMIN D3) 5000 units capsule capsule Take 5,000 Units by mouth Daily.   Yes Will Barrow MD   Cholecalciferol (VITAMIN D3) 5000 units capsule capsule Take 5,000 Units by mouth Daily.   Yes Will Barrow MD   diltiaZEM CD (CARDIZEM CD) 240 MG 24 hr capsule Take 1 capsule by mouth Daily. 4/14/18  Yes Jody Hernandez APRN   docusate sodium (COLACE) 100 MG capsule Take 100 mg by mouth Daily.   Yes Will Barrow MD   escitalopram (LEXAPRO) 20 MG tablet Take 20 mg by mouth Every Night.   Yes Danial  MD Will   fluticasone (FLONASE) 50 MCG/ACT nasal spray 1 spray into the nostril(s) as directed by provider 2 (Two) Times a Day.   Yes Will Barrow MD   folic acid (FOLVITE) 1 MG tablet Take 1 mg by mouth Daily.   Yes Will Barrow MD   gabapentin (NEURONTIN) 300 MG capsule Take 2 capsules by mouth Every Night. 4/1/19  Yes Issa Adames MD   gabapentin (NEURONTIN) 300 MG capsule Take 1 capsule by mouth Every Morning. 4/1/19  Yes Issa Adames MD   insulin aspart (novoLOG) 100 UNIT/ML injection Inject  under the skin into the appropriate area as directed 4 (Four) Times a Day Before Meals & at Bedtime. 0-149 = 0U.  150-199 = 2U.  200-249 = 4U.  250-299 = 6U.  300-349 = 7U.  350-399 = 8U.  400+ = 0 units, call MD/ARPN.   Yes Will Barrow MD   insulin detemir (LEVEMIR) 100 UNIT/ML injection Inject 10 Units under the skin into the appropriate area as directed Every Night. 4/1/19  Yes Issa Adames MD   ipratropium-albuterol (DUO-NEB) 0.5-2.5 mg/3 ml nebulizer Take 3 mL by nebulization Every 6 (Six) Hours As Needed for Wheezing or Shortness of Air.   Yes Will Barrow MD   levothyroxine (SYNTHROID, LEVOTHROID) 50 MCG tablet Take 50 mcg by mouth Daily.   Yes Will Barrow MD   metoprolol succinate XL (TOPROL-XL) 25 MG 24 hr tablet Take 25 mg by mouth Daily. Hold for HR<60 or SBP<110 or DBP<80.   Yes Will Barrow MD   nystatin (nystatin) 142835 UNIT/GM powder Apply 1 application topically to the appropriate area as directed 4 (Four) Times a Day. Apply to breast folds.   Yes Will Barrow MD   pantoprazole (PROTONIX) 40 MG EC tablet Take 40 mg by mouth Daily.   Yes Will Barrow MD   polyethylene glycol (MIRALAX) packet Take 17 g by mouth Daily.   Yes Will Barrow MD   simethicone (MYLICON) 80 MG chewable tablet Chew 1 tablet 4 (Four) Times a Day As Needed for Flatulence. 4/13/18  Yes Jody Hernandez APRN   simvastatin (ZOCOR) 20  "MG tablet Take 20 mg by mouth Every Night.   Yes Will Barrow MD   tamsulosin (FLOMAX) 0.4 MG capsule 24 hr capsule Take 1 capsule by mouth 2 (Two) Times a Day.   Yes Will Barrow MD   traMADol (ULTRAM) 50 MG tablet Take 1 tablet by mouth 3 (Three) Times a Day As Needed for Moderate Pain . 4/1/19  Yes Issa Adames MD   Insulin Lispro (HUMALOG KWIKPEN) 100 UNIT/ML solution pen-injector Inject 0-19 Units under the skin into the appropriate area as directed 4 (Four) Times a Day Before Meals & at Bedtime. 0-149 = 0 units.  150-400 = formula: (BG-100) / 20 = units to be given.  401+ = call MD.   4/6/19  ProviderWill MD     Allergies:  No Known Allergies    Objective     Vital Signs: /41   Pulse 62   Temp 98.3 °F (36.8 °C) (Oral)   Resp 18   Ht 190.5 cm (75\")   Wt 89.8 kg (198 lb)   LMP  (LMP Unknown)   SpO2 96%   BMI 24.75 kg/m²   Physical Exam   Constitutional: She appears well-developed.   HENT:   Head: Normocephalic and atraumatic.   Eyes: Conjunctivae are normal. Pupils are equal, round, and reactive to light.   Neck: Neck supple. No JVD present. No thyromegaly present.   Cardiovascular: Normal heart sounds and intact distal pulses. Exam reveals no gallop and no friction rub.   No murmur heard.  Irregular irregular, rate controlled.   Pulmonary/Chest: No respiratory distress. She has no wheezes. She has no rales. She exhibits no tenderness.   Diminished breath sound bilateral, clear.   Abdominal: Soft. Bowel sounds are normal. She exhibits no distension. There is no tenderness. There is no rebound and no guarding.   Musculoskeletal: She exhibits edema. She exhibits no tenderness or deformity.   Pitting edema bilateral 2-3+.  All 4 extremities.   Lymphadenopathy:     She has no cervical adenopathy.   Neurological: She displays normal reflexes. No cranial nerve deficit. She exhibits abnormal muscle tone. Coordination abnormal.   Skin: Skin is warm and dry. Capillary refill " takes 2 to 3 seconds. No rash noted.   Nursing note and vitals reviewed.          Results Reviewed:    Lab Results (last 24 hours)     Procedure Component Value Units Date/Time    [place] Draw [[id number]] Collected:  04/06/19 1405    Specimen:  Blood Updated:  04/06/19 1515    Narrative:       The following orders were created for panel order [place] Draw.  Procedure                               Abnormality         Status                     ---------                               -----------         ------                     Light Blue Top[[id number]]                                   Final result               Green Top (Gel)[[id number]]                                  Final result               Lavender Top[[id number]]                                     Final result               Red Top[[id number]]                                          Final result                 Please view results for these tests on the individual orders.    Light Blue Top [[id number]] Collected:  04/06/19 1405    Specimen:  Blood Updated:  04/06/19 1515     Extra Tube hold for add-on     Comment: Auto resulted       Green Top (Gel) [[id number]] Collected:  04/06/19 1405    Specimen:  Blood Updated:  04/06/19 1515     Extra Tube Hold for add-ons.     Comment: Auto resulted.       Lavender Top [[id number]] Collected:  04/06/19 1405    Specimen:  Blood Updated:  04/06/19 1515     Extra Tube hold for add-on     Comment: Auto resulted       Red Top [[id number]] Collected:  04/06/19 1405    Specimen:  Blood Updated:  04/06/19 1515     Extra Tube Hold for add-ons.     Comment: Auto resulted.       CBC & Differential [[id number]] Collected:  04/06/19 1405    Specimen:  Blood Updated:  04/06/19 1458    Narrative:       The following orders were created for panel order CBC & Differential.  Procedure                               Abnormality         Status                     ---------                               -----------         ------                      CBC Auto Differential[[id number]]        Abnormal            Edited Result - FINAL        Please view results for these tests on the individual orders.    CBC Auto Differential [[id number]]  (Abnormal) Collected:  04/06/19 1405    Specimen:  Blood Updated:  04/06/19 1458     WBC 23.73 10*3/mm3      RBC 3.35 10*6/mm3      Hemoglobin 9.9 g/dL      Hematocrit 31.8 %      MCV 94.9 fL      MCH 29.6 pg      MCHC 31.1 g/dL      RDW 17.0 %      RDW-SD 58.1 fl      MPV 10.9 fL      Platelets 560 10*3/mm3     Narrative:       The previously reported component NRBC is no longer being reported.    Manual Differential [[id number]]  (Abnormal) Collected:  04/06/19 1405    Specimen:  Blood Updated:  04/06/19 1457     Neutrophil % 66.0 %      Lymphocyte % 5.0 %      Monocyte % 3.0 %      Eosinophil % 15.0 %      Basophil % 3.0 %      Bands %  2.0 %      Metamyelocyte % 3.0 %      Myelocyte % 1.0 %      Atypical Lymphocyte % 2.0 %      Neutrophils Absolute 16.14 10*3/mm3      Lymphocytes Absolute 1.19 10*3/mm3      Monocytes Absolute 0.71 10*3/mm3      Eosinophils Absolute 3.56 10*3/mm3      Basophils Absolute 0.71 10*3/mm3      Anisocytosis Slight/1+     Basophilic Stippling Slight/1+     Hypochromia Slight/1+     Poikilocytes Slight/1+     Polychromasia Slight/1+     Toxic Granulation Mod/2+     Comment: Appended report. These results have been appended to a previously final verified report.        Platelet Estimate Increased     Clumped Platelets Present     Giant Platelets Mod/2+    Narrative:       The previously reported component WBC Morphology is no longer being reported.    Urinalysis With Culture If Indicated - Urine, Catheter [[id number]]  (Abnormal) Collected:  04/06/19 1354    Specimen:  Urine, Catheter Updated:  04/06/19 1450     Color, UA Yellow     Appearance, UA Cloudy     pH, UA <=5.0     Specific Gravity, UA 1.009     Glucose, UA Negative     Ketones, UA Negative     Bilirubin, UA Negative     Blood, UA Small  (1+)     Protein, UA Trace     Leuk Esterase, UA Large (3+)     Nitrite, UA Negative     Urobilinogen, UA 0.2 E.U./dL    Urinalysis, Microscopic Only - Urine, Catheter [[id number]]  (Abnormal) Collected:  04/06/19 1354    Specimen:  Urine, Catheter Updated:  04/06/19 1450     RBC, UA 3-5 /HPF      WBC, UA Too Numerous to Count /HPF      Bacteria, UA Trace /HPF      Squamous Epithelial Cells, UA 3-6 /HPF      Yeast, UA Large/3+ Budding Yeast /HPF      Hyaline Casts, UA None Seen /LPF      Methodology Manual Light Microscopy    Urine Culture - Urine, Urine, Catheter [[id number]] Collected:  04/06/19 1354    Specimen:  Urine, Catheter Updated:  04/06/19 1450    BNP [[id number]]  (Abnormal) Collected:  04/06/19 1405    Specimen:  Blood Updated:  04/06/19 1435     proBNP 11,800.0 pg/mL     Troponin [[id number]]  (Normal) Collected:  04/06/19 1405    Specimen:  Blood Updated:  04/06/19 1435     Troponin I 0.021 ng/mL     Comprehensive Metabolic Panel [[id number]]  (Abnormal) Collected:  04/06/19 1405    Specimen:  Blood Updated:  04/06/19 1427     Glucose 138 mg/dL      BUN 38 mg/dL      Creatinine 0.68 mg/dL      Sodium 142 mmol/L      Potassium 4.4 mmol/L      Comment: Specimen hemolyzed.  Results may be affected.        Chloride 100 mmol/L      CO2 37.0 mmol/L      Calcium 9.3 mg/dL      Total Protein 5.4 g/dL      Albumin 3.00 g/dL      ALT (SGPT) 15 U/L      Comment: Specimen hemolyzed.  Results may be affected.        AST (SGOT) 28 U/L      Comment: Specimen hemolyzed.  Results may be affected.        Alkaline Phosphatase 101 U/L      Comment: Specimen hemolyzed. Results may be affected.        Total Bilirubin 0.5 mg/dL      eGFR Non African Amer 82 mL/min/1.73      Globulin 2.4 gm/dL      A/G Ratio 1.3 g/dL      BUN/Creatinine Ratio 55.9     Anion Gap 5.0 mmol/L     Narrative:       GFR Normal >60  Chronic Kidney Disease <60  Kidney Failure <15    aPTT [[id number]]  (Normal) Collected:  04/06/19 1405    Specimen:  Blood  Medical Necessity Information: It is in your best interest to select a reason for this procedure from the list below. All of these items fulfill various CMS LCD requirements except the new and changing color options. Updated:  04/06/19 1422     PTT 33.7 seconds     Protime-INR [[id number]]  (Abnormal) Collected:  04/06/19 1405    Specimen:  Blood Updated:  04/06/19 1422     Protime 17.0 Seconds      INR 1.35           Radiology Data:    Imaging Results (last 24 hours)     Procedure Component Value Units Date/Time    XR Chest 1 View [[id number]] Collected:  04/06/19 1428     Updated:  04/06/19 1433    Narrative:       EXAMINATION: XR CHEST 1 VW-. 4/6/2019 2:28 PM CDT     CHEST, ONE VIEW:     HISTORY: Altered mental status     COMPARISON: 3/25/2019 and 3/20/2019     A single frontal chest radiograph was obtained.     FINDINGS:     The heart is generous. Diffuse perihilar interstitial prominence  observed with airspace opacities in both lower lobes greater on the  left. Pleural effusion suspected. Differential considerations include  pulmonary edema.     The bony structures are intact.                                     Impression:       1. Cardiomegaly with perihilar bronchovascular interstitial prominence  and lower lobe airspace opacities with probable effusions. Differential  considerations include pulmonary edema.     This report was finalized on 04/06/2019 14:29 by Dr. Boom Jaimes MD.          I have personally reviewed and interpreted the radiology studies and ECG obtained at time of admission.     Assessment / Plan      Assessment & Plan  Active Hospital Problems    Diagnosis   • Pulmonary edema     Plans    Urinary tract infection/yeast infection-Rocephin and Diflucan for now.  Blood culture ordered and urine culture ordered.  History of ESBL and MRSA.  Nystatin.     Pulmonary edema/atrial fibrillation/Hypertension/CHF-edema/shortness of breath.  BNP 64326.  Cont Eliquis and aspirin.  Cont with Cardizem, and Lipitor.  Continue Toprol-XL.  Echocardiogram 3/22/19 ejection fraction 60%, wall thickening-hypertrophy of left ventricle, sigmoid shape ventricular septum, diastolic dysfunction, no evidence of pulmonary  Medical Necessity Clause: This procedure was medically necessary because the lesion that was treated was: hypertension.  Start Bumex 1 mg twice daily.     Myeloproliferative neoplasm.  Chronic leukocytosis.  Patient white blood cells usually run about about 20,000.      Right upper extremity edema. Doppler ultrasound of right upper extremity is pending by ER physician.     Thrombocytosis.  Platelets run about 400-500.     Anemia.  Stable compared to previous hemoglobin.     History chronic renal failure.  Patient continue calcitriol.    Urinary obstruction.  Replace Alves cath.  Continue Flomax.    Reflux.  Pepcid and Zofran.    Gout.  Allopurinol.     Left arm pain/edema.  Doppler ultrasound right upper extremity pending.     Diabetes.  Sliding scale.  Cut back Levemir. HbA1c 3/8/19 8.6.    Chronic constipation.  Continue Colace.  MiraLAX daily.    COPD.  Continue Symbicort.  Duo nebs.     Hypothyroidism.  Patient to continue Synthroid. TSH.     Gout.  Patient to continue allopurinol.    Nutrition. Speech evaluation.     Deconditioning. PT and OT    Code Status: DNR/DNI    I discussed the patient's findings and my recommendations with: Patient and family    Estimated length of stay: 1-4 days.    Issa Adames MD   04/06/19   4:57 PM             Lab: [id number] Lab Facility: [id number] Detail Level: Detailed Was A Bandage Applied: Yes Size Of Lesion In Cm (Required): 0.6 X Size Of Lesion In Cm (Optional): 0 Depth Of Shave: dermis Biopsy Method: Dermablade Anesthesia Type: 1% lidocaine with epinephrine Hemostasis: Electrocautery Wound Care: Petrolatum Render Path Notes In Note?: No Consent was obtained from the patient. The risks and benefits to therapy were discussed in detail. Specifically, the risks of infection, scarring, bleeding, prolonged wound healing, incomplete removal, allergy to anesthesia, nerve injury and recurrence were addressed. Prior to the procedure, the treatment site was clearly identified and confirmed by the patient. All components of Universal Protocol/PAUSE Rule completed. Post-Care Instructions: I reviewed with the patient in detail post-care instructions. Patient is to keep the biopsy site dry overnight, and then apply bacitracin twice daily until healed. Patient may apply hydrogen peroxide soaks to remove any crusting. Notification Instructions: Patient will be notified of pathology results. However, patient instructed to call the office if not contacted within 2 weeks. Billing Type: Third-Party Bill Size Of Lesion In Cm (Required): 0.8 (0) independent

## (undated) DEVICE — BIPOLAR SEALER 23-112-1 AQM 6.0: Brand: AQUAMANTYS™

## (undated) DEVICE — Device

## (undated) DEVICE — TOWEL,OR,DSP,ST,BLUE,STD,4/PK,20PK/CS: Brand: MEDLINE

## (undated) DEVICE — DRSNG WND GZ CURAD OIL EMULSION 3X8IN STRL PK/3

## (undated) DEVICE — TRY PREP SCRB VAG PVP

## (undated) DEVICE — BIT DRL RF CALIB 4.9MM

## (undated) DEVICE — ANTIBACTERIAL UNDYED BRAIDED (POLYGLACTIN 910), SYNTHETIC ABSORBABLE SUTURE: Brand: COATED VICRYL

## (undated) DEVICE — PK HIP TOTL 30

## (undated) DEVICE — 4-PORT MANIFOLD: Brand: NEPTUNE 2

## (undated) DEVICE — BNDG ELAS W/CLIP 6IN 10YD LF STRL

## (undated) DEVICE — MARKR SKIN W/RULR AND LBL

## (undated) DEVICE — 3M™ WARMING BLANKET, UPPER BODY, 10 PER CASE, 42268: Brand: BAIR HUGGER™

## (undated) DEVICE — SUT GUT CHRM 2/0 CT1 36IN 923H

## (undated) DEVICE — PK TURNOVER RM ADV

## (undated) DEVICE — GLV SURG TRIUMPH MICRO PF LTX 7.5 STRL

## (undated) DEVICE — SPNG GZ WOVN 4X4IN 12PLY 10/BX STRL

## (undated) DEVICE — GLV SURG TRIUMPH MICRO PF LTX 8 STRL